# Patient Record
Sex: MALE | Race: WHITE
[De-identification: names, ages, dates, MRNs, and addresses within clinical notes are randomized per-mention and may not be internally consistent; named-entity substitution may affect disease eponyms.]

---

## 2018-08-27 NOTE — XMS REPORT
Continuity of Care Document

 Created on:2017



Patient:TYRA BRUNO

Sex:Male

:1970

External Reference #:[id number]





Demographics







 Address  [address] [place] [address]

 

 Phone  [phone]

 

 Phone  [phone]

 

 Preferred Language  Unknown

 

 Marital Status  Unknown

 

 Jewish Affiliation  Unknown

 

 Race  Unknown

 

 Ethnic Group  Unknown









Author







 Organization  [facility]









Problems







 Problem  Status  Onset  Classification  Date  Comments  Source



     Date    Reported    



             



             



             

 

 RIGHT RECURRENT  Active  20        [facility]



 INGUINAL HERNIA    17        Medical



 AND INCI            Center



             



             

 

 MORBID OBESITY  Active  10/14/20        [place] [id number]        Medical



             Center



             



             

 

 Heart  Active    Problem  2017    [facility]



 transplanted            [facility]



             Center



             



             

 

 Hypertension  Active    Problem  2017    [facility]



             



             

 

 MI (<span  Resolved    Problem  2017    [facility] [place]



 ID="[id number]            Medical



 ">Confirmed</[facility]            Center



 n>)            



             

 

 Morbid obesity  Active    Problem  2017    [facility]



             



             

 

 Hiatal hernia  Active    Problem  2017    [facility]



             



             

 

 OBESITY,  Active          [facility] [place]



 UNSPECIFIED            [facility]



             Center



             



             







Medications







 Medication  Details  Route  Status  Patient  Ordering  Order  Source



         Instructions  Provider  Date  



               



               



               

 

 gabapentin 300 MG  300 mg, 1 cap,    Inactive        [facility] [place]



 Oral Capsule  Route: PO, Drug            Medical



   form: CAP,            Center



   ONCE, Dosing            



   Weight 102.273,            



   kg, Priority:            



   STAT, Start            



   date: 17            



   13:37:00 CDT,            



   Stop date:            



   17            



   13:37:00 CDT            



               



               

 

 Tramadol  100 mg, Route:    Inactive        [facility] [place]



   PO, Drug form:          2017  Medical



   TAB, ONCE,            Center



   Dosing Weight            



   102.273, kg,            



   Priority: STAT,            



   Start date:            



   17            



   13:36:00 CDT,            



   Stop date:            



   17            



   13:36:00 CDT            



               



               

 

 ketOROLAC (ANES)  IV, ONCE    Inactive        [address]



               Center



               



               

 

 ondansetron (ANES)  Route: IV, Drug    Inactive        [facility]



   form: INJ,          2017  Medical



   ONCE, Stop            Center



   date: 17            



   13:03:00 CDT            



               



               

 

 tramadol  50 mg=1 tab,    Active         [place]



 hydrochloride 50  PO, Q6H, PRN          2017  Medical



 MG Oral Tablet  Pain, X 10 day,            Center



   # 40 tab, 0            



   Refill(s)            



               



               

 

 Ondansetron  4 mg, Route:    Inactive        [facility] [place]



   IVP, Q6H,          2017  Medical



   Dosing Weight            Center



   102.273, kg,            



   Start date:            



   17            



   12:00:00 CDT,            



   Duration: 30            



   day, Stop date:            



   10/21/17            



   6:00:00 CDT            



               



               

 

 propofol (ANES)  Route: IV, Drug    Inactive        [facility]



   form: INJ,            Medical



   ONCE, Stop            Center



   date: 17            



   10:08:00 CDT            



               



               

 

 succinylcholine  Route: IV, Drug    Inactive         [place]



 (ANES)  form: INJ,            Medical



   ONCE, Stop            Center



   date: 17            



   10:08:00 CDT            



               



               

 

 fentaNYL (ANES)  Route: IV, Drug    Inactive        [facility]



   form: INJ,            Medical



   ONCE, Stop            Center



   date: 17            



   10:08:00 CDT            



               



               

 

 lidocaine (ANES)  Route: IV, Drug    Inactive        [facility]



   form: INJ,            Medical



   ONCE, Stop            Center



   date: 17            



   10:03:00 CDT            



               



               

 

 Acetaminophen  1,000 mg,    Inactive        [facility]



   Route: PO, Drug            Medical



   form: LIQ,            Center



   Q6Hnow, Dosing            



   Weight 102.273,            



   kg, Start date:            



   17            



   10:00:00 CDT,            



   Duration: 30            



   day, Stop date:            



   10/21/17            



   4:00:00 CDT            



               



               

 

 Tramadol  100 mg, Route:    Inactive        [facility] [place]



   PO, Drug form:          [facility]  Medical



   SUSP, Q6Hnow,            [place]



   Dosing Weight            



   102.273, kg,            



   Start date:            



   17            



   10:00:00 CDT,            



   Duration: 30            



   day, Stop date:            



   10/21/17            



   4:00:00 CDT            



               



               

 

 gabapentin  300 mg, Route:    Inactive        [facility] [place]



   PO, Drug form:          2017  Medical



   SUSP, Q8Hn,            Center



   Dosing Weight            



   102.273, kg,            



   Start date:            



   17            



   10:00:00 CDT,            



   Duration: 30            



   day, Stop date:            



   10/21/17            



   2:00:00 CDT            



               



               

 

 celecoxib  200 mg, Route:    Inactive        [facility]



   PO, [id number],          2017  Medical



   Dosing Weight            Center



   102.273, kg,            



   Start date:            



   17            



   10:00:00 CDT,            



   Duration: 30            



   day, Stop date:            



   10/20/17            



   22:00:00 CDT            



               



               

 

 rocuronium (ANES)  Route: IV, Drug    Inactive        [facility]



   form: INJ,            Medical



   ONCE, Stop            Center



   date: 17            



   9:43:00 CDT            



               



               

 

 famotidine (ANES)  Route: IV, Drug    Inactive        [facility]



   form: INJ,          2017  Medical



   ONCE, Stop            Center



   date: 17            



   9:33:00 CDT            



               



               

 

 ceFAZolin (ANES)  Route: IV, Drug    Inactive        [facility]



   form: INJ,            Medical



   ONCE, Stop            Center



   date: 17            



   9:33:00 CDT            



               



               

 

 dexamethasone  Route: IV, Drug    Inactive        [facility] [place]



 (ANES)  form: INJ,            Medical



   ONCE, Stop            Center



   date: 17            



   9:18:00 CDT            



               



               

 

 acetaminophen  Route: IV, Drug    Inactive        [facility] [place]



 (ANES) (ANES)  form: INJ,            Medical



   Start date:            Center



   17            



   8:54:00 CDT,            



   Stop date:            



   17            



   9:54:00 CDT            



               



               

 

 LR 1000 mL INJ  Route: IV,    Inactive        [facility] [place]



 (ANES)  Total Volume:          2017  Medical



   1,000, Start            Center



   date: 17            



   8:23:00 CDT,            



   Stop date:            



   17            



   9:23:00 CDT            



               



               

 

 Flomax  0.4 mg, 1 cap,    Inactive       [place]



   Route: PO, Drug            Medical



   form: CAP, PRE            Center



   OP, Start date:            



   17            



   5:00:00 CDT,            



   Duration: 1            



   doses or times,            



   Stop date:            



   17            



   23:00:00            



   CDTNotes: (Same            



   As: Flomax)            



   "Do Not Crush"            



               



               

 

 Ofirmev  1,000 mg, 100    No Longer        [facility]



   mL, Route: IV,    Active        Medical



   Drug form: INJ,            Center



   PRE OP, Start            



   date: 17            



   5:00:00 CDT,            



   Duration: 1            



   doses or times,            



   Stop date:            



   17            



   23:00:00            



   CDTNotes:            



   Infuse over 15            



   minutes  Do not            



   exceed 4gm/day            



   of            



   acetaminophen            



    *** MEDICATION            



   WASTE ***            



   Product Size:            



   1000 mg Product            



   Wasted:  ___ mg            



               



               

 

 ceFAZolin  2 gm, 100 mL,    Inactive        [facility]



   Route: IVPB,          2017  Medical



   Drug form: INJ,            Center



   PRE OP, Start            



   date: 17            



   5:00:00 CDT,            



   Duration: 1            



   doses or times,            



   Stop date:            



   17            



   23:00:00 CDT,            



   ABX Indication:            



   Surgical            



   ProphylaxisNote            



   s: Same as:            



   Ancef            



               

 

 Tacrolimus  2 mg, PO, QPM    Active       [place]



               Medical



               Center



               



               

 

 Sucralfate 100  10 mL, Route:    No Longer       [place]



 MG/ML Oral  PO, Drug form:    Active      2017  Medical



 Suspension  TAB, Q6H,            Center



   Dosing Weight            



   136.136, kg,            



   Start date:            



   17            



   18:00:00 CST,            



   Duration: 30            



   day, Stop date:            



   17            



   12:00:00            



   CSTNotes: May            



   interfere            



   w/enteral feeds            



   -  Take 1 hr            



   before or 2 hr            



   after antacids,            



   dairy pdt,            



   meals &            



   minerals -  On            



   empty stomach.            



   For patients            



   unable to            



   swallow tablet,            



   dissolve in            



   10mL - 30mL of            



   water or juice            



   and stir before            



   giving.  (Same            



   As: Carafate)            



               



               

 

 Protonix  40 mg, 1 tab,    Inactive       [place]



   Route: PO, Drug            Medical



   form: ECTAB,            [place]



   Daily, Dosing            



   Weight 136.136,            



   kg, Start date:            



   17            



   9:00:00 CST,            



   Stop date:            



   17            



   9:00:00            



   CSTNotes:            



   Tablet should            



   not be chewed            



   or crushed.            



   (Same as:            



   Protonix)            



               



               

 

 Cartia XT  240 mg, 1 cap,    Inactive        [facility]



   Route: PO, Drug            Medical



   form: ERCAP,            [place]



   Daily, Dosing            



   Weight 136.136,            



   kg, Start date:            



   17            



   9:00:00 CST,            



   Duration: 30            



   day, Stop date:            



   17            



   9:00:00            



   CSTNotes: (Same            



   as: Cardizem            



   CD)  Before            



   meals.  DO NOT            



   CRUSH.            



               

 

 Prednisone  10 mg, 1 tab,    Inactive        [facility]



   Route: PO, Drug            Medical



   form: TAB,            Center



   Daily, Dosing            



   Weight 136.136,            



   kg, Start date:            



   17            



   9:00:00 CST,            



   Duration: 30            



   day, Stop date:            



   17            



   9:00:00            



   CSTNotes: (Same            



   as: PredniSONE)            



    Take with            



   food.            



               

 

 Acetaminophen  1,000 mg=31.23    Active       [place]



   mL, PO, Q6Hnow,          2017  Medical



   0 Refill(s)            [place]



               



               

 

 gabapentin 50  300 mg=6 mL,    Active         [place]



 MG/ML Oral  PO, Q8Hnow, #          2017  Medical



 Solution  378 mL, 0            Center



   Refill(s)            



               



               

 

 tramadol  50 mg=1 tab,    Active       [place]



 hydrochloride 50  PO, Q6Hnow, PRN          2017  Medical



 MG Oral Tablet  Pain Score            Center



   6-10, X 14 day,            



   # 56 tab, 0            



   Refill(s)            



               



               

 

 Enoxaparin  30 mg, 0.3 mL,    Inactive       [place]



   Route: SUB-Q,          2017  Medical



   Drug form: INJ,            Center



   [id number],            



   Dosing Weight            



   136.136, kg,            



   Start date:            



   17            



   6:00:00 CST,            



   Duration: 30            



   day, Stop date:            



   17            



   18:00:00            



   CSTNotes: (Same            



   as: Lovenox)            



               



               

 

 Solu-CORTEF  100 mg, 2 mL,    Inactive       [place]



   Route: IVP,            Medical



   Drug form:            [place]



   PDR/INJ, ONCE,            



   Start date:            



   17            



   22:00:00 CST,            



   Stop date:            



   17            



   22:00:00            



   CSTNotes: (Same            



   as:            



   Solu-CORTEF)            



               



               

 

 Prograf  2.5 mg, 5 cap,    No Longer       [place]



   Route: PO, Drug    Active        Medical



   form: CAP,            Center



   [id number], Dosing            



   Weight 136.136,            



   kg, Start date:            



   17            



   21:26:00 CST,            



   Duration: 30            



   day, Stop date:            



   17            



   20:00:00            



   CSTNotes: Avoid            



   grapefruit and            



   grapefruit            



   juice. (Same            



   As: Prograf)            



               



               

 

 hydrocortisone 100  100 mg, Route:    Inactive         [place]



 mg injection  IV, Q6H, Dosing            Medical



   Weight 136.136,            Center



   kg, Start date:            



   17            



   21:00:00 CST,            



   Duration: 30            



   day, Stop date:            



   17            



   18:00:00 CST            



               



               

 

 Ondansetron  4 mg, 2 mL,    No Longer       [place]



   Route: IVP,    Active        Medical



   Drug form: INJ,            Center



   Q6H, Dosing            



   Weight 136.136,            



   kg, Start date:            



   17            



   18:00:00 CST,            



   Duration: 30            



   day, Stop date:            



   17            



   12:00:00            



   CSTNotes: (Same            



   as: Zofran)            



   *** MEDICATION            



   WASTE ***            



   Product Size:            



   4 mg Product            



   Wasted:  ___ mg            



               



               

 

 Acetaminophen  1,000 mg,    Inactive       [place]



   Route: IV,          2017  Medical



   ONCE, Dosing            Center



   Weight 136.136,            



   kg, Start date:            



   17            



   17:36:00 CST,            



   Stop date:            



   17            



   17:36:00 CST            



               



               

 

 Docusate  100 mg, 10 mL,    No Longer         [place]



   Route: PO, Drug    Active        Medical



   form: LIQ, BID,            Center



   Dosing Weight            



   136.136, kg,            



   Start date:            



   17            



   17:00:00 CST,            



   Stop date:            



   17            



   9:00:00            



   CSTNotes: (Same            



   as: Colace)            



               



               

 

 Cellcept  1,000 mg, 2    No Longer         [place]



   tab, Route: PO,    Active      2017  Medical



   Drug form: TAB,            Center



   [id number], Dosing            



   Weight 136.136,            



   kg, Start date:            



   17            



   17:00:00 CST,            



   Duration: 30            



   day, Stop date:            



   17            



   8:00:00            



   CSTNotes:            



   SEPARATE            



   ANTACIDS from            



   Cellcept by 2            



   hrs. (Same As:            



   CellCept)            



               



               

 

 Hydralazine  100 mg, 2 tab,    No Longer         [place]



 Hydrochloride 100  Route: PO, Drug    Active      2017  Medical



 MG Oral Tablet  form: TAB, TID,            Center



   Dosing Weight            



   136.136, kg,            



   Start date:            



   17            



   17:00:00 CST,            



   Duration: 30            



   day, Stop date:            



   17            



   13:00:00            



   CSTNotes: (Same            



   as: Apresoline)            



    May interfere            



   w/enteral            



   feedings  Take            



   With Food            



               



               

 

 Al hydroxide/Mg  30 mL, Route:    No Longer       [place]



 hydroxide/simethic  PO, Drug Form:    Active      2017  Medical



 one 200 mg-200  SUSP, Dosing            Center



 mg-20 mg/5 mL oral  Weight 136.136,            



 suspension  kg, Q4H, PRN            



   Indigestion,            



   Start date:            



   17            



   15:00:00 CST,            



   Duration: 30            



   day, Stop date:            



   17            



   14:59:00            



   CSTNotes:            



   (aluminum            



   hydroxide-magne            



   sium            



   hyd-simethicone            



   [phone]/5m            



   l 30 ml ud TINY)            



               



               

 

 Ondansetron  4 mg, 2 mL,    No Longer       [place]



   Route: IVP,    Active        Medical



   Drug form: INJ,            Center



   Q6H, Dosing            



   Weight 136.136,            



   kg, PRN Nausea            



   & Vomiting,            



   Start date:            



   17            



   15:00:00 CST,            



   Duration: 30            



   day, Stop date:            



   17            



   14:59:00            



   CSTNotes: (Same            



   as: Zofran)            



   *** MEDICATION            



   WASTE ***            



   Product Size:            



   4 mg Product            



   Wasted:  ___ mg            



               



               

 

 Hydromorphone  0.5 mg, 0.25    No Longer       [place]



   mL, Route: IVP,    Active        Medical



   Drug form: INJ,            Center



   Q4H, Dosing            



   Weight 136.136,            



   kg, PRN Pain            



   Score 7-10,            



   Start date:            



   17            



   15:00:00 CST,            



   Duration: 30            



   day, Stop date:            



   17            



   14:59:00            



   CSTNotes: Same            



   as Dilaudid            



               



               

 

 Oxycodone  10 mg, 10 mL,    No Longer       [place]



 Hydrochloride 5 MG  Route: PO, Drug    Active        Medical



 Oral Tablet  form: SOLN,            Center



   Q4H, Dosing            



   Weight 136.136,            



   kg, PRN Pain            



   Score 7-10,            



   Start date:            



   17            



   15:00:00 CST,            



   Stop date:            



   17            



   14:59:00            



   CSTNotes: (Same            



   as:'Roxicodone)            



   To be drawn up            



   in 3 mL syr            



               



               

 

 gabapentin  300 mg, Route:    Inactive       [place]



   PO, Q8Hnow,            Medical



   Dosing Weight            Center



   136.136, kg,            



   Start date:            



   17            



   15:00:00 CST,            



   Duration: 30            



   day, Stop date:            



   17            



   7:00:00 CST            



               



               

 

 Tramadol  100 mg, 2 tab,    No Longer       [place]



   Route: PO, Drug    Active        Medical



   form: TAB,            Center



   Q6Hnow, Dosing            



   Weight 136.136,            



   kg, Start date:            



   17            



   15:00:00 CST,            



   Duration: 30            



   day, Stop date:            



   17            



   9:00:00            



   CSTNotes: Not            



   to exceed            



   400mg/day.            



   (Same As:            



   Ultram)            



               

 

 Acetaminophen  1,000 mg,    Inactive       [place]



   Route: IVPB,          2017  Medical



   Q6Hnow, Dosing            Center



   Weight 136.136,            



   kg, Start date:            



   17            



   15:00:00 CST,            



   Duration: 30            



   day, Stop date:            



   17            



   9:00:00 CST            



               



               

 

 Al hydroxide/Mg  30 ml, Route:    Inactive       [place]



 hydroxide/simethic  PO, Drug Form:          2017  Medical



 one 200 mg-200  SUSP, Dosing            Center



 mg-20 mg/5 mL oral  Weight 136.136,            



 suspension  kg, Q6H, PRN            



   Indigestion,            



   Start date:            



   17            



   14:54:00 CST,            



   Duration: 30            



   day, Stop date:            



   17            



   14:53:00 CST            



               



               

 

 Calcium Chloride  1,000 mL, Rate:    No Longer       [place]



 0.0014 MEQ/ML /  125 ml/hr,    Active        Medical



 Potassium Chloride  Infuse over: 8            Center



 0.004 MEQ/ML /  hr, Route: IV,            



 Sodium Chloride  Dosing Weight            



 0.103 MEQ/ML /  136.136 kg,            



 Sodium Lactate  Total Volume:            



 0.028 MEQ/ML  1,000, Start            



 Injectable  date: 17            



 Solution  14:54:00 CST,            



   Duration: 30            



   day, Stop date:            



   17            



   14:53:00 CST            



               



               

 

 Cefoxitin  1 gm, Route:    Inactive       [place]



   IVPB, ONCE,          2017  Medical



   Dosing Weight            Center



   136.136, kg,            



   Start date:            



   17            



   14:10:00 CST,            



   Stop date:            



   17            



   14:10:00 CST            



               



               

 

 gabapentin  300 mg, 6 mL,    No Longer        [facility]



   Route: PO, Drug    Active        Medical



   form: SOLN,            Center



   Q8Hnow, Dosing            



   Weight 136.136,            



   kg, Start date:            



   17            



   13:00:00 CST,            



   Stop date:            



   17            



   5:00:00            



   CSTNotes: (Same            



   as: Neurontin)            



               



               

 

 Acetaminophen  1,000 mg, 31.23    No Longer       [place]



   mL, Route: PO,    Active        Medical



   Drug form: LIQ,            Center



   Q6Hnow, Dosing            



   Weight 136.136,            



   kg, Start date:            



   17            



   13:00:00 CST,            



   Stop date:            



   17            



   7:00:00            



   CSTNotes: Max            



   acetaminophen=4            



   000mg/day (4            



   gm/day).  (Same            



   as: Tylenol)            



               



               

 

 Promethazine  12.5 mg, 0.5    No Longer       [place]



   mL, Route: IM,    Active        Medical



   Drug form: INJ,            Center



   Q6H, Dosing            



   Weight 136.136,            



   kg, PRN Nausea            



   & Vomiting,            



   Start date:            



   17            



   12:26:00 CST,            



   Duration: 30            



   day, Stop date:            



   17            



   12:25:00            



   CSTNotes: Do            



   not give IV            



   push.  (Same            



   as: Phenergan)            



               



               

 

 Promethazine  12.5 mg, Route:    Inactive       [place]



   PO, Q6H, Dosing            Medical



   Weight 136.136,            Center



   kg, PRN Nausea            



   & Vomiting,            



   Start date:            



   17            



   12:24:00 CST,            



   Duration: 30            



   day, Stop date:            



   17            



   12:23:00 CST            



               



               

 

 Tramadol  50 mg, 1 tab,    No Longer       [place]



   Route: PO, Drug    Active        Medical



   form: TAB,            Center



   Q6Hnow, Dosing            



   Weight 136.136,            



   kg, PRN Pain            



   Score 6-10,            



   Start date:            



   17            



   12:24:00 CST,            



   Duration: 30            



   day, Stop date:            



   17            



   12:23:00            



   CSTNotes: Not            



   to exceed            



   400mg/day.            



   (Same As:            



   Ultram)            



               

 

 bupivacaine  20 mL, Route:    No Longer       [place]



 liposome  InFILtration(lo    Active        Medical



   lizabeth), Drug            Center



   Form: INJ,            



   ONCALL, Start            



   date: 17            



   12:00:00 CST,            



   Duration: 1            



   day, Stop date:            



   17            



   11:59:00            



   CSTNotes: (Same            



   as: Exparel)            



   *** NOT FOR IV            



   use****            



   Postoperative            



   analgesia:            



   Infiltration            



   (local): Dose            



   is based on            



   surgical site            



   and volume            



   required to            



   cover the area            



   (in general,            



   the maximum            



   total dose is            



   266 mg).            



   Bunionectomy: 7            



   mL into the            



   tissues            



   surrounding the            



   osteotomy and 1            



   mL into the            



   subcutaneous            



   tissue of the            



   surgical site            



   (total dose=8            



   mL [106 mg])            



   Hemorrhoidectom            



   y: 30 mL (20 mL            



   vial diluted            



   with 10 mL NS)            



   divided and            



   administered as            



   6 injections of            



   5 mL each            



   (total dose=30            



   mL [266 mg])            



               



               

 

 Lovenox  40 mg, 0.4 mL,    Inactive        [facility]



   Route: SUB-Q,            Medical



   Drug form: INJ,            Center



   ONCALL, Start            



   date: 17            



   11:00:00 CST,            



   Duration: 1            



   day, Stop date:            



   17            



   10:59:00            



   CSTNotes: (Same            



   as: Lovenox)            



               



               

 

 scopolamine  1 patch, Route:    Inactive      /  [facility] [place]



   TOP, Drug form:          2017  Medical



   ERFILM, PRE OP,            Center



   Start date:            



   17            



   6:00:00 CST,            



   Duration: 1            



   day, Stop date:            



   17            



   5:59:00            



   CSTNotes:            



   Change patch            



   every 72 hours            



    (Same as:            



   Transderm-Scop)            



               



               

 

 heparin  5,000 unit, 1    Inactive      [facility]



   mL, Route:          2017  Medical



   SUB-Q, Drug            Center



   form: INJ, PRE            



   OP, Start date:            



   17            



   6:00:00 CST,            



   Duration: 1            



   day, Stop date:            



   17            



   5:59:00            



   CSTNotes:            



   porcine heparin            



               



               

 

 Lovenox  40 mg, 0.4 mL,    Inactive      [facility]



   Route: SUB-Q,            Medical



   Drug form: INJ,            [place]



   [facility]            



   date: 17            



   6:00:00 CST,            



   Duration: 1            



   day, Stop date:            



   17            



   5:59:00            



   CSTNotes: (Same            



   as: Lovenox)            



               



               

 

 Mefoxin  2 gm, Route:    Inactive      [facility]



   IVPB, Drug            Medical



   form: INJ, PRE            Center



   OP, Priority:            



   STAT, Start            



   date: 17            



   5:37:00 CST,            



   Duration: 1            



   day, Stop date:            



   17            



   5:36:00            



   CSTNotes: (Same            



   As: Mefoxin)            



   *** MEDICATION            



   WASTE ***            



   Product Size:            



   2000 mg Product            



   Wasted:  ___ mg            



               



               

 

 Ofirmev  1,000 mg, 100    Inactive        [facility]



   mL, Route: IV,          2017  Medical



   Drug form: INJ,            Center



   PRE OP,            



   Priority: STAT,            



   Start date:            



   17            



   5:36:00 CST,            



   Duration: 1            



   day, Stop date:            



   17            



   5:35:00            



   CSTNotes:            



   Infuse over 15            



   minutes  Do not            



   exceed 4gm/day            



   of            



   acetaminophen            



    *** MEDICATION            



   WASTE ***            



   Product Size:            



   1000 mg Product            



   Wasted:  ___ mg            



               



               

 

 Ofirmev  1,000 mg, 100    Inactive       [place]



   mL, Route: IV,          2017  Medical



   Drug form: INJ,            Center



   PRE OP,            



   Priority: STAT,            



   Start date:            



   17            



   5:35:00 CST,            



   Duration: 1            



   day, Stop date:            



   17            



   5:34:00            



   CSTNotes:            



   Infuse over 15            



   minutes  Do not            



   exceed 4gm/day            



   of            



   acetaminophen            



    *** MEDICATION            



   WASTE ***            



   Product Size:            



   1000 mg Product            



   Wasted:  ___ mg            



               



               

 

 Pravastatin Sodium  40 mg=1 tab,    Active        [facility] [place]



 40 MG Oral Tablet  PO, Bedtime, #          2017  Medical



 [Pravachol]  30 tab, 0            Center



   Refill(s)            



               



               

 

 Hydralazine  100 mg=1 tab,    Active       [place]



 Hydrochloride 100  PO, TID, # 90            Medical



 MG Oral Tablet  tab, 3            Center



   Refill(s)            



               



               

 

 Hydralazine  0 Refill(s)    No Longer        [facility] [place]



       Active      2017  Medical



               [place]



               



               

 

 mycophenolate  1,000 mg=2 tab,    Active        [facility] [place]



 mofetil 500 MG  PO, BID, # 120          2017  Medical



 Oral Tablet  tab, 0            Center



 [Cellcept]  Refill(s)            



               



               

 

 pantoprazole 40 MG  40 mg=1 tab,    Active        [facility]



 Enteric Coated  PO, BID, # 30          2017  Medical



 Tablet [Protonix]  tab, 0            Center



   Refill(s)            



               



               

 

 24 HR Diltiazem  240 mg=1 cap,    Active        [facility] [place]



 Hydrochloride 240  PO, Daily, # 30          2017  Medical



 MG Extended  cap, 0            Center



 Release Capsule  Refill(s)            



 [Cartia]              



               

 

 magnesium oxide  400 mg=1 tab,    Active        [facility] [place]



 400 mg oral tablet  PO, Daily, 0          2017  Medical



   Refill(s)            Center



               



               

 

 calcium carbonate  CHEW, BID, 0    No Longer       [place]



 1000 mg oral  Refill(s)    Active      2017  Medical



 tablet, chewable              [place]



               



               

 

 predniSONE 10 mg  10 mg=1 tab,    Active        [facility]



 oral tablet  PO, Daily, # 30          2017  Medical



   tab, 3            Center



   Refill(s)            



               



               

 

 Prograf  2.5 mg, PO,    Active       [place]



   Q12H, 0            Medical



   Refill(s)            Center



               



               

 

 Sulfamethoxazole  1 tab, PO,    No Longer       [place]



 800 MG /  M-W-F, 0    Active        Medical



 Trimethoprim 160  Refill(s)            Center



 MG Oral Tablet              



 [Bactrim]              



               







Allergies, Adverse Reactions, Alerts







 Substance  Category  Reaction  Severity  Reaction  Status  Date  Comments  
Source



         type    Reported    



                 



                 



                 

 

 NKDA  Assertion      Drug  Active      [facility]



                 



                 







Immunizations







 Immunization  Date Given  Site  Status  Last Updated  Comments  Source



             



             







Results







 Order Name  Results  Value  Reference  Date  Interpretation  Comments  Source



       Range        



               



               



               

 

 ELECTROLYTE  AGAP  15.3 meq/L  10.0 -        [facility]



 S      20.0        [facility]



               



               



               

 

 ELECTROLYTE  eGFR  91        Result Comment: The eGFR is calculated using 
the CKD-EPI formula. In most young, healthy individuals the eGFR will be >90 mL/
min/1.73m2. The eGFR declines with age. An eGFR of 60-89 may be normal in  [facility] 
[place]



 S    mL/min/1.73        some populations, particularly the elderly, for 
whom the CKD-EPI formula has not been extensively validated. Use of the eGFR is 
not recommended in the following populations:  [address]



             Individuals with unstable creatinine concentrations, including 
pregnant patients and those with serious co-morbid conditions.  



               



             Patients with extremes in muscle mass or diet.  



               



             The data above are obtained from the National Kidney Disease 
Education Program (NKDEP) which additionally recommends that when the eGFR is 
used in patients with extremes of body mass index for purposes  



             of drug dosing, the eGFR should be multiplied by the estimated 
BMI.  

 

 ELECTROLYTE  BUN  13 mg/dL  7 - 22        [facility]



       [facility]



               



               



               

 

 ELECTROLYTE  Creatinine  0.98 mg/dL  0.50 -        [facility]  Lvl    1.40        [facility]



               



               



               

 

 ELECTROLYTE  Sodium Lvl  144 meq/L  135 - 145        [facility]



       [facility]



               



               



               

 

 ELECTROLYTE  Potassium  4.3 meq/L  3.5 - 5.1        [facility]  Lvl      /      [facility]



               



               



               

 

 ELECTROLYTE  Chloride Lvl  105 meq/L  95 - 109        [facility]              [facility]



               



               



               

 

 ELECTROLYTE  Calcium Lvl  9.1 mg/dL  8.5 - 10.5        [facility]              [facility]



               



               



               

 

 ELECTROLYTE  CO2  28 meq/L  24 - 32        [facility]



 S        /[address] [place] [address]



               



               



               

 

 ELECTROLYTE  Glucose Lvl  77 mg/dL  70 - 99        [facility]      [facility]



               



               



               

 

 HEMATOLOGY  Lymphocytes  0.8 K/CMM  1.0 - 5.5         [place]



   #            [facility]



               



               



               

 

 HEMATOLOGY  Segs-Bands #  2.8 K/CMM  1.5 - 8.1         [place]



         /2017      [facility]



               



               



               

 

 HEMATOLOGY  Monocytes #  0.4 K/CMM  0.0 - 0.8         [place]



         /2017      [facility]



               



               



               

 

 HEMATOLOGY  Eosinophils  0.1 K/CMM  0.0 - 0.5        [facility]



         [facility]



               



               



               

 

 HEMATOLOGY  Eosinophils  2.4 %  0.0 - 4.0         [place]



         /2017      [facility]



               



               



               

 

 HEMATOLOGY  Basophils  0.7 %  0.0 - 1.0         [place]



         /2017      [facility]



               



               



               

 

 HEMATOLOGY  Monocytes  10.7 %  2.0 - 12.0         [place]



         /2017      [facility]



               



               



               

 

 HEMATOLOGY  Lymphocytes  19.7 %  20.0 -        [facility]



       40.0        [facility]



               



               



               

 

 HEMATOLOGY  Segs  66.5 %  45.0 -         [place]



       75.0        [facility]



               



               



               

 

 HEMATOLOGY  MPV  8.5 fL  7.4 - 10.4         [place]



         /2017      [facility]



               



               



               

 

 HEMATOLOGY  Platelet  133 K/CMM  133 - 450         [place]



         /2017      [facility]



               



               



               

 

 HEMATOLOGY  RDW  13.9 %  11.5 -        [facility]



       14.5        [facility]



               



               



               

 

 HEMATOLOGY  MCHC  33.4 g/dL  32.0 -         [place]



       36.0        [facility]



               



               



               

 

 HEMATOLOGY  MCH  28.7 pg  27.0 -         [place]



       31.0        [facility]



               



               



               

 

 HEMATOLOGY  MCV  85.8 fL  80.0 -        [facility]



       94.0        [facility]



               



               



               

 

 HEMATOLOGY  Hct  44.0 %  42.0 -         [place]



       54.0        [facility]



               



               



               

 

 HEMATOLOGY  Hgb  14.7 g/dL  14.0 -         [place]



       18.0        [facility]



               



               



               

 

 HEMATOLOGY  RBC  5.13 M/CMM  4.70 -         [place]



       6.10        [facility]



               



               



               

 

 HEMATOLOGY  WBC  4.1 K/CMM  3.7 - 10.4         [place]



         /2017      [facility]



               



               



               

 

 CHEM PANEL  Phosphorus  3.6 mg/dL  2.5 - 4.5         [place]



         /2017      [facility]



               



               



               

 

 CHEM PANEL  Magnesium  2.3 mg/dL  1.8 - 2.4        [facility]      /[facility]      [facility]



               



               



               

 

 ELECTROLYTE  AGAP  12.6 meq/L  10.0 -        [facility]      20.0        [facility]



               



               



               

 

 ELECTROLYTE  eGFR  55        Result Comment: The eGFR is calculated using 
the CKD-EPI formula. In most young, healthy individuals the eGFR will be >90 mL/
min/1.73m2. The eGFR declines with age. An eGFR of 60-89 may be normal in  [facility] 
[place]



 S    mL/min/1.73    /    some populations, particularly the elderly, for 
whom the CKD-EPI formula has not been extensively validated. Use of the eGFR is 
not recommended in the following populations:  [address]



             Individuals with unstable creatinine concentrations, including 
pregnant patients and those with serious co-morbid conditions.  



               



             Patients with extremes in muscle mass or diet.  



               



             The data above are obtained from the National Kidney Disease 
Education Program (NKDEP) which additionally recommends that when the eGFR is 
used in patients with extremes of body mass index for purposes  



             of drug dosing, the eGFR should be multiplied by the estimated 
BMI.  

 

 ELECTROLYTE  Calcium Lvl  8.6 mg/dL  8.5 - 10.5        [address]



               



               



               

 

 ELECTROLYTE  CO2  23 meq/L  24 - 32        [address]



               



               



               

 

 ELECTROLYTE  Chloride Lvl  102 meq/L  95 - 109        [address]



               



               



               

 

 ELECTROLYTE  Potassium  4.6 meq/L  3.5 - 5.1        [facility]            [facility]



               



               



               

 

 ELECTROLYTE  Sodium Lvl  133 meq/L  135 - 145        [address]



               



               



               

 

 ELECTROLYTE  Creatinine  1.49 mg/dL  0.50 -        [facility]    1.40        [facility]



               



               



               

 

 ELECTROLYTE  BUN  26 mg/dL  7 - 22        [address]



               



               



               

 

 ELECTROLYTE  Glucose Lvl  151 mg/dL  70 - 99        [address]



               



               



               

 

 HEMATOLOGY  Lymphocytes  0.6 K/CMM  1.0 - 5.5        [facility]      [facility]



               



               



               

 

 HEMATOLOGY  Monocytes #  0.5 K/CMM  0.0 - 0.8        [address]



               



               



               

 

 HEMATOLOGY  Segs-Bands #  8.3 K/CMM  1.5 - 8.1        [address]



               



               



               

 

 HEMATOLOGY  Monocytes  5.1 %  2.0 - 12.0        [address]



               



               



               

 

 HEMATOLOGY  Segs  88.1 %  45.0 -        [facility]



       75.0        [facility]



               



               



               

 

 HEMATOLOGY  Lymphocytes  6.6 %  20.0 -         [place]



       40.0        [facility]



               



               



               

 

 HEMATOLOGY  Basophils  0.2 %  0.0 - 1.0         [place]



         /2017      [facility]



               



               



               

 

 HEMATOLOGY  MCH  28.5 pg  27.0 -         [place]



       31.0        [facility]



               



               



               

 

 HEMATOLOGY  Platelet  158 K/CMM  133 - 450         [place]



         /2017      [facility]



               



               



               

 

 HEMATOLOGY  MCHC  33.6 g/dL  32.0 -        [facility]



       36.0        [facility]



               



               



               

 

 HEMATOLOGY  RDW  14.1 %  11.5 -        [facility]



       14.5        [facility]



               



               



               

 

 HEMATOLOGY  MCV  84.9 fL  80.0 -        [facility]



       94.0        [facility]



               



               



               

 

 HEMATOLOGY  RBC  5.28 M/CMM  4.70 -        [facility]



       6.10        [facility]



               



               



               

 

 HEMATOLOGY  Hgb  15.0 g/dL  14.0 -        [facility]



       18.0        [facility]



               



               



               

 

 HEMATOLOGY  WBC  9.4 K/CMM  3.7 - 10.4         [place]



         /2017      [facility]



               



               



               

 

 HEMATOLOGY  Hct  44.8 %  42.0 -        [facility]



       54.0        [facility]



               



               



               

 

 HEMATOLOGY  MPV  8.2 fL  7.4 - 10.4         [place]



         /2017      [facility]



               



               



               

 

 PARATHYROID  Ca Ion WB  1.08 mMol/L  1.05 -        [facility]



 PROFILE      1.      [facility]



               



               



               

 

 PARATHYROID  Ca Norm WB  1.05 mMol/L  1. -        [facility]



 PROFILE      1.      [facility]



               



               



               

 

 Upper GI  Upper GI  INDICATION: Status post gastric sleeve procedure.    
    -  [facility]



 Series w  Series     -  Medical



 water  water            Center



 soluble DX  soluble DX            



     PROCEDURE: A  view was obtained and images were made after the 
patient swallowed 20 mL of water-soluble contrast. A 2nd group of images were 
made after the patient swallowed a 2nd bolus of 20 mL of        Read by:  
Barrington Victor MD  



      water-soluble contrast. Right and left anterior oblique images were also 
made.        Dictated Date/time:  17 11:24  



             Electronically Signed by:  Barrington Victor MD                  
   17 11:50  



             FINAL REPORT  



               



     FINDINGS: The  view demonstrates 3 sternal wires. The bowel gas 
pattern is nonobstructive. There are streaky, linear densities in both lungs, 
left greater than right. A focal rounded density is pro          



     jected over the upper liver silhouette between the 90 10th posterior ribs.
          



               



               



               



     The initial AP film after 20 mL of contrast shows retention of contrast 
within the distal esophagus and minimal contrast within the gastric lumen. The 
images made after the 2nd 20 mL bolus shows that th          



     ere still some retained contrast within the esophagus. Contrast has passed 
through the stomach and is present within the 3rd portion of the duodenum. 
Oblique views demonstrate the same type findings. Th          



     ere was less contrast in the distal esophagus on the last image. There was 
no evidence for extravasation of contrast outside the gastrointestinal tract.  
        



               



               



               



     IMPRESSION:          



               



     1. Findings consistent with gastric sleeve procedure. There is no evidence 
for extravasation.          



               



               



               



               

 

 BLOOD BANK  Antibody  Negative          [facility] [employer] [facility] [employer]



 RESULTS  Scr      [facility]



     [facility]17 10[facility]



               



               



               

 

 BLOOD BANK  ABO/Rh  O NEG          [facility]              [facility]



               



               



               

 

 CHEM PANEL  A/G Ratio  1.6  0.7 - 1.6         [place]



         /2017      [facility]



               



               



               

 

 CHEM PANEL  Globulin  2.6 g/dL  2.7 - 4.2        [facility]



               [facility]



               



               



               

 

 CHEM PANEL  B/C Ratio  20  6 - 25        [facility]      [facility]



               



               



               

 

 CHEM PANEL  AGAP  15.4 meq/L  10.0 -        [facility]



       20.0        [facility]



               



               



               

 

 CHEM PANEL  eGFR  67        Result Comment: The eGFR is calculated using 
the CKD-EPI formula. In most young, healthy individuals the eGFR will be >90 mL/
min/1.73m2. The eGFR declines with age. An eGFR of 60-89 may be normal in  [facility] 
[place]



     mL/min/1.73    /    some populations, particularly the elderly, for 
whom the CKD-EPI formula has not been extensively validated. Use of the eGFR is 
not recommended in the following populations:  [address]



             Individuals with unstable creatinine concentrations, including 
pregnant patients and those with serious co-morbid conditions.  



               



             Patients with extremes in muscle mass or diet.  



               



             The data above are obtained from the National Kidney Disease 
Education Program (NKDEP) which additionally recommends that when the eGFR is 
used in patients with extremes of body mass index for purposes  



             of drug dosing, the eGFR should be multiplied by the estimated 
BMI.  

 

 CHEM PANEL  CO2  24 meq/L  24 - 32        [facility] [place]



         /2017      [facility]



               



               



               

 

 CHEM PANEL  Calcium Lvl  8.8 mg/dL  8.5 - 10.5         [place]



         /2017      [facility]



               



               



               

 

 CHEM PANEL  Total  6.8 g/dL  6.4 - 8.4         [place]



   Protein            [facility]



               



               



               

 

 CHEM PANEL  ALT  25 unit/L  0 - 65         [place]



         /2017      [facility]



               



               



               

 

 CHEM PANEL  Albumin Lvl  4.2 g/dL  3.5 - 5.0         [place]



         /2017      [facility]



               



               



               

 

 CHEM PANEL  AST  12 unit/L  0 - 37         [place]



         /2017      [facility]



               



               



               

 

 CHEM PANEL  Alk Phos  83 unit/L  39 - 136         [place]



         /2017      [facility]



               



               



               

 

 CHEM PANEL  Chloride Lvl  106 meq/L  95 - 109         [place]



         /2017      [facility]



               



               



               

 

 CHEM PANEL  Bili Total  0.5 mg/dL  0.2 - 1.3         [place]



         /2017      [facility]



               



               



               

 

 CHEM PANEL  Potassium  4.4 meq/L  3.5 - 5.1        [facility]            [facility]



               



               



               

 

 CHEM PANEL  Sodium Lvl  141 meq/L  135 - 145         [place]



         /2017      [facility]



               



               



               

 

 CHEM PANEL  Creatinine  1.26 mg/dL  0.50 -        [facility]



   Lvl    1.40  /      [facility]



               



               



               

 

 CHEM PANEL  BUN  25 mg/dL  7 - 22         [place]



         /2017      [facility]



               



               



               

 

 CHEM PANEL  Glucose Lvl  91 mg/dL  70 - 99         [place]



         /2017      [facility]



               



               



               

 

 HEMATOLOGY  RDW  14.2 %  11.5 -         [place]



       14.5        [facility]



               



               



               

 

 HEMATOLOGY  Hct  45.7 %  42.0 -         [place]



       54.0        [facility]



               



               



               

 

 HEMATOLOGY  Platelet  149 K/CMM  133 - 450         [place]



         /2017      [facility]



               



               



               

 

 HEMATOLOGY  MPV  8.4 fL  7.4 - 10.4         [place]



         /2017      [facility]



               



               



               

 

 HEMATOLOGY  MCV  82.9 fL  80.0 -         [place]



       94.0        [facility]



               



               



               

 

 HEMATOLOGY  MCHC  34.2 g/dL  32.0 -         [place]



       36.0        [facility]



               



               



               

 

 HEMATOLOGY  MCH  28.3 pg  27.0 -         [place]



       31.0        [facility]



               



               



               

 

 HEMATOLOGY  WBC  6.1 K/CMM  3.7 - 10.4         [place]



         /2017      [facility]



               



               



               

 

 HEMATOLOGY  Hgb  15.6 g/dL  14.0 -        [facility] [place]



       18.0        [facility]



               



               



               

 

 HEMATOLOGY  RBC  5.52 M/CMM  4.70 -         [place]



       6.10        [facility]



               



               



               

 

 HEMATOLOGY  Basophils  0.7 %  0.0 - 1.0        [facility]



               [facility]



               



               



               

 

 HEMATOLOGY  Segs-Bands #  4.4 K/CMM  1.5 - 8.1        [facility]



               [facility]



               



               



               

 

 HEMATOLOGY  Monocytes #  0.6 K/CMM  0.0 - 0.8        [facility]



               [facility]



               



               



               

 

 HEMATOLOGY  Lymphocytes  1.1 K/CMM  1.0 - 5.5        [facility]



         [facility]



               



               



               

 

 HEMATOLOGY  Eosinophils  0.1 K/CMM  0.0 - 0.5        [facility]



         [facility]



               



               



               

 

 HEMATOLOGY  Eosinophils  0.9 %  0.0 - 4.0        [facility]



               [facility]



               



               



               

 

 HEMATOLOGY  Lymphocytes  17.6 %  20.0 -        [facility]



       40.0        [facility]



               



               



               

 

 HEMATOLOGY  Monocytes  9.3 %  2.0 - 12.0         [place]



         /2017      [facility]



               



               



               

 

 HEMATOLOGY  Segs  71.5 %  45.0 -         [place]



       75.0        [facility]



               



               



               

 

 SPECIAL  Hgb A1C  5.3 %  <=5.6 %        [facility]



 CHEMISTRY              [facility]



               



               



               







Vital Signs







 Vital Sign  Value  Date  Comments  Source



         

 

 Systolic (mm Hg)  133  2017    [facility]



         

 

 Diastolic (mm Hg)  75  2017    [facility]



         

 

 Respitory Rate  16  2017    [facility]



         

 

 Respitory Rate  14  2017    [facility]



         

 

 Systolic (mm Hg)  136  2017    [facility]



         

 

 Diastolic (mm Hg)  79  2017    [facility]



         

 

 Respitory Rate  12  2017    [facility]



         

 

 Systolic (mm Hg)  143  2017    [facility]



         

 

 Diastolic (mm Hg)  85  2017    [facility]



         

 

 Heart Rate  83  2017    [facility]



         

 

 Weight  102.273  2017    [facility]



         

 

 BMI Calculated  27.45  2017    [facility]



         

 

 Height  193.04 cm  2017    [facility]



         

 

 Temperature Oral (F)  98.0 F  2017    [facility]



         

 

 Systolic (mm Hg)  125  2017    [facility]



         

 

 Diastolic (mm Hg)  87  2017    [facility]



         

 

 Respitory Rate  19  2017    [facility]



         

 

 Heart Rate  97  2017    [facility]



         

 

 Heart Rate  98  2017    [facility]



         

 

 Respitory Rate  20  2017    [facility]



         

 

 Temperature Oral (F)  97.6 F  2017    [facility]



         

 

 Systolic (mm Hg)  147  2017    [facility]



         

 

 Diastolic (mm Hg)  95  2017    [facility]



         

 

 Respitory Rate  20  2017    [facility]



         

 

 Heart Rate  98  2017    [facility]



         

 

 Systolic (mm Hg)  144  2017    [facility]



         

 

 Diastolic (mm Hg)  97  2017    [facility]



         

 

 Temperature Oral (F)  98.3 F  2017    [facility]



         

 

 Weight  136.136  2017    [facility]



         

 

 Height  193.04 cm  2017    [facility]



         

 

 BMI Calculated  36.53  2017    [facility]



         

 

 Weight  136.136  2017    [facility]



         

 

 Height  193.04 cm  2017    [facility]



         

 

 BMI Calculated  36.53  2017    [facility]



         

 

 Weight  143.636  2017    [facility]



         

 

 BMI Calculated  38.55  2017    [facility]



         

 

 Height  193.04 cm  2017    [facility]



         







Encounters







 Location  Location  Encounter  Encounter  Reason  Attending  ADM  DC  Status  
Source



   Details  Type  Number  For  Provider  Date  Date    



         Visit          



                   



                   



                   

 

 [facility]  [id number]    [facility]      [facility]          [place]    [facility]



                   



                   



                   

 

 [facility]  [id number]    [place]      [facility]    Surgery      [place]    [facility]



                   



                   



                   







Procedures







 Procedure  Code  Date  Perfomer  Comments  Source



           



           

 

 Heart transplant  [id number]        [facility]



           

 

 Hernia repair  [id number]        [facility]



           

 

 Laparoscopic sleeve  [id number]        [facility]

## 2018-08-27 NOTE — EDPHYS
Physician Documentation                                                                           

 [facility]                                                                

Name: Gomez Romero                                                                               

Age: 48 yrs                                                                                       

Sex: Male                                                                                         

: 1970                                                                                   

MRN: [id number]                                                                                   

Arrival Date: 2018                                                                          

Time: 23:23                                                                                       

Account#: [id number]                                                                            

Bed 13                                                                                            

Private MD:                                                                                       

ED Physician Ozzy Wright                                                                     

HPI:                                                                                              

                                                                                             

00:29 This 48 yrs old  Male presents to ER via Ambulatory with complaints of Leg     jr8 

      Pain.                                                                                       

00:29 The patient presents with pain. The complaints affect the medial aspect of right calf.  jr8 

      Onset: The symptoms/episode began/occurred acutely, yesterday. Modifying factors: The       

      symptoms are alleviated by nothing. the symptoms are aggravated by nothing. Severity of     

      symptoms: At their worst the symptoms were mild, in the emergency department the            

      symptoms are unchanged. The patient has not experienced similar symptoms in the past.       

      The patient has not recently seen a physician. Patient stated that he woke up with red      

      spot to medial right leg. Stated that it had open wound to it and now has bruising          

      around it. Denies trauma. Unknown what happened .                                           

                                                                                                  

Historical:                                                                                       

- Allergies:                                                                                      

                                                                                             

23:42 unknown rejection medication;                                                           ak1 

- Home Meds:                                                                                      

23:42 pt unable to provide [Active];                                                          ak1 

- PMHx:                                                                                           

23:42 pt denies;                                                                              ak1 

- PSHx:                                                                                           

23:42 Hernia repair; cardiac stent; heart transplant;                                         ak1 

                                                                                                  

- Immunization history:: Adult Immunizations unknown, Last tetanus immunization:                  

  unknown, pt stated "I have doctors watching me  I know I am up to date on                   

  everything" pt could not provide a date or approximate date for last Tetanus. .                 

- Social history:: Smoking status: Patient/guardian denies using tobacco.                         

- Ebola Screening: : No symptoms or risks identified at this time.                                

                                                                                                  

                                                                                                  

ROS:                                                                                              

                                                                                             

00:29 Eyes: Negative for injury, pain, redness, and discharge, ENT: Negative for injury,      jr8 

      pain, and discharge, Neck: Negative for injury, pain, and swelling, Cardiovascular:         

      Negative for chest pain, palpitations, and edema, Respiratory: Negative for shortness       

      of breath, cough, wheezing, and pleuritic chest pain, Abdomen/GI: Negative for              

      abdominal pain, nausea, vomiting, diarrhea, and constipation, Back: Negative for injury     

      and pain, MS/Extremity: Negative for injury and deformity, Neuro: Negative for              

      headache, weakness, numbness, tingling, and seizure.                                        

      Skin: Positive for ecchymosis, erythema, lesions, of the medial aspect of right calf.       

                                                                                                  

Exam:                                                                                             

00:29 Cardiovascular:  Regular rate and rhythm with a normal S1 and S2.  No gallops, murmurs, jr8 

      or rubs.  Normal PMI, no JVD.  No pulse deficits. Respiratory:  Lungs have equal breath     

      sounds bilaterally, clear to auscultation and percussion.  No rales, rhonchi or wheezes     

      noted.  No increased work of breathing, no retractions or nasal flaring. MS/ Extremity:     

       Pulses equal, no cyanosis.  Neurovascular intact.  Full, normal range of motion.           

      Neuro:  Awake and alert, GCS 15, oriented to person, place, time, and situation.            

      Cranial nerves II-XII grossly intact.  Motor strength 5/5 in all extremities.  Sensory      

      grossly intact.  Cerebellar exam normal.  Normal gait.                                      

00:29 Skin: small erythematic scabbed lesion to medial right leg. Surround ecchymosis             

      present. No drainage or cellulitis seen. No distinct bite marks noted .                     

                                                                                                  

Vital Signs:                                                                                      

                                                                                             

23:39  / 76; Pulse 79; Resp 18; Temp 97.9(O); Pulse Ox 99% on R/A; Weight 97.52 kg (R); ak1 

      Height 6 ft. 4 in. (193.04 cm) (R); Pain 0/10;                                              

23:53  / 94; Pulse 78; Resp 17; Pulse Ox 99% on R/A; Pain 0/10;                         bs1 

23:39 Body Mass Index 26.17 (97.52 kg, 193.04 cm)                                             ak1 

                                                                                                  

MDM:                                                                                              

23:33 Patient medically screened.                                                             jr8 

23:44 Data reviewed: vital signs, nurses notes, and as a result, I will discharge patient.    jr8 

      Data interpreted: Pulse oximetry: on room air is 99 %. Interpretation: normal.              

      Counseling: I had a detailed discussion with the patient and/or guardian regarding: the     

      historical points, exam findings, and any diagnostic results supporting the                 

      discharge/admit diagnosis, the need for outpatient follow up, a family practitioner, to     

      return to the emergency department if symptoms worsen or persist or if there are any        

      questions or concerns that arise at home.                                                   

                                                                                                  

Administered Medications:                                                                         

No medications were administered                                                                  

                                                                                                  

                                                                                                  

Disposition:                                                                                      

18 23:45 Discharged to Home. Impression: Local infection of the skin and subcutaneous       

  tissue, unspecified.                                                                            

- Condition is Stable.                                                                            

- Discharge Instructions: Cellulitis, Adult, Spider Bite.                                         

- Prescriptions for Keflex 500 mg Oral Capsule - take 1 capsule by ORAL route every 6             

  hours for 7 days; 28 capsule.                                                                   

- Medication Reconciliation Form, Thank You Letter, Antibiotic Education, Prescription            

  Opioid Use form.                                                                                

- Follow up: Private Physician; When: 2 - 3 days; Reason: Recheck today's complaints,             

  Continuance of care, Re-evaluation by your physician.                                           

- Problem is new.                                                                                 

- Symptoms have improved.                                                                         

                                                                                                  

                                                                                                  

                                                                                                  

Addendum:                                                                                         

2018                                                                                        

     06:25 Co-signature as Attending Physician, Ozzy Wright MD I agree with the assessment and t
w4

           plan of care. Attestation: The patient's history, exam findings, diagnostics, and a    

           summary of any interventions or procedures was reviewed in detail with Ozzy Wright MD.                                                                                    

                                                                                                  

Signatures:                                                                                       

Geoff Carlson PA PA   jr8                                                  

Elza Galan, RN                       RN   ak1                                                  

Jazmine Lugo RN                   RN   bs1                                                  

Ozzy Wright MD MD   tw4                                                  

                                                                                                  

Corrections: (The following items were deleted from the chart)                                    

                                                                                             

23:53 23:45 2018 23:45 Discharged to Home. Impression: Local infection of the skin and  bs1 

      subcutaneous tissue, unspecified. Condition is Stable. Forms are Medication                 

      Reconciliation Form, Thank You Letter, Antibiotic Education, Prescription Opioid Use.       

      Follow up: Private Physician; When: 2 - 3 days; Reason: Recheck today's complaints,         

      Continuance of care, Re-evaluation by your physician. Problem is new. Symptoms have         

      improved. jr8                                                                               

                                                                                                  

**************************************************************************************************

## 2018-08-27 NOTE — ER
Nurse's Notes                                                                                     

 [facility]                                                                

Name: Gomez Romero                                                                               

Age: 48 yrs                                                                                       

Sex: Male                                                                                         

: 1970                                                                                   

MRN: [id number]                                                                                   

Arrival Date: 2018                                                                          

Time: 23:23                                                                                       

Account#: [id number]                                                                            

Bed 13                                                                                            

Private MD:                                                                                       

Diagnosis: Local infection of the skin and subcutaneous tissue, unspecified                       

                                                                                                  

Presentation:                                                                                     

                                                                                             

23:39 Presenting complaint: Patient states: right lower leg with healing wound and bruising   ak1 

      since last Thursday. pt denies injury. pt stated he woke up Thursday with the wound to      

      his right lower leg. Transition of care: patient was not received from another setting      

      of care. Onset of symptoms is unknown. Risk Assessment: Do you want to hurt yourself or     

      someone else? Patient reports no desire to harm self or others. Initial Sepsis Screen:      

      Does the patient meet any 2 criteria? No. Patient's initial sepsis screen is negative.      

      Does the patient have a suspected source of infection? No. Patient's initial sepsis         

      screen is negative. Care prior to arrival: None.                                            

23:39 Method Of Arrival: Ambulatory                                                           ak1 

23:39 Acuity: MIL[place] 4                                                                           ak1 

                                                                                                  

Triage Assessment:                                                                                

23:43 General: Appears in no apparent distress. Behavior is calm. Pain: Complains of pain in  ak1 

      right ankle.                                                                                

                                                                                                  

Historical:                                                                                       

- Allergies:                                                                                      

23:42 unknown rejection medication;                                                           ak1 

- Home Meds:                                                                                      

23:42 pt unable to provide [Active];                                                          ak1 

- PMHx:                                                                                           

23:42 pt denies;                                                                              ak1 

- PSHx:                                                                                           

23:42 Hernia repair; cardiac stent; heart transplant;                                         ak1 

                                                                                                  

- Immunization history:: Adult Immunizations unknown, Last tetanus immunization:                  

  unknown, pt stated "I have doctors watching me  I know I am up to date on                   

  everything" pt could not provide a date or approximate date for last Tetanus. .                 

- Social history:: Smoking status: Patient/guardian denies using tobacco.                         

- Ebola Screening: : No symptoms or risks identified at this time.                                

                                                                                                  

                                                                                                  

Screenin:43 Abuse screen: Denies threats or abuse. Denies injuries from another. Nutritional        ak1 

      screening: No deficits noted. Tuberculosis screening: No symptoms or risk factors           

      identified. Fall Risk None identified.                                                      

                                                                                                  

Assessment:                                                                                       

23:40 General: Appears in no apparent distress. comfortable. Pain: Complains of pain in left  bs1 

      lower leg.                                                                                  

23:40 Neuro: Level of Consciousness is awake, alert, obeys commands, Oriented to person,      bs1 

      place, time, situation, Appropriate for age. Cardiovascular: Denies chest pain,             

      shortness of breath, Heart tones S1 S2 present Capillary refill < 3 seconds Patient's       

      skin is warm and dry. Respiratory: Airway is patent Trachea midline Respiratory effort      

      is even, unlabored, Breath sounds are clear bilaterally. GI: No signs and/or symptoms       

      were reported involving the gastrointestinal system. : No signs and/or symptoms were      

      reported regarding the genitourinary system. EENT: No signs and/or symptoms were            

      reported regarding the EENT system. Derm: Skin is intact, scab matheus noted to left lower     

      leg, bruising noted around site. Musculoskeletal: Circulation, motion, and sensation        

      intact. Capillary refill < 3 seconds.                                                       

23:52 Reassessment: Patient states understanding of discharge instructions.                   bs1 

                                                                                                  

Vital Signs:                                                                                      

23:39  / 76; Pulse 79; Resp 18; Temp 97.9(O); Pulse Ox 99% on R/A; Weight 97.52 kg (R); ak1 

      Height 6 ft. 4 in. (193.04 cm) (R); Pain 0/10;                                              

23:53  / 94; Pulse 78; Resp 17; Pulse Ox 99% on R/A; Pain 0/10;                         bs1 

23:39 Body Mass Index 26.17 (97.52 kg, 193.04 cm)                                             ak1 

                                                                                                  

ED Course:                                                                                        

23:23 Patient arrived in ED.                                                                  ds1 

23:26 Jazmine Lugo, RN is Primary Nurse.                                                 bs1 

23:33 Geoff Carlson PA is PHCP.                                                               jr8 

23:33 Ozzy Wright MD is Attending Physician.                                            jr8 

23:39 Arm band placed on Patient placed in an exam room, on a stretcher, Patient notified of  ak1 

      wait time.                                                                                  

23:41 Triage completed.                                                                       ak1 

23:43 Patient has correct armband on for positive identification. Bed in low position. Call   ak1 

      light in reach. Side rails up X 1. Adult w/ patient.                                        

23:52 No provider procedures requiring assistance completed. Patient admitted, IV remains in  bs1 

      place.                                                                                      

                                                                                                  

Administered Medications:                                                                         

No medications were administered                                                                  

                                                                                                  

                                                                                                  

Outcome:                                                                                          

23:45 Discharge ordered by MD.                                                                jr8 

23:52 Discharged to home ambulatory, with significant other.                                  bs1 

23:52 Condition: stable                                                                           

23:52 Discharge instructions given to patient, family, Instructed on discharge instructions,      

      follow up and referral plans. medication usage, Demonstrated understanding of               

      instructions, follow-up care, medications, Prescriptions given X 1.                         

23:53 Patient left the ED.                                                                    bs1 

                                                                                                  

Signatures:                                                                                       

Rachel Orr                                ds1                                                  

Geoff Carlson PA PA   jr8                                                  

Elza Galan RN                       RN   ak1                                                  

Jazmine Lugo RN                   RN   bs1                                                  

                                                                                                  

**************************************************************************************************

## 2018-08-27 NOTE — XMS REPORT
Clinical Summary

 Created on:2018



Patient:Tyra Bruno

Sex:Male

:1970

External Reference #:[id number]





Demographics







 Address  [address] [place] [address]

 

 Mobile Phone  [phone]

 

 Home Phone  [phone]

 

 Email Address  [email or web address]

 

 Preferred Language  English

 

 Marital Status  Unknown

 

 Holiness Affiliation  Unknown

 

 Race  White

 

 Ethnic Group  Not  or 









Author







 Organization  [facility]

 

 Address  [address] [place] [address]

 

 Phone  [phone]









Support







 Name  Relationship  Address  Phone

 

 Unavailable  Unavailable  Unavailable  [phone]

 

 Unavailable  Unavailable  Unavailable  [phone]









Care Team Providers







 Name  Role  Phone

 

 Unavailable  Primary Care Provider  Unavailable









Allergies







 Active Allergy  Reactions  Severity  Noted Date  Comments

 

 Foscarnet  Nausea And Vomiting,  High  2015







   Swelling      Pt has no reaction to



         current medication



         administration regimen:



         premedicate with Benadryl,



         Zofran, Tylenol, then 500cc



         NS bolus, then diluted



         foscarnet over 2 hours,



         then another 500cc NS



         bolus. Electrolyte labs



         after every dose,



         electrolyte replacement



         protocol in place.







Current Medications







 Prescription  Sig.  Disp.  Refills  Start Date  End Date  Status

 

 mycophenolate  Take 3  180 capsule  11  2018  Active



 (CELLCEPT) 250 mg  capsules (750        9  



 capsule  mg total) by          



   mouth 2 (two)          



   times daily.          

 

 tacrolimus  Take 1mg by  90 capsule  11  2018    Active



 (PROGRAF) 1 MG  mouth in the          



 capsule  morning.  Take          



   2mg by mouth          



   at night. .          

 

 aspirin 81 MG EC  Take 1 tablet  30 tablet  11  2018  Active



 tablet  (81 mg total)        9  



   by mouth          



   daily.          

 

 famotidine (PEPCID)  Take 1 tablet  60 tablet  11  2018  
Active



 20 MG tablet  (20 mg total)        9  



   by mouth 2          



   (two) times          



   daily.          

 

 pravastatin  Take 1 tablet  30 tablet  11  2018  Active



 (PRAVACHOL) 40 MG  (40 mg total)        9  



 tablet  by mouth          



   daily.          

 

 mycophenolate  Take 3  180 capsule  11  2017  Discontinued



 (CELLCEPT) 250 mg  capsules (750        8  



 capsule  mg total) by          



   mouth 2 (two)          



   times daily.          

 

 predniSONE  Take 1 tablet  30 tablet  11  2017  Discontinued



 (DELTASONE) 5 MG  (5 mg total)        7  



 tablet  by mouth          



   daily.          

 

 tacrolimus  Take 1mg by  90 capsule  11  2017  Discontinued



 (PROGRAF) 1 MG  mouth in the        8  



 capsule  morning.  Take          



   2mg by mouth          



   at night. .          

 

 albuterol HFA  Inhale 1 puff  1 Inhaler  0  2017  Discontinued



 (PROVENTIL HFA) 90  by mouth via        8  



 mcg/actuation  inhaler every          



 inhaler  6 (six) hours          



   as needed for          



   Wheezing.          

 

 azelastine  1 spray by  30 mL  0  2017  Discontinued



 (ASTELIN) 137 mcg  Nasal route 2        8  



 (0.1 %) nasal spray  (two) times          



   daily Use in          



   each nostril          



   as directed.          

 

 predniSONE  Take 1 tablet  30 tablet  11  2017  Discontinued



 (DELTASONE) 2.5 MG  (2.5 mg total)        7  



 tablet  by mouth          



   daily.          

 

 pravastatin  Take 1 tablet    0  2017  Discontinued



 (PRAVACHOL) 40 MG  (40 mg total)        8  



 tablet  by mouth          



   daily.          

 

 aspirin 81 MG EC  Take 1 tablet    0  2017  Discontinued



 tablet  (81 mg total)        8  



   by mouth          



   daily.          

 

 famotidine (PEPCID)  TAKE ONE  60 tablet  11  2018  
Discontinued



 20 MG tablet  TABLET BY        8  



   MOUTH TWICE          



   DAILY          

 

 famotidine (PEPCID)  Take 1 tablet  180 tablet  3  2018  
Discontinued



 20 MG tablet  (20 mg total)        8  



   by mouth 2          



   (two) times          



   daily.          









 Hospital, Clinic, or  Ordered Dose  Route  Frequency  Start Date  End Date  
Status



 Other Facility            



 Administered            



 Medication            

 

 influenza vaccine (PF)  0.5 mL  IM  Once  2017  Discontinued



 [id number] (FLUZONE            



 HIGH DOSE) syringe            



 (>/=65            



 yrs)Indications:            



 Status post heart            



 transplantation (HCC),            



 Immunization due            







Active Problems







 Patient Care Coordination Note

 

 



 



 









 Problem  Noted Date

 

 Heart transplant, orthotopic, status (HCC)  2016

 

 Heart transplant rejection (HCC)  2015









 Overview: 



- 2R rejection on low dose immunosuppression  10/22/15 treated with prednisone 
pulse



 - 2R rejection on low dose immunosuppression  10/28/15 treated with IV 
solumedrol, increased MMF, increased Prograf



 - 2R biopsy on 12/2/15 treated with ATG x 4









 History of Cytomegalovirus (CMV) viremia  2015

 

 ICM s/p orthotopic heart transplant 5/14/15  2015

 

 Dyslipidemia  2015

 

 Hypertension  

 

 Ischemic cardiomyopathy with MI 2015  







Encounters







 Date  Type  Specialty  Care Team  Description

 

 2018  Telephone  Transplant  Margot Post,  Heart Transplant



       RN  Follow-up

 

 2018  Abstract  Transplant  Shila Golden  

 

 2018  Telephone  Transplant  Margot Post,  Heart Transplant



       RN  Follow-up

 

 06/15/2018  Abstract  Transplant  LongShila  

 

 2018  Telephone  Transplant  Margot Post,  Heart Transplant



       RN  Follow-up

 

 2018  Abstract  Transplant  Shila Golden  

 

 2018  Orders Only  Transplant  Margot Post,  Status post heart



       RN  transplantation (HCC)



         (Primary Dx);Encounter



         for therapeutic drug



         level monitoring

 

 2018  Telephone  Transplant  Margot Post,  Heart Transplant



       RN  Follow-up

 

 2018  Hospital Encounter  Cardiology    Status post heart



         transplantation (HCC)

 

 2018  Follow-Up  Transplant  Dana Loo  Essential hypertension



       MD Scott  (Primary Dx);Status



         post heart



         transplantation



         ([facility]);Encounter for



         therapeutic drug level



         monitoring;Dyslipidemi



         a

 

 2018  Hospital Encounter    Dana Loo  Status post heart



       MD Scott  transplantation ([facility])

 

 2018  Orders Only  Transplant  Margot Post RN  

 

 2018  Procedure Pass      

 

 2018  Surgery    Dana Loo  R & L CATH / CORONARY



       MD Scott  ANGIOS / PCI

 

 2018  Documentation  Transplant  Kenisha Lyon NP  

 

 2018  Documentation  Transplant  Herminia Kilgore  

 

 2018  Orders Only  Transplant  Margot Post RN  

 

 2018  Orders Only  Transplant  Margot Post RN  

 

 2018  Orders Only  Transplant  Margot Post,  Status post heart



       RN  transplantation (HCC)



         (Primary Dx);Encounter



         for therapeutic drug



         level monitoring;Type



         1 diabetes mellitus



         without complication



         ([facility]);Hyperlipidemia,



         unspecified



         hyperlipidemia type

 

 2018  Orders Only  Transplant  Margot Post RN  

 

 2018  Refill  Transplant  Dana Loo MD  

 

 2017  Telephone  Transplant  Inez Phillip,  Follow-up



       NP  

 

 2017  Telephone  Transplant  Inez Phillip,  Post-op Problem



       NP  

 

 2017  Hospital Encounter  Radiology  Dana Loo  Status post heart



       MD Scott  transplantation (HCC)

 

 2017  Outside Orders    Dana Loo MD  

 

 2017  Telephone  Central Scheduling  Shila Golden  Appointment

 

 2017  Follow-Up  Transplant  Dana Loo  Immunization due



       MD Scott  (Primary Dx);Status



         post heart



         transplantation



         (HCC);Encounter for



         therapeutic drug level



         monitoring;Essential



         hypertension

 

 2017  Telephone  Transplant  Margot Post,  Heart Transplant



       RN  Follow-up

 

 2017  Orders Only  Transplant  Margot Post,  Status post heart



       RN  transplantation (HCC)



         (Primary Dx);Encounter



         for therapeutic drug



         level monitoring

 

 2017  Telephone  Transplant  Shila Golden  Appointment



after 2017



Immunizations







 Name  Dates Previously Given  Next Due

 

 Influenza High Dose Preservative Free [id number]  

 

 Influenza TIV (IM)  10/09/2017  

 

 Rho (D) Immune Globulin  05/15/2015  







Family History







 Medical History  Relation  Name  Comments

 

 Cancer  Maternal Grandfather    

 

 Heart disease  Maternal Grandfather    

 

 Diabetes  Mother    









 Relation  Name  Status  Comments

 

 Maternal Grandfather      

 

 Mother      







Social History







 Tobacco Use  Types  Packs/Day  Years Used  Date

 

 Never Smoker        









 Smokeless Tobacco: Never Used      









 Alcohol Use  Drinks/Week  oz/Week  Comments

 

 No      









 Sex Assigned at Birth  Date Recorded

 

 Not on file  







Last Filed Vital Signs







 Vital Sign  Reading  Time Taken

 

 Blood Pressure  124/79  2018  9:05 AM CDT

 

 Pulse  86  2018  9:05 AM CDT

 

 Temperature  35.8 C (96.5 F)  2018  9:05 AM CDT

 

 Respiratory Rate  18  2018  9:05 AM CDT

 

 Oxygen Saturation  99%  2018  9:05 AM CDT

 

 Inhaled Oxygen Concentration  -  -

 

 Weight  104 kg (229 lb 3.2 oz)  2018  9:05 AM CDT

 

 Height  188 cm (6' 2")  2018  9:05 AM CDT

 

 Body Mass Index  29.43  2018  9:05 AM CDT







Plan of Treatment







 Date  Type  Specialty  Care Team  Description

 

 2018  Appointment  Cardiology    

 

 2018  Follow-Up  Transplant  Dana Loo MD



  



       [address] [facility] [phone]



  



       [place] [address]



  



       [phone] (Fax)  









 Health Maintenance  Due Date  Last Done  Comments

 

 INFLUENZA VACCINE  10/01/2018    







Implants







 Implanted  Type  Area    Device  Expiration  Model / Serial



         Identifier  Date  / Lot

 

 Hemostat,Sixto Ah Absorbable Powder 3g - [id number]  Cement/Fille    C R BARD 
DAVOL    2019  [id number] /



 Implanted: Qty: 1 on 2015 by Champ Jean MD  r/Adhesive           /



             [id number]

 

 Graft,Goretex Cg [id number] Lined - [id number]  Graft/Patch    W L GORE    2020  [id number] /



 Implanted: Qty: 1 on 2015 by Teofilo Rubio MD             /



             [id number]

 

 Lead,Defibrillator Cardioverter Dual Coil Df4 Connector [id number] Durata - 
[id number]  ICD    ST FIDEL    2015  7120Q-58 /



 Implanted: Qty: 1 on 2015 by Champ Jean MD      [employer]      
[id number] /

 

 Lead, Pacing Endocardial Steroid Eluting Bipolar Active Fix 52cm Tendril Sts - 
[id number]  Pacemakers    ST FIDEL    2017  [id number]/52 /



 Implanted: Qty: 1 on 2015 by Champ Jean MD      [employer]      
[id number] /

 

 Fortify Assura      ST FIDEL    10/31/2016  [id number] /



 Implanted: Qty: 1 on 2015 by Champ Jean MD      [employer]      
[id number] /

 

 Tyrx Absorbable Antibacterial Envelope          2015  [id number] /



 Implanted: Qty: 1 on 2015 by Champ Jean MD             /



             [id number]

 

 Sensation      [employer]    2018  9025--01U /



 Implanted: Qty: 1 on 03/10/2015            [id number] /

 

 Intra-Aortic Balloon      [employer]    2018  1019--01U /



 Implanted: Qty: 1 on 2015            [id number] /

 

 Intra-Aortic Balloon      [employer]    2018  2934--01U /



 Implanted: Qty: 1 on 2015            [id number] /







Procedures







 Procedure Name  Priority  Date/Time  Associated Diagnosis  Comments

 

 R & L CATH / CORONARY    2018  1:13 PM  H/O heart transplant  



 ANGIOS / PCI    CDT  ([facility])  









   Case Notes







   POP6



after 2017



Results

Tacrolimus level (2018 10:30 AM)Only the most recent of5 resultswithin 
the time period is included.





 Component  Value  Ref Range

 

 Tacrolimus Lvl  6.4  









 Specimen  Performing Laboratory

 

 Blood  



CBC with platelet count + automated diff (2018 10:30 AM)Only the most 
recent of4 resultswithin the time period is included.





 Component  Value  Ref Range

 

 WBC (MANUAL)  4.1  10^3/mL

 

 RBC (MANUAL)  5.60  10^6/L

 

 HEMOGLOBIN (MANUAL)  16.0  GM/DL

 

 HEMATOCRIT (MANUAL)  48.1  %

 

 MCV (MANUAL)  86.0  fL

 

 MCH (MANUAL)  28.6  pg

 

 MCHC (MANUAL)  33.3  GM/DL

 

 RDW (MANUAL)  14.0  %

 

 PLATELETS (MANUAL)  163  K/CU MM

 

 MPV (MANUAL)  8.3  fL

 

 NRBC (MANUAL)    /100 WBC

 

 NEUTROS PCT (MANUAL)  53.0  %

 

 LYMPHS PCT (MANUAL)  29.8  %

 

 MONOS PCT (MANUAL)  11.3  %

 

 EOS PCT (MANUAL)  5.0  %

 

 BASOS PCT (MANUAL)  0.9  %

 

 NEUTROS ABS (MANUAL)  2.2  K/L

 

 LYMPHS ABS (MANUAL)  1.2  K/L

 

 MONOS ABS (MANUAL)  0.5  K/L

 

 EOS ABS (MANUAL)  0.2  K/L

 

 BASOS ABS (MANUAL)  0.0  K/L









 Specimen  Performing Laboratory

 

 Blood  



Hemoglobin A1c (2018 10:30 AM)





 Component  Value  Ref Range

 

 Hemoglobin A1C  5.3  4.0 - 6.0 %









 Specimen  Performing Laboratory

 

 Blood  



Basic Metabolic Panel (2018 10:30 AM)Only the most recent of3 
resultswithin the time period is included.





 Component  Value  Ref Range

 

 SODIUM (MANUAL)  140  meq/L

 

 POTASSIUM (MANUAL)  4.3  meq/L

 

 CHLORIDE (MANUAL)  106  meq/L

 

 CO2 (MANUAL)  32  meq/L

 

 BUN (MANUAL)  17  mg/dL

 

 CREATININE (MANUAL)  1.00  mg/dL

 

 GLUCOSE (MANUAL)  99  mg/dL

 

 CALCIUM (MANUAL)  9.0  mg/dL

 

 EGFR (MANUAL)    mL/min/1.73 m^2









 Specimen  Performing Laboratory

 

 Blood  



ECHOCARDIOGRAM REPORT - SCAN (2018  7:20 AM)CARDIAC CATH REPORT - SCAN (  3:01 PM)2D echo w/ doppler (cw/pw/color) (2018  2:02 PM)





 Component  Value  Ref Range

 

 Ejection Fraction    









 Specimen  Performing Laboratory

 

   [facility] ECHO HEARTLAB MKCKESSON CPACS









 Narrative

 

 Transthoracic Echocardiography Report (TTE)







  







  Demographics







  







  Patient Name Lulu BRUNO of Study 2018







 WOOD







  







  [id number]




 Male







  







  Visit Number



 [id number]







  







  [id number] Room Number







  Number







  







  Date of Birth1970Referring Physician MD Eze Luo MD







  







  Age47 year(s)Sonographer




 Pasha Gill [facility]







  







  AnalystAlex Zade Interpreting
Physician TRU Rooney







  







 Procedure







  







  Type of







  Study TTE procedure:2DECHO W DOPPLER(CW/PW/COLOR) (Routine)







  







 Indications:Heart Transplant Follow up.







  







 Clinical History







 Congestive Heart Failure







 Coronary Artery Disease







 Hypertension







 Ischemic Cardiomyopathy







 OHT 5/14/15







  







 Height: 74 inches Weight: 103.87 kg (229 lbs) BSA: 2.3 m^2 BMI: 29.4 kg/m^2







  







 HR: 73 bpm BP: 124/79 mmHg







  







  Summary







  Regular sinus rhythm during the exam.







  The LV apex is incompletely visualized due to foreshortening.







  The other segments have low normal contractility.







  LVEF by Guy's method of disk assessment is low normal (50-55%).







  A trace of tricuspid regurgitation.







  Estimated peak systolic PA pressure is 25-30 mmHg .







  Normal TV structure.







  No evidence of pericardial effusion.







  







  Previous Study







  In comparison with the prior exam 2017 the following changes are







  noted: mildly depressed LVEF .







  







  Signature







  







  ----------------------------------------------------------------







  Electronically signed by Vic Garcia MD(Interpreting







  physician) on 2018 06:33 PM







  ----------------------------------------------------------------







  







  Findings







  







  Rhythm/BPRegular sinus rhythm during the exam.







  







  Left Ventricle The LV apex is incompletely visualized due to







 foreshortening.







 The other segments have low 
normal



 contractility.







 LVEF by Guy's method of 
disk



 assessment is low







 normal (50-55%).







  







  Left AtriumLA morphology consistent with orthotoptic 
heart







 transplant.







  







  Right VentricleRV chamber size is mildly enlarged .







 Global RV systolic function is 
normal



 .







  







  Right Atrium RA cavity size is consistent with orthotopic 
heart







 transplantation .







  







  Aortic Valve Normal AoV structure and function.







  







  Mitral Valve Normal MV structure and function.







  







  Tricuspid ValveA trace of tricuspid regurgitation.







 Estimated peak systolic PA 
pressure



 is 25-30 mmHg .







 Normal TV structure.







  







  Pulmonic Valve Normal PV structure and function by limited 
views







 and Doppler.







  







  AortaAortic root size (SInus of Valsalva



 diameter) is







 normal .







  







  PericardiumNo evidence of pericardial effusion.







  







  IVC/SVC/PA/PV/PleuralThe estimated RA pressure by IVC dynamics 5-10mmHg







 .







  







 Chambers/Structures







  







  Left Atrium







  







  LA Dimension: 4.78 cm







  







  Left Ventricle







  







  LVIDd: 5.29 cm







  LVIDs: 3.41 cm







  LV Septum Diastolic: 0.98 cm







  LV PW Diastolic: 1.02 cm LV Length: 7.23 
cm







  LVEDV Guy's:91.24 ml LV FS: 35.5 %







  LVESV Guy's:36.89 ml







  LVEF Guy's: 59.6 % LVEDVI: 40 ml/m
^2







 




 LVESVI: 16 ml/m^2







  LVOT Diameter: 2.41 cm







  







 Aorta







  







  Ao Root S of Krupa.: 2.96 cm







  







 Doppler/Quantitative Measurements







  







  LVOT







  







  Peak Velocity: 0.65 m/s Peak Gradient: 1.7 mmHg







  Mean Velocity: 0.39 m/s Mean Gradient: 0.75 mmHg







  LVOT Diameter: 2.41 cmLVOT VTI: 12.26 cm







  LVOT Area: 4.56 cm^2LVOT SV:55.9 ml







  LVOT CO: 4.08 l/min LVOT CI: 1.77 l/min/m^2







 









 Procedure Note

 

 Interface, External Ris In - 2018  6:34 PM CDT



Transthoracic Echocardiography Report (TTE)



 



  Demographics



 



  Patient Name   TYRA BRUNO    Date of Study       2018



                 MAX



 



  MRN            [id number]          Gender              Male



 



  Visit Number   [id number]        Race                Unknown



 



  Accession      [id number]         Room Number



  Number



 



  Date of Birth  1970        Referring Physician MD Eze Luo MD



 



  Age            48 year(s)        Sonographer         Pasha Gill [facility]



 



  Analyst        Fer Mario         Interpreting        Physician TRU Rooney



 



 Procedure



 



  Type of



  Study     TTE procedure:2DECHO W DOPPLER(CW/PW/COLOR) (Routine)



 



 Indications:Heart Transplant Follow up.



 



 Clinical History



 Congestive Heart Failure



 Coronary Artery Disease



 Hypertension



 Ischemic Cardiomyopathy



 OHT 5/14/15



 



 Height: 74 inches Weight: 103.87 kg (229 lbs) BSA: 2.3 m^2 BMI: 29.4 kg/m^2



 



 HR: 73 bpm BP: 124/79 mmHg



 



  Summary



  Regular sinus rhythm during the exam.



  The LV apex is incompletely visualized due to foreshortening.



  The other segments have low normal contractility.



  LVEF by Guy's method of disk assessment is low normal (50-55%).



  A trace of tricuspid regurgitation.



  Estimated peak systolic PA pressure is 25-30 mmHg .



  Normal TV structure.



  No evidence of pericardial effusion.



 



  Previous Study



  In comparison with the prior exam 2017 the following changes are



  noted: mildly depressed LVEF .



 



  Signature



 



  ----------------------------------------------------------------



  Electronically signed by Vic Garcia MD(Interpreting



  physician) on 2018 06:33 PM



  ----------------------------------------------------------------



 



  Findings



 



  Rhythm/BP              Regular sinus rhythm during the exam.



 



  Left Ventricle         The LV apex is incompletely visualized due to



                         foreshortening.



                         The other segments have low normal contractility.



                         LVEF by Guy's method of disk assessment is low



                         normal (50-55%).



 



  Left Atrium            LA morphology consistent with orthotoptic heart



                         transplant.



 



  Right Ventricle        RV chamber size is mildly enlarged .



                         Global RV systolic function is normal .



 



  Right Atrium           RA cavity size is consistent with orthotopic heart



                         transplantation .



 



  Aortic Valve           Normal AoV structure and function.



 



  Mitral Valve           Normal MV structure and function.



 



  Tricuspid Valve        A trace of tricuspid regurgitation.



                         Estimated peak systolic PA pressure is 25-30 mmHg .



                         Normal TV structure.



 



  Pulmonic Valve         Normal PV structure and function by limited views



                         and Doppler.



 



  Aorta                  Aortic root size (SInus of Valsalva diameter) is



                         normal .



 



  Pericardium            No evidence of pericardial effusion.



 



  IVC/SVC/PA/PV/Pleural  The estimated RA pressure by IVC dynamics 5-10mmHg



                         .



 



 Chambers/Structures



 



  Left Atrium



 



  LA Dimension: 4.78 cm



 



  Left Ventricle



 



  LVIDd: 5.29 cm



  LVIDs: 3.41 cm



  LV Septum Diastolic: 0.98 cm



  LV PW Diastolic: 1.02 cm                   LV Length: 7.23 cm



  LVEDV Guy's:91.24 ml                   LV FS: 35.5 %



  LVESV Guy's:36.89 ml



  LVEF Guy's: 59.6 %                     LVEDVI: 40 ml/m^2



                                             LVESVI: 16 ml/m^2



  LVOT Diameter: 2.41 cm



 



 Aorta



 



  Ao Root S of Krupa.: 2.96 cm



 



 Doppler/Quantitative Measurements



 



  LVOT



 



  Peak Velocity: 0.65 m/s             Peak Gradient: 1.7 mmHg



  Mean Velocity: 0.39 m/s             Mean Gradient: 0.75 mmHg



  LVOT Diameter: 2.41 cm              LVOT VTI: 12.26 cm



  LVOT Area: 4.56 cm^2                LVOT SV:55.9 ml



  LVOT CO: 4.08 l/min                 LVOT CI: 1.77 l/min/m^2



 



XR chest 2 views (2018  1:50 PM)





 Specimen  Performing Laboratory

 

   [facility]









 Narrative

 

 FINAL REPORT







  







 PATIENT ID: [id number]







  







 Chest two views







  







 INDICATION: Heart transplant annual follow-up.







  







 COMPARISON: 2017







  







 IMPRESSION:







  







 There is no focal consolidation, vascular congestion, pleural 







 effusion, or pneumothorax. Heart size is within normal limits. Median 







 sternotomy changes, left axillary surgical clips, and gastric sleeve 







 with small hiatal hernia are again noted.







  







 No significant change since 2017.







  







 Signed: Richard Nickerson MD







 Report Verified Date/Time:2018 14:13:48







  







 Reading Location: [facility] C013W Consult Reading Room







  Electronically signed by: RICHARD NICKERSON M.D. on 2018 02:13 PM







 









 Procedure Note

 

 Interface, External Ris In - 2018  2:15 PM CDT



FINAL REPORT



 



 PATIENT ID:   [id number]



 



 Chest two views



 



 INDICATION: Heart transplant annual follow-up.



 



 COMPARISON: 2017



 



 IMPRESSION:



 



 There is no focal consolidation, vascular congestion, pleural



 effusion, or pneumothorax. Heart size is within normal limits. Median



 sternotomy changes, left axillary surgical clips, and gastric sleeve



 with small hiatal hernia are again noted.



 



 No significant change since 2017.



 



 Signed: Richard Nickerson MD



 Report Verified Date/Time:  2018 14:13:48



 



 Reading Location: [facility] C013W Consult Reading Room



    Electronically signed by: RICHARD NICKERSON M.D. on 2018 02:13 PM



 



ECG 12 lead (2018 11:50 AM)





 Specimen  Performing Laboratory

 

   [facility]









 Narrative

 

 Ventricular Rate 73 BPM







 Atrial Rate 73 BPM







 P-R Interval 158 ms







 QRS Duration 94 ms







 Q-T Interval 392 ms







 QTC Calculation(Bazett) 431 ms







 P Axis 58 degrees







 R Axis 73 degrees







 T Axis 56 degrees







  







 Normal sinus rhythm







 Incomplete right bundle branch block







 Borderline ECG







 When compared with ECG of 04-MAR-2017 20:13,







 No significant change was found







  







 Confirmed by Dana Loo ([id number]) on 2018 8:56:21 PM









 Procedure Note

 

 Interface, External Ris In - 2018  8:56 PM CDT



Ventricular Rate 73 BPM



 Atrial Rate 73 BPM



 P-R Interval 158 ms



 QRS Duration 94 ms



 Q-T Interval 392 ms



 QTC Calculation(Bazett) 431 ms



 P Axis 58 degrees



 R Axis 73 degrees



 T Axis 56 degrees



 



 Normal sinus rhythm



 Incomplete right bundle branch block



 Borderline ECG



 When compared with ECG of 04-MAR-2017 20:13,



 No significant change was found



 



 Confirmed by Dana Loo ([id number]) on 2018 8:56:21 PM



CBC with platelet count + automated diff (2018 11:07 AM)Only the most 
recent of2 resultswithin the time period is included.





 Component  Value  Ref Range

 

 WBC  4.8  3.5 - 10.5 K/L

 

 RBC  5.26  4.63 - 6.08 M/L

 

 Hemoglobin  15.1  13.7 - 17.5 GM/DL

 

 Hematocrit  45.8  40.1 - 51.0 %

 

 MCV  87.1  79.0 - 92.2 fL

 

 MCH  28.7  25.7 - 32.2 pg

 

 MCHC  33.0  32.3 - 36.5 GM/DL

 

 RDW  13.4  11.6 - 14.4 %

 

 Platelets  161  150 - 450 K/CU MM

 

 MPV  9.0 (L)  9.4 - 12.4 fL

 

 nRBC  0  0 - 0 /100 WBC

 

 % Neutros  67  %

 

 % Lymphs  19  %

 

 % Monos  10  %

 

 % Eos  3  %

 

 % Baso  1  %

 

 # Neutros  3.21  1.78 - 5.38 K/L

 

 # Lymphs  0.93 (L)  1.32 - 3.57 K/L

 

 # Monos  0.48  0.30 - 0.82 K/L

 

 # Eos  0.14  0.04 - 0.54 K/L

 

 # Baso  0.04  0.01 - 0.08 K/L

 

 Immature Granulocytes-Relative  1  0 - 1 %









 Specimen  Performing Laboratory

 

 Blood  [address] [address]



Prothrombin time/INR (2018 11:07 AM)





 Component  Value  Ref Range

 

 Protime  14.7  11.7 - 14.7 seconds

 

 INR  1.2  <=5.9









 Specimen  Performing Laboratory

 

 Blood  [address] [address]









 Narrative

 

  







 RECOMMENDED COUMADIN/WARFARIN INR THERAPY RANGES







 STANDARD DOSE: 2.0 - 3.0 Includes: PROPHYLAXIS for venous thrombosis, 
systemic



 embolization; TREATMENT for venous thrombosis and/or pulmonary embolus.







 HIGH RISK: Target INR is 2.5-3.5 for patients with mechanical heart valves.



Lipid panel (2018 11:07 AM)





 Component  Value  Ref Range

 

 Triglycerides  87  mg/dL

 

 Cholesterol  127  mg/dL

 

 HDL  37  mg/dL

 

 LDL Calculated  73  mg/dL









 Specimen  Performing Laboratory

 

 Blood  [address] [address]









 Narrative

 

  







 Triglyceride Reference Range:







  Low Risk <150







  [id number]







  High Risk 200-499







  Very High Risk>=500







  







 Cholesterol Reference Range:







  Low Risk <200







  [id number] 







  High Risk>240







  







 HDL Cholesterol Reference Range:







  Low Risk >=60







  High Risk <40







  







 LDL Cholesterol Reference Range:







  Optimal<100







  Near [id number]







  [id number]







  Dkll312-571







  Very High >=190



Comprehensive metabolic panel (2018 11:07 AM)





 Component  Value  Ref Range

 

 Protein, Total  6.5  6.0 - 8.3 gm/dL

 

 Albumin  4.2  3.5 - 5.0 g/dL

 

 Alkaline Phosphatase  98  40 - 150 U/L

 

 Total Bilirubin  0.4  0.2 - 1.2 mg/dL

 

 Sodium  141  136 - 145 meq/L

 

 Potassium  4.3  3.5 - 5.1 meq/L

 

 Chloride  103  98 - 107 meq/L

 

 CO2  29  22 - 29 meq/L

 

 BUN  17  7 - 21 mg/dL

 

 Creatinine  1.00  0.57 - 1.25 mg/dL

 

 Glucose  101  70 - 105 mg/dL

 

 Calcium  9.5  8.4 - 10.2 mg/dL

 

 AST  15  5 - 34 U/L

 

 ALT  12  6 - 55 U/L

 

 EGFR  79Comment: ESTIMATED GFR IS NOT AS ACCURATE AS  mL/min/1.73 sq m



   CREATININE CLEARANCE IN PREDICTING GLOMERULAR  



   FILTRATION RATE. ESTIMATED GFR IS NOT  



   APPLICABLE FOR DIALYSIS PATIENTS.  









 Specimen  Performing Laboratory

 

 Blood  [facility]







   [address] [place] [id number]



CT chest without IV contrast (2017  8:45 AM)





 Specimen  Performing Laboratory

 

   [employer]









 Narrative

 

 FINAL REPORT







  







 PATIENT ID: [id number]







  







 INDICATION: 







 47-year-old male with chest wall pain and history of heart transplant.







  







 COMPARISON:







 Chest radiograph 2017







  







 TECHNIQUE: 







 Chest CT exam WITHOUT intravenous contrast.







  







 The exam was performed according to our department dose-optimization 







 protocol, which includes automated exposure control, adjustments of 







 mA and kV according to patient size. Iterative reconstructions are 







 also sometimes employed.







  







 FINDINGS:







 No chest wall mass, chest wall hematoma, rib fracture, or rib lesion 







 is demonstrated. There is a healed median sternotomy.







  







 Heart is normal in size and there is no pericardial effusion. Mild 







 atherosclerotic plaque of the ascending thoracic aorta is 







 demonstrated. Thoracic aorta is normal in caliber.







  







 8 mm calcified nodule in the right lower lobe represents prior 







 granulomatous infection. No pneumonia, pulmonary edema, pleural 







 effusion, or pneumothorax is demonstrated.







  







 There is no mediastinal or hilar lymphadenopathy. Thyroid gland is 







 unremarkable. Upper and mid esophagus are unremarkable. Patient is 







 status post sleeve gastrectomy and there is a small part of the 







 sleeve herniated in the low posterior mediastinum. Partial imaging of 







 the upper abdomen is otherwise unremarkable.







  







 IMPRESSION: 







 No chest wall mass, muscle tear, rib fracture, or rib lesion. No 







 other CT explanation for the patient's chest wall pain.







  







 Clear lungs.







  







 Unremarkable heart transplant.







  







 Prior gastric sleeve with small hiatal hernia.







  







 Signed: Dennis Julio MD







 Report Verified Date/Time:2017 09:10:45







  







 Reading Location: [facility] Imaging Reading Room - [place] [id number]







  Electronically signed by: DENNIS JULIO M.D. on 2017 09:10 AM







 









 Procedure Note

 

 Interface, External Ris In - 2017  9:12 AM CST



FINAL REPORT



 



 PATIENT ID:   [id number]



 



 INDICATION:



 47-year-old male with chest wall pain and history of heart transplant.



 



 COMPARISON:



 Chest radiograph 2017



 



 TECHNIQUE:



 Chest CT exam WITHOUT intravenous contrast.



 



 The exam was performed according to our department dose-optimization



 protocol, which includes automated exposure control, adjustments of



 mA and kV according to patient size. Iterative reconstructions are



 also sometimes employed.



 



 FINDINGS:



 No chest wall mass, chest wall hematoma, rib fracture, or rib lesion



 is demonstrated. There is a healed median sternotomy.



 



 Heart is normal in size and there is no pericardial effusion. Mild



 atherosclerotic plaque of the ascending thoracic aorta is



 demonstrated. Thoracic aorta is normal in caliber.



 



 8 mm calcified nodule in the right lower lobe represents prior



 granulomatous infection. No pneumonia, pulmonary edema, pleural



 effusion, or pneumothorax is demonstrated.



 



 There is no mediastinal or hilar lymphadenopathy. Thyroid gland is



 unremarkable. Upper and mid esophagus are unremarkable. Patient is



 status post sleeve gastrectomy and there is a small part of the



 sleeve herniated in the low posterior mediastinum. Partial imaging of



 the upper abdomen is otherwise unremarkable.



 



 IMPRESSION:



 No chest wall mass, muscle tear, rib fracture, or rib lesion. No



 other CT explanation for the patient's chest wall pain.



 



 Clear lungs.



 



 Unremarkable heart transplant.



 



 Prior gastric sleeve with small hiatal hernia.



 



 Signed: Dennis Julio MD



 Report Verified Date/Time:  2017 09:10:45



 



 Reading Location: [facility] Reading Room - [place] [id number]



  Electronically signed by: DENNIS JULIO M.D. on 2017 09:10 AM



 



after 2017

## 2018-08-27 NOTE — XMS REPORT
Patient Summary Document

 Created on:2018



Patient:TYRA BRUNO

Sex:Male

:1970

External Reference #:[id number]





Demographics







 Address  [address] [place] [address]

 

 Home Phone  [phone]

 

 Work Phone  [phone]

 

 Email Address  [email or web address]

 

 Preferred Language  Unknown

 

 Marital Status  Unknown

 

 Anabaptism Affiliation  Unknown

 

 Race  Unknown

 

 Additional Race(s)  Unavailable

 

 Ethnic Group  Unknown









Author







 Organization  [facility]

 

 Address  [address] [place] [address]

 

 Phone  [phone]









Care Team Providers







 Name  Role  Phone

 

 FAWAD SHIRLEY HERNANDEZ  Unavailable  Unavailable

 

 MAURO AWAD  Unavailable  Unavailable

 

 NORBERTO SILVESTRE  Unavailable  Unavailable









Problems

This patient has no known problems.



Allergies, Adverse Reactions, Alerts

This patient has no known allergies or adverse reactions.



Medications

This patient has no known medications.



Results







 Test Description  Test Time  Test Comments  Text Results  Atomic Results  
Result Comments









 TACROLIMUS LEVEL  2018 13:26:00    









   Test Item  Value  Reference Range  Comments









 TACROLIMUS BLOOD (BEAKER) (test code=657)  4.1 ng/mL  10.0-20.0  



RAD, CHEST, 2 [id number] 14:13:00NEW CARDIOLOGIST OBERTONReason for Exam:-&
gt;heart transplant annual follow upFINAL REPORT PATIENT ID:   [id number] Chest 
two views INDICATION: Heart transplant annual follow-up. COMPARISON: 2017 
IMPRESSION: There is no focal consolidation, vascular congestion, pleural 
effusion, or pneumothorax. Heart size is within normal limits. Median 
sternotomy changes, left axillary surgical clips, and gastric sleeve with small 
hiatal hernia are again noted. No significant change since 2017. Signed: 
Richard Nickerson MDReport Verified Date/Time:  2018 14:13:48 Reading 
Location: [address] Consult Reading Room   Electronically signed by: 
RICHARD NICKERSON M.D. on 2018 02:13 PMTACROLIMUS [id number] 13:55
:00





 Test Item  Value  Reference Range  Comments

 

 TACROLIMUS BLOOD (BEAKER) (test code=657)  4.7 ng/mL  10.0-20.0  



COMPREHENSIVE METABOLIC [id number] 12:11:00





 Test Item  Value  Reference Range  Comments

 

 TOTAL PROTEIN (BEAKER)  6.5 gm/dL  6.0-8.3  



 (test code=770)      

 

 ALBUMIN (BEAKER) (test  4.2 g/dL  3.5-5.0  



 code=1145)      

 

 ALKALINE PHOSPHATASE  98 U/L    



 (BEAKER) (test code=346)      

 

 BILIRUBIN TOTAL (BEAKER)  0.4 mg/dL  0.2-1.2  



 (test code=377)      

 

 SODIUM (BEAKER) (test  141 meq/L  136-145  



 bzdd=805)      

 

 POTASSIUM (BEAKER) (test  4.3 meq/L  3.5-5.1  



 code=379)      

 

 CHLORIDE (BEAKER) (test  103 meq/L    



 code=382)      

 

 CO2 (BEAKER) (test  29 meq/L  22-29  



 code=355)      

 

 BLOOD UREA NITROGEN  17 mg/dL  7-21  



 (BEAKER) (test code=354)      

 

 CREATININE (BEAKER) (test  1.00 mg/dL  0.57-1.25  



 code=358)      

 

 GLUCOSE RANDOM (BEAKER)  101 mg/dL    



 (test code=652)      

 

 CALCIUM (BEAKER) (test  9.5 mg/dL  8.4-10.2  



 code=697)      

 

 AST (SGOT) (BEAKER) (test  15 U/L  5-34  



 code=353)      

 

 ALT (SGPT) (BEAKER) (test  12 U/L  6-55  



 code=347)      

 

 EGFR (BEAKER) (test  79 mL/min/1.73 sq m    ESTIMATED GFR IS NOT AS



 code=1092)      ACCURATE AS CREATININE



       CLEARANCE IN PREDICTING



       GLOMERULAR FILTRATION



       RATE. ESTIMATED GFR IS



       NOT APPLICABLE FOR



       DIALYSIS PATIENTS.



LIPID [id number] 11:47:00





 Test Item  Value  Reference Range  Comments

 

 TRIGLYCERIDES (BEAKER) (test code=540)  87 mg/dL    

 

 CHOLESTEROL (BEAKER) (test code=631)  127 mg/dL    

 

 HDL CHOLESTEROL (BEAKER) (test code=976)  37 mg/dL    

 

 LDL CHOLESTEROL CALCULATED (BEAKER) (test  73 mg/dL    



 code=633)      



Triglyceride Reference Range:   Low Risk         &lt;150   Borderline    150-
199   High Risk     200-499   Very High Risk  &gt;=500Cholesterol Reference 
Range:   Low Risk         &lt;200   Borderline 200-239    High Risk        &gt;
240HDL Cholesterol Reference Range:   Low Risk         &gt;=60   High Risk     
    &lt;40LDL Cholesterol Reference Range:   Optimal          &lt;100   Near 
Optimal  100-129   Borderline    130-159   High          160-189   Very High   
    &gt;=190PROTHROMBIN [id number] 11:30:00





 Test Item  Value  Reference Range  Comments

 

 PROTIME (BEAKER) (test code=759)  14.7 seconds  11.7-14.7  

 

 INR (BEAKER) (test code=370)  1.2  <=5.9  



RECOMMENDED COUMADIN/WARFARIN INR THERAPY RANGESSTANDARD DOSE: 2.0 - 3.0   
Includes: PROPHYLAXIS forvenous thrombosis, systemic embolization; TREATMENT 
for venous thrombosis and/or pulmonary embolus.HIGH RISK: Target INR is 2.5-3.5 
for patients with mechanical heart valves.CBC W/PLT COUNT &amp; AUTO 
[id number] 11:23:00





 Test Item  Value  Reference Range  Comments

 

 WHITE BLOOD CELL COUNT (BEAKER) (test code=775)  4.8 K/ L  3.5-10.5  

 

 RED BLOOD CELL COUNT (BEAKER) (test code=761)  5.26 M/ L  4.63-6.08  

 

 HEMOGLOBIN (BEAKER) (test code=410)  15.1 GM/DL  13.7-17.5  

 

 HEMATOCRIT (BEAKER) (test code=411)  45.8 %  40.1-51.0  

 

 MEAN CORPUSCULAR VOLUME (BEAKER) (test code=753)  87.1 fL  79.0-92.2  

 

 MEAN CORPUSCULAR HEMOGLOBIN (BEAKER) (test  28.7 pg  25.7-32.2  



 nfjr=562)      

 

 MEAN CORPUSCULAR HEMOGLOBIN CONC (BEAKER) (test  33.0 GM/DL  32.3-36.5  



 code=752)      

 

 RED CELL DISTRIBUTION WIDTH (BEAKER) (test  13.4 %  11.6-14.4  



 code=412)      

 

 PLATELET COUNT (BEAKER) (test code=756)  161 K/CU MM  150-450  

 

 MEAN PLATELET VOLUME (BEAKER) (test code=754)  9.0 fL  9.4-12.4  

 

 NUCLEATED RED BLOOD CELLS (BEAKER) (test  0 /100 WBC  0-0  



 code=413)      

 

 NEUTROPHILS RELATIVE PERCENT (BEAKER) (test  67 %    



 code=429)      

 

 LYMPHOCYTES RELATIVE PERCENT (BEAKER) (test  19 %    



 code=430)      

 

 MONOCYTES RELATIVE PERCENT (BEAKER) (test  10 %    



 code=431)      

 

 EOSINOPHILS RELATIVE PERCENT (BEAKER) (test  3 %    



 code=432)      

 

 BASOPHILS RELATIVE PERCENT (BEAKER) (test  1 %    



 code=437)      

 

 NEUTROPHILS ABSOLUTE COUNT (BEAKER) (test  3.21 K/ L  1.78-5.38  



 code=670)      

 

 LYMPHOCYTES ABSOLUTE COUNT (BEAKER) (test  0.93 K/ L  1.32-3.57  



 code=414)      

 

 MONOCYTES ABSOLUTE COUNT (BEAKER) (test  0.48 K/ L  0.30-0.82  



 code=415)      

 

 EOSINOPHILS ABSOLUTE COUNT (BEAKER) (test  0.14 K/ L  0.04-0.54  



 code=416)      

 

 BASOPHILS ABSOLUTE COUNT (BEAKER) (test  0.04 K/ L  0.01-0.08  



 code=417)      

 

 IMMATURE GRANULOCYTES-RELATIVE PERCENT (BEAKER)  1 %  0-1  



 (test code=2801)      



CT, CHEST, WITHOUT [id number] 09:10:00NEW CARDIOLOGIST OBERTONFINAL 
REPORT PATIENT ID:   [id number] INDICATION: 47-year-old male with chest wall pain 
and history ofheart transplant. COMPARISON:Chest radiograph 2017 
TECHNIQUE: Chest CT exam WITHOUT intravenous contrast. The exam was performed 
according to our department dose-optimization protocol, which includes 
automated exposure control, adjustments of mA and kV according to patient size. 
Iterative reconstructions are also sometimes employed. FINDINGS:No chest wall 
mass, chest wall hematoma, rib fracture, or rib lesion is demonstrated. There 
is a healed median sternotomy. Heart is normal in size and there is no 
pericardial effusion. Mild atherosclerotic plaque of the ascending thoracic 
aorta is demonstrated. Thoracic aorta is normal in caliber. 8 mm calcified 
nodule in the right lower lobe represents prior granulomatous infection. No 
pneumonia, pulmonary edema, pleural effusion, or pneumothorax is demonstrated. 
There is no mediastinal or hilar lymphadenopathy. Thyroid gland is 
unremarkable. Upper and mid esophagus are unremarkable. Patient is status post 
sleeve gastrectomy and there is a small part of the sleeve herniated in the low 
posterior mediastinum. Partial imaging of the upper abdomen is otherwise 
unremarkable. IMPRESSION: No chest wall mass, muscle tear, rib fracture, or rib 
lesion. No other CT explanation for the patient's chest wall pain. Clear lungs. 
Unremarkable heart transplant. Prior gastric sleeve with small hiatal hernia. 
Signed: Marlee Calhoun MDReport Verified Date/Time:  2017 09:10:45 
Reading Location: [facility] Reading Room - [place] [id number] 
Electronically signed by: MARLEE CALHOUN M.D. on 2017 09:10 
AMTACROLIMUS [id number] 13:49:00





 Test Item  Value  Reference Range  Comments

 

 TACROLIMUS BLOOD (BEAKER) (test code=657)  5.3 ng/mL  10.0-20.0  



BASIC METABOLIC [id number] 10:44:00





 Test Item  Value  Reference Range  Comments

 

 SODIUM (BEAKER) (test  141 meq/L  136-145  



 snwk=301)      

 

 POTASSIUM (BEAKER) (test  4.5 meq/L  3.5-5.1  



 code=379)      

 

 CHLORIDE (BEAKER) (test  103 meq/L    



 code=382)      

 

 CO2 (BEAKER) (test  29 meq/L  22-29  



 code=355)      

 

 BLOOD UREA NITROGEN  17 mg/dL  7-21  



 (BEAKER) (test code=354)      

 

 CREATININE (BEAKER) (test  1.09 mg/dL  0.57-1.25  



 code=358)      

 

 GLUCOSE RANDOM (BEAKER)  97 mg/dL    



 (test code=652)      

 

 CALCIUM (BEAKER) (test  9.3 mg/dL  8.4-10.2  



 code=697)      

 

 EGFR (BEAKER) (test  72 mL/min/1.73 sq m    ESTIMATED GFR IS NOT AS



 code=1092)      ACCURATE AS CREATININE



       CLEARANCE IN PREDICTING



       GLOMERULAR FILTRATION



       RATE. ESTIMATED GFR IS



       NOT APPLICABLE FOR



       DIALYSIS PATIENTS.



CBC W/PLT COUNT &amp; AUTO [id number] 09:54:00





 Test Item  Value  Reference Range  Comments

 

 WHITE BLOOD CELL COUNT (BEAKER) (test code=775)  4.0 K/ L  3.5-10.5  

 

 RED BLOOD CELL COUNT (BEAKER) (test code=761)  5.42 M/ L  4.63-6.08  

 

 HEMOGLOBIN (BEAKER) (test code=410)  15.4 GM/DL  13.7-17.5  

 

 HEMATOCRIT (BEAKER) (test code=411)  47.6 %  40.1-51.0  

 

 MEAN CORPUSCULAR VOLUME (BEAKER) (test code=753)  87.8 fL  79.0-92.2  

 

 MEAN CORPUSCULAR HEMOGLOBIN (BEAKER) (test  28.4 pg  25.7-32.2  



 mmjx=245)      

 

 MEAN CORPUSCULAR HEMOGLOBIN CONC (BEAKER) (test  32.4 GM/DL  32.3-36.5  



 code=752)      

 

 RED CELL DISTRIBUTION WIDTH (BEAKER) (test  13.8 %  11.6-14.4  



 code=412)      

 

 PLATELET COUNT (BEAKER) (test code=756)  152 K/CU MM  150-450  

 

 MEAN PLATELET VOLUME (BEAKER) (test code=754)  9.9 fL  9.4-12.4  

 

 NUCLEATED RED BLOOD CELLS (BEAKER) (test  0 /100 WBC  0-0  



 code=413)      

 

 NEUTROPHILS RELATIVE PERCENT (BEAKER) (test  66 %    



 code=429)      

 

 LYMPHOCYTES RELATIVE PERCENT (BEAKER) (test  20 %    



 code=430)      

 

 MONOCYTES RELATIVE PERCENT (BEAKER) (test  11 %    



 code=431)      

 

 EOSINOPHILS RELATIVE PERCENT (BEAKER) (test  2 %    



 code=432)      

 

 BASOPHILS RELATIVE PERCENT (BEAKER) (test  1 %    



 code=437)      

 

 NEUTROPHILS ABSOLUTE COUNT (BEAKER) (test  2.65 K/ L  1.78-5.38  



 code=670)      

 

 LYMPHOCYTES ABSOLUTE COUNT (BEAKER) (test  0.82 K/ L  1.32-3.57  



 code=414)      

 

 MONOCYTES ABSOLUTE COUNT (BEAKER) (test  0.44 K/ L  0.30-0.82  



 code=415)      

 

 EOSINOPHILS ABSOLUTE COUNT (BEAKER) (test  0.08 K/ L  0.04-0.54  



 code=416)      

 

 BASOPHILS ABSOLUTE COUNT (BEAKER) (test  0.04 K/ L  0.01-0.08  



 code=417)      

 

 IMMATURE GRANULOCYTES-RELATIVE PERCENT (BEAKER)  0 %  0-1  



 (test code=2801)      



TACROLIMUS [id number] 14:05:00





 Test Item  Value  Reference Range  Comments

 

 TACROLIMUS BLOOD (BEAKER) (test code=657)  6.5 ng/mL  10.0-20.0  



BASIC METABOLIC [id number] 10:13:00





 Test Item  Value  Reference Range  Comments

 

 SODIUM (BEAKER) (test  140 meq/L  136-145  



 mwfh=816)      

 

 POTASSIUM (BEAKER) (test  4.1 meq/L  3.5-5.1  



 code=379)      

 

 CHLORIDE (BEAKER) (test  109 meq/L    



 code=382)      

 

 CO2 (BEAKER) (test  22 meq/L  22-29  



 code=355)      

 

 BLOOD UREA NITROGEN  15 mg/dL  7-21  



 (BEAKER) (test code=354)      

 

 CREATININE (BEAKER) (test  0.95 mg/dL  0.57-1.25  



 code=358)      

 

 GLUCOSE RANDOM (BEAKER)  99 mg/dL    



 (test code=652)      

 

 CALCIUM (BEAKER) (test  8.8 mg/dL  8.4-10.2  



 code=697)      

 

 EGFR (BEAKER) (test  85 mL/min/1.73 sq m    ESTIMATED GFR IS NOT AS



 code=1092)      ACCURATE AS CREATININE



       CLEARANCE IN PREDICTING



       GLOMERULAR FILTRATION



       RATE. ESTIMATED GFR IS



       NOT APPLICABLE FOR



       DIALYSIS PATIENTS.



CBC W/PLT COUNT &amp; AUTO [id number] 09:42:00





 Test Item  Value  Reference Range  Comments

 

 WHITE BLOOD CELL COUNT (BEAKER) (test code=775)  4.7 K/ L  4.0-10.0  

 

 RED BLOOD CELL COUNT (BEAKER) (test code=761)  5.18 M/ L  4.20-5.80  

 

 HEMOGLOBIN (BEAKER) (test code=410)  15.3 GM/DL  13.0-16.8  

 

 HEMATOCRIT (BEAKER) (test code=411)  47.1 %  40.0-50.0  

 

 MEAN CORPUSCULAR VOLUME (BEAKER) (test code=753)  90.8 fL  82.0-98.0  

 

 MEAN CORPUSCULAR HEMOGLOBIN (BEAKER) (test  29.6 pg  27.0-33.0  



 code=751)      

 

 MEAN CORPUSCULAR HEMOGLOBIN CONC (BEAKER) (test  32.6 GM/DL  32.0-36.0  



 code=752)      

 

 RED CELL DISTRIBUTION WIDTH (BEAKER) (test  15.1 %  10.3-14.2  



 code=412)      

 

 PLATELET COUNT (BEAKER) (test code=756)  129 K/CU MM  150-430  

 

 MEAN PLATELET VOLUME (BEAKER) (test code=754)  7.7 fL  6.5-10.5  

 

 NUCLEATED RED BLOOD CELLS (BEAKER) (test  0 /100 WBC  0-0  



 code=413)      

 

 NEUTROPHILS RELATIVE PERCENT (BEAKER) (test  72 %    



 code=429)      

 

 LYMPHOCYTES RELATIVE PERCENT (BEAKER) (test  18 %    



 code=430)      

 

 MONOCYTES RELATIVE PERCENT (BEAKER) (test  8 %    



 code=431)      

 

 EOSINOPHILS RELATIVE PERCENT (BEAKER) (test  1 %    



 code=432)      

 

 BASOPHILS RELATIVE PERCENT (BEAKER) (test  1 %    



 code=437)      

 

 NEUTROPHILS ABSOLUTE COUNT (BEAKER) (test  3.37 K/ L  1.80-8.00  



 code=670)      

 

 LYMPHOCYTES ABSOLUTE COUNT (BEAKER) (test  0.85 K/ L  1.48-4.50  



 code=414)      

 

 MONOCYTES ABSOLUTE COUNT (BEAKER) (test  0.40 K/ L  0.00-1.30  



 code=415)      

 

 EOSINOPHILS ABSOLUTE COUNT (BEAKER) (test  0.07 K/ L  0.00-0.50  



 code=416)      

 

 BASOPHILS ABSOLUTE COUNT (BEAKER) (test  0.03 K/ L  0.00-0.20  



 code=417)      



[id number] 10:38:00





 Test Item  Value  Reference Range  Comments

 

 MAGNESIUM (BEAKER) (test code=627)  2.2 mg/dL  1.6-2.6  



TACROLIMUS [id number] 10:33:00





 Test Item  Value  Reference Range  Comments

 

 TACROLIMUS BLOOD (BEAKER) (test code=657)  7.1 ng/mL  10.0-20.0  



CBC W/PLT COUNT &amp; AUTO [id number] 08:47:00





 Test Item  Value  Reference Range  Comments

 

 WHITE BLOOD CELL COUNT (BEAKER) (test code=775)  4.1 K/ L  4.0-10.0  

 

 RED BLOOD CELL COUNT (BEAKER) (test code=761)  5.02 M/ L  4.20-5.80  

 

 HEMOGLOBIN (BEAKER) (test code=410)  14.8 GM/DL  13.0-16.8  

 

 HEMATOCRIT (BEAKER) (test code=411)  43.6 %  40.0-50.0  

 

 MEAN CORPUSCULAR VOLUME (BEAKER) (test code=753)  87.0 fL  82.0-98.0  

 

 MEAN CORPUSCULAR HEMOGLOBIN (BEAKER) (test  29.5 pg  27.0-33.0  



 code=751)      

 

 MEAN CORPUSCULAR HEMOGLOBIN CONC (BEAKER) (test  33.9 GM/DL  32.0-36.0  



 code=752)      

 

 RED CELL DISTRIBUTION WIDTH (BEAKER) (test  14.1 %  10.3-14.2  



 code=412)      

 

 PLATELET COUNT (BEAKER) (test code=756)  160 K/CU MM  150-430  

 

 MEAN PLATELET VOLUME (BEAKER) (test code=754)  7.2 fL  6.5-10.5  

 

 NUCLEATED RED BLOOD CELLS (BEAKER) (test  0 /100 WBC  0-0  



 code=413)      

 

 NEUTROPHILS RELATIVE PERCENT (BEAKER) (test  64 %    



 code=429)      

 

 LYMPHOCYTES RELATIVE PERCENT (BEAKER) (test  21 %    



 code=430)      

 

 MONOCYTES RELATIVE PERCENT (BEAKER) (test  12 %    



 code=431)      

 

 EOSINOPHILS RELATIVE PERCENT (BEAKER) (test  2 %    



 code=432)      

 

 BASOPHILS RELATIVE PERCENT (BEAKER) (test  0 %    



 code=437)      

 

 NEUTROPHILS ABSOLUTE COUNT (BEAKER) (test  2.61 K/ L  1.80-8.00  



 code=670)      

 

 LYMPHOCYTES ABSOLUTE COUNT (BEAKER) (test  0.87 K/ L  1.48-4.50  



 code=414)      

 

 MONOCYTES ABSOLUTE COUNT (BEAKER) (test  0.49 K/ L  0.00-1.30  



 code=415)      

 

 EOSINOPHILS ABSOLUTE COUNT (BEAKER) (test  0.09 K/ L  0.00-0.50  



 code=416)      

 

 BASOPHILS ABSOLUTE COUNT (BEAKER) (test  0.01 K/ L  0.00-0.20  



 code=417)      



0.00BASIC METABOLIC [id number] 08:47:00





 Test Item  Value  Reference Range  Comments

 

 SODIUM (BEAKER) (test  142 meq/L  136-145  



 brkf=971)      

 

 POTASSIUM (BEAKER) (test  3.8 meq/L  3.5-5.1  



 code=379)      

 

 CHLORIDE (BEAKER) (test  109 meq/L    



 code=382)      

 

 CO2 (BEAKER) (test  21 meq/L  22-29  



 code=355)      

 

 BLOOD UREA NITROGEN  22 mg/dL  7-21  



 (BEAKER) (test code=354)      

 

 CREATININE (BEAKER) (test  1.18 mg/dL  0.57-1.25  



 code=358)      

 

 GLUCOSE RANDOM (BEAKER)  99 mg/dL    



 (test code=652)      

 

 CALCIUM (BEAKER) (test  9.1 mg/dL  8.4-10.2  



 code=697)      

 

 EGFR (BEAKER) (test  66 mL/min/1.73 sq m    ESTIMATED GFR IS NOT AS



 code=1092)      ACCURATE AS CREATININE



       CLEARANCE IN PREDICTING



       GLOMERULAR FILTRATION



       RATE. ESTIMATED GFR IS



       NOT APPLICABLE FOR



       DIALYSIS PATIENTS.



URINALYSIS W/ [id number] 00:20:00





 Test Item  Value  Reference Range  Comments

 

 COLOR (BEAKER) (test code=470)  Yellow    

 

 CLARITY (BEAKER) (test code=469)  Slightly Hazy    

 

 SPECIFIC GRAVITY UA (BEAKER) (test  1.050  1.001-1.035  



 code=468)      

 

 PH UA (BEAKER) (test code=467)  5.5  5.0-8.0  

 

 PROTEIN UA (BEAKER) (test code=464)  20 mg/dL  Negative  

 

 GLUCOSE UA (BEAKER) (test code=365)  Negative  Negative  

 

 KETONES UA (BEAKER) (test code=371)  40 mg/dL  Negative  

 

 BILIRUBIN UA (BEAKER) (test code=462)  Negative  Negative  

 

 BLOOD UA (BEAKER) (test code=461)  Negative  Negative  

 

 NITRITE UA (BEAKER) (test code=465)  Negative  Negative  

 

 LEUKOCYTE ESTERASE UA (BEAKER) (test  Negative  Negative  



 gjdf=357)      

 

 UROBILINOGEN UA (BEAKER) (test code=463)  0.2 mg/dL  0.2-1.0  

 

 RBC UA (BEAKER) (test code=519)  1 /HPF    

 

 WBC UA (BEAKER) (test code=520)  3 /HPF    

 

 MUCUS (BEAKER) (test code=1574)  Many    

 

 HYALINE CASTS (BEAKER) (test code=514)  6 /LPF    

 

 SOURCE(BEAKER) (test code=2795)  Urine, Clean Catch    



BASIC METABOLIC [id number] 21:55:00





 Test Item  Value  Reference Range  Comments

 

 SODIUM (BEAKER) (test  139 meq/L  136-145  



 uhvf=888)      

 

 POTASSIUM (BEAKER) (test  5.1 meq/L  3.5-5.1  



 code=379)      

 

 CHLORIDE (BEAKER) (test  107 meq/L    



 code=382)      

 

 CO2 (BEAKER) (test  18 meq/L  22-29  



 code=355)      

 

 BLOOD UREA NITROGEN  26 mg/dL  7-21  



 (BEAKER) (test code=354)      

 

 CREATININE (BEAKER) (test  1.26 mg/dL  0.57-1.25  



 code=358)      

 

 GLUCOSE RANDOM (BEAKER)  89 mg/dL    



 (test code=652)      

 

 CALCIUM (BEAKER) (test  9.1 mg/dL  8.4-10.2  



 code=697)      

 

 EGFR (BEAKER) (test  61 mL/min/1.73 sq m    ESTIMATED GFR IS NOT AS



 code=1092)      ACCURATE AS CREATININE



       CLEARANCE IN PREDICTING



       GLOMERULAR FILTRATION



       RATE. ESTIMATED GFR IS



       NOT APPLICABLE FOR



       DIALYSIS PATIENTS.



HEPATIC FUNCTION [id number] 21:55:00





 Test Item  Value  Reference Range  Comments

 

 TOTAL PROTEIN (BEAKER) (test code=770)  7.1 gm/dL  6.0-8.3  

 

 ALBUMIN (BEAKER) (test code=1145)  4.2 g/dL  3.5-5.0  

 

 BILIRUBIN TOTAL (BEAKER) (test code=377)  0.7 mg/dL  0.2-1.2  

 

 BILIRUBIN DIRECT (BEAKER) (test code=706)  0.2 mg/dL  0.1-0.5  

 

 ALKALINE PHOSPHATASE (BEAKER) (test code=346)  86 U/L    

 

 AST (SGOT) (BEAKER) (test code=353)  28 U/L  5-34  

 

 ALT (SGPT) (BEAKER) (test code=347)  20 U/L  6-55  



[id number] 21:52:00





 Test Item  Value  Reference Range  Comments

 

 LIPASE (BEAKER) (test code=749)  40 U/L  8-78  



B-TYPE NATRIURETIC FACTOR (BNP)2017 21:46:00





 Test Item  Value  Reference Range  Comments

 

 B-TYPE NATRIURETIC PEPTIDE (BEAKER) (test code=700)  21 pg/mL  0-100  



CREATINE KINASE (CK), TOTAL AND [id number] 21:45:00





 Test Item  Value  Reference Range  Comments

 

 CREATINE KINASE TOTAL (BEAKER) (test code=380)  32 U/L    

 

 CREATINE KINASE-MB (BEAKER) (test code=750)  0.6 ng/mL  0.0-6.6  

 

 CREATINE KINASE-MB INDEX (BEAKER) (test code=395)  1.9 %    



Effective 2014: CK-MB Reference Range ChangeNew: 0.0-6.6    Previous: 0.0-
4.9CK-MB Reference Range:&lt;6.7      Normal6.7-10.0  Borderline&gt;10.0     
AbnormalTROPONIN -80-08 21:45:00





 Test Item  Value  Reference Range  Comments

 

 TROPONIN I (BEAKER) (test code=397)  < ng/mL  0.00-0.03  



Effective 2014: Reference Range ChangeNew: 0.00-0.03   Previous 0.00-
0.15Troponin I (TnI) levels must be interpreted in the context of the 
presenting symptoms and the clinical findings. Elevated TnI levels indicate 
myocardial damage, but are not specific for ischemic heart disease. Elevated 
TnI levels are seen in patients with other cardiac conditions (including 
myocarditis and congestive heartfailure), and slight TnI elevations occur in 
patients with other conditions, including sepsis, renalfailure, acidosis, acute 
neurological disease, and persistent tachyarrhythmia.CBC W/PLT COUNT &amp; AUTO 
[id number] 21:24:00





 Test Item  Value  Reference Range  Comments

 

 WHITE BLOOD CELL COUNT (BEAKER) (test code=775)  4.7 K/ L  4.0-10.0  

 

 RED BLOOD CELL COUNT (BEAKER) (test code=761)  5.75 M/ L  4.20-5.80  

 

 HEMOGLOBIN (BEAKER) (test code=410)  17.4 GM/DL  13.0-16.8  

 

 HEMATOCRIT (BEAKER) (test code=411)  49.2 %  40.0-50.0  

 

 MEAN CORPUSCULAR VOLUME (BEAKER) (test code=753)  85.6 fL  82.0-98.0  

 

 MEAN CORPUSCULAR HEMOGLOBIN (BEAKER) (test  30.3 pg  27.0-33.0  



 code=751)      

 

 MEAN CORPUSCULAR HEMOGLOBIN CONC (BEAKER) (test  35.4 GM/DL  32.0-36.0  



 code=752)      

 

 RED CELL DISTRIBUTION WIDTH (BEAKER) (test  12.9 %  10.3-14.2  



 code=412)      

 

 PLATELET COUNT (BEAKER) (test code=756)  98 K/CU MM  150-430  

 

 MEAN PLATELET VOLUME (BEAKER) (test code=754)  8.7 fL  6.5-10.5  

 

 NUCLEATED RED BLOOD CELLS (BEAKER) (test  0 /100 WBC  0-0  



 code=413)      

 

 NEUTROPHILS RELATIVE PERCENT (BEAKER) (test  69 %    



 code=429)      

 

 LYMPHOCYTES RELATIVE PERCENT (BEAKER) (test  22 %    



 code=430)      

 

 MONOCYTES RELATIVE PERCENT (BEAKER) (test  8 %    



 code=431)      

 

 EOSINOPHILS RELATIVE PERCENT (BEAKER) (test  1 %    



 code=432)      

 

 BASOPHILS RELATIVE PERCENT (BEAKER) (test  0 %    



 code=437)      

 

 NEUTROPHILS ABSOLUTE COUNT (BEAKER) (test  3.26 K/ L  1.80-8.00  



 code=670)      

 

 LYMPHOCYTES ABSOLUTE COUNT (BEAKER) (test  1.05 K/ L  1.48-4.50  



 code=414)      

 

 MONOCYTES ABSOLUTE COUNT (BEAKER) (test code=415)  0.38 K/ L  0.00-1.30  

 

 EOSINOPHILS ABSOLUTE COUNT (BEAKER) (test  0.03 K/ L  0.00-0.50  



 code=416)      

 

 BASOPHILS ABSOLUTE COUNT (BEAKER) (test code=417)  0.02 K/ L  0.00-0.20  



0.00CMV PCR, [id number] 17:55:00





 Test Item  Value  Reference Range  Comments

 

 CMV VIRAL LOAD - NEGATIVE  Negative or below the linear    



 (BEAKER) (test code=2558)  range of the assay (<375    



   copies/mL)    



Cytomegalovirus (CMV) infection can cause significant disease in 
immunosuppressed patients. However,it is common for CMV to manifest as a 
limited infection which is of no clinical significance in immunosuppressed 
patients or in healthy individuals.Viral load measurements are helpful to 
identify clinical CMV infection and to guide the pre-emptive management of 
antiviral therapy.  For treatment of CMVinfection due to reactivation in 
transplant recipients, a threshold between 4,000 and 5,000 copies/mL is 
suggested.  For treatment of primary CMV infection, a lower threshold can be 
used.CMV infection may also be monitored using weekly serial measurements. 
Serial measurements of CMV DNA viral load canbe evaluated by identifying a 10-
fold change, as well as assessing the CMV DNA viral load and the clinical 
context for each patient.The plasma CMV DNA viral load was detected using 
quantitative polymerase chain reaction and fluorescent monitoring of a specific 
hybridized probe. Genetic variation and other factors can affect the accuracy 
of nucleic acid testing. Therefore, the results should be interpreted in light 
of clinical data. A negative result may not exclude the presence of CMV 
disease.This test was developed and its performance characteristics determined 
by the [facility], Section of Molecular 
Pathology. It has not been cleared or approved by the U.S. Food and Drug 
Administration (FDA), since FDA approval is not required for clinical use of 
the test. Validation was done as required by The Clinical Laboratory 
Improvement Amendments of 1988.CREATINE KINASE (CK), TOTAL AND [id number] 18:
40:00





 Test Item  Value  Reference Range  Comments

 

 CREATINE KINASE TOTAL (BEAKER) (test code=380)  36 U/L    

 

 CREATINE KINASE-MB (BEAKER) (test code=750)  0.3 ng/mL  0.0-6.6  

 

 CREATINE KINASE-MB INDEX (BEAKER) (test code=395)  0.8 %    



Effective 2014: CK-MB Reference Range ChangeNew: 0.0-6.6    Previous: 0.0-
4.9CK-MB Reference Range:&lt;6.7      Normal6.7-10.0  Borderline&gt;10.0     
AbnormalTROPONIN -32-34 18:40:00





 Test Item  Value  Reference Range  Comments

 

 TROPONIN I (BEAKER) (test code=397)  < ng/mL  0.00-0.03  



Effective 2014: Reference Range ChangeNew: 0.00-0.03   Previous 0.00-
0.15Troponin I (TnI) levels must be interpreted in the context of the 
presenting symptoms and the clinical findings. Elevated TnI levels indicate 
myocardial damage, but are not specific for ischemic heart disease. Elevated 
TnI levels are seen in patients with other cardiac conditions (including 
myocarditis and congestive heartfailure), and slight TnI elevations occur in 
patients with other conditions, including sepsis, renalfailure, acidosis, acute 
neurological disease, and persistent tachyarrhythmia.B-TYPE NATRIURETIC FACTOR (
BNP)2017 18:40:00





 Test Item  Value  Reference Range  Comments

 

 B-TYPE NATRIURETIC PEPTIDE (BEAKER) (test code=700)  23 pg/mL  0-100  



[id number] 18:33:00





 Test Item  Value  Reference Range  Comments

 

 MAGNESIUM (BEAKER) (test code=627)  1.8 mg/dL  1.6-2.6  



BASIC METABOLIC [id number] 18:33:00





 Test Item  Value  Reference Range  Comments

 

 SODIUM (BEAKER) (test  140 meq/L  136-145  



 nfsn=232)      

 

 POTASSIUM (BEAKER) (test  4.1 meq/L  3.5-5.1  



 code=379)      

 

 CHLORIDE (BEAKER) (test  105 meq/L    



 code=382)      

 

 CO2 (BEAKER) (test  23 meq/L  22-29  



 code=355)      

 

 BLOOD UREA NITROGEN  18 mg/dL  7-21  



 (BEAKER) (test code=354)      

 

 CREATININE (BEAKER) (test  1.34 mg/dL  0.57-1.25  



 code=358)      

 

 GLUCOSE RANDOM (BEAKER)  100 mg/dL    



 (test code=652)      

 

 CALCIUM (BEAKER) (test  9.2 mg/dL  8.4-10.2  



 code=697)      

 

 EGFR (BEAKER) (test  57 mL/min/1.73 sq m    ESTIMATED GFR IS NOT AS



 code=1092)      ACCURATE AS CREATININE



       CLEARANCE IN PREDICTING



       GLOMERULAR FILTRATION



       RATE. ESTIMATED GFR IS



       NOT APPLICABLE FOR



       DIALYSIS PATIENTS.



CBC W/PLT COUNT &amp; AUTO [id number] 18:24:00





 Test Item  Value  Reference Range  Comments

 

 WHITE BLOOD CELL COUNT (BEAKER) (test code=775)  2.6 K/ L  4.0-10.0  

 

 RED BLOOD CELL COUNT (BEAKER) (test code=761)  5.66 M/ L  4.20-5.80  

 

 HEMOGLOBIN (BEAKER) (test code=410)  16.5 GM/DL  13.0-16.8  

 

 HEMATOCRIT (BEAKER) (test code=411)  49.0 %  40.0-50.0  

 

 MEAN CORPUSCULAR VOLUME (BEAKER) (test code=753)  86.6 fL  82.0-98.0  

 

 MEAN CORPUSCULAR HEMOGLOBIN (BEAKER) (test  29.1 pg  27.0-33.0  



 code=751)      

 

 MEAN CORPUSCULAR HEMOGLOBIN CONC (BEAKER) (test  33.6 GM/DL  32.0-36.0  



 code=752)      

 

 RED CELL DISTRIBUTION WIDTH (BEAKER) (test  13.0 %  10.3-14.2  



 code=412)      

 

 PLATELET COUNT (BEAKER) (test code=756)  87 K/CU MM  150-430  

 

 MEAN PLATELET VOLUME (BEAKER) (test code=754)  8.3 fL  6.5-10.5  

 

 NUCLEATED RED BLOOD CELLS (BEAKER) (test  0 /100 WBC  0-0  



 code=413)      

 

 NEUTROPHILS RELATIVE PERCENT (BEAKER) (test  51 %    



 code=429)      

 

 LYMPHOCYTES RELATIVE PERCENT (BEAKER) (test  30 %    



 code=430)      

 

 MONOCYTES RELATIVE PERCENT (BEAKER) (test  18 %    



 code=431)      

 

 EOSINOPHILS RELATIVE PERCENT (BEAKER) (test  1 %    



 code=432)      

 

 BASOPHILS RELATIVE PERCENT (BEAKER) (test  0 %    



 code=437)      

 

 NEUTROPHILS ABSOLUTE COUNT (BEAKER) (test  1.33 K/ L  1.80-8.00  



 code=670)      

 

 LYMPHOCYTES ABSOLUTE COUNT (BEAKER) (test  0.78 K/ L  1.48-4.50  



 code=414)      

 

 MONOCYTES ABSOLUTE COUNT (BEAKER) (test code=415)  0.47 K/ L  0.00-1.30  

 

 EOSINOPHILS ABSOLUTE COUNT (BEAKER) (test  0.02 K/ L  0.00-0.50  



 code=416)      

 

 BASOPHILS ABSOLUTE COUNT (BEAKER) (test code=417)  0.01 K/ L  0.00-0.20  



0.00POCT-GLUCOSE [id number] 15:37:00





 Test Item  Value  Reference Range  Comments

 

 POC-GLUCOSE METER (BEAKER)  97 mg/dL    TESTED AT [facility] [address]



 (test fyah=4623)      [facility] [address]

## 2018-12-13 NOTE — EDPHYS
Physician Documentation                                                                           

 [facility]                                                                

Name: Gomez Romero                                                                               

Age: 48 yrs                                                                                       

Sex: Male                                                                                         

: 1970                                                                                   

MRN: [id number]                                                                                   

Arrival Date: 2018                                                                          

Time: 18:53                                                                                       

Account#: [id number]                                                                            

Bed 6                                                                                             

Private MD:                                                                                       

ED Physician Ozzy Wright                                                                     

HPI:                                                                                              

                                                                                             

20:30 This 48 yrs old  Male presents to ER via Ambulatory with complaints of Right   pm1 

      Knee Pain.                                                                                  

20:30 The patient presents with pain, that is acute. The complaints affect the right knee.    pm1 

      Context: The problem was sustained at an unknown site, resulted from an unknown cause,      

      No trauma, the patient can fully bear weight, the patient is able to ambulate, Problem      

      is a result from a previous injury: No. Onset: The symptoms/episode began/occurred 4        

      day(s) ago. Modifying factors: The symptoms are alleviated by nothing. the symptoms are     

      aggravated by kneeling on right knee. Associated signs and symptoms: Pertinent              

      negatives calf tenderness, fever, numbness, swelling, tingling, warmth. Treatment prior     

      to arrival includes: no previous treatment. Severity of symptoms: in the emergency          

      department the symptoms have improved. The patient has not experienced similar symptoms     

      in the past. The patient has not recently seen a physician.                                 

                                                                                                  

Historical:                                                                                       

- Allergies:                                                                                      

19:03 unknown rejection medication;                                                           hj  

- Home Meds:                                                                                      

19:03 pt unable to provide [Active];                                                          hj  

- PMHx:                                                                                           

19:03 pt denies;                                                                              hj  

- PSHx:                                                                                           

19:03 Hernia repair; cardiac stent; heart transplant;                                         hj  

                                                                                                  

- Immunization history:: Adult Immunizations up to date.                                          

- Social history:: Smoking status: Patient/guardian denies using tobacco,                         

  Patient/guardian denies using alcohol.                                                          

- Ebola Screening: : Patient negative for fever greater than or equal to 101.5 degrees            

  Fahrenheit, and additional compatible Ebola Virus Disease symptoms Patient denies               

  exposure to infectious person Patient denies travel to an Ebola-affected area in the            

  21 days before illness onset.                                                                   

                                                                                                  

                                                                                                  

ROS:                                                                                              

20:30 Constitutional: Negative for fever, chills, and weight loss, Eyes: Negative for injury, pm1 

      pain, redness, and discharge, ENT: Negative for injury, pain, and discharge, Neck:          

      Negative for injury, pain, and swelling, Cardiovascular: Negative for chest pain,           

      palpitations, and edema, Respiratory: Negative for shortness of breath, cough,              

      wheezing, and pleuritic chest pain, Abdomen/GI: Negative for abdominal pain, nausea,        

      vomiting, diarrhea, and constipation, Back: Negative for injury and pain, : Negative      

      for injury, bleeding, discharge, and swelling.                                              

20:30 Skin: Negative for injury, rash, and discoloration, Neuro: Negative for headache,           

      weakness, numbness, tingling, and seizure.                                                  

20:30 MS/extremity: Positive for pain, of the right knee, Negative for decreased range of         

      motion, deformity, swelling.                                                                

                                                                                                  

Exam:                                                                                             

20:30 Constitutional:  This is a well developed, well nourished patient who is awake, alert,  pm1 

      and in no acute distress. Head/Face:  Normocephalic, atraumatic. Eyes:  Pupils equal        

      round and reactive to light, extra-ocular motions intact.  Lids and lashes normal.          

      Conjunctiva and sclera are non-icteric and not injected.  Cornea within normal limits.      

      Periorbital areas with no swelling, redness, or edema. ENT:  Nares patent. No nasal         

      discharge, no septal abnormalities noted.  Tympanic membranes are normal and external       

      auditory canals are clear.  Oropharynx with no redness, swelling, or masses, exudates,      

      or evidence of obstruction, uvula midline.  Mucous membranes moist. Neck:  Trachea          

      midline, no thyromegaly or masses palpated, and no cervical lymphadenopathy.  Supple,       

      full range of motion without nuchal rigidity, or vertebral point tenderness.  No            

      Meningismus. Chest/axilla:  Normal chest wall appearance and motion.  Nontender with no     

      deformity.  No lesions are appreciated. Cardiovascular:  Regular rate and rhythm with a     

      normal S1 and S2.  No gallops, murmurs, or rubs.  Normal PMI, no JVD.  No pulse             

      deficits. Respiratory:  Lungs have equal breath sounds bilaterally, clear to                

      auscultation and percussion.  No rales, rhonchi or wheezes noted.  No increased work of     

      breathing, no retractions or nasal flaring. Abdomen/GI:  Soft, non-tender, with normal      

      bowel sounds.  No distension or tympany.  No guarding or rebound.  No evidence of           

      tenderness throughout. Back:  No spinal tenderness.  No costovertebral tenderness.          

      Full range of motion. Skin:  Warm, dry with normal turgor.  Normal color with no            

      rashes, no lesions, and no evidence of cellulitis.                                          

20:30 Musculoskeletal/extremity: Extremities: grossly normal except: noted in the :               

      tenderness below right knee patella, ROM: full active range of motion, in the right         

      knee, full passive range of motion, in the right knee, Circulation is intact in all         

      extremities. Sensation intact.                                                              

                                                                                                  

Vital Signs:                                                                                      

19:04  / 85; Pulse 92; Resp 18; Temp 98.2(TE); Pulse Ox 98% on R/A; Weight 99.79 kg;    hj  

      Height 6 ft. 4 in. (193.04 cm); Pain 3/10;                                                  

19:04 Body Mass Index 26.78 (99.79 kg, 193.04 cm)                                               

                                                                                                  

MDM:                                                                                              

20:17 Patient medically screened.                                                             pm1 

20:31 Data reviewed: vital signs. Data interpreted: Pulse oximetry: on room air is 98 %.      pm1 

      Interpretation: normal. Counseling: I had a detailed discussion with the patient and/or     

      guardian regarding: the historical points, exam findings, and any diagnostic results        

      supporting the discharge/admit diagnosis, radiology results, the need for outpatient        

      follow up, to return to the emergency department if symptoms worsen or persist or if        

      there are any questions or concerns that arise at home.                                     

20:37 ED course: Patient offered crutches and knee immobilizer. Patient has been able to walk pm1 

      and work the whole day without much difficulty. Has a hard time getting up after being      

      on his knees at work due to pain. Offered patient an ace wrap and his willing to take       

      it.                                                                                         

                                                                                                  

                                                                                             

19:05 Order name: Knee Right 3 View XRAY; Complete Time: 20:37                                  

                                                                                             

20:33 Order name: Ace wrap-joint; Complete Time: 20:39                                        pm1 

                                                                                                  

Administered Medications:                                                                         

21:01 Not Given (Patient Refused): traMADol 50 mg PO once                                     tl2 

                                                                                                  

                                                                                                  

Disposition:                                                                                      

                                                                                             

04:25 Co-signature as Attending Physician, Ozzy Wright MD I agree with the assessment and tw4 

      plan of care.                                                                               

                                                                                                  

Disposition:                                                                                      

18 20:32 Discharged to Home. Impression: Pain in right knee.                                

- Condition is Stable.                                                                            

- Discharge Instructions: Knee Pain.                                                              

- Prescriptions for Tramadol 50 mg Oral Tablet - take 1 tablet by ORAL route every 8              

  hours As needed as needed; 20 tablet.                                                           

- Medication Reconciliation Form, Thank You Letter, Antibiotic Education, Prescription            

  Opioid Use form.                                                                                

- Follow up: Emergency Department; When: As needed; Reason: Worsening of condition.               

  Follow up: Samuel Aj MD; When: 2 - 3 days; Reason: Recheck today's complaints,             

  Continuance of care, Re-evaluation by your physician.                                           

- Problem is new.                                                                                 

- Symptoms have improved.                                                                         

                                                                                                  

                                                                                                  

                                                                                                  

Signatures:                                                                                       

Dispatcher [employer]                           EDMS                                                 

Fortino Arreola, RN                      RN   hj                                                   

Emir Thomas, NP                    NP   pm1                                                  

Negin Hill RN                        RN   tl2                                                  

Ozzy Wright MD MD   tw4                                                  

                                                                                                  

Corrections: (The following items were deleted from the chart)                                    

                                                                                             

21:11 20:32 2018 20:32 Discharged to Home. Impression: Pain in right knee. Condition is tl2 

      Stable. Forms are Medication Reconciliation Form, Thank You Letter, Antibiotic              

      Education, Prescription Opioid Use. Follow up: Emergency Department; When: As needed;       

      Reason: Worsening of condition. Follow up: Dr. Samuel Aj; When: 2 - 3 days; Reason:      

      Recheck today's complaints, Continuance of care, Re-evaluation by your physician.           

      Problem is new. Symptoms have improved. pm1                                                 

                                                                                                  

**************************************************************************************************

## 2018-12-13 NOTE — RAD REPORT
EXAM DESCRIPTION:  RAD - Knee Right 3 View - 12/13/2018 7:59 pm

 

CLINICAL HISTORY:  Persistent knee pain, no precipitating event noted

 

COMPARISON:  None.

 

FINDINGS:  No fracture, dislocation or periosteal reaction.No joint effusion seen. No joint space megan
rowing. No foreign body or other soft tissue abnormality.

 

Clinical concerns for internal derangement or occult bony injury could be further assessed with MR im
aging.

 

IMPRESSION:  Negative right knee.

## 2018-12-13 NOTE — XMS REPORT
Patient Summary Document

 Created on:2018



Patient:TYRA BRUNO

Sex:Male

:1970

External Reference #:[id number]





Demographics







 Address  [address] [place] [address]

 

 Home Phone  [phone]

 

 Work Phone  [phone]

 

 Email Address  [email or web address]

 

 Preferred Language  Unknown

 

 Marital Status  Unknown

 

 Temple Affiliation  Unknown

 

 Race  Unknown

 

 Additional Race(s)  Unavailable

 

 Ethnic Group  Unknown









Author







 Organization  [facility]

 

 Address  [address] [place] [address]

 

 Phone  [phone]









Care Team Providers







 Name  Role  Phone

 

 FAWAD SHIRLEY HERNANDEZ  Unavailable  Unavailable

 

 MAURO AWAD  Unavailable  Unavailable

 

 NORBERTO SILVESTRE  Unavailable  Unavailable









Problems

This patient has no known problems.



Allergies, Adverse Reactions, Alerts

This patient has no known allergies or adverse reactions.



Medications

This patient has no known medications.



Results







 Test Description  Test Time  Test Comments  Text Results  Atomic Results  
Result Comments









 BASIC METABOLIC PANEL  2018 09:28:00    









   Test Item  Value  Reference Range  Comments









 SODIUM (BEAKER) (test  140 meq/L  136-145  



 yojq=212)      

 

 POTASSIUM (BEAKER) (test  4.7 meq/L  3.5-5.1  



 code=379)      

 

 CHLORIDE (BEAKER) (test  103 meq/L    



 code=382)      

 

 CO2 (BEAKER) (test code=355)  30 meq/L  22-29  

 

 BLOOD UREA NITROGEN (BEAKER)  20 mg/dL  7-21  



 (test code=354)      

 

 CREATININE (BEAKER) (test  1.08 mg/dL  0.57-1.25  



 code=358)      

 

 GLUCOSE RANDOM (BEAKER)  110 mg/dL    



 (test code=652)      

 

 CALCIUM (BEAKER) (test  10.0 mg/dL  8.4-10.2  



 code=697)      

 

 EGFR (BEAKER) (test  73 mL/min/1.73 sq m    ESTIMATED GFR IS NOT AS



 code=1092)      ACCURATE AS CREATININE



       CLEARANCE IN PREDICTING



       GLOMERULAR FILTRATION RATE.



       ESTIMATED GFR IS NOT



       APPLICABLE FOR DIALYSIS



       PATIENTS.



CBC W/PLT COUNT &amp; AUTO [id number] 08:58:00





 Test Item  Value  Reference Range  Comments

 

 WHITE BLOOD CELL COUNT (BEAKER) (test code=775)  4.9 K/ L  3.5-10.5  

 

 RED BLOOD CELL COUNT (BEAKER) (test code=761)  5.37 M/ L  4.63-6.08  

 

 HEMOGLOBIN (BEAKER) (test code=410)  15.5 GM/DL  13.7-17.5  

 

 HEMATOCRIT (BEAKER) (test code=411)  46.8 %  40.1-51.0  

 

 MEAN CORPUSCULAR VOLUME (BEAKER) (test code=753)  87.2 fL  79.0-92.2  

 

 MEAN CORPUSCULAR HEMOGLOBIN (BEAKER) (test  28.9 pg  25.7-32.2  



 qpio=781)      

 

 MEAN CORPUSCULAR HEMOGLOBIN CONC (BEAKER) (test  33.1 GM/DL  32.3-36.5  



 code=752)      

 

 RED CELL DISTRIBUTION WIDTH (BEAKER) (test  13.3 %  11.6-14.4  



 code=412)      

 

 PLATELET COUNT (BEAKER) (test code=756)  130 K/CU MM  150-450  

 

 MEAN PLATELET VOLUME (BEAKER) (test code=754)  9.6 fL  9.4-12.4  

 

 NUCLEATED RED BLOOD CELLS (BEAKER) (test  0 /100 WBC  0-0  



 code=413)      

 

 NEUTROPHILS RELATIVE PERCENT (BEAKER) (test  65 %    



 code=429)      

 

 LYMPHOCYTES RELATIVE PERCENT (BEAKER) (test  20 %    



 code=430)      

 

 MONOCYTES RELATIVE PERCENT (BEAKER) (test  10 %    



 code=431)      

 

 EOSINOPHILS RELATIVE PERCENT (BEAKER) (test  4 %    



 code=432)      

 

 BASOPHILS RELATIVE PERCENT (BEAKER) (test  1 %    



 code=437)      

 

 NEUTROPHILS ABSOLUTE COUNT (BEAKER) (test  3.16 K/ L  1.78-5.38  



 code=670)      

 

 LYMPHOCYTES ABSOLUTE COUNT (BEAKER) (test  0.99 K/ L  1.32-3.57  



 code=414)      

 

 MONOCYTES ABSOLUTE COUNT (BEAKER) (test  0.49 K/ L  0.30-0.82  



 code=415)      

 

 EOSINOPHILS ABSOLUTE COUNT (BEAKER) (test  0.17 K/ L  0.04-0.54  



 code=416)      

 

 BASOPHILS ABSOLUTE COUNT (BEAKER) (test  0.04 K/ L  0.01-0.08  



 code=417)      

 

 IMMATURE GRANULOCYTES-RELATIVE PERCENT (BEAKER)  1 %  0-1  



 (test code=2801)      



TACROLIMUS [id number] 13:26:00





 Test Item  Value  Reference Range  Comments

 

 TACROLIMUS BLOOD (BEAKER) (test code=657)  4.1 ng/mL  10.0-20.0  



RAD, CHEST, 2 [id number] 14:13:00NEW CARDIOLOGIST OBERTONReason for Exam:-&
gt;heart transplant annual follow upFINAL REPORT PATIENT ID:   [id number] Chest 
two views INDICATION: Heart transplant annual follow-up. COMPARISON: 2017 
IMPRESSION: There is no focal consolidation, vascular congestion, pleural 
effusion, or pneumothorax. Heart size is within normal limits. Median 
sternotomy changes, left axillary surgical clips, and gastric sleeve with small 
hiatal hernia are again noted. No significant change since 2017. Signed: 
Richard Nickerson MDReport Verified Date/Time:  2018 14:13:48 Reading 
Location: [facility] C013 Consult Reading Room   Electronically signed by: 
RICHARD NICKERSON M.D. on 2018 02:13 PMTACROLIMUS [id number] 13:55
:00





 Test Item  Value  Reference Range  Comments

 

 TACROLIMUS BLOOD (BEAKER) (test code=657)  4.7 ng/mL  10.0-20.0  



COMPREHENSIVE METABOLIC [id number] 12:11:00





 Test Item  Value  Reference Range  Comments

 

 TOTAL PROTEIN (BEAKER)  6.5 gm/dL  6.0-8.3  



 (test code=770)      

 

 ALBUMIN (BEAKER) (test  4.2 g/dL  3.5-5.0  



 code=1145)      

 

 ALKALINE PHOSPHATASE  98 U/L    



 (BEAKER) (test code=346)      

 

 BILIRUBIN TOTAL (BEAKER)  0.4 mg/dL  0.2-1.2  



 (test code=377)      

 

 SODIUM (BEAKER) (test  141 meq/L  136-145  



 ntad=583)      

 

 POTASSIUM (BEAKER) (test  4.3 meq/L  3.5-5.1  



 code=379)      

 

 CHLORIDE (BEAKER) (test  103 meq/L    



 code=382)      

 

 CO2 (BEAKER) (test  29 meq/L  22-29  



 code=355)      

 

 BLOOD UREA NITROGEN  17 mg/dL  7-21  



 (BEAKER) (test code=354)      

 

 CREATININE (BEAKER) (test  1.00 mg/dL  0.57-1.25  



 code=358)      

 

 GLUCOSE RANDOM (BEAKER)  101 mg/dL    



 (test code=652)      

 

 CALCIUM (BEAKER) (test  9.5 mg/dL  8.4-10.2  



 code=697)      

 

 AST (SGOT) (BEAKER) (test  15 U/L  5-34  



 code=353)      

 

 ALT (SGPT) (BEAKER) (test  12 U/L  6-55  



 code=347)      

 

 EGFR (BEAKER) (test  79 mL/min/1.73 sq m    ESTIMATED GFR IS NOT AS



 code=1092)      ACCURATE AS CREATININE



       CLEARANCE IN PREDICTING



       GLOMERULAR FILTRATION



       RATE. ESTIMATED GFR IS



       NOT APPLICABLE FOR



       DIALYSIS PATIENTS.



LIPID [id number] 11:47:00





 Test Item  Value  Reference Range  Comments

 

 TRIGLYCERIDES (BEAKER) (test code=540)  87 mg/dL    

 

 CHOLESTEROL (BEAKER) (test code=631)  127 mg/dL    

 

 HDL CHOLESTEROL (BEAKER) (test code=976)  37 mg/dL    

 

 LDL CHOLESTEROL CALCULATED (BEAKER) (test  73 mg/dL    



 code=633)      



Triglyceride Reference Range:   Low Risk         &lt;150   Borderline    150-
199   High Risk     200-499   Very High Risk  &gt;=500Cholesterol Reference 
Range:   Low Risk         &lt;200   Borderline 200-239    High Risk        &gt;
240HDL Cholesterol Reference Range:   Low Risk         &gt;=60   High Risk     
    &lt;40LDL Cholesterol Reference Range:   Optimal          &lt;100   Near 
Optimal  100-129   Borderline    130-159   High          160-189   Very High   
    &gt;=190PROTHROMBIN [id number] 11:30:00





 Test Item  Value  Reference Range  Comments

 

 PROTIME (BEAKER) (test code=759)  14.7 seconds  11.7-14.7  

 

 INR (BEAKER) (test code=370)  1.2  <=5.9  



RECOMMENDED COUMADIN/WARFARIN INR THERAPY RANGESSTANDARD DOSE: 2.0 - 3.0   
Includes: PROPHYLAXIS forvenous thrombosis, systemic embolization; TREATMENT 
for venous thrombosis and/or pulmonary embolus.HIGH RISK: Target INR is 2.5-3.5 
for patients with mechanical heart valves.CBC W/PLT COUNT &amp; AUTO 
[id number] 11:23:00





 Test Item  Value  Reference Range  Comments

 

 WHITE BLOOD CELL COUNT (BEAKER) (test code=775)  4.8 K/ L  3.5-10.5  

 

 RED BLOOD CELL COUNT (BEAKER) (test code=761)  5.26 M/ L  4.63-6.08  

 

 HEMOGLOBIN (BEAKER) (test code=410)  15.1 GM/DL  13.7-17.5  

 

 HEMATOCRIT (BEAKER) (test code=411)  45.8 %  40.1-51.0  

 

 MEAN CORPUSCULAR VOLUME (BEAKER) (test code=753)  87.1 fL  79.0-92.2  

 

 MEAN CORPUSCULAR HEMOGLOBIN (BEAKER) (test  28.7 pg  25.7-32.2  



 fxht=105)      

 

 MEAN CORPUSCULAR HEMOGLOBIN CONC (BEAKER) (test  33.0 GM/DL  32.3-36.5  



 code=752)      

 

 RED CELL DISTRIBUTION WIDTH (BEAKER) (test  13.4 %  11.6-14.4  



 code=412)      

 

 PLATELET COUNT (BEAKER) (test code=756)  161 K/CU MM  150-450  

 

 MEAN PLATELET VOLUME (BEAKER) (test code=754)  9.0 fL  9.4-12.4  

 

 NUCLEATED RED BLOOD CELLS (BEAKER) (test  0 /100 WBC  0-0  



 code=413)      

 

 NEUTROPHILS RELATIVE PERCENT (BEAKER) (test  67 %    



 code=429)      

 

 LYMPHOCYTES RELATIVE PERCENT (BEAKER) (test  19 %    



 code=430)      

 

 MONOCYTES RELATIVE PERCENT (BEAKER) (test  10 %    



 code=431)      

 

 EOSINOPHILS RELATIVE PERCENT (BEAKER) (test  3 %    



 code=432)      

 

 BASOPHILS RELATIVE PERCENT (BEAKER) (test  1 %    



 code=437)      

 

 NEUTROPHILS ABSOLUTE COUNT (BEAKER) (test  3.21 K/ L  1.78-5.38  



 code=670)      

 

 LYMPHOCYTES ABSOLUTE COUNT (BEAKER) (test  0.93 K/ L  1.32-3.57  



 code=414)      

 

 MONOCYTES ABSOLUTE COUNT (BEAKER) (test  0.48 K/ L  0.30-0.82  



 code=415)      

 

 EOSINOPHILS ABSOLUTE COUNT (BEAKER) (test  0.14 K/ L  0.04-0.54  



 code=416)      

 

 BASOPHILS ABSOLUTE COUNT (BEAKER) (test  0.04 K/ L  0.01-0.08  



 code=417)      

 

 IMMATURE GRANULOCYTES-RELATIVE PERCENT (BEAKER)  1 %  0-1  



 (test code=2801)      



CT, CHEST, WITHOUT [id number] 09:10:00NEW CARDIOLOGIST OBERTONFINAL 
REPORT PATIENT ID:   [id number] INDICATION: 47-year-old male with chest wall pain 
and history ofheart transplant. COMPARISON:Chest radiograph 2017 
TECHNIQUE: Chest CT exam WITHOUT intravenous contrast. The exam was performed 
according to our department dose-optimization protocol, which includes 
automated exposure control, adjustments of mA and kV according to patient size. 
Iterative reconstructions are also sometimes employed. FINDINGS:No chest wall 
mass, chest wall hematoma, rib fracture, or rib lesion is demonstrated. There 
is a healed median sternotomy. Heart is normal in size and there is no 
pericardial effusion. Mild atherosclerotic plaque of the ascending thoracic 
aorta is demonstrated. Thoracic aorta is normal in caliber. 8 mm calcified 
nodule in the right lower lobe represents prior granulomatous infection. No 
pneumonia, pulmonary edema, pleural effusion, or pneumothorax is demonstrated. 
There is no mediastinal or hilar lymphadenopathy. Thyroid gland is 
unremarkable. Upper and mid esophagus are unremarkable. Patient is status post 
sleeve gastrectomy and there is a small part of the sleeve herniated in the low 
posterior mediastinum. Partial imaging of the upper abdomen is otherwise 
unremarkable. IMPRESSION: No chest wall mass, muscle tear, rib fracture, or rib 
lesion. No other CT explanation for the patient's chest wall pain. Clear lungs. 
Unremarkable heart transplant. Prior gastric sleeve with small hiatal hernia. 
Signed: Marlee Calhoun MDReport Verified Date/Time:  2017 09:10:45 
Reading Location: [facility] Diagnostic Imaging Reading Room - [place] [address] 
Electronically signed by: MARLEE CALHOUN M.D. on 2017 09:10 
AMTACROLIMUS [id number] 13:49:00





 Test Item  Value  Reference Range  Comments

 

 TACROLIMUS BLOOD (BEAKER) (test code=657)  5.3 ng/mL  10.0-20.0  



BASIC METABOLIC [id number] 10:44:00





 Test Item  Value  Reference Range  Comments

 

 SODIUM (BEAKER) (test  141 meq/L  136-145  



 zjev=930)      

 

 POTASSIUM (BEAKER) (test  4.5 meq/L  3.5-5.1  



 code=379)      

 

 CHLORIDE (BEAKER) (test  103 meq/L    



 code=382)      

 

 CO2 (BEAKER) (test  29 meq/L  22-29  



 code=355)      

 

 BLOOD UREA NITROGEN  17 mg/dL  7-21  



 (BEAKER) (test code=354)      

 

 CREATININE (BEAKER) (test  1.09 mg/dL  0.57-1.25  



 code=358)      

 

 GLUCOSE RANDOM (BEAKER)  97 mg/dL    



 (test code=652)      

 

 CALCIUM (BEAKER) (test  9.3 mg/dL  8.4-10.2  



 code=697)      

 

 EGFR (BEAKER) (test  72 mL/min/1.73 sq m    ESTIMATED GFR IS NOT AS



 code=1092)      ACCURATE AS CREATININE



       CLEARANCE IN PREDICTING



       GLOMERULAR FILTRATION



       RATE. ESTIMATED GFR IS



       NOT APPLICABLE FOR



       DIALYSIS PATIENTS.



CBC W/PLT COUNT &amp; AUTO [id number] 09:54:00





 Test Item  Value  Reference Range  Comments

 

 WHITE BLOOD CELL COUNT (BEAKER) (test code=775)  4.0 K/ L  3.5-10.5  

 

 RED BLOOD CELL COUNT (BEAKER) (test code=761)  5.42 M/ L  4.63-6.08  

 

 HEMOGLOBIN (BEAKER) (test code=410)  15.4 GM/DL  13.7-17.5  

 

 HEMATOCRIT (BEAKER) (test code=411)  47.6 %  40.1-51.0  

 

 MEAN CORPUSCULAR VOLUME (BEAKER) (test code=753)  87.8 fL  79.0-92.2  

 

 MEAN CORPUSCULAR HEMOGLOBIN (BEAKER) (test  28.4 pg  25.7-32.2  



 vmhy=018)      

 

 MEAN CORPUSCULAR HEMOGLOBIN CONC (BEAKER) (test  32.4 GM/DL  32.3-36.5  



 code=752)      

 

 RED CELL DISTRIBUTION WIDTH (BEAKER) (test  13.8 %  11.6-14.4  



 code=412)      

 

 PLATELET COUNT (BEAKER) (test code=756)  152 K/CU MM  150-450  

 

 MEAN PLATELET VOLUME (BEAKER) (test code=754)  9.9 fL  9.4-12.4  

 

 NUCLEATED RED BLOOD CELLS (BEAKER) (test  0 /100 WBC  0-0  



 code=413)      

 

 NEUTROPHILS RELATIVE PERCENT (BEAKER) (test  66 %    



 code=429)      

 

 LYMPHOCYTES RELATIVE PERCENT (BEAKER) (test  20 %    



 code=430)      

 

 MONOCYTES RELATIVE PERCENT (BEAKER) (test  11 %    



 code=431)      

 

 EOSINOPHILS RELATIVE PERCENT (BEAKER) (test  2 %    



 code=432)      

 

 BASOPHILS RELATIVE PERCENT (BEAKER) (test  1 %    



 code=437)      

 

 NEUTROPHILS ABSOLUTE COUNT (BEAKER) (test  2.65 K/ L  1.78-5.38  



 code=670)      

 

 LYMPHOCYTES ABSOLUTE COUNT (BEAKER) (test  0.82 K/ L  1.32-3.57  



 code=414)      

 

 MONOCYTES ABSOLUTE COUNT (BEAKER) (test  0.44 K/ L  0.30-0.82  



 code=415)      

 

 EOSINOPHILS ABSOLUTE COUNT (BEAKER) (test  0.08 K/ L  0.04-0.54  



 code=416)      

 

 BASOPHILS ABSOLUTE COUNT (BEAKER) (test  0.04 K/ L  0.01-0.08  



 code=417)      

 

 IMMATURE GRANULOCYTES-RELATIVE PERCENT (BEAKER)  0 %  0-1  



 (test code=2801)      



TACROLIMUS [id number] 14:05:00





 Test Item  Value  Reference Range  Comments

 

 TACROLIMUS BLOOD (BEAKER) (test code=657)  6.5 ng/mL  10.0-20.0  



BASIC METABOLIC [id number] 10:13:00





 Test Item  Value  Reference Range  Comments

 

 SODIUM (BEAKER) (test  140 meq/L  136-145  



 letz=387)      

 

 POTASSIUM (BEAKER) (test  4.1 meq/L  3.5-5.1  



 code=379)      

 

 CHLORIDE (BEAKER) (test  109 meq/L    



 code=382)      

 

 CO2 (BEAKER) (test  22 meq/L  22-29  



 code=355)      

 

 BLOOD UREA NITROGEN  15 mg/dL  7-21  



 (BEAKER) (test code=354)      

 

 CREATININE (BEAKER) (test  0.95 mg/dL  0.57-1.25  



 code=358)      

 

 GLUCOSE RANDOM (BEAKER)  99 mg/dL    



 (test code=652)      

 

 CALCIUM (BEAKER) (test  8.8 mg/dL  8.4-10.2  



 code=697)      

 

 EGFR (BEAKER) (test  85 mL/min/1.73 sq m    ESTIMATED GFR IS NOT AS



 code=1092)      ACCURATE AS CREATININE



       CLEARANCE IN PREDICTING



       GLOMERULAR FILTRATION



       RATE. ESTIMATED GFR IS



       NOT APPLICABLE FOR



       DIALYSIS PATIENTS.



CBC W/PLT COUNT &amp; AUTO [id number] 09:42:00





 Test Item  Value  Reference Range  Comments

 

 WHITE BLOOD CELL COUNT (BEAKER) (test code=775)  4.7 K/ L  4.0-10.0  

 

 RED BLOOD CELL COUNT (BEAKER) (test code=761)  5.18 M/ L  4.20-5.80  

 

 HEMOGLOBIN (BEAKER) (test code=410)  15.3 GM/DL  13.0-16.8  

 

 HEMATOCRIT (BEAKER) (test code=411)  47.1 %  40.0-50.0  

 

 MEAN CORPUSCULAR VOLUME (BEAKER) (test code=753)  90.8 fL  82.0-98.0  

 

 MEAN CORPUSCULAR HEMOGLOBIN (BEAKER) (test  29.6 pg  27.0-33.0  



 code=751)      

 

 MEAN CORPUSCULAR HEMOGLOBIN CONC (BEAKER) (test  32.6 GM/DL  32.0-36.0  



 code=752)      

 

 RED CELL DISTRIBUTION WIDTH (BEAKER) (test  15.1 %  10.3-14.2  



 code=412)      

 

 PLATELET COUNT (BEAKER) (test code=756)  129 K/CU MM  150-430  

 

 MEAN PLATELET VOLUME (BEAKER) (test code=754)  7.7 fL  6.5-10.5  

 

 NUCLEATED RED BLOOD CELLS (BEAKER) (test  0 /100 WBC  0-0  



 code=413)      

 

 NEUTROPHILS RELATIVE PERCENT (BEAKER) (test  72 %    



 code=429)      

 

 LYMPHOCYTES RELATIVE PERCENT (BEAKER) (test  18 %    



 code=430)      

 

 MONOCYTES RELATIVE PERCENT (BEAKER) (test  8 %    



 code=431)      

 

 EOSINOPHILS RELATIVE PERCENT (BEAKER) (test  1 %    



 code=432)      

 

 BASOPHILS RELATIVE PERCENT (BEAKER) (test  1 %    



 code=437)      

 

 NEUTROPHILS ABSOLUTE COUNT (BEAKER) (test  3.37 K/ L  1.80-8.00  



 code=670)      

 

 LYMPHOCYTES ABSOLUTE COUNT (BEAKER) (test  0.85 K/ L  1.48-4.50  



 code=414)      

 

 MONOCYTES ABSOLUTE COUNT (BEAKER) (test  0.40 K/ L  0.00-1.30  



 code=415)      

 

 EOSINOPHILS ABSOLUTE COUNT (BEAKER) (test  0.07 K/ L  0.00-0.50  



 code=416)      

 

 BASOPHILS ABSOLUTE COUNT (BEAKER) (test  0.03 K/ L  0.00-0.20  



 code=417)      



[id number] 10:38:00





 Test Item  Value  Reference Range  Comments

 

 MAGNESIUM (BEAKER) (test code=627)  2.2 mg/dL  1.6-2.6  



TACROLIMUS [id number] 10:33:00





 Test Item  Value  Reference Range  Comments

 

 TACROLIMUS BLOOD (BEAKER) (test code=657)  7.1 ng/mL  10.0-20.0  



CBC W/PLT COUNT &amp; AUTO [id number] 08:47:00





 Test Item  Value  Reference Range  Comments

 

 WHITE BLOOD CELL COUNT (BEAKER) (test code=775)  4.1 K/ L  4.0-10.0  

 

 RED BLOOD CELL COUNT (BEAKER) (test code=761)  5.02 M/ L  4.20-5.80  

 

 HEMOGLOBIN (BEAKER) (test code=410)  14.8 GM/DL  13.0-16.8  

 

 HEMATOCRIT (BEAKER) (test code=411)  43.6 %  40.0-50.0  

 

 MEAN CORPUSCULAR VOLUME (BEAKER) (test code=753)  87.0 fL  82.0-98.0  

 

 MEAN CORPUSCULAR HEMOGLOBIN (BEAKER) (test  29.5 pg  27.0-33.0  



 code=751)      

 

 MEAN CORPUSCULAR HEMOGLOBIN CONC (BEAKER) (test  33.9 GM/DL  32.0-36.0  



 code=752)      

 

 RED CELL DISTRIBUTION WIDTH (BEAKER) (test  14.1 %  10.3-14.2  



 code=412)      

 

 PLATELET COUNT (BEAKER) (test code=756)  160 K/CU MM  150-430  

 

 MEAN PLATELET VOLUME (BEAKER) (test code=754)  7.2 fL  6.5-10.5  

 

 NUCLEATED RED BLOOD CELLS (BEAKER) (test  0 /100 WBC  0-0  



 code=413)      

 

 NEUTROPHILS RELATIVE PERCENT (BEAKER) (test  64 %    



 code=429)      

 

 LYMPHOCYTES RELATIVE PERCENT (BEAKER) (test  21 %    



 code=430)      

 

 MONOCYTES RELATIVE PERCENT (BEAKER) (test  12 %    



 code=431)      

 

 EOSINOPHILS RELATIVE PERCENT (BEAKER) (test  2 %    



 code=432)      

 

 BASOPHILS RELATIVE PERCENT (BEAKER) (test  0 %    



 code=437)      

 

 NEUTROPHILS ABSOLUTE COUNT (BEAKER) (test  2.61 K/ L  1.80-8.00  



 code=670)      

 

 LYMPHOCYTES ABSOLUTE COUNT (BEAKER) (test  0.87 K/ L  1.48-4.50  



 code=414)      

 

 MONOCYTES ABSOLUTE COUNT (BEAKER) (test  0.49 K/ L  0.00-1.30  



 code=415)      

 

 EOSINOPHILS ABSOLUTE COUNT (BEAKER) (test  0.09 K/ L  0.00-0.50  



 code=416)      

 

 BASOPHILS ABSOLUTE COUNT (BEAKER) (test  0.01 K/ L  0.00-0.20  



 code=417)      



0.00BASIC METABOLIC [id number] 08:47:00





 Test Item  Value  Reference Range  Comments

 

 SODIUM (BEAKER) (test  142 meq/L  136-145  



 hxgu=455)      

 

 POTASSIUM (BEAKER) (test  3.8 meq/L  3.5-5.1  



 code=379)      

 

 CHLORIDE (BEAKER) (test  109 meq/L    



 code=382)      

 

 CO2 (BEAKER) (test  21 meq/L  22-29  



 code=355)      

 

 BLOOD UREA NITROGEN  22 mg/dL  7-21  



 (BEAKER) (test code=354)      

 

 CREATININE (BEAKER) (test  1.18 mg/dL  0.57-1.25  



 code=358)      

 

 GLUCOSE RANDOM (BEAKER)  99 mg/dL    



 (test code=652)      

 

 CALCIUM (BEAKER) (test  9.1 mg/dL  8.4-10.2  



 code=697)      

 

 EGFR (BEAKER) (test  66 mL/min/1.73 sq m    ESTIMATED GFR IS NOT AS



 code=1092)      ACCURATE AS CREATININE



       CLEARANCE IN PREDICTING



       GLOMERULAR FILTRATION



       RATE. ESTIMATED GFR IS



       NOT APPLICABLE FOR



       DIALYSIS PATIENTS.



URINALYSIS W/ [id number] 00:20:00





 Test Item  Value  Reference Range  Comments

 

 COLOR (BEAKER) (test code=470)  Yellow    

 

 CLARITY (BEAKER) (test code=469)  Slightly Hazy    

 

 SPECIFIC GRAVITY UA (BEAKER) (test  1.050  1.001-1.035  



 code=468)      

 

 PH UA (BEAKER) (test code=467)  5.5  5.0-8.0  

 

 PROTEIN UA (BEAKER) (test code=464)  20 mg/dL  Negative  

 

 GLUCOSE UA (BEAKER) (test code=365)  Negative  Negative  

 

 KETONES UA (BEAKER) (test code=371)  40 mg/dL  Negative  

 

 BILIRUBIN UA (BEAKER) (test code=462)  Negative  Negative  

 

 BLOOD UA (BEAKER) (test code=461)  Negative  Negative  

 

 NITRITE UA (BEAKER) (test code=465)  Negative  Negative  

 

 LEUKOCYTE ESTERASE UA (BEAKER) (test  Negative  Negative  



 isqa=529)      

 

 UROBILINOGEN UA (BEAKER) (test code=463)  0.2 mg/dL  0.2-1.0  

 

 RBC UA (BEAKER) (test code=519)  1 /HPF    

 

 WBC UA (BEAKER) (test code=520)  3 /HPF    

 

 MUCUS (BEAKER) (test code=1574)  Many    

 

 HYALINE CASTS (BEAKER) (test code=514)  6 /LPF    

 

 SOURCE(BEAKER) (test code=2795)  Urine, Clean Catch    



BASIC METABOLIC [id number] 21:55:00





 Test Item  Value  Reference Range  Comments

 

 SODIUM (BEAKER) (test  139 meq/L  136-145  



 wbqw=194)      

 

 POTASSIUM (BEAKER) (test  5.1 meq/L  3.5-5.1  



 code=379)      

 

 CHLORIDE (BEAKER) (test  107 meq/L    



 code=382)      

 

 CO2 (BEAKER) (test  18 meq/L  22-29  



 code=355)      

 

 BLOOD UREA NITROGEN  26 mg/dL  7-21  



 (BEAKER) (test code=354)      

 

 CREATININE (BEAKER) (test  1.26 mg/dL  0.57-1.25  



 code=358)      

 

 GLUCOSE RANDOM (BEAKER)  89 mg/dL    



 (test code=652)      

 

 CALCIUM (BEAKER) (test  9.1 mg/dL  8.4-10.2  



 code=697)      

 

 EGFR (BEAKER) (test  61 mL/min/1.73 sq m    ESTIMATED GFR IS NOT AS



 code=1092)      ACCURATE AS CREATININE



       CLEARANCE IN PREDICTING



       GLOMERULAR FILTRATION



       RATE. ESTIMATED GFR IS



       NOT APPLICABLE FOR



       DIALYSIS PATIENTS.



HEPATIC FUNCTION [id number] 21:55:00





 Test Item  Value  Reference Range  Comments

 

 TOTAL PROTEIN (BEAKER) (test code=770)  7.1 gm/dL  6.0-8.3  

 

 ALBUMIN (BEAKER) (test code=1145)  4.2 g/dL  3.5-5.0  

 

 BILIRUBIN TOTAL (BEAKER) (test code=377)  0.7 mg/dL  0.2-1.2  

 

 BILIRUBIN DIRECT (BEAKER) (test code=706)  0.2 mg/dL  0.1-0.5  

 

 ALKALINE PHOSPHATASE (BEAKER) (test code=346)  86 U/L    

 

 AST (SGOT) (BEAKER) (test code=353)  28 U/L  5-34  

 

 ALT (SGPT) (BEAKER) (test code=347)  20 U/L  6-55  



[id number] 21:52:00





 Test Item  Value  Reference Range  Comments

 

 LIPASE (BEAKER) (test code=749)  40 U/L  8-78  



B-TYPE NATRIURETIC FACTOR (BNP)2017 21:46:00





 Test Item  Value  Reference Range  Comments

 

 B-TYPE NATRIURETIC PEPTIDE (BEAKER) (test code=700)  21 pg/mL  0-100  



CREATINE KINASE (CK), TOTAL AND [id number] 21:45:00





 Test Item  Value  Reference Range  Comments

 

 CREATINE KINASE TOTAL (BEAKER) (test code=380)  32 U/L    

 

 CREATINE KINASE-MB (BEAKER) (test code=750)  0.6 ng/mL  0.0-6.6  

 

 CREATINE KINASE-MB INDEX (BEAKER) (test code=395)  1.9 %    



Effective 2014: CK-MB Reference Range ChangeNew: 0.0-6.6    Previous: 0.0-
4.9CK-MB Reference Range:&lt;6.7      Normal6.7-10.0  Borderline&gt;10.0     
[id number] 21:45:00





 Test Item  Value  Reference Range  Comments

 

 TROPONIN I (BEAKER) (test code=397)  < ng/mL  0.00-0.03  



Effective 2014: Reference Range ChangeNew: 0.00-0.03   Previous 0.00-
0.15Troponin I (TnI) levels must be interpreted in the context of the 
presenting symptoms and the clinical findings. Elevated TnI levels indicate 
myocardial damage, but are not specific for ischemic heart disease. Elevated 
TnI levels are seen in patients with other cardiac conditions (including 
myocarditis and congestive heartfailure), and slight TnI elevations occur in 
patients with other conditions, including sepsis, renalfailure, acidosis, acute 
neurological disease, and persistent tachyarrhythmia.CBC W/PLT COUNT &amp; AUTO 
[id number] 21:24:00





 Test Item  Value  Reference Range  Comments

 

 WHITE BLOOD CELL COUNT (BEAKER) (test code=775)  4.7 K/ L  4.0-10.0  

 

 RED BLOOD CELL COUNT (BEAKER) (test code=761)  5.75 M/ L  4.20-5.80  

 

 HEMOGLOBIN (BEAKER) (test code=410)  17.4 GM/DL  13.0-16.8  

 

 HEMATOCRIT (BEAKER) (test code=411)  49.2 %  40.0-50.0  

 

 MEAN CORPUSCULAR VOLUME (BEAKER) (test code=753)  85.6 fL  82.0-98.0  

 

 MEAN CORPUSCULAR HEMOGLOBIN (BEAKER) (test  30.3 pg  27.0-33.0  



 code=751)      

 

 MEAN CORPUSCULAR HEMOGLOBIN CONC (BEAKER) (test  35.4 GM/DL  32.0-36.0  



 code=752)      

 

 RED CELL DISTRIBUTION WIDTH (BEAKER) (test  12.9 %  10.3-14.2  



 code=412)      

 

 PLATELET COUNT (BEAKER) (test code=756)  98 K/CU MM  150-430  

 

 MEAN PLATELET VOLUME (BEAKER) (test code=754)  8.7 fL  6.5-10.5  

 

 NUCLEATED RED BLOOD CELLS (BEAKER) (test  0 /100 WBC  0-0  



 code=413)      

 

 NEUTROPHILS RELATIVE PERCENT (BEAKER) (test  69 %    



 code=429)      

 

 LYMPHOCYTES RELATIVE PERCENT (BEAKER) (test  22 %    



 code=430)      

 

 MONOCYTES RELATIVE PERCENT (BEAKER) (test  8 %    



 code=431)      

 

 EOSINOPHILS RELATIVE PERCENT (BEAKER) (test  1 %    



 code=432)      

 

 BASOPHILS RELATIVE PERCENT (BEAKER) (test  0 %    



 code=437)      

 

 NEUTROPHILS ABSOLUTE COUNT (BEAKER) (test  3.26 K/ L  1.80-8.00  



 code=670)      

 

 LYMPHOCYTES ABSOLUTE COUNT (BEAKER) (test  1.05 K/ L  1.48-4.50  



 code=414)      

 

 MONOCYTES ABSOLUTE COUNT (BEAKER) (test code=415)  0.38 K/ L  0.00-1.30  

 

 EOSINOPHILS ABSOLUTE COUNT (BEAKER) (test  0.03 K/ L  0.00-0.50  



 code=416)      

 

 BASOPHILS ABSOLUTE COUNT (BEAKER) (test code=417)  0.02 K/ L  0.00-0.20  



0.00CMV PCR, [id number] 17:55:00





 Test Item  Value  Reference Range  Comments

 

 CMV VIRAL LOAD - NEGATIVE  Negative or below the linear    



 (BEAKER) (test code=2558)  range of the assay (<375    



   copies/mL)    



Cytomegalovirus (CMV) infection can cause significant disease in 
immunosuppressed patients. However,it is common for CMV to manifest as a 
limited infection which is of no clinical significance in immunosuppressed 
patients or in healthy individuals.Viral load measurements are helpful to 
identify clinical CMV infection and to guide the pre-emptive management of 
antiviral therapy.  For treatment of CMVinfection due to reactivation in 
transplant recipients, a threshold between 4,000 and 5,000 copies/mL is 
suggested.  For treatment of primary CMV infection, a lower threshold can be 
used.CMV infection may also be monitored using weekly serial measurements. 
Serial measurements of CMV DNA viral load canbe evaluated by identifying a 10-
fold change, as well as assessing the CMV DNA viral load and the clinical 
context for each patient.The plasma CMV DNA viral load was detected using 
quantitative polymerase chain reaction and fluorescent monitoring of a specific 
hybridized probe. Genetic variation and other factors can affect the accuracy 
of nucleic acid testing. Therefore, the results should be interpreted in light 
of clinical data. A negative result may not exclude the presence of CMV 
disease.This test was developed and its performance characteristics determined 
by the [facility] Pathology Department, Section of Molecular 
Pathology. It has not been cleared or approved by the U.S. Food and Drug 
Administration (FDA), since FDA approval is not required for clinical use of 
the test. Validation was done as required by The Clinical Laboratory 
Improvement Amendments of 1988.CREATINE KINASE (CK), TOTAL AND [id number] 18:
40:00





 Test Item  Value  Reference Range  Comments

 

 CREATINE KINASE TOTAL (BEAKER) (test code=380)  36 U/L    

 

 CREATINE KINASE-MB (BEAKER) (test code=750)  0.3 ng/mL  0.0-6.6  

 

 CREATINE KINASE-MB INDEX (BEAKER) (test code=395)  0.8 %    



Effective 2014: CK-MB Reference Range ChangeNew: 0.0-6.6    Previous: 0.0-
4.9CK-MB Reference Range:&lt;6.7      Normal6.7-10.0  Borderline&gt;10.0     
AbnormalTROPONIN -10-82 18:40:00





 Test Item  Value  Reference Range  Comments

 

 TROPONIN I (BEAKER) (test code=397)  < ng/mL  0.00-0.03  



Effective 2014: Reference Range ChangeNew: 0.00-0.03   Previous 0.00-
0.15Troponin I (TnI) levels must be interpreted in the context of the 
presenting symptoms and the clinical findings. Elevated TnI levels indicate 
myocardial damage, but are not specific for ischemic heart disease. Elevated 
TnI levels are seen in patients with other cardiac conditions (including 
myocarditis and congestive heartfailure), and slight TnI elevations occur in 
patients with other conditions, including sepsis, renalfailure, acidosis, acute 
neurological disease, and persistent tachyarrhythmia.B-TYPE NATRIURETIC FACTOR (
BNP)2017 18:40:00





 Test Item  Value  Reference Range  Comments

 

 B-TYPE NATRIURETIC PEPTIDE (BEAKER) (test code=700)  23 pg/mL  0-100  



[id number] 18:33:00





 Test Item  Value  Reference Range  Comments

 

 MAGNESIUM (BEAKER) (test code=627)  1.8 mg/dL  1.6-2.6  



BASIC METABOLIC [id number] 18:33:00





 Test Item  Value  Reference Range  Comments

 

 SODIUM (BEAKER) (test  140 meq/L  136-145  



 cffe=459)      

 

 POTASSIUM (BEAKER) (test  4.1 meq/L  3.5-5.1  



 code=379)      

 

 CHLORIDE (BEAKER) (test  105 meq/L    



 code=382)      

 

 CO2 (BEAKER) (test  23 meq/L  22-29  



 code=355)      

 

 BLOOD UREA NITROGEN  18 mg/dL  7-21  



 (BEAKER) (test code=354)      

 

 CREATININE (BEAKER) (test  1.34 mg/dL  0.57-1.25  



 code=358)      

 

 GLUCOSE RANDOM (BEAKER)  100 mg/dL    



 (test code=652)      

 

 CALCIUM (BEAKER) (test  9.2 mg/dL  8.4-10.2  



 code=697)      

 

 EGFR (BEAKER) (test  57 mL/min/1.73 sq m    ESTIMATED GFR IS NOT AS



 code=1092)      ACCURATE AS CREATININE



       CLEARANCE IN PREDICTING



       GLOMERULAR FILTRATION



       RATE. ESTIMATED GFR IS



       NOT APPLICABLE FOR



       DIALYSIS PATIENTS.



CBC W/PLT COUNT &amp; AUTO [id number] 18:24:00





 Test Item  Value  Reference Range  Comments

 

 WHITE BLOOD CELL COUNT (BEAKER) (test code=775)  2.6 K/ L  4.0-10.0  

 

 RED BLOOD CELL COUNT (BEAKER) (test code=761)  5.66 M/ L  4.20-5.80  

 

 HEMOGLOBIN (BEAKER) (test code=410)  16.5 GM/DL  13.0-16.8  

 

 HEMATOCRIT (BEAKER) (test code=411)  49.0 %  40.0-50.0  

 

 MEAN CORPUSCULAR VOLUME (BEAKER) (test code=753)  86.6 fL  82.0-98.0  

 

 MEAN CORPUSCULAR HEMOGLOBIN (BEAKER) (test  29.1 pg  27.0-33.0  



 code=751)      

 

 MEAN CORPUSCULAR HEMOGLOBIN CONC (BEAKER) (test  33.6 GM/DL  32.0-36.0  



 code=752)      

 

 RED CELL DISTRIBUTION WIDTH (BEAKER) (test  13.0 %  10.3-14.2  



 code=412)      

 

 PLATELET COUNT (BEAKER) (test code=756)  87 K/CU MM  150-430  

 

 MEAN PLATELET VOLUME (BEAKER) (test code=754)  8.3 fL  6.5-10.5  

 

 NUCLEATED RED BLOOD CELLS (BEAKER) (test  0 /100 WBC  0-0  



 code=413)      

 

 NEUTROPHILS RELATIVE PERCENT (BEAKER) (test  51 %    



 code=429)      

 

 LYMPHOCYTES RELATIVE PERCENT (BEAKER) (test  30 %    



 code=430)      

 

 MONOCYTES RELATIVE PERCENT (BEAKER) (test  18 %    



 code=431)      

 

 EOSINOPHILS RELATIVE PERCENT (BEAKER) (test  1 %    



 code=432)      

 

 BASOPHILS RELATIVE PERCENT (BEAKER) (test  0 %    



 code=437)      

 

 NEUTROPHILS ABSOLUTE COUNT (BEAKER) (test  1.33 K/ L  1.80-8.00  



 code=670)      

 

 LYMPHOCYTES ABSOLUTE COUNT (BEAKER) (test  0.78 K/ L  1.48-4.50  



 code=414)      

 

 MONOCYTES ABSOLUTE COUNT (BEAKER) (test code=415)  0.47 K/ L  0.00-1.30  

 

 EOSINOPHILS ABSOLUTE COUNT (BEAKER) (test  0.02 K/ L  0.00-0.50  



 code=416)      

 

 BASOPHILS ABSOLUTE COUNT (BEAKER) (test code=417)  0.01 K/ L  0.00-0.20  



0.00POCT-GLUCOSE [id number] 15:37:00





 Test Item  Value  Reference Range  Comments

 

 POC-GLUCOSE METER (BEAKER)  97 mg/dL    TESTED AT [facility] [address]



 (test iemo=0616)      [facility] [address]

## 2018-12-13 NOTE — ER
Nurse's Notes                                                                                     

 [facility]                                                                

Name: Gomez Romero                                                                               

Age: 48 yrs                                                                                       

Sex: Male                                                                                         

: 1970                                                                                   

MRN: [id number]                                                                                   

Arrival Date: 2018                                                                          

Time: 18:53                                                                                       

Account#: [id number]                                                                            

Bed 6                                                                                             

Private MD:                                                                                       

Diagnosis: Pain in right knee                                                                     

                                                                                                  

Presentation:                                                                                     

                                                                                             

19:01 Presenting complaint: Patient states: about 4 -5 days ago, my R knee started hurting    hj  

      all the way to my shin; denies trauma to the area; denies numbness and tingling on the      

      affected leg; pain 3/10; denies swelling;. Transition of care: patient was not received     

      from another setting of care. Onset of symptoms was 2018. Risk Assessment:     

      Do you want to hurt yourself or someone else? Patient reports no desire to harm self or     

      others. Initial Sepsis Screen: Does the patient meet any 2 criteria? No. Patient's          

      initial sepsis screen is negative. Does the patient have a suspected source of              

      infection? No. Patient's initial sepsis screen is negative. Care prior to arrival: None.    

19:01 Method Of Arrival: Ambulatory                                                             

19:01 Acuity: MILTON 4                                                                           hj  

                                                                                                  

Triage Assessment:                                                                                

19:03 General: Appears in no apparent distress. uncomfortable, Behavior is calm, cooperative, hj  

      appropriate for age. Pain: Complains of pain in right knee.                                 

                                                                                                  

Historical:                                                                                       

- Allergies:                                                                                      

19:03 unknown rejection medication;                                                           hj  

- Home Meds:                                                                                      

19:03 pt unable to provide [Active];                                                          hj  

- PMHx:                                                                                           

19:03 pt denies;                                                                              hj  

- PSHx:                                                                                           

19:03 Hernia repair; cardiac stent; heart transplant;                                         hj  

                                                                                                  

- Immunization history:: Adult Immunizations up to date.                                          

- Social history:: Smoking status: Patient/guardian denies using tobacco,                         

  Patient/guardian denies using alcohol.                                                          

- Ebola Screening: : Patient negative for fever greater than or equal to 101.5 degrees            

  Fahrenheit, and additional compatible Ebola Virus Disease symptoms Patient denies               

  exposure to infectious person Patient denies travel to an Ebola-affected area in the            

  21 days before illness onset.                                                                   

                                                                                                  

                                                                                                  

Screenin:03 Abuse screen: Denies threats or abuse. Denies injuries from another. Nutritional        hj  

      screening: No deficits noted. Tuberculosis screening: No symptoms or risk factors           

      identified. Fall Risk None identified.                                                      

                                                                                                  

Assessment:                                                                                       

20:10 General: Appears in no apparent distress. uncomfortable, Behavior is calm, cooperative, tl2 

      appropriate for age. Pain: Complains of pain in right knee Pain radiates to right shin.     

      Neuro: Level of Consciousness is awake, alert, obeys commands, Oriented to person,          

      place, time, situation. Derm: Skin is pink, warm \T\ dry. Musculoskeletal: Circulation,     

      motion, and sensation intact. Range of motion: limited in right knee Swelling absent.       

21:09 Reassessment: Patient appears in no apparent distress at this time. Patient and/or      tl2 

      family updated on plan of care and expected duration. Pain level reassessed. Patient is     

      alert, oriented x 3, equal unlabored respirations, skin warm/dry/pink. Pt and family        

      verbalized understanding of discharge instructions, need for follow up and prescription     

      usage.                                                                                      

                                                                                                  

Vital Signs:                                                                                      

19:04  / 85; Pulse 92; Resp 18; Temp 98.2(TE); Pulse Ox 98% on R/A; Weight 99.79 kg;    hj  

      Height 6 ft. 4 in. (193.04 cm); Pain 3/10;                                                  

19:04 Body Mass Index 26.78 (99.79 kg, 193.04 cm)                                               

                                                                                                  

ED Course:                                                                                        

18:53 Patient arrived in ED.                                                                  mr  

19:03 Triage completed.                                                                       hj  

19:04 Arm band placed on left wrist.                                                          hj  

19:04 Patient has correct armband on for positive identification. Placed in gown. Bed in low  hj  

      position. Call light in reach. Side rails up X 1.                                           

19:59 Knee Right 3 View XRAY In Process Unspecified.                                          EDMS

20:03 Emir Thomas NP is PHCP.                                                           pm1 

20:03 Ozzy Wright MD is Attending Physician.                                            pm1 

20:32 Samuel Aj MD is Referral Physician.                                                pm1 

21:09 No provider procedures requiring assistance completed. Patient did not have IV access   tl2 

      during this emergency room visit. Ace wrap to right knee.                                   

                                                                                                  

Administered Medications:                                                                         

21:01 Not Given (Patient Refused): traMADol 50 mg PO once                                     tl2 

                                                                                                  

                                                                                                  

Outcome:                                                                                          

20:32 Discharge ordered by MD.                                                                pm1 

21:09 Discharged to home ambulatory, with family.                                             tl2 

21:09 Condition: stable                                                                           

21:09 Discharge instructions given to patient, family, Instructed on discharge instructions,      

      follow up and referral plans. medication usage, Demonstrated understanding of               

      instructions, follow-up care, medications, Prescriptions given X 1.                         

21:11 Patient left the ED.                                                                    tl2 

                                                                                                  

Signatures:                                                                                       

Dispatcher [employer]                           LISBETHNenita Munroe                                 mr                                                   

Fortino Arreola RN                      RN   hj                                                   

Emir Thomas NP                    NP   pm1                                                  

Negin Hill RN                        RN   tl2                                                  

                                                                                                  

Corrections: (The following items were deleted from the chart)                                    

19:06 19:04 Pulse 92bpm; Resp 18bpm; Pulse Ox 98% RA; Temp 98.2F Temporal; 99.79 kg; Height 6 hj  

      ft. 4 in.; BMI: 26.7; Pain 3/10; hj                                                         

                                                                                                  

**************************************************************************************************

## 2018-12-13 NOTE — XMS REPORT
Clinical Summary

 Created on:2018



Patient:Tyra Bruno

Sex:Male

:1970

External Reference #:[id number]





Demographics







 Address  [address] [place] [id number]

 

 Mobile Phone  [phone]

 

 Home Phone  [phone]

 

 Email Address  [email or web address]

 

 Preferred Language  English

 

 Marital Status  Unknown

 

 Bahai Affiliation  Unknown

 

 Race  White

 

 Ethnic Group  Not  or 









Author







 Organization  [facility] [place], [place]

 

 Address  [address] [place] [address]









Support







 Name  Relationship  Address  Phone

 

 Janet Bruno  Unavailable  Unavailable  [phone]

 

 Hannah Bruno  Unavailable  Unavailable  [phone]









Care Team Providers







 Name  Role  Phone

 

 Dana Loo MD  Primary Care Provider  [phone]









Allergies







 Active Allergy  Reactions  Severity  Noted Date  Comments

 

 Foscarnet  Nausea And Vomiting,  High  2015







   Swelling      Pt has no reaction to



         current medication



         administration regimen:



         premedicate with Benadryl,



         Zofran, Tylenol, then 500cc



         NS bolus, then diluted



         foscarnet over 2 hours,



         then another 500cc NS



         bolus. Electrolyte labs



         after every dose,



         electrolyte replacement



         protocol in place.







Medications







 Medication  Sig  Dispensed  Refills  Start Date  End Date  Status

 

 mycophenolate  Take 3  180 capsule  11  2018  Active



 (CELLCEPT) 250 mg  capsules (750        9  



 capsule  mg total) by          



   mouth 2 (two)          



   times daily.          

 

 tacrolimus  Take 1mg by  90 capsule  11  2018    Active



 (PROGRAF) 1 MG  mouth in the          



 capsule  morning.  Take          



   2mg by mouth          



   at night. .          

 

 aspirin 81 MG EC  Take 1 tablet  30 tablet  11  2018  Active



 tablet  (81 mg total)        9  



   by mouth          



   daily.          

 

 famotidine (PEPCID)  Take 1 tablet  60 tablet  11  2018  
Active



 20 MG tablet  (20 mg total)        9  



   by mouth 2          



   (two) times          



   daily.          

 

 pravastatin  Take 1 tablet  30 tablet  11  2018  Active



 (PRAVACHOL) 40 MG  (40 mg total)        9  



 tablet  by mouth          



   daily.          

 

 mycophenolate  Take 3  180 capsule  11  2017  Discontinued



 (CELLCEPT) 250 mg  capsules (750        8  



 capsule  mg total) by          



   mouth 2 (two)          



   times daily.          

 

 tacrolimus  Take 1mg by  90 capsule  11  2017  Discontinued



 (PROGRAF) 1 MG  mouth in the        8  



 capsule  morning.  Take          



   2mg by mouth          



   at night. .          

 

 albuterol HFA  Inhale 1 puff  1 Inhaler  0  2017  Discontinued



 (PROVENTIL HFA) 90  by mouth via        8  



 mcg/actuation  inhaler every          



 inhaler  6 (six) hours          



   as needed for          



   Wheezing.          

 

 azelastine  1 spray by  30 mL  0  2017  Discontinued



 (ASTELIN) 137 mcg  Nasal route 2        8  



 (0.1 %) nasal spray  (two) times          



   daily Use in          



   each nostril          



   as directed.          

 

 pravastatin  Take 1 tablet    0  2017  Discontinued



 (PRAVACHOL) 40 MG  (40 mg total)        8  



 tablet  by mouth          



   daily.          

 

 aspirin 81 MG EC  Take 1 tablet    0  2017  Discontinued



 tablet  (81 mg total)        8  



   by mouth          



   daily.          

 

 famotidine (PEPCID)  TAKE ONE  60 tablet  11  2018  
Discontinued



 20 MG tablet  TABLET BY        8  



   MOUTH TWICE          



   DAILY          

 

 famotidine (PEPCID)  Take 1 tablet  180 tablet  3  2018  
Discontinued



 20 MG tablet  (20 mg total)        8  



   by mouth 2          



   (two) times          



   daily.          









 Hospital, Clinic, or Other  Ordered Dose  Route  Frequency  Start Date  End 
Date  Status



 Facility Administered            



 Medication            

 

 influenza vaccine (PF)  0.5 mL  IM  Once  2018  Ended



 [id number] (FLUZONE HIGH            



 DOSE) syringe (>/=65 yrs)            







Active Problems







 Patient Care Coordination Note

 

 



 



 









 Problem  Noted Date

 

 Heart transplant, orthotopic, status  2016

 

 Heart transplant rejection  2015









 Overview: 



- 2R rejection on low dose immunosuppression  10/22/15 treated with prednisone 
pulse



 - 2R rejection on low dose immunosuppression  10/28/15 treated with IV 
solumedrol, increased MMF, increased Prograf



 - 2R biopsy on 12/2/15 treated with ATG x 4









 History of Cytomegalovirus (CMV) viremia  2015

 

 ICM s/p orthotopic heart transplant 5/14/15  2015

 

 Dyslipidemia  2015

 

 Hypertension  

 

 Ischemic cardiomyopathy with MI 2015  







Encounters







 Date  Type  Specialty  Care Team  Description

 

 2018  Hospital Encounter  Cardiology  Dana Loo  Status post heart



       MD Scott  transplantation (Formerly [facility])

 

 2018  Follow-Up  Transplant  Dana Loo  Hyperlipidemia, unspecified 
hyperlipidemia type (Primary Dx);



       MD Scott  Status post heart transplantation (Formerly [facility]);



         Encounter for therapeutic drug level monitoring;



         Prostate cancer screening

 

 2018  Telephone  Transplant  Margot Post RN  Heart Transplant



         Follow-up

 

 2018  Abstract  Transplant  Chico Shila  

 

 2018  Telephone  Transplant  Margot Post RN  Heart Transplant



         Follow-up

 

 06/15/2018  Abstract  Transplant  Long, Shila  

 

 2018  Telephone  Transplant  Margot Post RN  Heart Transplant



         Follow-up

 

 2018  Abstract  Transplant  Long, Shila  

 

 2018  Orders Only  Transplant  Margot Post RN  Status post heart 
transplantation (Formerly [facility]) (Primary Dx);



         Encounter for therapeutic drug level monitoring

 

 2018  Telephone  Transplant  Margot Post RN  Heart Transplant



         Follow-up

 

 2018  Hospital Encounter  Cardiology    Status post heart



         transplantation (Formerly [facility])

 

 2018  Follow-Up  Transplant  Dana Loo  Essential hypertension (
Primary Dx);



       MD Scott  Status post heart transplantation (Formerly [facility]);



         Encounter for therapeutic drug level monitoring;



         Dyslipidemia

 

 2018  Surgery    Dana Loo  R & L CATH / CORONARY



       MD Scott  ANGIOS / PCI

 

 2018  Hospital Encounter    Dana Loo  Status post heart



       MD Scott  transplantation (Formerly [facility])

 

 2018  Orders Only  Transplant  Margot Post RN  

 

 2018  Documentation  Transplant  Kenisha Lyon NP  

 

 2018  Documentation  Transplant  Herminia Kilgore  

 

 2018  Orders Only  Transplant  Margot Post RN  

 

 2018  Orders Only  Transplant  Margot Post RN  

 

 2018  Orders Only  Transplant  Margot Post RN  Status post heart 
transplantation (Formerly [facility]) (Primary Dx);



         Encounter for therapeutic drug level monitoring;



         Type 1 diabetes mellitus without complication (Formerly [facility]);



         Hyperlipidemia, unspecified hyperlipidemia type

 

 2018  Orders Only  Transplant  Margot Post RN  

 

 2018  Refill  Transplant  Dana Loo MD  

 

 2017  Telephone  Transplant  Inez Phillip NP  Follow-up

 

 2017  Telephone  Transplant  Inez Phillip NP  Post-op Problem

 

 2017  Hospital Encounter  Radiology  Dana Loo  Status post heart



       MD Scott  transplantation (Formerly [facility])



after 2017



Immunizations







 Name  Dates Previously Given  Next Due

 

 Influenza High Dose Preservative Free IM  2018  

 

 Influenza High Dose Preservative Free [id number]  

 

 Influenza TIV (IM)  10/09/2017  

 

 Rho (D) Immune Globulin  05/15/2015  







Family History







 Medical History  Relation  Name  Comments

 

 Cancer  Maternal Grandfather    

 

 Heart disease  Maternal Grandfather    

 

 Diabetes  Mother    









 Relation  Name  Status  Comments

 

 Maternal Grandfather      

 

 Mother      







Social History







 Tobacco Use  Types  Packs/Day  Years Used  Date

 

 Never Smoker        









 Smokeless Tobacco: Never Used      









 Alcohol Use  Drinks/Week  oz/Week  Comments

 

 No      









 Sex Assigned at Birth  Date Recorded

 

 Not on file  









 Job Start Date  Occupation  Industry

 

 Not on file  Not on file  Not on file









 Travel History  Travel Start  Travel End









 No recent travel history available.







Last Filed Vital Signs







 Vital Sign  Reading  Time Taken

 

 Blood Pressure  131/90  2018  8:36 AM CST

 

 Pulse  84  2018  8:36 AM CST

 

 Temperature  36.6 C (97.9 F)  2018  8:36 AM CST

 

 Respiratory Rate  18  2018  8:36 AM CST

 

 Oxygen Saturation  98%  2018  8:36 AM CST

 

 Inhaled Oxygen Concentration  -  -

 

 Weight  103.6 kg (228 lb 4.8 oz)  2018  8:36 AM CST

 

 Height  188 cm (6' 2")  2018  8:36 AM CST

 

 Body Mass Index  29.31  2018  8:36 AM CST







Plan of Treatment







 Health Maintenance  Due Date  Last Done  Comments

 

 INFLUENZA VACCINE  10/01/2018  2018  







Implants







 Implanted  Type  Area    Device  Shelf  Model / Serial



         Identifier  Expiration  / Lot



           Date  

 

 Hemostat,Sixto Ah Absorbable Powder 3g - [id number]  Cement/Fille    C R BARD 
DAVOL    2019  [id number] /



 Implanted: Qty: 1 on 2015 by Champ Jean MD  r/Adhesive           /



             [id number]

 

 Graft,Goretex Cg [id number] Lined - [id number]  Graft/Patch    W L GORE    2020  [id number] /



 Implanted: Qty: 1 on 2015 by Teofilo Rubio MD             /



             [id number]

 

 Lead,Defibrillator Cardioverter Dual Coil Df4 Connector [id number] Durata - 
[id number]  ICD    ST FIDEL    2015  [id number] /



 Implanted: Qty: 1 on 2015 by Champ Jean MD      [employer]      
[id number] /

 

 Lead, Pacing Endocardial Steroid Eluting Bipolar Active Fix 52cm Tendril Sts - 
[id number]  Pacemakers    ST FIDEL    2017  [id number] /



 Implanted: Qty: 1 on 2015 by Champ Jean MD      [employer]      
[id number] /

 

 Fortify Assura      ST FIDEL    10/31/2016  [id number] /



 Implanted: Qty: 1 on 2015 by Champ Jean MD      [employer]      
[id number] /

 

 Tyrx Absorbable Antibacterial Envelope          2015  [id number] /



 Implanted: Qty: 1 on 2015 by Champ Jean MD             /



             [id number]

 

 Sensation      [employer]    2018  4660--[id number] /



 Implanted: Qty: 1 on 03/10/2015            [id number] /

 

 Intra-Aortic Balloon      [employer]    2018  6437--01U /



 Implanted: Qty: 1 on 2015            [id number] /

 

 Intra-Aortic Balloon      [employer]    2018  1061--01U /



 Implanted: Qty: 1 on 2015            [id number] /







Procedures







 Procedure Name  Priority  Date/Time  Associated Diagnosis  Comments

 

 ECHOCARDIOGRAM REPORT    2018 12:54    



 - SCAN    PM CST    

 

 2D ECHO W/ DOPPLER  Routine  2018  9:20  Status post heart  Results for 
this



 (CW/PW/COLOR)    AM CST  transplantation (HCC)  procedure are in



         the results



         section.

 

 CBC W/PLT COUNT & AUTO  Routine  2018  8:41  Status post heart  Results 
for this



 DIFFERENTIAL    AM CST  transplantation (HCC)



  procedure are in



       Encounter for  the results



       therapeutic drug level  section.



       monitoring  

 

 BASIC METABOLIC PANEL  Routine  2018  8:41  Status post heart  Results 
for this



 (7)    AM CST  transplantation (HCC)



  procedure are in



       Encounter for  the results



       therapeutic drug level  section.



       monitoring  

 

 CBC W/PLT COUNT & AUTO  Routine  2018  8:41  Status post heart  Results 
for this



 DIFFERENTIAL    AM CST  transplantation (HCC)



  procedure are in



       Encounter for  the results



       therapeutic drug level  section.



       monitoring  

 

 BASIC METABOLIC PANEL  Routine  2018 10:30    Results for this



 (7)    AM CDT    procedure are in



         the results



         section.

 

 HEMOGLOBIN A1C  Routine  2018 10:30    Results for this



     AM CDT    procedure are in



         the results



         section.

 

 CBC W/PLT COUNT & AUTO  Routine  2018 10:30    Results for this



 DIFFERENTIAL    AM CDT    procedure are in



         the results



         section.

 

 TACROLIMUS LEVEL  Routine  2018 10:30    Results for this



     AM CDT    procedure are in



         the results



         section.

 

 BASIC METABOLIC PANEL  Routine  2018  3:30    Results for this



 (7)    PM CDT    procedure are in



         the results



         section.

 

 CBC W/PLT COUNT & AUTO  Routine  2018  3:30    Results for this



 DIFFERENTIAL    PM CDT    procedure are in



         the results



         section.

 

 TACROLIMUS LEVEL  Routine  2018  3:30    Results for this



     PM CDT    procedure are in



         the results



         section.

 

 ECHOCARDIOGRAM REPORT    2018  7:20    



 - SCAN    AM CDT    

 

 CARDIAC CATH REPORT -    2018  3:01    



 SCAN    PM CDT    

 

 2D ECHO W/ DOPPLER  Routine  2018  2:02  Status post heart  Results for 
this



 (CW/PW/COLOR)    PM CDT  transplantation (HCC)  procedure are in



         the results



         section.

 

 TACROLIMUS LEVEL  Routine  2018  9:24  Status post heart  Results for 
this



     AM CDT  transplantation (HCC)



  procedure are in



       Encounter for  the results



       therapeutic drug level  section.



       monitoring  

 

 XR CHEST 2 VIEWS  Routine  2018  1:50  Status post heart  Results for 
this



     PM CDT  transplantation (HCC)  procedure are in



         the results



         section.

 

 R & L CATH / CORONARY    2018  1:13  H/O heart transplant  



 ANGIOS / PCI    PM CDT  (HCC)  









   Case Notes







   POP6









 ECG 12-LEAD  Routine  2018 11:50 AM CDT    









   Procedure Note - Interface, External Ris In - 2018  1:21 PM CDT



Ventricular Rate 73 BPM



   Atrial Rate 73 BPM



   P-R Interval 158 ms



   QRS Duration 94 ms



   Q-T Interval 392 ms



   QTC Calculation(Bazett) 431 ms



   P Axis 58 degrees



   R Axis 73 degrees



   T Axis 56 degrees



   



   Normal sinus rhythm



   Incomplete right bundle branch block



   Borderline ECG



   When compared with ECG of 04-MAR-2017 20:13,



   Left posterior fascicular block is no longer Present



   Incomplete right bundle branch block is now Present



   Criteria for Anterior infarct are no longer Present









 ECG 12-LEAD  Routine  2018 11:50    Results for



     AM CDT    this procedure



         are in the



         results



         section.

 

 CBC W/PLT COUNT &  STAT  2018 11:07    Results for



 AUTO DIFFERENTIAL    AM CDT    this procedure



         are in the



         results



         section.

 

 TACROLIMUS LEVEL  AP Routine  2018 11:07    Results for



     AM CDT    this procedure



         are in the



         results



         section.

 

 PROTHROMBIN TIME/INR  STAT  2018 11:07    Results for



     AM CDT    this procedure



         are in the



         results



         section.

 

 COMPREHENSIVE  STAT  2018 11:07    Results for



 METABOLIC PANEL    AM CDT    this procedure



         are in the



         results



         section.

 

 CBC W/PLT COUNT &  STAT  2018 11:07    Results for



 AUTO DIFFERENTIAL    AM CDT    this procedure



         are in the



         results



         section.

 

 LIPID PANEL  Routine  2018 11:07    Results for



     AM CDT    this procedure



         are in the



         results



         section.

 

 CT CHEST WITHOUT IV  Routine  2017  8:45  Status post heart  Results for



 CONTRAST    AM CST  transplantation (HCC)  this procedure



         are in the



         results



         section.



after 2017



Results

ECHOCARDIOGRAM REPORT - SCAN (2018 12:54 PM CST)





 Narrative  Performed At

 

 This result has an attachment that is not available.



  



2D Echo W/Doppler(CW/PW/Color) (2018  9:20 AM CST)





 Component  Value  Ref Range  Performed At

 

 Ejection Fraction      [facility] ECHO [facility]









 Narrative  Performed At

 

 Transthoracic Echocardiography Report (TTE)



  [facility]



  



  



  Demographics



  



  



  



  Patient Name Lulu BRUNO of  



 Study 2018



  



 WOOD



  



  



  



  [id number]  



 GenderMale



  



  



  



  Visit Number  



 [id number]  



 Unknown



  



  



  



  [id number]  



 Room Number op



  



  Number



  



  



  



  Date of  



 Birth1970Referring  



 Physician Dana Loo MD



  



  



  



  Age49  



 year(s)Sonographer  



 Ashok Mcmillan



  



   



   



 ROSA



  



  



  



  AnalystMailyn  



 InterpretingJoseph Thomas Merchant  



 Physician MD



  



  



  



 Procedure



  



  



  



  Type of



  



  Study TTE procedure:2DECHO W  



 DOPPLER(CW/PW/COLOR) (Routine)



  



  



  



 Indications:Heart Transplant Follow up.



  



  



  



 Clinical History



  



 ICMP, HTN



  



  



  



 OHT 5



  



  



  



 Height: 76 inches Weight: 90.72 kg (200 lbs) BSA:  



 2.22 m^2 BMI: 24.34 kg/m^2



  



  



  



 HR: 79 bpm BP: 127/77 mmHg



  



  



  



  Summary



  



  The left ventricle is chamber size (by PSLAX  



 dimension) is normal (male -



  



  LVIDd 4.2-5.8cm) . LV septal thickness is normal  



 (0.6-1.1cm). LV posterior



  



  wall thickness is mildly increased . LV segments  



 contract normally. LVEF



  



  by Guy's method of disk assessment is normal  



 (60%) .



  



  Normal diastolic function with normal LV filling  



 pressure (LAP) at rest.



  



  Estimated peak systolic PA pressure is 30-35 mmHg  



 .



  



  No significant pericardial effusion is  



 visualized.



  



  



  



  Previous Study



  



  Compared to the previous study there was no  



 significant change.



  



  



  



  Signature



  



  



  



  -------------------------------------------------  



 ---------------



  



  Electronically signed by Alcides Gibbs MD(Interpreting



  



  physician) on 2018 12:12 PM



  



  -------------------------------------------------  



 ---------------



  



  



  



  Findings



  



  



  



  Left Ventricle The LV endocardium  



 is well visualized.



  



 Th  



 e left ventricle is chamber size (by PSLAX



  



 di  



 mension) is normal (male - LVIDd 4.2-5.8cm) .



  



 LV  



 septal thickness is normal (0.6-1.1cm). LV



  



 po  



 sterior wall thickness is mildly increased .



  



 LV  



 segments contract normally.



  



 LV  



 EF by Guy's method of disk assessment is



  



 no  



 rmal (60%) .



  



 No  



 rmal diastolic function with normal LV filling



  



 pr  



 essure (LAP) at rest.



  



  



  



  Left AtriumLA size is  



 normal (16-34 ml/m2) .



  



  



  



  Right VentricleThe right  



 ventricular chamber size and systolic



  



 fu  



 nction are within normal limits.



  



  



  



  Right Atrium RA size is  



 normal.



  



  



  



  Aortic Valve Normal AoV  



 structure.



  



 No  



 evidence of aortic regurgitation.



  



  



  



  Mitral Valve Mild MV leaflet  



 thickening.



  



 Tr  



 ace mitral regurgitation.



  



  



  



  Tricuspid ValveMild tricuspid  



 regurgitation.



  



 Es  



 timated peak systolic PA pressure is 30-35 mmHg .



  



  



  



  Pulmonic Valve Normal PV  



 structure and function.



  



  



  



  AortaAortic  



 root size (SInus of Valsalva diameter) is



  



 no  



 rmal .



  



 Pr  



 oximal ascending aorta size is normal .



  



  



  



  PericardiumNo significant  



 pericardial effusion is visualized.



  



  



  



  IVC/SVC/PA/PV/PleuralThe estimated RA  



 pressure by IVC dynamics 5-10mmHg



  



 .



  



  



  



 Chambers/Structures



  



  



  



  Left Atrium



  



  



  



  LA Volume: 59.91  



 ml LA  



 Area: 21.63 cm^2



  



  LA Vol. Index: 27 ml/m^2



  



  



  



  Left Ventricle



  



  



  



  LVIDd: 4.83 cm



  



  LVIDs: 2.99 cm



  



  LV Septum Diastolic: 1.01 cm



  



  LV PW Diastolic: 1.22  



 cmLV FS:  



 38.1 %



  



  LVEDV Guy's:73.35 ml



  



  LVESV Guy's:25.17  



 mlLVEDVI:  



 33 ml/m^2



  



  LVEF Guy's: 65.7  



 %LVESV  



 I: 11 ml/m^2



  



  



  



  LVOT Diameter: 2.34 cm



  



  



  



  Right Atrium



  



  



  



  RA Vol. (Sngl Plane): 38.77 ml



  



  



  



  Right Ventricle



  



  



  



 TAPSE:  



 1.64 cm



  



  



  



 Aorta



  



  



  



  Ao Root S of Krupa.: 3.37  



 cmAscending Aorta:  



 2.63 cm



  



  



  



 Doppler/Quantitative Measurements



  



  



  



  Mitral Valve



  



  



  



  MV Peak E-Wave: 0.61  



 m/sMV Peak A-Wave:  



 0.47 m/s



  



   



  E/A Ratio: 1.31



  



   



  Peak Gradient: 1.5 mmHg



  



   



  Deceleration Time:  



 179.6 msec



  



  



  



  MV Bharathi. Peak:



  



  



  



  Tissue Doppler



  



  



  



  E' Septal Velocity: 0.08  



 m/sE/E': 7.29



  



  E' Lateral Velocity: 0.17 m/s



  



  



  



  Aortic Valve



  



  



  



  Peak Velocity: 1.14  



 m/sMean  



 Velocity: 0.78 m/s



  



  Peak Gradient: 5.21  



 mmHg Mean  



 Gradient: 2.77 mmHg



  



  AV Area (continuity): 3.71 cm^2



  



  AV VTI: 21 cm



  



  



  



  AV DVI: 0.86



  



  



  



  LVOT



  



  



  



  Peak Velocity: 0.86 m/s  



 Peak Gradient: 2.99 mmHg



  



  Mean Velocity: 0.62 m/s  



 Mean Gradient: 1.72 mmHg



  



  LVOT Diameter: 2.34  



 cmLVOT VTI: 18.12 cm



  



  LVOT Area: 4.3  



 cm^2 LVOT SV:77.89  



 ml



  



  LVOT CO: 6.15  



 l/min LVOT CI:  



 2.77 l/min/m^2



  



  



  



  Tricuspid Valve



  



  



  



  TR Velocity: 2.41 m/s



  



  TR Gradient: 23.19 mmHg



  



   









 Procedure Note

 

 Interface, External Ris In - 2018 12:13 PM CST



Transthoracic Echocardiography Report (TTE)



 



  Demographics



 



  Patient Name   TYRA BRUNO    Date of Study       2018



                 WOOD



 



  MRN            [id number]          Gender              Male



 



  Visit Number   [id number]        Race                Unknown



 



  Accession      [id number]         Room Number         op



  Number



 



  Date of Birth  1970        Referring Physician Dana Loo MD



 



  Age            48 year(s)        Sonographer         Ashok Mcmillan



                                                       [facility]



 



  Analyst        Mayte            Interpreting        Thomas Agee      Physician           MD



 



 Procedure



 



  Type of



  Study     TTE procedure:2DECHO W DOPPLER(CW/PW/COLOR) (Routine)



 



 Indications:Heart Transplant Follow up.



 



 Clinical History



 ICMP, HTN



 



 OHT 5.



 



 Height: 76 inches Weight: 90.72 kg (200 lbs) BSA: 2.22 m^2 BMI: 24.34 kg/m^2



 



 HR: 79 bpm BP: 127/77 mmHg



 



  Summary



  The left ventricle is chamber size (by PSLAX dimension) is normal (male -



  LVIDd 4.2-5.8cm) . LV septal thickness is normal (0.6-1.1cm). LV posterior



  wall thickness is mildly increased . LV segments contract normally. LVEF



  by Guy's method of disk assessment is normal (60%) .



  Normal diastolic function with normal LV filling pressure (LAP) at rest.



  Estimated peak systolic PA pressure is 30-35 mmHg .



  No significant pericardial effusion is visualized.



 



  Previous Study



  Compared to the previous study there was no significant change.



 



  Signature



 



  ----------------------------------------------------------------



  Electronically signed by Alcides Gibbs MD(Interpreting



  physician) on 2018 12:12 PM



  ----------------------------------------------------------------



 



  Findings



 



  Left Ventricle         The LV endocardium is well visualized.



                         The left ventricle is chamber size (by PSLAX



                         dimension) is normal (male - LVIDd 4.2-5.8cm) .



                         LV septal thickness is normal (0.6-1.1cm). LV



                         posterior wall thickness is mildly increased .



                         LV segments contract normally.



                         LVEF by Guy's method of disk assessment is



                         normal (60%) .



                         Normal diastolic function with normal LV filling



                         pressure (LAP) at rest.



 



  Left Atrium            LA size is normal (16-34 ml/m2) .



 



  Right Ventricle        The right ventricular chamber size and systolic



                         function are within normal limits.



 



  Right Atrium           RA size is normal.



 



  Aortic Valve           Normal AoV structure.



                         No evidence of aortic regurgitation.



 



  Mitral Valve           Mild MV leaflet thickening.



                         Trace mitral regurgitation.



 



  Tricuspid Valve        Mild tricuspid regurgitation.



                         Estimated peak systolic PA pressure is 30-35 mmHg .



 



  Pulmonic Valve         Normal PV structure and function.



 



  Aorta                  Aortic root size (SInus of Valsalva diameter) is



                         normal .



                         Proximal ascending aorta size is normal .



 



  Pericardium            No significant pericardial effusion is visualized.



 



  IVC/SVC/PA/PV/Pleural  The estimated RA pressure by IVC dynamics 5-10mmHg



                         .



 



 Chambers/Structures



 



  Left Atrium



 



  LA Volume: 59.91 ml                     LA Area: 21.63 cm^2



  LA Vol. Index: 27 ml/m^2



 



  Left Ventricle



 



  LVIDd: 4.83 cm



  LVIDs: 2.99 cm



  LV Septum Diastolic: 1.01 cm



  LV PW Diastolic: 1.22 cm                    LV FS: 38.1 %



  LVEDV Guy's:73.35 ml



  LVESV Guy's:25.17 ml                    LVEDVI: 33 ml/m^2



  LVEF Guy's: 65.7 %                      LVESVI: 11 ml/m^2



 



  LVOT Diameter: 2.34 cm



 



  Right Atrium



 



          RA Vol. (Sngl Plane): 38.77 ml



 



  Right Ventricle



 



                     TAPSE: 1.64 cm



 



 Aorta



 



  Ao Root S of Krupa.: 3.37 cm              Ascending Aorta: 2.63 cm



 



 Doppler/Quantitative Measurements



 



  Mitral Valve



 



  MV Peak E-Wave: 0.61 m/s              MV Peak A-Wave: 0.47 m/s



                                        E/A Ratio: 1.31



                                        Peak Gradient: 1.5 mmHg



                                        Deceleration Time: 179.6 msec



 



  MV Bharathi. Peak:



 



  Tissue Doppler



 



  E' Septal Velocity: 0.08 m/s          E/E': 7.29



  E' Lateral Velocity: 0.17 m/s



 



  Aortic Valve



 



  Peak Velocity: 1.14 m/s                  Mean Velocity: 0.78 m/s



  Peak Gradient: 5.21 mmHg                 Mean Gradient: 2.77 mmHg



  AV Area (continuity): 3.71 cm^2



  AV VTI: 21 cm



 



  AV DVI: 0.86



 



  LVOT



 



  Peak Velocity: 0.86 m/s             Peak Gradient: 2.99 mmHg



  Mean Velocity: 0.62 m/s             Mean Gradient: 1.72 mmHg



  LVOT Diameter: 2.34 cm              LVOT VTI: 18.12 cm



  LVOT Area: 4.3 cm^2                 LVOT SV:77.89 ml



  LVOT CO: 6.15 l/min                 LVOT CI: 2.77 l/min/m^2



 



  Tricuspid Valve



 



  TR Velocity: 2.41 m/s



  TR Gradient: 23.19 mmHg



 









 Performing Organization  Address  City/State/Zipcode  Phone Number

 

 [facility]      



CBC with platelet count + automated diff (2018  8:41 AM CST)Only the most 
recent of2 resultswithin the time period is included.





 Component  Value  Ref Range  Performed At

 

 WBC  4.9  3.5 - 10.5 K/L  [facility]

 

 RBC  5.37  4.63 - 6.08 M/L  [facility]

 

 Hemoglobin  15.5  13.7 - 17.5 GM/DL  [facility]

 

 Hematocrit  46.8  40.1 - 51.0 %  [facility]

 

 MCV  87.2  79.0 - 92.2 fL  [facility]

 

 MCH  28.9  25.7 - 32.2 pg  [facility]

 

 MCHC  33.1  32.3 - 36.5 GM/DL  [facility]

 

 RDW  13.3  11.6 - 14.4 %  [facility]

 

 Platelets  130 (L)  150 - 450 K/CU MM  [facility]

 

 MPV  9.6  9.4 - 12.4 fL  [facility]

 

 nRBC  0  0 - 0 /100 WBC  [facility]

 

 % Neutros  65  %  [facility]

 

 % Lymphs  20  %  [facility]

 

 % Monos  10  %  [facility]

 

 % Eos  4  %  [facility]

 

 % Baso  1  %  [facility]

 

 # Neutros  3.16  1.78 - 5.38 K/L  [facility]

 

 # Lymphs  0.99 (L)  1.32 - 3.57 K/L  [facility]

 

 # Monos  0.49  0.30 - 0.82 K/L  [facility]

 

 # Eos  0.17  0.04 - 0.54 K/L  [facility]

 

 # Baso  0.04  0.01 - 0.08 K/L  [facility]

 

 Immature Granulocytes-Relative  1  0 - 1 %  [facility]









 Specimen

 

 Blood









 Performing Organization  Address  City/State/Zipcode  Phone Number

 

 [facility]  [address] [place] [address] [phone]
[phone]



 CENTER      



Basic Metabolic Panel (2018  8:41 AM CST)Only the most recent of3 
resultswithin the time period is included.





 Component  Value  Ref Range  Performed At

 

 Sodium  140  136 - 145 meq/L  [facility]

 

 Potassium  4.7  3.5 - 5.1 meq/L  [facility]

 

 Chloride  103  98 - 107 meq/L  [facility]

 

 CO2  30 (H)  22 - 29 meq/L  [facility]

 

 BUN  20  7 - 21 mg/dL  [facility]

 

 Creatinine  1.08  0.57 - 1.25 mg/dL  [facility]

 

 Glucose  110 (H)  70 - 105 mg/dL  [facility]

 

 Calcium  10.0  8.4 - 10.2 mg/dL  [facility]

 

 EGFR  73Comment: ESTIMATED GFR IS  mL/min/1.73 sq m  [facility]



   NOT AS ACCURATE AS CREATININE    MEDICAL CENTER



   CLEARANCE IN PREDICTING    



   GLOMERULAR FILTRATION RATE.    



   ESTIMATED GFR IS NOT    



   APPLICABLE FOR DIALYSIS    



   PATIENTS.    









 Specimen

 

 Blood









 Performing Organization  Address  City/State/Zipcode  Phone Number

 

 [facility]  [address] [place] [address] [phone]
[phone]



 CENTER      



Tacrolimus level (2018 10:30 AM CDT)Only the most recent of4 
resultswithin the time period is included.





 Component  Value  Ref Range  Performed At

 

 Tacrolimus Lvl  6.4    









 Specimen

 

 Blood









 Narrative  Performed At

 

 This result has an attachment that is not available.



  



CBC with platelet count + automated diff (2018 10:30 AM CDT)Only the most 
recent of2 resultswithin the time period is included.





 Component  Value  Ref Range  Performed At

 

 WBC (MANUAL)  4.1  10^3/mL  

 

 RBC (MANUAL)  5.60  10^6/L  

 

 HEMOGLOBIN (MANUAL)  16.0  GM/DL  

 

 HEMATOCRIT (MANUAL)  48.1  %  

 

 MCV (MANUAL)  86.0  fL  

 

 MCH (MANUAL)  28.6  pg  

 

 MCHC (MANUAL)  33.3  GM/DL  

 

 RDW (MANUAL)  14.0  %  

 

 PLATELETS (MANUAL)  163  K/CU MM  

 

 MPV (MANUAL)  8.3  fL  

 

 NRBC (MANUAL)    /100 WBC  

 

 NEUTROS PCT (MANUAL)  53.0  %  

 

 LYMPHS PCT (MANUAL)  29.8  %  

 

 MONOS PCT (MANUAL)  11.3  %  

 

 EOS PCT (MANUAL)  5.0  %  

 

 BASOS PCT (MANUAL)  0.9  %  

 

 NEUTROS ABS (MANUAL)  2.2  K/L  

 

 LYMPHS ABS (MANUAL)  1.2  K/L  

 

 MONOS ABS (MANUAL)  0.5  K/L  

 

 EOS ABS (MANUAL)  0.2  K/L  

 

 BASOS ABS (MANUAL)  0.0  K/L  









 Specimen

 

 Blood



Hemoglobin A1c (2018 10:30 AM CDT)





 Component  Value  Ref Range  Performed At

 

 Hemoglobin A1C  5.3  4.0 - 6.0 %  









 Specimen

 

 Blood



ECHOCARDIOGRAM REPORT - SCAN (2018  7:20 AM CDT)





 Narrative  Performed At

 

 This result has an attachment that is not available.



  



CARDIAC CATH REPORT - SCAN (2018  3:01 PM CDT)





 Narrative  Performed At

 

 This result has an attachment that is not available.



  



2D echo w/ doppler (cw/pw/color) (2018  2:02 PM CDT)





 Component  Value  Ref Range  Performed At

 

 Ejection Fraction      [facility]









 Narrative  Performed At

 

 Transthoracic Echocardiography Report (TTE)



  [facility]



  



  



  Demographics



  



  



  



  Patient Name Lulu BRUNO of  



 Study 2018



  



 WOOD



  



  



  



  [id number]  



 GenderMale



  



  



  



  Visit Number  



 [id number]  



 Unknown



  



  



  



  [id number]  



 Room Number



  



  Number



  



  



  



  Date of  



 Birth1970Referring  



 Physician MD Eze Luo MD



  



  



  



  Age49  



 year(s)Sonographer  



 [facility]



  



  



  



  AnalystAlex Shelbi  



 InterpretingRaPhysician TRU Hong



  



  



  



 Procedure



  



  



  



  Type of



  



  Study TTE procedure:2DECHO W  



 DOPPLER(CW/PW/COLOR) (Routine)



  



  



  



 Indications:Heart Transplant Follow up.



  



  



  



 Clinical History



  



 Congestive Heart Failure



  



 Coronary Artery Disease



  



 Hypertension



  



 Ischemic Cardiomyopathy



  



 OHT 5/14/15



  



  



  



 Height: 74 inches Weight: 103.87 kg (229 lbs) BSA:  



 2.3 m^2 BMI: 29.4 kg/m^2



  



  



  



 HR: 73 bpm BP: 124/79 mmHg



  



  



  



  Summary



  



  Regular sinus rhythm during the exam.



  



  The LV apex is incompletely visualized due to  



 foreshortening.



  



  The other segments have low normal contractility.



  



  LVEF by Guy's method of disk assessment is  



 low normal (50-55%).



  



  A trace of tricuspid regurgitation.



  



  Estimated peak systolic PA pressure is 25-30 mmHg  



 .



  



  Normal TV structure.



  



  No evidence of pericardial effusion.



  



  



  



  Previous Study



  



  In comparison with the prior exam 2017 the  



 following changes are



  



  noted: mildly depressed LVEF .



  



  



  



  Signature



  



  



  



  -------------------------------------------------  



 ---------------



  



  Electronically signed by Vic Garcia MD(Interpreting



  



  physician) on 2018 06:33 PM



  



  -------------------------------------------------  



 ---------------



  



  



  



  Findings



  



  



  



  Rhythm/BPRegular  



 sinus rhythm during the exam.



  



  



  



  Left Ventricle The LV apex is  



 incompletely visualized due to



  



 fo  



 reshortening.



  



 Th  



 e other segments have low normal contractility.



  



 LV  



 EF by Guy's method of disk assessment is low



  



 no  



 rmal (50-55%).



  



  



  



  Left AtriumLA morphology  



 consistent with orthotoptic heart



  



 tr  



 ansplant.



  



  



  



  Right VentricleRV chamber size is  



 mildly enlarged .



  



 Gl  



 obal RV systolic function is normal .



  



  



  



  Right Atrium RA cavity size  



 is consistent with orthotopic heart



  



 tr  



 ansplantation .



  



  



  



  Aortic Valve Normal AoV  



 structure and function.



  



  



  



  Mitral Valve Normal MV  



 structure and function.



  



  



  



  Tricuspid ValveA trace of  



 tricuspid regurgitation.



  



 Es  



 timated peak systolic PA pressure is 25-30 mmHg .



  



 No  



 rmal TV structure.



  



  



  



  Pulmonic Valve Normal PV  



 structure and function by limited views



  



 an  



 d Doppler.



  



  



  



  AortaAortic  



 root size (SInus of Valsalva diameter) is



  



 no  



 rmal .



  



  



  



  PericardiumNo evidence of  



 pericardial effusion.



  



  



  



  IVC/SVC/PA/PV/PleuralThe estimated RA  



 pressure by IVC dynamics 5-10mmHg



  



 .



  



  



  



 Chambers/Structures



  



  



  



  Left Atrium



  



  



  



  LA Dimension: 4.78 cm



  



  



  



  Left Ventricle



  



  



  



  LVIDd: 5.29 cm



  



  LVIDs: 3.41 cm



  



  LV Septum Diastolic: 0.98 cm



  



  LV PW Diastolic: 1.02  



 cm LV Length:  



 7.23 cm



  



  LVEDV Guy's:91.24  



 ml LV FS: 35.5  



 %



  



  LVESV Guy's:36.89 ml



  



  LVEF Guy's: 59.6  



 % LVEDVI:  



 40 ml/m^2



  



   



 LVESVI: 16  



 ml/m^2



  



  LVOT Diameter: 2.41 cm



  



  



  



 Aorta



  



  



  



  Ao Root S of Krupa.: 2.96 cm



  



  



  



 Doppler/Quantitative Measurements



  



  



  



  LVOT



  



  



  



  Peak Velocity: 0.65 m/s  



 Peak Gradient: 1.7 mmHg



  



  Mean Velocity: 0.39 m/s  



 Mean Gradient: 0.75 mmHg



  



  LVOT Diameter: 2.41  



 cmLVOT VTI: 12.26 cm



  



  LVOT Area: 4.56  



 cm^2LVOT SV:55.9  



 ml



  



  LVOT CO: 4.08  



 l/min LVOT CI:  



 1.77 l/min/m^2



  



   









 Procedure Note

 

 Interface, External Ris In - 2018  6:34 PM CDT



Transthoracic Echocardiography Report (TTE)



 



  Demographics



 



  Patient Name   TYRA BRUNO    Date of Study       2018



                 [place]



 



  MRN            [id number]          Gender              Male



 



  Visit Number   [id number]        Race                Unknown



 



  Accession      [id number]         Room Number



  Number



 



  Date of Birth  1970        Referring Physician MD Eze Luo MD



 



  Age            48 year(s)        Sonographer         [facility]



 



  Analyst        Fer Mario         Interpreting        Physician TRU Rooney



 



 Procedure



 



  Type of



  Study     TTE procedure:2DECHO W DOPPLER(CW/PW/COLOR) (Routine)



 



 Indications:Heart Transplant Follow up.



 



 Clinical History



 Congestive Heart Failure



 Coronary Artery Disease



 Hypertension



 Ischemic Cardiomyopathy



 OHT 5/14/15



 



 Height: 74 inches Weight: 103.87 kg (229 lbs) BSA: 2.3 m^2 BMI: 29.4 kg/m^2



 



 HR: 73 bpm BP: 124/79 mmHg



 



  Summary



  Regular sinus rhythm during the exam.



  The LV apex is incompletely visualized due to foreshortening.



  The other segments have low normal contractility.



  LVEF by Guy's method of disk assessment is low normal (50-55%).



  A trace of tricuspid regurgitation.



  Estimated peak systolic PA pressure is 25-30 mmHg .



  Normal TV structure.



  No evidence of pericardial effusion.



 



  Previous Study



  In comparison with the prior exam 2017 the following changes are



  noted: mildly depressed LVEF .



 



  Signature



 



  ----------------------------------------------------------------



  Electronically signed by Vic Garcia MD(Interpreting



  physician) on 2018 06:33 PM



  ----------------------------------------------------------------



 



  Findings



 



  Rhythm/BP              Regular sinus rhythm during the exam.



 



  Left Ventricle         The LV apex is incompletely visualized due to



                         foreshortening.



                         The other segments have low normal contractility.



                         LVEF by Guy's method of disk assessment is low



                         normal (50-55%).



 



  Left Atrium            LA morphology consistent with orthotoptic heart



                         transplant.



 



  Right Ventricle        RV chamber size is mildly enlarged .



                         Global RV systolic function is normal .



 



  Right Atrium           RA cavity size is consistent with orthotopic heart



                         transplantation .



 



  Aortic Valve           Normal AoV structure and function.



 



  Mitral Valve           Normal MV structure and function.



 



  Tricuspid Valve        A trace of tricuspid regurgitation.



                         Estimated peak systolic PA pressure is 25-30 mmHg .



                         Normal TV structure.



 



  Pulmonic Valve         Normal PV structure and function by limited views



                         and Doppler.



 



  Aorta                  Aortic root size (SInus of Valsalva diameter) is



                         normal .



 



  Pericardium            No evidence of pericardial effusion.



 



  IVC/SVC/PA/PV/Pleural  The estimated RA pressure by IVC dynamics 5-10mmHg



                         .



 



 Chambers/Structures



 



  Left Atrium



 



  LA Dimension: 4.78 cm



 



  Left Ventricle



 



  LVIDd: 5.29 cm



  LVIDs: 3.41 cm



  LV Septum Diastolic: 0.98 cm



  LV PW Diastolic: 1.02 cm                   LV Length: 7.23 cm



  LVEDV Guy's:91.24 ml                   LV FS: 35.5 %



  LVESV Guy's:36.89 ml



  LVEF Guy's: 59.6 %                     LVEDVI: 40 ml/m^2



                                             LVESVI: 16 ml/m^2



  LVOT Diameter: 2.41 cm



 



 Aorta



 



  Ao Root S of Krupa.: 2.96 cm



 



 Doppler/Quantitative Measurements



 



  LVOT



 



  Peak Velocity: 0.65 m/s             Peak Gradient: 1.7 mmHg



  Mean Velocity: 0.39 m/s             Mean Gradient: 0.75 mmHg



  LVOT Diameter: 2.41 cm              LVOT VTI: 12.26 cm



  LVOT Area: 4.56 cm^2                LVOT SV:55.9 ml



  LVOT CO: 4.08 l/min                 LVOT CI: 1.77 l/min/m^2



 









 Performing Organization  Address  City/State/Zipcode  Phone Number

 

 [facility]      



XR chest 2 views (2018  1:50 PM CDT)





 Narrative  Performed At

 

 FINAL REPORT



  Fixational



  



  



 PATIENT ID: [id number]



  



  



  



 Chest two views



  



  



  



 INDICATION: Heart transplant annual follow-up.



  



  



  



 COMPARISON: 2017



  



  



  



 IMPRESSION:



  



  



  



 There is no focal consolidation, vascular congestion, pleural 



  



 effusion, or pneumothorax. Heart size is within normal limits. Median 



  



 sternotomy changes, left axillary surgical clips, and gastric sleeve 



  



 with small hiatal hernia are again noted.



  



  



  



 No significant change since 2017.



  



  



  



 Signed: Richard Nickerson MD



  



 Report Verified Date/Time:2018 14:13:48



  



  



  



 Reading Location: [facility] B1 C013W Consult Reading Room



  



  Electronically signed by: RICHARD NICKERSON M.D. on 2018  



 02:13 PM



  



   









 Procedure Note

 

 Interface, External Ris In - 2018  2:15 PM CDT



FINAL REPORT



 



 PATIENT ID:   [id number]



 



 Chest two views



 



 INDICATION: Heart transplant annual follow-up.



 



 COMPARISON: 2017



 



 IMPRESSION:



 



 There is no focal consolidation, vascular congestion, pleural



 effusion, or pneumothorax. Heart size is within normal limits. Median



 sternotomy changes, left axillary surgical clips, and gastric sleeve



 with small hiatal hernia are again noted.



 



 No significant change since 2017.



 



 Signed: Richard Nickerson MD



 Report Verified Date/Time:  2018 14:13:48



 



 Reading Location: [facility] B1 C013W Consult Reading Room



    Electronically signed by: RICHARD NICKERSON M.D. on 2018 02:13 PM



 









 Performing Organization  Address  City[facility]  Phone Number

 

 [facility]      



ECG 12 lead (2018 11:50 AM CDT)





 Narrative  Performed At

 

 Ventricular Rate 73 BPM



  [facility]



 Atrial Rate 73 BPM



  



 P-R Interval 158 ms



  



 QRS Duration 94 ms



  



 Q-T Interval 392 ms



  



 QTC Calculation(Bazett) 431 ms



  



 P Axis 58 degrees



  



 R Axis 73 degrees



  



 T Axis 56 degrees



  



  



  



 Normal sinus rhythm



  



 Incomplete right bundle branch block



  



 Borderline ECG



  



 When compared with ECG of 04-MAR-2017 20:13,



  



 No significant change was found



  



  



  



 Confirmed by Dana Loo ([id number]) on 2018 8:56:21 PM  









 Procedure Note

 

 Interface, External Ris In - 2018  8:56 PM CDT



Ventricular Rate 73 BPM



 Atrial Rate 73 BPM



 P-R Interval 158 ms



 QRS Duration 94 ms



 Q-T Interval 392 ms



 QTC Calculation(Bazett) 431 ms



 P Axis 58 degrees



 R Axis 73 degrees



 T Axis 56 degrees



 



 Normal sinus rhythm



 Incomplete right bundle branch block



 Borderline ECG



 When compared with ECG of 04-MAR-2017 20:13,



 No significant change was found



 



 Confirmed by Dana Loo ([id number]) on 2018 8:56:21 PM









 Performing Organization  Address  City/[facility]  Phone Number

 

 [facility]      



Prothrombin time/INR (2018 11:07 AM CDT)





 Component  Value  Ref Range  Performed At

 

 Protime  14.7  11.7 - 14.7 seconds  [facility]

 

 INR  1.2  <=5.9  [facility]









 Specimen

 

 Blood









 Narrative  Performed At

 

  



  [facility]



 RECOMMENDED COUMADIN/WARFARIN INR THERAPY  



 RANGES



  



 STANDARD DOSE: 2.0 - 3.0 Includes:  



 PROPHYLAXIS for venous thrombosis, systemic  



 embolization; TREATMENT for venous thrombosis  



 and/or pulmonary embolus.



  



 HIGH RISK: Target INR is 2.5-3.5 for patients  



 with mechanical heart valves.  









 Performing Organization  Address  City/[facility]  Phone Number

 

 [address] [address] [phone]
[phone]



 [place]      



Lipid panel (2018 11:07 AM CDT)





 Component  Value  Ref Range  Performed At

 

 Triglycerides  87  mg/dL  [facility]

 

 Cholesterol  127  mg/dL  [facility]

 

 HDL  37  mg/dL  [facility]

 

 LDL Calculated  73  mg/dL  [facility]









 Specimen

 

 Blood









 Narrative  Performed At

 

  



  [facility]



 Triglyceride Reference Range:



  



  Low Risk <150



  



  [id number]



  



  High Risk 200-499



  



  Very High Risk>=500



  



  



  



 Cholesterol Reference Range:



  



  Low Risk <200



  



  [id number] 



  



  High Risk>240



  



  



  



 HDL Cholesterol Reference Range:



  



  Low Risk >=60



  



  High Risk <40



  



  



  



 LDL Cholesterol Reference Range:



  



  Optimal<100



  



  Near [id number]



  



  [id number]



  



  [id number]



  



  Very High >=190  









 Performing Organization  Address  City/[facility]  Phone Number

 

 [address] [address] [phone]
[phone]



 [place]      



Comprehensive metabolic panel (2018 11:07 AM CDT)





 Component  Value  Ref Range  Performed At

 

 Protein, Total  6.5  6.0 - 8.3 gm/dL  [facility]

 

 Albumin  4.2  3.5 - 5.0 g/dL  [facility]

 

 Alkaline Phosphatase  98  40 - 150 U/L  [facility]

 

 Total Bilirubin  0.4  0.2 - 1.2 mg/dL  [facility]

 

 Sodium  141  136 - 145 meq/L  [facility]

 

 Potassium  4.3  3.5 - 5.1 meq/L  [facility]

 

 Chloride  103  98 - 107 meq/L  [facility]

 

 CO2  29  22 - 29 meq/L  [facility]

 

 BUN  17  7 - 21 mg/dL  [facility]

 

 Creatinine  1.00  0.57 - 1.25 mg/dL  [facility]

 

 Glucose  101  70 - 105 mg/dL  [facility]

 

 Calcium  9.5  8.4 - 10.2 mg/dL  [facility]

 

 AST  15  5 - 34 U/L  [facility]

 

 ALT  12  6 - 55 U/L  [facility]

 

 EGFR  79Comment: ESTIMATED GFR  mL/min/1.73 sq m  [facility]



   IS NOT AS ACCURATE AS    [facility]



   CREATININE CLEARANCE IN    



   PREDICTING GLOMERULAR    



   FILTRATION RATE.    



   ESTIMATED GFR IS NOT    



   APPLICABLE FOR DIALYSIS    



   PATIENTS.    









 Specimen

 

 Blood









 Performing Organization  Address  City/State/Zipcode  Phone Number

 

 [facility]  [address] [place] [address] [phone]
[phone]



 CENTER      



CT chest without IV contrast (2017  8:45 AM CST)





 Narrative  Performed At

 

 FINAL REPORT



  [email or web address] [facility]



  



  



 PATIENT ID: [id number]



  



  



  



 INDICATION: 



  



 47-year-old male with chest wall pain and history of heart transplant.



  



  



  



 COMPARISON:



  



 Chest radiograph 2017



  



  



  



 TECHNIQUE: 



  



 Chest CT exam WITHOUT intravenous contrast.



  



  



  



 The exam was performed according to our department dose-optimization 



  



 protocol, which includes automated exposure control, adjustments of 



  



 mA and kV according to patient size. Iterative reconstructions are 



  



 also sometimes employed.



  



  



  



 FINDINGS:



  



 No chest wall mass, chest wall hematoma, rib fracture, or rib lesion 



  



 is demonstrated. There is a healed median sternotomy.



  



  



  



 Heart is normal in size and there is no pericardial effusion. Mild 



  



 atherosclerotic plaque of the ascending thoracic aorta is 



  



 demonstrated. Thoracic aorta is normal in caliber.



  



  



  



 8 mm calcified nodule in the right lower lobe represents prior 



  



 granulomatous infection. No pneumonia, pulmonary edema, pleural 



  



 effusion, or pneumothorax is demonstrated.



  



  



  



 There is no mediastinal or hilar lymphadenopathy. Thyroid gland is 



  



 unremarkable. Upper and mid esophagus are unremarkable. Patient is 



  



 status post sleeve gastrectomy and there is a small part of the 



  



 sleeve herniated in the low posterior mediastinum. Partial imaging of 



  



 the upper abdomen is otherwise unremarkable.



  



  



  



 IMPRESSION: 



  



 No chest wall mass, muscle tear, rib fracture, or rib lesion. No 



  



 other CT explanation for the patient's chest wall pain.



  



  



  



 Clear lungs.



  



  



  



 Unremarkable heart transplant.



  



  



  



 Prior gastric sleeve with small hiatal hernia.



  



  



  



 Signed: Dennis Julio MD



  



 Report Verified Date/Time:2017 09:10:45



  



  



  



 Reading Location: [facility] Reading Room - [place] [id number]



  



  Electronically signed by: DENNIS JULIO M.D. on 2017  



 09:10 AM



  



   









 Procedure Note

 

 Interface, External Ris In - 2017  9:12 AM CST



FINAL REPORT



 



 PATIENT ID:   [id number]



 



 INDICATION:



 47-year-old male with chest wall pain and history of heart transplant.



 



 COMPARISON:



 Chest radiograph 2017



 



 TECHNIQUE:



 Chest CT exam WITHOUT intravenous contrast.



 



 The exam was performed according to our department dose-optimization



 protocol, which includes automated exposure control, adjustments of



 mA and kV according to patient size. Iterative reconstructions are



 also sometimes employed.



 



 FINDINGS:



 No chest wall mass, chest wall hematoma, rib fracture, or rib lesion



 is demonstrated. There is a healed median sternotomy.



 



 Heart is normal in size and there is no pericardial effusion. Mild



 atherosclerotic plaque of the ascending thoracic aorta is



 demonstrated. Thoracic aorta is normal in caliber.



 



 8 mm calcified nodule in the right lower lobe represents prior



 granulomatous infection. No pneumonia, pulmonary edema, pleural



 effusion, or pneumothorax is demonstrated.



 



 There is no mediastinal or hilar lymphadenopathy. Thyroid gland is



 unremarkable. Upper and mid esophagus are unremarkable. Patient is



 status post sleeve gastrectomy and there is a small part of the



 sleeve herniated in the low posterior mediastinum. Partial imaging of



 the upper abdomen is otherwise unremarkable.



 



 IMPRESSION:



 No chest wall mass, muscle tear, rib fracture, or rib lesion. No



 other CT explanation for the patient's chest wall pain.



 



 Clear lungs.



 



 Unremarkable heart transplant.



 



 Prior gastric sleeve with small hiatal hernia.



 



 Signed: Dennis Julio MD



 Report Verified Date/Time:  2017 09:10:45



 



 Reading Location: [facility] Reading Room - [place] [id number]



  Electronically signed by: DENNIS JULIO M.D. on 2017 09:10 AM



 









 Performing Organization  Address  City/State/Zipcode  Phone Number

 

 [place]      



after 2017



Insurance







 Payer  Benefit Plan /  Subscriber ID  Type  Phone  Address



   Group        

 

 BLUE CROSS/BLUE  [employer]  xxxxxxxxxxxx  [place]  [phone]  [address]







 [employer]  [place]



           [address]









 Guarantor Name  Account Type  Relation to  Date of  Phone  Billing



     Patient  Birth    Address

 

 Tyra Bruno  Personal/Family  Self  1970  [phone] [address]        (Home)  [place] [address]







Advance Directives

For more information, please contact:[place] [id number] [place], [place] [phone]





 Code Status  Date Activated  Date Inactivated  Comments

 

 Full Code  2018 10:57 AM  2018  9:33 PM  









 This code status was determined by:  Patient  









       

 

 Full Code  2015 11:49 AM  2015  5:04 PM  









 This code status was determined by:  Patient  









       

 

 Full Code  10/29/2015  2:29 PM  10/31/2015  3:02 PM  









 This code status was determined by:  Patient  









       

 

 Full Code  2015 12:39 PM  2015  7:25 PM  









 This code status was determined by:  Patient  









       

 

 Full Code  2015 11:11 AM  2015  7:28 PM  









 This code status was determined by:  Patient

## 2018-12-13 NOTE — XMS REPORT
Continuity of Care Document

 Created on:2017



Patient:TYRA BRUNO

Sex:Male

:1970

External Reference #:[id number]





Demographics







 Address  [address] [place] [id number]

 

 Phone  [phone]

 

 Phone  [phone]

 

 Preferred Language  Unknown

 

 Marital Status  Unknown

 

 Muslim Affiliation  Unknown

 

 Race  Unknown

 

 Ethnic Group  Unknown









Author







 Organization  [facility]









Problems







 Problem  Status  Onset  Classification  Date  Comments  Source



     Date    Reported    



             



             



             

 

 RIGHT RECURRENT  Active  20        [facility]



 INGUINAL HERNIA    17        Medical



 AND INCI            Center



             



             

 

 MORBID OBESITY  Active  10/14/20        [place] [address]        Medical



             Center



             



             

 

 Heart  Active    Problem  2017    [facility]



 transplanted            [facility]



             Center



             



             

 

 Hypertension  Active    Problem  2017    [facility]



             



             

 

 MI (<span  Resolved    Problem  2017    [facility] [place]



 ID="[id number]            Medical



 ">Confirmed</[facility]            Center



 n>)            



             

 

 Morbid obesity  Active    Problem  2017    [facility]



             



             

 

 Hiatal hernia  Active    Problem  2017    [facility]



             



             

 

 OBESITY,  Active          [facility] [place]



 UNSPECIFIED            [facility]



             Center



             



             







Medications







 Medication  Details  Route  Status  Patient  Ordering  Order  Source



         Instructions  Provider  Date  



               



               



               

 

 gabapentin 300 MG  300 mg, 1 cap,    Inactive        [facility] [place]



 Oral Capsule  Route: PO, Drug            Medical



   form: CAP,            Center



   ONCE, Dosing            



   Weight 102.273,            



   kg, Priority:            



   STAT, Start            



   date: 17            



   13:37:00 CDT,            



   Stop date:            



   17            



   13:37:00 CDT            



               



               

 

 Tramadol  100 mg, Route:    Inactive        [facility] [place]



   PO, Drug form:          2017  Medical



   TAB, ONCE,            Center



   Dosing Weight            



   102.273, kg,            



   Priority: STAT,            



   Start date:            



   17            



   13:36:00 CDT,            



   Stop date:            



   17            



   13:36:00 CDT            



               



               

 

 ketOROLAC (ANES)  IV, ONCE    Inactive        [address]



               Center



               



               

 

 ondansetron (ANES)  Route: IV, Drug    Inactive        [facility]



   form: INJ,          2017  Medical



   ONCE, Stop            Center



   date: 17            



   13:03:00 CDT            



               



               

 

 tramadol  50 mg=1 tab,    Active         [place]



 hydrochloride 50  PO, Q6H, PRN          2017  Medical



 MG Oral Tablet  Pain, X 10 day,            Center



   # 40 tab, 0            



   Refill(s)            



               



               

 

 Ondansetron  4 mg, Route:    Inactive        [facility] [place]



   IVP, Q6H,          2017  Medical



   Dosing Weight            Center



   102.273, kg,            



   Start date:            



   17            



   12:00:00 CDT,            



   Duration: 30            



   day, Stop date:            



   10/21/17            



   6:00:00 CDT            



               



               

 

 propofol (ANES)  Route: IV, Drug    Inactive        [facility]



   form: INJ,            Medical



   ONCE, Stop            Center



   date: 17            



   10:08:00 CDT            



               



               

 

 succinylcholine  Route: IV, Drug    Inactive         [place]



 (ANES)  form: INJ,            Medical



   ONCE, Stop            Center



   date: 17            



   10:08:00 CDT            



               



               

 

 fentaNYL (ANES)  Route: IV, Drug    Inactive        [facility]



   form: INJ,            Medical



   ONCE, Stop            Center



   date: 17            



   10:08:00 CDT            



               



               

 

 lidocaine (ANES)  Route: IV, Drug    Inactive        [facility]



   form: INJ,            Medical



   ONCE, Stop            Center



   date: 17            



   10:03:00 CDT            



               



               

 

 Acetaminophen  1,000 mg,    Inactive        [facility]



   Route: PO, Drug            Medical



   form: LIQ,            Center



   Q6Hnow, Dosing            



   Weight 102.273,            



   kg, Start date:            



   17            



   10:00:00 CDT,            



   Duration: 30            



   day, Stop date:            



   10/21/17            



   4:00:00 CDT            



               



               

 

 Tramadol  100 mg, Route:    Inactive        [facility] [place]



   PO, Drug form:          [facility]  Medical



   SUSP, Q6Hnow,            [place]



   Dosing Weight            



   102.273, kg,            



   Start date:            



   17            



   10:00:00 CDT,            



   Duration: 30            



   day, Stop date:            



   10/21/17            



   4:00:00 CDT            



               



               

 

 gabapentin  300 mg, Route:    Inactive        [facility] [place]



   PO, Drug form:          2017  Medical



   SUSP, Q8Hn,            Center



   Dosing Weight            



   102.273, kg,            



   Start date:            



   17            



   10:00:00 CDT,            



   Duration: 30            



   day, Stop date:            



   10/21/17            



   2:00:00 CDT            



               



               

 

 celecoxib  200 mg, Route:    Inactive        [facility]



   PO, [id number],          2017  Medical



   Dosing Weight            Center



   102.273, kg,            



   Start date:            



   17            



   10:00:00 CDT,            



   Duration: 30            



   day, Stop date:            



   10/20/17            



   22:00:00 CDT            



               



               

 

 rocuronium (ANES)  Route: IV, Drug    Inactive        [facility]



   form: INJ,            Medical



   ONCE, Stop            Center



   date: 17            



   9:43:00 CDT            



               



               

 

 famotidine (ANES)  Route: IV, Drug    Inactive        [facility]



   form: INJ,          2017  Medical



   ONCE, Stop            Center



   date: 17            



   9:33:00 CDT            



               



               

 

 ceFAZolin (ANES)  Route: IV, Drug    Inactive        [facility]



   form: INJ,            Medical



   ONCE, Stop            Center



   date: 17            



   9:33:00 CDT            



               



               

 

 dexamethasone  Route: IV, Drug    Inactive        [facility] [place]



 (ANES)  form: INJ,            Medical



   ONCE, Stop            Center



   date: 17            



   9:18:00 CDT            



               



               

 

 acetaminophen  Route: IV, Drug    Inactive        [facility] [place]



 (ANES) (ANES)  form: INJ,            Medical



   Start date:            Center



   17            



   8:54:00 CDT,            



   Stop date:            



   17            



   9:54:00 CDT            



               



               

 

 LR 1000 mL INJ  Route: IV,    Inactive        [facility] [place]



 (ANES)  Total Volume:          2017  Medical



   1,000, Start            Center



   date: 17            



   8:23:00 CDT,            



   Stop date:            



   17            



   9:23:00 CDT            



               



               

 

 Flomax  0.4 mg, 1 cap,    Inactive       [place]



   Route: PO, Drug            Medical



   form: CAP, PRE            Center



   OP, Start date:            



   17            



   5:00:00 CDT,            



   Duration: 1            



   doses or times,            



   Stop date:            



   17            



   23:00:00            



   CDTNotes: (Same            



   As: Flomax)            



   "Do Not Crush"            



               



               

 

 Ofirmev  1,000 mg, 100    No Longer        [facility]



   mL, Route: IV,    Active        Medical



   Drug form: INJ,            Center



   PRE OP, Start            



   date: 17            



   5:00:00 CDT,            



   Duration: 1            



   doses or times,            



   Stop date:            



   17            



   23:00:00            



   CDTNotes:            



   Infuse over 15            



   minutes  Do not            



   exceed 4gm/day            



   of            



   acetaminophen            



    *** MEDICATION            



   WASTE ***            



   Product Size:            



   1000 mg Product            



   Wasted:  ___ mg            



               



               

 

 ceFAZolin  2 gm, 100 mL,    Inactive        [facility]



   Route: IVPB,          2017  Medical



   Drug form: INJ,            Center



   PRE OP, Start            



   date: 17            



   5:00:00 CDT,            



   Duration: 1            



   doses or times,            



   Stop date:            



   17            



   23:00:00 CDT,            



   ABX Indication:            



   Surgical            



   ProphylaxisNote            



   s: Same as:            



   Ancef            



               

 

 Tacrolimus  2 mg, PO, QPM    Active       [place]



               Medical



               Center



               



               

 

 Sucralfate 100  10 mL, Route:    No Longer       [place]



 MG/ML Oral  PO, Drug form:    Active      2017  Medical



 Suspension  TAB, Q6H,            Center



   Dosing Weight            



   136.136, kg,            



   Start date:            



   17            



   18:00:00 CST,            



   Duration: 30            



   day, Stop date:            



   17            



   12:00:00            



   CSTNotes: May            



   interfere            



   w/enteral feeds            



   -  Take 1 hr            



   before or 2 hr            



   after antacids,            



   dairy pdt,            



   meals &            



   minerals -  On            



   empty stomach.            



   For patients            



   unable to            



   swallow tablet,            



   dissolve in            



   10mL - 30mL of            



   water or juice            



   and stir before            



   giving.  (Same            



   As: Carafate)            



               



               

 

 Protonix  40 mg, 1 tab,    Inactive       [place]



   Route: PO, Drug            Medical



   form: ECTAB,            [place]



   Daily, Dosing            



   Weight 136.136,            



   kg, Start date:            



   17            



   9:00:00 CST,            



   Stop date:            



   17            



   9:00:00            



   CSTNotes:            



   Tablet should            



   not be chewed            



   or crushed.            



   (Same as:            



   Protonix)            



               



               

 

 Cartia XT  240 mg, 1 cap,    Inactive        [facility]



   Route: PO, Drug            Medical



   form: ERCAP,            [place]



   Daily, Dosing            



   Weight 136.136,            



   kg, Start date:            



   17            



   9:00:00 CST,            



   Duration: 30            



   day, Stop date:            



   17            



   9:00:00            



   CSTNotes: (Same            



   as: Cardizem            



   CD)  Before            



   meals.  DO NOT            



   CRUSH.            



               

 

 Prednisone  10 mg, 1 tab,    Inactive        [facility]



   Route: PO, Drug            Medical



   form: TAB,            Center



   Daily, Dosing            



   Weight 136.136,            



   kg, Start date:            



   17            



   9:00:00 CST,            



   Duration: 30            



   day, Stop date:            



   17            



   9:00:00            



   CSTNotes: (Same            



   as: PredniSONE)            



    Take with            



   food.            



               

 

 Acetaminophen  1,000 mg=31.23    Active       [place]



   mL, PO, Q6Hnow,          2017  Medical



   0 Refill(s)            [place]



               



               

 

 gabapentin 50  300 mg=6 mL,    Active         [place]



 MG/ML Oral  PO, Q8Hnow, #          2017  Medical



 Solution  378 mL, 0            Center



   Refill(s)            



               



               

 

 tramadol  50 mg=1 tab,    Active       [place]



 hydrochloride 50  PO, Q6Hnow, PRN          2017  Medical



 MG Oral Tablet  Pain Score            Center



   6-10, X 14 day,            



   # 56 tab, 0            



   Refill(s)            



               



               

 

 Enoxaparin  30 mg, 0.3 mL,    Inactive       [place]



   Route: SUB-Q,          2017  Medical



   Drug form: INJ,            Center



   [id number],            



   Dosing Weight            



   136.136, kg,            



   Start date:            



   17            



   6:00:00 CST,            



   Duration: 30            



   day, Stop date:            



   17            



   18:00:00            



   CSTNotes: (Same            



   as: Lovenox)            



               



               

 

 Solu-CORTEF  100 mg, 2 mL,    Inactive       [place]



   Route: IVP,            Medical



   Drug form:            [place]



   PDR/INJ, ONCE,            



   Start date:            



   17            



   22:00:00 CST,            



   Stop date:            



   17            



   22:00:00            



   CSTNotes: (Same            



   as:            



   Solu-CORTEF)            



               



               

 

 Prograf  2.5 mg, 5 cap,    No Longer       [place]



   Route: PO, Drug    Active        Medical



   form: CAP,            Center



   [id number], Dosing            



   Weight 136.136,            



   kg, Start date:            



   17            



   21:26:00 CST,            



   Duration: 30            



   day, Stop date:            



   17            



   20:00:00            



   CSTNotes: Avoid            



   grapefruit and            



   grapefruit            



   juice. (Same            



   As: Prograf)            



               



               

 

 hydrocortisone 100  100 mg, Route:    Inactive         [place]



 mg injection  IV, Q6H, Dosing            Medical



   Weight 136.136,            Center



   kg, Start date:            



   17            



   21:00:00 CST,            



   Duration: 30            



   day, Stop date:            



   17            



   18:00:00 CST            



               



               

 

 Ondansetron  4 mg, 2 mL,    No Longer       [place]



   Route: IVP,    Active        Medical



   Drug form: INJ,            Center



   Q6H, Dosing            



   Weight 136.136,            



   kg, Start date:            



   17            



   18:00:00 CST,            



   Duration: 30            



   day, Stop date:            



   17            



   12:00:00            



   CSTNotes: (Same            



   as: Zofran)            



   *** MEDICATION            



   WASTE ***            



   Product Size:            



   4 mg Product            



   Wasted:  ___ mg            



               



               

 

 Acetaminophen  1,000 mg,    Inactive       [place]



   Route: IV,          2017  Medical



   ONCE, Dosing            Center



   Weight 136.136,            



   kg, Start date:            



   17            



   17:36:00 CST,            



   Stop date:            



   17            



   17:36:00 CST            



               



               

 

 Docusate  100 mg, 10 mL,    No Longer         [place]



   Route: PO, Drug    Active        Medical



   form: LIQ, BID,            Center



   Dosing Weight            



   136.136, kg,            



   Start date:            



   17            



   17:00:00 CST,            



   Stop date:            



   17            



   9:00:00            



   CSTNotes: (Same            



   as: Colace)            



               



               

 

 Cellcept  1,000 mg, 2    No Longer         [place]



   tab, Route: PO,    Active      2017  Medical



   Drug form: TAB,            Center



   [id number], Dosing            



   Weight 136.136,            



   kg, Start date:            



   17            



   17:00:00 CST,            



   Duration: 30            



   day, Stop date:            



   17            



   8:00:00            



   CSTNotes:            



   SEPARATE            



   ANTACIDS from            



   Cellcept by 2            



   hrs. (Same As:            



   CellCept)            



               



               

 

 Hydralazine  100 mg, 2 tab,    No Longer         [place]



 Hydrochloride 100  Route: PO, Drug    Active      2017  Medical



 MG Oral Tablet  form: TAB, TID,            Center



   Dosing Weight            



   136.136, kg,            



   Start date:            



   17            



   17:00:00 CST,            



   Duration: 30            



   day, Stop date:            



   17            



   13:00:00            



   CSTNotes: (Same            



   as: Apresoline)            



    May interfere            



   w/enteral            



   feedings  Take            



   With Food            



               



               

 

 Al hydroxide/Mg  30 mL, Route:    No Longer       [place]



 hydroxide/simethic  PO, Drug Form:    Active      2017  Medical



 one 200 mg-200  SUSP, Dosing            Center



 mg-20 mg/5 mL oral  Weight 136.136,            



 suspension  kg, Q4H, PRN            



   Indigestion,            



   Start date:            



   17            



   15:00:00 CST,            



   Duration: 30            



   day, Stop date:            



   17            



   14:59:00            



   CSTNotes:            



   (aluminum            



   hydroxide-magne            



   sium            



   hyd-simethicone            



   [id number]/5m            



   l 30 ml ud TINY)            



               



               

 

 Ondansetron  4 mg, 2 mL,    No Longer       [place]



   Route: IVP,    Active        Medical



   Drug form: INJ,            Center



   Q6H, Dosing            



   Weight 136.136,            



   kg, PRN Nausea            



   & Vomiting,            



   Start date:            



   17            



   15:00:00 CST,            



   Duration: 30            



   day, Stop date:            



   17            



   14:59:00            



   CSTNotes: (Same            



   as: Zofran)            



   *** MEDICATION            



   WASTE ***            



   Product Size:            



   4 mg Product            



   Wasted:  ___ mg            



               



               

 

 Hydromorphone  0.5 mg, 0.25    No Longer       [place]



   mL, Route: IVP,    Active        Medical



   Drug form: INJ,            Center



   Q4H, Dosing            



   Weight 136.136,            



   kg, PRN Pain            



   Score 7-10,            



   Start date:            



   17            



   15:00:00 CST,            



   Duration: 30            



   day, Stop date:            



   17            



   14:59:00            



   CSTNotes: Same            



   as Dilaudid            



               



               

 

 Oxycodone  10 mg, 10 mL,    No Longer       [place]



 Hydrochloride 5 MG  Route: PO, Drug    Active        Medical



 Oral Tablet  form: SOLN,            Center



   Q4H, Dosing            



   Weight 136.136,            



   kg, PRN Pain            



   Score 7-10,            



   Start date:            



   17            



   15:00:00 CST,            



   Stop date:            



   17            



   14:59:00            



   CSTNotes: (Same            



   as:'Roxicodone)            



   To be drawn up            



   in 3 mL syr            



               



               

 

 gabapentin  300 mg, Route:    Inactive       [place]



   PO, Q8Hnow,            Medical



   Dosing Weight            Center



   136.136, kg,            



   Start date:            



   17            



   15:00:00 CST,            



   Duration: 30            



   day, Stop date:            



   17            



   7:00:00 CST            



               



               

 

 Tramadol  100 mg, 2 tab,    No Longer       [place]



   Route: PO, Drug    Active        Medical



   form: TAB,            Center



   Q6Hnow, Dosing            



   Weight 136.136,            



   kg, Start date:            



   17            



   15:00:00 CST,            



   Duration: 30            



   day, Stop date:            



   17            



   9:00:00            



   CSTNotes: Not            



   to exceed            



   400mg/day.            



   (Same As:            



   Ultram)            



               

 

 Acetaminophen  1,000 mg,    Inactive       [place]



   Route: IVPB,          2017  Medical



   Q6Hnow, Dosing            Center



   Weight 136.136,            



   kg, Start date:            



   17            



   15:00:00 CST,            



   Duration: 30            



   day, Stop date:            



   17            



   9:00:00 CST            



               



               

 

 Al hydroxide/Mg  30 ml, Route:    Inactive       [place]



 hydroxide/simethic  PO, Drug Form:          2017  Medical



 one 200 mg-200  SUSP, Dosing            Center



 mg-20 mg/5 mL oral  Weight 136.136,            



 suspension  kg, Q6H, PRN            



   Indigestion,            



   Start date:            



   17            



   14:54:00 CST,            



   Duration: 30            



   day, Stop date:            



   17            



   14:53:00 CST            



               



               

 

 Calcium Chloride  1,000 mL, Rate:    No Longer       [place]



 0.0014 MEQ/ML /  125 ml/hr,    Active        Medical



 Potassium Chloride  Infuse over: 8            Center



 0.004 MEQ/ML /  hr, Route: IV,            



 Sodium Chloride  Dosing Weight            



 0.103 MEQ/ML /  136.136 kg,            



 Sodium Lactate  Total Volume:            



 0.028 MEQ/ML  1,000, Start            



 Injectable  date: 17            



 Solution  14:54:00 CST,            



   Duration: 30            



   day, Stop date:            



   17            



   14:53:00 CST            



               



               

 

 Cefoxitin  1 gm, Route:    Inactive       [place]



   IVPB, ONCE,          2017  Medical



   Dosing Weight            Center



   136.136, kg,            



   Start date:            



   17            



   14:10:00 CST,            



   Stop date:            



   17            



   14:10:00 CST            



               



               

 

 gabapentin  300 mg, 6 mL,    No Longer        [facility]



   Route: PO, Drug    Active        Medical



   form: SOLN,            Center



   Q8Hnow, Dosing            



   Weight 136.136,            



   kg, Start date:            



   17            



   13:00:00 CST,            



   Stop date:            



   17            



   5:00:00            



   CSTNotes: (Same            



   as: Neurontin)            



               



               

 

 Acetaminophen  1,000 mg, 31.23    No Longer       [place]



   mL, Route: PO,    Active        Medical



   Drug form: LIQ,            Center



   Q6Hnow, Dosing            



   Weight 136.136,            



   kg, Start date:            



   17            



   13:00:00 CST,            



   Stop date:            



   17            



   7:00:00            



   CSTNotes: Max            



   acetaminophen=4            



   000mg/day (4            



   gm/day).  (Same            



   as: Tylenol)            



               



               

 

 Promethazine  12.5 mg, 0.5    No Longer       [place]



   mL, Route: IM,    Active        Medical



   Drug form: INJ,            Center



   Q6H, Dosing            



   Weight 136.136,            



   kg, PRN Nausea            



   & Vomiting,            



   Start date:            



   17            



   12:26:00 CST,            



   Duration: 30            



   day, Stop date:            



   17            



   12:25:00            



   CSTNotes: Do            



   not give IV            



   push.  (Same            



   as: Phenergan)            



               



               

 

 Promethazine  12.5 mg, Route:    Inactive       [place]



   PO, Q6H, Dosing            Medical



   Weight 136.136,            Center



   kg, PRN Nausea            



   & Vomiting,            



   Start date:            



   17            



   12:24:00 CST,            



   Duration: 30            



   day, Stop date:            



   17            



   12:23:00 CST            



               



               

 

 Tramadol  50 mg, 1 tab,    No Longer       [place]



   Route: PO, Drug    Active        Medical



   form: TAB,            Center



   Q6Hnow, Dosing            



   Weight 136.136,            



   kg, PRN Pain            



   Score 6-10,            



   Start date:            



   17            



   12:24:00 CST,            



   Duration: 30            



   day, Stop date:            



   17            



   12:23:00            



   CSTNotes: Not            



   to exceed            



   400mg/day.            



   (Same As:            



   Ultram)            



               

 

 bupivacaine  20 mL, Route:    No Longer       [place]



 liposome  InFILtration(lo    Active        Medical



   lizabeth), Drug            Center



   Form: INJ,            



   ONCALL, Start            



   date: 17            



   12:00:00 CST,            



   Duration: 1            



   day, Stop date:            



   17            



   11:59:00            



   CSTNotes: (Same            



   as: Exparel)            



   *** NOT FOR IV            



   use****            



   Postoperative            



   analgesia:            



   Infiltration            



   (local): Dose            



   is based on            



   surgical site            



   and volume            



   required to            



   cover the area            



   (in general,            



   the maximum            



   total dose is            



   266 mg).            



   Bunionectomy: 7            



   mL into the            



   tissues            



   surrounding the            



   osteotomy and 1            



   mL into the            



   subcutaneous            



   tissue of the            



   surgical site            



   (total dose=8            



   mL [106 mg])            



   Hemorrhoidectom            



   y: 30 mL (20 mL            



   vial diluted            



   with 10 mL NS)            



   divided and            



   administered as            



   6 injections of            



   5 mL each            



   (total dose=30            



   mL [266 mg])            



               



               

 

 Lovenox  40 mg, 0.4 mL,    Inactive        [facility]



   Route: SUB-Q,            Medical



   Drug form: INJ,            Center



   ONCALL, Start            



   date: 17            



   11:00:00 CST,            



   Duration: 1            



   day, Stop date:            



   17            



   10:59:00            



   CSTNotes: (Same            



   as: Lovenox)            



               



               

 

 scopolamine  1 patch, Route:    Inactive      /  [facility] [place]



   TOP, Drug form:          2017  Medical



   ERFILM, PRE OP,            Center



   Start date:            



   17            



   6:00:00 CST,            



   Duration: 1            



   day, Stop date:            



   17            



   5:59:00            



   CSTNotes:            



   Change patch            



   every 72 hours            



    (Same as:            



   Transderm-Scop)            



               



               

 

 heparin  5,000 unit, 1    Inactive      [facility]



   mL, Route:          2017  Medical



   SUB-Q, Drug            Center



   form: INJ, PRE            



   OP, Start date:            



   17            



   6:00:00 CST,            



   Duration: 1            



   day, Stop date:            



   17            



   5:59:00            



   CSTNotes:            



   porcine heparin            



               



               

 

 Lovenox  40 mg, 0.4 mL,    Inactive      [facility]



   Route: SUB-Q,            Medical



   Drug form: INJ,            [place]



   [facility]            



   date: 17            



   6:00:00 CST,            



   Duration: 1            



   day, Stop date:            



   17            



   5:59:00            



   CSTNotes: (Same            



   as: Lovenox)            



               



               

 

 Mefoxin  2 gm, Route:    Inactive      [facility]



   IVPB, Drug            Medical



   form: INJ, PRE            Center



   OP, Priority:            



   STAT, Start            



   date: 17            



   5:37:00 CST,            



   Duration: 1            



   day, Stop date:            



   17            



   5:36:00            



   CSTNotes: (Same            



   As: Mefoxin)            



   *** MEDICATION            



   WASTE ***            



   Product Size:            



   2000 mg Product            



   Wasted:  ___ mg            



               



               

 

 Ofirmev  1,000 mg, 100    Inactive        [facility]



   mL, Route: IV,          2017  Medical



   Drug form: INJ,            Center



   PRE OP,            



   Priority: STAT,            



   Start date:            



   17            



   5:36:00 CST,            



   Duration: 1            



   day, Stop date:            



   17            



   5:35:00            



   CSTNotes:            



   Infuse over 15            



   minutes  Do not            



   exceed 4gm/day            



   of            



   acetaminophen            



    *** MEDICATION            



   WASTE ***            



   Product Size:            



   1000 mg Product            



   Wasted:  ___ mg            



               



               

 

 Ofirmev  1,000 mg, 100    Inactive       [place]



   mL, Route: IV,          2017  Medical



   Drug form: INJ,            Center



   PRE OP,            



   Priority: STAT,            



   Start date:            



   17            



   5:35:00 CST,            



   Duration: 1            



   day, Stop date:            



   17            



   5:34:00            



   CSTNotes:            



   Infuse over 15            



   minutes  Do not            



   exceed 4gm/day            



   of            



   acetaminophen            



    *** MEDICATION            



   WASTE ***            



   Product Size:            



   1000 mg Product            



   Wasted:  ___ mg            



               



               

 

 Pravastatin Sodium  40 mg=1 tab,    Active        [facility] [place]



 40 MG Oral Tablet  PO, Bedtime, #          2017  Medical



 [Pravachol]  30 tab, 0            Center



   Refill(s)            



               



               

 

 Hydralazine  100 mg=1 tab,    Active       [place]



 Hydrochloride 100  PO, TID, # 90            Medical



 MG Oral Tablet  tab, 3            Center



   Refill(s)            



               



               

 

 Hydralazine  0 Refill(s)    No Longer        [facility] [place]



       Active      2017  Medical



               [place]



               



               

 

 mycophenolate  1,000 mg=2 tab,    Active        [facility] [place]



 mofetil 500 MG  PO, BID, # 120          2017  Medical



 Oral Tablet  tab, 0            Center



 [Cellcept]  Refill(s)            



               



               

 

 pantoprazole 40 MG  40 mg=1 tab,    Active        [facility]



 Enteric Coated  PO, BID, # 30          2017  Medical



 Tablet [Protonix]  tab, 0            Center



   Refill(s)            



               



               

 

 24 HR Diltiazem  240 mg=1 cap,    Active        [facility] [place]



 Hydrochloride 240  PO, Daily, # 30          2017  Medical



 MG Extended  cap, 0            Center



 Release Capsule  Refill(s)            



 [Cartia]              



               

 

 magnesium oxide  400 mg=1 tab,    Active        [facility] [place]



 400 mg oral tablet  PO, Daily, 0          2017  Medical



   Refill(s)            Center



               



               

 

 calcium carbonate  CHEW, BID, 0    No Longer       [place]



 1000 mg oral  Refill(s)    Active      2017  Medical



 tablet, chewable              [place]



               



               

 

 predniSONE 10 mg  10 mg=1 tab,    Active        [facility]



 oral tablet  PO, Daily, # 30          2017  Medical



   tab, 3            Center



   Refill(s)            



               



               

 

 Prograf  2.5 mg, PO,    Active       [place]



   Q12H, 0            Medical



   Refill(s)            Center



               



               

 

 Sulfamethoxazole  1 tab, PO,    No Longer       [place]



 800 MG /  M-W-F, 0    Active        Medical



 Trimethoprim 160  Refill(s)            Center



 MG Oral Tablet              



 [Bactrim]              



               







Allergies, Adverse Reactions, Alerts







 Substance  Category  Reaction  Severity  Reaction  Status  Date  Comments  
Source



         type    Reported    



                 



                 







Immunizations







 Immunization  Date Given  Site  Status  Last Updated  Comments  Source



             



             







Results







 Order Name  Results  Value  Reference  Date  Interpretation  Comments  Source



       Range        



               



               



               

 

 ELECTROLYTE  AGAP  15.3 meq/L  10.0 -        [facility]



 S      20.0        [facility]



               



               



               

 

 ELECTROLYTE  eGFR  91        Result Comment: The eGFR is calculated using 
the CKD-EPI formula. In most young, healthy individuals the eGFR will be >90 mL/
min/1.73m2. The eGFR declines with age. An eGFR of 60-89 may be normal in  [facility] 
[place]



 S    mL/min/1.73        some populations, particularly the elderly, for 
whom the CKD-EPI formula has not been extensively validated. Use of the eGFR is 
not recommended in the following populations:  [address]



             Individuals with unstable creatinine concentrations, including 
pregnant patients and those with serious co-morbid conditions.  



               



             Patients with extremes in muscle mass or diet.  



               



             The data above are obtained from the National Kidney Disease 
Education Program (NKDEP) which additionally recommends that when the eGFR is 
used in patients with extremes of body mass index for purposes  



             of drug dosing, the eGFR should be multiplied by the estimated 
BMI.  

 

 ELECTROLYTE  BUN  13 mg/dL  7 - 22        [facility]



 S        /[address] [place] [address]



               



               



               

 

 ELECTROLYTE  Creatinine  0.98 mg/dL  0.50 -        [facility]  Lvl    1.40        [facility]



               



               



               

 

 ELECTROLYTE  Sodium Lvl  144 meq/L  135 - 145        [facility]              [facility]



               



               



               

 

 ELECTROLYTE  Potassium  4.3 meq/L  3.5 - 5.1        [facility]      /      [facility]



               



               



               

 

 ELECTROLYTE  Chloride Lvl  105 meq/L  95 - 109        [facility]        [address] [place] [address]



               



               



               

 

 ELECTROLYTE  Calcium Lvl  9.1 mg/dL  8.5 - 10.5        [facility]              [facility]



               



               



               

 

 ELECTROLYTE  CO2  28 meq/L  24 - 32        [facility]        [address] [place] [address]



               



               



               

 

 ELECTROLYTE  Glucose Lvl  77 mg/dL  70 - 99        [facility]      [facility]



               



               



               

 

 HEMATOLOGY  Lymphocytes  0.8 K/CMM  1.0 - 5.5         [place]



   #      /      [facility]



               



               



               

 

 HEMATOLOGY  Segs-Bands #  2.8 K/CMM  1.5 - 8.1         [place]



         /2017      [facility]



               



               



               

 

 HEMATOLOGY  Monocytes #  0.4 K/CMM  0.0 - 0.8         [place]



         /2017      [facility]



               



               



               

 

 HEMATOLOGY  Eosinophils  0.1 K/CMM  0.0 - 0.5         [place]



   #            [facility]



               



               



               

 

 HEMATOLOGY  Eosinophils  2.4 %  0.0 - 4.0         [place]



         /2017      [facility]



               



               



               

 

 HEMATOLOGY  Basophils  0.7 %  0.0 - 1.0         [place]



         /2017      [facility]



               



               



               

 

 HEMATOLOGY  Monocytes  10.7 %  2.0 - 12.0         [place]



         /2017      [facility]



               



               



               

 

 HEMATOLOGY  Lymphocytes  19.7 %  20.0 -         [place]



       40.0        [facility]



               



               



               

 

 HEMATOLOGY  Segs  66.5 %  45.0 -         [place]



       75.0        [facility]



               



               



               

 

 HEMATOLOGY  MPV  8.5 fL  7.4 - 10.4         [place]



         /2017      [facility]



               



               



               

 

 HEMATOLOGY  Platelet  133 K/CMM  133 - 450         [place]



         /2017      [facility]



               



               



               

 

 HEMATOLOGY  RDW  13.9 %  11.5 -         [place]



       14.5        [facility]



               



               



               

 

 HEMATOLOGY  MCHC  33.4 g/dL  32.0 -         [place]



       36.0        [facility]



               



               



               

 

 HEMATOLOGY  MCH  28.7 pg  27.0 -         [place]



       31.0        [facility]



               



               



               

 

 HEMATOLOGY  MCV  85.8 fL  80.0 -        [facility]



       94.0        [facility]



               



               



               

 

 HEMATOLOGY  Hct  44.0 %  42.0 -         [place]



       54.0        [facility]



               



               



               

 

 HEMATOLOGY  Hgb  14.7 g/dL  14.0 -         [place]



       18.0        [facility]



               



               



               

 

 HEMATOLOGY  RBC  5.13 M/CMM  4.70 -         [place]



       6.10        [facility]



               



               



               

 

 HEMATOLOGY  WBC  4.1 K/CMM  3.7 - 10.4         [place]



         /2017      [facility]



               



               



               

 

 CHEM PANEL  Phosphorus  3.6 mg/dL  2.5 - 4.5         [place]



         /2017      [facility]



               



               



               

 

 CHEM PANEL  Magnesium  2.3 mg/dL  1.8 - 2.4        [facility]



   Lvl            [facility]



               



               



               

 

 ELECTROLYTE  AGAP  12.6 meq/L  10.0 -  01/26      [facility]



 S      20.0        [facility]



               



               



               

 

 ELECTROLYTE  eGFR  55        Result Comment: The eGFR is calculated using 
the CKD-EPI formula. In most young, healthy individuals the eGFR will be >90 mL/
min/1.73m2. The eGFR declines with age. An eGFR of 60-89 may be normal in  [facility] 
[place]



 S    mL/min/1.73    /    some populations, particularly the elderly, for 
whom the CKD-EPI formula has not been extensively validated. Use of the eGFR is 
not recommended in the following populations:  [address]



             Individuals with unstable creatinine concentrations, including 
pregnant patients and those with serious co-morbid conditions.  



               



             Patients with extremes in muscle mass or diet.  



               



             The data above are obtained from the National Kidney Disease 
Education Program (NKDEP) which additionally recommends that when the eGFR is 
used in patients with extremes of body mass index for purposes  



             of drug dosing, the eGFR should be multiplied by the estimated 
BMI.  

 

 ELECTROLYTE  Calcium Lvl  8.6 mg/dL  8.5 - 10.5        [address]



               



               



               

 

 ELECTROLYTE  CO2  23 meq/L  24 - 32        [address]



               



               



               

 

 ELECTROLYTE  Chloride Lvl  102 meq/L  95 - 109        [address]



               



               



               

 

 ELECTROLYTE  Potassium  4.6 meq/L  3.5 - 5.1        [facility]            [facility]



               



               



               

 

 ELECTROLYTE  Sodium Lvl  133 meq/L  135 - 145        [address]



               



               



               

 

 ELECTROLYTE  Creatinine  1.49 mg/dL  0.50 -        [facility]  Lvl    1.40        [facility]



               



               



               

 

 ELECTROLYTE  BUN  26 mg/dL  7 - 22        [address]



               



               



               

 

 ELECTROLYTE  Glucose Lvl  151 mg/dL  70 - 99        [address]



               



               



               

 

 HEMATOLOGY  Lymphocytes  0.6 K/CMM  1.0 - 5.5        [facility]      [facility]



               



               



               

 

 HEMATOLOGY  Monocytes #  0.5 K/CMM  0.0 - 0.8        [address]



               



               



               

 

 HEMATOLOGY  Segs-Bands #  8.3 K/CMM  1.5 - 8.1        [address]



               



               



               

 

 HEMATOLOGY  Monocytes  5.1 %  2.0 - 12.0        [address]



               



               



               

 

 HEMATOLOGY  Segs  88.1 %  45.0 -        [facility]



       75.0        [facility]



               



               



               

 

 HEMATOLOGY  Lymphocytes  6.6 %  20.0 -         [place]



       40.0        [facility]



               



               



               

 

 HEMATOLOGY  Basophils  0.2 %  0.0 - 1.0         [place]



         /2017      [facility]



               



               



               

 

 HEMATOLOGY  MCH  28.5 pg  27.0 -         [place]



       31.0        [facility]



               



               



               

 

 HEMATOLOGY  Platelet  158 K/CMM  133 - 450         [place]



         /2017      [facility]



               



               



               

 

 HEMATOLOGY  MCHC  33.6 g/dL  32.0 -         [place]



       36.0        [facility]



               



               



               

 

 HEMATOLOGY  RDW  14.1 %  11.5 -        [facility]



       14.      [facility]



               



               



               

 

 HEMATOLOGY  MCV  84.9 fL  80.0 -         [place]



       94.0        [facility]



               



               



               

 

 HEMATOLOGY  RBC  5.28 M/CMM  4.70 -        [facility]



       6.10        [facility]



               



               



               

 

 HEMATOLOGY  Hgb  15.0 g/dL  14.0 -        [facility]



       18.0        [facility]



               



               



               

 

 HEMATOLOGY  WBC  9.4 K/CMM  3.7 - 10.4         [place]



         /2017      [facility]



               



               



               

 

 HEMATOLOGY  Hct  44.8 %  42.0 -        [facility]



       54.0        [facility]



               



               



               

 

 HEMATOLOGY  MPV  8.2 fL  7.4 - 10.4         [place]



         /2017      [facility]



               



               



               

 

 PARATHYROID  Ca Ion WB  1.08 mMol/L  1.05 -        [facility]



 PROFILE      1.      [facility]



               



               



               

 

 PARATHYROID  Ca Norm WB  1.05 mMol/L  1. -        [facility]



 PROFILE      .      [facility]



               



               



               

 

 Upper GI  Upper GI  INDICATION: Status post gastric sleeve procedure.    
    -  [facility]



 Series w  Series     -  Medical



 water  water            Center



 soluble DX  soluble DX            



     PROCEDURE: A  view was obtained and images were made after the 
patient swallowed 20 mL of water-soluble contrast. A 2nd group of images were 
made after the patient swallowed a 2nd bolus of 20 mL of        Read by:  
Barrington Victor MD  



      water-soluble contrast. Right and left anterior oblique images were also 
made.        Dictated Date/time:  17 11:24  



             Electronically Signed by:  Barrington Victor MD                  
   17 11:50  



             FINAL REPORT  



               



     FINDINGS: The  view demonstrates 3 sternal wires. The bowel gas 
pattern is nonobstructive. There are streaky, linear densities in both lungs, 
left greater than right. A focal rounded density is pro          



     jected over the upper liver silhouette between the 90 10th posterior ribs.
          



               



               



               



     The initial AP film after 20 mL of contrast shows retention of contrast 
within the distal esophagus and minimal contrast within the gastric lumen. The 
images made after the 2nd 20 mL bolus shows that th          



     ere still some retained contrast within the esophagus. Contrast has passed 
through the stomach and is present within the 3rd portion of the duodenum. 
Oblique views demonstrate the same type findings. Th          



     ere was less contrast in the distal esophagus on the last image. There was 
no evidence for extravasation of contrast outside the gastrointestinal tract.  
        



               



               



               



     IMPRESSION:          



               



     1. Findings consistent with gastric sleeve procedure. There is no evidence 
for extravasation.          



               



               



               



               

 

 BLOOD BANK  Antibody  Negative          [facility]



 RESULTS  Scr      [facility]



     [facility]



               



               



               

 

 BLOOD BANK  ABO/Rh  O NEG          [facility]              [facility]



               



               



               

 

 CHEM PANEL  A/G Ratio  1.6  0.7 - 1.6         [place]



         /2017      [facility]



               



               



               

 

 CHEM PANEL  Globulin  2.6 g/dL  2.7 - 4.2        [facility]



               [facility]



               



               



               

 

 CHEM PANEL  B/C Ratio  20  6 - 25        [facility]      [facility]



               



               



               

 

 CHEM PANEL  AGAP  15.4 meq/L  10.0 -        [facility]



       20.0        [facility]



               



               



               

 

 CHEM PANEL  eGFR  67        Result Comment: The eGFR is calculated using 
the CKD-EPI formula. In most young, healthy individuals the eGFR will be >90 mL/
min/1.73m2. The eGFR declines with age. An eGFR of 60-89 may be normal in  [facility] 
[place]



     mL/min/1.73        some populations, particularly the elderly, for 
whom the CKD-EPI formula has not been extensively validated. Use of the eGFR is 
not recommended in the following populations:  [address]



             Individuals with unstable creatinine concentrations, including 
pregnant patients and those with serious co-morbid conditions.  



               



             Patients with extremes in muscle mass or diet.  



               



             The data above are obtained from the National Kidney Disease 
Education Program (NKDEP) which additionally recommends that when the eGFR is 
used in patients with extremes of body mass index for purposes  



             of drug dosing, the eGFR should be multiplied by the estimated 
BMI.  

 

 CHEM PANEL  CO2  24 meq/L  24 - 32        [facility]



               [facility]



               



               



               

 

 CHEM PANEL  Calcium Lvl  8.8 mg/dL  8.5 - 10.5         [place]



         /2017      [facility]



               



               



               

 

 CHEM PANEL  Total  6.8 g/dL  6.4 - 8.4         [place]



   Protein            [facility]



               



               



               

 

 CHEM PANEL  ALT  25 unit/L  0 - 65         [place]



         /2017      [facility]



               



               



               

 

 CHEM PANEL  Albumin Lvl  4.2 g/dL  3.5 - 5.0         [place]



         /2017      [facility]



               



               



               

 

 CHEM PANEL  AST  12 unit/L  0 - 37         [place]



         /2017      [facility]



               



               



               

 

 CHEM PANEL  Alk Phos  83 unit/L  39 - 136         [place]



         /2017      [facility]



               



               



               

 

 CHEM PANEL  Chloride Lvl  106 meq/L  95 - 109         [place]



         /2017      [facility]



               



               



               

 

 CHEM PANEL  Bili Total  0.5 mg/dL  0.2 - 1.3         [place]



         /2017      [facility]



               



               



               

 

 CHEM PANEL  Potassium  4.4 meq/L  3.5 - 5.1        [facility]            [facility]



               



               



               

 

 CHEM PANEL  Sodium Lvl  141 meq/L  135 - 145         [place]



         /2017      [facility]



               



               



               

 

 CHEM PANEL  Creatinine  1.26 mg/dL  0.50 -        [facility]



   Lvl    1.40        [facility]



               



               



               

 

 CHEM PANEL  BUN  25 mg/dL  7 - 22         [place]



         /2017      [facility]



               



               



               

 

 CHEM PANEL  Glucose Lvl  91 mg/dL  70 - 99         [place]



         /2017      [facility]



               



               



               

 

 HEMATOLOGY  RDW  14.2 %  11.5 -         [place]



       14.      [facility]



               



               



               

 

 HEMATOLOGY  Hct  45.7 %  42.0 -         [place]



       54.0        [facility]



               



               



               

 

 HEMATOLOGY  Platelet  149 K/CMM  133 - 450         [place]



         /2017      [facility]



               



               



               

 

 HEMATOLOGY  MPV  8.4 fL  7.4 - 10.4         [place]



         /2017      [facility]



               



               



               

 

 HEMATOLOGY  MCV  82.9 fL  80.0 -         [place]



       94.0        [facility]



               



               



               

 

 HEMATOLOGY  MCHC  34.2 g/dL  32.0 -         [place]



       36.0        [facility]



               



               



               

 

 HEMATOLOGY  MCH  28.3 pg  27.0 -         [place]



       31.0        [facility]



               



               



               

 

 HEMATOLOGY  WBC  6.1 K/CMM  3.7 - 10.4         [place]



         /2017      [facility]



               



               



               

 

 HEMATOLOGY  Hgb  15.6 g/dL  14.0 -         [place]



       18.0        [facility]



               



               



               

 

 HEMATOLOGY  RBC  5.52 M/CMM  4.70 -         [place]



       6.10        [facility]



               



               



               

 

 HEMATOLOGY  Basophils  0.7 %  0.0 - 1.0        [facility]



               [facility]



               



               



               

 

 HEMATOLOGY  Segs-Bands #  4.4 K/CMM  1.5 - 8.1        [facility]



               [facility]



               



               



               

 

 HEMATOLOGY  Monocytes #  0.6 K/CMM  0.0 - 0.8         [place]



         /2017      [facility]



               



               



               

 

 HEMATOLOGY  Lymphocytes  1.1 K/CMM  1.0 - 5.5        [facility]



   #      /2017      [facility]



               



               



               

 

 HEMATOLOGY  Eosinophils  0.1 K/CMM  0.0 - 0.5        [facility]



         [facility]



               



               



               

 

 HEMATOLOGY  Eosinophils  0.9 %  0.0 - 4.0        [facility]



               [facility]



               



               



               

 

 HEMATOLOGY  Lymphocytes  17.6 %  20.0 -         [place]



       40.0        [facility]



               



               



               

 

 HEMATOLOGY  Monocytes  9.3 %  2.0 - 12.0         [place]



         /2017      [facility]



               



               



               

 

 HEMATOLOGY  Segs  71.5 %  45.0 -         [place]



       75.0        [facility]



               



               



               

 

 SPECIAL  Hgb A1C  5.3 %  <=5.6 %        [facility]



 CHEMISTRY              [facility]



               



               



               







Vital Signs







 Vital Sign  Value  Date  Comments  Source



         

 

 Systolic (mm Hg)  133  2017    [facility]



         

 

 Diastolic (mm Hg)  75  2017    [facility]



         

 

 Respitory Rate  16  2017    [facility]



         

 

 Respitory Rate  14  2017    [facility]



         

 

 Systolic (mm Hg)  136  2017    [facility]



         

 

 Diastolic (mm Hg)  79  2017    [facility]



         

 

 Respitory Rate  12  2017    [facility]



         

 

 Systolic (mm Hg)  143  2017    [facility]



         

 

 Diastolic (mm Hg)  85  2017    [facility]



         

 

 Heart Rate  83  2017    [facility]



         

 

 Weight  102.273  2017    [facility]



         

 

 BMI Calculated  27.45  2017    [facility]



         

 

 Height  193.04 cm  2017    [facility]



         

 

 Temperature Oral (F)  98.0 F  2017    [facility]



         

 

 Systolic (mm Hg)  125  2017    [facility]



         

 

 Diastolic (mm Hg)  87  2017    [facility]



         

 

 Respitory Rate  19  2017    [facility]



         

 

 Heart Rate  97  2017    [facility]



         

 

 Heart Rate  98  2017    [facility]



         

 

 Respitory Rate  20  2017    [facility]



         

 

 Temperature Oral (F)  97.6 F  2017    [facility]



         

 

 Systolic (mm Hg)  147  2017    [facility]



         

 

 Diastolic (mm Hg)  95  2017    [facility]



         

 

 Respitory Rate  20  2017    [facility]



         

 

 Heart Rate  98  2017    [facility]



         

 

 Systolic (mm Hg)  144  2017    [facility]



         

 

 Diastolic (mm Hg)  97  2017    [facility]



         

 

 Temperature Oral (F)  98.3 F  2017    [facility]



         

 

 Weight  136.136  2017    [facility]



         

 

 Height  193.04 cm  2017    [facility]



         

 

 BMI Calculated  36.53  2017    [facility]



         

 

 Weight  136.136  2017    [facility]



         

 

 Height  193.04 cm  2017    [facility]



         

 

 BMI Calculated  36.53  2017    [facility]



         

 

 Weight  143.636  2017    [facility]



         

 

 BMI Calculated  38.55  2017    [facility]



         

 

 Height  193.04 cm  2017    [facility]



         







Encounters







 Location  Location  Encounter  Encounter  Reason  Attending  ADM  DC  Status  
Source



   Details  Type  Number  For  Provider  Date  Date    



         Visit          



                   



                   



                   

 

 [facility]  [id number]    Guido      [facility]          [place]amilah    [facility]



                   



                   



                   

 

 [facility]  [id number]    Guido      [facility]    Surgery      Kamilah    [facility]



                   



                   



                   







Procedures







 Procedure  Code  Date  Perfomer  Comments  Source



           



           

 

 Heart transplant  [id number]        [facility]



           

 

 Hernia repair  [id number]        [facility]



           

 

 Laparoscopic sleeve  [id number]        [facility]

## 2018-12-31 NOTE — ER
Nurse's Notes                                                                                     

 [facility]                                                                

Name: Gomez Romero                                                                               

Age: 48 yrs                                                                                       

Sex: Male                                                                                         

: 1970                                                                                   

MRN: [id number]                                                                                   

Arrival Date: 2018                                                                          

Time: 16:59                                                                                       

Account#: [id number]                                                                            

Bed Treatment                                                                                     

Private MD:                                                                                       

Diagnosis: Muscle spasm of back                                                                   

                                                                                                  

Presentation:                                                                                     

                                                                                             

17:21 Presenting complaint: Patient states: i slept on couch and then after that my back      hj  

      started hurting; lower back area; pain is 10/10; denies numbness and tingling on my         

      legs; took tramadol at noon time;. Transition of care: patient was not received from        

      another setting of care. Onset of symptoms was 2018. Risk Assessment: Do       

      you want to hurt yourself or someone else? Patient reports no desire to harm self or        

      others. Initial Sepsis Screen: Does the patient meet any 2 criteria? No. Patient's          

      initial sepsis screen is negative. Does the patient have a suspected source of              

      infection?. Care prior to arrival: None.                                                    

17:21 Method Of Arrival: Ambulatory                                                             

17:21 Acuity: MILTON 4                                                                           hj  

                                                                                                  

Triage Assessment:                                                                                

17:24 General: Appears in no apparent distress. uncomfortable, Behavior is calm, cooperative, hj  

      appropriate for age. Pain: Complains of pain in back Pain currently is 10 out of 10 on      

      a pain scale. Musculoskeletal: Circulation, motion, and sensation intact. Capillary         

      refill.                                                                                     

                                                                                                  

Historical:                                                                                       

- Allergies:                                                                                      

17:24 unknown rejection medication;                                                           hj  

- Home Meds:                                                                                      

17:24 pt unable to provide [Active];                                                          hj  

- PMHx:                                                                                           

17:24 pt denies;                                                                              hj  

- PSHx:                                                                                           

17:24 Hernia repair; cardiac stent; heart transplant;                                         hj  

                                                                                                  

- Immunization history:: Adult Immunizations up to date.                                          

- Social history:: Smoking status: Patient/guardian denies using tobacco,                         

  Patient/guardian denies using alcohol.                                                          

- Ebola Screening: : Patient negative for fever greater than or equal to 101.5 degrees            

  Fahrenheit, and additional compatible Ebola Virus Disease symptoms Patient denies               

  exposure to infectious person Patient denies travel to an Ebola-affected area in the            

  21 days before illness onset.                                                                   

- Family history:: not pertinent.                                                                 

- Hospitalizations: : No recent hospitalization is reported.                                      

                                                                                                  

                                                                                                  

Screenin:24 Abuse screen: Denies threats or abuse. Denies injuries from another. Nutritional        hj  

      screening: No deficits noted. Tuberculosis screening: No symptoms or risk factors           

      identified. Fall Risk None identified.                                                      

                                                                                                  

Assessment:                                                                                       

18:52 General: Appears in no apparent distress. uncomfortable. Pain: Complains of pain in     mg2 

      back Pain does not radiate. Pain currently is 8 out of 10 on a pain scale. Quality of       

      pain is described as aching, Pain began gradually, this morning Is intermittent. Neuro:     

      Level of Consciousness is awake, alert, obeys commands, Oriented to person, place,          

      time, situation. Cardiovascular: Capillary refill < 3 seconds Patient's skin is warm        

      and dry. Respiratory: Airway is patent Respiratory effort is even, unlabored,               

      Respiratory pattern is regular, symmetrical. GI: No deficits noted. : No deficits         

      noted. EENT: No signs and/or symptoms were reported regarding the EENT system. Derm:        

      Skin is intact, is healthy with good turgor, Skin is pink, warm \T\ dry. normal.            

      Musculoskeletal: Circulation, motion, and sensation intact. Capillary refill < 3            

      seconds, Reports pain in back since this morning. Pain is 8 out of 10 on a pain scale.      

19:31 Reassessment: patient improved..                                                        mg2 

                                                                                                  

Vital Signs:                                                                                      

17:24  / 88; Pulse 99; Resp 18; Temp 98.2(O); Pulse Ox 100% on R/A; Weight 102.06 kg;     

      Height 6 ft. 4 in. (193.04 cm); Pain 10/10;                                                 

19:30  / 80; Pulse 90; Resp 18; Pulse Ox 100% on R/A; Pain 2/10;                        mg2 

17:24 Body Mass Index 27.39 (102.06 kg, 193.04 cm)                                              

                                                                                                  

ED Course:                                                                                        

16:59 Patient arrived in ED.                                                                  [id number] 

17:23 Triage completed.                                                                         

17:24 Arm band placed on right wrist.                                                           

17:24 Patient has correct armband on for positive identification. Bed in low position. Call     

      light in reach. Side rails up X 1. Adult w/ patient.                                        

18:01 Ward Walker, EAN is Primary Nurse.                                                  mg2 

18:02 Lloyd Duff MD is Attending Physician.                                                rn  

18:53 No provider procedures requiring assistance completed. Inserted saline lock: 22 gauge   mg2 

      in left hand, using aseptic technique.                                                      

19:31 IV discontinued, intact, bleeding controlled, No redness/swelling at site. Pressure     mg2 

      dressing applied.                                                                           

                                                                                                  

Administered Medications:                                                                         

18:51 Drug: TORadol 30 mg Route: IVP; Site: left hand;                                        mg2 

19:27 Follow up: Response: No adverse reaction; Marked relief of symptoms                     mg2 

18:51 Drug: Decadron - Dexamethasone 10 mg Route: IVP; Site: left hand;                       mg2 

19:27 Follow up: Response: No adverse reaction; Marked relief of symptoms                     mg2 

18:51 Drug: Demerol 25 mg Route: IVP; Site: left hand;                                        mg2 

19:26 Follow up: Response: No adverse reaction; Marked relief of symptoms                     mg2 

                                                                                                  

                                                                                                  

Outcome:                                                                                          

19:13 Discharge ordered by MD.                                                                rn  

19:31 Discharged to home ambulatory, with crutches, with family.                              mg2 

19:31 Condition: good                                                                             

19:31 Discharge instructions given to patient, family, Instructed on discharge instructions,      

      follow up and referral plans. medication usage, Demonstrated understanding of               

      instructions, follow-up care, medications, Prescriptions given X 3.                         

19:32 Patient left the ED.                                                                    mg2 

                                                                                                  

Signatures:                                                                                       

Lloyd Duff MD MD rn Joaquin, Henry, RN RN hj Garcia, Rubi                                 4                                                  

Ward Walker RN RN   mg2                                                  

                                                                                                  

Corrections: (The following items were deleted from the chart)                                    

17:26 17:24 Pulse 99bpm; Resp 18bpm; Pulse Ox 100% RA; Temp 98.2F Oral; 102.06 kg; Height 6   hj  

      ft. 4 in.; BMI: 27.3; Pain 10/10; hj                                                        

                                                                                                  

**************************************************************************************************

## 2018-12-31 NOTE — XMS REPORT
Continuity of Care Document

 Created on:2017



Patient:TYRA BRUNO

Sex:Male

:1970

External Reference #:[id number]





Demographics







 Address  [address] [place] [id number]

 

 Phone  [phone]

 

 Phone  [phone]

 

 Preferred Language  Unknown

 

 Marital Status  Unknown

 

 Congregational Affiliation  Unknown

 

 Race  Unknown

 

 Ethnic Group  Unknown









Author







 Organization  [facility]









Problems







 Problem  Status  Onset  Classification  Date  Comments  Source



     Date    Reported    



             



             



             

 

 RIGHT RECURRENT  Active  20        [facility]



 INGUINAL HERNIA    17        Medical



 AND INCI            Center



             



             

 

 MORBID OBESITY  Active  10/14/20        [place] [address]        Medical



             Center



             



             

 

 Heart  Active    Problem  2017    [facility]



 transplanted            [facility]



             Center



             



             

 

 Hypertension  Active    Problem  2017    [facility]



             



             

 

 MI (<span  Resolved    Problem  2017    [facility] [place]



 ID="[id number]            Medical



 ">Confirmed</[facility]            Center



 n>)            



             

 

 Morbid obesity  Active    Problem  2017    [facility]



             



             

 

 Hiatal hernia  Active    Problem  2017    [facility]



             



             

 

 OBESITY,  Active          [facility] [place]



 UNSPECIFIED            [facility]



             Center



             



             







Medications







 Medication  Details  Route  Status  Patient  Ordering  Order  Source



         Instructions  Provider  Date  



               



               



               

 

 gabapentin 300 MG  300 mg, 1 cap,    Inactive        [facility] [place]



 Oral Capsule  Route: PO, Drug            Medical



   form: CAP,            Center



   ONCE, Dosing            



   Weight 102.273,            



   kg, Priority:            



   STAT, Start            



   date: 17            



   13:37:00 CDT,            



   Stop date:            



   17            



   13:37:00 CDT            



               



               

 

 Tramadol  100 mg, Route:    Inactive        [facility] [place]



   PO, Drug form:          2017  Medical



   TAB, ONCE,            Center



   Dosing Weight            



   102.273, kg,            



   Priority: STAT,            



   Start date:            



   17            



   13:36:00 CDT,            



   Stop date:            



   17            



   13:36:00 CDT            



               



               

 

 ketOROLAC (ANES)  IV, ONCE    Inactive        [address]



               Center



               



               

 

 ondansetron (ANES)  Route: IV, Drug    Inactive        [facility]



   form: INJ,          2017  Medical



   ONCE, Stop            Center



   date: 17            



   13:03:00 CDT            



               



               

 

 tramadol  50 mg=1 tab,    Active         [place]



 hydrochloride 50  PO, Q6H, PRN          2017  Medical



 MG Oral Tablet  Pain, X 10 day,            Center



   # 40 tab, 0            



   Refill(s)            



               



               

 

 Ondansetron  4 mg, Route:    Inactive        [facility] [place]



   IVP, Q6H,          2017  Medical



   Dosing Weight            Center



   102.273, kg,            



   Start date:            



   17            



   12:00:00 CDT,            



   Duration: 30            



   day, Stop date:            



   10/21/17            



   6:00:00 CDT            



               



               

 

 propofol (ANES)  Route: IV, Drug    Inactive        [facility]



   form: INJ,            Medical



   ONCE, Stop            Center



   date: 17            



   10:08:00 CDT            



               



               

 

 succinylcholine  Route: IV, Drug    Inactive         [place]



 (ANES)  form: INJ,            Medical



   ONCE, Stop            Center



   date: 17            



   10:08:00 CDT            



               



               

 

 fentaNYL (ANES)  Route: IV, Drug    Inactive        [facility]



   form: INJ,            Medical



   ONCE, Stop            Center



   date: 17            



   10:08:00 CDT            



               



               

 

 lidocaine (ANES)  Route: IV, Drug    Inactive        [facility]



   form: INJ,            Medical



   ONCE, Stop            Center



   date: 17            



   10:03:00 CDT            



               



               

 

 Acetaminophen  1,000 mg,    Inactive        [facility]



   Route: PO, Drug            Medical



   form: LIQ,            Center



   Q6Hnow, Dosing            



   Weight 102.273,            



   kg, Start date:            



   17            



   10:00:00 CDT,            



   Duration: 30            



   day, Stop date:            



   10/21/17            



   4:00:00 CDT            



               



               

 

 Tramadol  100 mg, Route:    Inactive        [facility] [place]



   PO, Drug form:          [facility]  Medical



   SUSP, Q6Hnow,            [place]



   Dosing Weight            



   102.273, kg,            



   Start date:            



   17            



   10:00:00 CDT,            



   Duration: 30            



   day, Stop date:            



   10/21/17            



   4:00:00 CDT            



               



               

 

 gabapentin  300 mg, Route:    Inactive        [facility] [place]



   PO, Drug form:          2017  Medical



   SUSP, Q8Hn,            Center



   Dosing Weight            



   102.273, kg,            



   Start date:            



   17            



   10:00:00 CDT,            



   Duration: 30            



   day, Stop date:            



   10/21/17            



   2:00:00 CDT            



               



               

 

 celecoxib  200 mg, Route:    Inactive        [facility]



   PO, [id number],          2017  Medical



   Dosing Weight            Center



   102.273, kg,            



   Start date:            



   17            



   10:00:00 CDT,            



   Duration: 30            



   day, Stop date:            



   10/20/17            



   22:00:00 CDT            



               



               

 

 rocuronium (ANES)  Route: IV, Drug    Inactive        [facility]



   form: INJ,            Medical



   ONCE, Stop            Center



   date: 17            



   9:43:00 CDT            



               



               

 

 famotidine (ANES)  Route: IV, Drug    Inactive        [facility]



   form: INJ,          2017  Medical



   ONCE, Stop            Center



   date: 17            



   9:33:00 CDT            



               



               

 

 ceFAZolin (ANES)  Route: IV, Drug    Inactive        [facility]



   form: INJ,            Medical



   ONCE, Stop            Center



   date: 17            



   9:33:00 CDT            



               



               

 

 dexamethasone  Route: IV, Drug    Inactive        [facility] [place]



 (ANES)  form: INJ,            Medical



   ONCE, Stop            Center



   date: 17            



   9:18:00 CDT            



               



               

 

 acetaminophen  Route: IV, Drug    Inactive        [facility] [place]



 (ANES) (ANES)  form: INJ,            Medical



   Start date:            Center



   17            



   8:54:00 CDT,            



   Stop date:            



   17            



   9:54:00 CDT            



               



               

 

 LR 1000 mL INJ  Route: IV,    Inactive        [facility] [place]



 (ANES)  Total Volume:          2017  Medical



   1,000, Start            Center



   date: 17            



   8:23:00 CDT,            



   Stop date:            



   17            



   9:23:00 CDT            



               



               

 

 Flomax  0.4 mg, 1 cap,    Inactive       [place]



   Route: PO, Drug            Medical



   form: CAP, PRE            Center



   OP, Start date:            



   17            



   5:00:00 CDT,            



   Duration: 1            



   doses or times,            



   Stop date:            



   17            



   23:00:00            



   CDTNotes: (Same            



   As: Flomax)            



   "Do Not Crush"            



               



               

 

 Ofirmev  1,000 mg, 100    No Longer        [facility]



   mL, Route: IV,    Active        Medical



   Drug form: INJ,            Center



   PRE OP, Start            



   date: 17            



   5:00:00 CDT,            



   Duration: 1            



   doses or times,            



   Stop date:            



   17            



   23:00:00            



   CDTNotes:            



   Infuse over 15            



   minutes  Do not            



   exceed 4gm/day            



   of            



   acetaminophen            



    *** MEDICATION            



   WASTE ***            



   Product Size:            



   1000 mg Product            



   Wasted:  ___ mg            



               



               

 

 ceFAZolin  2 gm, 100 mL,    Inactive        [facility]



   Route: IVPB,          2017  Medical



   Drug form: INJ,            Center



   PRE OP, Start            



   date: 17            



   5:00:00 CDT,            



   Duration: 1            



   doses or times,            



   Stop date:            



   17            



   23:00:00 CDT,            



   ABX Indication:            



   Surgical            



   ProphylaxisNote            



   s: Same as:            



   Ancef            



               

 

 Tacrolimus  2 mg, PO, QPM    Active       [place]



               Medical



               Center



               



               

 

 Sucralfate 100  10 mL, Route:    No Longer       [place]



 MG/ML Oral  PO, Drug form:    Active      2017  Medical



 Suspension  TAB, Q6H,            Center



   Dosing Weight            



   136.136, kg,            



   Start date:            



   17            



   18:00:00 CST,            



   Duration: 30            



   day, Stop date:            



   17            



   12:00:00            



   CSTNotes: May            



   interfere            



   w/enteral feeds            



   -  Take 1 hr            



   before or 2 hr            



   after antacids,            



   dairy pdt,            



   meals &            



   minerals -  On            



   empty stomach.            



   For patients            



   unable to            



   swallow tablet,            



   dissolve in            



   10mL - 30mL of            



   water or juice            



   and stir before            



   giving.  (Same            



   As: Carafate)            



               



               

 

 Protonix  40 mg, 1 tab,    Inactive       [place]



   Route: PO, Drug            Medical



   form: ECTAB,            [place]



   Daily, Dosing            



   Weight 136.136,            



   kg, Start date:            



   17            



   9:00:00 CST,            



   Stop date:            



   17            



   9:00:00            



   CSTNotes:            



   Tablet should            



   not be chewed            



   or crushed.            



   (Same as:            



   Protonix)            



               



               

 

 Cartia XT  240 mg, 1 cap,    Inactive        [facility]



   Route: PO, Drug            Medical



   form: ERCAP,            [place]



   Daily, Dosing            



   Weight 136.136,            



   kg, Start date:            



   17            



   9:00:00 CST,            



   Duration: 30            



   day, Stop date:            



   17            



   9:00:00            



   CSTNotes: (Same            



   as: Cardizem            



   CD)  Before            



   meals.  DO NOT            



   CRUSH.            



               

 

 Prednisone  10 mg, 1 tab,    Inactive        [facility]



   Route: PO, Drug            Medical



   form: TAB,            Center



   Daily, Dosing            



   Weight 136.136,            



   kg, Start date:            



   17            



   9:00:00 CST,            



   Duration: 30            



   day, Stop date:            



   17            



   9:00:00            



   CSTNotes: (Same            



   as: PredniSONE)            



    Take with            



   food.            



               

 

 Acetaminophen  1,000 mg=31.23    Active       [place]



   mL, PO, Q6Hnow,          2017  Medical



   0 Refill(s)            [place]



               



               

 

 gabapentin 50  300 mg=6 mL,    Active         [place]



 MG/ML Oral  PO, Q8Hnow, #          2017  Medical



 Solution  378 mL, 0            Center



   Refill(s)            



               



               

 

 tramadol  50 mg=1 tab,    Active       [place]



 hydrochloride 50  PO, Q6Hnow, PRN          2017  Medical



 MG Oral Tablet  Pain Score            Center



   6-10, X 14 day,            



   # 56 tab, 0            



   Refill(s)            



               



               

 

 Enoxaparin  30 mg, 0.3 mL,    Inactive       [place]



   Route: SUB-Q,          2017  Medical



   Drug form: INJ,            Center



   [id number],            



   Dosing Weight            



   136.136, kg,            



   Start date:            



   17            



   6:00:00 CST,            



   Duration: 30            



   day, Stop date:            



   17            



   18:00:00            



   CSTNotes: (Same            



   as: Lovenox)            



               



               

 

 Solu-CORTEF  100 mg, 2 mL,    Inactive       [place]



   Route: IVP,            Medical



   Drug form:            [place]



   PDR/INJ, ONCE,            



   Start date:            



   17            



   22:00:00 CST,            



   Stop date:            



   17            



   22:00:00            



   CSTNotes: (Same            



   as:            



   Solu-CORTEF)            



               



               

 

 Prograf  2.5 mg, 5 cap,    No Longer       [place]



   Route: PO, Drug    Active        Medical



   form: CAP,            Center



   [id number], Dosing            



   Weight 136.136,            



   kg, Start date:            



   17            



   21:26:00 CST,            



   Duration: 30            



   day, Stop date:            



   17            



   20:00:00            



   CSTNotes: Avoid            



   grapefruit and            



   grapefruit            



   juice. (Same            



   As: Prograf)            



               



               

 

 hydrocortisone 100  100 mg, Route:    Inactive         [place]



 mg injection  IV, Q6H, Dosing            Medical



   Weight 136.136,            Center



   kg, Start date:            



   17            



   21:00:00 CST,            



   Duration: 30            



   day, Stop date:            



   17            



   18:00:00 CST            



               



               

 

 Ondansetron  4 mg, 2 mL,    No Longer       [place]



   Route: IVP,    Active        Medical



   Drug form: INJ,            Center



   Q6H, Dosing            



   Weight 136.136,            



   kg, Start date:            



   17            



   18:00:00 CST,            



   Duration: 30            



   day, Stop date:            



   17            



   12:00:00            



   CSTNotes: (Same            



   as: Zofran)            



   *** MEDICATION            



   WASTE ***            



   Product Size:            



   4 mg Product            



   Wasted:  ___ mg            



               



               

 

 Acetaminophen  1,000 mg,    Inactive       [place]



   Route: IV,          2017  Medical



   ONCE, Dosing            Center



   Weight 136.136,            



   kg, Start date:            



   17            



   17:36:00 CST,            



   Stop date:            



   17            



   17:36:00 CST            



               



               

 

 Docusate  100 mg, 10 mL,    No Longer         [place]



   Route: PO, Drug    Active        Medical



   form: LIQ, BID,            Center



   Dosing Weight            



   136.136, kg,            



   Start date:            



   17            



   17:00:00 CST,            



   Stop date:            



   17            



   9:00:00            



   CSTNotes: (Same            



   as: Colace)            



               



               

 

 Cellcept  1,000 mg, 2    No Longer         [place]



   tab, Route: PO,    Active      2017  Medical



   Drug form: TAB,            Center



   [id number], Dosing            



   Weight 136.136,            



   kg, Start date:            



   17            



   17:00:00 CST,            



   Duration: 30            



   day, Stop date:            



   17            



   8:00:00            



   CSTNotes:            



   SEPARATE            



   ANTACIDS from            



   Cellcept by 2            



   hrs. (Same As:            



   CellCept)            



               



               

 

 Hydralazine  100 mg, 2 tab,    No Longer         [place]



 Hydrochloride 100  Route: PO, Drug    Active      2017  Medical



 MG Oral Tablet  form: TAB, TID,            Center



   Dosing Weight            



   136.136, kg,            



   Start date:            



   17            



   17:00:00 CST,            



   Duration: 30            



   day, Stop date:            



   17            



   13:00:00            



   CSTNotes: (Same            



   as: Apresoline)            



    May interfere            



   w/enteral            



   feedings  Take            



   With Food            



               



               

 

 Al hydroxide/Mg  30 mL, Route:    No Longer       [place]



 hydroxide/simethic  PO, Drug Form:    Active      2017  Medical



 one 200 mg-200  SUSP, Dosing            Center



 mg-20 mg/5 mL oral  Weight 136.136,            



 suspension  kg, Q4H, PRN            



   Indigestion,            



   Start date:            



   17            



   15:00:00 CST,            



   Duration: 30            



   day, Stop date:            



   17            



   14:59:00            



   CSTNotes:            



   (aluminum            



   hydroxide-magne            



   sium            



   hyd-simethicone            



   [id number]/5m            



   l 30 ml ud TINY)            



               



               

 

 Ondansetron  4 mg, 2 mL,    No Longer       [place]



   Route: IVP,    Active        Medical



   Drug form: INJ,            Center



   Q6H, Dosing            



   Weight 136.136,            



   kg, PRN Nausea            



   & Vomiting,            



   Start date:            



   17            



   15:00:00 CST,            



   Duration: 30            



   day, Stop date:            



   17            



   14:59:00            



   CSTNotes: (Same            



   as: Zofran)            



   *** MEDICATION            



   WASTE ***            



   Product Size:            



   4 mg Product            



   Wasted:  ___ mg            



               



               

 

 Hydromorphone  0.5 mg, 0.25    No Longer       [place]



   mL, Route: IVP,    Active        Medical



   Drug form: INJ,            Center



   Q4H, Dosing            



   Weight 136.136,            



   kg, PRN Pain            



   Score 7-10,            



   Start date:            



   17            



   15:00:00 CST,            



   Duration: 30            



   day, Stop date:            



   17            



   14:59:00            



   CSTNotes: Same            



   as Dilaudid            



               



               

 

 Oxycodone  10 mg, 10 mL,    No Longer       [place]



 Hydrochloride 5 MG  Route: PO, Drug    Active        Medical



 Oral Tablet  form: SOLN,            Center



   Q4H, Dosing            



   Weight 136.136,            



   kg, PRN Pain            



   Score 7-10,            



   Start date:            



   17            



   15:00:00 CST,            



   Stop date:            



   17            



   14:59:00            



   CSTNotes: (Same            



   as:'Roxicodone)            



   To be drawn up            



   in 3 mL syr            



               



               

 

 gabapentin  300 mg, Route:    Inactive       [place]



   PO, Q8Hnow,            Medical



   Dosing Weight            Center



   136.136, kg,            



   Start date:            



   17            



   15:00:00 CST,            



   Duration: 30            



   day, Stop date:            



   17            



   7:00:00 CST            



               



               

 

 Tramadol  100 mg, 2 tab,    No Longer       [place]



   Route: PO, Drug    Active        Medical



   form: TAB,            Center



   Q6Hnow, Dosing            



   Weight 136.136,            



   kg, Start date:            



   17            



   15:00:00 CST,            



   Duration: 30            



   day, Stop date:            



   17            



   9:00:00            



   CSTNotes: Not            



   to exceed            



   400mg/day.            



   (Same As:            



   Ultram)            



               

 

 Acetaminophen  1,000 mg,    Inactive       [place]



   Route: IVPB,          2017  Medical



   Q6Hnow, Dosing            Center



   Weight 136.136,            



   kg, Start date:            



   17            



   15:00:00 CST,            



   Duration: 30            



   day, Stop date:            



   17            



   9:00:00 CST            



               



               

 

 Al hydroxide/Mg  30 ml, Route:    Inactive       [place]



 hydroxide/simethic  PO, Drug Form:          2017  Medical



 one 200 mg-200  SUSP, Dosing            Center



 mg-20 mg/5 mL oral  Weight 136.136,            



 suspension  kg, Q6H, PRN            



   Indigestion,            



   Start date:            



   17            



   14:54:00 CST,            



   Duration: 30            



   day, Stop date:            



   17            



   14:53:00 CST            



               



               

 

 Calcium Chloride  1,000 mL, Rate:    No Longer       [place]



 0.0014 MEQ/ML /  125 ml/hr,    Active        Medical



 Potassium Chloride  Infuse over: 8            Center



 0.004 MEQ/ML /  hr, Route: IV,            



 Sodium Chloride  Dosing Weight            



 0.103 MEQ/ML /  136.136 kg,            



 Sodium Lactate  Total Volume:            



 0.028 MEQ/ML  1,000, Start            



 Injectable  date: 17            



 Solution  14:54:00 CST,            



   Duration: 30            



   day, Stop date:            



   17            



   14:53:00 CST            



               



               

 

 Cefoxitin  1 gm, Route:    Inactive       [place]



   IVPB, ONCE,          2017  Medical



   Dosing Weight            Center



   136.136, kg,            



   Start date:            



   17            



   14:10:00 CST,            



   Stop date:            



   17            



   14:10:00 CST            



               



               

 

 gabapentin  300 mg, 6 mL,    No Longer        [facility]



   Route: PO, Drug    Active        Medical



   form: SOLN,            Center



   Q8Hnow, Dosing            



   Weight 136.136,            



   kg, Start date:            



   17            



   13:00:00 CST,            



   Stop date:            



   17            



   5:00:00            



   CSTNotes: (Same            



   as: Neurontin)            



               



               

 

 Acetaminophen  1,000 mg, 31.23    No Longer       [place]



   mL, Route: PO,    Active        Medical



   Drug form: LIQ,            Center



   Q6Hnow, Dosing            



   Weight 136.136,            



   kg, Start date:            



   17            



   13:00:00 CST,            



   Stop date:            



   17            



   7:00:00            



   CSTNotes: Max            



   acetaminophen=4            



   000mg/day (4            



   gm/day).  (Same            



   as: Tylenol)            



               



               

 

 Promethazine  12.5 mg, 0.5    No Longer       [place]



   mL, Route: IM,    Active        Medical



   Drug form: INJ,            Center



   Q6H, Dosing            



   Weight 136.136,            



   kg, PRN Nausea            



   & Vomiting,            



   Start date:            



   17            



   12:26:00 CST,            



   Duration: 30            



   day, Stop date:            



   17            



   12:25:00            



   CSTNotes: Do            



   not give IV            



   push.  (Same            



   as: Phenergan)            



               



               

 

 Promethazine  12.5 mg, Route:    Inactive       [place]



   PO, Q6H, Dosing            Medical



   Weight 136.136,            Center



   kg, PRN Nausea            



   & Vomiting,            



   Start date:            



   17            



   12:24:00 CST,            



   Duration: 30            



   day, Stop date:            



   17            



   12:23:00 CST            



               



               

 

 Tramadol  50 mg, 1 tab,    No Longer       [place]



   Route: PO, Drug    Active        Medical



   form: TAB,            Center



   Q6Hnow, Dosing            



   Weight 136.136,            



   kg, PRN Pain            



   Score 6-10,            



   Start date:            



   17            



   12:24:00 CST,            



   Duration: 30            



   day, Stop date:            



   17            



   12:23:00            



   CSTNotes: Not            



   to exceed            



   400mg/day.            



   (Same As:            



   Ultram)            



               

 

 bupivacaine  20 mL, Route:    No Longer       [place]



 liposome  InFILtration(lo    Active        Medical



   lizabeth), Drug            Center



   Form: INJ,            



   ONCALL, Start            



   date: 17            



   12:00:00 CST,            



   Duration: 1            



   day, Stop date:            



   17            



   11:59:00            



   CSTNotes: (Same            



   as: Exparel)            



   *** NOT FOR IV            



   use****            



   Postoperative            



   analgesia:            



   Infiltration            



   (local): Dose            



   is based on            



   surgical site            



   and volume            



   required to            



   cover the area            



   (in general,            



   the maximum            



   total dose is            



   266 mg).            



   Bunionectomy: 7            



   mL into the            



   tissues            



   surrounding the            



   osteotomy and 1            



   mL into the            



   subcutaneous            



   tissue of the            



   surgical site            



   (total dose=8            



   mL [106 mg])            



   Hemorrhoidectom            



   y: 30 mL (20 mL            



   vial diluted            



   with 10 mL NS)            



   divided and            



   administered as            



   6 injections of            



   5 mL each            



   (total dose=30            



   mL [266 mg])            



               



               

 

 Lovenox  40 mg, 0.4 mL,    Inactive        [facility]



   Route: SUB-Q,            Medical



   Drug form: INJ,            Center



   ONCALL, Start            



   date: 17            



   11:00:00 CST,            



   Duration: 1            



   day, Stop date:            



   17            



   10:59:00            



   CSTNotes: (Same            



   as: Lovenox)            



               



               

 

 scopolamine  1 patch, Route:    Inactive      /  [facility] [place]



   TOP, Drug form:          2017  Medical



   ERFILM, PRE OP,            Center



   Start date:            



   17            



   6:00:00 CST,            



   Duration: 1            



   day, Stop date:            



   17            



   5:59:00            



   CSTNotes:            



   Change patch            



   every 72 hours            



    (Same as:            



   Transderm-Scop)            



               



               

 

 heparin  5,000 unit, 1    Inactive      [facility]



   mL, Route:          2017  Medical



   SUB-Q, Drug            Center



   form: INJ, PRE            



   OP, Start date:            



   17            



   6:00:00 CST,            



   Duration: 1            



   day, Stop date:            



   17            



   5:59:00            



   CSTNotes:            



   porcine heparin            



               



               

 

 Lovenox  40 mg, 0.4 mL,    Inactive      [facility]



   Route: SUB-Q,            Medical



   Drug form: INJ,            [place]



   [facility]            



   date: 17            



   6:00:00 CST,            



   Duration: 1            



   day, Stop date:            



   17            



   5:59:00            



   CSTNotes: (Same            



   as: Lovenox)            



               



               

 

 Mefoxin  2 gm, Route:    Inactive      [facility]



   IVPB, Drug            Medical



   form: INJ, PRE            Center



   OP, Priority:            



   STAT, Start            



   date: 17            



   5:37:00 CST,            



   Duration: 1            



   day, Stop date:            



   17            



   5:36:00            



   CSTNotes: (Same            



   As: Mefoxin)            



   *** MEDICATION            



   WASTE ***            



   Product Size:            



   2000 mg Product            



   Wasted:  ___ mg            



               



               

 

 Ofirmev  1,000 mg, 100    Inactive        [facility]



   mL, Route: IV,          2017  Medical



   Drug form: INJ,            Center



   PRE OP,            



   Priority: STAT,            



   Start date:            



   17            



   5:36:00 CST,            



   Duration: 1            



   day, Stop date:            



   17            



   5:35:00            



   CSTNotes:            



   Infuse over 15            



   minutes  Do not            



   exceed 4gm/day            



   of            



   acetaminophen            



    *** MEDICATION            



   WASTE ***            



   Product Size:            



   1000 mg Product            



   Wasted:  ___ mg            



               



               

 

 Ofirmev  1,000 mg, 100    Inactive       [place]



   mL, Route: IV,          2017  Medical



   Drug form: INJ,            Center



   PRE OP,            



   Priority: STAT,            



   Start date:            



   17            



   5:35:00 CST,            



   Duration: 1            



   day, Stop date:            



   17            



   5:34:00            



   CSTNotes:            



   Infuse over 15            



   minutes  Do not            



   exceed 4gm/day            



   of            



   acetaminophen            



    *** MEDICATION            



   WASTE ***            



   Product Size:            



   1000 mg Product            



   Wasted:  ___ mg            



               



               

 

 Pravastatin Sodium  40 mg=1 tab,    Active        [facility] [place]



 40 MG Oral Tablet  PO, Bedtime, #          2017  Medical



 [Pravachol]  30 tab, 0            Center



   Refill(s)            



               



               

 

 Hydralazine  100 mg=1 tab,    Active       [place]



 Hydrochloride 100  PO, TID, # 90            Medical



 MG Oral Tablet  tab, 3            Center



   Refill(s)            



               



               

 

 Hydralazine  0 Refill(s)    No Longer        [facility] [place]



       Active      2017  Medical



               [place]



               



               

 

 mycophenolate  1,000 mg=2 tab,    Active        [facility] [place]



 mofetil 500 MG  PO, BID, # 120          2017  Medical



 Oral Tablet  tab, 0            Center



 [Cellcept]  Refill(s)            



               



               

 

 pantoprazole 40 MG  40 mg=1 tab,    Active        [facility]



 Enteric Coated  PO, BID, # 30          2017  Medical



 Tablet [Protonix]  tab, 0            Center



   Refill(s)            



               



               

 

 24 HR Diltiazem  240 mg=1 cap,    Active        [facility] [place]



 Hydrochloride 240  PO, Daily, # 30          2017  Medical



 MG Extended  cap, 0            Center



 Release Capsule  Refill(s)            



 [Cartia]              



               

 

 magnesium oxide  400 mg=1 tab,    Active        [facility] [place]



 400 mg oral tablet  PO, Daily, 0          2017  Medical



   Refill(s)            Center



               



               

 

 calcium carbonate  CHEW, BID, 0    No Longer       [place]



 1000 mg oral  Refill(s)    Active      2017  Medical



 tablet, chewable              [place]



               



               

 

 predniSONE 10 mg  10 mg=1 tab,    Active        [facility]



 oral tablet  PO, Daily, # 30          2017  Medical



   tab, 3            Center



   Refill(s)            



               



               

 

 Prograf  2.5 mg, PO,    Active       [place]



   Q12H, 0            Medical



   Refill(s)            Center



               



               

 

 Sulfamethoxazole  1 tab, PO,    No Longer       [place]



 800 MG /  M-W-F, 0    Active        Medical



 Trimethoprim 160  Refill(s)            Center



 MG Oral Tablet              



 [Bactrim]              



               







Allergies, Adverse Reactions, Alerts







 Substance  Category  Reaction  Severity  Reaction  Status  Date  Comments  
Source



         type    Reported    



                 



                 







Immunizations







 Immunization  Date Given  Site  Status  Last Updated  Comments  Source



             



             







Results







 Order Name  Results  Value  Reference  Date  Interpretation  Comments  Source



       [facility]        



               



               



               

 

 ELECTROLYTE  AGAP  15.3 meq/L  10.0 -        [facility]



 S      20.0        [facility]



               



               



               

 

 ELECTROLYTE  eGFR  91        Result Comment: The eGFR is calculated using 
the CKD-EPI formula. In most young, healthy individuals the eGFR will be >90 mL/
min/1.73m2. The eGFR declines with age. An eGFR of 60-89 may be normal in  [facility] 
[place]



 S    mL/min/1.73        some populations, particularly the elderly, for 
whom the CKD-EPI formula has not been extensively validated. Use of the eGFR is 
not recommended in the following populations:  [address]



             Individuals with unstable creatinine concentrations, including 
pregnant patients and those with serious co-morbid conditions.  



               



             Patients with extremes in muscle mass or diet.  



               



             The data above are obtained from the National Kidney Disease 
Education Program (NKDEP) which additionally recommends that when the eGFR is 
used in patients with extremes of body mass index for purposes  



             of drug dosing, the eGFR should be multiplied by the estimated 
BMI.  

 

 ELECTROLYTE  BUN  13 mg/dL  7 - 22        [facility]



 S        /[address] [place] [address]



               



               



               

 

 ELECTROLYTE  Creatinine  0.98 mg/dL  0.50 -        [facility]  Lvl    1.40        [facility]



               



               



               

 

 ELECTROLYTE  Sodium Lvl  144 meq/L  135 - 145        [facility]              [facility]



               



               



               

 

 ELECTROLYTE  Potassium  4.3 meq/L  3.5 - 5.1        [facility]      /      [facility]



               



               



               

 

 ELECTROLYTE  Chloride Lvl  105 meq/L  95 - 109        [facility]        [address] [place] [address]



               



               



               

 

 ELECTROLYTE  Calcium Lvl  9.1 mg/dL  8.5 - 10.5        [facility]              [facility]



               



               



               

 

 ELECTROLYTE  CO2  28 meq/L  24 - 32        [facility]        [address] [place] [address]



               



               



               

 

 ELECTROLYTE  Glucose Lvl  77 mg/dL  70 - 99        [facility]      [facility]



               



               



               

 

 HEMATOLOGY  Lymphocytes  0.8 K/CMM  1.0 - 5.5         [place]



   #      /      [facility]



               



               



               

 

 HEMATOLOGY  Segs-Bands #  2.8 K/CMM  1.5 - 8.1         [place]



         /2017      [facility]



               



               



               

 

 HEMATOLOGY  Monocytes #  0.4 K/CMM  0.0 - 0.8         [place]



         /2017      [facility]



               



               



               

 

 HEMATOLOGY  Eosinophils  0.1 K/CMM  0.0 - 0.5         [place]



   #            [facility]



               



               



               

 

 HEMATOLOGY  Eosinophils  2.4 %  0.0 - 4.0         [place]



         /2017      [facility]



               



               



               

 

 HEMATOLOGY  Basophils  0.7 %  0.0 - 1.0         [place]



         /2017      [facility]



               



               



               

 

 HEMATOLOGY  Monocytes  10.7 %  2.0 - 12.0         [place]



         /2017      [facility]



               



               



               

 

 HEMATOLOGY  Lymphocytes  19.7 %  20.0 -         [place]



       40.0        [facility]



               



               



               

 

 HEMATOLOGY  Segs  66.5 %  45.0 -         [place]



       75.0        [facility]



               



               



               

 

 HEMATOLOGY  MPV  8.5 fL  7.4 - 10.4         [place]



         /2017      [facility]



               



               



               

 

 HEMATOLOGY  Platelet  133 K/CMM  133 - 450         [place]



         /2017      [facility]



               



               



               

 

 HEMATOLOGY  RDW  13.9 %  11.5 -         [place]



       14.5        [facility]



               



               



               

 

 HEMATOLOGY  MCHC  33.4 g/dL  32.0 -         [place]



       36.0        [facility]



               



               



               

 

 HEMATOLOGY  MCH  28.7 pg  27.0 -         [place]



       31.0        [facility]



               



               



               

 

 HEMATOLOGY  MCV  85.8 fL  80.0 -        [facility]



       94.0        [facility]



               



               



               

 

 HEMATOLOGY  Hct  44.0 %  42.0 -         [place]



       54.0        [facility]



               



               



               

 

 HEMATOLOGY  Hgb  14.7 g/dL  14.0 -         [place]



       18.0        [facility]



               



               



               

 

 HEMATOLOGY  RBC  5.13 M/CMM  4.70 -         [place]



       6.10        [facility]



               



               



               

 

 HEMATOLOGY  WBC  4.1 K/CMM  3.7 - 10.4         [place]



         /2017      [facility]



               



               



               

 

 CHEM PANEL  Phosphorus  3.6 mg/dL  2.5 - 4.5         [place]



         /2017      [facility]



               



               



               

 

 CHEM PANEL  Magnesium  2.3 mg/dL  1.8 - 2.4        [facility]



   Lvl            [facility]



               



               



               

 

 ELECTROLYTE  AGAP  12.6 meq/L  10.0 -  01/26      [facility]



 S      20.0        [facility]



               



               



               

 

 ELECTROLYTE  eGFR  55        Result Comment: The eGFR is calculated using 
the CKD-EPI formula. In most young, healthy individuals the eGFR will be >90 mL/
min/1.73m2. The eGFR declines with age. An eGFR of 60-89 may be normal in  [facility] 
[place]



 S    mL/min/1.73    /    some populations, particularly the elderly, for 
whom the CKD-EPI formula has not been extensively validated. Use of the eGFR is 
not recommended in the following populations:  [address]



             Individuals with unstable creatinine concentrations, including 
pregnant patients and those with serious co-morbid conditions.  



               



             Patients with extremes in muscle mass or diet.  



               



             The data above are obtained from the National Kidney Disease 
Education Program (NKDEP) which additionally recommends that when the eGFR is 
used in patients with extremes of body mass index for purposes  



             of drug dosing, the eGFR should be multiplied by the estimated 
BMI.  

 

 ELECTROLYTE  Calcium Lvl  8.6 mg/dL  8.5 - 10.5        [address]



               



               



               

 

 ELECTROLYTE  CO2  23 meq/L  24 - 32        [address]



               



               



               

 

 ELECTROLYTE  Chloride Lvl  102 meq/L  95 - 109        [address]



               



               



               

 

 ELECTROLYTE  Potassium  4.6 meq/L  3.5 - 5.1        [facility]            [facility]



               



               



               

 

 ELECTROLYTE  Sodium Lvl  133 meq/L  135 - 145        [address]



               



               



               

 

 ELECTROLYTE  Creatinine  1.49 mg/dL  0.50 -        [facility]  Lvl    1.40        [facility]



               



               



               

 

 ELECTROLYTE  BUN  26 mg/dL  7 - 22        [address]



               



               



               

 

 ELECTROLYTE  Glucose Lvl  151 mg/dL  70 - 99        [address]



               



               



               

 

 HEMATOLOGY  Lymphocytes  0.6 K/CMM  1.0 - 5.5        [facility]      [facility]



               



               



               

 

 HEMATOLOGY  Monocytes #  0.5 K/CMM  0.0 - 0.8        [address]



               



               



               

 

 HEMATOLOGY  Segs-Bands #  8.3 K/CMM  1.5 - 8.1        [address]



               



               



               

 

 HEMATOLOGY  Monocytes  5.1 %  2.0 - 12.0        [address]



               



               



               

 

 HEMATOLOGY  Segs  88.1 %  45.0 -        [facility]



       75.0        [facility]



               



               



               

 

 HEMATOLOGY  Lymphocytes  6.6 %  20.0 -         [place]



       40.0        [facility]



               



               



               

 

 HEMATOLOGY  Basophils  0.2 %  0.0 - 1.0         [place]



         /2017      [facility]



               



               



               

 

 HEMATOLOGY  MCH  28.5 pg  27.0 -         [place]



       31.0        [facility]



               



               



               

 

 HEMATOLOGY  Platelet  158 K/CMM  133 - 450         [place]



         /2017      [facility]



               



               



               

 

 HEMATOLOGY  MCHC  33.6 g/dL  32.0 -         [place]



       36.0        [facility]



               



               



               

 

 HEMATOLOGY  RDW  14.1 %  11.5 -        [facility]



       14.      [facility]



               



               



               

 

 HEMATOLOGY  MCV  84.9 fL  80.0 -         [place]



       94.0        [facility]



               



               



               

 

 HEMATOLOGY  RBC  5.28 M/CMM  4.70 -        [facility]



       6.10        [facility]



               



               



               

 

 HEMATOLOGY  Hgb  15.0 g/dL  14.0 -        [facility]



       18.0        [facility]



               



               



               

 

 HEMATOLOGY  WBC  9.4 K/CMM  3.7 - 10.4         [place]



         /2017      [facility]



               



               



               

 

 HEMATOLOGY  Hct  44.8 %  42.0 -        [facility]



       54.0        [facility]



               



               



               

 

 HEMATOLOGY  MPV  8.2 fL  7.4 - 10.4         [place]



         /2017      [facility]



               



               



               

 

 PARATHYROID  Ca Ion WB  1.08 mMol/L  1.05 -        [facility]



 PROFILE      1.      [facility]



               



               



               

 

 PARATHYROID  Ca Norm WB  1.05 mMol/L  1. -        [facility]



 PROFILE      .      [facility]



               



               



               

 

 Upper GI  Upper GI  INDICATION: Status post gastric sleeve procedure.    
    -  [facility]



 Series w  Series     -  Medical



 water  water            Center



 soluble DX  soluble DX            



     PROCEDURE: A  view was obtained and images were made after the 
patient swallowed 20 mL of water-soluble contrast. A 2nd group of images were 
made after the patient swallowed a 2nd bolus of 20 mL of        Read by:  
Barrington Victor MD  



      water-soluble contrast. Right and left anterior oblique images were also 
made.        Dictated Date/time:  17 11:24  



             Electronically Signed by:  Barrington Victor MD                  
   17 11:50  



             FINAL REPORT  



               



     FINDINGS: The  view demonstrates 3 sternal wires. The bowel gas 
pattern is nonobstructive. There are streaky, linear densities in both lungs, 
left greater than right. A focal rounded density is pro          



     jected over the upper liver silhouette between the 90 10th posterior ribs.
          



               



               



               



     The initial AP film after 20 mL of contrast shows retention of contrast 
within the distal esophagus and minimal contrast within the gastric lumen. The 
images made after the 2nd 20 mL bolus shows that th          



     ere still some retained contrast within the esophagus. Contrast has passed 
through the stomach and is present within the 3rd portion of the duodenum. 
Oblique views demonstrate the same type findings. Th          



     ere was less contrast in the distal esophagus on the last image. There was 
no evidence for extravasation of contrast outside the gastrointestinal tract.  
        



               



               



               



     IMPRESSION:          



               



     1. Findings consistent with gastric sleeve procedure. There is no evidence 
for extravasation.          



               



               



               



               

 

 BLOOD BANK  Antibody  Negative          [facility]



 RESULTS  Scr      [facility]



     [facility]



               



               



               

 

 BLOOD BANK  ABO/Rh  O NEG          [facility]              [facility]



               



               



               

 

 CHEM PANEL  A/G Ratio  1.6  0.7 - 1.6         [place]



         /2017      [facility]



               



               



               

 

 CHEM PANEL  Globulin  2.6 g/dL  2.7 - 4.2        [facility]



               [facility]



               



               



               

 

 CHEM PANEL  B/C Ratio  20  6 - 25        [facility]      [facility]



               



               



               

 

 CHEM PANEL  AGAP  15.4 meq/L  10.0 -        [facility]



       20.0        [facility]



               



               



               

 

 CHEM PANEL  eGFR  67        Result Comment: The eGFR is calculated using 
the CKD-EPI formula. In most young, healthy individuals the eGFR will be >90 mL/
min/1.73m2. The eGFR declines with age. An eGFR of 60-89 may be normal in  [facility] 
[place]



     mL/min/1.73        some populations, particularly the elderly, for 
whom the CKD-EPI formula has not been extensively validated. Use of the eGFR is 
not recommended in the following populations:  [address]



             Individuals with unstable creatinine concentrations, including 
pregnant patients and those with serious co-morbid conditions.  



               



             Patients with extremes in muscle mass or diet.  



               



             The data above are obtained from the National Kidney Disease 
Education Program (NKDEP) which additionally recommends that when the eGFR is 
used in patients with extremes of body mass index for purposes  



             of drug dosing, the eGFR should be multiplied by the estimated 
BMI.  

 

 CHEM PANEL  CO2  24 meq/L  24 - 32        [facility]



               [facility]



               



               



               

 

 CHEM PANEL  Calcium Lvl  8.8 mg/dL  8.5 - 10.5         [place]



         /2017      [facility]



               



               



               

 

 CHEM PANEL  Total  6.8 g/dL  6.4 - 8.4         [place]



   Protein            [facility]



               



               



               

 

 CHEM PANEL  ALT  25 unit/L  0 - 65         [place]



         /2017      [facility]



               



               



               

 

 CHEM PANEL  Albumin Lvl  4.2 g/dL  3.5 - 5.0         [place]



         /2017      [facility]



               



               



               

 

 CHEM PANEL  AST  12 unit/L  0 - 37         [place]



         /2017      [facility]



               



               



               

 

 CHEM PANEL  Alk Phos  83 unit/L  39 - 136         [place]



         /2017      [facility]



               



               



               

 

 CHEM PANEL  Chloride Lvl  106 meq/L  95 - 109         [place]



         /2017      [facility]



               



               



               

 

 CHEM PANEL  Bili Total  0.5 mg/dL  0.2 - 1.3         [place]



         /2017      [facility]



               



               



               

 

 CHEM PANEL  Potassium  4.4 meq/L  3.5 - 5.1        [facility]            [facility]



               



               



               

 

 CHEM PANEL  Sodium Lvl  141 meq/L  135 - 145         [place]



         /2017      [facility]



               



               



               

 

 CHEM PANEL  Creatinine  1.26 mg/dL  0.50 -        [facility]



   Lvl    1.40        [facility]



               



               



               

 

 CHEM PANEL  BUN  25 mg/dL  7 - 22         [place]



         /2017      [facility]



               



               



               

 

 CHEM PANEL  Glucose Lvl  91 mg/dL  70 - 99         [place]



         /2017      [facility]



               



               



               

 

 HEMATOLOGY  RDW  14.2 %  11.5 -         [place]



       14.      [facility]



               



               



               

 

 HEMATOLOGY  Hct  45.7 %  42.0 -         [place]



       54.0        [facility]



               



               



               

 

 HEMATOLOGY  Platelet  149 K/CMM  133 - 450         [place]



         /2017      [facility]



               



               



               

 

 HEMATOLOGY  MPV  8.4 fL  7.4 - 10.4         [place]



         /2017      [facility]



               



               



               

 

 HEMATOLOGY  MCV  82.9 fL  80.0 -         [place]



       94.0        [facility]



               



               



               

 

 HEMATOLOGY  MCHC  34.2 g/dL  32.0 -         [place]



       36.0        [facility]



               



               



               

 

 HEMATOLOGY  MCH  28.3 pg  27.0 -         [place]



       31.0        [facility]



               



               



               

 

 HEMATOLOGY  WBC  6.1 K/CMM  3.7 - 10.4         [place]



         /2017      [facility]



               



               



               

 

 HEMATOLOGY  Hgb  15.6 g/dL  14.0 -         [place]



       18.0        [facility]



               



               



               

 

 HEMATOLOGY  RBC  5.52 M/CMM  4.70 -         [place]



       6.10        [facility]



               



               



               

 

 HEMATOLOGY  Basophils  0.7 %  0.0 - 1.0        [facility]



               [facility]



               



               



               

 

 HEMATOLOGY  Segs-Bands #  4.4 K/CMM  1.5 - 8.1        [facility]



               [facility]



               



               



               

 

 HEMATOLOGY  Monocytes #  0.6 K/CMM  0.0 - 0.8         [place]



         /2017      [facility]



               



               



               

 

 HEMATOLOGY  Lymphocytes  1.1 K/CMM  1.0 - 5.5        [facility]



   #      /2017      [facility]



               



               



               

 

 HEMATOLOGY  Eosinophils  0.1 K/CMM  0.0 - 0.5        [facility]



         [facility]



               



               



               

 

 HEMATOLOGY  Eosinophils  0.9 %  0.0 - 4.0        [facility]



               [facility]



               



               



               

 

 HEMATOLOGY  Lymphocytes  17.6 %  20.0 -         [place]



       40.0        [facility]



               



               



               

 

 HEMATOLOGY  Monocytes  9.3 %  2.0 - 12.0         [place]



         /2017      [facility]



               



               



               

 

 HEMATOLOGY  Segs  71.5 %  45.0 -         [place]



       75.0        [facility]



               



               



               

 

 SPECIAL  Hgb A1C  5.3 %  <=5.6 %        [facility]



 CHEMISTRY              [facility]



               



               



               







Vital Signs







 Vital Sign  Value  Date  Comments  Source



         

 

 Systolic (mm Hg)  133  2017    [facility]



         

 

 Diastolic (mm Hg)  75  2017    [facility]



         

 

 Respitory Rate  16  2017    [facility]



         

 

 Respitory Rate  14  2017    [facility]



         

 

 Systolic (mm Hg)  136  2017    [facility]



         

 

 Diastolic (mm Hg)  79  2017    [facility]



         

 

 Respitory Rate  12  2017    [facility]



         

 

 Systolic (mm Hg)  143  2017    [facility]



         

 

 Diastolic (mm Hg)  85  2017    [facility]



         

 

 Heart Rate  83  2017    [facility]



         

 

 Weight  102.273  2017    [facility]



         

 

 BMI Calculated  27.45  2017    [facility]



         

 

 Height  193.04 cm  2017    [facility]



         

 

 Temperature Oral (F)  98.0 F  2017    [facility]



         

 

 Systolic (mm Hg)  125  2017    [facility]



         

 

 Diastolic (mm Hg)  87  2017    [facility]



         

 

 Respitory Rate  19  2017    [facility]



         

 

 Heart Rate  97  2017    [facility]



         

 

 Heart Rate  98  2017    [facility]



         

 

 Respitory Rate  20  2017    [facility]



         

 

 Temperature Oral (F)  97.6 F  2017    [facility]



         

 

 Systolic (mm Hg)  147  2017    [facility]



         

 

 Diastolic (mm Hg)  95  2017    [facility]



         

 

 Respitory Rate  20  2017    [facility]



         

 

 Heart Rate  98  2017    [facility]



         

 

 Systolic (mm Hg)  144  2017    [facility]



         

 

 Diastolic (mm Hg)  97  2017    [facility]



         

 

 Temperature Oral (F)  98.3 F  2017    [facility]



         

 

 Weight  136.136  2017    [facility]



         

 

 Height  193.04 cm  2017    [facility]



         

 

 BMI Calculated  36.53  2017    [facility]



         

 

 Weight  136.136  2017    [facility]



         

 

 Height  193.04 cm  2017    [facility]



         

 

 BMI Calculated  36.53  2017    [facility]



         

 

 Weight  143.636  2017    [facility]



         

 

 BMI Calculated  38.55  2017    [facility]



         

 

 Height  193.04 cm  2017    [facility]



         







Encounters







 Location  Location  Encounter  Encounter  Reason  Attending  ADM  DC  Status  
Source



   Details  Type  Number  For  Provider  Date  Date    



         Visit          



                   



                   



                   

 

 [facility]  [id number]    [facility]      [facility]          [place]    [facility]



                   



                   



                   

 

 [facility]    Day  [id number]    Guido      [facility]    Surgery      [place]    [facility]



                   



                   



                   







Procedures







 Procedure  Code  Date  Perfomer  Comments  Source



           



           

 

 Heart transplant  [id number]        [facility]



           

 

 Hernia repair  [id number]        [facility]



           

 

 Laparoscopic sleeve  [id number]        [facility]

## 2018-12-31 NOTE — XMS REPORT
Clinical Summary

 Created on:2018



Patient:Tyra Bruno

Sex:Male

:1970

External Reference #:[id number]





Demographics







 Address  [address] [place] [address]

 

 Mobile Phone  [phone]

 

 Home Phone  [phone]

 

 Email Address  [email or web address]

 

 Preferred Language  English

 

 Marital Status  Unknown

 

 Cheondoism Affiliation  Unknown

 

 Race  White

 

 Ethnic Group  Not  or 









Author







 Organization  [facility]

 

 Address  [address] [place] [address]









Support







 Name  Relationship  Address  Phone

 

 Janet Bruno  Unavailable  Unavailable  [phone]

 

 Hannah Bruno  Unavailable  Unavailable  [phone]









Care Team Providers







 Name  Role  Phone

 

 Dana Loo MD  Primary Care Provider  [phone]









Allergies







 Active Allergy  Reactions  Severity  Noted Date  Comments

 

 Foscarnet  Nausea And Vomiting,  High  2015







   Swelling      Pt has no reaction to



         current medication



         administration regimen:



         premedicate with Benadryl,



         Zofran, Tylenol, then 500cc



         NS bolus, then diluted



         foscarnet over 2 hours,



         then another 500cc NS



         bolus. Electrolyte labs



         after every dose,



         electrolyte replacement



         protocol in place.







Medications







 Medication  Sig  Dispensed  Refills  Start Date  End Date  Status

 

 mycophenolate  Take 3  180 capsule  11  2018  Active



 (CELLCEPT) 250 mg  capsules (750        9  



 capsule  mg total) by          



   mouth 2 (two)          



   times daily.          

 

 tacrolimus  Take 1mg by  90 capsule  11  2018    Active



 (PROGRAF) 1 MG  mouth in the          



 capsule  morning.  Take          



   2mg by mouth          



   at night. .          

 

 aspirin 81 MG EC  Take 1 tablet  30 tablet  11  2018  Active



 tablet  (81 mg total)        9  



   by mouth          



   daily.          

 

 famotidine (PEPCID)  Take 1 tablet  60 tablet  11  2018  
Active



 20 MG tablet  (20 mg total)        9  



   by mouth 2          



   (two) times          



   daily.          

 

 pravastatin  Take 1 tablet  30 tablet  11  2018  Active



 (PRAVACHOL) 40 MG  (40 mg total)        9  



 tablet  by mouth          



   daily.          

 

 mycophenolate  Take 3  180 capsule  11  2017  Discontinued



 (CELLCEPT) 250 mg  capsules (750        8  



 capsule  mg total) by          



   mouth 2 (two)          



   times daily.          

 

 tacrolimus  Take 1mg by  90 capsule  11  2017  Discontinued



 (PROGRAF) 1 MG  mouth in the        8  



 capsule  morning.  Take          



   2mg by mouth          



   at night. .          

 

 albuterol HFA  Inhale 1 puff  1 Inhaler  0  2017  Discontinued



 (PROVENTIL HFA) 90  by mouth via        8  



 mcg/actuation  inhaler every          



 inhaler  6 (six) hours          



   as needed for          



   Wheezing.          

 

 azelastine  1 spray by  30 mL  0  2017  Discontinued



 (ASTELIN) 137 mcg  Nasal route 2        8  



 (0.1 %) nasal spray  (two) times          



   daily Use in          



   each nostril          



   as directed.          

 

 pravastatin  Take 1 tablet    0  2017  Discontinued



 (PRAVACHOL) 40 MG  (40 mg total)        8  



 tablet  by mouth          



   daily.          

 

 aspirin 81 MG EC  Take 1 tablet    0  2017  Discontinued



 tablet  (81 mg total)        8  



   by mouth          



   daily.          

 

 famotidine (PEPCID)  TAKE ONE  60 tablet  11  2018  
Discontinued



 20 MG tablet  TABLET BY        8  



   MOUTH TWICE          



   DAILY          

 

 famotidine (PEPCID)  Take 1 tablet  180 tablet  3  2018  
Discontinued



 20 MG tablet  (20 mg total)        8  



   by mouth 2          



   (two) times          



   daily.          









 Hospital, Clinic, or Other  Ordered Dose  Route  Frequency  Start Date  End 
Date  Status



 Facility Administered            



 Medication            

 

 influenza vaccine (PF)  0.5 mL  IM  Once  2018  Ended



 [id number] (FLUZONE HIGH            



 DOSE) syringe (>/=65 yrs)            







Active Problems







 Patient Care Coordination Note

 

 



 



 









 Problem  Noted Date

 

 Heart transplant, orthotopic, status  2016

 

 Heart transplant rejection  2015









 Overview: 



- 2R rejection on low dose immunosuppression  10/22/15 treated with prednisone 
pulse



 - 2R rejection on low dose immunosuppression  10/28/15 treated with IV 
solumedrol, increased MMF, increased Prograf



 - 2R biopsy on 12/2/15 treated with ATG x 4









 History of Cytomegalovirus (CMV) viremia  2015

 

 ICM s/p orthotopic heart transplant 5/14/15  2015

 

 Dyslipidemia  2015

 

 Hypertension  

 

 Ischemic cardiomyopathy with MI 2015  







Encounters







 Date  Type  Specialty  Care Team  Description

 

 2018  Hospital Encounter  Cardiology  Dana Loo  Status post heart



       MD Scott  transplantation ([facility])

 

 2018  Follow-Up  Transplant  Dana Loo  Hyperlipidemia, unspecified 
hyperlipidemia type (Primary Dx);



       MD Scott  Status post heart transplantation ([facility]);



         Encounter for therapeutic drug level monitoring;



         Prostate cancer screening

 

 2018  Telephone  Transplant  Margot Post RN  Heart Transplant



         Follow-up

 

 2018  Abstract  Transplant  [place] Shila  

 

 2018  Telephone  Transplant  Margot Post RN  Heart Transplant



         Follow-up

 

 06/15/2018  Abstract  Transplant  Long, Shila  

 

 2018  Telephone  Transplant  Margot Post RN  Heart Transplant



         Follow-up

 

 2018  Abstract  Transplant  Long, Shila  

 

 2018  Orders Only  Transplant  Margot Post RN  Status post heart 
transplantation ([facility]) (Primary Dx);



         Encounter for therapeutic drug level monitoring

 

 2018  Telephone  Transplant  Margot Post RN  Heart Transplant



         Follow-up

 

 2018  Hospital Encounter  Cardiology    Status post heart



         transplantation ([facility])

 

 2018  Follow-Up  Transplant  Dana Loo  Essential hypertension (
Primary Dx);



       MD Scott  Status post heart transplantation ([facility]);



         Encounter for therapeutic drug level monitoring;



         Dyslipidemia

 

 2018  Surgery    Dana Loo  R & L CATH / CORONARY



       MD Scott  ANGIOS / PCI

 

 2018  Hospital Encounter    Dana Loo  Status post heart



       MD Scott  transplantation ([facility])

 

 2018  Orders Only  Transplant  Margot Post RN  

 

 2018  Documentation  Transplant  Kenisha Lyon NP  

 

 2018  Documentation  Transplant  Herminia Kilgore  

 

 2018  Orders Only  Transplant  Margot Post RN  

 

 2018  Orders Only  Transplant  Margot Post RN  

 

 2018  Orders Only  Transplant  Margot Post RN  Status post heart 
transplantation ([facility]) (Primary Dx);



         Encounter for therapeutic drug level monitoring;



         Type 1 diabetes mellitus without complication ([facility]);



         Hyperlipidemia, unspecified hyperlipidemia type

 

 2018  Orders Only  Transplant  Margot Post RN  

 

 2018  Refill  Transplant  Dana Loo MD  



after 2017



Immunizations







 Name  Dates Previously Given  Next Due

 

 Influenza High Dose Preservative Free IM  2018  

 

 Influenza High Dose Preservative Free [id number]  

 

 Influenza TIV (IM)  10/09/2017  

 

 Rho (D) Immune Globulin  05/15/2015  







Family History







 Medical History  Relation  Name  Comments

 

 Cancer  Maternal Grandfather    

 

 Heart disease  Maternal Grandfather    

 

 Diabetes  Mother    









 Relation  Name  Status  Comments

 

 Maternal Grandfather      

 

 Mother      







Social History







 Tobacco Use  Types  Packs/Day  Years Used  Date

 

 Never Smoker        









 Smokeless Tobacco: Never Used      









 Alcohol Use  Drinks/Week  oz/Week  Comments

 

 No      









 Sex Assigned at Birth  Date Recorded

 

 Not on file  









 Job Start Date  Occupation  Industry

 

 Not on file  Not on file  Not on file









 Travel History  Travel Start  Travel End









 No recent travel history available.







Last Filed Vital Signs







 Vital Sign  Reading  Time Taken

 

 Blood Pressure  131/90  2018  8:36 AM CST

 

 Pulse  84  2018  8:36 AM CST

 

 Temperature  36.6 C (97.9 F)  2018  8:36 AM CST

 

 Respiratory Rate  18  2018  8:36 AM CST

 

 Oxygen Saturation  98%  2018  8:36 AM CST

 

 Inhaled Oxygen Concentration  -  -

 

 Weight  103.6 kg (228 lb 4.8 oz)  2018  8:36 AM CST

 

 Height  188 cm (6' 2")  2018  8:36 AM CST

 

 Body Mass Index  29.31  2018  8:36 AM CST







Plan of Treatment







 Health Maintenance  Due Date  Last Done  Comments

 

 INFLUENZA VACCINE  10/01/2018  2018  







Implants







 Implanted  Type  Area    Device  Shelf  Model / Serial



         Identifier  Expiration  / Lot



           Date  

 

 HemostatSixto Ah Absorbable Powder 3g - [id number]  Cement/Fille    C R BARD 
DAVOL    2019  [id number] /



 Implanted: Qty: 1 on 2015 by Champ Jean MD  r/Adhesive           /



             [id number]

 

 Graft,Goretex Cg [id number] Lined - [id number]  Graft/Patch    W L GORE    2020  [id number] /



 Implanted: Qty: 1 on 2015 by Teofilo Rubio MD             /



             [id number]

 

 Lead,Defibrillator Cardioverter Dual Coil Df4 Connector 3nta00ax Durata - 
[id number]  ICD    ST FIDEL    2015  7120Q-58 /



 Implanted: Qty: 1 on 2015 by Champ Jean MD      [employer]      
[id number] /

 

 Lead, Pacing Endocardial Steroid Eluting Bipolar Active Fix 52cm Tendril Sts - 
[id number]  Pacemakers    ST FIDEL    2017  2088TC/52 /



 Implanted: Qty: 1 on 2015 by Champ Jean MD      [employer]      
[id number] /

 

 Fortify Assura      ST FIDEL    10/31/2016  [id number] /



 Implanted: Qty: 1 on 2015 by Champ Jean MD      [employer]      
[id number] /

 

 Tyrx Absorbable Antibacterial Envelope          2015  [id number] /



 Implanted: Qty: 1 on 2015 by Champ Jean MD             /



             [id number]

 

 Sensation      [employer]    2018  1277--01U /



 Implanted: Qty: 1 on 03/10/2015            [id number] /

 

 Intra-Aortic Balloon      [employer]    2018  4821--01U /



 Implanted: Qty: 1 on 2015            [id number] /

 

 Intra-Aortic Balloon      [employer]    2018  3766--01U /



 Implanted: Qty: 1 on 2015            [id number] /







Procedures







 Procedure Name  Priority  Date/Time  Associated Diagnosis  Comments

 

 ECHOCARDIOGRAM REPORT    2018 12:54    



 - SCAN    PM CST    

 

 2D ECHO W/ DOPPLER  Routine  2018  9:20  Status post heart  Results for 
this



 (CW/PW/COLOR)    AM CST  transplantation (HCC)  procedure are in



         the results



         section.

 

 CBC W/PLT COUNT & AUTO  Routine  2018  8:41  Status post heart  Results 
for this



 DIFFERENTIAL    AM CST  transplantation (HCC)



  procedure are in



       Encounter for  the results



       therapeutic drug level  section.



       monitoring  

 

 BASIC METABOLIC PANEL  Routine  2018  8:41  Status post heart  Results 
for this



 (7)    AM CST  transplantation (HCC)



  procedure are in



       Encounter for  the results



       therapeutic drug level  section.



       monitoring  

 

 CBC W/PLT COUNT & AUTO  Routine  2018  8:41  Status post heart  Results 
for this



 DIFFERENTIAL    AM CST  transplantation (HCC)



  procedure are in



       Encounter for  the results



       therapeutic drug level  section.



       monitoring  

 

 BASIC METABOLIC PANEL  Routine  2018 10:30    Results for this



 (7)    AM CDT    procedure are in



         the results



         section.

 

 HEMOGLOBIN A1C  Routine  2018 10:30    Results for this



     AM CDT    procedure are in



         the results



         section.

 

 CBC W/PLT COUNT & AUTO  Routine  2018 10:30    Results for this



 DIFFERENTIAL    AM CDT    procedure are in



         the results



         section.

 

 TACROLIMUS LEVEL  Routine  2018 10:30    Results for this



     AM CDT    procedure are in



         the results



         section.

 

 BASIC METABOLIC PANEL  Routine  2018  3:30    Results for this



 (7)    PM CDT    procedure are in



         the results



         section.

 

 CBC W/PLT COUNT & AUTO  Routine  2018  3:30    Results for this



 DIFFERENTIAL    PM CDT    procedure are in



         the results



         section.

 

 TACROLIMUS LEVEL  Routine  2018  3:30    Results for this



     PM CDT    procedure are in



         the results



         section.

 

 ECHOCARDIOGRAM REPORT    2018  7:20    



 - SCAN    AM CDT    

 

 CARDIAC CATH REPORT -    2018  3:01    



 SCAN    PM CDT    

 

 2D ECHO W/ DOPPLER  Routine  2018  2:02  Status post heart  Results for 
this



 (CW/PW/COLOR)    PM CDT  transplantation (HCC)  procedure are in



         the results



         section.

 

 TACROLIMUS LEVEL  Routine  2018  9:24  Status post heart  Results for 
this



     AM CDT  transplantation (HCC)



  procedure are in



       Encounter for  the results



       therapeutic drug level  section.



       monitoring  

 

 XR CHEST 2 VIEWS  Routine  2018  1:50  Status post heart  Results for 
this



     PM CDT  transplantation (HCC)  procedure are in



         the results



         section.

 

 R & L CATH / CORONARY    2018  1:13  H/O heart transplant  



 ANGIOS / PCI    PM CDT  (HCC)  









   Case Notes







   POP6









 ECG 12-LEAD  Routine  2018 11:50 AM CDT    









   Procedure Note - Interface, External Ris In - 2018  1:21 PM CDT



Ventricular Rate 73 BPM



   Atrial Rate 73 BPM



   P-R Interval 158 ms



   QRS Duration 94 ms



   Q-T Interval 392 ms



   QTC Calculation(Bazett) 431 ms



   P Axis 58 degrees



   R Axis 73 degrees



   T Axis 56 degrees



   



   Normal sinus rhythm



   Incomplete right bundle branch block



   Borderline ECG



   When compared with ECG of 04-MAR-2017 20:13,



   Left posterior fascicular block is no longer Present



   Incomplete right bundle branch block is now Present



   Criteria for Anterior infarct are no longer Present









 ECG 12-LEAD  Routine  2018 11:50 AM CDT    Results for this



         procedure are in the



         results section.

 

 CBC W/PLT COUNT & AUTO  STAT  2018 11:07 AM CDT    Results for this



 DIFFERENTIAL        procedure are in the



         results section.

 

 TACROLIMUS LEVEL  AP Routine  2018 11:07 AM CDT    Results for this



         procedure are in the



         results section.

 

 PROTHROMBIN TIME/INR  STAT  2018 11:07 AM CDT    Results for this



         procedure are in the



         results section.

 

 COMPREHENSIVE METABOLIC  STAT  2018 11:07 AM CDT    Results for this



 PANEL        procedure are in the



         results section.

 

 CBC W/PLT COUNT & AUTO  STAT  2018 11:07 AM CDT    Results for this



 DIFFERENTIAL        procedure are in the



         results section.

 

 LIPID PANEL  Routine  2018 11:07 AM CDT    Results for this



         procedure are in the



         results section.



after 2017



Results

ECHOCARDIOGRAM REPORT - SCAN (2018 12:54 PM CST)





 Narrative  Performed At

 

 This result has an attachment that is not available.



  



2D Echo W/Doppler(CW/PW/Color) (2018  9:20 AM CST)





 Component  Value  Ref Range  Performed At

 

 Ejection Fraction      [facility] ECHO HEARTLAB [facility]









 Narrative  Performed At

 

 Transthoracic Echocardiography Report (TTE)



  [facility] ECHO HEARTLAB [facility]



  



  



  Demographics



  



  



  



  Patient Name Lulu BRUNO of  



 Study 2018



  



 WOOD



  



  



  



  [id number]  



 GenderMale



  



  



  



  Visit Number  



 [id number]  



 Unknown



  



  



  



  [id number]  



 Room Number op



  



  Number



  



  



  



  Date of  



 Birth1970Referring  



 Physician Dana Loo MD



  



  



  



  Age49  



 year(s)Sonographer  



 Ashok Mcmillan RDCS



  



  



  



  AnalystMailyn  



 InterpretingJoseThomas Jean  



 Physician MD



  



  



  



 Procedure



  



  



  



  Type of



  



  Study TTE procedure:2DECHO W  



 DOPPLER(CW/PW/COLOR) (Routine)



  



  



  



 Indications:Heart Transplant Follow up.



  



  



  



 Clinical History



  



 ICMP, HTN



  



  



  



 OHT 5.



  



  



  



 Height: 76 inches Weight: 90.72 kg (200 lbs) BSA:  



 2.22 m^2 BMI: 24.34 kg/m^2



  



  



  



 HR: 79 bpm BP: 127/77 mmHg



  



  



  



  Summary



  



  The left ventricle is chamber size (by PSLAX  



 dimension) is normal (male -



  



  LVIDd 4.2-5.8cm) . LV septal thickness is normal  



 (0.6-1.1cm). LV posterior



  



  wall thickness is mildly increased . LV segments  



 contract normally. LVEF



  



  by Guy's method of disk assessment is normal  



 (60%) .



  



  Normal diastolic function with normal LV filling  



 pressure (LAP) at rest.



  



  Estimated peak systolic PA pressure is 30-35 mmHg  



 .



  



  No significant pericardial effusion is  



 visualized.



  



  



  



  Previous Study



  



  Compared to the previous study there was no  



 significant change.



  



  



  



  Signature



  



  



  



  -------------------------------------------------  



 ---------------



  



  Electronically signed by Alcides Gibbs MD(Interpreting



  



  physician) on 2018 12:12 PM



  



  -------------------------------------------------  



 ---------------



  



  



  



  Findings



  



  



  



  Left Ventricle The LV endocardium  



 is well visualized.



  



 Th  



 e left ventricle is chamber size (by PSLAX



  



 di  



 mension) is normal (male - LVIDd 4.2-5.8cm) .



  



 LV  



 septal thickness is normal (0.6-1.1cm). LV



  



 po  



 sterior wall thickness is mildly increased .



  



 LV  



 segments contract normally.



  



 LV  



 EF by Guy's method of disk assessment is



  



 no  



 rmal (60%) .



  



 No  



 rmal diastolic function with normal LV filling



  



 pr  



 essure (LAP) at rest.



  



  



  



  Left AtriumLA size is  



 normal (16-34 ml/m2) .



  



  



  



  Right VentricleThe right  



 ventricular chamber size and systolic



  



 fu  



 nction are within normal limits.



  



  



  



  Right Atrium RA size is  



 normal.



  



  



  



  Aortic Valve Normal AoV  



 structure.



  



 No  



 evidence of aortic regurgitation.



  



  



  



  Mitral Valve Mild MV leaflet  



 thickening.



  



 Tr  



 ace mitral regurgitation.



  



  



  



  Tricuspid ValveMild tricuspid  



 regurgitation.



  



 Es  



 timated peak systolic PA pressure is 30-35 mmHg .



  



  



  



  Pulmonic Valve Normal PV  



 structure and function.



  



  



  



  AortaAortic  



 root size (SInus of Valsalva diameter) is



  



 no  



 rmal .



  



 Pr  



 oximal ascending aorta size is normal .



  



  



  



  PericardiumNo significant  



 pericardial effusion is visualized.



  



  



  



  IVC/SVC/PA/PV/PleuralThe estimated RA  



 pressure by IVC dynamics 5-10mmHg



  



 .



  



  



  



 Chambers/Structures



  



  



  



  Left Atrium



  



  



  



  LA Volume: 59.91  



 ml LA  



 Area: 21.63 cm^2



  



  LA Vol. Index: 27 ml/m^2



  



  



  



  Left Ventricle



  



  



  



  LVIDd: 4.83 cm



  



  LVIDs: 2.99 cm



  



  LV Septum Diastolic: 1.01 cm



  



  LV PW Diastolic: 1.22  



 cmLV FS:  



 38.1 %



  



  LVEDV Guy's:73.35 ml



  



  LVESV Guy's:25.17  



 mlLVEDVI:  



 33 ml/m^2



  



  LVEF Guy's: 65.7  



 %LVESV  



 I: 11 ml/m^2



  



  



  



  LVOT Diameter: 2.34 cm



  



  



  



  Right Atrium



  



  



  



  RA Vol. (Sngl Plane): 38.77 ml



  



  



  



  Right Ventricle



  



  



  



 TAPSE:  



 1.64 cm



  



  



  



 Aorta



  



  



  



  Ao Root S of Krupa.: 3.37  



 cmAscending Aorta:  



 2.63 cm



  



  



  



 Doppler/Quantitative Measurements



  



  



  



  Mitral Valve



  



  



  



  MV Peak E-Wave: 0.61  



 m/sMV Peak A-Wave:  



 0.47 m/s



  



   



  E/A Ratio: 1.31



  



   



  Peak Gradient: 1.5 mmHg



  



   



  Deceleration Time:  



 179.6 msec



  



  



  



  MV Bharathi. Peak:



  



  



  



  Tissue Doppler



  



  



  



  E' Septal Velocity: 0.08  



 m/sE/E': 7.29



  



  E' Lateral Velocity: 0.17 m/s



  



  



  



  Aortic Valve



  



  



  



  Peak Velocity: 1.14  



 m/sMean  



 Velocity: 0.78 m/s



  



  Peak Gradient: 5.21  



 mmHg Mean  



 Gradient: 2.77 mmHg



  



  AV Area (continuity): 3.71 cm^2



  



  AV VTI: 21 cm



  



  



  



  AV DVI: 0.86



  



  



  



  LVOT



  



  



  



  Peak Velocity: 0.86 m/s  



 Peak Gradient: 2.99 mmHg



  



  Mean Velocity: 0.62 m/s  



 Mean Gradient: 1.72 mmHg



  



  LVOT Diameter: 2.34  



 cmLVOT VTI: 18.12 cm



  



  LVOT Area: 4.3  



 cm^2 LVOT SV:77.89  



 ml



  



  LVOT CO: 6.15  



 l/min LVOT CI:  



 2.77 l/min/m^2



  



  



  



  Tricuspid Valve



  



  



  



  TR Velocity: 2.41 m/s



  



  TR Gradient: 23.19 mmHg



  



   









 Procedure Note

 

 Interface, External Ris In - 2018 12:13 PM CST



Transthoracic Echocardiography Report (TTE)



 



  Demographics



 



  Patient [facility]   [facility]RA BRUNO    Date [facility] Study       2018



                 [id number]          Gender              Male



 



  Visit Number   [id number]        Race                Unknown



 



  Accession      [id number]         Room Number         op



  Number



 



  Date of Birth  1970        Referring Physician Dana Loo MD



 



  Age            48 year(s)        Sonographer         Ashok Mcmillan



                                                       [facility]



 



  Analyst        Mayte            Interpreting        Thomas Agee      Physician           MD



 



 Procedure



 



  Type of



  Study     TTE procedure:2DECHO W DOPPLER(CW/PW/COLOR) (Routine)



 



 Indications:Heart Transplant Follow up.



 



 Clinical History



 ICMP, HTN



 



 OHT 2015



 



 Height: 76 inches Weight: 90.72 kg (200 lbs) BSA: 2.22 m^2 BMI: 24.34 kg/m^2



 



 HR: 79 bpm BP: 127/77 mmHg



 



  Summary



  The left ventricle is chamber size (by PSLAX dimension) is normal (male -



  LVIDd 4.2-5.8cm) . LV septal thickness is normal (0.6-1.1cm). LV posterior



  wall thickness is mildly increased . LV segments contract normally. LVEF



  by Guy's method of disk assessment is normal (60%) .



  Normal diastolic function with normal LV filling pressure (LAP) at rest.



  Estimated peak systolic PA pressure is 30-35 mmHg .



  No significant pericardial effusion is visualized.



 



  Previous Study



  Compared to the previous study there was no significant change.



 



  Signature



 



  ----------------------------------------------------------------



  Electronically signed by Alcides Gibbs MD(Interpreting



  physician) on 2018 12:12 PM



  ----------------------------------------------------------------



 



  Findings



 



  Left Ventricle         The LV endocardium is well visualized.



                         The left ventricle is chamber size (by PSLAX



                         dimension) is normal (male - LVIDd 4.2-5.8cm) .



                         LV septal thickness is normal (0.6-1.1cm). LV



                         posterior wall thickness is mildly increased .



                         LV segments contract normally.



                         LVEF by Guy's method of disk assessment is



                         normal (60%) .



                         Normal diastolic function with normal LV filling



                         pressure (LAP) at rest.



 



  Left Atrium            LA size is normal (16-34 ml/m2) .



 



  Right Ventricle        The right ventricular chamber size and systolic



                         function are within normal limits.



 



  Right Atrium           RA size is normal.



 



  Aortic Valve           Normal AoV structure.



                         No evidence of aortic regurgitation.



 



  Mitral Valve           Mild MV leaflet thickening.



                         Trace mitral regurgitation.



 



  Tricuspid Valve        Mild tricuspid regurgitation.



                         Estimated peak systolic PA pressure is 30-35 mmHg .



 



  Pulmonic Valve         Normal PV structure and function.



 



  Aorta                  Aortic root size (SInus of Valsalva diameter) is



                         normal .



                         Proximal ascending aorta size is normal .



 



  Pericardium            No significant pericardial effusion is visualized.



 



  IVC/SVC/PA/PV/Pleural  The estimated RA pressure by IVC dynamics 5-10mmHg



                         .



 



 Chambers/Structures



 



  Left Atrium



 



  LA Volume: 59.91 ml                     LA Area: 21.63 cm^2



  LA Vol. Index: 27 ml/m^2



 



  Left Ventricle



 



  LVIDd: 4.83 cm



  LVIDs: 2.99 cm



  LV Septum Diastolic: 1.01 cm



  LV PW Diastolic: 1.22 cm                    LV FS: 38.1 %



  LVEDV Guy's:73.35 ml



  LVESV Guy's:25.17 ml                    LVEDVI: 33 ml/m^2



  LVEF Guy's: 65.7 %                      LVESVI: 11 ml/m^2



 



  LVOT Diameter: 2.34 cm



 



  Right Atrium



 



          RA Vol. (Sngl Plane): 38.77 ml



 



  Right Ventricle



 



                     TAPSE: 1.64 cm



 



 Aorta



 



  Ao Root S of Krupa.: 3.37 cm              Ascending Aorta: 2.63 cm



 



 Doppler/Quantitative Measurements



 



  Mitral Valve



 



  MV Peak E-Wave: 0.61 m/s              MV Peak A-Wave: 0.47 m/s



                                        E/A Ratio: 1.31



                                        Peak Gradient: 1.5 mmHg



                                        Deceleration Time: 179.6 msec



 



  MV Bharathi. Peak:



 



  Tissue Doppler



 



  E' Septal Velocity: 0.08 m/s          E/E': 7.29



  E' Lateral Velocity: 0.17 m/s



 



  Aortic Valve



 



  Peak Velocity: 1.14 m/s                  Mean Velocity: 0.78 m/s



  Peak Gradient: 5.21 mmHg                 Mean Gradient: 2.77 mmHg



  AV Area (continuity): 3.71 cm^2



  AV VTI: 21 cm



 



  AV DVI: 0.86



 



  LVOT



 



  Peak Velocity: 0.86 m/s             Peak Gradient: 2.99 mmHg



  Mean Velocity: 0.62 m/s             Mean Gradient: 1.72 mmHg



  LVOT Diameter: 2.34 cm              LVOT VTI: 18.12 cm



  LVOT Area: 4.3 cm^2                 LVOT SV:77.89 ml



  LVOT CO: 6.15 l/min                 LVOT CI: 2.77 l/min/m^2



 



  Tricuspid Valve



 



  TR Velocity: 2.41 m/s



  TR Gradient: 23.19 mmHg



 









 Performing Organization  Address  City/State/Zipcode  Phone Number

 

 [facility]      



CBC with platelet count + automated diff (2018  8:41 AM CST)Only the most 
recent of2 resultswithin the time period is included.





 Component  Value  Ref Range  Performed At

 

 WBC  4.9  3.5 - 10.5 K/L  [facility]

 

 RBC  5.37  4.63 - 6.08 M/L  [facility]

 

 Hemoglobin  15.5  13.7 - 17.5 GM/DL  [facility]

 

 Hematocrit  46.8  40.1 - 51.0 %  [facility]

 

 MCV  87.2  79.0 - 92.2 fL  [facility]

 

 MCH  28.9  25.7 - 32.2 pg  [facility]

 

 MCHC  33.1  32.3 - 36.5 GM/DL  [facility]

 

 RDW  13.3  11.6 - 14.4 %  [facility]

 

 Platelets  130 (L)  150 - 450 K/CU MM  [facility]

 

 MPV  9.6  9.4 - 12.4 fL  [facility]

 

 nRBC  0  0 - 0 /100 WBC  [facility]

 

 % Neutros  65  %  [facility]

 

 % Lymphs  20  %  [facility]

 

 % Monos  10  %  [facility]

 

 % Eos  4  %  [facility]

 

 % Baso  1  %  [facility]

 

 # Neutros  3.16  1.78 - 5.38 K/L  [facility]

 

 # Lymphs  0.99 (L)  1.32 - 3.57 K/L  [facility]

 

 # Monos  0.49  0.30 - 0.82 K/L  [facility]

 

 # Eos  0.17  0.04 - 0.54 K/L  [facility]

 

 # Baso  0.04  0.01 - 0.08 K/L  [facility]

 

 Immature Granulocytes-Relative  1  0 - 1 %  [facility]









 Specimen

 

 Blood









 Performing Organization  Address  City/State/Zipcode  Phone Number

 

 [address] [address] [phone]
[phone]



 CENTER      



Basic Metabolic Panel (2018  8:41 AM CST)Only the most recent of3 
resultswithin the time period is included.





 Component  Value  Ref Range  Performed At

 

 Sodium  140  136 - 145 meq/L  [facility]

 

 Potassium  4.7  3.5 - 5.1 meq/L  [facility]

 

 Chloride  103  98 - 107 meq/L  [facility]

 

 CO2  30 (H)  22 - 29 meq/L  [facility]

 

 BUN  20  7 - 21 mg/dL  [facility]

 

 Creatinine  1.08  0.57 - 1.25 mg/dL  [facility]

 

 Glucose  110 (H)  70 - 105 mg/dL  [facility]

 

 Calcium  10.0  8.4 - 10.2 mg/dL  [facility]

 

 EGFR  73Comment: ESTIMATED GFR IS  mL/min/1.73 sq m  [facility]



   NOT AS ACCURATE AS CREATININE    MED[facility]ENTER



   CLEARANCE IN PREDICTING    



   GLOMERULAR FILTRATION RATE.    



   ESTIMATED GFR IS NOT    



   APPLICABLE FOR DIALYSIS    



   PATIENTS.    









 Specimen

 

 Blood









 Performing Organization  Address  City/State/Zipcode  Phone Number

 

 [facility]  [address] [place] [address] [phone]
[phone]



 CENTER      



Tacrolimus level (2018 10:30 AM CDT)Only the most recent of4 
resultswithin the time period is included.





 Component  Value  Ref Range  Performed At

 

 Tacrolimus Lvl  6.4    









 Specimen

 

 Blood









 Narrative  Performed At

 

 This result has an attachment that is not available.



  



CBC with platelet count + automated diff (2018 10:30 AM CDT)Only the most 
recent of2 resultswithin the time period is included.





 Component  Value  Ref Range  Performed At

 

 WBC (MANUAL)  4.1  10^3/mL  

 

 RBC (MANUAL)  5.60  10^6/L  

 

 HEMOGLOBIN (MANUAL)  16.0  GM/DL  

 

 HEMATOCRIT (MANUAL)  48.1  %  

 

 MCV (MANUAL)  86.0  fL  

 

 MCH (MANUAL)  28.6  pg  

 

 MCHC (MANUAL)  33.3  GM/DL  

 

 RDW (MANUAL)  14.0  %  

 

 PLATELETS (MANUAL)  163  K/CU MM  

 

 MPV (MANUAL)  8.3  fL  

 

 NRBC (MANUAL)    /100 WBC  

 

 NEUTROS PCT (MANUAL)  53.0  %  

 

 LYMPHS PCT (MANUAL)  29.8  %  

 

 MONOS PCT (MANUAL)  11.3  %  

 

 EOS PCT (MANUAL)  5.0  %  

 

 BASOS PCT (MANUAL)  0.9  %  

 

 NEUTROS ABS (MANUAL)  2.2  K/L  

 

 LYMPHS ABS (MANUAL)  1.2  K/L  

 

 MONOS ABS (MANUAL)  0.5  K/L  

 

 EOS ABS (MANUAL)  0.2  K/L  

 

 BASOS ABS (MANUAL)  0.0  K/L  









 Specimen

 

 Blood



Hemoglobin A1c (2018 10:30 AM CDT)





 Component  Value  Ref Range  Performed At

 

 Hemoglobin A1C  5.3  4.0 - 6.0 %  









 Specimen

 

 Blood



ECHOCARDIOGRAM REPORT - SCAN (2018  7:20 AM CDT)





 Narrative  Performed At

 

 This result has an attachment that is not available.



  



CARDIAC CATH REPORT - SCAN (2018  3:01 PM CDT)





 Narrative  Performed At

 

 This result has an attachment that is not available.



  



2D echo w/ doppler (cw/pw/color) (2018  2:02 PM CDT)





 Component  Value  Ref Range  Performed At

 

 Ejection Fraction      [facility]









 Narrative  Performed At

 

 Transthoracic Echocardiography Report (TTE)



  [facility]CHO [facility]ART[facility] [address] [facility] [facility]



  



  



  Demographics



  



  



  



  Patient Name Lulu BRUNO of  



 Study 2018



  



 WOOD



  



  



  



  [id number]  



 GenderMale



  



  



  



  Visit Number  



 [id number]  



 Unknown



  



  



  



  [id number]  



 Room Number



  



  Number



  



  



  



  Date of  



 Birth1970Referring  



 Physician MD Eze Luo MD



  



  



  



  Age49  



 year(s)Sonographer  



 Pasha Gill [facility]



  



  



  



  AnalystAlex Shelbi  



 Physician TRU Vivas



  



  



  



 Procedure



  



  



  



  Type of



  



  Study TTE procedure:2DECHO W  



 DOPPLER(CW/PW/COLOR) (Routine)



  



  



  



 Indications:Heart Transplant Follow up.



  



  



  



 Clinical History



  



 Congestive Heart Failure



  



 Coronary Artery Disease



  



 Hypertension



  



 Ischemic Cardiomyopathy



  



 OHT 5/14/15



  



  



  



 Height: 74 inches Weight: 103.87 kg (229 lbs) BSA:  



 2.3 m^2 BMI: 29.4 kg/m^2



  



  



  



 HR: 73 bpm BP: 124/79 mmHg



  



  



  



  Summary



  



  Regular sinus rhythm during the exam.



  



  The LV apex is incompletely visualized due to  



 foreshortening.



  



  The other segments have low normal contractility.



  



  LVEF by Guy's method of disk assessment is  



 low normal (50-55%).



  



  A trace of tricuspid regurgitation.



  



  Estimated peak systolic PA pressure is 25-30 mmHg  



 .



  



  Normal TV structure.



  



  No evidence of pericardial effusion.



  



  



  



  Previous Study



  



  In comparison with the prior exam 2017 the  



 following changes are



  



  noted: mildly depressed LVEF .



  



  



  



  Signature



  



  



  



  -------------------------------------------------  



 ---------------



  



  Electronically signed by Vic Garcia MD(Interpreting



  



  physician) on 2018 06:33 PM



  



  -------------------------------------------------  



 ---------------



  



  



  



  Findings



  



  



  



  Rhythm/BPRegular  



 sinus rhythm during the exam.



  



  



  



  Left Ventricle The LV apex is  



 incompletely visualized due to



  



 fo  



 reshortening.



  



 Th  



 e other segments have low normal contractility.



  



 LV  



 EF by Guy's method of disk assessment is low



  



 no  



 rmal (50-55%).



  



  



  



  Left AtriumLA morphology  



 consistent with orthotoptic heart



  



 tr  



 ansplant.



  



  



  



  Right VentricleRV chamber size is  



 mildly enlarged .



  



 Gl  



 obal RV systolic function is normal .



  



  



  



  Right Atrium RA cavity size  



 is consistent with orthotopic heart



  



 tr  



 ansplantation .



  



  



  



  Aortic Valve Normal AoV  



 structure and function.



  



  



  



  Mitral Valve Normal MV  



 structure and function.



  



  



  



  Tricuspid ValveA trace of  



 tricuspid regurgitation.



  



 Es  



 timated peak systolic PA pressure is 25-30 mmHg .



  



 No  



 rmal TV structure.



  



  



  



  Pulmonic Valve Normal PV  



 structure and function by limited views



  



 an  



 d Doppler.



  



  



  



  AortaAortic  



 root size (SInus of Valsalva diameter) is



  



 no  



 rmal .



  



  



  



  PericardiumNo evidence of  



 pericardial effusion.



  



  



  



  IVC/SVC/PA/PV/PleuralThe estimated RA  



 pressure by IVC dynamics 5-10mmHg



  



 .



  



  



  



 Chambers/Structures



  



  



  



  Left Atrium



  



  



  



  LA Dimension: 4.78 cm



  



  



  



  Left Ventricle



  



  



  



  LVIDd: 5.29 cm



  



  LVIDs: 3.41 cm



  



  LV Septum Diastolic: 0.98 cm



  



  LV PW Diastolic: 1.02  



 cm LV Length:  



 7.23 cm



  



  LVEDV Guy's:91.24  



 ml LV FS: 35.5  



 %



  



  LVESV Guy's:36.89 ml



  



  LVEF Guy's: 59.6  



 % LVEDVI:  



 40 ml/m^2



  



   



 LVESVI: 16  



 ml/m^2



  



  LVOT Diameter: 2.41 cm



  



  



  



 Aorta



  



  



  



  Ao Root S of Krupa.: 2.96 cm



  



  



  



 Doppler/Quantitative Measurements



  



  



  



  LVOT



  



  



  



  Peak Velocity: 0.65 m/s  



 Peak Gradient: 1.7 mmHg



  



  Mean Velocity: 0.39 m/s  



 Mean Gradient: 0.75 mmHg



  



  LVOT Diameter: 2.41  



 cmLVOT VTI: 12.26 cm



  



  LVOT Area: 4.56  



 cm^2LVOT SV:55.9  



 ml



  



  LVOT CO: 4.08  



 l/min LVOT CI:  



 1.77 l/min/m^2



  



   









 Procedure Note

 

 Interface, External Ris In - 2018  6:34 PM CDT



Transthoracic Echocardiography Report (TTE)



 



  Demographics



 



  Patient Name   TYRA BRUNO    Date of Study       2018



                 [place]



 



  MRN            [id number]          Gender              Male



 



  Visit Number   [id number]        Race                Unknown



 



  Accession      [id number]         Room Number



  Number



 



  Date of Birth  1970        Referring Physician MD Eze Luo MD



 



  Age            48 year(s)        Sonographer         Pasha Gill [facility]



 



  Analyst        Fer Garcia



                                   Physician           MD



 



 Procedure



 



  Type of



  Study     TTE procedure:2DECHO W DOPPLER(CW/PW/COLOR) (Routine)



 



 Indications:Heart Transplant Follow up.



 



 Clinical History



 Congestive Heart Failure



 Coronary Artery Disease



 Hypertension



 Ischemic Cardiomyopathy



 OHT 5/14/15



 



 Height: 74 inches Weight: 103.87 kg (229 lbs) BSA: 2.3 m^2 BMI: 29.4 kg/m^2



 



 HR: 73 bpm BP: 124/79 mmHg



 



  Summary



  Regular sinus rhythm during the exam.



  The LV apex is incompletely visualized due to foreshortening.



  The other segments have low normal contractility.



  LVEF by Guy's method of disk assessment is low normal (50-55%).



  A trace of tricuspid regurgitation.



  Estimated peak systolic PA pressure is 25-30 mmHg .



  Normal TV structure.



  No evidence of pericardial effusion.



 



  Previous Study



  In comparison with the prior exam 2017 the following changes are



  noted: mildly depressed LVEF .



 



  Signature



 



  ----------------------------------------------------------------



  Electronically signed by Vic Garcia MD(Interpreting



  physician) on 2018 06:33 PM



  ----------------------------------------------------------------



 



  Findings



 



  Rhythm/BP              Regular sinus rhythm during the exam.



 



  Left Ventricle         The LV apex is incompletely visualized due to



                         foreshortening.



                         The other segments have low normal contractility.



                         LVEF by Guy's method of disk assessment is low



                         normal (50-55%).



 



  Left Atrium            LA morphology consistent with orthotoptic heart



                         transplant.



 



  Right Ventricle        RV chamber size is mildly enlarged .



                         Global RV systolic function is normal .



 



  Right Atrium           RA cavity size is consistent with orthotopic heart



                         transplantation .



 



  Aortic Valve           Normal AoV structure and function.



 



  Mitral Valve           Normal MV structure and function.



 



  Tricuspid Valve        A trace of tricuspid regurgitation.



                         Estimated peak systolic PA pressure is 25-30 mmHg .



                         Normal TV structure.



 



  Pulmonic Valve         Normal PV structure and function by limited views



                         and Doppler.



 



  Aorta                  Aortic root size (SInus of Valsalva diameter) is



                         normal .



 



  Pericardium            No evidence of pericardial effusion.



 



  IVC/SVC/PA/PV/Pleural  The estimated RA pressure by IVC dynamics 5-10mmHg



                         .



 



 Chambers/Structures



 



  Left Atrium



 



  LA Dimension: 4.78 cm



 



  Left Ventricle



 



  LVIDd: 5.29 cm



  LVIDs: 3.41 cm



  LV Septum Diastolic: 0.98 cm



  LV PW Diastolic: 1.02 cm                   LV Length: 7.23 cm



  LVEDV Guy's:91.24 ml                   LV FS: 35.5 %



  LVESV Guy's:36.89 ml



  LVEF Guy's: 59.6 %                     LVEDVI: 40 ml/m^2



                                             LVESVI: 16 ml/m^2



  LVOT Diameter: 2.41 cm



 



 Aorta



 



  Ao Root S of Krupa.: 2.96 cm



 



 Doppler/Quantitative Measurements



 



  LVOT



 



  Peak Velocity: 0.65 m/s             Peak Gradient: 1.7 mmHg



  Mean Velocity: 0.39 m/s             Mean Gradient: 0.75 mmHg



  LVOT Diameter: 2.41 cm              LVOT VTI: 12.26 cm



  LVOT Area: 4.56 cm^2                LVOT SV:55.9 ml



  LVOT CO: 4.08 l/min                 LVOT CI: 1.77 l/min/m^2



 









 Performing Organization  Address  City/State/Zipcode  Phone Number

 

 [facility]      



XR chest 2 views (2018  1:50 PM CDT)





 Narrative  Performed At

 

 FINAL REPORT



  [facility]



  



  



 PATIENT ID: [id number]



  



  



  



 Chest two views



  



  



  



 INDICATION: Heart transplant annual follow-up.



  



  



  



 COMPARISON: 2017



  



  



  



 IMPRESSION:



  



  



  



 There is no focal consolidation, vascular congestion, pleural 



  



 effusion, or pneumothorax. Heart size is within normal limits. Median 



  



 sternotomy changes, left axillary surgical clips, and gastric sleeve 



  



 with small hiatal hernia are again noted.



  



  



  



 No significant change since 2017.



  



  



  



 Signed: Richard Nickerson MD



  



 Report Verified Date/Time:2018 14:13:48



  



  



  



 Reading Location: [facility] C0W Consult Reading Room



  



  Electronically signed by: RICHARD NICKERSON M.D. on 2018  



 02:13 PM



  



   









 Procedure Note

 

 Interface, External Ris In - 2018  2:15 PM CDT



FINAL REPORT



 



 PATIENT ID:   [id number]



 



 Chest two views



 



 INDICATION: Heart transplant annual follow-up.



 



 COMPARISON: 2017



 



 IMPRESSION:



 



 There is no focal consolidation, vascular congestion, pleural



 effusion, or pneumothorax. Heart size is within normal limits. Median



 sternotomy changes, left axillary surgical clips, and gastric sleeve



 with small hiatal hernia are again noted.



 



 No significant change since 2017.



 



 Signed: Richard Nickerson MD



 Report Verified Date/Time:  2018 14:13:48



 



 Reading Location: [facility] C013 Consult Reading Room



    Electronically signed by: RICHARD NICKERSON M.D. on 2018 02:13 PM



 









 Performing Organization  Address  City/State[facility]  Phone Number

 

 [facility]      



ECG 12 lead (2018 11:50 AM CDT)





 Narrative  Performed At

 

 Ventricular Rate 73 BPM



  [facility]



 Atrial Rate 73 BPM



  



 P-R Interval 158 ms



  



 QRS Duration 94 ms



  



 Q-T Interval 392 ms



  



 QTC Calculation(Bazett) 431 ms



  



 P Axis 58 degrees



  



 R Axis 73 degrees



  



 T Axis 56 degrees



  



  



  



 Normal sinus rhythm



  



 Incomplete right bundle branch block



  



 Borderline ECG



  



 When compared with ECG of 04-MAR-2017 20:13,



  



 No significant change was found



  



  



  



 Confirmed by Dana Loo ([id number]) on 2018 8:56:21 PM  









 Procedure Note

 

 Interface, External Ris In - 2018  8:56 PM CDT



Ventricular Rate 73 BPM



 Atrial Rate 73 BPM



 P-R Interval 158 ms



 QRS Duration 94 ms



 Q-T Interval 392 ms



 QTC Calculation(Bazett) 431 ms



 P Axis 58 degrees



 R Axis 73 degrees



 T Axis 56 degrees



 



 Normal sinus rhythm



 Incomplete right bundle branch block



 Borderline ECG



 When compared with ECG of 04-MAR-2017 20:13,



 No significant change was found



 



 Confirmed by Dana Loo ([id number]) on 2018 8:56:21 PM









 Performing Organization  Address  City/[facility]  Phone Number

 

 [facility]      



Prothrombin time/INR (2018 11:07 AM CDT)





 Component  Value  Ref Range  Performed At

 

 Protime  14.7  11.7 - 14.7 seconds  [facility]

 

 INR  1.2  <=5.9  [facility]









 Specimen

 

 Blood









 Narrative  Performed At

 

  



  [facility]



 RECOMMENDED COUMADIN/WARFARIN INR THERAPY  



 RANGES



  



 STANDARD DOSE: 2.0 - 3.0 Includes:  



 PROPHYLAXIS for venous thrombosis, systemic  



 embolization; TREATMENT for venous thrombosis  



 and/or pulmonary embolus.



  



 HIGH RISK: Target INR is 2.5-3.5 for patients  



 with mechanical heart valves.  









 Performing Organization  Address  City/[facility]/Zipcode  Phone Number

 

 [address] [address] [phone]
[phone]



 [place]      



Lipid panel (2018 11:07 AM CDT)





 Component  Value  Ref Range  Performed At

 

 Triglycerides  87  mg/dL  [facility]

 

 Cholesterol  127  mg/dL  [facility]

 

 HDL  37  mg/dL  [facility]

 

 LDL Calculated  73  mg/dL  [facility]









 Specimen

 

 Blood









 Narrative  Performed At

 

  



  [facility]



 Triglyceride Reference Range:



  



  Low Risk <150



  



  [id number]



  



  High Risk 200-499



  



  Very High Risk>=500



  



  



  



 Cholesterol Reference Range:



  



  Low Risk <200



  



  [id number] 



  



  High Risk>240



  



  



  



 HDL Cholesterol Reference Range:



  



  Low Risk >=60



  



  High Risk <40



  



  



  



 LDL Cholesterol Reference Range:



  



  Optimal<100



  



  Near [id number]



  



  [id number]



  



  [id number]



  



  Very High >=190  









 Performing Organization  Address  City/[facility]/Zipcode  Phone Number

 

 [address] [address] [phone]
[phone]



 [place]      



Comprehensive metabolic panel (2018 11:07 AM CDT)





 Component  Value  Ref Range  Performed At

 

 Protein, Total  6.5  6.0 - 8.3 gm/dL  [facility]

 

 Albumin  4.2  3.5 - 5.0 g/dL  [facility]

 

 Alkaline Phosphatase  98  40 - 150 U/L  [facility]

 

 Total Bilirubin  0.4  0.2 - 1.2 mg/dL  [facility]

 

 Sodium  141  136 - 145 meq/L  [facility]

 

 Potassium  4.3  3.5 - 5.1 meq/L  [facility]

 

 Chloride  103  98 - 107 meq/L  [facility]

 

 CO2  29  22 - 29 meq/L  [facility]

 

 BUN  17  7 - 21 mg/dL  [facility]

 

 Creatinine  1.00  0.57 - 1.25 mg/dL  [facility]

 

 Glucose  101  70 - 105 mg/dL  [facility]

 

 Calcium  9.5  8.4 - 10.2 mg/dL  [facility]

 

 AST  15  5 - 34 U/L  [facility]

 

 ALT  12  6 - 55 U/L  [facility]

 

 EGFR  79Comment: ESTIMATED GFR  mL/min/1.73 sq m  [facility]



   IS NOT AS ACCURATE AS    [facility]



   CREATININE CLEARANCE IN    



   PREDICTING GLOMERULAR    



   FILTRATION RATE.    



   ESTIMATED GFR IS NOT    



   APPLICABLE FOR DIALYSIS    



   PATIENTS.    









 Specimen

 

 Blood









 Performing Organization  Address  City/State/Zipcode  Phone Number

 

 [facility]  [address] [place] [address]  [phone]
[phone]



 CENTER      



after 2017



Insurance







 Payer  Benefit Plan /  Subscriber ID  Type  Phone  Address



   Group        

 

 BLUE CROSS/BLUE  [employer]  xxxxxxxxxxxx  PPO  [phone]  [address]







 [employer]  [place]



           [address]









 Guarantor Name  Account Type  Relation to  Date of  Phone  Billing



     Patient  Birth    Address

 

 Tyra Bruno  Personal/Family  Self  1970  [phone] [address]        ([place])  [place] [address]







Advance Directives

For more information, please contact:[address] [phone]





 Code Status  Date Activated  Date Inactivated  Comments

 

 Full Code  2018 10:57 AM  2018  9:33 PM  









 This code status was determined by:  Patient  









       

 

 Full Code  2015 11:49 AM  2015  5:04 PM  









 This code status was determined by:  Patient  









       

 

 Full Code  10/29/2015  2:29 PM  10/31/2015  3:02 PM  









 This code status was determined by:  Patient  









       

 

 Full Code  2015 12:39 PM  2015  7:25 PM  









 This code status was determined by:  Patient  









       

 

 Full Code  2015 11:11 AM  2015  7:28 PM  









 This code status was determined by:  Patient

## 2018-12-31 NOTE — XMS REPORT
Patient Summary Document

 Created on:2018



Patient:TYRA BRUNO

Sex:Male

:1970

External Reference #:[id number]





Demographics







 Address  [address] [place] [address]

 

 Home Phone  [phone]

 

 Work Phone  [phone]

 

 Email Address  [email or web address]

 

 Preferred Language  Unknown

 

 Marital Status  Unknown

 

 Evangelical Affiliation  Unknown

 

 Race  Unknown

 

 Additional Race(s)  Unavailable

 

 Ethnic Group  Unknown









Author







 Organization  [facility]

 

 Address  [address] [place] [id number]

 

 Phone  [phone]









Care Team Providers







 Name  Role  Phone

 

 FAWAD SHIRLEY HERNANDEZ  Unavailable  Unavailable

 

 MAURO AWAD  Unavailable  Unavailable

 

 NORBERTO SILVESTRE  Unavailable  Unavailable









Problems

This patient has no known problems.



Allergies, Adverse Reactions, Alerts

This patient has no known allergies or adverse reactions.



Medications

This patient has no known medications.



Results







 Test Description  Test Time  Test Comments  Text Results  Atomic Results  
Result Comments









 BASIC METABOLIC PANEL  2018 09:28:00    









   Test Item  Value  Reference Range  Comments









 SODIUM (BEAKER) (test  140 meq/L  136-145  



 xzdh=879)      

 

 POTASSIUM (BEAKER) (test  4.7 meq/L  3.5-5.1  



 code=379)      

 

 CHLORIDE (BEAKER) (test  103 meq/L    



 code=382)      

 

 CO2 (BEAKER) (test code=355)  30 meq/L  22-29  

 

 BLOOD UREA NITROGEN (BEAKER)  20 mg/dL  7-21  



 (test code=354)      

 

 CREATININE (BEAKER) (test  1.08 mg/dL  0.57-1.25  



 code=358)      

 

 GLUCOSE RANDOM (BEAKER)  110 mg/dL    



 (test code=652)      

 

 CALCIUM (BEAKER) (test  10.0 mg/dL  8.4-10.2  



 code=697)      

 

 EGFR (BEAKER) (test  73 mL/min/1.73 sq m    ESTIMATED GFR IS NOT AS



 code=1092)      ACCURATE AS CREATININE



       CLEARANCE IN PREDICTING



       GLOMERULAR FILTRATION RATE.



       ESTIMATED GFR IS NOT



       APPLICABLE FOR DIALYSIS



       PATIENTS.



CBC W/PLT COUNT &amp; AUTO [id number] 08:58:00





 Test Item  Value  Reference Range  Comments

 

 WHITE BLOOD CELL COUNT (BEAKER) (test code=775)  4.9 K/ L  3.5-10.5  

 

 RED BLOOD CELL COUNT (BEAKER) (test code=761)  5.37 M/ L  4.63-6.08  

 

 HEMOGLOBIN (BEAKER) (test code=410)  15.5 GM/DL  13.7-17.5  

 

 HEMATOCRIT (BEAKER) (test code=411)  46.8 %  40.1-51.0  

 

 MEAN CORPUSCULAR VOLUME (BEAKER) (test code=753)  87.2 fL  79.0-92.2  

 

 MEAN CORPUSCULAR HEMOGLOBIN (BEAKER) (test  28.9 pg  25.7-32.2  



 gvzt=686)      

 

 MEAN CORPUSCULAR HEMOGLOBIN CONC (BEAKER) (test  33.1 GM/DL  32.3-36.5  



 code=752)      

 

 RED CELL DISTRIBUTION WIDTH (BEAKER) (test  13.3 %  11.6-14.4  



 code=412)      

 

 PLATELET COUNT (BEAKER) (test code=756)  130 K/CU MM  150-450  

 

 MEAN PLATELET VOLUME (BEAKER) (test code=754)  9.6 fL  9.4-12.4  

 

 NUCLEATED RED BLOOD CELLS (BEAKER) (test  0 /100 WBC  0-0  



 code=413)      

 

 NEUTROPHILS RELATIVE PERCENT (BEAKER) (test  65 %    



 code=429)      

 

 LYMPHOCYTES RELATIVE PERCENT (BEAKER) (test  20 %    



 code=430)      

 

 MONOCYTES RELATIVE PERCENT (BEAKER) (test  10 %    



 code=431)      

 

 EOSINOPHILS RELATIVE PERCENT (BEAKER) (test  4 %    



 code=432)      

 

 BASOPHILS RELATIVE PERCENT (BEAKER) (test  1 %    



 code=437)      

 

 NEUTROPHILS ABSOLUTE COUNT (BEAKER) (test  3.16 K/ L  1.78-5.38  



 code=670)      

 

 LYMPHOCYTES ABSOLUTE COUNT (BEAKER) (test  0.99 K/ L  1.32-3.57  



 code=414)      

 

 MONOCYTES ABSOLUTE COUNT (BEAKER) (test  0.49 K/ L  0.30-0.82  



 code=415)      

 

 EOSINOPHILS ABSOLUTE COUNT (BEAKER) (test  0.17 K/ L  0.04-0.54  



 code=416)      

 

 BASOPHILS ABSOLUTE COUNT (BEAKER) (test  0.04 K/ L  0.01-0.08  



 code=417)      

 

 IMMATURE GRANULOCYTES-RELATIVE PERCENT (BEAKER)  1 %  0-1  



 (test code=2801)      



TACROLIMUS [id number] 13:26:00





 Test Item  Value  Reference Range  Comments

 

 TACROLIMUS BLOOD (BEAKER) (test code=657)  4.1 ng/mL  10.0-20.0  



RAD, CHEST, 2 [id number] 14:13:00NEW CARDIOLOGIST OBERTONReason for Exam:-&
gt;heart transplant annual follow upFINAL REPORT PATIENT ID:   [id number] Chest 
two views INDICATION: Heart transplant annual follow-up. COMPARISON: 2017 
IMPRESSION: There is no focal consolidation, vascular congestion, pleural 
effusion, or pneumothorax. Heart size is within normal limits. Median 
sternotomy changes, left axillary surgical clips, and gastric sleeve with small 
hiatal hernia are again noted. No significant change since 2017. Signed: 
Richard Nickerson MDReport Verified Date/Time:  2018 14:13:48 Reading 
Location: [facility] C013 Consult Reading Room   Electronically signed by: 
RICHARD NICKERSON M.D. on 2018 02:13 PMTACROLIMUS [id number] 13:55
:00





 Test Item  Value  Reference Range  Comments

 

 TACROLIMUS BLOOD (BEAKER) (test code=657)  4.7 ng/mL  10.0-20.0  



COMPREHENSIVE METABOLIC [id number] 12:11:00





 Test Item  Value  Reference Range  Comments

 

 TOTAL PROTEIN (BEAKER)  6.5 gm/dL  6.0-8.3  



 (test code=770)      

 

 ALBUMIN (BEAKER) (test  4.2 g/dL  3.5-5.0  



 code=1145)      

 

 ALKALINE PHOSPHATASE  98 U/L    



 (BEAKER) (test code=346)      

 

 BILIRUBIN TOTAL (BEAKER)  0.4 mg/dL  0.2-1.2  



 (test code=377)      

 

 SODIUM (BEAKER) (test  141 meq/L  136-145  



 qlqv=409)      

 

 POTASSIUM (BEAKER) (test  4.3 meq/L  3.5-5.1  



 code=379)      

 

 CHLORIDE (BEAKER) (test  103 meq/L    



 code=382)      

 

 CO2 (BEAKER) (test  29 meq/L  22-29  



 code=355)      

 

 BLOOD UREA NITROGEN  17 mg/dL  7-21  



 (BEAKER) (test code=354)      

 

 CREATININE (BEAKER) (test  1.00 mg/dL  0.57-1.25  



 code=358)      

 

 GLUCOSE RANDOM (BEAKER)  101 mg/dL    



 (test code=652)      

 

 CALCIUM (BEAKER) (test  9.5 mg/dL  8.4-10.2  



 code=697)      

 

 AST (SGOT) (BEAKER) (test  15 U/L  5-34  



 code=353)      

 

 ALT (SGPT) (BEAKER) (test  12 U/L  6-55  



 code=347)      

 

 EGFR (BEAKER) (test  79 mL/min/1.73 sq m    ESTIMATED GFR IS NOT AS



 code=1092)      ACCURATE AS CREATININE



       CLEARANCE IN PREDICTING



       GLOMERULAR FILTRATION



       RATE. ESTIMATED GFR IS



       NOT APPLICABLE FOR



       DIALYSIS PATIENTS.



LIPID [id number] 11:47:00





 Test Item  Value  Reference Range  Comments

 

 TRIGLYCERIDES (BEAKER) (test code=540)  87 mg/dL    

 

 CHOLESTEROL (BEAKER) (test code=631)  127 mg/dL    

 

 HDL CHOLESTEROL (BEAKER) (test code=976)  37 mg/dL    

 

 LDL CHOLESTEROL CALCULATED (BEAKER) (test  73 mg/dL    



 code=633)      



Triglyceride Reference Range:   Low Risk         &lt;150   Borderline    150-
199   High Risk     200-499   Very High Risk  &gt;=500Cholesterol Reference 
Range:   Low Risk         &lt;200   Borderline 200-239    High Risk        &gt;
240HDL Cholesterol Reference Range:   Low Risk         &gt;=60   High Risk     
    &lt;40LDL Cholesterol Reference Range:   Optimal          &lt;100   Near 
Optimal  100-129   Borderline    130-159   High          160-189   Very High   
    &gt;=190PROTHROMBIN TIME/[id number] 11:30:00





 Test Item  Value  Reference Range  Comments

 

 PROTIME (BEAKER) (test code=759)  14.7 seconds  11.7-14.7  

 

 INR (BEAKER) (test code=370)  1.2  <=5.9  



RECOMMENDED COUMADIN/WARFARIN INR THERAPY RANGESSTANDARD DOSE: 2.0 - 3.0   
Includes: PROPHYLAXIS forvenous thrombosis, systemic embolization; TREATMENT 
for venous thrombosis and/or pulmonary embolus.HIGH RISK: Target INR is 2.5-3.5 
for patients with mechanical heart valves.CBC W/PLT COUNT &amp; AUTO 
[id number] 11:23:00





 Test Item  Value  Reference Range  Comments

 

 WHITE BLOOD CELL COUNT (BEAKER) (test code=775)  4.8 K/ L  3.5-10.5  

 

 RED BLOOD CELL COUNT (BEAKER) (test code=761)  5.26 M/ L  4.63-6.08  

 

 HEMOGLOBIN (BEAKER) (test code=410)  15.1 GM/DL  13.7-17.5  

 

 HEMATOCRIT (BEAKER) (test code=411)  45.8 %  40.1-51.0  

 

 MEAN CORPUSCULAR VOLUME (BEAKER) (test code=753)  87.1 fL  79.0-92.2  

 

 MEAN CORPUSCULAR HEMOGLOBIN (BEAKER) (test  28.7 pg  25.7-32.2  



 kbgt=163)      

 

 MEAN CORPUSCULAR HEMOGLOBIN CONC (BEAKER) (test  33.0 GM/DL  32.3-36.5  



 code=752)      

 

 RED CELL DISTRIBUTION WIDTH (BEAKER) (test  13.4 %  11.6-14.4  



 code=412)      

 

 PLATELET COUNT (BEAKER) (test code=756)  161 K/CU MM  150-450  

 

 MEAN PLATELET VOLUME (BEAKER) (test code=754)  9.0 fL  9.4-12.4  

 

 NUCLEATED RED BLOOD CELLS (BEAKER) (test  0 /100 WBC  0-0  



 code=413)      

 

 NEUTROPHILS RELATIVE PERCENT (BEAKER) (test  67 %    



 code=429)      

 

 LYMPHOCYTES RELATIVE PERCENT (BEAKER) (test  19 %    



 code=430)      

 

 MONOCYTES RELATIVE PERCENT (BEAKER) (test  10 %    



 code=431)      

 

 EOSINOPHILS RELATIVE PERCENT (BEAKER) (test  3 %    



 code=432)      

 

 BASOPHILS RELATIVE PERCENT (BEAKER) (test  1 %    



 code=437)      

 

 NEUTROPHILS ABSOLUTE COUNT (BEAKER) (test  3.21 K/ L  1.78-5.38  



 code=670)      

 

 LYMPHOCYTES ABSOLUTE COUNT (BEAKER) (test  0.93 K/ L  1.32-3.57  



 code=414)      

 

 MONOCYTES ABSOLUTE COUNT (BEAKER) (test  0.48 K/ L  0.30-0.82  



 code=415)      

 

 EOSINOPHILS ABSOLUTE COUNT (BEAKER) (test  0.14 K/ L  0.04-0.54  



 code=416)      

 

 BASOPHILS ABSOLUTE COUNT (BEAKER) (test  0.04 K/ L  0.01-0.08  



 code=417)      

 

 IMMATURE GRANULOCYTES-RELATIVE PERCENT (BEAKER)  1 %  0-1  



 (test code=2801)      



CT, CHEST, WITHOUT [id number] 09:10:00NEW CARDIOLOGIST OBERTONFINAL 
REPORT PATIENT ID:   [id number] INDICATION: 47-year-old male with chest wall pain 
and history ofheart transplant. COMPARISON:Chest radiograph 2017 
TECHNIQUE: Chest CT exam WITHOUT intravenous contrast. The exam was performed 
according to our department dose-optimization protocol, which includes 
automated exposure control, adjustments of mA and kV according to patient size. 
Iterative reconstructions are also sometimes employed. FINDINGS:No chest wall 
mass, chest wall hematoma, rib fracture, or rib lesion is demonstrated. There 
is a healed median sternotomy. Heart is normal in size and there is no 
pericardial effusion. Mild atherosclerotic plaque of the ascending thoracic 
aorta is demonstrated. Thoracic aorta is normal in caliber. 8 mm calcified 
nodule in the right lower lobe represents prior granulomatous infection. No 
pneumonia, pulmonary edema, pleural effusion, or pneumothorax is demonstrated. 
There is no mediastinal or hilar lymphadenopathy. Thyroid gland is 
unremarkable. Upper and mid esophagus are unremarkable. Patient is status post 
sleeve gastrectomy and there is a small part of the sleeve herniated in the low 
posterior mediastinum. Partial imaging of the upper abdomen is otherwise 
unremarkable. IMPRESSION: No chest wall mass, muscle tear, rib fracture, or rib 
lesion. No other CT explanation for the patient's chest wall pain. Clear lungs. 
Unremarkable heart transplant. Prior gastric sleeve with small hiatal hernia. 
Signed: Marlee Calhoun MDReport Verified Date/Time:  2017 09:10:45 
Reading Location: [facility] Reading Room - [place] [address] 
Electronically signed by: MARLEE CALHOUN M.D. on 2017 09:10 
AMTACROLIMUS [id number] 13:49:00





 Test Item  Value  Reference Range  Comments

 

 TACROLIMUS BLOOD (BEAKER) (test code=657)  5.3 ng/mL  10.0-20.0  



BASIC METABOLIC [id number] 10:44:00





 Test Item  Value  Reference Range  Comments

 

 SODIUM (BEAKER) (test  141 meq/L  136-145  



 mvez=225)      

 

 POTASSIUM (BEAKER) (test  4.5 meq/L  3.5-5.1  



 code=379)      

 

 CHLORIDE (BEAKER) (test  103 meq/L    



 code=382)      

 

 CO2 (BEAKER) (test  29 meq/L  22-29  



 code=355)      

 

 BLOOD UREA NITROGEN  17 mg/dL  7-21  



 (BEAKER) (test code=354)      

 

 CREATININE (BEAKER) (test  1.09 mg/dL  0.57-1.25  



 code=358)      

 

 GLUCOSE RANDOM (BEAKER)  97 mg/dL    



 (test code=652)      

 

 CALCIUM (BEAKER) (test  9.3 mg/dL  8.4-10.2  



 code=697)      

 

 EGFR (BEAKER) (test  72 mL/min/1.73 sq m    ESTIMATED GFR IS NOT AS



 code=1092)      ACCURATE AS CREATININE



       CLEARANCE IN PREDICTING



       GLOMERULAR FILTRATION



       RATE. ESTIMATED GFR IS



       NOT APPLICABLE FOR



       DIALYSIS PATIENTS.



CBC W/PLT COUNT &amp; AUTO [id number] 09:54:00





 Test Item  Value  Reference Range  Comments

 

 WHITE BLOOD CELL COUNT (BEAKER) (test code=775)  4.0 K/ L  3.5-10.5  

 

 RED BLOOD CELL COUNT (BEAKER) (test code=761)  5.42 M/ L  4.63-6.08  

 

 HEMOGLOBIN (BEAKER) (test code=410)  15.4 GM/DL  13.7-17.5  

 

 HEMATOCRIT (BEAKER) (test code=411)  47.6 %  40.1-51.0  

 

 MEAN CORPUSCULAR VOLUME (BEAKER) (test code=753)  87.8 fL  79.0-92.2  

 

 MEAN CORPUSCULAR HEMOGLOBIN (BEAKER) (test  28.4 pg  25.7-32.2  



 zpqa=802)      

 

 MEAN CORPUSCULAR HEMOGLOBIN CONC (BEAKER) (test  32.4 GM/DL  32.3-36.5  



 code=752)      

 

 RED CELL DISTRIBUTION WIDTH (BEAKER) (test  13.8 %  11.6-14.4  



 code=412)      

 

 PLATELET COUNT (BEAKER) (test code=756)  152 K/CU MM  150-450  

 

 MEAN PLATELET VOLUME (BEAKER) (test code=754)  9.9 fL  9.4-12.4  

 

 NUCLEATED RED BLOOD CELLS (BEAKER) (test  0 /100 WBC  0-0  



 code=413)      

 

 NEUTROPHILS RELATIVE PERCENT (BEAKER) (test  66 %    



 code=429)      

 

 LYMPHOCYTES RELATIVE PERCENT (BEAKER) (test  20 %    



 code=430)      

 

 MONOCYTES RELATIVE PERCENT (BEAKER) (test  11 %    



 code=431)      

 

 EOSINOPHILS RELATIVE PERCENT (BEAKER) (test  2 %    



 code=432)      

 

 BASOPHILS RELATIVE PERCENT (BEAKER) (test  1 %    



 code=437)      

 

 NEUTROPHILS ABSOLUTE COUNT (BEAKER) (test  2.65 K/ L  1.78-5.38  



 code=670)      

 

 LYMPHOCYTES ABSOLUTE COUNT (BEAKER) (test  0.82 K/ L  1.32-3.57  



 code=414)      

 

 MONOCYTES ABSOLUTE COUNT (BEAKER) (test  0.44 K/ L  0.30-0.82  



 code=415)      

 

 EOSINOPHILS ABSOLUTE COUNT (BEAKER) (test  0.08 K/ L  0.04-0.54  



 code=416)      

 

 BASOPHILS ABSOLUTE COUNT (BEAKER) (test  0.04 K/ L  0.01-0.08  



 code=417)      

 

 IMMATURE GRANULOCYTES-RELATIVE PERCENT (BEAKER)  0 %  0-1  



 (test code=2801)      



TACROLIMUS [id number] 14:05:00





 Test Item  Value  Reference Range  Comments

 

 TACROLIMUS BLOOD (BEAKER) (test code=657)  6.5 ng/mL  10.0-20.0  



BASIC METABOLIC [id number] 10:13:00





 Test Item  Value  Reference Range  Comments

 

 SODIUM (BEAKER) (test  140 meq/L  136-145  



 hqhd=731)      

 

 POTASSIUM (BEAKER) (test  4.1 meq/L  3.5-5.1  



 code=379)      

 

 CHLORIDE (BEAKER) (test  109 meq/L    



 code=382)      

 

 CO2 (BEAKER) (test  22 meq/L  22-29  



 code=355)      

 

 BLOOD UREA NITROGEN  15 mg/dL  7-21  



 (BEAKER) (test code=354)      

 

 CREATININE (BEAKER) (test  0.95 mg/dL  0.57-1.25  



 code=358)      

 

 GLUCOSE RANDOM (BEAKER)  99 mg/dL    



 (test code=652)      

 

 CALCIUM (BEAKER) (test  8.8 mg/dL  8.4-10.2  



 code=697)      

 

 EGFR (BEAKER) (test  85 mL/min/1.73 sq m    ESTIMATED GFR IS NOT AS



 code=1092)      ACCURATE AS CREATININE



       CLEARANCE IN PREDICTING



       GLOMERULAR FILTRATION



       RATE. ESTIMATED GFR IS



       NOT APPLICABLE FOR



       DIALYSIS PATIENTS.



CBC W/PLT COUNT &amp; AUTO [id number] 09:42:00





 Test Item  Value  Reference Range  Comments

 

 WHITE BLOOD CELL COUNT (BEAKER) (test code=775)  4.7 K/ L  4.0-10.0  

 

 RED BLOOD CELL COUNT (BEAKER) (test code=761)  5.18 M/ L  4.20-5.80  

 

 HEMOGLOBIN (BEAKER) (test code=410)  15.3 GM/DL  13.0-16.8  

 

 HEMATOCRIT (BEAKER) (test code=411)  47.1 %  40.0-50.0  

 

 MEAN CORPUSCULAR VOLUME (BEAKER) (test code=753)  90.8 fL  82.0-98.0  

 

 MEAN CORPUSCULAR HEMOGLOBIN (BEAKER) (test  29.6 pg  27.0-33.0  



 code=751)      

 

 MEAN CORPUSCULAR HEMOGLOBIN CONC (BEAKER) (test  32.6 GM/DL  32.0-36.0  



 code=752)      

 

 RED CELL DISTRIBUTION WIDTH (BEAKER) (test  15.1 %  10.3-14.2  



 code=412)      

 

 PLATELET COUNT (BEAKER) (test code=756)  129 K/CU MM  150-430  

 

 MEAN PLATELET VOLUME (BEAKER) (test code=754)  7.7 fL  6.5-10.5  

 

 NUCLEATED RED BLOOD CELLS (BEAKER) (test  0 /100 WBC  0-0  



 code=413)      

 

 NEUTROPHILS RELATIVE PERCENT (BEAKER) (test  72 %    



 code=429)      

 

 LYMPHOCYTES RELATIVE PERCENT (BEAKER) (test  18 %    



 code=430)      

 

 MONOCYTES RELATIVE PERCENT (BEAKER) (test  8 %    



 code=431)      

 

 EOSINOPHILS RELATIVE PERCENT (BEAKER) (test  1 %    



 code=432)      

 

 BASOPHILS RELATIVE PERCENT (BEAKER) (test  1 %    



 code=437)      

 

 NEUTROPHILS ABSOLUTE COUNT (BEAKER) (test  3.37 K/ L  1.80-8.00  



 code=670)      

 

 LYMPHOCYTES ABSOLUTE COUNT (BEAKER) (test  0.85 K/ L  1.48-4.50  



 code=414)      

 

 MONOCYTES ABSOLUTE COUNT (BEAKER) (test  0.40 K/ L  0.00-1.30  



 code=415)      

 

 EOSINOPHILS ABSOLUTE COUNT (BEAKER) (test  0.07 K/ L  0.00-0.50  



 code=416)      

 

 BASOPHILS ABSOLUTE COUNT (BEAKER) (test  0.03 K/ L  0.00-0.20  



 code=417)      



[id number] 10:38:00





 Test Item  Value  Reference Range  Comments

 

 MAGNESIUM (BEAKER) (test code=627)  2.2 mg/dL  1.6-2.6  



TACROLIMUS [id number] 10:33:00





 Test Item  Value  Reference Range  Comments

 

 TACROLIMUS BLOOD (BEAKER) (test code=657)  7.1 ng/mL  10.0-20.0  



CBC W/PLT COUNT &amp; AUTO [id number] 08:47:00





 Test Item  Value  Reference Range  Comments

 

 WHITE BLOOD CELL COUNT (BEAKER) (test code=775)  4.1 K/ L  4.0-10.0  

 

 RED BLOOD CELL COUNT (BEAKER) (test code=761)  5.02 M/ L  4.20-5.80  

 

 HEMOGLOBIN (BEAKER) (test code=410)  14.8 GM/DL  13.0-16.8  

 

 HEMATOCRIT (BEAKER) (test code=411)  43.6 %  40.0-50.0  

 

 MEAN CORPUSCULAR VOLUME (BEAKER) (test code=753)  87.0 fL  82.0-98.0  

 

 MEAN CORPUSCULAR HEMOGLOBIN (BEAKER) (test  29.5 pg  27.0-33.0  



 code=751)      

 

 MEAN CORPUSCULAR HEMOGLOBIN CONC (BEAKER) (test  33.9 GM/DL  32.0-36.0  



 code=752)      

 

 RED CELL DISTRIBUTION WIDTH (BEAKER) (test  14.1 %  10.3-14.2  



 code=412)      

 

 PLATELET COUNT (BEAKER) (test code=756)  160 K/CU MM  150-430  

 

 MEAN PLATELET VOLUME (BEAKER) (test code=754)  7.2 fL  6.5-10.5  

 

 NUCLEATED RED BLOOD CELLS (BEAKER) (test  0 /100 WBC  0-0  



 code=413)      

 

 NEUTROPHILS RELATIVE PERCENT (BEAKER) (test  64 %    



 code=429)      

 

 LYMPHOCYTES RELATIVE PERCENT (BEAKER) (test  21 %    



 code=430)      

 

 MONOCYTES RELATIVE PERCENT (BEAKER) (test  12 %    



 code=431)      

 

 EOSINOPHILS RELATIVE PERCENT (BEAKER) (test  2 %    



 code=432)      

 

 BASOPHILS RELATIVE PERCENT (BEAKER) (test  0 %    



 code=437)      

 

 NEUTROPHILS ABSOLUTE COUNT (BEAKER) (test  2.61 K/ L  1.80-8.00  



 code=670)      

 

 LYMPHOCYTES ABSOLUTE COUNT (BEAKER) (test  0.87 K/ L  1.48-4.50  



 code=414)      

 

 MONOCYTES ABSOLUTE COUNT (BEAKER) (test  0.49 K/ L  0.00-1.30  



 code=415)      

 

 EOSINOPHILS ABSOLUTE COUNT (BEAKER) (test  0.09 K/ L  0.00-0.50  



 code=416)      

 

 BASOPHILS ABSOLUTE COUNT (BEAKER) (test  0.01 K/ L  0.00-0.20  



 code=417)      



0.00BASIC METABOLIC [id number] 08:47:00





 Test Item  Value  Reference Range  Comments

 

 SODIUM (BEAKER) (test  142 meq/L  136-145  



 lcgz=797)      

 

 POTASSIUM (BEAKER) (test  3.8 meq/L  3.5-5.1  



 code=379)      

 

 CHLORIDE (BEAKER) (test  109 meq/L    



 code=382)      

 

 CO2 (BEAKER) (test  21 meq/L  22-29  



 code=355)      

 

 BLOOD UREA NITROGEN  22 mg/dL  7-21  



 (BEAKER) (test code=354)      

 

 CREATININE (BEAKER) (test  1.18 mg/dL  0.57-1.25  



 code=358)      

 

 GLUCOSE RANDOM (BEAKER)  99 mg/dL    



 (test code=652)      

 

 CALCIUM (BEAKER) (test  9.1 mg/dL  8.4-10.2  



 code=697)      

 

 EGFR (BEAKER) (test  66 mL/min/1.73 sq m    ESTIMATED GFR IS NOT AS



 code=1092)      ACCURATE AS CREATININE



       CLEARANCE IN PREDICTING



       GLOMERULAR FILTRATION



       RATE. ESTIMATED GFR IS



       NOT APPLICABLE FOR



       DIALYSIS PATIENTS.



URINALYSIS W/ [id number] 00:20:00





 Test Item  Value  Reference Range  Comments

 

 COLOR (BEAKER) (test code=470)  Yellow    

 

 CLARITY (BEAKER) (test code=469)  Slightly Hazy    

 

 SPECIFIC GRAVITY UA (BEAKER) (test  1.050  1.001-1.035  



 code=468)      

 

 PH UA (BEAKER) (test code=467)  5.5  5.0-8.0  

 

 PROTEIN UA (BEAKER) (test code=464)  20 mg/dL  Negative  

 

 GLUCOSE UA (BEAKER) (test code=365)  Negative  Negative  

 

 KETONES UA (BEAKER) (test code=371)  40 mg/dL  Negative  

 

 BILIRUBIN UA (BEAKER) (test code=462)  Negative  Negative  

 

 BLOOD UA (BEAKER) (test code=461)  Negative  Negative  

 

 NITRITE UA (BEAKER) (test code=465)  Negative  Negative  

 

 LEUKOCYTE ESTERASE UA (BEAKER) (test  Negative  Negative  



 kwoh=301)      

 

 UROBILINOGEN UA (BEAKER) (test code=463)  0.2 mg/dL  0.2-1.0  

 

 RBC UA (BEAKER) (test code=519)  1 /HPF    

 

 WBC UA (BEAKER) (test code=520)  3 /HPF    

 

 MUCUS (BEAKER) (test code=1574)  Many    

 

 HYALINE CASTS (BEAKER) (test code=514)  6 /LPF    

 

 SOURCE(BEAKER) (test code=2795)  Urine, Clean Catch    



BASIC METABOLIC [id number] 21:55:00





 Test Item  Value  Reference Range  Comments

 

 SODIUM (BEAKER) (test  139 meq/L  136-145  



 ivtj=716)      

 

 POTASSIUM (BEAKER) (test  5.1 meq/L  3.5-5.1  



 code=379)      

 

 CHLORIDE (BEAKER) (test  107 meq/L    



 code=382)      

 

 CO2 (BEAKER) (test  18 meq/L  22-29  



 code=355)      

 

 BLOOD UREA NITROGEN  26 mg/dL  7-21  



 (BEAKER) (test code=354)      

 

 CREATININE (BEAKER) (test  1.26 mg/dL  0.57-1.25  



 code=358)      

 

 GLUCOSE RANDOM (BEAKER)  89 mg/dL    



 (test code=652)      

 

 CALCIUM (BEAKER) (test  9.1 mg/dL  8.4-10.2  



 code=697)      

 

 EGFR (BEAKER) (test  61 mL/min/1.73 sq m    ESTIMATED GFR IS NOT AS



 code=1092)      ACCURATE AS CREATININE



       CLEARANCE IN PREDICTING



       GLOMERULAR FILTRATION



       RATE. ESTIMATED GFR IS



       NOT APPLICABLE FOR



       DIALYSIS PATIENTS.



HEPATIC FUNCTION [id number] 21:55:00





 Test Item  Value  Reference Range  Comments

 

 TOTAL PROTEIN (BEAKER) (test code=770)  7.1 gm/dL  6.0-8.3  

 

 ALBUMIN (BEAKER) (test code=1145)  4.2 g/dL  3.5-5.0  

 

 BILIRUBIN TOTAL (BEAKER) (test code=377)  0.7 mg/dL  0.2-1.2  

 

 BILIRUBIN DIRECT (BEAKER) (test code=706)  0.2 mg/dL  0.1-0.5  

 

 ALKALINE PHOSPHATASE (BEAKER) (test code=346)  86 U/L    

 

 AST (SGOT) (BEAKER) (test code=353)  28 U/L  5-34  

 

 ALT (SGPT) (BEAKER) (test code=347)  20 U/L  6-55  



[id number] 21:52:00





 Test Item  Value  Reference Range  Comments

 

 LIPASE (BEAKER) (test code=749)  40 U/L  8-78  



B-TYPE NATRIURETIC FACTOR (BNP)2017 21:46:00





 Test Item  Value  Reference Range  Comments

 

 B-TYPE NATRIURETIC PEPTIDE (BEAKER) (test code=700)  21 pg/mL  0-100  



CREATINE KINASE (CK), TOTAL AND [id number] 21:45:00





 Test Item  Value  Reference Range  Comments

 

 CREATINE KINASE TOTAL (BEAKER) (test code=380)  32 U/L    

 

 CREATINE KINASE-MB (BEAKER) (test code=750)  0.6 ng/mL  0.0-6.6  

 

 CREATINE KINASE-MB INDEX (BEAKER) (test code=395)  1.9 %    



Effective 2014: CK-MB Reference Range ChangeNew: 0.0-6.6    Previous: 0.0-
4.9CK-MB Reference Range:&lt;6.7      Normal6.7-10.0  Borderline&gt;10.0     
AbnormalTROPONIN -[id number] 21:45:00





 Test Item  Value  Reference Range  Comments

 

 TROPONIN I (BEAKER) (test code=397)  < ng/mL  0.00-0.03  



Effective 2014: Reference Range ChangeNew: 0.00-0.03   Previous 0.00-
0.15Troponin I (TnI) levels must be interpreted in the context of the 
presenting symptoms and the clinical findings. Elevated TnI levels indicate 
myocardial damage, but are not specific for ischemic heart disease. Elevated 
TnI levels are seen in patients with other cardiac conditions (including 
myocarditis and congestive heartfailure), and slight TnI elevations occur in 
patients with other conditions, including sepsis, renalfailure, acidosis, acute 
neurological disease, and persistent tachyarrhythmia.CBC W/PLT COUNT &amp; AUTO 
[id number] 21:24:00





 Test Item  Value  Reference Range  Comments

 

 WHITE BLOOD CELL COUNT (BEAKER) (test code=775)  4.7 K/ L  4.0-10.0  

 

 RED BLOOD CELL COUNT (BEAKER) (test code=761)  5.75 M/ L  4.20-5.80  

 

 HEMOGLOBIN (BEAKER) (test code=410)  17.4 GM/DL  13.0-16.8  

 

 HEMATOCRIT (BEAKER) (test code=411)  49.2 %  40.0-50.0  

 

 MEAN CORPUSCULAR VOLUME (BEAKER) (test code=753)  85.6 fL  82.0-98.0  

 

 MEAN CORPUSCULAR HEMOGLOBIN (BEAKER) (test  30.3 pg  27.0-33.0  



 code=751)      

 

 MEAN CORPUSCULAR HEMOGLOBIN CONC (BEAKER) (test  35.4 GM/DL  32.0-36.0  



 code=752)      

 

 RED CELL DISTRIBUTION WIDTH (BEAKER) (test  12.9 %  10.3-14.2  



 code=412)      

 

 PLATELET COUNT (BEAKER) (test code=756)  98 K/CU MM  150-430  

 

 MEAN PLATELET VOLUME (BEAKER) (test code=754)  8.7 fL  6.5-10.5  

 

 NUCLEATED RED BLOOD CELLS (BEAKER) (test  0 /100 WBC  0-0  



 code=413)      

 

 NEUTROPHILS RELATIVE PERCENT (BEAKER) (test  69 %    



 code=429)      

 

 LYMPHOCYTES RELATIVE PERCENT (BEAKER) (test  22 %    



 code=430)      

 

 MONOCYTES RELATIVE PERCENT (BEAKER) (test  8 %    



 code=431)      

 

 EOSINOPHILS RELATIVE PERCENT (BEAKER) (test  1 %    



 code=432)      

 

 BASOPHILS RELATIVE PERCENT (BEAKER) (test  0 %    



 code=437)      

 

 NEUTROPHILS ABSOLUTE COUNT (BEAKER) (test  3.26 K/ L  1.80-8.00  



 code=670)      

 

 LYMPHOCYTES ABSOLUTE COUNT (BEAKER) (test  1.05 K/ L  1.48-4.50  



 code=414)      

 

 MONOCYTES ABSOLUTE COUNT (BEAKER) (test code=415)  0.38 K/ L  0.00-1.30  

 

 EOSINOPHILS ABSOLUTE COUNT (BEAKER) (test  0.03 K/ L  0.00-0.50  



 code=416)      

 

 BASOPHILS ABSOLUTE COUNT (BEAKER) (test code=417)  0.02 K/ L  0.00-0.20  



0.00CMV PCR, [id number] 17:55:00





 Test Item  Value  Reference Range  Comments

 

 CMV VIRAL LOAD - NEGATIVE  Negative or below the linear    



 (BEAKER) (test code=2558)  range of the assay (<375    



   copies/mL)    



Cytomegalovirus (CMV) infection can cause significant disease in 
immunosuppressed patients. However,it is common for CMV to manifest as a 
limited infection which is of no clinical significance in immunosuppressed 
patients or in healthy individuals.Viral load measurements are helpful to 
identify clinical CMV infection and to guide the pre-emptive management of 
antiviral therapy.  For treatment of CMVinfection due to reactivation in 
transplant recipients, a threshold between 4,000 and 5,000 copies/mL is 
suggested.  For treatment of primary CMV infection, a lower threshold can be 
used.CMV infection may also be monitored using weekly serial measurements. 
Serial measurements of CMV DNA viral load canbe evaluated by identifying a 10-
fold change, as well as assessing the CMV DNA viral load and the clinical 
context for each patient.The plasma CMV DNA viral load was detected using 
quantitative polymerase chain reaction and fluorescent monitoring of a specific 
hybridized probe. Genetic variation and other factors can affect the accuracy 
of nucleic acid testing. Therefore, the results should be interpreted in light 
of clinical data. A negative result may not exclude the presence of CMV 
disease.This test was developed and its performance characteristics determined 
by the [facility] Pathology Department, Section of Molecular 
Pathology. It has not been cleared or approved by the U.S. Food and Drug 
Administration (FDA), since FDA approval is not required for clinical use of 
the test. Validation was done as required by The Clinical Laboratory 
Improvement Amendments of 1988.CREATINE KINASE (CK), TOTAL AND [id number] 18:
40:00





 Test Item  Value  Reference Range  Comments

 

 CREATINE KINASE TOTAL (BEAKER) (test code=380)  36 U/L    

 

 CREATINE KINASE-MB (BEAKER) (test code=750)  0.3 ng/mL  0.0-6.6  

 

 CREATINE KINASE-MB INDEX (BEAKER) (test code=395)  0.8 %    



Effective 2014: CK-MB Reference Range ChangeNew: 0.0-6.6    Previous: 0.0-
4.9CK-MB Reference Range:&lt;6.7      Normal6.7-10.0  Borderline&gt;10.0     
AbnormalTROPONIN -19-37 18:40:00





 Test Item  Value  Reference Range  Comments

 

 TROPONIN I (BEAKER) (test code=397)  < ng/mL  0.00-0.03  



Effective 2014: Reference Range ChangeNew: 0.00-0.03   Previous 0.00-
0.15Troponin I (TnI) levels must be interpreted in the context of the 
presenting symptoms and the clinical findings. Elevated TnI levels indicate 
myocardial damage, but are not specific for ischemic heart disease. Elevated 
TnI levels are seen in patients with other cardiac conditions (including 
myocarditis and congestive heartfailure), and slight TnI elevations occur in 
patients with other conditions, including sepsis, renalfailure, acidosis, acute 
neurological disease, and persistent tachyarrhythmia.B-TYPE NATRIURETIC FACTOR (
BNP)2017 18:40:00





 Test Item  Value  Reference Range  Comments

 

 B-TYPE NATRIURETIC PEPTIDE (BEAKER) (test code=700)  23 pg/mL  0-100  



[id number] 18:33:00





 Test Item  Value  Reference Range  Comments

 

 MAGNESIUM (BEAKER) (test code=627)  1.8 mg/dL  1.6-2.6  



BASIC METABOLIC [id number] 18:33:00





 Test Item  Value  Reference Range  Comments

 

 SODIUM (BEAKER) (test  140 meq/L  136-145  



 cgvr=979)      

 

 POTASSIUM (BEAKER) (test  4.1 meq/L  3.5-5.1  



 code=379)      

 

 CHLORIDE (BEAKER) (test  105 meq/L    



 code=382)      

 

 CO2 (BEAKER) (test  23 meq/L  22-29  



 code=355)      

 

 BLOOD UREA NITROGEN  18 mg/dL  7-21  



 (BEAKER) (test code=354)      

 

 CREATININE (BEAKER) (test  1.34 mg/dL  0.57-1.25  



 code=358)      

 

 GLUCOSE RANDOM (BEAKER)  100 mg/dL    



 (test code=652)      

 

 CALCIUM (BEAKER) (test  9.2 mg/dL  8.4-10.2  



 code=697)      

 

 EGFR (BEAKER) (test  57 mL/min/1.73 sq m    ESTIMATED GFR IS NOT AS



 code=1092)      ACCURATE AS CREATININE



       CLEARANCE IN PREDICTING



       GLOMERULAR FILTRATION



       RATE. ESTIMATED GFR IS



       NOT APPLICABLE FOR



       DIALYSIS PATIENTS.



CBC W/PLT COUNT &amp; AUTO [id number] 18:24:00





 Test Item  Value  Reference Range  Comments

 

 WHITE BLOOD CELL COUNT (BEAKER) (test code=775)  2.6 K/ L  4.0-10.0  

 

 RED BLOOD CELL COUNT (BEAKER) (test code=761)  5.66 M/ L  4.20-5.80  

 

 HEMOGLOBIN (BEAKER) (test code=410)  16.5 GM/DL  13.0-16.8  

 

 HEMATOCRIT (BEAKER) (test code=411)  49.0 %  40.0-50.0  

 

 MEAN CORPUSCULAR VOLUME (BEAKER) (test code=753)  86.6 fL  82.0-98.0  

 

 MEAN CORPUSCULAR HEMOGLOBIN (BEAKER) (test  29.1 pg  27.0-33.0  



 code=751)      

 

 MEAN CORPUSCULAR HEMOGLOBIN CONC (BEAKER) (test  33.6 GM/DL  32.0-36.0  



 code=752)      

 

 RED CELL DISTRIBUTION WIDTH (BEAKER) (test  13.0 %  10.3-14.2  



 code=412)      

 

 PLATELET COUNT (BEAKER) (test code=756)  87 K/CU MM  150-430  

 

 MEAN PLATELET VOLUME (BEAKER) (test code=754)  8.3 fL  6.5-10.5  

 

 NUCLEATED RED BLOOD CELLS (BEAKER) (test  0 /100 WBC  0-0  



 code=413)      

 

 NEUTROPHILS RELATIVE PERCENT (BEAKER) (test  51 %    



 code=429)      

 

 LYMPHOCYTES RELATIVE PERCENT (BEAKER) (test  30 %    



 code=430)      

 

 MONOCYTES RELATIVE PERCENT (BEAKER) (test  18 %    



 code=431)      

 

 EOSINOPHILS RELATIVE PERCENT (BEAKER) (test  1 %    



 code=432)      

 

 BASOPHILS RELATIVE PERCENT (BEAKER) (test  0 %    



 code=437)      

 

 NEUTROPHILS ABSOLUTE COUNT (BEAKER) (test  1.33 K/ L  1.80-8.00  



 code=670)      

 

 LYMPHOCYTES ABSOLUTE COUNT (BEAKER) (test  0.78 K/ L  1.48-4.50  



 code=414)      

 

 MONOCYTES ABSOLUTE COUNT (BEAKER) (test code=415)  0.47 K/ L  0.00-1.30  

 

 EOSINOPHILS ABSOLUTE COUNT (BEAKER) (test  0.02 K/ L  0.00-0.50  



 code=416)      

 

 BASOPHILS ABSOLUTE COUNT (BEAKER) (test code=417)  0.01 K/ L  0.00-0.20  



0.00POCT-GLUCOSE [id number] 15:37:00





 Test Item  Value  Reference Range  Comments

 

 POC-GLUCOSE METER (BEAKER)  97 mg/dL    TESTED AT [facility] [address]



 (test gmxs=3209)      [facility] [address]

## 2018-12-31 NOTE — EDPHYS
Physician Documentation                                                                           

 [facility]                                                                

Name: Gomez Romero                                                                               

Age: 48 yrs                                                                                       

Sex: Male                                                                                         

: 1970                                                                                   

MRN: [id number]                                                                                   

Arrival Date: 2018                                                                          

Time: 16:59                                                                                       

Account#: [id number]                                                                            

Bed Treatment                                                                                     

Private MD:                                                                                       

ED Physician Lloyd Duff                                                                         

HPI:                                                                                              

                                                                                             

18:13 This 48 yrs old  Male presents to ER via Ambulatory with complaints of Back    rn  

      Pain.                                                                                       

18:13 The patient presents with pain that is acute. The symptoms are located in the low back. rn  

18:13 Onset: The symptoms/episode began/occurred today. The pain does not radiate. Associated rn  

      signs and symptoms: Pertinent positives: none Pertinent negatives: abdominal pain,          

      chest pain, constipation, dysuria, fever, headache, hematuria, incontinence, nausea,        

      numbness, tingling, urinary retention, vomiting, weakness. Modifying factors: The           

      patient symptoms are alleviated by remaining still, the patient symptoms are aggravated     

      by any movement, bending. Severity of symptoms: At their worst the symptoms were            

      moderate, in the emergency department the symptoms are unchanged. The patient has not       

      experienced similar symptoms in the past. Reports recent knee injury and slept on           

      couch, woke up with back pain, low back, no fall or injury, no radiation of pain, no        

      weakness/bowel or bladder issues. Is ambulatory. .                                          

                                                                                                  

Historical:                                                                                       

- Allergies:                                                                                      

17:24 unknown rejection medication;                                                           hj  

- Home Meds:                                                                                      

17:24 pt unable to provide [Active];                                                          hj  

- PMHx:                                                                                           

17:24 pt denies;                                                                              hj  

- PSHx:                                                                                           

17:24 Hernia repair; cardiac stent; heart transplant;                                         hj  

                                                                                                  

- Immunization history:: Adult Immunizations up to date.                                          

- Social history:: Smoking status: Patient/guardian denies using tobacco,                         

  Patient/guardian denies using alcohol.                                                          

- Ebola Screening: : Patient negative for fever greater than or equal to 101.5 degrees            

  Fahrenheit, and additional compatible Ebola Virus Disease symptoms Patient denies               

  exposure to infectious person Patient denies travel to an Ebola-affected area in the            

  21 days before illness onset.                                                                   

- Family history:: not pertinent.                                                                 

- Hospitalizations: : No recent hospitalization is reported.                                      

                                                                                                  

                                                                                                  

ROS:                                                                                              

18:13 Constitutional: Negative for fever, chills, and weight loss, Cardiovascular: Negative   rn  

      for chest pain, palpitations, and edema, Abdomen/GI: Negative for abdominal pain,           

      nausea, vomiting, diarrhea, and constipation, Back: + pain, negative for injury :         

      Negative for injury, bleeding, discharge, and swelling, MS/Extremity: Negative for          

      injury and deformity, Skin: Negative for injury, rash, and discoloration, Neuro:            

      Negative for headache, weakness, numbness, tingling, and seizure.                           

                                                                                                  

Exam:                                                                                             

18:13 Constitutional:  This is a well developed, well nourished patient who is awake, alert,  rn  

      and in no acute distress. Standing, leaning on crutches, but at times can support his       

      full weight on feet.  Back:  No spinal tenderness.  No costovertebral tenderness.           

      Skin:  Warm, dry with normal turgor.  Normal color with no rashes, no lesions, and no       

      evidence of cellulitis. MS/ Extremity:  Pulses equal, no cyanosis.  Neurovascular           

      intact.  Full, normal range of motion.  Equal circumference. Neuro:  Awake and alert,       

      GCS 15, oriented to person, place, time, and situation.  Cranial nerves II-XII grossly      

      intact.  Motor strength 5/5 in all extremities.  Sensory grossly intact.  Cerebellar        

      exam normal.  Normal gait.                                                                  

                                                                                                  

Vital Signs:                                                                                      

17:24  / 88; Pulse 99; Resp 18; Temp 98.2(O); Pulse Ox 100% on R/A; Weight 102.06 kg;   hj  

      Height 6 ft. 4 in. (193.04 cm); Pain 10/10;                                                 

19:30  / 80; Pulse 90; Resp 18; Pulse Ox 100% on R/A; Pain 2/10;                        mg2 

17:24 Body Mass Index 27.39 (102.06 kg, 193.04 cm)                                            hj  

                                                                                                  

MDM:                                                                                              

18:02 Patient medically screened.                                                             rn  

19:11 Differential diagnosis: muscle spasm, disc bulge, herniated disc. Data reviewed: vital  rn  

      signs, nurses notes, and as a result, I will discharge patient. Counseling: I had a         

      detailed discussion with the patient and/or guardian regarding: the historical points,      

      exam findings, and any diagnostic results supporting the discharge/admit diagnosis, the     

      need for outpatient follow up, to return to the emergency department if symptoms worsen     

      or persist or if there are any questions or concerns that arise at home. Response to        

      treatment: the patient's symptoms have mildly improved after treatment, and as a            

      result, I will discharge patient. Special discussion: I discussed with the                  

      patient/guardian in detail that at this point there is no indication for admission to       

      the hospital. It is understood, however, that if the symptoms persist or worsen the         

      patient needs to return immediately for re-evaluation.                                      

                                                                                                  

                                                                                             

18:13 Order name: IV Start; Complete Time: 18:51                                              rn  

                                                                                                  

Administered Medications:                                                                         

18:51 Drug: TORadol 30 mg Route: IVP; Site: left hand;                                        mg2 

19:27 Follow up: Response: No adverse reaction; Marked relief of symptoms                     mg2 

18:51 Drug: Decadron - Dexamethasone 10 mg Route: IVP; Site: left hand;                       mg2 

19:27 Follow up: Response: No adverse reaction; Marked relief of symptoms                     mg2 

18:51 Drug: Demerol 25 mg Route: IVP; Site: left hand;                                        mg2 

19:26 Follow up: Response: No adverse reaction; Marked relief of symptoms                     mg2 

                                                                                                  

                                                                                                  

Disposition:                                                                                      

18 19:13 Discharged to Home. Impression: Muscle spasm of back.                              

- Condition is Stable.                                                                            

- Discharge Instructions: Muscle Cramps and Spasms, Back Injury Prevention,                       

  Easy-to-Read, Back Exercises, Easy-to-Read.                                                     

- Prescriptions for Tylenol- Codeine #3 300-30 mg Oral Tablet - take 2 tablets by ORAL            

  route every 6 hours As needed; 20 tablet. Cyclobenzaprine 10 mg Oral Tablet - take 1            

  tablet by ORAL route every 8 hours As needed; 20 tablet. Medrol (Khoi) 4 mg Oral                 

  Tablets, Dose Pack - take 1 tablet by ORAL route as directed - follow package                   

  instructions; 1 packet.                                                                         

- Medication Reconciliation Form, Thank You Letter, Antibiotic Education, Prescription            

  Opioid Use, Work release form form.                                                             

- Follow up: Private Physician; When: As needed; Reason: Recheck today's complaints,              

  Re-evaluation by your physician.                                                                

- Problem is new.                                                                                 

- Symptoms have improved.                                                                         

                                                                                                  

                                                                                                  

                                                                                                  

Signatures:                                                                                       

Lloyd Duff MD MD rn Joaquin, Henry, RN RN hj Gardose, Michele, RN                    RN   mg2                                                  

                                                                                                  

Corrections: (The following items were deleted from the chart)                                    

19:32 19:13 2018 19:13 Discharged to Home. Impression: Muscle spasm of back. Condition  mg2 

      is Stable. Forms are Medication Reconciliation Form, Thank You Letter, Antibiotic           

      Education, Prescription Opioid Use. Follow up: Private Physician; When: As needed;          

      Reason: Recheck today's complaints, Re-evaluation by your physician. Problem is new.        

      Symptoms have improved. rn                                                                  

                                                                                                  

**************************************************************************************************

## 2019-04-14 NOTE — XMS REPORT
Patient Summary Document

 Created on:2019



Patient:TYRA BRUNO

Sex:Male

:1970

External Reference #:[id number]





Demographics







 Address  [address] [place] [address]

 

 Home Phone  [phone]

 

 Work Phone  [phone]

 

 Email Address  [email or web address]

 

 Preferred Language  Unknown

 

 Marital Status  Unknown

 

 Mu-ism Affiliation  Unknown

 

 Race  Unknown

 

 Additional Race(s)  Unavailable

 

 Ethnic Group  Unknown









Author







 Organization  [facility]

 

 Address  [address]



   [place] [address]

 

 Phone  [phone]









Care Team Providers







 Name  Role  Phone

 

 FAWAD SHIRLEY HERNANDEZ  Unavailable  Unavailable

 

 MAURO AWAD  Unavailable  Unavailable

 

 NORBERTO SILVESTRE  Unavailable  Unavailable









Problems

This patient has no known problems.



Allergies, Adverse Reactions, Alerts

This patient has no known allergies or adverse reactions.



Medications

This patient has no known medications.



Results







 Test Description  Test Time  Test Comments  Text Results  Atomic Results  
Result Comments









 TACROLIMUS LEVEL  2019 13:15:00    









   Test Item  Value  Reference Range  Comments









 TACROLIMUS BLOOD (BEAKER) (test code=657)  6.9 ng/mL  10.0-20.0  



BASIC METABOLIC [id number] 10:45:00





 Test Item  Value  Reference Range  Comments

 

 SODIUM (BEAKER) (test  139 meq/L  136-145  



 vkni=004)      

 

 POTASSIUM (BEAKER) (test  4.8 meq/L  3.5-5.1  



 code=379)      

 

 CHLORIDE (BEAKER) (test  102 meq/L    



 code=382)      

 

 CO2 (BEAKER) (test  30 meq/L  22-29  



 code=355)      

 

 BLOOD UREA NITROGEN  14 mg/dL  7-21  



 (BEAKER) (test code=354)      

 

 CREATININE (BEAKER) (test  0.95 mg/dL  0.57-1.25  



 code=358)      

 

 GLUCOSE RANDOM (BEAKER)  88 mg/dL    



 (test code=652)      

 

 CALCIUM (BEAKER) (test  10.1 mg/dL  8.4-10.2  



 code=697)      

 

 EGFR (BEAKER) (test  84 mL/min/1.73 sq m    ESTIMATED GFR IS NOT AS



 code=1092)      ACCURATE AS CREATININE



       CLEARANCE IN PREDICTING



       GLOMERULAR FILTRATION



       RATE. ESTIMATED GFR IS



       NOT APPLICABLE FOR



       DIALYSIS PATIENTS.



CBC W/PLT COUNT &amp; AUTO [id number] 10:31:00





 Test Item  Value  Reference Range  Comments

 

 WHITE BLOOD CELL COUNT (BEAKER) (test code=775)  5.0 K/ L  3.5-10.5  

 

 RED BLOOD CELL COUNT (BEAKER) (test code=761)  5.55 M/ L  4.63-6.08  

 

 HEMOGLOBIN (BEAKER) (test code=410)  15.8 GM/DL  13.7-17.5  

 

 HEMATOCRIT (BEAKER) (test code=411)  47.9 %  40.1-51.0  

 

 MEAN CORPUSCULAR VOLUME (BEAKER) (test code=753)  86.3 fL  79.0-92.2  

 

 MEAN CORPUSCULAR HEMOGLOBIN (BEAKER) (test  28.5 pg  25.7-32.2  



 ztqs=136)      

 

 MEAN CORPUSCULAR HEMOGLOBIN CONC (BEAKER) (test  33.0 GM/DL  32.3-36.5  



 code=752)      

 

 RED CELL DISTRIBUTION WIDTH (BEAKER) (test  13.2 %  11.6-14.4  



 code=412)      

 

 PLATELET COUNT (BEAKER) (test code=756)  131 K/CU MM  150-450  

 

 MEAN PLATELET VOLUME (BEAKER) (test code=754)  9.5 fL  9.4-12.4  

 

 NUCLEATED RED BLOOD CELLS (BEAKER) (test  0 /100 WBC  0-0  



 code=413)      

 

 NEUTROPHILS RELATIVE PERCENT (BEAKER) (test  56 %    



 code=429)      

 

 LYMPHOCYTES RELATIVE PERCENT (BEAKER) (test  26 %    



 code=430)      

 

 MONOCYTES RELATIVE PERCENT (BEAKER) (test  12 %    



 code=431)      

 

 EOSINOPHILS RELATIVE PERCENT (BEAKER) (test  4 %    



 code=432)      

 

 BASOPHILS RELATIVE PERCENT (BEAKER) (test  1 %    



 code=437)      

 

 NEUTROPHILS ABSOLUTE COUNT (BEAKER) (test  2.80 K/ L  1.78-5.38  



 code=670)      

 

 LYMPHOCYTES ABSOLUTE COUNT (BEAKER) (test  1.32 K/ L  1.32-3.57  



 code=414)      

 

 MONOCYTES ABSOLUTE COUNT (BEAKER) (test  0.59 K/ L  0.30-0.82  



 code=415)      

 

 EOSINOPHILS ABSOLUTE COUNT (BEAKER) (test  0.21 K/ L  0.04-0.54  



 code=416)      

 

 BASOPHILS ABSOLUTE COUNT (BEAKER) (test  0.04 K/ L  0.01-0.08  



 code=417)      

 

 IMMATURE GRANULOCYTES-RELATIVE PERCENT (BEAKER)  1 %  0-1  



 (test code=2801)      



BASIC METABOLIC [id number] 09:28:00





 Test Item  Value  Reference Range  Comments

 

 SODIUM (BEAKER) (test  140 meq/L  136-145  



 ttae=596)      

 

 POTASSIUM (BEAKER) (test  4.7 meq/L  3.5-5.1  



 code=379)      

 

 CHLORIDE (BEAKER) (test  103 meq/L    



 code=382)      

 

 CO2 (BEAKER) (test  30 meq/L  22-29  



 code=355)      

 

 BLOOD UREA NITROGEN  20 mg/dL  7-21  



 (BEAKER) (test code=354)      

 

 CREATININE (BEAKER) (test  1.08 mg/dL  0.57-1.25  



 code=358)      

 

 GLUCOSE RANDOM (BEAKER)  110 mg/dL    



 (test code=652)      

 

 CALCIUM (BEAKER) (test  10.0 mg/dL  8.4-10.2  



 code=697)      

 

 EGFR (BEAKER) (test  73 mL/min/1.73 sq m    ESTIMATED GFR IS NOT AS



 code=1092)      ACCURATE AS CREATININE



       CLEARANCE IN PREDICTING



       GLOMERULAR FILTRATION



       RATE. ESTIMATED GFR IS



       NOT APPLICABLE FOR



       DIALYSIS PATIENTS.



CBC W/PLT COUNT &amp; AUTO [id number] 08:58:00





 Test Item  Value  Reference Range  Comments

 

 WHITE BLOOD CELL COUNT (BEAKER) (test code=775)  4.9 K/ L  3.5-10.5  

 

 RED BLOOD CELL COUNT (BEAKER) (test code=761)  5.37 M/ L  4.63-6.08  

 

 HEMOGLOBIN (BEAKER) (test code=410)  15.5 GM/DL  13.7-17.5  

 

 HEMATOCRIT (BEAKER) (test code=411)  46.8 %  40.1-51.0  

 

 MEAN CORPUSCULAR VOLUME (BEAKER) (test code=753)  87.2 fL  79.0-92.2  

 

 MEAN CORPUSCULAR HEMOGLOBIN (BEAKER) (test  28.9 pg  25.7-32.2  



 zich=612)      

 

 MEAN CORPUSCULAR HEMOGLOBIN CONC (BEAKER) (test  33.1 GM/DL  32.3-36.5  



 code=752)      

 

 RED CELL DISTRIBUTION WIDTH (BEAKER) (test  13.3 %  11.6-14.4  



 code=412)      

 

 PLATELET COUNT (BEAKER) (test code=756)  130 K/CU MM  150-450  

 

 MEAN PLATELET VOLUME (BEAKER) (test code=754)  9.6 fL  9.4-12.4  

 

 NUCLEATED RED BLOOD CELLS (BEAKER) (test  0 /100 WBC  0-0  



 code=413)      

 

 NEUTROPHILS RELATIVE PERCENT (BEAKER) (test  65 %    



 code=429)      

 

 LYMPHOCYTES RELATIVE PERCENT (BEAKER) (test  20 %    



 code=430)      

 

 MONOCYTES RELATIVE PERCENT (BEAKER) (test  10 %    



 code=431)      

 

 EOSINOPHILS RELATIVE PERCENT (BEAKER) (test  4 %    



 code=432)      

 

 BASOPHILS RELATIVE PERCENT (BEAKER) (test  1 %    



 code=437)      

 

 NEUTROPHILS ABSOLUTE COUNT (BEAKER) (test  3.16 K/ L  1.78-5.38  



 code=670)      

 

 LYMPHOCYTES ABSOLUTE COUNT (BEAKER) (test  0.99 K/ L  1.32-3.57  



 code=414)      

 

 MONOCYTES ABSOLUTE COUNT (BEAKER) (test  0.49 K/ L  0.30-0.82  



 code=415)      

 

 EOSINOPHILS ABSOLUTE COUNT (BEAKER) (test  0.17 K/ L  0.04-0.54  



 code=416)      

 

 BASOPHILS ABSOLUTE COUNT (BEAKER) (test  0.04 K/ L  0.01-0.08  



 code=417)      

 

 IMMATURE GRANULOCYTES-RELATIVE PERCENT (BEAKER)  1 %  0-1  



 (test code=2801)      



TACROLIMUS [id number] 13:26:00





 Test Item  Value  Reference Range  Comments

 

 TACROLIMUS BLOOD (BEAKER) (test code=657)  4.1 ng/mL  10.0-20.0  



RAD, CHEST, 2 [id number] 14:13:00NEW CARDIOLOGIST OBERTONReason for Exam:-&
gt;heart transplant annual follow upFINAL REPORT PATIENT ID:   [id number] Chest 
two views INDICATION: Heart transplant annual follow-up. COMPARISON: 2017 
IMPRESSION: There is no focal consolidation, vascular congestion, pleural 
effusion, or pneumothorax. Heart size is within normal limits. Median 
sternotomy changes, left axillary surgical clips, and gastric sleeve with small 
hiatal hernia are again noted. No significant change since 2017. Signed: 
Richard Nickersonort Verified Date/Time:  2018 14:13:48 Reading 
Location: [facility] C013W Consult Reading Room   Electronically signed by: 
RICHARD NICKERSON M.D. on 2018 02:13 PMTACROLIMUS [id number] 13:55
:00





 Test Item  Value  Reference Range  Comments

 

 TACROLIMUS BLOOD (BEAKER) (test code=657)  4.7 ng/mL  10.0-20.0  



COMPREHENSIVE METABOLIC [id number] 12:11:00





 Test Item  Value  Reference Range  Comments

 

 TOTAL PROTEIN (BEAKER)  6.5 gm/dL  6.0-8.3  



 (test code=770)      

 

 ALBUMIN (BEAKER) (test  4.2 g/dL  3.5-5.0  



 code=1145)      

 

 ALKALINE PHOSPHATASE  98 U/L    



 (BEAKER) (test code=346)      

 

 BILIRUBIN TOTAL (BEAKER)  0.4 mg/dL  0.2-1.2  



 (test code=377)      

 

 SODIUM (BEAKER) (test  141 meq/L  136-145  



 xnvi=272)      

 

 POTASSIUM (BEAKER) (test  4.3 meq/L  3.5-5.1  



 code=379)      

 

 CHLORIDE (BEAKER) (test  103 meq/L    



 code=382)      

 

 CO2 (BEAKER) (test  29 meq/L  22-29  



 code=355)      

 

 BLOOD UREA NITROGEN  17 mg/dL  7-21  



 (BEAKER) (test code=354)      

 

 CREATININE (BEAKER) (test  1.00 mg/dL  0.57-1.25  



 code=358)      

 

 GLUCOSE RANDOM (BEAKER)  101 mg/dL    



 (test code=652)      

 

 CALCIUM (BEAKER) (test  9.5 mg/dL  8.4-10.2  



 code=697)      

 

 AST (SGOT) (BEAKER) (test  15 U/L  5-34  



 code=353)      

 

 ALT (SGPT) (BEAKER) (test  12 U/L  6-55  



 code=347)      

 

 EGFR (BEAKER) (test  79 mL/min/1.73 sq m    ESTIMATED GFR IS NOT AS



 code=1092)      ACCURATE AS CREATININE



       CLEARANCE IN PREDICTING



       GLOMERULAR FILTRATION



       RATE. ESTIMATED GFR IS



       NOT APPLICABLE FOR



       DIALYSIS PATIENTS.



LIPID [id number] 11:47:00





 Test Item  Value  Reference Range  Comments

 

 TRIGLYCERIDES (BEAKER) (test code=540)  87 mg/dL    

 

 CHOLESTEROL (BEAKER) (test code=631)  127 mg/dL    

 

 HDL CHOLESTEROL (BEAKER) (test code=976)  37 mg/dL    

 

 LDL CHOLESTEROL CALCULATED (BEAKER) (test  73 mg/dL    



 code=633)      



Triglyceride Reference Range:   Low Risk         &lt;150   Borderline    150-
199   High Risk     200-499   Very High Risk  &gt;=500Cholesterol Reference 
Range:   Low Risk         &lt;200   Borderline 200-239    High Risk        &gt;
240HDL Cholesterol Reference Range:   Low Risk         &gt;=60   High Risk     
    &lt;40LDL Cholesterol Reference Range:   Optimal          &lt;100   Near 
Optimal  100-129   Borderline    130-159   High          160-189   Very High   
    &gt;=190PROTHROMBIN [id number] 11:30:00





 Test Item  Value  Reference Range  Comments

 

 PROTIME (BEAKER) (test code=759)  14.7 seconds  11.7-14.7  

 

 INR (BEAKER) (test code=370)  1.2  <=5.9  



RECOMMENDED COUMADIN/WARFARIN INR THERAPY RANGESSTANDARD DOSE: 2.0 - 3.0   
Includes: PROPHYLAXIS forvenous thrombosis, systemic embolization; TREATMENT 
for venous thrombosis and/or pulmonary embolus.HIGH RISK: Target INR is 2.5-3.5 
for patients with mechanical heart valves.CBC W/PLT COUNT &amp; AUTO 
[id number] 11:23:00





 Test Item  Value  Reference Range  Comments

 

 WHITE BLOOD CELL COUNT (BEAKER) (test code=775)  4.8 K/ L  3.5-10.5  

 

 RED BLOOD CELL COUNT (BEAKER) (test code=761)  5.26 M/ L  4.63-6.08  

 

 HEMOGLOBIN (BEAKER) (test code=410)  15.1 GM/DL  13.7-17.5  

 

 HEMATOCRIT (BEAKER) (test code=411)  45.8 %  40.1-51.0  

 

 MEAN CORPUSCULAR VOLUME (BEAKER) (test code=753)  87.1 fL  79.0-92.2  

 

 MEAN CORPUSCULAR HEMOGLOBIN (BEAKER) (test  28.7 pg  25.7-32.2  



 igjy=628)      

 

 MEAN CORPUSCULAR HEMOGLOBIN CONC (BEAKER) (test  33.0 GM/DL  32.3-36.5  



 code=752)      

 

 RED CELL DISTRIBUTION WIDTH (BEAKER) (test  13.4 %  11.6-14.4  



 code=412)      

 

 PLATELET COUNT (BEAKER) (test code=756)  161 K/CU MM  150-450  

 

 MEAN PLATELET VOLUME (BEAKER) (test code=754)  9.0 fL  9.4-12.4  

 

 NUCLEATED RED BLOOD CELLS (BEAKER) (test  0 /100 WBC  0-0  



 code=413)      

 

 NEUTROPHILS RELATIVE PERCENT (BEAKER) (test  67 %    



 code=429)      

 

 LYMPHOCYTES RELATIVE PERCENT (BEAKER) (test  19 %    



 code=430)      

 

 MONOCYTES RELATIVE PERCENT (BEAKER) (test  10 %    



 code=431)      

 

 EOSINOPHILS RELATIVE PERCENT (BEAKER) (test  3 %    



 code=432)      

 

 BASOPHILS RELATIVE PERCENT (BEAKER) (test  1 %    



 code=437)      

 

 NEUTROPHILS ABSOLUTE COUNT (BEAKER) (test  3.21 K/ L  1.78-5.38  



 code=670)      

 

 LYMPHOCYTES ABSOLUTE COUNT (BEAKER) (test  0.93 K/ L  1.32-3.57  



 code=414)      

 

 MONOCYTES ABSOLUTE COUNT (BEAKER) (test  0.48 K/ L  0.30-0.82  



 code=415)      

 

 EOSINOPHILS ABSOLUTE COUNT (BEAKER) (test  0.14 K/ L  0.04-0.54  



 code=416)      

 

 BASOPHILS ABSOLUTE COUNT (BEAKER) (test  0.04 K/ L  0.01-0.08  



 code=417)      

 

 IMMATURE GRANULOCYTES-RELATIVE PERCENT (BEAKER)  1 %  0-1  



 (test code=2801)      



CT, CHEST, WITHOUT [id number] 09:10:00NEW CARDIOLOGIST OBERTONFINAL 
REPORT PATIENT ID:   [id number] INDICATION: 47-year-old male with chest wall pain 
and history ofheart transplant. COMPARISON:Chest radiograph 2017 
TECHNIQUE: Chest CT exam WITHOUT intravenous contrast. The exam was performed 
according to our department dose-optimization protocol, which includes 
automated exposure control, adjustments of mA and kV according to patient size. 
Iterative reconstructions are also sometimes employed. FINDINGS:No chest wall 
mass, chest wall hematoma, rib fracture, or rib lesion is demonstrated. There 
is a healed median sternotomy. Heart is normal in size and there is no 
pericardial effusion. Mild atherosclerotic plaque of the ascending thoracic 
aorta is demonstrated. Thoracic aorta is normal in caliber. 8 mm calcified 
nodule in the right lower lobe represents prior granulomatous infection. No 
pneumonia, pulmonary edema, pleural effusion, or pneumothorax is demonstrated. 
There is no mediastinal or hilar lymphadenopathy. Thyroid gland is 
unremarkable. Upper and mid esophagus are unremarkable. Patient is status post 
sleeve gastrectomy and there is a small part of the sleeve herniated in the low 
posterior mediastinum. Partial imaging of the upper abdomen is otherwise 
unremarkable. IMPRESSION: No chest wall mass, muscle tear, rib fracture, or rib 
lesion. No other CT explanation for the patient's chest wall pain. Clear lungs. 
Unremarkable heart transplant. Prior gastric sleeve with small hiatal hernia. 
Signed: Marlee Calhoun MDReport Verified Date/Time:  2017 09:10:45 
Reading Location: [facility] Reading Room - [place] [id number] 
Electronically signed by: MARLEE CALHOUN M.D. on 2017 09:10 
AMTACROLIMUS [id number] 13:49:00





 Test Item  Value  Reference Range  Comments

 

 TACROLIMUS BLOOD (BEAKER) (test code=657)  5.3 ng/mL  10.0-20.0  



BASIC METABOLIC [id number] 10:44:00





 Test Item  Value  Reference Range  Comments

 

 SODIUM (BEAKER) (test  141 meq/L  136-145  



 nddo=851)      

 

 POTASSIUM (BEAKER) (test  4.5 meq/L  3.5-5.1  



 code=379)      

 

 CHLORIDE (BEAKER) (test  103 meq/L    



 code=382)      

 

 CO2 (BEAKER) (test  29 meq/L  22-29  



 code=355)      

 

 BLOOD UREA NITROGEN  17 mg/dL  7-21  



 (BEAKER) (test code=354)      

 

 CREATININE (BEAKER) (test  1.09 mg/dL  0.57-1.25  



 code=358)      

 

 GLUCOSE RANDOM (BEAKER)  97 mg/dL    



 (test code=652)      

 

 CALCIUM (BEAKER) (test  9.3 mg/dL  8.4-10.2  



 code=697)      

 

 EGFR (BEAKER) (test  72 mL/min/1.73 sq m    ESTIMATED GFR IS NOT AS



 code=1092)      ACCURATE AS CREATININE



       CLEARANCE IN PREDICTING



       GLOMERULAR FILTRATION



       RATE. ESTIMATED GFR IS



       NOT APPLICABLE FOR



       DIALYSIS PATIENTS.



CBC W/PLT COUNT &amp; AUTO [id number] 09:54:00





 Test Item  Value  Reference Range  Comments

 

 WHITE BLOOD CELL COUNT (BEAKER) (test code=775)  4.0 K/ L  3.5-10.5  

 

 RED BLOOD CELL COUNT (BEAKER) (test code=761)  5.42 M/ L  4.63-6.08  

 

 HEMOGLOBIN (BEAKER) (test code=410)  15.4 GM/DL  13.7-17.5  

 

 HEMATOCRIT (BEAKER) (test code=411)  47.6 %  40.1-51.0  

 

 MEAN CORPUSCULAR VOLUME (BEAKER) (test code=753)  87.8 fL  79.0-92.2  

 

 MEAN CORPUSCULAR HEMOGLOBIN (BEAKER) (test  28.4 pg  25.7-32.2  



 baji=270)      

 

 MEAN CORPUSCULAR HEMOGLOBIN CONC (BEAKER) (test  32.4 GM/DL  32.3-36.5  



 code=752)      

 

 RED CELL DISTRIBUTION WIDTH (BEAKER) (test  13.8 %  11.6-14.4  



 code=412)      

 

 PLATELET COUNT (BEAKER) (test code=756)  152 K/CU MM  150-450  

 

 MEAN PLATELET VOLUME (BEAKER) (test code=754)  9.9 fL  9.4-12.4  

 

 NUCLEATED RED BLOOD CELLS (BEAKER) (test  0 /100 WBC  0-0  



 code=413)      

 

 NEUTROPHILS RELATIVE PERCENT (BEAKER) (test  66 %    



 code=429)      

 

 LYMPHOCYTES RELATIVE PERCENT (BEAKER) (test  20 %    



 code=430)      

 

 MONOCYTES RELATIVE PERCENT (BEAKER) (test  11 %    



 code=431)      

 

 EOSINOPHILS RELATIVE PERCENT (BEAKER) (test  2 %    



 code=432)      

 

 BASOPHILS RELATIVE PERCENT (BEAKER) (test  1 %    



 code=437)      

 

 NEUTROPHILS ABSOLUTE COUNT (BEAKER) (test  2.65 K/ L  1.78-5.38  



 code=670)      

 

 LYMPHOCYTES ABSOLUTE COUNT (BEAKER) (test  0.82 K/ L  1.32-3.57  



 code=414)      

 

 MONOCYTES ABSOLUTE COUNT (BEAKER) (test  0.44 K/ L  0.30-0.82  



 code=415)      

 

 EOSINOPHILS ABSOLUTE COUNT (BEAKER) (test  0.08 K/ L  0.04-0.54  



 code=416)      

 

 BASOPHILS ABSOLUTE COUNT (BEAKER) (test  0.04 K/ L  0.01-0.08  



 code=417)      

 

 IMMATURE GRANULOCYTES-RELATIVE PERCENT (BEAKER)  0 %  0-1  



 (test code=2801)      



TACROLIMUS [id number] 14:05:00





 Test Item  Value  Reference Range  Comments

 

 TACROLIMUS BLOOD (BEAKER) (test code=657)  6.5 ng/mL  10.0-20.0  



BASIC METABOLIC [id number] 10:13:00





 Test Item  Value  Reference Range  Comments

 

 SODIUM (BEAKER) (test  140 meq/L  136-145  



 kxps=696)      

 

 POTASSIUM (BEAKER) (test  4.1 meq/L  3.5-5.1  



 code=379)      

 

 CHLORIDE (BEAKER) (test  109 meq/L    



 code=382)      

 

 CO2 (BEAKER) (test  22 meq/L  22-29  



 code=355)      

 

 BLOOD UREA NITROGEN  15 mg/dL  7-21  



 (BEAKER) (test code=354)      

 

 CREATININE (BEAKER) (test  0.95 mg/dL  0.57-1.25  



 code=358)      

 

 GLUCOSE RANDOM (BEAKER)  99 mg/dL    



 (test code=652)      

 

 CALCIUM (BEAKER) (test  8.8 mg/dL  8.4-10.2  



 code=697)      

 

 EGFR (BEAKER) (test  85 mL/min/1.73 sq m    ESTIMATED GFR IS NOT AS



 code=1092)      ACCURATE AS CREATININE



       CLEARANCE IN PREDICTING



       GLOMERULAR FILTRATION



       RATE. ESTIMATED GFR IS



       NOT APPLICABLE FOR



       DIALYSIS PATIENTS.



CBC W/PLT COUNT &amp; AUTO [id number] 09:42:00





 Test Item  Value  Reference Range  Comments

 

 WHITE BLOOD CELL COUNT (BEAKER) (test code=775)  4.7 K/ L  4.0-10.0  

 

 RED BLOOD CELL COUNT (BEAKER) (test code=761)  5.18 M/ L  4.20-5.80  

 

 HEMOGLOBIN (BEAKER) (test code=410)  15.3 GM/DL  13.0-16.8  

 

 HEMATOCRIT (BEAKER) (test code=411)  47.1 %  40.0-50.0  

 

 MEAN CORPUSCULAR VOLUME (BEAKER) (test code=753)  90.8 fL  82.0-98.0  

 

 MEAN CORPUSCULAR HEMOGLOBIN (BEAKER) (test  29.6 pg  27.0-33.0  



 code=751)      

 

 MEAN CORPUSCULAR HEMOGLOBIN CONC (BEAKER) (test  32.6 GM/DL  32.0-36.0  



 code=752)      

 

 RED CELL DISTRIBUTION WIDTH (BEAKER) (test  15.1 %  10.3-14.2  



 code=412)      

 

 PLATELET COUNT (BEAKER) (test code=756)  129 K/CU MM  150-430  

 

 MEAN PLATELET VOLUME (BEAKER) (test code=754)  7.7 fL  6.5-10.5  

 

 NUCLEATED RED BLOOD CELLS (BEAKER) (test  0 /100 WBC  0-0  



 code=413)      

 

 NEUTROPHILS RELATIVE PERCENT (BEAKER) (test  72 %    



 code=429)      

 

 LYMPHOCYTES RELATIVE PERCENT (BEAKER) (test  18 %    



 code=430)      

 

 MONOCYTES RELATIVE PERCENT (BEAKER) (test  8 %    



 code=431)      

 

 EOSINOPHILS RELATIVE PERCENT (BEAKER) (test  1 %    



 code=432)      

 

 BASOPHILS RELATIVE PERCENT (BEAKER) (test  1 %    



 code=437)      

 

 NEUTROPHILS ABSOLUTE COUNT (BEAKER) (test  3.37 K/ L  1.80-8.00  



 code=670)      

 

 LYMPHOCYTES ABSOLUTE COUNT (BEAKER) (test  0.85 K/ L  1.48-4.50  



 code=414)      

 

 MONOCYTES ABSOLUTE COUNT (BEAKER) (test  0.40 K/ L  0.00-1.30  



 code=415)      

 

 EOSINOPHILS ABSOLUTE COUNT (BEAKER) (test  0.07 K/ L  0.00-0.50  



 code=416)      

 

 BASOPHILS ABSOLUTE COUNT (BEAKER) (test  0.03 K/ L  0.00-0.20  



 code=417)      



[id number] 10:38:00





 Test Item  Value  Reference Range  Comments

 

 MAGNESIUM (BEAKER) (test code=627)  2.2 mg/dL  1.6-2.6  



TACROLIMUS [id number] 10:33:00





 Test Item  Value  Reference Range  Comments

 

 TACROLIMUS BLOOD (BEAKER) (test code=657)  7.1 ng/mL  10.0-20.0  



CBC W/PLT COUNT &amp; AUTO [id number] 08:47:00





 Test Item  Value  Reference Range  Comments

 

 WHITE BLOOD CELL COUNT (BEAKER) (test code=775)  4.1 K/ L  4.0-10.0  

 

 RED BLOOD CELL COUNT (BEAKER) (test code=761)  5.02 M/ L  4.20-5.80  

 

 HEMOGLOBIN (BEAKER) (test code=410)  14.8 GM/DL  13.0-16.8  

 

 HEMATOCRIT (BEAKER) (test code=411)  43.6 %  40.0-50.0  

 

 MEAN CORPUSCULAR VOLUME (BEAKER) (test code=753)  87.0 fL  82.0-98.0  

 

 MEAN CORPUSCULAR HEMOGLOBIN (BEAKER) (test  29.5 pg  27.0-33.0  



 code=751)      

 

 MEAN CORPUSCULAR HEMOGLOBIN CONC (BEAKER) (test  33.9 GM/DL  32.0-36.0  



 code=752)      

 

 RED CELL DISTRIBUTION WIDTH (BEAKER) (test  14.1 %  10.3-14.2  



 code=412)      

 

 PLATELET COUNT (BEAKER) (test code=756)  160 K/CU MM  150-430  

 

 MEAN PLATELET VOLUME (BEAKER) (test code=754)  7.2 fL  6.5-10.5  

 

 NUCLEATED RED BLOOD CELLS (BEAKER) (test  0 /100 WBC  0-0  



 code=413)      

 

 NEUTROPHILS RELATIVE PERCENT (BEAKER) (test  64 %    



 code=429)      

 

 LYMPHOCYTES RELATIVE PERCENT (BEAKER) (test  21 %    



 code=430)      

 

 MONOCYTES RELATIVE PERCENT (BEAKER) (test  12 %    



 code=431)      

 

 EOSINOPHILS RELATIVE PERCENT (BEAKER) (test  2 %    



 code=432)      

 

 BASOPHILS RELATIVE PERCENT (BEAKER) (test  0 %    



 code=437)      

 

 NEUTROPHILS ABSOLUTE COUNT (BEAKER) (test  2.61 K/ L  1.80-8.00  



 code=670)      

 

 LYMPHOCYTES ABSOLUTE COUNT (BEAKER) (test  0.87 K/ L  1.48-4.50  



 code=414)      

 

 MONOCYTES ABSOLUTE COUNT (BEAKER) (test  0.49 K/ L  0.00-1.30  



 code=415)      

 

 EOSINOPHILS ABSOLUTE COUNT (BEAKER) (test  0.09 K/ L  0.00-0.50  



 code=416)      

 

 BASOPHILS ABSOLUTE COUNT (BEAKER) (test  0.01 K/ L  0.00-0.20  



 code=417)      



0.00BASI METABOLIC [id number] 08:47:00





 Test Item  Value  Reference Range  Comments

 

 SODIUM (BEAKER) (test  142 meq/L  136-145  



 dggu=015)      

 

 POTASSIUM (BEAKER) (test  3.8 meq/L  3.5-5.1  



 code=379)      

 

 CHLORIDE (BEAKER) (test  109 meq/L    



 code=382)      

 

 CO2 (BEAKER) (test  21 meq/L  22-29  



 code=355)      

 

 BLOOD UREA NITROGEN  22 mg/dL  7-21  



 (BEAKER) (test code=354)      

 

 CREATININE (BEAKER) (test  1.18 mg/dL  0.57-1.25  



 code=358)      

 

 GLUCOSE RANDOM (BEAKER)  99 mg/dL    



 (test code=652)      

 

 CALCIUM (BEAKER) (test  9.1 mg/dL  8.4-10.2  



 code=697)      

 

 EGFR (BEAKER) (test  66 mL/min/1.73 sq m    ESTIMATED GFR IS NOT AS



 code=1092)      ACCURATE AS CREATININE



       CLEARANCE IN PREDICTING



       GLOMERULAR FILTRATION



       RATE. ESTIMATED GFR IS



       NOT APPLICABLE FOR



       DIALYSIS PATIENTS.



URINALYSIS W/ [id number] 00:20:00





 Test Item  Value  Reference Range  Comments

 

 COLOR (BEAKER) (test code=470)  Yellow    

 

 CLARITY (BEAKER) (test code=469)  Slightly Hazy    

 

 SPECIFIC GRAVITY UA (BEAKER) (test  1.050  1.001-1.035  



 code=468)      

 

 PH UA (BEAKER) (test code=467)  5.5  5.0-8.0  

 

 PROTEIN UA (BEAKER) (test code=464)  20 mg/dL  Negative  

 

 GLUCOSE UA (BEAKER) (test code=365)  Negative  Negative  

 

 KETONES UA (BEAKER) (test code=371)  40 mg/dL  Negative  

 

 BILIRUBIN UA (BEAKER) (test code=462)  Negative  Negative  

 

 BLOOD UA (BEAKER) (test code=461)  Negative  Negative  

 

 NITRITE UA (BEAKER) (test code=465)  Negative  Negative  

 

 LEUKOCYTE ESTERASE UA (BEAKER) (test  Negative  Negative  



 qycj=396)      

 

 UROBILINOGEN UA (BEAKER) (test code=463)  0.2 mg/dL  0.2-1.0  

 

 RBC UA (BEAKER) (test code=519)  1 /HPF    

 

 WBC UA (BEAKER) (test code=520)  3 /HPF    

 

 MUCUS (BEAKER) (test code=1574)  Many    

 

 HYALINE CASTS (BEAKER) (test code=514)  6 /LPF    

 

 SOURCE(BEAKER) (test code=4205)  Urine, Clean Catch    



BASIC METABOLIC [id number] 21:55:00





 Test Item  Value  Reference Range  Comments

 

 SODIUM (BEAKER) (test  139 meq/L  136-145  



 itqb=907)      

 

 POTASSIUM (BEAKER) (test  5.1 meq/L  3.5-5.1  



 code=379)      

 

 CHLORIDE (BEAKER) (test  107 meq/L    



 code=382)      

 

 CO2 (BEAKER) (test  18 meq/L  22-29  



 code=355)      

 

 BLOOD UREA NITROGEN  26 mg/dL  7-21  



 (BEAKER) (test code=354)      

 

 CREATININE (BEAKER) (test  1.26 mg/dL  0.57-1.25  



 code=358)      

 

 GLUCOSE RANDOM (BEAKER)  89 mg/dL    



 (test code=652)      

 

 CALCIUM (BEAKER) (test  9.1 mg/dL  8.4-10.2  



 code=697)      

 

 EGFR (BEAKER) (test  61 mL/min/1.73 sq m    ESTIMATED GFR IS NOT AS



 code=1092)      ACCURATE AS CREATININE



       CLEARANCE IN PREDICTING



       GLOMERULAR FILTRATION



       RATE. ESTIMATED GFR IS



       NOT APPLICABLE FOR



       DIALYSIS PATIENTS.



HEPATIC FUNCTION [id number] 21:55:00





 Test Item  Value  Reference Range  Comments

 

 TOTAL PROTEIN (BEAKER) (test code=770)  7.1 gm/dL  6.0-8.3  

 

 ALBUMIN (BEAKER) (test code=1145)  4.2 g/dL  3.5-5.0  

 

 BILIRUBIN TOTAL (BEAKER) (test code=377)  0.7 mg/dL  0.2-1.2  

 

 BILIRUBIN DIRECT (BEAKER) (test code=706)  0.2 mg/dL  0.1-0.5  

 

 ALKALINE PHOSPHATASE (BEAKER) (test code=346)  86 U/L    

 

 AST (SGOT) (BEAKER) (test code=353)  28 U/L  5-34  

 

 ALT (SGPT) (BEAKER) (test code=347)  20 U/L  6-55  



[id number] 21:52:00





 Test Item  Value  Reference Range  Comments

 

 LIPASE (BEAKER) (test code=749)  40 U/L  8-78  



B-TYPE NATRIURETIC FACTOR (BNP)2017 21:46:00





 Test Item  Value  Reference Range  Comments

 

 B-TYPE NATRIURETIC PEPTIDE (BEAKER) (test code=700)  21 pg/mL  0-100  



CREATINE KINASE (CK), TOTAL AND [id number] 21:45:00





 Test Item  Value  Reference Range  Comments

 

 CREATINE KINASE TOTAL (BEAKER) (test code=380)  32 U/L    

 

 CREATINE KINASE-MB (BEAKER) (test code=750)  0.6 ng/mL  0.0-6.6  

 

 CREATINE KINASE-MB INDEX (BEAKER) (test code=395)  1.9 %    



Effective 2014: CK-MB Reference Range ChangeNew: 0.0-6.6    Previous: 0.0-
4.9CK-MB Reference Range:&lt;6.7      Normal6.7-10.0  Borderline&gt;10.0     
AbnormalTROPONIN -51-13 21:45:00





 Test Item  Value  Reference Range  Comments

 

 TROPONIN I (BEAKER) (test code=397)  < ng/mL  0.00-0.03  



Effective 2014: Reference Range ChangeNew: 0.00-0.03   Previous 0.00-
0.15Troponin I (TnI) levels must be interpreted in the context of the 
presenting symptoms and the clinical findings. Elevated TnI levels indicate 
myocardial damage, but are not specific for ischemic heart disease. Elevated 
TnI levels are seen in patients with other cardiac conditions (including 
myocarditis and congestive heartfailure), and slight TnI elevations occur in 
patients with other conditions, including sepsis, renalfailure, acidosis, acute 
neurological disease, and persistent tachyarrhythmia.CBC W/PLT COUNT &amp; AUTO 
[id number] 21:24:00





 Test Item  Value  Reference Range  Comments

 

 WHITE BLOOD CELL COUNT (BEAKER) (test code=775)  4.7 K/ L  4.0-10.0  

 

 RED BLOOD CELL COUNT (BEAKER) (test code=761)  5.75 M/ L  4.20-5.80  

 

 HEMOGLOBIN (BEAKER) (test code=410)  17.4 GM/DL  13.0-16.8  

 

 HEMATOCRIT (BEAKER) (test code=411)  49.2 %  40.0-50.0  

 

 MEAN CORPUSCULAR VOLUME (BEAKER) (test code=753)  85.6 fL  82.0-98.0  

 

 MEAN CORPUSCULAR HEMOGLOBIN (BEAKER) (test  30.3 pg  27.0-33.0  



 code=751)      

 

 MEAN CORPUSCULAR HEMOGLOBIN CONC (BEAKER) (test  35.4 GM/DL  32.0-36.0  



 code=752)      

 

 RED CELL DISTRIBUTION WIDTH (BEAKER) (test  12.9 %  10.3-14.2  



 code=412)      

 

 PLATELET COUNT (BEAKER) (test code=756)  98 K/CU MM  150-430  

 

 MEAN PLATELET VOLUME (BEAKER) (test code=754)  8.7 fL  6.5-10.5  

 

 NUCLEATED RED BLOOD CELLS (BEAKER) (test  0 /100 WBC  0-0  



 code=413)      

 

 NEUTROPHILS RELATIVE PERCENT (BEAKER) (test  69 %    



 code=429)      

 

 LYMPHOCYTES RELATIVE PERCENT (BEAKER) (test  22 %    



 code=430)      

 

 MONOCYTES RELATIVE PERCENT (BEAKER) (test  8 %    



 code=431)      

 

 EOSINOPHILS RELATIVE PERCENT (BEAKER) (test  1 %    



 code=432)      

 

 BASOPHILS RELATIVE PERCENT (BEAKER) (test  0 %    



 code=437)      

 

 NEUTROPHILS ABSOLUTE COUNT (BEAKER) (test  3.26 K/ L  1.80-8.00  



 code=670)      

 

 LYMPHOCYTES ABSOLUTE COUNT (BEAKER) (test  1.05 K/ L  1.48-4.50  



 code=414)      

 

 MONOCYTES ABSOLUTE COUNT (BEAKER) (test code=415)  0.38 K/ L  0.00-1.30  

 

 EOSINOPHILS ABSOLUTE COUNT (BEAKER) (test  0.03 K/ L  0.00-0.50  



 code=416)      

 

 BASOPHILS ABSOLUTE COUNT (BEAKER) (test code=417)  0.02 K/ L  0.00-0.20  



0.00CMV PCR, [id number] 17:55:00





 Test Item  Value  Reference Range  Comments

 

 CMV VIRAL LOAD - NEGATIVE  Negative or below the linear    



 (BEAKER) (test code=2558)  range of the assay (<375    



   copies/mL)    



Cytomegalovirus (CMV) infection can cause significant disease in 
immunosuppressed patients. However,it is common for CMV to manifest as a 
limited infection which is of no clinical significance in immunosuppressed 
patients or in healthy individuals.Viral load measurements are helpful to 
identify clinical CMV infection and to guide the pre-emptive management of 
antiviral therapy.  For treatment of CMVinfection due to reactivation in 
transplant recipients, a threshold between 4,000 and 5,000 copies/mL is 
suggested.  For treatment of primary CMV infection, a lower threshold can be 
used.CMV infection may also be monitored using weekly serial measurements. 
Serial measurements of CMV DNA viral load canbe evaluated by identifying a 10-
fold change, as well as assessing the CMV DNA viral load and the clinical 
context for each patient.The plasma CMV DNA viral load was detected using 
quantitative polymerase chain reaction and fluorescent monitoring of a specific 
hybridized probe. Genetic variation and other factors can affect the accuracy 
of nucleic acid testing. Therefore, the results should be interpreted in light 
of clinical data. A negative result may not exclude the presence of CMV 
disease.This test was developed and its performance characteristics determined 
by the [facility] Pathology Department, Section of Molecular 
Pathology. It has not been cleared or approved by the U.S. Food and Drug 
Administration (FDA), since FDA approval is not required for clinical use of 
the test. Validation was done as required by The Clinical Laboratory 
Improvement Amendments of 1988.CREATINE KINASE (CK), TOTAL AND [id number] 18:
40:00





 Test Item  Value  Reference Range  Comments

 

 CREATINE KINASE TOTAL (BEAKER) (test code=380)  36 U/L    

 

 CREATINE KINASE-MB (BEAKER) (test code=750)  0.3 ng/mL  0.0-6.6  

 

 CREATINE KINASE-MB INDEX (BEAKER) (test code=395)  0.8 %    



Effective 2014: CK-MB Reference Range ChangeNew: 0.0-6.6    Previous: 0.0-
4.9CK-MB Reference Range:&lt;6.7      Normal6.7-10.0  Borderline&gt;10.0     
AbnormalTROPONIN -15-41 18:40:00





 Test Item  Value  Reference Range  Comments

 

 TROPONIN I (BEAKER) (test code=397)  < ng/mL  0.00-0.03  



Effective 2014: Reference Range ChangeNew: 0.00-0.03   Previous 0.00-
0.15Troponin I (TnI) levels must be interpreted in the context of the 
presenting symptoms and the clinical findings. Elevated TnI levels indicate 
myocardial damage, but are not specific for ischemic heart disease. Elevated 
TnI levels are seen in patients with other cardiac conditions (including 
myocarditis and congestive heartfailure), and slight TnI elevations occur in 
patients with other conditions, including sepsis, renalfailure, acidosis, acute 
neurological disease, and persistent tachyarrhythmia.B-TYPE NATRIURETIC FACTOR (
BNP)2017 18:40:00





 Test Item  Value  Reference Range  Comments

 

 B-TYPE NATRIURETIC PEPTIDE (BEAKER) (test code=700)  23 pg/mL  0-100  



[id number] 18:33:00





 Test Item  Value  Reference Range  Comments

 

 MAGNESIUM (BEAKER) (test code=627)  1.8 mg/dL  1.6-2.6  



BASIC METABOLIC [id number] 18:33:00





 Test Item  Value  Reference Range  Comments

 

 SODIUM (BEAKER) (test  140 meq/L  136-145  



 fuqu=653)      

 

 POTASSIUM (BEAKER) (test  4.1 meq/L  3.5-5.1  



 code=379)      

 

 CHLORIDE (BEAKER) (test  105 meq/L    



 code=382)      

 

 CO2 (BEAKER) (test  23 meq/L  22-29  



 code=355)      

 

 BLOOD UREA NITROGEN  18 mg/dL  7-21  



 (BEAKER) (test code=354)      

 

 CREATININE (BEAKER) (test  1.34 mg/dL  0.57-1.25  



 code=358)      

 

 GLUCOSE RANDOM (BEAKER)  100 mg/dL    



 (test code=652)      

 

 CALCIUM (BEAKER) (test  9.2 mg/dL  8.4-10.2  



 code=697)      

 

 EGFR (BEAKER) (test  57 mL/min/1.73 sq m    ESTIMATED GFR IS NOT AS



 code=1092)      ACCURATE AS CREATININE



       CLEARANCE IN PREDICTING



       GLOMERULAR FILTRATION



       RATE. ESTIMATED GFR IS



       NOT APPLICABLE FOR



       DIALYSIS PATIENTS.



CBC W/PLT COUNT &amp; AUTO [id number] 18:24:00





 Test Item  Value  Reference Range  Comments

 

 WHITE BLOOD CELL COUNT (BEAKER) (test code=775)  2.6 K/ L  4.0-10.0  

 

 RED BLOOD CELL COUNT (BEAKER) (test code=761)  5.66 M/ L  4.20-5.80  

 

 HEMOGLOBIN (BEAKER) (test code=410)  16.5 GM/DL  13.0-16.8  

 

 HEMATOCRIT (BEAKER) (test code=411)  49.0 %  40.0-50.0  

 

 MEAN CORPUSCULAR VOLUME (BEAKER) (test code=753)  86.6 fL  82.0-98.0  

 

 MEAN CORPUSCULAR HEMOGLOBIN (BEAKER) (test  29.1 pg  27.0-33.0  



 code=751)      

 

 MEAN CORPUSCULAR HEMOGLOBIN CONC (BEAKER) (test  33.6 GM/DL  32.0-36.0  



 code=752)      

 

 RED CELL DISTRIBUTION WIDTH (BEAKER) (test  13.0 %  10.3-14.2  



 code=412)      

 

 PLATELET COUNT (BEAKER) (test code=756)  87 K/CU MM  150-430  

 

 MEAN PLATELET VOLUME (BEAKER) (test code=754)  8.3 fL  6.5-10.5  

 

 NUCLEATED RED BLOOD CELLS (BEAKER) (test  0 /100 WBC  0-0  



 code=413)      

 

 NEUTROPHILS RELATIVE PERCENT (BEAKER) (test  51 %    



 code=429)      

 

 LYMPHOCYTES RELATIVE PERCENT (BEAKER) (test  30 %    



 code=430)      

 

 MONOCYTES RELATIVE PERCENT (BEAKER) (test  18 %    



 code=431)      

 

 EOSINOPHILS RELATIVE PERCENT (BEAKER) (test  1 %    



 code=432)      

 

 BASOPHILS RELATIVE PERCENT (BEAKER) (test  0 %    



 code=437)      

 

 NEUTROPHILS ABSOLUTE COUNT (BEAKER) (test  1.33 K/ L  1.80-8.00  



 code=670)      

 

 LYMPHOCYTES ABSOLUTE COUNT (BEAKER) (test  0.78 K/ L  1.48-4.50  



 code=414)      

 

 MONOCYTES ABSOLUTE COUNT (BEAKER) (test code=415)  0.47 K/ L  0.00-1.30  

 

 EOSINOPHILS ABSOLUTE COUNT (BEAKER) (test  0.02 K/ L  0.00-0.50  



 code=416)      

 

 BASOPHILS ABSOLUTE COUNT (BEAKER) (test code=417)  0.01 K/ L  0.00-0.20  



0.00POCT-GLUCOSE [id number] 15:37:00





 Test Item  Value  Reference Range  Comments

 

 POC-GLUCOSE METER (BEAKER)  97 mg/dL    TESTED AT [facility] [address] [facility]



 (test jhds=[id number])      [facility] [address]

## 2019-04-14 NOTE — XMS REPORT
Continuity of Care Document

 Created on:2017



Patient:TYRA BRUNO

Sex:Male

:1970

External Reference #:[id number]





Demographics







 Address  [address] [place] [id number]

 

 Phone  [phone]

 

 Phone  [phone]

 

 Preferred Language  Unknown

 

 Marital Status  Unknown

 

 Faith Affiliation  Unknown

 

 Race  Unknown

 

 Ethnic Group  Unknown









Author







 Organization  [facility]









Problems







 Problem  Status  Onset  Classification  Date  Comments  Source



     Date    Reported    



             



             



             

 

 RIGHT RECURRENT  Active  20        [facility]



 INGUINAL HERNIA    17        Medical



 AND INCI            Center



             



             

 

 MORBID OBESITY  Active  10/14/20        [place] [address]        Medical



             Center



             



             

 

 Heart  Active    Problem  2017    [facility]



 transplanted            [facility]



             Center



             



             

 

 Hypertension  Active    Problem  2017    [facility]



             



             

 

 MI (<span  Resolved    Problem  2017    [facility] [place]



 ID="[id number]            Medical



 ">Confirmed</[facility]            Center



 n>)            



             

 

 Morbid obesity  Active    Problem  2017    [facility]



             



             

 

 Hiatal hernia  Active    Problem  2017    [facility]



             



             

 

 OBESITY,  Active          [facility] [place]



 UNSPECIFIED            [facility]



             Center



             



             







Medications







 Medication  Details  Route  Status  Patient  Ordering  Order  Source



         Instructions  Provider  Date  



               



               



               

 

 gabapentin 300 MG  300 mg, 1 cap,    Inactive        [facility] [place]



 Oral Capsule  Route: PO, Drug            Medical



   form: CAP,            Center



   ONCE, Dosing            



   Weight 102.273,            



   kg, Priority:            



   STAT, Start            



   date: 17            



   13:37:00 CDT,            



   Stop date:            



   17            



   13:37:00 CDT            



               



               

 

 Tramadol  100 mg, Route:    Inactive        [facility] [place]



   PO, Drug form:          2017  Medical



   TAB, ONCE,            Center



   Dosing Weight            



   102.273, kg,            



   Priority: STAT,            



   Start date:            



   17            



   13:36:00 CDT,            



   Stop date:            



   17            



   13:36:00 CDT            



               



               

 

 ketOROLAC (ANES)  IV, ONCE    Inactive        [address]



               Center



               



               

 

 ondansetron (ANES)  Route: IV, Drug    Inactive        [facility]



   form: INJ,          2017  Medical



   ONCE, Stop            Center



   date: 17            



   13:03:00 CDT            



               



               

 

 tramadol  50 mg=1 tab,    Active         [place]



 hydrochloride 50  PO, Q6H, PRN          2017  Medical



 MG Oral Tablet  Pain, X 10 day,            Center



   # 40 tab, 0            



   Refill(s)            



               



               

 

 Ondansetron  4 mg, Route:    Inactive        [facility] [place]



   IVP, Q6H,          2017  Medical



   Dosing Weight            Center



   102.273, kg,            



   Start date:            



   17            



   12:00:00 CDT,            



   Duration: 30            



   day, Stop date:            



   10/21/17            



   6:00:00 CDT            



               



               

 

 propofol (ANES)  Route: IV, Drug    Inactive        [facility]



   form: INJ,            Medical



   ONCE, Stop            Center



   date: 17            



   10:08:00 CDT            



               



               

 

 succinylcholine  Route: IV, Drug    Inactive         [place]



 (ANES)  form: INJ,            Medical



   ONCE, Stop            Center



   date: 17            



   10:08:00 CDT            



               



               

 

 fentaNYL (ANES)  Route: IV, Drug    Inactive        [facility]



   form: INJ,            Medical



   ONCE, Stop            Center



   date: 17            



   10:08:00 CDT            



               



               

 

 lidocaine (ANES)  Route: IV, Drug    Inactive        [facility]



   form: INJ,            Medical



   ONCE, Stop            Center



   date: 17            



   10:03:00 CDT            



               



               

 

 Acetaminophen  1,000 mg,    Inactive        [facility]



   Route: PO, Drug            Medical



   form: LIQ,            Center



   Q6Hnow, Dosing            



   Weight 102.273,            



   kg, Start date:            



   17            



   10:00:00 CDT,            



   Duration: 30            



   day, Stop date:            



   10/21/17            



   4:00:00 CDT            



               



               

 

 Tramadol  100 mg, Route:    Inactive        [facility] [place]



   PO, Drug form:          [facility]  Medical



   SUSP, Q6Hnow,            [facility]



   Dosing Weight            



   102.273, kg,            



   Start date:            



   17            



   10:00:00 CDT,            



   Duration: 30            



   day, Stop date:            



   10/21/17            



   4:00:00 CDT            



               



               

 

 gabapentin  300 mg, Route:    Inactive        [facility] [place]



   PO, Drug form:          2017  Medical



   SUSP, Q8Hn,            Center



   Dosing Weight            



   102.273, kg,            



   Start date:            



   17            



   10:00:00 CDT,            



   Duration: 30            



   day, Stop date:            



   10/21/17            



   2:00:00 CDT            



               



               

 

 celecoxib  200 mg, Route:    Inactive        [facility]



   PO, [id number],          2017  Medical



   Dosing Weight            Center



   102.273, kg,            



   Start date:            



   17            



   10:00:00 CDT,            



   Duration: 30            



   day, Stop date:            



   10/20/17            



   22:00:00 CDT            



               



               

 

 rocuronium (ANES)  Route: IV, Drug    Inactive        [facility]



   form: INJ,            Medical



   ONCE, Stop            Center



   date: 17            



   9:43:00 CDT            



               



               

 

 famotidine (ANES)  Route: IV, Drug    Inactive        [facility]



   form: INJ,          2017  Medical



   ONCE, Stop            Center



   date: 17            



   9:33:00 CDT            



               



               

 

 ceFAZolin (ANES)  Route: IV, Drug    Inactive        [facility]



   form: INJ,            Medical



   ONCE, Stop            Center



   date: 17            



   9:33:00 CDT            



               



               

 

 dexamethasone  Route: IV, Drug    Inactive        [facility] [place]



 (ANES)  form: INJ,            Medical



   ONCE, Stop            Center



   date: 17            



   9:18:00 CDT            



               



               

 

 acetaminophen  Route: IV, Drug    Inactive        [facility] [place]



 (ANES) (ANES)  form: INJ,            Medical



   Start date:            Center



   17            



   8:54:00 CDT,            



   Stop date:            



   17            



   9:54:00 CDT            



               



               

 

 LR 1000 mL INJ  Route: IV,    Inactive        [facility] [place]



 (ANES)  Total Volume:          2017  Medical



   1,000, Start            Center



   date: 17            



   8:23:00 CDT,            



   Stop date:            



   17            



   9:23:00 CDT            



               



               

 

 Flomax  0.4 mg, 1 cap,    Inactive       [place]



   Route: PO, Drug            Medical



   form: CAP, PRE            Center



   OP, Start date:            



   17            



   5:00:00 CDT,            



   Duration: 1            



   doses or times,            



   Stop date:            



   17            



   23:00:00            



   CDTNotes: (Same            



   As: Flomax)            



   "Do Not Crush"            



               



               

 

 Ofirmev  1,000 mg, 100    No Longer        [facility]



   mL, Route: IV,    Active        Medical



   Drug form: INJ,            Center



   PRE OP, Start            



   date: 17            



   5:00:00 CDT,            



   Duration: 1            



   doses or times,            



   Stop date:            



   17            



   23:00:00            



   CDTNotes:            



   Infuse over 15            



   minutes  Do not            



   exceed 4gm/day            



   of            



   acetaminophen            



    *** MEDICATION            



   WASTE ***            



   Product Size:            



   1000 mg Product            



   Wasted:  ___ mg            



               



               

 

 ceFAZolin  2 gm, 100 mL,    Inactive        [facility]



   Route: IVPB,          2017  Medical



   Drug form: INJ,            Center



   PRE OP, Start            



   date: 17            



   5:00:00 CDT,            



   Duration: 1            



   doses or times,            



   Stop date:            



   17            



   23:00:00 CDT,            



   ABX Indication:            



   Surgical            



   ProphylaxisNote            



   s: Same as:            



   Ancef            



               

 

 Tacrolimus  2 mg, PO, QPM    Active       [place]



               Medical



               Center



               



               

 

 Sucralfate 100  10 mL, Route:    No Longer       [place]



 MG/ML Oral  PO, Drug form:    Active      2017  Medical



 Suspension  TAB, Q6H,            Center



   Dosing Weight            



   136.136, kg,            



   Start date:            



   17            



   18:00:00 CST,            



   Duration: 30            



   day, Stop date:            



   17            



   12:00:00            



   CSTNotes: May            



   interfere            



   w/enteral feeds            



   -  Take 1 hr            



   before or 2 hr            



   after antacids,            



   dairy pdt,            



   meals &            



   minerals -  On            



   empty stomach.            



   For patients            



   unable to            



   swallow tablet,            



   dissolve in            



   10mL - 30mL of            



   water or juice            



   and stir before            



   giving.  (Same            



   As: Carafate)            



               



               

 

 Protonix  40 mg, 1 tab,    Inactive       [place]



   Route: PO, Drug            Medical



   form: ECTAB,            [place]



   Daily, Dosing            



   Weight 136.136,            



   kg, Start date:            



   17            



   9:00:00 CST,            



   Stop date:            



   17            



   9:00:00            



   CSTNotes:            



   Tablet should            



   not be chewed            



   or crushed.            



   (Same as:            



   Protonix)            



               



               

 

 Cartia XT  240 mg, 1 cap,    Inactive        [facility]



   Route: PO, Drug            Medical



   form: ERCAP,            [place]



   Daily, Dosing            



   Weight 136.136,            



   kg, Start date:            



   17            



   9:00:00 CST,            



   Duration: 30            



   day, Stop date:            



   17            



   9:00:00            



   CSTNotes: (Same            



   as: Cardizem            



   CD)  Before            



   meals.  DO NOT            



   CRUSH.            



               

 

 Prednisone  10 mg, 1 tab,    Inactive        [facility]



   Route: PO, Drug            Medical



   form: TAB,            Center



   Daily, Dosing            



   Weight 136.136,            



   kg, Start date:            



   17            



   9:00:00 CST,            



   Duration: 30            



   day, Stop date:            



   17            



   9:00:00            



   CSTNotes: (Same            



   as: PredniSONE)            



    Take with            



   food.            



               

 

 Acetaminophen  1,000 mg=31.23    Active       [place]



   mL, PO, Q6Hnow,          2017  Medical



   0 Refill(s)            [place]



               



               

 

 gabapentin 50  300 mg=6 mL,    Active         [place]



 MG/ML Oral  PO, Q8Hnow, #          2017  Medical



 Solution  378 mL, 0            Center



   Refill(s)            



               



               

 

 tramadol  50 mg=1 tab,    Active       [place]



 hydrochloride 50  PO, Q6Hnow, PRN          2017  Medical



 MG Oral Tablet  Pain Score            Center



   6-10, X 14 day,            



   # 56 tab, 0            



   Refill(s)            



               



               

 

 Enoxaparin  30 mg, 0.3 mL,    Inactive       [place]



   Route: SUB-Q,          2017  Medical



   Drug form: INJ,            Center



   [id number],            



   Dosing Weight            



   136.136, kg,            



   Start date:            



   17            



   6:00:00 CST,            



   Duration: 30            



   day, Stop date:            



   17            



   18:00:00            



   CSTNotes: (Same            



   as: Lovenox)            



               



               

 

 Solu-CORTEF  100 mg, 2 mL,    Inactive       [place]



   Route: IVP,            Medical



   Drug form:            [place]



   PDR/INJ, ONCE,            



   Start date:            



   17            



   22:00:00 CST,            



   Stop date:            



   17            



   22:00:00            



   CSTNotes: (Same            



   as:            



   Solu-CORTEF)            



               



               

 

 Prograf  2.5 mg, 5 cap,    No Longer       [place]



   Route: PO, Drug    Active        Medical



   form: CAP,            Center



   [id number], Dosing            



   Weight 136.136,            



   kg, Start date:            



   17            



   21:26:00 CST,            



   Duration: 30            



   day, Stop date:            



   17            



   20:00:00            



   CSTNotes: Avoid            



   grapefruit and            



   grapefruit            



   juice. (Same            



   As: Prograf)            



               



               

 

 hydrocortisone 100  100 mg, Route:    Inactive         [place]



 mg injection  IV, Q6H, Dosing            Medical



   Weight 136.136,            Center



   kg, Start date:            



   17            



   21:00:00 CST,            



   Duration: 30            



   day, Stop date:            



   17            



   18:00:00 CST            



               



               

 

 Ondansetron  4 mg, 2 mL,    No Longer       [place]



   Route: IVP,    Active        Medical



   Drug form: INJ,            Center



   Q6H, Dosing            



   Weight 136.136,            



   kg, Start date:            



   17            



   18:00:00 CST,            



   Duration: 30            



   day, Stop date:            



   17            



   12:00:00            



   CSTNotes: (Same            



   as: Zofran)            



   *** MEDICATION            



   WASTE ***            



   Product Size:            



   4 mg Product            



   Wasted:  ___ mg            



               



               

 

 Acetaminophen  1,000 mg,    Inactive       [place]



   Route: IV,          2017  Medical



   ONCE, Dosing            Center



   Weight 136.136,            



   kg, Start date:            



   17            



   17:36:00 CST,            



   Stop date:            



   17            



   17:36:00 CST            



               



               

 

 Docusate  100 mg, 10 mL,    No Longer         [place]



   Route: PO, Drug    Active        Medical



   form: LIQ, BID,            Center



   Dosing Weight            



   136.136, kg,            



   Start date:            



   17            



   17:00:00 CST,            



   Stop date:            



   17            



   9:00:00            



   CSTNotes: (Same            



   as: Colace)            



               



               

 

 Cellcept  1,000 mg, 2    No Longer         [place]



   tab, Route: PO,    Active      2017  Medical



   Drug form: TAB,            Center



   [id number], Dosing            



   Weight 136.136,            



   kg, Start date:            



   17            



   17:00:00 CST,            



   Duration: 30            



   day, Stop date:            



   17            



   8:00:00            



   CSTNotes:            



   SEPARATE            



   ANTACIDS from            



   Cellcept by 2            



   hrs. (Same As:            



   CellCept)            



               



               

 

 Hydralazine  100 mg, 2 tab,    No Longer         [place]



 Hydrochloride 100  Route: PO, Drug    Active      2017  Medical



 MG Oral Tablet  form: TAB, TID,            Center



   Dosing Weight            



   136.136, kg,            



   Start date:            



   17            



   17:00:00 CST,            



   Duration: 30            



   day, Stop date:            



   17            



   13:00:00            



   CSTNotes: (Same            



   as: Apresoline)            



    May interfere            



   w/enteral            



   feedings  Take            



   With Food            



               



               

 

 Al hydroxide/Mg  30 mL, Route:    No Longer       [place]



 hydroxide/simethic  PO, Drug Form:    Active      2017  Medical



 one 200 mg-200  SUSP, Dosing            Center



 mg-20 mg/5 mL oral  Weight 136.136,            



 suspension  kg, Q4H, PRN            



   Indigestion,            



   Start date:            



   17            



   15:00:00 CST,            



   Duration: 30            



   day, Stop date:            



   17            



   14:59:00            



   CSTNotes:            



   (aluminum            



   hydroxide-magne            



   sium            



   hyd-simethicone            



   [id number]/5m            



   l 30 ml ud TINY)            



               



               

 

 Ondansetron  4 mg, 2 mL,    No Longer       [place]



   Route: IVP,    Active        Medical



   Drug form: INJ,            Center



   Q6H, Dosing            



   Weight 136.136,            



   kg, PRN Nausea            



   & Vomiting,            



   Start date:            



   17            



   15:00:00 CST,            



   Duration: 30            



   day, Stop date:            



   17            



   14:59:00            



   CSTNotes: (Same            



   as: Zofran)            



   *** MEDICATION            



   WASTE ***            



   Product Size:            



   4 mg Product            



   Wasted:  ___ mg            



               



               

 

 Hydromorphone  0.5 mg, 0.25    No Longer       [place]



   mL, Route: IVP,    Active        Medical



   Drug form: INJ,            Center



   Q4H, Dosing            



   Weight 136.136,            



   kg, PRN Pain            



   Score 7-10,            



   Start date:            



   17            



   15:00:00 CST,            



   Duration: 30            



   day, Stop date:            



   17            



   14:59:00            



   CSTNotes: Same            



   as Dilaudid            



               



               

 

 Oxycodone  10 mg, 10 mL,    No Longer       [place]



 Hydrochloride 5 MG  Route: PO, Drug    Active        Medical



 Oral Tablet  form: SOLN,            Center



   Q4H, Dosing            



   Weight 136.136,            



   kg, PRN Pain            



   Score 7-10,            



   Start date:            



   17            



   15:00:00 CST,            



   Stop date:            



   17            



   14:59:00            



   CSTNotes: (Same            



   as:'Roxicodone)            



   To be drawn up            



   in 3 mL syr            



               



               

 

 gabapentin  300 mg, Route:    Inactive       [place]



   PO, Q8Hnow,            Medical



   Dosing Weight            Center



   136.136, kg,            



   Start date:            



   17            



   15:00:00 CST,            



   Duration: 30            



   day, Stop date:            



   17            



   7:00:00 CST            



               



               

 

 Tramadol  100 mg, 2 tab,    No Longer       [place]



   Route: PO, Drug    Active        Medical



   form: TAB,            Center



   Q6Hnow, Dosing            



   Weight 136.136,            



   kg, Start date:            



   17            



   15:00:00 CST,            



   Duration: 30            



   day, Stop date:            



   17            



   9:00:00            



   CSTNotes: Not            



   to exceed            



   400mg/day.            



   (Same As:            



   Ultram)            



               

 

 Acetaminophen  1,000 mg,    Inactive       [place]



   Route: IVPB,          2017  Medical



   Q6Hnow, Dosing            Center



   Weight 136.136,            



   kg, Start date:            



   17            



   15:00:00 CST,            



   Duration: 30            



   day, Stop date:            



   17            



   9:00:00 CST            



               



               

 

 Al hydroxide/Mg  30 ml, Route:    Inactive       [place]



 hydroxide/simethic  PO, Drug Form:          2017  Medical



 one 200 mg-200  SUSP, Dosing            Center



 mg-20 mg/5 mL oral  Weight 136.136,            



 suspension  kg, Q6H, PRN            



   Indigestion,            



   Start date:            



   17            



   14:54:00 CST,            



   Duration: 30            



   day, Stop date:            



   17            



   14:53:00 CST            



               



               

 

 Calcium Chloride  1,000 mL, Rate:    No Longer       [place]



 0.0014 MEQ/ML /  125 ml/hr,    Active        Medical



 Potassium Chloride  Infuse over: 8            Center



 0.004 MEQ/ML /  hr, Route: IV,            



 Sodium Chloride  Dosing Weight            



 0.103 MEQ/ML /  136.136 kg,            



 Sodium Lactate  Total Volume:            



 0.028 MEQ/ML  1,000, Start            



 Injectable  date: 17            



 Solution  14:54:00 CST,            



   Duration: 30            



   day, Stop date:            



   17            



   14:53:00 CST            



               



               

 

 Cefoxitin  1 gm, Route:    Inactive       [place]



   IVPB, ONCE,          2017  Medical



   Dosing Weight            Center



   136.136, kg,            



   Start date:            



   17            



   14:10:00 CST,            



   Stop date:            



   17            



   14:10:00 CST            



               



               

 

 gabapentin  300 mg, 6 mL,    No Longer        [facility]



   Route: PO, Drug    Active        Medical



   form: SOLN,            Center



   Q8Hnow, Dosing            



   Weight 136.136,            



   kg, Start date:            



   17            



   13:00:00 CST,            



   Stop date:            



   17            



   5:00:00            



   CSTNotes: (Same            



   as: Neurontin)            



               



               

 

 Acetaminophen  1,000 mg, 31.23    No Longer       [place]



   mL, Route: PO,    Active        Medical



   Drug form: LIQ,            Center



   Q6Hnow, Dosing            



   Weight 136.136,            



   kg, Start date:            



   17            



   13:00:00 CST,            



   Stop date:            



   17            



   7:00:00            



   CSTNotes: Max            



   acetaminophen=4            



   000mg/day (4            



   gm/day).  (Same            



   as: Tylenol)            



               



               

 

 Promethazine  12.5 mg, 0.5    No Longer       [place]



   mL, Route: IM,    Active        Medical



   Drug form: INJ,            Center



   Q6H, Dosing            



   Weight 136.136,            



   kg, PRN Nausea            



   & Vomiting,            



   Start date:            



   17            



   12:26:00 CST,            



   Duration: 30            



   day, Stop date:            



   17            



   12:25:00            



   CSTNotes: Do            



   not give IV            



   push.  (Same            



   as: Phenergan)            



               



               

 

 Promethazine  12.5 mg, Route:    Inactive       [place]



   PO, Q6H, Dosing            Medical



   Weight 136.136,            Center



   kg, PRN Nausea            



   & Vomiting,            



   Start date:            



   17            



   12:24:00 CST,            



   Duration: 30            



   day, Stop date:            



   17            



   12:23:00 CST            



               



               

 

 Tramadol  50 mg, 1 tab,    No Longer       [place]



   Route: PO, Drug    Active        Medical



   form: TAB,            Center



   Q6Hnow, Dosing            



   Weight 136.136,            



   kg, PRN Pain            



   Score 6-10,            



   Start date:            



   17            



   12:24:00 CST,            



   Duration: 30            



   day, Stop date:            



   17            



   12:23:00            



   CSTNotes: Not            



   to exceed            



   400mg/day.            



   (Same As:            



   Ultram)            



               

 

 bupivacaine  20 mL, Route:    No Longer       [place]



 liposome  InFILtration(lo    Active        Medical



   lizabeth), Drug            Center



   Form: INJ,            



   ONCALL, Start            



   date: 17            



   12:00:00 CST,            



   Duration: 1            



   day, Stop date:            



   17            



   11:59:00            



   CSTNotes: (Same            



   as: Exparel)            



   *** NOT FOR IV            



   use****            



   Postoperative            



   analgesia:            



   Infiltration            



   (local): Dose            



   is based on            



   surgical site            



   and volume            



   required to            



   cover the area            



   (in general,            



   the maximum            



   total dose is            



   266 mg).            



   Bunionectomy: 7            



   mL into the            



   tissues            



   surrounding the            



   osteotomy and 1            



   mL into the            



   subcutaneous            



   tissue of the            



   surgical site            



   (total dose=8            



   mL [106 mg])            



   Hemorrhoidectom            



   y: 30 mL (20 mL            



   vial diluted            



   with 10 mL NS)            



   divided and            



   administered as            



   6 injections of            



   5 mL each            



   (total dose=30            



   mL [266 mg])            



               



               

 

 Lovenox  40 mg, 0.4 mL,    Inactive        [facility]



   Route: SUB-Q,            Medical



   Drug form: INJ,            Center



   ONCALL, Start            



   date: 17            



   11:00:00 CST,            



   Duration: 1            



   day, Stop date:            



   17            



   10:59:00            



   CSTNotes: (Same            



   as: Lovenox)            



               



               

 

 scopolamine  1 patch, Route:    Inactive      /  [facility] [place]



   TOP, Drug form:          2017  Medical



   ERFILM, PRE OP,            Center



   Start date:            



   17            



   6:00:00 CST,            



   Duration: 1            



   day, Stop date:            



   17            



   5:59:00            



   CSTNotes:            



   Change patch            



   every 72 hours            



    (Same as:            



   Transderm-Scop)            



               



               

 

 heparin  5,000 unit, 1    Inactive      [facility]



   mL, Route:          2017  Medical



   SUB-Q, Drug            Center



   form: INJ, PRE            



   OP, Start date:            



   17            



   6:00:00 CST,            



   Duration: 1            



   day, Stop date:            



   17            



   5:59:00            



   CSTNotes:            



   porcine heparin            



               



               

 

 Lovenox  40 mg, 0.4 mL,    Inactive      [facility]



   Route: SUB-Q,            Medical



   Drug form: INJ,            [place]



   [facility]            



   date: 17            



   6:00:00 CST,            



   Duration: 1            



   day, Stop date:            



   17            



   5:59:00            



   CSTNotes: (Same            



   as: Lovenox)            



               



               

 

 Mefoxin  2 gm, Route:    Inactive      [facility]



   IVPB, Drug            Medical



   form: INJ, PRE            Center



   OP, Priority:            



   STAT, Start            



   date: 17            



   5:37:00 CST,            



   Duration: 1            



   day, Stop date:            



   17            



   5:36:00            



   CSTNotes: (Same            



   As: Mefoxin)            



   *** MEDICATION            



   WASTE ***            



   Product Size:            



   2000 mg Product            



   Wasted:  ___ mg            



               



               

 

 Ofirmev  1,000 mg, 100    Inactive        [facility]



   mL, Route: IV,          2017  Medical



   Drug form: INJ,            Center



   PRE OP,            



   Priority: STAT,            



   Start date:            



   17            



   5:36:00 CST,            



   Duration: 1            



   day, Stop date:            



   17            



   5:35:00            



   CSTNotes:            



   Infuse over 15            



   minutes  Do not            



   exceed 4gm/day            



   of            



   acetaminophen            



    *** MEDICATION            



   WASTE ***            



   Product Size:            



   1000 mg Product            



   Wasted:  ___ mg            



               



               

 

 Ofirmev  1,000 mg, 100    Inactive       [place]



   mL, Route: IV,          2017  Medical



   Drug form: INJ,            Center



   PRE OP,            



   Priority: STAT,            



   Start date:            



   17            



   5:35:00 CST,            



   Duration: 1            



   day, Stop date:            



   17            



   5:34:00            



   CSTNotes:            



   Infuse over 15            



   minutes  Do not            



   exceed 4gm/day            



   of            



   acetaminophen            



    *** MEDICATION            



   WASTE ***            



   Product Size:            



   1000 mg Product            



   Wasted:  ___ mg            



               



               

 

 Pravastatin Sodium  40 mg=1 tab,    Active        [facility] [place]



 40 MG Oral Tablet  PO, Bedtime, #          2017  Medical



 [Pravachol]  30 tab, 0            Center



   Refill(s)            



               



               

 

 Hydralazine  100 mg=1 tab,    Active       [place]



 Hydrochloride 100  PO, TID, # 90            Medical



 MG Oral Tablet  tab, 3            Center



   Refill(s)            



               



               

 

 Hydralazine  0 Refill(s)    No Longer        [facility] [place]



       Active      2017  Medical



               [facility]



               



               

 

 mycophenolate  1,000 mg=2 tab,    Active        [facility] [place]



 mofetil 500 MG  PO, BID, # 120          2017  Medical



 Oral Tablet  tab, 0            Center



 [Cellcept]  Refill(s)            



               



               

 

 pantoprazole 40 MG  40 mg=1 tab,    Active        [facility]



 Enteric Coated  PO, BID, # 30          2017  Medical



 Tablet [Protonix]  tab, 0            Center



   Refill(s)            



               



               

 

 24 HR Diltiazem  240 mg=1 cap,    Active        [facility] [place]



 Hydrochloride 240  PO, Daily, # 30          2017  Medical



 MG Extended  cap, 0            Center



 Release Capsule  Refill(s)            



 [Cartia]              



               

 

 magnesium oxide  400 mg=1 tab,    Active        [facility] [place]



 400 mg oral tablet  PO, Daily, 0          2017  Medical



   Refill(s)            Center



               



               

 

 calcium carbonate  CHEW, BID, 0    No Longer       [place]



 1000 mg oral  Refill(s)    Active      2017  Medical



 tablet, chewable              [place]



               



               

 

 predniSONE 10 mg  10 mg=1 tab,    Active        [facility]



 oral tablet  PO, Daily, # 30          2017  Medical



   tab, 3            Center



   Refill(s)            



               



               

 

 Prograf  2.5 mg, PO,    Active       [place]



   Q12H, 0            Medical



   Refill(s)            Center



               



               

 

 Sulfamethoxazole  1 tab, PO,    No Longer       [place]



 800 MG /  M-W-F, 0    Active        Medical



 Trimethoprim 160  Refill(s)            Center



 MG Oral Tablet              



 [Bactrim]              



               







Allergies, Adverse Reactions, Alerts







 Substance  Category  Reaction  Severity  Reaction  Status  Date  Comments  
Source



         type    Reported    



                 



                 







Immunizations







 Immunization  Date Given  Site  Status  Last Updated  Comments  Source



             



             







Results







 Order Name  Results  Value  Reference  Date  Interpretation  Comments  Source



       Range        



               



               



               

 

 ELECTROLYTE  AGAP  15.3 meq/L  10.0 -        [facility]



 S      20.0        [facility]



               



               



               

 

 ELECTROLYTE  eGFR  91        Result Comment: The eGFR is calculated using 
the CKD-EPI formula. In most young, healthy individuals the eGFR will be >90 mL/
min/1.73m2. The eGFR declines with age. An eGFR of 60-89 may be normal in  [facility] 
[place]



 S    mL/min/1.73        some populations, particularly the elderly, for 
whom the CKD-EPI formula has not been extensively validated. Use of the eGFR is 
not recommended in the following populations:  [address]



             Individuals with unstable creatinine concentrations, including 
pregnant patients and those with serious co-morbid conditions.  



               



             Patients with extremes in muscle mass or diet.  



               



             The data above are obtained from the National Kidney Disease 
Education Program (NKDEP) which additionally recommends that when the eGFR is 
used in patients with extremes of body mass index for purposes  



             of drug dosing, the eGFR should be multiplied by the estimated 
BMI.  

 

 ELECTROLYTE  BUN  13 mg/dL  7 - 22        [facility]



 S        /[address] [place] [address]



               



               



               

 

 ELECTROLYTE  Creatinine  0.98 mg/dL  0.50 -        [facility]  Lvl    1.40        [facility]



               



               



               

 

 ELECTROLYTE  Sodium Lvl  144 meq/L  135 - 145        [facility]              [facility]



               



               



               

 

 ELECTROLYTE  Potassium  4.3 meq/L  3.5 - 5.1        [facility]      /      [facility]



               



               



               

 

 ELECTROLYTE  Chloride Lvl  105 meq/L  95 - 109        [facility]        [address] [place] [address]



               



               



               

 

 ELECTROLYTE  Calcium Lvl  9.1 mg/dL  8.5 - 10.5        [facility]              [facility]



               



               



               

 

 ELECTROLYTE  CO2  28 meq/L  24 - 32        [facility]        [address] [place] [address]



               



               



               

 

 ELECTROLYTE  Glucose Lvl  77 mg/dL  70 - 99        [facility]      [facility]



               



               



               

 

 HEMATOLOGY  Lymphocytes  0.8 K/CMM  1.0 - 5.5         [place]



   #      /      [facility]



               



               



               

 

 HEMATOLOGY  Segs-Bands #  2.8 K/CMM  1.5 - 8.1         [place]



         /2017      [facility]



               



               



               

 

 HEMATOLOGY  Monocytes #  0.4 K/CMM  0.0 - 0.8         [place]



         /2017      [facility]



               



               



               

 

 HEMATOLOGY  Eosinophils  0.1 K/CMM  0.0 - 0.5         [place]



   #            [facility]



               



               



               

 

 HEMATOLOGY  Eosinophils  2.4 %  0.0 - 4.0         [place]



         /2017      [facility]



               



               



               

 

 HEMATOLOGY  Basophils  0.7 %  0.0 - 1.0         [place]



         /2017      [facility]



               



               



               

 

 HEMATOLOGY  Monocytes  10.7 %  2.0 - 12.0         [place]



         /2017      [facility]



               



               



               

 

 HEMATOLOGY  Lymphocytes  19.7 %  20.0 -         [place]



       40.0        [facility]



               



               



               

 

 HEMATOLOGY  Segs  66.5 %  45.0 -         [place]



       75.0        [facility]



               



               



               

 

 HEMATOLOGY  MPV  8.5 fL  7.4 - 10.4         [place]



         /2017      [facility]



               



               



               

 

 HEMATOLOGY  Platelet  133 K/CMM  133 - 450         [place]



         /2017      [facility]



               



               



               

 

 HEMATOLOGY  RDW  13.9 %  11.5 -         [place]



       14.5        [facility]



               



               



               

 

 HEMATOLOGY  MCHC  33.4 g/dL  32.0 -         [place]



       36.0        [facility]



               



               



               

 

 HEMATOLOGY  MCH  28.7 pg  27.0 -         [place]



       31.0        [facility]



               



               



               

 

 HEMATOLOGY  MCV  85.8 fL  80.0 -        [facility]



       94.0        [facility]



               



               



               

 

 HEMATOLOGY  Hct  44.0 %  42.0 -         [place]



       54.0        [facility]



               



               



               

 

 HEMATOLOGY  Hgb  14.7 g/dL  14.0 -         [place]



       18.0        [facility]



               



               



               

 

 HEMATOLOGY  RBC  5.13 M/CMM  4.70 -         [place]



       6.10        [facility]



               



               



               

 

 HEMATOLOGY  WBC  4.1 K/CMM  3.7 - 10.4         [place]



         /2017      [facility]



               



               



               

 

 CHEM PANEL  Phosphorus  3.6 mg/dL  2.5 - 4.5         [place]



         /2017      [facility]



               



               



               

 

 CHEM PANEL  Magnesium  2.3 mg/dL  1.8 - 2.4        [facility]



   Lvl            [facility]



               



               



               

 

 ELECTROLYTE  AGAP  12.6 meq/L  10.0 -  01/26      [facility]



 S      20.0        [facility]



               



               



               

 

 ELECTROLYTE  eGFR  55        Result Comment: The eGFR is calculated using 
the CKD-EPI formula. In most young, healthy individuals the eGFR will be >90 mL/
min/1.73m2. The eGFR declines with age. An eGFR of 60-89 may be normal in  [facility] 
[place]



 S    mL/min/1.73    /    some populations, particularly the elderly, for 
whom the CKD-EPI formula has not been extensively validated. Use of the eGFR is 
not recommended in the following populations:  [address]



             Individuals with unstable creatinine concentrations, including 
pregnant patients and those with serious co-morbid conditions.  



               



             Patients with extremes in muscle mass or diet.  



               



             The data above are obtained from the National Kidney Disease 
Education Program (NKDEP) which additionally recommends that when the eGFR is 
used in patients with extremes of body mass index for purposes  



             of drug dosing, the eGFR should be multiplied by the estimated 
BMI.  

 

 ELECTROLYTE  Calcium Lvl  8.6 mg/dL  8.5 - 10.5        [address]



               



               



               

 

 ELECTROLYTE  CO2  23 meq/L  24 - 32        [address]



               



               



               

 

 ELECTROLYTE  Chloride Lvl  102 meq/L  95 - 109        [address]



               



               



               

 

 ELECTROLYTE  Potassium  4.6 meq/L  3.5 - 5.1        [facility]            [facility]



               



               



               

 

 ELECTROLYTE  Sodium Lvl  133 meq/L  135 - 145        [address]



               



               



               

 

 ELECTROLYTE  Creatinine  1.49 mg/dL  0.50 -        [facility]  Lvl    1.40        [facility]



               



               



               

 

 ELECTROLYTE  BUN  26 mg/dL  7 - 22        [address]



               



               



               

 

 ELECTROLYTE  Glucose Lvl  151 mg/dL  70 - 99        [address]



               



               



               

 

 HEMATOLOGY  Lymphocytes  0.6 K/CMM  1.0 - 5.5        [facility]      [facility]



               



               



               

 

 HEMATOLOGY  Monocytes #  0.5 K/CMM  0.0 - 0.8        [address]



               



               



               

 

 HEMATOLOGY  Segs-Bands #  8.3 K/CMM  1.5 - 8.1        [address]



               



               



               

 

 HEMATOLOGY  Monocytes  5.1 %  2.0 - 12.0        [address]



               



               



               

 

 HEMATOLOGY  Segs  88.1 %  45.0 -        [facility]



       75.0        [facility]



               



               



               

 

 HEMATOLOGY  Lymphocytes  6.6 %  20.0 -         [place]



       40.0        [facility]



               



               



               

 

 HEMATOLOGY  Basophils  0.2 %  0.0 - 1.0         [place]



         /2017      [facility]



               



               



               

 

 HEMATOLOGY  MCH  28.5 pg  27.0 -         [place]



       31.0        [facility]



               



               



               

 

 HEMATOLOGY  Platelet  158 K/CMM  133 - 450         [place]



         /2017      [facility]



               



               



               

 

 HEMATOLOGY  MCHC  33.6 g/dL  32.0 -         [place]



       36.0        [facility]



               



               



               

 

 HEMATOLOGY  RDW  14.1 %  11.5 -        [facility]



       14.      [facility]



               



               



               

 

 HEMATOLOGY  MCV  84.9 fL  80.0 -         [place]



       94.0        [facility]



               



               



               

 

 HEMATOLOGY  RBC  5.28 M/CMM  4.70 -        [facility]



       6.10        [facility]



               



               



               

 

 HEMATOLOGY  Hgb  15.0 g/dL  14.0 -        [facility]



       18.0        [facility]



               



               



               

 

 HEMATOLOGY  WBC  9.4 K/CMM  3.7 - 10.4         [place]



         /2017      [facility]



               



               



               

 

 HEMATOLOGY  Hct  44.8 %  42.0 -        [facility]



       54.0        [facility]



               



               



               

 

 HEMATOLOGY  MPV  8.2 fL  7.4 - 10.4         [place]



         /2017      [facility]



               



               



               

 

 PARATHYROID  Ca Ion WB  1.08 mMol/L  1.05 -        [facility]



 PROFILE      1.      [facility]



               



               



               

 

 PARATHYROID  Ca Norm WB  1.05 mMol/L  1. -        [facility]



 PROFILE      .      [facility]



               



               



               

 

 Upper GI  Upper GI  INDICATION: Status post gastric sleeve procedure.    
    -  [facility]



 Series w  Series     -  Medical



 water  water            Center



 soluble DX  soluble DX            



     PROCEDURE: A  view was obtained and images were made after the 
patient swallowed 20 mL of water-soluble contrast. A 2nd group of images were 
made after the patient swallowed a 2nd bolus of 20 mL of        Read by:  
Barrington Victor MD  



      water-soluble contrast. Right and left anterior oblique images were also 
made.        Dictated Date/time:  17 11:24  



             Electronically Signed by:  Barrington Victor MD                  
   17 11:50  



             FINAL REPORT  



               



     FINDINGS: The  view demonstrates 3 sternal wires. The bowel gas 
pattern is nonobstructive. There are streaky, linear densities in both lungs, 
left greater than right. A focal rounded density is pro          



     jected over the upper liver silhouette between the 90 10th posterior ribs.
          



               



               



               



     The initial AP film after 20 mL of contrast shows retention of contrast 
within the distal esophagus and minimal contrast within the gastric lumen. The 
images made after the 2nd 20 mL bolus shows that th          



     ere still some retained contrast within the esophagus. Contrast has passed 
through the stomach and is present within the 3rd portion of the duodenum. 
Oblique views demonstrate the same type findings. Th          



     ere was less contrast in the distal esophagus on the last image. There was 
no evidence for extravasation of contrast outside the gastrointestinal tract.  
        



               



               



               



     IMPRESSION:          



               



     1. Findings consistent with gastric sleeve procedure. There is no evidence 
for extravasation.          



               



               



               



               

 

 BLOOD BANK  Antibody  Negative          [facility]



 RESULTS  Scr      [facility]



     [facility]



               



               



               

 

 BLOOD BANK  ABO/Rh  O NEG          [facility]              [facility]



               



               



               

 

 CHEM PANEL  A/G Ratio  1.6  0.7 - 1.6         [place]



         /2017      [facility]



               



               



               

 

 CHEM PANEL  Globulin  2.6 g/dL  2.7 - 4.2        [facility] [facility]



               [facility]



               



               



               

 

 CHEM PANEL  B/C Ratio  20  6 - 25        [facility]      [facility]



               



               



               

 

 CHEM PANEL  AGAP  15.4 meq/L  10.0 -        [facility]



       20.0        [facility]



               



               



               

 

 CHEM PANEL  eGFR  67        Result Comment: The eGFR is calculated using 
the CKD-EPI formula. In most young, healthy individuals the eGFR will be >90 mL/
min/1.73m2. The eGFR declines with age. An eGFR of 60-89 may be normal in  [facility] 
[place]



     mL/min/1.73        some populations, particularly the elderly, for 
whom the CKD-EPI formula has not been extensively validated. Use of the eGFR is 
not recommended in the following populations:  [address]



             Individuals with unstable creatinine concentrations, including 
pregnant patients and those with serious co-morbid conditions.  



               



             Patients with extremes in muscle mass or diet.  



               



             The data above are obtained from the National Kidney Disease 
Education Program (NKDEP) which additionally recommends that when the eGFR is 
used in patients with extremes of body mass index for purposes  



             of drug dosing, the eGFR should be multiplied by the estimated 
BMI.  

 

 CHEM PANEL  CO2  24 meq/L  24 - 32        [facility]



               [facility]



               



               



               

 

 CHEM PANEL  Calcium Lvl  8.8 mg/dL  8.5 - 10.5         [place]



         /2017      [facility]



               



               



               

 

 CHEM PANEL  Total  6.8 g/dL  6.4 - 8.4         [place]



   Protein            [facility]



               



               



               

 

 CHEM PANEL  ALT  25 unit/L  0 - 65         [place]



         /2017      [facility]



               



               



               

 

 CHEM PANEL  Albumin Lvl  4.2 g/dL  3.5 - 5.0         [place]



         /2017      [facility]



               



               



               

 

 CHEM PANEL  AST  12 unit/L  0 - 37         [place]



         /2017      [facility]



               



               



               

 

 CHEM PANEL  Alk Phos  83 unit/L  39 - 136         [place]



         /2017      [facility]



               



               



               

 

 CHEM PANEL  Chloride Lvl  106 meq/L  95 - 109         [place]



         /2017      [facility]



               



               



               

 

 CHEM PANEL  Bili Total  0.5 mg/dL  0.2 - 1.3         [place]



         /2017      [facility]



               



               



               

 

 CHEM PANEL  Potassium  4.4 meq/L  3.5 - 5.1        [facility]            [facility]



               



               



               

 

 CHEM PANEL  Sodium Lvl  141 meq/L  135 - 145         [place]



         /2017      [facility]



               



               



               

 

 CHEM PANEL  Creatinine  1.26 mg/dL  0.50 -        [facility]



   Lvl    1.40        [facility]



               



               



               

 

 CHEM PANEL  BUN  25 mg/dL  7 - 22         [place]



         /2017      [facility]



               



               



               

 

 CHEM PANEL  Glucose Lvl  91 mg/dL  70 - 99         [place]



         /2017      [facility]



               



               



               

 

 HEMATOLOGY  RDW  14.2 %  11.5 -         [place]



       14.      [facility]



               



               



               

 

 HEMATOLOGY  Hct  45.7 %  42.0 -         [place]



       54.0        [facility]



               



               



               

 

 HEMATOLOGY  Platelet  149 K/CMM  133 - 450         [place]



         /2017      [facility]



               



               



               

 

 HEMATOLOGY  MPV  8.4 fL  7.4 - 10.4         [place]



         /2017      [facility]



               



               



               

 

 HEMATOLOGY  MCV  82.9 fL  80.0 -         [place]



       94.0        [facility]



               



               



               

 

 HEMATOLOGY  MCHC  34.2 g/dL  32.0 -         [place]



       36.0        [facility]



               



               



               

 

 HEMATOLOGY  MCH  28.3 pg  27.0 -         [place]



       31.0        [facility]



               



               



               

 

 HEMATOLOGY  WBC  6.1 K/CMM  3.7 - 10.4         [place]



         /2017      [facility]



               



               



               

 

 HEMATOLOGY  Hgb  15.6 g/dL  14.0 -         [place]



       18.0        [facility]



               



               



               

 

 HEMATOLOGY  RBC  5.52 M/CMM  4.70 -         [place]



       6.10        [facility]



               



               



               

 

 HEMATOLOGY  Basophils  0.7 %  0.0 - 1.0        [facility]



               [facility]



               



               



               

 

 HEMATOLOGY  Segs-Bands #  4.4 K/CMM  1.5 - 8.1        [facility]



               [facility]



               



               



               

 

 HEMATOLOGY  Monocytes #  0.6 K/CMM  0.0 - 0.8         [place]



         /2017      [facility]



               



               



               

 

 HEMATOLOGY  Lymphocytes  1.1 K/CMM  1.0 - 5.5        [facility]



   #      /2017      [facility]



               



               



               

 

 HEMATOLOGY  Eosinophils  0.1 K/CMM  0.0 - 0.5        [facility]



         [facility]



               



               



               

 

 HEMATOLOGY  Eosinophils  0.9 %  0.0 - 4.0        [facility]



               [facility]



               



               



               

 

 HEMATOLOGY  Lymphocytes  17.6 %  20.0 -         [place]



       40.0        [facility]



               



               



               

 

 HEMATOLOGY  Monocytes  9.3 %  2.0 - 12.0         [place]



         /2017      [facility]



               



               



               

 

 HEMATOLOGY  Segs  71.5 %  45.0 -         [place]



       75.0        [facility]



               



               



               

 

 SPECIAL  Hgb A1C  5.3 %  <=5.6 %        [facility]



 CHEMISTRY              [facility]



               



               



               







Vital Signs







 Vital Sign  Value  Date  Comments  Source



         

 

 Systolic (mm Hg)  133  2017    [facility]



         

 

 Diastolic (mm Hg)  75  2017    [facility]



         

 

 Respitory Rate  16  2017    [facility]



         

 

 Respitory Rate  14  2017    [facility]



         

 

 Systolic (mm Hg)  136  2017    [facility]



         

 

 Diastolic (mm Hg)  79  2017    [facility]



         

 

 Respitory Rate  12  2017    [facility]



         

 

 Systolic (mm Hg)  143  2017    [facility]



         

 

 Diastolic (mm Hg)  85  2017    [facility]



         

 

 Heart Rate  83  2017    [facility]



         

 

 Weight  102.273  2017    [facility]



         

 

 BMI Calculated  27.45  2017    [facility]



         

 

 Height  193.04 cm  2017    [facility]



         

 

 Temperature Oral (F)  98.0 F  2017    [facility]



         

 

 Systolic (mm Hg)  125  2017    [facility]



         

 

 Diastolic (mm Hg)  87  2017    [facility]



         

 

 Respitory Rate  19  2017    [facility]



         

 

 Heart Rate  97  2017    [facility]



         

 

 Heart Rate  98  2017    [facility]



         

 

 Respitory Rate  20  2017    [facility]



         

 

 Temperature Oral (F)  97.6 F  2017    [facility]



         

 

 Systolic (mm Hg)  147  2017    [facility]



         

 

 Diastolic (mm Hg)  95  2017    [facility]



         

 

 Respitory Rate  20  2017    [facility]



         

 

 Heart Rate  98  2017    [facility]



         

 

 Systolic (mm Hg)  144  2017    [facility]



         

 

 Diastolic (mm Hg)  97  2017    [facility]



         

 

 Temperature Oral (F)  98.3 F  2017    [facility]



         

 

 Weight  136.136  2017    [facility]



         

 

 Height  193.04 cm  2017    [facility]



         

 

 BMI Calculated  36.53  2017    [facility]



         

 

 Weight  136.136  2017    [facility]



         

 

 Height  193.04 cm  2017    [facility]



         

 

 BMI Calculated  36.53  2017    [facility]



         

 

 Weight  143.636  2017    [facility]



         

 

 BMI Calculated  38.55  2017    [facility]



         

 

 Height  193.04 cm  2017    [facility]



         







Encounters







 Location  Location  Encounter  Encounter  Reason  Attending  ADM  DC  Status  
Source



   Details  Type  Number  For  Provider  Date  Date    



         Visit          



                   



                   



                   

 

 [facility]  [id number]    Gu[facility]      [facility]          [place]    [facility]



                   



                   



                   

 

 [facility]    Day  [id number]    Guido      [facility]    Surgery      [place]    [facility]



                   



                   



                   







Procedures







 Procedure  Code  Date  Perfomer  Comments  Source



           



           

 

 Heart transplant  [id number]        [facility]



           

 

 Hernia repair  [id number]        [facility]



           

 

 Laparoscopic sleeve  [id number]        [facility]

## 2019-04-15 NOTE — RAD REPORT
EXAM DESCRIPTION:   XR Left Ribs and AP Chest, 3 or More Views

 

CLINICAL HISTORY:  The patient is 48 years old and is Male; PAIN

 

TECHNIQUE:  Frontal and oblique views of the left ribs and frontal view of the chest.

 

COMPARISON:  No relevant prior studies available.

 

FINDINGS:  LUNGS:   Unremarkable.   No consolidation.

   PLEURAL SPACE:   Unremarkable.   No pneumothorax.

   HEART:   Unremarkable.   No cardiomegaly.

   MEDIASTINUM:   Unremarkable.

   BONES/JOINTS:   Median sternotomy wires are present.   No acute fracture.

   SOFT TISSUES:   Surgical clips are present within the left axilla.

 

IMPRESSION:  No acute findings.

 

Electronically signed by:   Naomy Morocho MD   4/14/2019 11:43 PM CDT Workstation: [id number]

 

 

Due to temporary technical issues with the PACS/Fluency reporting system, reports are being signed by
 the in house radiologist as a courtesy to ensure prompt reporting. The interpreting radiologist is f
ully responsible for the content of the report.

## 2019-04-15 NOTE — EDPHYS
Physician Documentation                                                                           

 [facility]                                                                 

Name: Gomez Romero                                                                               

Age: 48 yrs                                                                                       

Sex: Male                                                                                         

: 1970                                                                                   

MRN: [id number]                                                                                   

Arrival Date: 2019                                                                          

Time: 21:46                                                                                       

Account#: [id number]                                                                            

Bed 8                                                                                             

Private MD:                                                                                       

ED Physician Lloyd Duff                                                                         

HPI:                                                                                              

                                                                                             

22:58 This 48 yrs old  Male presents to ER via Ambulatory with complaints of Fall    rn  

      Injury.                                                                                     

22:58 Details of fall: The patient fell from a height.                                        rn  

22:58 Onset: The symptoms/episode began/occurred 4 day(s) ago. Associated injuries: The       rn  

      patient sustained injury to the chest. Severity of symptoms: At their worst the             

      symptoms were moderate, in the emergency department the symptoms are unchanged. The         

      patient has not experienced similar symptoms in the past. Reports fall at work off          

      platform, hit edge of rail, hit left ribs, taken by work to doctor, no imaging done,        

      has been having pain since then with movement/palpation/breathing. NO hemoptysis. No        

      blood thinners. .                                                                           

                                                                                                  

Historical:                                                                                       

- Allergies:                                                                                      

21:53 unknown rejection medication;                                                           lp1 

- Home Meds:                                                                                      

21:53 Unable to obtain [Active];                                                              lp1 

- PMHx:                                                                                           

21:53 None;                                                                                   lp1 

- PSHx:                                                                                           

21:53 Heart transplant;                                                                       lp1 

                                                                                                  

- Immunization history:: Adult Immunizations up to date.                                          

- Social history:: Smoking status: Patient/guardian denies using tobacco.                         

- Ebola Screening: : No symptoms or risks identified at this time.                                

- Family history:: not pertinent.                                                                 

- Hospitalizations: : No recent hospitalization is reported.                                      

                                                                                                  

                                                                                                  

ROS:                                                                                              

22:58 Constitutional: Negative for fever, chills, and weight loss, Eyes: Negative for injury, rn  

      pain, redness, and discharge, Neck: Negative for injury, pain, and swelling,                

      Cardiovascular: + chest wall pain Respiratory: + pleuritic chest pain, negative for         

      cough Abdomen/GI: Negative for abdominal pain, nausea, vomiting, diarrhea, and              

      constipation, Back: Negative for injury and pain, MS/Extremity: Negative for injury and     

      deformity, Skin: Negative for injury, rash, and discoloration, Neuro: Negative for          

      headache, weakness, numbness, tingling, and seizure.                                        

                                                                                                  

Exam:                                                                                             

22:58 Constitutional:  This is a well developed, well nourished patient who is awake, alert,  rn  

      and in no acute distress. Head/Face:  Normocephalic, atraumatic. Chest/axilla:  Mild        

      tenderness left lateral/anterior ribs, no crepitus, no ecchymosis Respiratory:  Lungs       

      have equal breath sounds bilaterally, clear to auscultation.  No increased work of          

      breathing, no retractions or nasal flaring. Abdomen/GI:  soft, non-tender Skin:  Warm,      

      dry with normal turgor.  Normal color with no rashes, no lesions, and no evidence of        

      cellulitis. MS/ Extremity:  Pulses equal, no cyanosis.  Neurovascular intact.  Full,        

      normal range of motion.  Equal circumference. Neuro:  Awake and alert, GCS 15, oriented     

      to person, place, time, and situation.  Cranial nerves II-XII grossly intact.  Motor        

      strength 5/5 in all extremities.  Sensory grossly intact.  Cerebellar exam normal.          

      Normal gait.                                                                                

                                                                                                  

Vital Signs:                                                                                      

21:51  / 87; Pulse 87; Resp 18; Temp 97.2(TE); Pulse Ox 98% on R/A; Weight 99.79 kg;    lp1 

      Height 6 ft. 4 in. (193.04 cm); Pain 5/10;                                                  

04/15                                                                                             

00:15  / 89; Pulse 72; Resp 16; Pulse Ox 99% on R/A;                                    lp1 

                                                                                             

21:51 Body Mass Index 26.78 (99.79 kg, 193.04 cm)                                             lp1 

                                                                                                  

MDM:                                                                                              

                                                                                             

21:59 Patient medically screened.                                                             rn  

04/15                                                                                             

00:13 Differential diagnosis: contusion, fracture. Data reviewed: vital signs, nurses notes,  rn  

      radiologic studies, plain films, and as a result, I will discharge patient. Counseling:     

      I had a detailed discussion with the patient and/or guardian regarding: the historical      

      points, exam findings, and any diagnostic results supporting the discharge/admit            

      diagnosis, radiology results, the need for outpatient follow up, to return to the           

      emergency department if symptoms worsen or persist or if there are any questions or         

      concerns that arise at home. Special discussion: Based on the patient's history, exam,      

      and Dx evaluation, there is no indication for emergent intervention or inpatient Tx. It     

      is understood by the patient/guardian that if the Sx's persist or worsen they need to       

      return immediately for re-evaluation. I discussed with the patient/guardian in detail       

      that at this point there is no indication for admission to the hospital. It is              

      understood, however, that if the symptoms persist or worsen the patient needs to return     

      immediately for re-evaluation.                                                              

                                                                                                  

                                                                                             

23:03 Order name: Ribs Unil W/CXR                                                             EDMS

                                                                                                  

Administered Medications:                                                                         

No medications were administered                                                                  

                                                                                                  

                                                                                                  

Disposition:                                                                                      

04/15/19 00:14 Discharged to Home. Impression: Rib Contusion.                                     

- Condition is Stable.                                                                            

- Discharge Instructions: Rib Contusion.                                                          

                                                                                                  

- Medication Reconciliation Form, Thank You Letter, Antibiotic Education, Prescription            

  Opioid Use, Work release form form.                                                             

- Follow up: Private Physician; When: As needed; Reason: Recheck today's complaints,              

  Re-evaluation by your physician.                                                                

- Problem is new.                                                                                 

- Symptoms have improved.                                                                         

                                                                                                  

                                                                                                  

                                                                                                  

Signatures:                                                                                       

Dispatcher [employer]                           EDMS                                                 

Lloyd Duff MD MD rn Pena, Laura, RN                         RN   lp1                                                  

                                                                                                  

Corrections: (The following items were deleted from the chart)                                    

00:23 00:14 04/15/2019 00:14 Discharged to Home. Impression: Rib Contusion. Condition is      lp1 

      Stable. Forms are Medication Reconciliation Form, Thank You Letter, Antibiotic              

      Education, Prescription Opioid Use. Follow up: Private Physician; When: As needed;          

      Reason: Recheck today's complaints, Re-evaluation by your physician. Problem is new.        

      Symptoms have improved. rn                                                                  

                                                                                                  

**************************************************************************************************

## 2019-04-15 NOTE — ER
Nurse's Notes                                                                                     

 [facility]                                                                 

Name: Gomez Romero                                                                               

Age: 48 yrs                                                                                       

Sex: Male                                                                                         

: 1970                                                                                   

MRN: [id number]                                                                                   

Arrival Date: 2019                                                                          

Time: 21:46                                                                                       

Account#: [id number]                                                                            

Bed 8                                                                                             

Private MD:                                                                                       

Diagnosis: Rib Contusion                                                                          

                                                                                                  

Presentation:                                                                                     

                                                                                             

21:50 Presenting complaint: Patient states: Had a fall at work on Thursday while walking down lp1 

      stairs, fell onto metal bar hitting left side of rib cage; Complaint of pain to left        

      side of chest, aggravated by movement, breathing. Transition of care: patient was not       

      received from another setting of care. Onset of symptoms was 2019. Risk           

      Assessment: Do you want to hurt yourself or someone else? Patient reports no desire to      

      harm self or others. Initial Sepsis Screen: Does the patient meet any 2 criteria? No.       

      Patient's initial sepsis screen is negative. Does the patient have a suspected source       

      of infection? No. Patient's initial sepsis screen is negative. Care prior to arrival:       

      None.                                                                                       

21:50 Method Of Arrival: Ambulatory                                                           lp1 

21:50 Acuity: MLITON 4                                                                           lp1 

                                                                                                  

Historical:                                                                                       

- Allergies:                                                                                      

21:53 unknown rejection medication;                                                           lp1 

- Home Meds:                                                                                      

21:53 Unable to obtain [Active];                                                              lp1 

- PMHx:                                                                                           

21:53 None;                                                                                   lp1 

- PSHx:                                                                                           

21:53 Heart transplant;                                                                       lp1 

                                                                                                  

- Immunization history:: Adult Immunizations up to date.                                          

- Social history:: Smoking status: Patient/guardian denies using tobacco.                         

- Ebola Screening: : No symptoms or risks identified at this time.                                

- Family history:: not pertinent.                                                                 

- Hospitalizations: : No recent hospitalization is reported.                                      

                                                                                                  

                                                                                                  

Screenin:19 Abuse screen: Denies threats or abuse. Denies injuries from another. Nutritional        lp1 

      screening: No deficits noted. Tuberculosis screening: No symptoms or risk factors           

      identified. Fall Risk None identified.                                                      

                                                                                                  

Assessment:                                                                                       

22:18 General: Appears in no apparent distress. Behavior is appropriate for age. Pain:        lp1 

      Complains of pain in left lateral anterior chest Pain currently is 5 out of 10 on a         

      pain scale. Aggravated by increased activity, breathing. Neuro: No deficits noted.          

      Cardiovascular: No deficits noted. Respiratory: Reports pain with respiration               

      Respiratory effort is even, unlabored, Respiratory pattern is regular. GI: No signs         

      and/or symptoms were reported involving the gastrointestinal system. : No signs           

      and/or symptoms were reported regarding the genitourinary system. EENT: No signs and/or     

      symptoms were reported regarding the EENT system. Derm: Skin is pink, warm \T\ dry.         

      Musculoskeletal: Circulation, motion, and sensation intact.                                 

23:15 Reassessment: Patient appears in no apparent distress at this time. Patient and/or      lp1 

      family updated on plan of care and expected duration. Pain level reassessed. Patient        

      aware of pending results of chest x-ray.                                                    

                                                                                                  

Vital Signs:                                                                                      

21:51  / 87; Pulse 87; Resp 18; Temp 97.2(TE); Pulse Ox 98% on R/A; Weight 99.79 kg;    lp1 

      Height 6 ft. 4 in. (193.04 cm); Pain 5/10;                                                  

04/15                                                                                             

00:15  / 89; Pulse 72; Resp 16; Pulse Ox 99% on R/A;                                    lp1 

                                                                                             

21:51 Body Mass Index 26.78 (99.79 kg, 193.04 cm)                                             lp1 

                                                                                                  

ED Course:                                                                                        

                                                                                             

21:46 Patient arrived in ED.                                                                  do  

21:51 Triage completed.                                                                       lp1 

21:52 Arm band placed on left wrist.                                                          lp1 

21:59 Lloyd Duff MD is Attending Physician.                                                rn  

22:13 Angie Seo, RN is Primary Nurse.                                                       lp1 

22:19 Patient has correct armband on for positive identification.                             lp1 

22:20 No provider procedures requiring assistance completed. Patient did not have IV access   lp1 

      during this emergency room visit.                                                           

23:26 X-ray completed. Patient tolerated procedure well. Patient moved to radiology WALKED.   kw  

23:26 Patient moved back from radiology.                                                      kw  

23:32 Ribs Unil W/CXR In Process Unspecified.                                                 EDMS

                                                                                                  

Administered Medications:                                                                         

No medications were administered                                                                  

                                                                                                  

                                                                                                  

Outcome:                                                                                          

04/15                                                                                             

00:14 Discharge ordered by MD.                                                                rn  

00:23 Discharged to home ambulatory.                                                          lp1 

00:23 Condition: good                                                                             

00:23 Discharge instructions given to patient, Instructed on discharge instructions, follow       

      up and referral plans. Demonstrated understanding of instructions, follow-up care.          

00:23 Patient left the ED.                                                                    lp1 

                                                                                                  

Signatures:                                                                                       

Dispatcher [employer]                           EDMS                                                 

Lloyd Duff MD MD rn Whitley, Kimberlee kw Pena, Laura, RN                         RN   lp1                                                  

Rose Hernadez do                                                   

                                                                                                  

Corrections: (The following items were deleted from the chart)                                    

00:16 04 23:15 Reassessment: Patient aware of pending results of chest x-ray lp1           lp1 

                                                                                                  

**************************************************************************************************

## 2019-06-12 NOTE — RAD REPORT
EXAM DESCRIPTION:  CT - Head C Spine Mpr Wo Con - 6/12/2019 7:03 am

 

CLINICAL HISTORY:  Head and neck injury status post fall. Head and neck pain

 

COMPARISON:  None.

 

TECHNIQUE:  Computed axial tomography of the head and cervical spine was obtained.

 

Sagittal and coronal reconstruction was performed.

 

All CT scans are performed using dose optimization technique as appropriate and may include automated
 exposure control or mA/KV adjustment according to patient size.

 

FINDINGS:  An intracranial bleed is not seen. The ventricles are normal in caliber. An extra-axial fl
uid collection is not noted. Marked ethmoid sinusitis

 

A cervical fracture is not visualized. No dislocation is noted.

 

IMPRESSION:  No acute intracranial abnormality is seen.

 

A cervical fracture is not visualized.  If the patient continues to have symptoms to suggest intracra
nial /spinal cord pathology then MRI would be recommended

## 2019-06-12 NOTE — XMS REPORT
Clinical Summary

 Created on:2019



Patient:Tyra Bruno

Sex:Male

:1970

External Reference #:[id number]





Demographics







 Address  [address] [place] [id number]

 

 Mobile Phone  [phone]

 

 Home Phone  [phone]

 

 Email Address  [email or web address]

 

 Preferred Language  English

 

 Marital Status  Unknown

 

 Christian Affiliation  Unknown

 

 Race  White

 

 Ethnic Group  Not  or 









Author







 Organization  [facility]

 

 Address  [address] [place] [address]









Support







 Name  Relationship  Address  Phone

 

 Janet Bruno  Unavailable  Unavailable  [phone]

 

 Hannah Bruno  Unavailable  Unavailable  [phone]









Care Team Providers







 Name  Role  Phone

 

 Dana Loo MD  Primary Care Provider  [phone]









Allergies







 Active Allergy  Reactions  Severity  Noted Date  Comments

 

 Foscarnet  Nausea And Vomiting,  High  2015







   Swelling      Pt has no reaction to



         current medication



         administration regimen:



         premedicate with Benadryl,



         Zofran, Tylenol, then 500cc



         NS bolus, then diluted



         foscarnet over 2 hours,



         then another 500cc NS



         bolus. Electrolyte labs



         after every dose,



         electrolyte replacement



         protocol in place.







Medications







 Medication  Sig  Dispensed  Refills  Start Date  End Date  Status

 

 tacrolimus  Take 2  120 capsule  11  2019  Active



 (PROGRAF) 1 MG  capsules (2 mg        0  



 capsule  total) by          



   mouth 2 (two)          



   times daily.          

 

 tacrolimus  Take 2  120 capsule  11  2019  Active



 (PROGRAF) 1 MG  capsules (2 mg        0  



 capsule  total) by          



   mouth 2 (two)          



   times daily.          

 

 famotidine (PEPCID)  Take 1 tablet  60 tablet  11  2019  04/15/202  
Active



 20 MG tablet  (20 mg total)        0  



   by mouth 2          



   (two) times          



   daily.          

 

 mycophenolate  Take 3  180 capsule  11  2019  04/15/202  Active



 (CELLCEPT) 250 mg  capsules (750        0  



 capsule  mg total) by          



   mouth 2 (two)          



   times daily.          

 

 pravastatin  Take 1 tablet  30 tablet  11  2019  04/15/202  Active



 (PRAVACHOL) 40 MG  (40 mg total)        0  



 tablet  by mouth          



   daily.          

 

 mycophenolate  Take 3  180 capsule  11  2018  Discontinued



 (CELLCEPT) 250 mg  capsules (750        9  



 capsule  mg total) by          



   mouth 2 (two)          



   times daily.          

 

 tacrolimus  Take 1mg by  90 capsule  11  2018  Discontinued



 (PROGRAF) 1 MG  mouth in the        9  



 capsule  morning.  Take          



   2mg by mouth          



   at night. .          

 

 aspirin 81 MG EC  Take 1 tablet  30 tablet  11  2018  



 tablet  (81 mg total)        9  



   by mouth          



   daily.          

 

 famotidine (PEPCID)  Take 1 tablet  60 tablet  11  2018  
Discontinued



 20 MG tablet  (20 mg total)        9  



   by mouth 2          



   (two) times          



   daily.          

 

 pravastatin  Take 1 tablet  30 tablet  11  2018  Discontinued



 (PRAVACHOL) 40 MG  (40 mg total)        9  



 tablet  by mouth          



   daily.          









 Hospital, Clinic, or Other  Ordered Dose  Route  Frequency  Start Date  End 
Date  Status



 Facility Administered            



 Medication            

 

 influenza vaccine (PF)  0.5 mL  IM  Once  2018  Ended



 [id number] (FLUZONE HIGH            



 DOSE) syringe (>/=65 yrs)            







Active Problems







 Patient Care Coordination Note

 

 



 



 









 Problem  Noted Date

 

 Heart transplant, orthotopic, status  2016

 

 Heart transplant rejection  2015









 Overview: 



- 2R rejection on low dose immunosuppression  10/22/15 treated with prednisone 
pulse



 - 2R rejection on low dose immunosuppression  10/28/15 treated with IV 
solumedrol, increased MMF, increased Prograf



 - 2R biopsy on 12/2/15 treated with ATG x 4









 History of Cytomegalovirus (CMV) viremia  2015

 

 ICM s/p orthotopic heart transplant 5/14/15  2015

 

 Dyslipidemia  2015

 

 Hypertension  

 

 Ischemic cardiomyopathy with MI 2015  







Encounters







 Date  Type  Specialty  Care Team  Description

 

 2019  Follow-Up  Transplant  Dana Loo  Status post heart 
transplantation ([facility]) (Primary Dx);



       MD Scott  Encounter for therapeutic drug level monitoring;



         Essential hypertension;



         Dyslipidemia

 

 2019  Hospital Encounter  Cardiology  Dana Loo  Status post heart



       MD Scott  transplantation ([facility])

 

 2019  Hospital Encounter  Radiology  Dana Loo  Status post heart



       MD Scott  transplantation ([facility])

 

 2019  Orders Only  Transplant  Dana Loo  Status post heart 
transplantation ([facility]);



       MD Scott  Encounter for therapeutic drug level monitoring;



         Hyperlipidemia, unspecified hyperlipidemia type;



         Prostate cancer screening

 

 2019  Orders Only  Transplant  Margot Post RN  

 

 2019  Orders Only  Transplant  Dana Loo  Status post heart 
transplantation (HCC);



       MD Scott  Encounter for therapeutic drug level monitoring

 

 2019  Orders Only  Transplant  Margot Post RN  Status post heart 
transplantation (HCC) (Primary Dx);



         Encounter for therapeutic drug level monitoring

 

 2019  Telephone  Transplant  Margot Post RN  Heart Transplant Follow-
up

 

 2019  Orders Only  Transplant  Margot Post RN  

 

 2018  Hospital Encounter  Cardiology  Dana Loo  Status post heart



       MD Scott  transplantation (HCC)

 

 2018  Follow-Up  Transplant  Dana Loo  Hyperlipidemia, unspecified 
hyperlipidemia type (Primary Dx);



       MD Scott  Status post heart transplantation (HCC);



         Encounter for therapeutic drug level monitoring;



         Prostate cancer screening

 

 2018  Telephone  Transplant  Margot Post RN  Heart Transplant Follow-
up

 

 2018  Abstract  Shila Graves  

 

 2018  Telephone  Transplant  Margot Post RN  Heart Transplant Follow-
up

 

 06/15/2018  Abstract  Transplant  Shila Golden  



after 2018



Immunizations







 Name  Dates Previously Given  Next Due

 

 Influenza High Dose Preservative Free IM  2018  

 

 Influenza High Dose Preservative Free [id number]  

 

 Influenza TIV (IM)  10/09/2017  

 

 Rho (D) Immune Globulin  05/15/2015  







Family History







 Medical History  Relation  Name  Comments

 

 Cancer  Maternal Grandfather    

 

 Heart disease  Maternal Grandfather    

 

 Diabetes  Mother    









 Relation  Name  Status  Comments

 

 Maternal Grandfather      

 

 Mother      







Social History







 Tobacco Use  Types  Packs/Day  Years Used  Date

 

 Never Smoker        









 Smokeless Tobacco: Never Used      









 Alcohol Use  Drinks/Week  oz/Week  Comments

 

 No      









 Sex Assigned at Birth  Date Recorded

 

 Not on file  









 Job Start Date  Occupation  Industry

 

 Not on file  Not on file  Not on file









 Travel History  Travel Start  Travel End









 No recent travel history available.







Last Filed Vital Signs







 Vital Sign  Reading  Time Taken

 

 Blood Pressure  118/79  2019 12:16 PM CDT

 

 Pulse  81  2019 12:16 PM CDT

 

 Temperature  36.8 C (98.3 F)  2019 12:16 PM CDT

 

 Respiratory Rate  18  2019 12:16 PM CDT

 

 Oxygen Saturation  99%  2019 12:16 PM CDT

 

 Inhaled Oxygen Concentration  -  -

 

 Weight  103.6 kg (228 lb 8 oz)  2019 12:16 PM CDT

 

 Height  188 cm (6' 2")  2019 12:16 PM CDT

 

 Body Mass Index  29.34  2019 12:16 PM CDT







Plan of Treatment

Not on file



Implants







 Implanted  Type  Area    Device  Shelf  Model / Serial



         Identifier  Expiration  / Lot



           Date  

 

 Hemostat,Sixto Ah Absorbable Powder 3g - [id number]  Cement/Fille    C R BARD 
DAVOL    2019  [id number] /



 Implanted: Qty: 1 on 2015 by Champ Jean MD  r/Adhesive           /



             [id number]

 

 Graft,Goretex Cg [id number] Lined - [id number]  Graft/Patch    W L GORE    2020  [id number] /



 Implanted: Qty: 1 on 2015 by Teofilo Rubio MD             /



             [id number]

 

 Lead,Defibrillator Cardioverter Dual Coil Df4 Connector [id number] Durata - 
[id number]  ICD    ST FIDEL    2015  7120Q-58 /



 Implanted: Qty: 1 on 2015 by Champ Jean MD      [employer]      
[id number] /

 

 Lead, Pacing Endocardial Steroid Eluting Bipolar Active Fix 52cm Tendril Sts - 
[id number]  Pacemakers    ST FIDEL    2017  2088TC/52 /



 Implanted: Qty: 1 on 2015 by Champ Jean MD      [employer]      
[id number] /

 

 Fortify Assura      ST FIDEL    10/31/2016  [id number] /



 Implanted: Qty: 1 on 2015 by Champ Jean MD      [employer]      
[id number] /

 

 Tyrx Absorbable Antibacterial Envelope          2015  [id number] /



 Implanted: Qty: 1 on 2015 by Champ Jean MD             /



             [id number]

 

 Sensation      [employer]    2018  9597--01U /



 Implanted: Qty: 1 on 03/10/2015            [id number] /

 

 Intra-Aortic Balloon      [employer]    2018  3646--01U /



 Implanted: Qty: 1 on 2015            [id number] /

 

 Intra-Aortic Balloon      [employer]    2018  9306--01U /



 Implanted: Qty: 1 on 2015            [id number] /







Procedures







 Procedure Name  Priority  Date/Time  Associated Diagnosis  Comments

 

 ECHOCARDIOGRAM REPORT    2019  9:23    



 - SCAN    PM CDT    

 

 PERIPHERAL VASCULAR    2019  9:21    



 REPORT - SCAN    PM CDT    

 

 STRESS ECHO  Routine  2019 11:09  Status post heart  Results for this



     AM CDT  transplantation (HCC)  procedure are in



         the results



         section.

 

 2D ECHO W/ DOPPLER  Routine  2019 10:25  Status post heart  Results for 
this



 (CW/PW/COLOR)    AM CDT  transplantation (HCC)  procedure are in



         the results



         section.

 

 XR CHEST 2 VIEWS  Routine  2019  8:33  Status post heart  Results for 
this



     AM CDT  transplantation (HCC)  procedure are in



         the results



         section.

 

 CBC W/PLT COUNT & AUTO  Routine  2019  7:51  Status post heart  Results 
for this



 DIFFERENTIAL    AM CDT  transplantation (HCC)



  procedure are in



       Encounter for  the results



       therapeutic drug level  section.



       monitoring  

 

 TACROLIMUS LEVEL  Routine  2019  7:51  Status post heart  Results for 
this



     AM CDT  transplantation (HCC)



  procedure are in



       Encounter for  the results



       therapeutic drug level  section.



       monitoring  

 

 CBC W/PLT COUNT & AUTO  Routine  2019  7:51  Status post heart  Results 
for this



 DIFFERENTIAL    AM CDT  transplantation (HCC)



  procedure are in



       Encounter for  the results



       therapeutic drug level  section.



       monitoring  

 

 PSA  Routine  2019  7:50  Status post heart  Results for this



     AM CDT  transplantation (HCC)



  procedure are in



       Prostate cancer  the results



       screening  section.

 

 HEPATIC FUNCTION PANEL  Routine  2019  7:50  Status post heart  Results 
for this



     AM CDT  transplantation (HCC)  procedure are in



         the results



         section.

 

 LIPID PANEL  Routine  2019  7:50  Status post heart  Results for this



     AM CDT  transplantation (HCC)



  procedure are in



       Hyperlipidemia,  the results



       unspecified  section.



       hyperlipidemia type  

 

 BASIC METABOLIC PANEL  Routine  2019  7:50  Status post heart  Results 
for this



 (7)    AM CDT  transplantation (HCC)



  procedure are in



       Encounter for  the results



       therapeutic drug level  section.



       monitoring  

 

 CBC W/PLT COUNT & AUTO  Routine  2019  9:53  Status post heart  Results 
for this



 DIFFERENTIAL    AM CDT  transplantation (HCC)



  procedure are in



       Encounter for  the results



       therapeutic drug level  section.



       monitoring  

 

 TACROLIMUS LEVEL  Routine  2019  9:53  Status post heart  Results for 
this



     AM CDT  transplantation (HCC)



  procedure are in



       Encounter for  the results



       therapeutic drug level  section.



       monitoring  

 

 CBC W/PLT COUNT & AUTO  Routine  2019  9:53  Status post heart  Results 
for this



 DIFFERENTIAL    AM CDT  transplantation (HCC)



  procedure are in



       Encounter for  the results



       therapeutic drug level  section.



       monitoring  

 

 BASIC METABOLIC PANEL  Routine  2019  9:52  Status post heart  Results 
for this



 (7)    AM CDT  transplantation (HCC)



  procedure are in



       Encounter for  the results



       therapeutic drug level  section.



       monitoring  

 

 ECHOCARDIOGRAM REPORT    2018 12:54    



 - SCAN    PM CST    

 

 2D ECHO W/ DOPPLER  Routine  2018  9:20  Status post heart  Results for 
this



 (CW/PW/COLOR)    AM CST  transplantation (HCC)  procedure are in



         the results



         section.

 

 CBC W/PLT COUNT & AUTO  Routine  2018  8:41  Status post heart  Results 
for this



 DIFFERENTIAL    AM CST  transplantation (HCC)



  procedure are in



       Encounter for  the results



       therapeutic drug level  section.



       monitoring  

 

 BASIC METABOLIC PANEL  Routine  2018  8:41  Status post heart  Results 
for this



 (7)    AM CST  transplantation (HCC)



  procedure are in



       Encounter for  the results



       therapeutic drug level  section.



       monitoring  

 

 CBC W/PLT COUNT & AUTO  Routine  2018  8:41  Status post heart  Results 
for this



 DIFFERENTIAL    AM CST  transplantation (HCC)



  procedure are in



       Encounter for  the results



       therapeutic drug level  section.



       monitoring  

 

 BASIC METABOLIC PANEL  Routine  2018 10:30    Results for this



 (7)    AM CDT    procedure are in



         the results



         section.

 

 HEMOGLOBIN A1C  Routine  2018 10:30    Results for this



     AM CDT    procedure are in



         the results



         section.

 

 CBC W/PLT COUNT & AUTO  Routine  2018 10:30    Results for this



 DIFFERENTIAL    AM CDT    procedure are in



         the results



         section.

 

 TACROLIMUS LEVEL  Routine  2018 10:30    Results for this



     AM CDT    procedure are in



         the results



         section.

 

 BASIC METABOLIC PANEL  Routine  2018  3:30    Results for this



 (7)    PM CDT    procedure are in



         the results



         section.

 

 CBC W/PLT COUNT & AUTO  Routine  2018  3:30    Results for this



 DIFFERENTIAL    PM CDT    procedure are in



         the results



         section.

 

 TACROLIMUS LEVEL  Routine  2018  3:30    Results for this



     PM CDT    procedure are in



         the results



         section.



after 2018



Results

ECHOCARDIOGRAM REPORT - SCAN (2019  9:23 PM CDT)





 Narrative  Performed At

 

 This result has an attachment that is not available.



  



PERIPHERAL VASCULAR REPORT - SCAN (2019  9:21 PM CDT)





 Narrative  Performed At

 

 This result has an attachment that is not available.



  



Stress Echo With Tracing (2019 11:09 AM CDT)





 Component  Value  Ref Range  Performed At

 

 Ejection Fraction      [facility]









 Specimen

 

 









 Narrative  Performed At

 

 Stress Echocardiography Report



  [facility]



  



  



  Demographics



  



  



  



  Patient NameTYRA BRUNO Date of  



 Study2019



  



  WOOD



  



  



  



  MRN  



 [id number]  



 Gender Male



  



  



  



  Visit [id number]  



 Race 



  



  



  



  Accession  



 [id number]  



 NumberOP



  



  Number



  



  



  



  Date of Birth 1970  



 ReferringSemichael Loo MD



  



   



 Physician



  



  



  



  Age 48  



 year(s)  



 SonographDmitriy Black RDCS



  



  



  



   



 Interpreting Alcides Gibbs MD



  



   



 Physician



  



  



  



  FellowLEILANI Dhaliwal



  



  



  



 Procedure



  



  



  



  Type of



  



  Study Stress procedure:STRESS(TMT)ECHO  



 W/TMT TRACING (Routine)



  



  



  



 Indications:Heart Transplant Follow up.



  



  



  



 Clinical History



  



 HGB 15.1



  



 HCT 45.4 %



  



 ICMP, HTN



  



 s/p OHT (2015)



  



  



  



 Contrast Medium: Definity. Amount - 2 ml



  



  



  



 Height: 76 inches Weight: 99.79 kg (220 lbs) BSA:  



 2.31 m^2 BMI: 26.78 kg/m^2



  



  



  



 HR: 70 bpm BP: 120/86 mmHg



  



  



  



  Rest



  



  



  



  ECG



  



  Normal sinus rhythm with no ischemic changes.



  



  



  



  Stress



  



  



  



  Stress Type: Pharmacologic



  



  



  



  Peak HR: 160  



 bpm  



 HR Response: Normal



  



  Peak BP: 156/92  



 mmHg  



 BP Response: Normal



  



  Predicted HR: 171  



 bpm HR  



 BP Product: 83961



  



  % of predicted HR:  



 94 Max  



 Infusion: 40 mcg/kg/min



  



  Test Duration: 14:29  



 minStress  



 EF: 70 %



  



  Reason for Termination: Target heart rate



  



  



  



  Stress Interpretation



  



  



  



  Indication for test: History of heart transplant



  



  Patient underwent dobutamine stress lasting 14:29  



 and achieved 93% of



  



  maximum predicted heart rate.



  



  Stress images showed appropriate augmentation. No  



 regional wall motion



  



  abnormalities or evidence of myocardial ischemia.



  



  



  



  Results



  



  



  



  Global LVEF (rest): Normal (LVEF >50%)



  



  



  



  Global LVEF (stress): Hyperkinetic (LVEF >70%)



  



  



  



  ECG



  



  Sinus tachycardia with no ischemic changes .



  



  



  



  Arrhythmias



  



  No significant rhythm abnormality during stress  



 or recovery.



  



  



  



  Symptoms



  



  No symptoms during stress.



  



  



  



  Summary



  



  No evidence of myocardial ischemia.



  



  



  



  Signature



  



  



  



  -------------------------------------------------  



 ---------------



  



  Electronically signed by Alcides Gibbs MD(Interpreting



  



  physician) on 2019 03:14 PM



  



  -------------------------------------------------  



 ---------------



  



   









 Procedure Note

 

 Interface, External Ris In - 2019  3:14 PM CDT



Stress Echocardiography Report



 



  Demographics



 



  Patient Name  TYRA BRUNO     Date of Study      2019



                [place]



 



  MRN           [id number]           Gender             Male



 



  Visit Number  [id number]         Race               



 



  Accession     [id number]          Room Number        OP



  Number



 



  Date of Birth 1970         Referring          Dana Loo MD



                                   Physician



 



  Age           48 year(s)         Sonographer        Dee Black RDCS



 



                                   Interpreting       Alcides Gibbs MD



                                   Physician



 



  Fellow        LEILANI Dhaliwal



 



 Procedure



 



  Type of



  Study     Stress procedure:STRESS(TMT)ECHO W/TMT TRACING (Routine)



 



 Indications:Heart Transplant Follow up.



 



 Clinical History



 HGB 15.1



 HCT 45.4 %



 ICMP, HTN



 s/p OHT (2015)



 



 Contrast Medium: Definity. Amount - 2 ml



 



 Height: 76 inches Weight: 99.79 kg (220 lbs) BSA: 2.31 m^2 BMI: 26.78 kg/m^2



 



 HR: 70 bpm BP: 120/86 mmHg



 



  Rest



 



  ECG



  Normal sinus rhythm with no ischemic changes.



 



  Stress



 



  Stress Type: Pharmacologic



 



  Peak HR: 160 bpm                            HR Response: Normal



  Peak BP: 156/92 mmHg                        BP Response: Normal



  Predicted HR: 171 bpm                       HR BP Product: 20959



  % of predicted HR: 94                       Max Infusion: 40 mcg/kg/min



  Test Duration: 14:29 min                    Stress EF: 70 %



  Reason for Termination: Target heart rate



 



  Stress Interpretation



 



  Indication for test: History of heart transplant



  Patient underwent dobutamine stress lasting 14:29 and achieved 93% of



  maximum predicted heart rate.



  Stress images showed appropriate augmentation. No regional wall motion



  abnormalities or evidence of myocardial ischemia.



 



  Results



 



  Global LVEF (rest): Normal (LVEF >50%)



 



  Global LVEF (stress): Hyperkinetic (LVEF >70%)



 



  ECG



  Sinus tachycardia with no ischemic changes .



 



  Arrhythmias



  No significant rhythm abnormality during stress or recovery.



 



  Symptoms



  No symptoms during stress.



 



  Summary



  No evidence of myocardial ischemia.



 



  Signature



 



  ----------------------------------------------------------------



  Electronically signed by Alcides Gibbs MD(Interpreting



  physician) on 2019 03:14 PM



  ----------------------------------------------------------------



 









 Performing Organization  Address  City/State/Zipcode  Phone Number

 

 [facility]      



2D echo w/ doppler (cw/pw/color) (2019 10:25 AM CDT)





 Component  Value  Ref Range  Performed At

 

 Ejection Fraction      [facility]









 Specimen

 

 









 Narrative  Performed At

 

 Transthoracic Echocardiography Report (TTE)



  [facility]



  



  



  Demographics



  



  



  



  Patient NameTYRA BRUNO Date of  



 Study2019



  



  [place]



  



  



  



  MRN  



 [id number]  



 Gender Male



  



  



  



  Visit [id number]  



 Race 



  



  



  



  Accession  



 [id number]  



 NumberOP



  



  Number



  



  



  



  Date of Birth 1970  



 ReferringSemichael Loo MD



  



   



 Physician



  



  



  



  Age 48  



 year(s)  



 Rizwan Black RDCS



  



  



  



   



 Interpreting Alcides Gibbs MD



  



   



 Physician



  



  



  



  FellowLEILANI Dhaliwal



  



  



  



 Procedure



  



  



  



  Type of



  



  Study TTE procedure:2DECHO W  



 DOPPLER(CW/PW/COLOR) (Routine)



  



  



  



 Indications:Heart Transplant Follow up.



  



  



  



 Clinical History



  



 HGB 15.1



  



 HCT 45.4 %



  



 ICMP, HTN



  



 s/p OHT (2015)



  



  



  



 Height: 76 inches Weight: 99.79 kg (220 lbs) BSA:  



 2.31 m^2 BMI: 26.78 kg/m^2



  



  



  



 HR: 71 bpm BP: 131/86 mmHg



  



  



  



  Summary



  



  Left ventricular endocardium is well visualized  



 without IV contrast. LV



  



  chamber size is normal (male - LVED vol  



 34-74ml/m2). There is borderline



  



  concentric LV hypertrophy. All of the segments  



 appear to contract



  



  normally. Estimated ejection fraction by  



 Guy's biplane method is



  



  normal (55-60%). Diastolic dysfunction is not  



 present.



  



  RV chamber size is mildly enlarged. There is mild  



 systolic dysfunction of



  



  the right ventricle. RVS' is 8 cm/s. TAPSE is 13  



 cm. FAC is 38%. RV MPI is



  



  0.58 by TDI.



  



  Pulmonary artery systolic pressure is estimated  



 at 30 mmHg.



  



  



  



  Signature



  



  



  



  -------------------------------------------------  



 ---------------



  



  Electronically signed by Alcides Gibbs MD(Interpreting



  



  physician) on 2019 03:12 PM



  



  -------------------------------------------------  



 ---------------



  



  



  



  Findings



  



  



  



 Technical Quality: Technically adequate exam.



  



  



  



  Rhythm/BPNormal sinus  



 rhythm during the exam.



  



  



  



  Left Ventricle Left ventricular  



 endocardium is well visualized



  



 wi  



 thout IV contrast. LV chamber size is normal



  



 (m  



 marcus - LVED vol 34-74ml/m2). There is borderline



  



 co  



 ncentric LV hypertrophy. All of the segments



  



 ap  



 pear to contract normally. Estimated ejection



  



 fr  



 action by Guy's biplane method is normal



  



 (5  



 5-60%). Diastolic dysfunction is not present.



  



  



  



  Left AtriumLA morphology  



 consistent with orthotoptic heart



  



 tr  



 ansplant.



  



  



  



  Right VentricleRV chamber size is  



 mildly enlarged.



  



 Th  



 ere is mild systolic dysfunction of the right



  



 ve  



 ntricle. RVS' is 8 cm/s. TAPSE is 13 cm. FAC is



  



 38  



 %. RV MPI is 0.58 by TDI.



  



  



  



  Right Atrium Right atrial  



 size is normal.



  



  



  



  Atrial SeptumThe intraatrial  



 septum is well visualized. Normal



  



 in  



 traatrial septum by available views.



  



  



  



  Aortic Valve Normal aortic  



 valve structure.



  



 No  



 aortic stenosis.



  



 No  



 aortic regurgitation.



  



  



  



  Mitral Valve Normal mitral  



 structure.



  



 No  



 mitral stenosis.



  



 No  



 mitral regurgitation.



  



  



  



  Tricuspid ValveTricuspid  



 structure is normal. Mild tricuspid



  



 re  



 gurgitation.



  



 Pu  



 lmonary artery systolic pressure is estimated at



  



 30  



 mmHg.



  



  



  



  Pulmonic Valve Normal pulmonic  



 valve structure and function.



  



  



  



  AortaAortic  



 root size (sinus of Valsalva diameter) is



  



 no  



 rmal. Visualized aortic arch is normal.



  



  



  



  PericardiumNo pericardial  



 effusion is visualized.



  



  



  



  IVC/SVC/PA/PV/PleuralThe inferior vena cava  



 is adequately visualized.



  



 Th  



 e IVC size is normal with respiratory variation.



  



 Es  



 timated right atrial pressure is 0-5 mmHg.



  



  



  



 Chambers/Structures



  



  



  



  Left Atrium



  



  



  



  LA Volume: 84.59  



 ml LA  



 Area: 28.24 cm^2



  



  LA Vol. Index: 37 ml/m^2



  



  



  



  Left Ventricle



  



  



  



  LVIDd: 4.92 cm



  



  LV Septum Diastolic: 1.07 cm



  



  LV PW Diastolic: 1.18 cm



  



  LVEDV Guy's:101.95 ml



  



  LVESV Guy's:43.94 ml



  



  LVEF Guy's: 56.9  



 %LVEDV  



 I: 44 ml/m^2



  



   



  LVESVI: 19  



 ml/m^2



  



  LVOT Diameter: 2.29 cm



  



  



  



  Right Ventricle



  



  



  



  RV Diast Dim.: 4.64  



 cmRV Area  



 Systolic: 20.33 cm^2



  



  RV Area Diastolic: 32.66 cm^2



  



  RVOT Diameter: 2.18  



 cmTAPSE: 1.25 cm



  



  



  



 Pulmonary Vein:



  



  



  



  S Velocity: 0.22 m/s



  



  D Velocity: 0.61 m/s



  



  



  



 Shunts



  



  



  



  QP: 60.84  



 mlQP/QS:  



 0.73



  



  QS:83.65 ml



  



  



  



 Doppler/Quantitative Measurements



  



  



  



  Mitral Valve



  



  



  



  MV Peak E-Wave: 0.84  



 m/sMV Peak A-Wave:  



 0.39 m/s



  



   



  E/A Ratio: 2.18



  



   



  Peak Gradient: 2.83  



 mmHg



  



   



  Deceleration Time:  



 174.9 msec



  



  



  



  MV Bharathi. Peak:



  



  



  



  Tissue Doppler



  



  



  



  E' Septal Velocity: 0.08  



 m/sE/E': 5.62



  



  E' Lateral Velocity: 0.15 m/s



  



  



  



  Aortic Valve



  



  



  



  Peak Velocity: 1.16  



 m/sMean  



 Velocity: 0.81 m/s



  



  Peak Gradient: 5.35  



 mmHg Mean  



 Gradient: 2.99 mmHg



  



  AV Area (continuity): 3.38 cm^2



  



  AV VTI: 24.77 cm



  



  



  



  AV DVI: 0.82



  



  



  



  LVOT



  



  



  



  Peak Velocity: 0.95 m/s  



 Peak Gradient: 3.58 mmHg



  



  Mean Velocity: 0.62 m/s  



 Mean Gradient: 1.83 mmHg



  



  LVOT Diameter: 2.29  



 cmLVOT VTI: 20.31 cm



  



  LVOT Area: 4.12  



 cm^2LVOT SV:83.61  



 ml



  



  LVOT CO: 5.94  



 l/min LVOT CI:  



 2.57 l/min/m^2



  



  



  



  RVOT



  



  



  



  RVOT VTI (PW): 16.3  



 cm RVOT SV:  



 60.81 ml



  



   



 RVOT CO: 4.32  



 L/min



  



  



  



  Tricuspid Valve



  



  



  



  TR Velocity: 2.56 m/s



  



  TR Gradient: 26.2 mmHg



  



   









 Procedure Note

 

 Interface, External Ris In - 2019  3:12 PM CDT



Transthoracic Echocardiography Report (TTE)



 



  Demographics



 



  Patient Name  TYRA BRUNO     Date of Study      2019



                [place]



 



  MRN           [id number]           Gender             Male



 



  Visit Number  [id number]         Race               



 



  Accession     [id number]          Room Number        OP



  Number



 



  Date of Birth 1970         Referring          Dana Loo MD



                                   Physician



 



  Age           48 year(s)         Sonographer        Dee Black RDCS



 



                                   Interpreting       Alcides Gibbs MD



                                   Physician



 



  Fellow        LEILANI Dhaliwal



 



 Procedure



 



  Type of



  Study     TTE procedure:2DECHO W DOPPLER(CW/PW/COLOR) (Routine)



 



 Indications:Heart Transplant Follow up.



 



 Clinical History



 HGB 15.1



 HCT 45.4 %



 ICMP, HTN



 s/p OHT (2015)



 



 Height: 76 inches Weight: 99.79 kg (220 lbs) BSA: 2.31 m^2 BMI: 26.78 kg/m^2



 



 HR: 71 bpm BP: 131/86 mmHg



 



  Summary



  Left ventricular endocardium is well visualized without IV contrast. LV



  chamber size is normal (male - LVED vol 34-74ml/m2). There is borderline



  concentric LV hypertrophy. All of the segments appear to contract



  normally. Estimated ejection fraction by Guy's biplane method is



  normal (55-60%). Diastolic dysfunction is not present.



  RV chamber size is mildly enlarged. There is mild systolic dysfunction of



  the right ventricle. RVS' is 8 cm/s. TAPSE is 13 cm. FAC is 38%. RV MPI is



  0.58 by TDI.



  Pulmonary artery systolic pressure is estimated at 30 mmHg.



 



  Signature



 



  ----------------------------------------------------------------



  Electronically signed by Alcides Gibbs MD(Interpreting



  physician) on 2019 03:12 PM



  ----------------------------------------------------------------



 



  Findings



 



 Technical Quality: Technically adequate exam.



 



  Rhythm/BP              Normal sinus rhythm during the exam.



 



  Left Ventricle         Left ventricular endocardium is well visualized



                         without IV contrast. LV chamber size is normal



                         (male - LVED vol 34-74ml/m2). There is borderline



                         concentric LV hypertrophy. All of the segments



                         appear to contract normally. Estimated ejection



                         fraction by Guy's biplane method is normal



                         (55-60%). Diastolic dysfunction is not present.



 



  Left Atrium            LA morphology consistent with orthotoptic heart



                         transplant.



 



  Right Ventricle        RV chamber size is mildly enlarged.



                         There is mild systolic dysfunction of the right



                         ventricle. RVS' is 8 cm/s. TAPSE is 13 cm. FAC is



                         38%. RV MPI is 0.58 by TDI.



 



  Right Atrium           Right atrial size is normal.



 



  Atrial Septum          The intraatrial septum is well visualized. Normal



                         intraatrial septum by available views.



 



  Aortic Valve           Normal aortic valve structure.



                         No aortic stenosis.



                         No aortic regurgitation.



 



  Mitral Valve           Normal mitral structure.



                         No mitral stenosis.



                         No mitral regurgitation.



 



  Tricuspid Valve        Tricuspid structure is normal. Mild tricuspid



                         regurgitation.



                         Pulmonary artery systolic pressure is estimated at



                         30 mmHg.



 



  Pulmonic Valve         Normal pulmonic valve structure and function.



 



  Aorta                  Aortic root size (sinus of Valsalva diameter) is



                         normal. Visualized aortic arch is normal.



 



  Pericardium            No pericardial effusion is visualized.



 



  IVC/SVC/PA/PV/Pleural  The inferior vena cava is adequately visualized.



                         The IVC size is normal with respiratory variation.



                         Estimated right atrial pressure is 0-5 mmHg.



 



 Chambers/Structures



 



  Left Atrium



 



  LA Volume: 84.59 ml                     LA Area: 28.24 cm^2



  LA Vol. Index: 37 ml/m^2



 



  Left Ventricle



 



  LVIDd: 4.92 cm



  LV Septum Diastolic: 1.07 cm



  LV PW Diastolic: 1.18 cm



  LVEDV Guy's:101.95 ml



  LVESV Guy's:43.94 ml



  LVEF Guy's: 56.9 %                      LVEDVI: 44 ml/m^2



                                              LVESVI: 19 ml/m^2



  LVOT Diameter: 2.29 cm



 



  Right Ventricle



 



  RV Diast Dim.: 4.64 cm                RV Area Systolic: 20.33 cm^2



  RV Area Diastolic: 32.66 cm^2



  RVOT Diameter: 2.18 cm                TAPSE: 1.25 cm



 



 Pulmonary Vein:



 



  S Velocity: 0.22 m/s



  D Velocity: 0.61 m/s



 



 Shunts



 



  QP: 60.84 ml                    QP/QS: 0.73



  QS:83.65 ml



 



 Doppler/Quantitative Measurements



 



  Mitral Valve



 



  MV Peak E-Wave: 0.84 m/s              MV Peak A-Wave: 0.39 m/s



                                        E/A Ratio: 2.18



                                        Peak Gradient: 2.83 mmHg



                                        Deceleration Time: 174.9 msec



 



  MV Bharathi. Peak:



 



  Tissue Doppler



 



  E' Septal Velocity: 0.08 m/s          E/E': 5.62



  E' Lateral Velocity: 0.15 m/s



 



  Aortic Valve



 



  Peak Velocity: 1.16 m/s                  Mean Velocity: 0.81 m/s



  Peak Gradient: 5.35 mmHg                 Mean Gradient: 2.99 mmHg



  AV Area (continuity): 3.38 cm^2



  AV VTI: 24.77 cm



 



  AV DVI: 0.82



 



  LVOT



 



  Peak Velocity: 0.95 m/s             Peak Gradient: 3.58 mmHg



  Mean Velocity: 0.62 m/s             Mean Gradient: 1.83 mmHg



  LVOT Diameter: 2.29 cm              LVOT VTI: 20.31 cm



  LVOT Area: 4.12 cm^2                LVOT SV:83.61 ml



  LVOT CO: 5.94 l/min                 LVOT CI: 2.57 l/min/m^2



 



  RVOT



 



  RVOT VTI (PW): 16.3 cm                   RVOT SV: 60.81 ml



                                           RVOT CO: 4.32 L/min



 



  Tricuspid Valve



 



  TR Velocity: 2.56 m/s



  TR Gradient: 26.2 mmHg



 









 Performing Organization  Address  [facility]  Phone Number

 

 [facility]      



XR chest 2 views (2019  8:33 AM CDT)





 Specimen

 

 









 Narrative  Performed At

 

 FINAL REPORT



  [facility]



  



  



 PATIENT ID: [id number]



  



  



  



 INDICATION: 



  



 heart transplant annual follow up



  



  



  



 COMPARISON: 



  



 May 23, 2018



  



  



  



 TECHNIQUE: 



  



 Chest radiograph, two views, PA and lateral.



  



  



  



 FINDINGS / IMPRESSION:



  



 Lung volumes are normal and lungs are clear. Intact median sternotomy 



  



 wires and left axillary/subclavian surgical clips again demonstrated 



  



 from prior heart transplant. Cardiac and mediastinal contours normal. 



  



 No pleural effusion or pneumothorax. Osseous structures unremarkable.



  



  



  



 Signed: Dennis Calhoun MD



  



 Report Verified Date/Time:2019 08:48:33



  



  



  



 Reading Location: [facility] Radiology Reading Room



  



  Electronically signed by: DENNIS CALHOUN M.D. on 2019  



 08:48 AM



  



   









 Procedure Note

 

 Interface, External Ris In - 2019  8:50 AM CDT



FINAL REPORT



 



 PATIENT ID:   [id number]



 



 INDICATION:



 heart transplant annual follow up



 



 COMPARISON:



 May 23, 2018



 



 TECHNIQUE:



 Chest radiograph, two views, PA and lateral.



 



 FINDINGS / IMPRESSION:



 Lung volumes are normal and lungs are clear. Intact median sternotomy



 wires and left axillary/subclavian surgical clips again demonstrated



 from prior heart transplant. Cardiac and mediastinal contours normal.



 No pleural effusion or pneumothorax. Osseous structures unremarkable.



 



 Signed: Dennis Calhoun MD



 Report Verified Date/Time:  2019 08:48:33



 



 Reading Location: [facility] Radiology Reading Room



  Electronically signed by: DENNIS CALHOUN M.D. on 2019 08:48 AM



 









 Performing Organization  Address  City/[facility]/Zipcode  Phone Number

 

 [facility]      



CBC with platelet count + automated diff (2019  7:51 AM CDT)Only the most 
recent of3 resultswithin the time period is included.





 Component  Value  Ref Range  Performed At

 

 WBC  5.2  3.5 - 10.5 K/L  [facility]

 

 RBC  5.18  4.63 - 6.08 M/L  [facility]

 

 Hemoglobin  15.1  13.7 - 17.5 GM/DL  [facility]

 

 Hematocrit  45.4  40.1 - 51.0 %  [facility]

 

 MCV  87.6  79.0 - 92.2 fL  [facility]

 

 MCH  29.2  25.7 - 32.2 pg  [facility]

 

 MCHC  33.3  32.3 - 36.5 GM/DL  [facility]

 

 RDW  13.3  11.6 - 14.4 %  [facility]

 

 Platelets  135 (L)  150 - 450 K/CU MM  [facility]

 

 MPV  9.6  9.4 - 12.4 fL  [facility]

 

 nRBC  0  0 - 0 /100 WBC  [facility]

 

 % Neutros  55  %  [facility]

 

 % Lymphs  29  %  [facility]

 

 % Monos  10  %  [facility]

 

 % Eos  5  %  [facility]

 

 % Baso  1  %  [facility]

 

 # Neutros  2.88  1.78 - 5.38 K/L  [facility]

 

 # Lymphs  1.52  1.32 - 3.57 K/L  [facility]

 

 # Monos  0.51  0.30 - 0.82 K/L  [facility]

 

 # Eos  0.24  0.04 - 0.54 K/L  [facility]

 

 # Baso  0.05  0.01 - 0.08 K/L  [facility]

 

 Immature Granulocytes-Relative  1  0 - 1 %  [facility]









 Specimen

 

 Blood









 Performing Organization  Address  City/State/Zipcode  Phone Number

 

 [facility]  [address] [place] [address] [phone]
[phone]



 CENTER      



Tacrolimus level (2019  7:51 AM CDT)Only the most recent of4 
resultswithin the time period is included.





 Component  Value  Ref Range  Performed At

 

 Tacrolimus Lvl  6.2 (L)  10.0 - 20.0 ng/mL  [facility]









 Specimen

 

 Blood









 Performing Organization  Address  City/State/Zipcode  Phone Number

 

 [facility]  [address] [place] [address] [phone]
[phone]



 [place]      



PSA (2019  7:50 AM CDT)





 Component  Value  Ref Range  Performed At

 

 PSA  0.5  0.0 - 4.0 ng/mL  [facility]









 Specimen

 

 Blood









 Performing Organization  Address  City/State/Zipcode  Phone Number

 

 [facility]  [address] [place] [id number] [phone]
[phone]



 [place]      



Hepatic function panel (2019  7:50 AM CDT)





 Component  Value  Ref Range  Performed At

 

 Protein, Total  7.0  6.0 - 8.3 gm/dL  [facility]

 

 Albumin  4.5  3.5 - 5.0 g/dL  [facility]

 

 Total Bilirubin  0.5  0.2 - 1.2 mg/dL  [facility]

 

 Bilirubin, Direct  0.3  0.1 - 0.5 mg/dL  [facility]

 

 Alkaline Phosphatase  81  40 - 150 U/L  [facility]

 

 AST  18  5 - 34 U/L  [facility]

 

 ALT  14  6 - 55 U/L  [facility]









 Specimen

 

 Blood









 Performing Organization  Address  City/State/Zipcode  Phone Number

 

 [address] [address] [phone]
[phone]



 [place]      



Lipid panel (2019  7:50 AM CDT)





 Component  Value  Ref Range  Performed At

 

 Triglycerides  63  mg/dL  [facility]

 

 Cholesterol  126  mg/dL  [facility]

 

 HDL  42  mg/dL  [facility]

 

 LDL Calculated  71  mg/dL  [facility]









 Specimen

 

 Blood









 Narrative  Performed At

 

 Triglyceride Reference Range:



  [facility]



  Low Risk <150



  



  [id number]



  



  High Risk 200-499



  



  Very High Risk>=500



  



  



  



 Cholesterol Reference Range:



  



  Low Risk <200



  



  [id number] 



  



  High Risk>240



  



  



  



 HDL Cholesterol Reference Range:



  



  Low Risk >=60



  



  High Risk <40



  



  



  



 LDL Cholesterol Reference Range:



  



  Optimal<100



  



  Near [id number]



  



  [id number]



  



  [id number]



  



  Very High >=190  









 Performing Organization  Address  [facility]  Phone Number

 

 [facility]  [address], [place] [id number] [phone]
[phone]



 [place]      



Basic Metabolic Panel (2019  7:50 AM CDT)Only the most recent of5 
resultswithin the time period is included.





 Component  Value  Ref Range  Performed At

 

 Sodium  143  136 - 145 meq/L  [facility]

 

 Potassium  4.3  3.5 - 5.1 meq/L  [facility]

 

 Chloride  105  98 - 107 meq/L  [facility]

 

 CO2  30 (H)  22 - 29 meq/L  [facility]

 

 BUN  16  7 - 21 mg/dL  [facility]

 

 Creatinine  0.96  0.57 - 1.25 mg/dL  [facility]

 

 Glucose  108 (H)  70 - 105 mg/dL  [facility]

 

 Calcium  9.7  8.4 - 10.2 mg/dL  [facility]

 

 EGFR  83Comment: ESTIMATED GFR IS  mL/min/1.73 sq m  [facility]



   NOT AS ACCURATE AS CREATININE    [facility]



   CLEARANCE IN PREDICTING    



   GLOMERULAR FILTRATION RATE.    



   ESTIMATED GFR IS NOT    



   APPLICABLE FOR DIALYSIS    



   PATIENTS.    









 Specimen

 

 Blood









 Performing Organization  Address  City[facility]  Phone Number

 

 [address] [id number] [phone]
[phone]



 [place]      



ECHOCARDIOGRAM REPORT - SCAN (2018 12:54 PM CST)





 Narrative  Performed At

 

 This result has an attachment that is not available.



  



2D Echo W/Doppler(CW/PW/Color) (2018  9:20 AM CST)





 Component  Value  Ref Range  Performed At

 

 Ejection Fraction      [facility]









 Specimen

 

 









 Narrative  Performed At

 

 Transthoracic Echocardiography Report (TTE)



  [facility]



  



  



  Demographics



  



  



  



  Patient Name Lulu BRUNO of  



 Study 2018



  



 [place]



  



  



  



  [id number]  



 GenderMale



  



  



  



  Visit Number  



 [id number]  



 Unknown



  



  



  



  [id number]  



 Room Number op



  



  Number



  



  



  



  Date of  



 Birth1970Referring  



 Physician Dana Loo MD



  



  



  



  Age47  



 year(s)Sonographer  



 Ashok Mcmillan



  



   



   



 [facility]



  



  



  



  AnalystMailyn  



 InterpretingJoThomas Torres  



 Physician MD



  



  



  



 Procedure



  



  



  



  Type of



  



  Study TTE procedure:2DECHO W  



 DOPPLER(CW/PW/COLOR) (Routine)



  



  



  



 Indications:Heart Transplant Follow up.



  



  



  



 Clinical History



  



 ICMP, HTN



  



  



  



 OHT 5.



  



  



  



 Height: 76 inches Weight: 90.72 kg (200 lbs) BSA:  



 2.22 m^2 BMI: 24.34 kg/m^2



  



  



  



 HR: 79 bpm BP: 127/77 mmHg



  



  



  



  Summary



  



  The left ventricle is chamber size (by PSLAX  



 dimension) is normal (male -



  



  LVIDd 4.2-5.8cm) . LV septal thickness is normal  



 (0.6-1.1cm). LV posterior



  



  wall thickness is mildly increased . LV segments  



 contract normally. LVEF



  



  by Guy's method of disk assessment is normal  



 (60%) .



  



  Normal diastolic function with normal LV filling  



 pressure (LAP) at rest.



  



  Estimated peak systolic PA pressure is 30-35 mmHg  



 .



  



  No significant pericardial effusion is  



 visualized.



  



  



  



  Previous Study



  



  Compared to the previous study there was no  



 significant change.



  



  



  



  Signature



  



  



  



  -------------------------------------------------  



 ---------------



  



  Electronically signed by Alcides Gibbs MD(Interpreting



  



  physician) on 2018 12:12 PM



  



  -------------------------------------------------  



 ---------------



  



  



  



  Findings



  



  



  



  Left Ventricle The LV endocardium  



 is well visualized.



  



 Th  



 e left ventricle is chamber size (by PSLAX



  



 di  



 mension) is normal (male - LVIDd 4.2-5.8cm) .



  



 LV  



 septal thickness is normal (0.6-1.1cm). LV



  



 po  



 sterior wall thickness is mildly increased .



  



 LV  



 segments contract normally.



  



 LV  



 EF by Guy's method of disk assessment is



  



 no  



 rmal (60%) .



  



 No  



 rmal diastolic function with normal LV filling



  



 pr  



 essure (LAP) at rest.



  



  



  



  Left AtriumLA size is  



 normal (16-34 ml/m2) .



  



  



  



  Right VentricleThe right  



 ventricular chamber size and systolic



  



 fu  



 nction are within normal limits.



  



  



  



  Right Atrium RA size is  



 normal.



  



  



  



  Aortic Valve Normal AoV  



 structure.



  



 No  



 evidence of aortic regurgitation.



  



  



  



  Mitral Valve Mild MV leaflet  



 thickening.



  



 Tr  



 ace mitral regurgitation.



  



  



  



  Tricuspid ValveMild tricuspid  



 regurgitation.



  



 Es  



 timated peak systolic PA pressure is 30-35 mmHg .



  



  



  



  Pulmonic Valve Normal PV  



 structure and function.



  



  



  



  AortaAortic  



 root size (SInus of Valsalva diameter) is



  



 no  



 rmal .



  



 Pr  



 oximal ascending aorta size is normal .



  



  



  



  PericardiumNo significant  



 pericardial effusion is visualized.



  



  



  



  IVC/SVC/PA/PV/PleuralThe estimated RA  



 pressure by IVC dynamics 5-10mmHg



  



 .



  



  



  



 Chambers/Structures



  



  



  



  Left Atrium



  



  



  



  LA Volume: 59.91  



 ml LA  



 Area: 21.63 cm^2



  



  LA Vol. Index: 27 ml/m^2



  



  



  



  Left Ventricle



  



  



  



  LVIDd: 4.83 cm



  



  LVIDs: 2.99 cm



  



  LV Septum Diastolic: 1.01 cm



  



  LV PW Diastolic: 1.22  



 cmLV FS:  



 38.1 %



  



  LVEDV Guy's:73.35 ml



  



  LVESV Guy's:25.17  



 mlLVEDVI:  



 33 ml/m^2



  



  LVEF Guy's: 65.7  



 %LVESV  



 I: 11 ml/m^2



  



  



  



  LVOT Diameter: 2.34 cm



  



  



  



  Right Atrium



  



  



  



  RA Vol. (Sngl Plane): 38.77 ml



  



  



  



  Right Ventricle



  



  



  



 TAPSE:  



 1.64 cm



  



  



  



 Aorta



  



  



  



  Ao Root S of Krupa.: 3.37  



 cmAscending Aorta:  



 2.63 cm



  



  



  



 Doppler/Quantitative Measurements



  



  



  



  Mitral Valve



  



  



  



  MV Peak E-Wave: 0.61  



 m/sMV Peak A-Wave:  



 0.47 m/s



  



   



  E/A Ratio: 1.31



  



   



  Peak Gradient: 1.5 mmHg



  



   



  Deceleration Time:  



 179.6 msec



  



  



  



  MV Bharathi. Peak:



  



  



  



  Tissue Doppler



  



  



  



  E' Septal Velocity: 0.08  



 m/sE/E': 7.29



  



  E' Lateral Velocity: 0.17 m/s



  



  



  



  Aortic Valve



  



  



  



  Peak Velocity: 1.14  



 m/sMean  



 Velocity: 0.78 m/s



  



  Peak Gradient: 5.21  



 mmHg Mean  



 Gradient: 2.77 mmHg



  



  AV Area (continuity): 3.71 cm^2



  



  AV VTI: 21 cm



  



  



  



  AV DVI: 0.86



  



  



  



  LVOT



  



  



  



  Peak Velocity: 0.86 m/s  



 Peak Gradient: 2.99 mmHg



  



  Mean Velocity: 0.62 m/s  



 Mean Gradient: 1.72 mmHg



  



  LVOT Diameter: 2.34  



 cmLVOT VTI: 18.12 cm



  



  LVOT Area: 4.3  



 cm^2 LVOT SV:77.89  



 ml



  



  LVOT CO: 6.15  



 l/min LVOT CI:  



 2.77 l/min/m^2



  



  



  



  Tricuspid Valve



  



  



  



  TR Velocity: 2.41 m/s



  



  TR Gradient: 23.19 mmHg



  



   









 Procedure Note

 

 Interface, External Ris In - 2018 12:13 PM CST



Transthoracic Echocardiography Report (TTE)



 



  Demographics



 



  Patient Name   TYRA BRUNO    Date of Study       2018



                 [place]



 



  MRN            [id number]          Gender              Male



 



  Visit Number   [id number]        Race                Unknown



 



  Accession      [id number]         Room Number         op



  Number



 



  Date of Birth  1970        Referring Physician Dana Loo MD



 



  Age            48 year(s)        Sonographer         Ashok Mcmillan



                                                       [facility]



 



  Analyst        Mayte            Interpreting        Thomas Agee      Physician           MD



 



 Procedure



 



  Type of



  Study     TTE procedure:2DECHO W DOPPLER(CW/PW/COLOR) (Routine)



 



 Indications:Heart Transplant Follow up.



 



 Clinical History



 ICMP, HTN



 



 OHT 5.14.



 



 Height: 76 inches Weight: 90.72 kg (200 lbs) BSA: 2.22 m^2 BMI: 24.34 kg/m^2



 



 HR: 79 bpm BP: 127/77 mmHg



 



  Summary



  The left ventricle is chamber size (by PSLAX dimension) is normal (male -



  LVIDd 4.2-5.8cm) . LV septal thickness is normal (0.6-1.1cm). LV posterior



  wall thickness is mildly increased . LV segments contract normally. LVEF



  by Guy's method of disk assessment is normal (60%) .



  Normal diastolic function with normal LV filling pressure (LAP) at rest.



  Estimated peak systolic PA pressure is 30-35 mmHg .



  No significant pericardial effusion is visualized.



 



  Previous Study



  Compared to the previous study there was no significant change.



 



  Signature



 



  ----------------------------------------------------------------



  Electronically signed by Alcides Gibbs MD(Interpreting



  physician) on 2018 12:12 PM



  ----------------------------------------------------------------



 



  Findings



 



  Left Ventricle         The LV endocardium is well visualized.



                         The left ventricle is chamber size (by PSLAX



                         dimension) is normal (male - LVIDd 4.2-5.8cm) .



                         LV septal thickness is normal (0.6-1.1cm). LV



                         posterior wall thickness is mildly increased .



                         LV segments contract normally.



                         LVEF by Guy's method of disk assessment is



                         normal (60%) .



                         Normal diastolic function with normal LV filling



                         pressure (LAP) at rest.



 



  Left Atrium            LA size is normal (16-34 ml/m2) .



 



  Right Ventricle        The right ventricular chamber size and systolic



                         function are within normal limits.



 



  Right Atrium           RA size is normal.



 



  Aortic Valve           Normal AoV structure.



                         No evidence of aortic regurgitation.



 



  Mitral Valve           Mild MV leaflet thickening.



                         Trace mitral regurgitation.



 



  Tricuspid Valve        Mild tricuspid regurgitation.



                         Estimated peak systolic PA pressure is 30-35 mmHg .



 



  Pulmonic Valve         Normal PV structure and function.



 



  Aorta                  Aortic root size (SInus of Valsalva diameter) is



                         normal .



                         Proximal ascending aorta size is normal .



 



  Pericardium            No significant pericardial effusion is visualized.



 



  IVC/SVC/PA/PV/Pleural  The estimated RA pressure by IVC dynamics 5-10mmHg



                         .



 



 Chambers/Structures



 



  Left Atrium



 



  LA Volume: 59.91 ml                     LA Area: 21.63 cm^2



  LA Vol. Index: 27 ml/m^2



 



  Left Ventricle



 



  LVIDd: 4.83 cm



  LVIDs: 2.99 cm



  LV Septum Diastolic: 1.01 cm



  LV PW Diastolic: 1.22 cm                    LV FS: 38.1 %



  LVEDV Guy's:73.35 ml



  LVESV Guy's:25.17 ml                    LVEDVI: 33 ml/m^2



  LVEF Guy's: 65.7 %                      LVESVI: 11 ml/m^2



 



  LVOT Diameter: 2.34 cm



 



  Right Atrium



 



          RA Vol. (Sngl Plane): 38.77 ml



 



  Right Ventricle



 



                     TAPSE: 1.64 cm



 



 Aorta



 



  Ao Root S of Krupa.: 3.37 cm              Ascending Aorta: 2.63 cm



 



 Doppler/Quantitative Measurements



 



  Mitral Valve



 



  MV Peak E-Wave: 0.61 m/s              MV Peak A-Wave: 0.47 m/s



                                        E/A Ratio: 1.31



                                        Peak Gradient: 1.5 mmHg



                                        Deceleration Time: 179.6 msec



 



  MV Bharathi. Peak:



 



  Tissue Doppler



 



  E' Septal Velocity: 0.08 m/s          E/E': 7.29



  E' Lateral Velocity: 0.17 m/s



 



  Aortic Valve



 



  Peak Velocity: 1.14 m/s                  Mean Velocity: 0.78 m/s



  Peak Gradient: 5.21 mmHg                 Mean Gradient: 2.77 mmHg



  AV Area (continuity): 3.71 cm^2



  AV VTI: 21 cm



 



  AV DVI: 0.86



 



  LVOT



 



  Peak Velocity: 0.86 m/s             Peak Gradient: 2.99 mmHg



  Mean Velocity: 0.62 m/s             Mean Gradient: 1.72 mmHg



  LVOT Diameter: 2.34 cm              LVOT VTI: 18.12 cm



  LVOT Area: 4.3 cm^2                 LVOT SV:77.89 ml



  LVOT CO: 6.15 l/min                 LVOT CI: 2.77 l/min/m^2



 



  Tricuspid Valve



 



  TR Velocity: 2.41 m/s



  TR Gradient: 23.19 mmHg



 









 Performing Organization  Address  City/State/Zipcode  Phone Number

 

 [facility]      



CBC with platelet count + automated diff (2018 10:30 AM CDT)Only the most 
recent of2 resultswithin the time period is included.





 Component  Value  Ref Range  Performed At

 

 WBC (MANUAL)  4.1  10^3/mL  

 

 RBC (MANUAL)  5.60  10^6/L  

 

 HEMOGLOBIN (MANUAL)  16.0  GM/DL  

 

 HEMATOCRIT (MANUAL)  48.1  %  

 

 MCV (MANUAL)  86.0  fL  

 

 MCH (MANUAL)  28.6  pg  

 

 MCHC (MANUAL)  33.3  GM/DL  

 

 RDW (MANUAL)  14.0  %  

 

 PLATELETS (MANUAL)  163  K/CU MM  

 

 MPV (MANUAL)  8.3  fL  

 

 NRBC (MANUAL)    /100 WBC  

 

 NEUTROS PCT (MANUAL)  53.0  %  

 

 LYMPHS PCT (MANUAL)  29.8  %  

 

 MONOS PCT (MANUAL)  11.3  %  

 

 EOS PCT (MANUAL)  5.0  %  

 

 BASOS PCT (MANUAL)  0.9  %  

 

 NEUTROS ABS (MANUAL)  2.2  K/L  

 

 LYMPHS ABS (MANUAL)  1.2  K/L  

 

 MONOS ABS (MANUAL)  0.5  K/L  

 

 EOS ABS (MANUAL)  0.2  K/L  

 

 BASOS ABS (MANUAL)  0.0  K/L  









 Specimen

 

 Blood



Hemoglobin A1c (2018 10:30 AM CDT)





 Component  Value  Ref Range  Performed At

 

 Hemoglobin A1C  5.3  4.0 - 6.0 %  









 Specimen

 

 Blood



after 2018



Insurance







 Payer  Benefit Plan /  Subscriber ID  Type  Phone  Address



   Group        

 

 [employer]  xxxxxxxxxxxx  PPO  [phone]  [address]







 [employer]  [place]



           [address]









 Guarantor Name  Account Type  Relation to  Date of  Phone  Billing



     Patient  Birth    Address

 

 Tyra Bruno  Personal/Family  Self  1970  [phone] [address]        (Home)  [place] [address]







Advance Directives

For more information, please contact:[address] [phone]





 Code Status  Date Activated  Date Inactivated  Comments

 

 Full Code  2018 10:57 AM  2018  9:33 PM  









 This code status was determined by:  Patient  









       

 

 Full Code  2015 11:49 AM  2015  5:04 PM  









 This code status was determined by:  Patient  









       

 

 Full Code  10/29/2015  2:29 PM  10/31/2015  3:02 PM  









 This code status was determined by:  Patient  









       

 

 Full Code  2015 12:39 PM  2015  7:25 PM  









 This code status was determined by:  Patient  









       

 

 Full Code  2015 11:11 AM  2015  7:28 PM  









 This code status was determined by:  Patient

## 2019-06-12 NOTE — EDPHYS
Physician Documentation                                                                           

 [facility]                                                                 

Name: Gomez Romero                                                                               

Age: 49 yrs                                                                                       

Sex: Male                                                                                         

: 1970                                                                                   

MRN: [id number]                                                                                   

Arrival Date: 2019                                                                          

Time: 06:10                                                                                       

Account#: [id number]                                                                            

Bed 5                                                                                             

Private MD:                                                                                       

ED Physician Lloyd Duff                                                                         

HPI:                                                                                              

                                                                                             

06:24 This 49 yrs old  Male presents to ER via Wheelchair with complaints of Fall    jmm 

      Injury.                                                                                     

06:24 Details of fall: The patient fell from an upright position, while walking. Onset: The   jmm 

      symptoms/episode began/occurred acutely, 5 day(s) ago. Associated injuries: The patient     

      sustained injury to the head. This is a 49 year old male that presents to the ED with       

      complaints of right sided headache and neck pain after a fall which occurred 5 days         

      ago. Patient states he slipped, landing on concrete. Patient states he was unable to        

      brace his fall. Patient now complains of progressively worsening headaches. .               

                                                                                                  

Historical:                                                                                       

- Allergies:                                                                                      

06:24 unknown rejection medication;                                                           lp1 

- Home Meds:                                                                                      

06:24 pt unable to provide [Active];                                                          lp1 

- PMHx:                                                                                           

06:24 pt denies;                                                                              lp1 

- PSHx:                                                                                           

06:24 Heart transplant;                                                                       lp1 

                                                                                                  

- Immunization history:: Adult Immunizations up to date.                                          

- Social history:: Smoking status: Patient/guardian denies using tobacco.                         

- Ebola Screening: : No symptoms or risks identified at this time.                                

                                                                                                  

                                                                                                  

ROS:                                                                                              

06:24 Constitutional: Negative for fever, chills, and weight loss, Cardiovascular: Negative   jmm 

      for chest pain, palpitations, and edema, Respiratory: Negative for shortness of breath,     

      cough, wheezing, and pleuritic chest pain.                                                  

06:24 Neuro: Positive for headache.                                                               

06:24 All other systems are negative.                                                             

                                                                                                  

Exam:                                                                                             

06:24 Constitutional:  This is a well developed, well nourished patient who is awake, alert,  jmm 

      and in no acute distress. Head/Face:  atraumatic.                                           

06:24 ENT:  Moist Mucus Membranes                                                                 

06:24 Chest/axilla:  Normal chest wall appearance and motion.   Cardiovascular:  Regular rate     

      and rhythm.  No edema appreciated Respiratory:  Normal respirations, no respiratory         

      distress appreciated Abdomen/GI:  Non distended, soft Back:  Normal ROM Skin:  General      

      appearance color normal MS/ Extremity:  Moves all extremities, no obvious deformities       

      appreciated, no edema noted to the lower extremities  Neuro:  Awake and alert, normal       

      gait Psych:  Behavior is normal, Mood is normal, Patient is cooperative and pleasant        

06:24 Head/face: Exam is negative for solis signs, hematoma, raccoon eyes.                       

06:24 Neck: C-spine: appears grossly normal, no vertebral tenderness, no crepitus,                

      ROM/movement: is normal.                                                                    

                                                                                                  

Vital Signs:                                                                                      

06:23  / 87; Pulse 80; Resp 18; Temp 97.9(O); Pulse Ox 98% on R/A; Weight 99.79 kg;     lp1 

      Height 6 ft. 4 in. (193.04 cm); Pain 9/10;                                                  

08:00  / 91; Pulse 71; Resp 15; Temp 98; Pulse Ox 100% ;                                bp  

06:23 Body Mass Index 26.78 (99.79 kg, 193.04 cm)                                             lp1 

                                                                                                  

MDM:                                                                                              

06:22 Patient medically screened.                                                             [facility] 

07:57 Data reviewed: vital signs, nurses notes. Counseling: I had a detailed discussion with  sania 

      the patient and/or guardian regarding: the historical points, exam findings, and any        

      diagnostic results supporting the discharge/admit diagnosis, radiology results, the         

      need for outpatient follow up, to return to the emergency department if symptoms worsen     

      or persist or if there are any questions or concerns that arise at home. ED course: CT      

      imaging is negative. Patient is advised to follow up with pcp and otherwise given           

      strict return precautions. patient understood and agrees with the plan of care. .           

                                                                                                  

                                                                                             

06:23 Order name: CT Head C Spine; Complete Time: 07:58                                       [facility] 

                                                                                                  

Administered Medications:                                                                         

08:09 Not Given (Patient Refused): Tylenol 650 mg PO once                                     bp  

                                                                                                  

                                                                                                  

Disposition:                                                                                      

19:00 Co-signature as Attending Physician, Lloyd Duff MD.                                    rn  

                                                                                                  

Disposition:                                                                                      

19 07:58 Discharged to Home. Impression: Postconcussional syndrome.                         

- Condition is Stable.                                                                            

- Discharge Instructions: Post-Concussion Syndrome.                                               

- Prescriptions for Fiorinal 50- 325-40 mg Oral Capsule - take 1 capsule by ORAL route            

  every 4 hours As needed - not to exceed 6 capsules per day; 20 capsule.                         

- Medication Reconciliation Form, Thank You Letter, Antibiotic Education, Prescription            

  Opioid Use, Work release form form.                                                             

- Follow up: Private Physician; When: 2 - 3 days; Reason: Recheck today's complaints,             

  Continuance of care, Re-evaluation by your physician.                                           

                                                                                                  

                                                                                                  

                                                                                                  

Signatures:                                                                                       

Dispatcher [employer]                           EDAce Caceres PA PA jmm Nieto, Roman, MD MD rn Pena, Laura, RN                         RN   lp1                                                  

Lasha Verdin RN                      RN   bp                                                   

                                                                                                  

Corrections: (The following items were deleted from the chart)                                    

08:13 07:58 2019 07:58 Discharged to Home. Impression: Postconcussional syndrome.       bp  

      Condition is Stable. Forms are Medication Reconciliation Form, Thank You Letter,            

      Antibiotic Education, Prescription Opioid Use. Follow up: Private Physician; When: 2 -      

      3 days; Reason: Recheck today's complaints, Continuance of care, Re-evaluation by your      

      physician. sania                                                                              

                                                                                                  

**************************************************************************************************

## 2019-06-12 NOTE — XMS REPORT
Continuity of Care Document

 Created on:2017



Patient:TYRA BRUNO

Sex:Male

:1970

External Reference #:[id number]





Demographics







 Address  [address] [place] [id number]

 

 Phone  [phone]

 

 Phone  [phone]

 

 Preferred Language  Unknown

 

 Marital Status  Unknown

 

 Oriental orthodox Affiliation  Unknown

 

 Race  Unknown

 

 Ethnic Group  Unknown









Author







 Organization  [facility]









Problems







 Problem  Status  Onset  Classification  Date  Comments  Source



     Date    Reported    



             



             



             

 

 RIGHT RECURRENT  Active  20        [facility]



 INGUINAL HERNIA    17        Medical



 AND INCI            Center



             



             

 

 MORBID OBESITY  Active  10/14/20        [place] [address]        Medical



             Center



             



             

 

 Heart  Active    Problem  2017    [facility]



 transplanted            [facility]



             Center



             



             

 

 Hypertension  Active    Problem  2017    [facility]



             



             

 

 MI (<span  Resolved    Problem  2017    [facility] [place]



 ID="[id number]            Medical



 ">Confirmed</[facility]            Center



 n>)            



             

 

 Morbid obesity  Active    Problem  2017    [facility]



             



             

 

 Hiatal hernia  Active    Problem  2017    [facility]



             



             

 

 OBESITY,  Active          [facility] [place]



 UNSPECIFIED            [facility]



             Center



             



             







Medications







 Medication  Details  Route  Status  Patient  Ordering  Order  Source



         Instructions  Provider  Date  



               



               



               

 

 gabapentin 300 MG  300 mg, 1 cap,    Inactive        [facility] [place]



 Oral Capsule  Route: PO, Drug            Medical



   form: CAP,            Center



   ONCE, Dosing            



   Weight 102.273,            



   kg, Priority:            



   STAT, Start            



   date: 17            



   13:37:00 CDT,            



   Stop date:            



   17            



   13:37:00 CDT            



               



               

 

 Tramadol  100 mg, Route:    Inactive        [facility] [place]



   PO, Drug form:          2017  Medical



   TAB, ONCE,            Center



   Dosing Weight            



   102.273, kg,            



   Priority: STAT,            



   Start date:            



   17            



   13:36:00 CDT,            



   Stop date:            



   17            



   13:36:00 CDT            



               



               

 

 ketOROLAC (ANES)  IV, ONCE    Inactive        [address]



               Center



               



               

 

 ondansetron (ANES)  Route: IV, Drug    Inactive        [facility]



   form: INJ,          2017  Medical



   ONCE, Stop            Center



   date: 17            



   13:03:00 CDT            



               



               

 

 tramadol  50 mg=1 tab,    Active         [place]



 hydrochloride 50  PO, Q6H, PRN          2017  Medical



 MG Oral Tablet  Pain, X 10 day,            Center



   # 40 tab, 0            



   Refill(s)            



               



               

 

 Ondansetron  4 mg, Route:    Inactive        [facility] [place]



   IVP, Q6H,          2017  Medical



   Dosing Weight            Center



   102.273, kg,            



   Start date:            



   17            



   12:00:00 CDT,            



   Duration: 30            



   day, Stop date:            



   10/21/17            



   6:00:00 CDT            



               



               

 

 propofol (ANES)  Route: IV, Drug    Inactive        [facility]



   form: INJ,            Medical



   ONCE, Stop            Center



   date: 17            



   10:08:00 CDT            



               



               

 

 succinylcholine  Route: IV, Drug    Inactive         [place]



 (ANES)  form: INJ,            Medical



   ONCE, Stop            Center



   date: 17            



   10:08:00 CDT            



               



               

 

 fentaNYL (ANES)  Route: IV, Drug    Inactive        [facility]



   form: INJ,            Medical



   ONCE, Stop            Center



   date: 17            



   10:08:00 CDT            



               



               

 

 lidocaine (ANES)  Route: IV, Drug    Inactive        [facility]



   form: INJ,            Medical



   ONCE, Stop            Center



   date: 17            



   10:03:00 CDT            



               



               

 

 Acetaminophen  1,000 mg,    Inactive        [facility]



   Route: PO, Drug            Medical



   form: LIQ,            Center



   Q6Hnow, Dosing            



   Weight 102.273,            



   kg, Start date:            



   17            



   10:00:00 CDT,            



   Duration: 30            



   day, Stop date:            



   10/21/17            



   4:00:00 CDT            



               



               

 

 Tramadol  100 mg, Route:    Inactive        [facility] [place]



   PO, Drug form:          [facility]  Medical



   SUSP, Q6Hnow,            [place]



   Dosing Weight            



   102.273, kg,            



   Start date:            



   17            



   10:00:00 CDT,            



   Duration: 30            



   day, Stop date:            



   10/21/17            



   4:00:00 CDT            



               



               

 

 gabapentin  300 mg, Route:    Inactive        [facility] [place]



   PO, Drug form:          2017  Medical



   SUSP, Q8Hn,            Center



   Dosing Weight            



   102.273, kg,            



   Start date:            



   17            



   10:00:00 CDT,            



   Duration: 30            



   day, Stop date:            



   10/21/17            



   2:00:00 CDT            



               



               

 

 celecoxib  200 mg, Route:    Inactive        [facility]



   PO, [id number],          2017  Medical



   Dosing Weight            Center



   102.273, kg,            



   Start date:            



   17            



   10:00:00 CDT,            



   Duration: 30            



   day, Stop date:            



   10/20/17            



   22:00:00 CDT            



               



               

 

 rocuronium (ANES)  Route: IV, Drug    Inactive        [facility]



   form: INJ,            Medical



   ONCE, Stop            Center



   date: 17            



   9:43:00 CDT            



               



               

 

 famotidine (ANES)  Route: IV, Drug    Inactive        [facility]



   form: INJ,          2017  Medical



   ONCE, Stop            Center



   date: 17            



   9:33:00 CDT            



               



               

 

 ceFAZolin (ANES)  Route: IV, Drug    Inactive        [facility]



   form: INJ,            Medical



   ONCE, Stop            Center



   date: 17            



   9:33:00 CDT            



               



               

 

 dexamethasone  Route: IV, Drug    Inactive        [facility] [place]



 (ANES)  form: INJ,            Medical



   ONCE, Stop            Center



   date: 17            



   9:18:00 CDT            



               



               

 

 acetaminophen  Route: IV, Drug    Inactive        [facility] [place]



 (ANES) (ANES)  form: INJ,            Medical



   Start date:            Center



   17            



   8:54:00 CDT,            



   Stop date:            



   17            



   9:54:00 CDT            



               



               

 

 LR 1000 mL INJ  Route: IV,    Inactive        [facility] [place]



 (ANES)  Total Volume:          2017  Medical



   1,000, Start            Center



   date: 17            



   8:23:00 CDT,            



   Stop date:            



   17            



   9:23:00 CDT            



               



               

 

 Flomax  0.4 mg, 1 cap,    Inactive       [place]



   Route: PO, Drug            Medical



   form: CAP, PRE            Center



   OP, Start date:            



   17            



   5:00:00 CDT,            



   Duration: 1            



   doses or times,            



   Stop date:            



   17            



   23:00:00            



   CDTNotes: (Same            



   As: Flomax)            



   "Do Not Crush"            



               



               

 

 Ofirmev  1,000 mg, 100    No Longer        [facility]



   mL, Route: IV,    Active        Medical



   Drug form: INJ,            Center



   PRE OP, Start            



   date: 17            



   5:00:00 CDT,            



   Duration: 1            



   doses or times,            



   Stop date:            



   17            



   23:00:00            



   CDTNotes:            



   Infuse over 15            



   minutes  Do not            



   exceed 4gm/day            



   of            



   acetaminophen            



    *** MEDICATION            



   WASTE ***            



   Product Size:            



   1000 mg Product            



   Wasted:  ___ mg            



               



               

 

 ceFAZolin  2 gm, 100 mL,    Inactive        [facility]



   Route: IVPB,          2017  Medical



   Drug form: INJ,            Center



   PRE OP, Start            



   date: 17            



   5:00:00 CDT,            



   Duration: 1            



   doses or times,            



   Stop date:            



   17            



   23:00:00 CDT,            



   ABX Indication:            



   Surgical            



   ProphylaxisNote            



   s: Same as:            



   Ancef            



               

 

 Tacrolimus  2 mg, PO, QPM    Active       [place]



               Medical



               Center



               



               

 

 Sucralfate 100  10 mL, Route:    No Longer       [place]



 MG/ML Oral  PO, Drug form:    Active      2017  Medical



 Suspension  TAB, Q6H,            Center



   Dosing Weight            



   136.136, kg,            



   Start date:            



   17            



   18:00:00 CST,            



   Duration: 30            



   day, Stop date:            



   17            



   12:00:00            



   CSTNotes: May            



   interfere            



   w/enteral feeds            



   -  Take 1 hr            



   before or 2 hr            



   after antacids,            



   dairy pdt,            



   meals &            



   minerals -  On            



   empty stomach.            



   For patients            



   unable to            



   swallow tablet,            



   dissolve in            



   10mL - 30mL of            



   water or juice            



   and stir before            



   giving.  (Same            



   As: Carafate)            



               



               

 

 Protonix  40 mg, 1 tab,    Inactive       [place]



   Route: PO, Drug            Medical



   form: ECTAB,            [place]



   Daily, Dosing            



   Weight 136.136,            



   kg, Start date:            



   17            



   9:00:00 CST,            



   Stop date:            



   17            



   9:00:00            



   CSTNotes:            



   Tablet should            



   not be chewed            



   or crushed.            



   (Same as:            



   Protonix)            



               



               

 

 Cartia XT  240 mg, 1 cap,    Inactive        [facility]



   Route: PO, Drug            Medical



   form: ERCAP,            [place]



   Daily, Dosing            



   Weight 136.136,            



   kg, Start date:            



   17            



   9:00:00 CST,            



   Duration: 30            



   day, Stop date:            



   17            



   9:00:00            



   CSTNotes: (Same            



   as: Cardizem            



   CD)  Before            



   meals.  DO NOT            



   CRUSH.            



               

 

 Prednisone  10 mg, 1 tab,    Inactive        [facility]



   Route: PO, Drug            Medical



   form: TAB,            Center



   Daily, Dosing            



   Weight 136.136,            



   kg, Start date:            



   17            



   9:00:00 CST,            



   Duration: 30            



   day, Stop date:            



   17            



   9:00:00            



   CSTNotes: (Same            



   as: PredniSONE)            



    Take with            



   food.            



               

 

 Acetaminophen  1,000 mg=31.23    Active       [place]



   mL, PO, Q6Hnow,          2017  Medical



   0 Refill(s)            [place]



               



               

 

 gabapentin 50  300 mg=6 mL,    Active         [place]



 MG/ML Oral  PO, Q8Hnow, #          2017  Medical



 Solution  378 mL, 0            Center



   Refill(s)            



               



               

 

 tramadol  50 mg=1 tab,    Active       [place]



 hydrochloride 50  PO, Q6Hnow, PRN          2017  Medical



 MG Oral Tablet  Pain Score            Center



   6-10, X 14 day,            



   # 56 tab, 0            



   Refill(s)            



               



               

 

 Enoxaparin  30 mg, 0.3 mL,    Inactive       [place]



   Route: SUB-Q,          2017  Medical



   Drug form: INJ,            Center



   [id number],            



   Dosing Weight            



   136.136, kg,            



   Start date:            



   17            



   6:00:00 CST,            



   Duration: 30            



   day, Stop date:            



   17            



   18:00:00            



   CSTNotes: (Same            



   as: Lovenox)            



               



               

 

 Solu-CORTEF  100 mg, 2 mL,    Inactive       [place]



   Route: IVP,            Medical



   Drug form:            [place]



   PDR/INJ, ONCE,            



   Start date:            



   17            



   22:00:00 CST,            



   Stop date:            



   17            



   22:00:00            



   CSTNotes: (Same            



   as:            



   Solu-CORTEF)            



               



               

 

 Prograf  2.5 mg, 5 cap,    No Longer       [place]



   Route: PO, Drug    Active        Medical



   form: CAP,            Center



   [id number], Dosing            



   Weight 136.136,            



   kg, Start date:            



   17            



   21:26:00 CST,            



   Duration: 30            



   day, Stop date:            



   17            



   20:00:00            



   CSTNotes: Avoid            



   grapefruit and            



   grapefruit            



   juice. (Same            



   As: Prograf)            



               



               

 

 hydrocortisone 100  100 mg, Route:    Inactive         [place]



 mg injection  IV, Q6H, Dosing            Medical



   Weight 136.136,            Center



   kg, Start date:            



   17            



   21:00:00 CST,            



   Duration: 30            



   day, Stop date:            



   17            



   18:00:00 CST            



               



               

 

 Ondansetron  4 mg, 2 mL,    No Longer       [place]



   Route: IVP,    Active        Medical



   Drug form: INJ,            Center



   Q6H, Dosing            



   Weight 136.136,            



   kg, Start date:            



   17            



   18:00:00 CST,            



   Duration: 30            



   day, Stop date:            



   17            



   12:00:00            



   CSTNotes: (Same            



   as: Zofran)            



   *** MEDICATION            



   WASTE ***            



   Product Size:            



   4 mg Product            



   Wasted:  ___ mg            



               



               

 

 Acetaminophen  1,000 mg,    Inactive       [place]



   Route: IV,          2017  Medical



   ONCE, Dosing            Center



   Weight 136.136,            



   kg, Start date:            



   17            



   17:36:00 CST,            



   Stop date:            



   17            



   17:36:00 CST            



               



               

 

 Docusate  100 mg, 10 mL,    No Longer         [place]



   Route: PO, Drug    Active        Medical



   form: LIQ, BID,            Center



   Dosing Weight            



   136.136, kg,            



   Start date:            



   17            



   17:00:00 CST,            



   Stop date:            



   17            



   9:00:00            



   CSTNotes: (Same            



   as: Colace)            



               



               

 

 Cellcept  1,000 mg, 2    No Longer         [place]



   tab, Route: PO,    Active      2017  Medical



   Drug form: TAB,            Center



   [id number], Dosing            



   Weight 136.136,            



   kg, Start date:            



   17            



   17:00:00 CST,            



   Duration: 30            



   day, Stop date:            



   17            



   8:00:00            



   CSTNotes:            



   SEPARATE            



   ANTACIDS from            



   Cellcept by 2            



   hrs. (Same As:            



   CellCept)            



               



               

 

 Hydralazine  100 mg, 2 tab,    No Longer         [place]



 Hydrochloride 100  Route: PO, Drug    Active      2017  Medical



 MG Oral Tablet  form: TAB, TID,            Center



   Dosing Weight            



   136.136, kg,            



   Start date:            



   17            



   17:00:00 CST,            



   Duration: 30            



   day, Stop date:            



   17            



   13:00:00            



   CSTNotes: (Same            



   as: Apresoline)            



    May interfere            



   w/enteral            



   feedings  Take            



   With Food            



               



               

 

 Al hydroxide/Mg  30 mL, Route:    No Longer       [place]



 hydroxide/simethic  PO, Drug Form:    Active      2017  Medical



 one 200 mg-200  SUSP, Dosing            Center



 mg-20 mg/5 mL oral  Weight 136.136,            



 suspension  kg, Q4H, PRN            



   Indigestion,            



   Start date:            



   17            



   15:00:00 CST,            



   Duration: 30            



   day, Stop date:            



   17            



   14:59:00            



   CSTNotes:            



   (aluminum            



   hydroxide-magne            



   sium            



   hyd-simethicone            



   [id number]/5m            



   l 30 ml ud TINY)            



               



               

 

 Ondansetron  4 mg, 2 mL,    No Longer       [place]



   Route: IVP,    Active        Medical



   Drug form: INJ,            Center



   Q6H, Dosing            



   Weight 136.136,            



   kg, PRN Nausea            



   & Vomiting,            



   Start date:            



   17            



   15:00:00 CST,            



   Duration: 30            



   day, Stop date:            



   17            



   14:59:00            



   CSTNotes: (Same            



   as: Zofran)            



   *** MEDICATION            



   WASTE ***            



   Product Size:            



   4 mg Product            



   Wasted:  ___ mg            



               



               

 

 Hydromorphone  0.5 mg, 0.25    No Longer       [place]



   mL, Route: IVP,    Active        Medical



   Drug form: INJ,            Center



   Q4H, Dosing            



   Weight 136.136,            



   kg, PRN Pain            



   Score 7-10,            



   Start date:            



   17            



   15:00:00 CST,            



   Duration: 30            



   day, Stop date:            



   17            



   14:59:00            



   CSTNotes: Same            



   as Dilaudid            



               



               

 

 Oxycodone  10 mg, 10 mL,    No Longer       [place]



 Hydrochloride 5 MG  Route: PO, Drug    Active        Medical



 Oral Tablet  form: SOLN,            Center



   Q4H, Dosing            



   Weight 136.136,            



   kg, PRN Pain            



   Score 7-10,            



   Start date:            



   17            



   15:00:00 CST,            



   Stop date:            



   17            



   14:59:00            



   CSTNotes: (Same            



   as:'Roxicodone)            



   To be drawn up            



   in 3 mL syr            



               



               

 

 gabapentin  300 mg, Route:    Inactive       [place]



   PO, Q8Hnow,            Medical



   Dosing Weight            Center



   136.136, kg,            



   Start date:            



   17            



   15:00:00 CST,            



   Duration: 30            



   day, Stop date:            



   17            



   7:00:00 CST            



               



               

 

 Tramadol  100 mg, 2 tab,    No Longer       [place]



   Route: PO, Drug    Active        Medical



   form: TAB,            Center



   Q6Hnow, Dosing            



   Weight 136.136,            



   kg, Start date:            



   17            



   15:00:00 CST,            



   Duration: 30            



   day, Stop date:            



   17            



   9:00:00            



   CSTNotes: Not            



   to exceed            



   400mg/day.            



   (Same As:            



   Ultram)            



               

 

 Acetaminophen  1,000 mg,    Inactive       [place]



   Route: IVPB,          2017  Medical



   Q6Hnow, Dosing            Center



   Weight 136.136,            



   kg, Start date:            



   17            



   15:00:00 CST,            



   Duration: 30            



   day, Stop date:            



   17            



   9:00:00 CST            



               



               

 

 Al hydroxide/Mg  30 ml, Route:    Inactive       [place]



 hydroxide/simethic  PO, Drug Form:          2017  Medical



 one 200 mg-200  SUSP, Dosing            Center



 mg-20 mg/5 mL oral  Weight 136.136,            



 suspension  kg, Q6H, PRN            



   Indigestion,            



   Start date:            



   17            



   14:54:00 CST,            



   Duration: 30            



   day, Stop date:            



   17            



   14:53:00 CST            



               



               

 

 Calcium Chloride  1,000 mL, Rate:    No Longer       [place]



 0.0014 MEQ/ML /  125 ml/hr,    Active        Medical



 Potassium Chloride  Infuse over: 8            Center



 0.004 MEQ/ML /  hr, Route: IV,            



 Sodium Chloride  Dosing Weight            



 0.103 MEQ/ML /  136.136 kg,            



 Sodium Lactate  Total Volume:            



 0.028 MEQ/ML  1,000, Start            



 Injectable  date: 17            



 Solution  14:54:00 CST,            



   Duration: 30            



   day, Stop date:            



   17            



   14:53:00 CST            



               



               

 

 Cefoxitin  1 gm, Route:    Inactive       [place]



   IVPB, ONCE,          2017  Medical



   Dosing Weight            Center



   136.136, kg,            



   Start date:            



   17            



   14:10:00 CST,            



   Stop date:            



   17            



   14:10:00 CST            



               



               

 

 gabapentin  300 mg, 6 mL,    No Longer        [facility]



   Route: PO, Drug    Active        Medical



   form: SOLN,            Center



   Q8Hnow, Dosing            



   Weight 136.136,            



   kg, Start date:            



   17            



   13:00:00 CST,            



   Stop date:            



   17            



   5:00:00            



   CSTNotes: (Same            



   as: Neurontin)            



               



               

 

 Acetaminophen  1,000 mg, 31.23    No Longer       [place]



   mL, Route: PO,    Active        Medical



   Drug form: LIQ,            Center



   Q6Hnow, Dosing            



   Weight 136.136,            



   kg, Start date:            



   17            



   13:00:00 CST,            



   Stop date:            



   17            



   7:00:00            



   CSTNotes: Max            



   acetaminophen=4            



   000mg/day (4            



   gm/day).  (Same            



   as: Tylenol)            



               



               

 

 Promethazine  12.5 mg, 0.5    No Longer       [place]



   mL, Route: IM,    Active        Medical



   Drug form: INJ,            Center



   Q6H, Dosing            



   Weight 136.136,            



   kg, PRN Nausea            



   & Vomiting,            



   Start date:            



   17            



   12:26:00 CST,            



   Duration: 30            



   day, Stop date:            



   17            



   12:25:00            



   CSTNotes: Do            



   not give IV            



   push.  (Same            



   as: Phenergan)            



               



               

 

 Promethazine  12.5 mg, Route:    Inactive       [place]



   PO, Q6H, Dosing            Medical



   Weight 136.136,            Center



   kg, PRN Nausea            



   & Vomiting,            



   Start date:            



   17            



   12:24:00 CST,            



   Duration: 30            



   day, Stop date:            



   17            



   12:23:00 CST            



               



               

 

 Tramadol  50 mg, 1 tab,    No Longer       [place]



   Route: PO, Drug    Active        Medical



   form: TAB,            Center



   Q6Hnow, Dosing            



   Weight 136.136,            



   kg, PRN Pain            



   Score 6-10,            



   Start date:            



   17            



   12:24:00 CST,            



   Duration: 30            



   day, Stop date:            



   17            



   12:23:00            



   CSTNotes: Not            



   to exceed            



   400mg/day.            



   (Same As:            



   Ultram)            



               

 

 bupivacaine  20 mL, Route:    No Longer       [place]



 liposome  InFILtration(lo    Active        Medical



   lizabeth), Drug            Center



   Form: INJ,            



   ONCALL, Start            



   date: 17            



   12:00:00 CST,            



   Duration: 1            



   day, Stop date:            



   17            



   11:59:00            



   CSTNotes: (Same            



   as: Exparel)            



   *** NOT FOR IV            



   use****            



   Postoperative            



   analgesia:            



   Infiltration            



   (local): Dose            



   is based on            



   surgical site            



   and volume            



   required to            



   cover the area            



   (in general,            



   the maximum            



   total dose is            



   266 mg).            



   Bunionectomy: 7            



   mL into the            



   tissues            



   surrounding the            



   osteotomy and 1            



   mL into the            



   subcutaneous            



   tissue of the            



   surgical site            



   (total dose=8            



   mL [106 mg])            



   Hemorrhoidectom            



   y: 30 mL (20 mL            



   vial diluted            



   with 10 mL NS)            



   divided and            



   administered as            



   6 injections of            



   5 mL each            



   (total dose=30            



   mL [266 mg])            



               



               

 

 Lovenox  40 mg, 0.4 mL,    Inactive        [facility]



   Route: SUB-Q,            Medical



   Drug form: INJ,            Center



   ONCALL, Start            



   date: 17            



   11:00:00 CST,            



   Duration: 1            



   day, Stop date:            



   17            



   10:59:00            



   CSTNotes: (Same            



   as: Lovenox)            



               



               

 

 scopolamine  1 patch, Route:    Inactive      /  [facility] [place]



   TOP, Drug form:          2017  Medical



   ERFILM, PRE OP,            Center



   Start date:            



   17            



   6:00:00 CST,            



   Duration: 1            



   day, Stop date:            



   17            



   5:59:00            



   CSTNotes:            



   Change patch            



   every 72 hours            



    (Same as:            



   Transderm-Scop)            



               



               

 

 heparin  5,000 unit, 1    Inactive      [facility]



   mL, Route:          2017  Medical



   SUB-Q, Drug            Center



   form: INJ, PRE            



   OP, Start date:            



   17            



   6:00:00 CST,            



   Duration: 1            



   day, Stop date:            



   17            



   5:59:00            



   CSTNotes:            



   porcine heparin            



               



               

 

 Lovenox  40 mg, 0.4 mL,    Inactive      [facility]



   Route: SUB-Q,            Medical



   Drug form: INJ,            [place]



   [facility]            



   date: 17            



   6:00:00 CST,            



   Duration: 1            



   day, Stop date:            



   17            



   5:59:00            



   CSTNotes: (Same            



   as: Lovenox)            



               



               

 

 Mefoxin  2 gm, Route:    Inactive      [facility]



   IVPB, Drug            Medical



   form: INJ, PRE            Center



   OP, Priority:            



   STAT, Start            



   date: 17            



   5:37:00 CST,            



   Duration: 1            



   day, Stop date:            



   17            



   5:36:00            



   CSTNotes: (Same            



   As: Mefoxin)            



   *** MEDICATION            



   WASTE ***            



   Product Size:            



   2000 mg Product            



   Wasted:  ___ mg            



               



               

 

 Ofirmev  1,000 mg, 100    Inactive        [facility]



   mL, Route: IV,          2017  Medical



   Drug form: INJ,            Center



   PRE OP,            



   Priority: STAT,            



   Start date:            



   17            



   5:36:00 CST,            



   Duration: 1            



   day, Stop date:            



   17            



   5:35:00            



   CSTNotes:            



   Infuse over 15            



   minutes  Do not            



   exceed 4gm/day            



   of            



   acetaminophen            



    *** MEDICATION            



   WASTE ***            



   Product Size:            



   1000 mg Product            



   Wasted:  ___ mg            



               



               

 

 Ofirmev  1,000 mg, 100    Inactive       [place]



   mL, Route: IV,          2017  Medical



   Drug form: INJ,            Center



   PRE OP,            



   Priority: STAT,            



   Start date:            



   17            



   5:35:00 CST,            



   Duration: 1            



   day, Stop date:            



   17            



   5:34:00            



   CSTNotes:            



   Infuse over 15            



   minutes  Do not            



   exceed 4gm/day            



   of            



   acetaminophen            



    *** MEDICATION            



   WASTE ***            



   Product Size:            



   1000 mg Product            



   Wasted:  ___ mg            



               



               

 

 Pravastatin Sodium  40 mg=1 tab,    Active        [facility] [place]



 40 MG Oral Tablet  PO, Bedtime, #          2017  Medical



 [Pravachol]  30 tab, 0            Center



   Refill(s)            



               



               

 

 Hydralazine  100 mg=1 tab,    Active       [place]



 Hydrochloride 100  PO, TID, # 90            Medical



 MG Oral Tablet  tab, 3            Center



   Refill(s)            



               



               

 

 Hydralazine  0 Refill(s)    No Longer        [facility] [place]



       Active      2017  Medical



               [place]



               



               

 

 mycophenolate  1,000 mg=2 tab,    Active        [facility] [place]



 mofetil 500 MG  PO, BID, # 120          2017  Medical



 Oral Tablet  tab, 0            Center



 [Cellcept]  Refill(s)            



               



               

 

 pantoprazole 40 MG  40 mg=1 tab,    Active        [facility]



 Enteric Coated  PO, BID, # 30          2017  Medical



 Tablet [Protonix]  tab, 0            Center



   Refill(s)            



               



               

 

 24 HR Diltiazem  240 mg=1 cap,    Active        [facility] [place]



 Hydrochloride 240  PO, Daily, # 30          2017  Medical



 MG Extended  cap, 0            Center



 Release Capsule  Refill(s)            



 [Cartia]              



               

 

 magnesium oxide  400 mg=1 tab,    Active        [facility] [place]



 400 mg oral tablet  PO, Daily, 0          2017  Medical



   Refill(s)            Center



               



               

 

 calcium carbonate  CHEW, BID, 0    No Longer       [place]



 1000 mg oral  Refill(s)    Active      2017  Medical



 tablet, chewable              [place]



               



               

 

 predniSONE 10 mg  10 mg=1 tab,    Active        [facility]



 oral tablet  PO, Daily, # 30          2017  Medical



   tab, 3            Center



   Refill(s)            



               



               

 

 Prograf  2.5 mg, PO,    Active       [place]



   Q12H, 0            Medical



   Refill(s)            Center



               



               

 

 Sulfamethoxazole  1 tab, PO,    No Longer       [place]



 800 MG /  M-W-F, 0    Active        Medical



 Trimethoprim 160  Refill(s)            Center



 MG Oral Tablet              



 [Bactrim]              



               







Allergies, Adverse Reactions, Alerts







 Substance  Category  Reaction  Severity  Reaction  Status  Date  Comments  
Source



         type    Reported    



                 



                 







Immunizations







 Immunization  Date Given  Site  Status  Last Updated  Comments  Source



             



             







Results







 Order Name  Results  Value  Reference  Date  Interpretation  Comments  Source



       Range        



               



               



               

 

 ELECTROLYTE  AGAP  15.3 meq/L  10.0 -        [facility]



 S      20.0        [facility]



               



               



               

 

 ELECTROLYTE  eGFR  91        Result Comment: The eGFR is calculated using 
the CKD-EPI formula. In most young, healthy individuals the eGFR will be >90 mL/
min/1.73m2. The eGFR declines with age. An eGFR of 60-89 may be normal in  [facility] 
[place]



 S    mL/min/1.73        some populations, particularly the elderly, for 
whom the CKD-EPI formula has not been extensively validated. Use of the eGFR is 
not recommended in the following populations:  [address]



             Individuals with unstable creatinine concentrations, including 
pregnant patients and those with serious co-morbid conditions.  



               



             Patients with extremes in muscle mass or diet.  



               



             The data above are obtained from the National Kidney Disease 
Education Program (NKDEP) which additionally recommends that when the eGFR is 
used in patients with extremes of body mass index for purposes  



             of drug dosing, the eGFR should be multiplied by the estimated 
BMI.  

 

 ELECTROLYTE  BUN  13 mg/dL  7 - 22        [facility]



 S        /[address] [place] [address]



               



               



               

 

 ELECTROLYTE  Creatinine  0.98 mg/dL  0.50 -        [facility]  Lvl    1.40        [facility]



               



               



               

 

 ELECTROLYTE  Sodium Lvl  144 meq/L  135 - 145        [facility]              [facility]



               



               



               

 

 ELECTROLYTE  Potassium  4.3 meq/L  3.5 - 5.1        [facility]      /      [facility]



               



               



               

 

 ELECTROLYTE  Chloride Lvl  105 meq/L  95 - 109        [facility]        [address] [place] [address]



               



               



               

 

 ELECTROLYTE  Calcium Lvl  9.1 mg/dL  8.5 - 10.5        [facility]              [facility]



               



               



               

 

 ELECTROLYTE  CO2  28 meq/L  24 - 32        [facility]        [address] [place] [address]



               



               



               

 

 ELECTROLYTE  Glucose Lvl  77 mg/dL  70 - 99        [facility]      [facility]



               



               



               

 

 HEMATOLOGY  Lymphocytes  0.8 K/CMM  1.0 - 5.5         [place]



   #      /      [facility]



               



               



               

 

 HEMATOLOGY  Segs-Bands #  2.8 K/CMM  1.5 - 8.1         [place]



         /2017      [facility]



               



               



               

 

 HEMATOLOGY  Monocytes #  0.4 K/CMM  0.0 - 0.8         [place]



         /2017      [facility]



               



               



               

 

 HEMATOLOGY  Eosinophils  0.1 K/CMM  0.0 - 0.5         [place]



   #            [facility]



               



               



               

 

 HEMATOLOGY  Eosinophils  2.4 %  0.0 - 4.0         [place]



         /2017      [facility]



               



               



               

 

 HEMATOLOGY  Basophils  0.7 %  0.0 - 1.0         [place]



         /2017      [facility]



               



               



               

 

 HEMATOLOGY  Monocytes  10.7 %  2.0 - 12.0         [place]



         /2017      [facility]



               



               



               

 

 HEMATOLOGY  Lymphocytes  19.7 %  20.0 -         [place]



       40.0        [facility]



               



               



               

 

 HEMATOLOGY  Segs  66.5 %  45.0 -         [place]



       75.0        [facility]



               



               



               

 

 HEMATOLOGY  MPV  8.5 fL  7.4 - 10.4         [place]



         /2017      [facility]



               



               



               

 

 HEMATOLOGY  Platelet  133 K/CMM  133 - 450         [place]



         /2017      [facility]



               



               



               

 

 HEMATOLOGY  RDW  13.9 %  11.5 -         [place]



       14.5        [facility]



               



               



               

 

 HEMATOLOGY  MCHC  33.4 g/dL  32.0 -         [place]



       36.0        [facility]



               



               



               

 

 HEMATOLOGY  MCH  28.7 pg  27.0 -         [place]



       31.0        [facility]



               



               



               

 

 HEMATOLOGY  MCV  85.8 fL  80.0 -        [facility]



       94.0        [facility]



               



               



               

 

 HEMATOLOGY  Hct  44.0 %  42.0 -         [place]



       54.0        [facility]



               



               



               

 

 HEMATOLOGY  Hgb  14.7 g/dL  14.0 -         [place]



       18.0        [facility]



               



               



               

 

 HEMATOLOGY  RBC  5.13 M/CMM  4.70 -         [place]



       6.10        [facility]



               



               



               

 

 HEMATOLOGY  WBC  4.1 K/CMM  3.7 - 10.4         [place]



         /2017      [facility]



               



               



               

 

 CHEM PANEL  Phosphorus  3.6 mg/dL  2.5 - 4.5         [place]



         /2017      [facility]



               



               



               

 

 CHEM PANEL  Magnesium  2.3 mg/dL  1.8 - 2.4        [facility]



   Lvl            [facility]



               



               



               

 

 ELECTROLYTE  AGAP  12.6 meq/L  10.0 -  01/26      [facility]



 S      20.0        [facility]



               



               



               

 

 ELECTROLYTE  eGFR  55        Result Comment: The eGFR is calculated using 
the CKD-EPI formula. In most young, healthy individuals the eGFR will be >90 mL/
min/1.73m2. The eGFR declines with age. An eGFR of 60-89 may be normal in  [facility] 
[place]



 S    mL/min/1.73    /    some populations, particularly the elderly, for 
whom the CKD-EPI formula has not been extensively validated. Use of the eGFR is 
not recommended in the following populations:  [address]



             Individuals with unstable creatinine concentrations, including 
pregnant patients and those with serious co-morbid conditions.  



               



             Patients with extremes in muscle mass or diet.  



               



             The data above are obtained from the National Kidney Disease 
Education Program (NKDEP) which additionally recommends that when the eGFR is 
used in patients with extremes of body mass index for purposes  



             of drug dosing, the eGFR should be multiplied by the estimated 
BMI.  

 

 ELECTROLYTE  Calcium Lvl  8.6 mg/dL  8.5 - 10.5        [address]



               



               



               

 

 ELECTROLYTE  CO2  23 meq/L  24 - 32        [address]



               



               



               

 

 ELECTROLYTE  Chloride Lvl  102 meq/L  95 - 109        [address]



               



               



               

 

 ELECTROLYTE  Potassium  4.6 meq/L  3.5 - 5.1        [facility]            [facility]



               



               



               

 

 ELECTROLYTE  Sodium Lvl  133 meq/L  135 - 145        [address]



               



               



               

 

 ELECTROLYTE  Creatinine  1.49 mg/dL  0.50 -        [facility]  Lvl    1.40        [facility]



               



               



               

 

 ELECTROLYTE  BUN  26 mg/dL  7 - 22        [address]



               



               



               

 

 ELECTROLYTE  Glucose Lvl  151 mg/dL  70 - 99        [address]



               



               



               

 

 HEMATOLOGY  Lymphocytes  0.6 K/CMM  1.0 - 5.5        [facility]      [facility]



               



               



               

 

 HEMATOLOGY  Monocytes #  0.5 K/CMM  0.0 - 0.8        [address]



               



               



               

 

 HEMATOLOGY  Segs-Bands #  8.3 K/CMM  1.5 - 8.1        [address]



               



               



               

 

 HEMATOLOGY  Monocytes  5.1 %  2.0 - 12.0        [address]



               



               



               

 

 HEMATOLOGY  Segs  88.1 %  45.0 -        [facility]



       75.0        [facility]



               



               



               

 

 HEMATOLOGY  Lymphocytes  6.6 %  20.0 -         [place]



       40.0        [facility]



               



               



               

 

 HEMATOLOGY  Basophils  0.2 %  0.0 - 1.0         [place]



         /2017      [facility]



               



               



               

 

 HEMATOLOGY  MCH  28.5 pg  27.0 -         [place]



       31.0        [facility]



               



               



               

 

 HEMATOLOGY  Platelet  158 K/CMM  133 - 450         [place]



         /2017      [facility]



               



               



               

 

 HEMATOLOGY  MCHC  33.6 g/dL  32.0 -         [place]



       36.0        [facility]



               



               



               

 

 HEMATOLOGY  RDW  14.1 %  11.5 -        [facility]



       14.      [facility]



               



               



               

 

 HEMATOLOGY  MCV  84.9 fL  80.0 -         [place]



       94.0        [facility]



               



               



               

 

 HEMATOLOGY  RBC  5.28 M/CMM  4.70 -        [facility]



       6.10        [facility]



               



               



               

 

 HEMATOLOGY  Hgb  15.0 g/dL  14.0 -        [facility]



       18.0        [facility]



               



               



               

 

 HEMATOLOGY  WBC  9.4 K/CMM  3.7 - 10.4         [place]



         /2017      [facility]



               



               



               

 

 HEMATOLOGY  Hct  44.8 %  42.0 -        [facility]



       54.0        [facility]



               



               



               

 

 HEMATOLOGY  MPV  8.2 fL  7.4 - 10.4         [place]



         /2017      [facility]



               



               



               

 

 PARATHYROID  Ca Ion WB  1.08 mMol/L  1.05 -        [facility]



 PROFILE      1.      [facility]



               



               



               

 

 PARATHYROID  Ca Norm WB  1.05 mMol/L  1. -        [facility]



 PROFILE      .      [facility]



               



               



               

 

 Upper GI  Upper GI  INDICATION: Status post gastric sleeve procedure.    
    -  [facility]



 Series w  Series     -  Medical



 water  water            Center



 soluble DX  soluble DX            



     PROCEDURE: A  view was obtained and images were made after the 
patient swallowed 20 mL of water-soluble contrast. A 2nd group of images were 
made after the patient swallowed a 2nd bolus of 20 mL of        Read by:  
Barrington Victor MD  



      water-soluble contrast. Right and left anterior oblique images were also 
made.        Dictated Date/time:  17 11:24  



             Electronically Signed by:  Barrington Victor MD                  
   17 11:50  



             FINAL REPORT  



               



     FINDINGS: The  view demonstrates 3 sternal wires. The bowel gas 
pattern is nonobstructive. There are streaky, linear densities in both lungs, 
left greater than right. A focal rounded density is pro          



     jected over the upper liver silhouette between the 90 10th posterior ribs.
          



               



               



               



     The initial AP film after 20 mL of contrast shows retention of contrast 
within the distal esophagus and minimal contrast within the gastric lumen. The 
images made after the 2nd 20 mL bolus shows that th          



     ere still some retained contrast within the esophagus. Contrast has passed 
through the stomach and is present within the 3rd portion of the duodenum. 
Oblique views demonstrate the same type findings. Th          



     ere was less contrast in the distal esophagus on the last image. There was 
no evidence for extravasation of contrast outside the gastrointestinal tract.  
        



               



               



               



     IMPRESSION:          



               



     1. Findings consistent with gastric sleeve procedure. There is no evidence 
for extravasation.          



               



               



               



               

 

 BLOOD BANK  Antibody  Negative          [facility]



 RESULTS  Scr      [facility]



     [facility]



               



               



               

 

 BLOOD BANK  ABO/Rh  O NEG          [facility]              [facility]



               



               



               

 

 CHEM PANEL  A/G Ratio  1.6  0.7 - 1.6         [place]



         /2017      [facility]



               



               



               

 

 CHEM PANEL  Globulin  2.6 g/dL  2.7 - 4.2        [facility]



               [facility]



               



               



               

 

 CHEM PANEL  B/C Ratio  20  6 - 25        [facility]      [facility]



               



               



               

 

 CHEM PANEL  AGAP  15.4 meq/L  10.0 -        [facility] [facility]



       20.0        [facility]



               



               



               

 

 CHEM PANEL  eGFR  67        Result Comment: The eGFR is calculated using 
the CKD-EPI formula. In most young, healthy individuals the eGFR will be >90 mL/
min/1.73m2. The eGFR declines with age. An eGFR of 60-89 may be normal in  [facility] 
[place]



     mL/min/1.73        some populations, particularly the elderly, for 
whom the CKD-EPI formula has not been extensively validated. Use of the eGFR is 
not recommended in the following populations:  [address]



             Individuals with unstable creatinine concentrations, including 
pregnant patients and those with serious co-morbid conditions.  



               



             Patients with extremes in muscle mass or diet.  



               



             The data above are obtained from the National Kidney Disease 
Education Program (NKDEP) which additionally recommends that when the eGFR is 
used in patients with extremes of body mass index for purposes  



             of drug dosing, the eGFR should be multiplied by the estimated 
BMI.  

 

 CHEM PANEL  CO2  24 meq/L  24 - 32        [facility]



               [facility]



               



               



               

 

 CHEM PANEL  Calcium Lvl  8.8 mg/dL  8.5 - 10.5         [place]



         /2017      [facility]



               



               



               

 

 CHEM PANEL  Total  6.8 g/dL  6.4 - 8.4         [place]



   Protein            [facility]



               



               



               

 

 CHEM PANEL  ALT  25 unit/L  0 - 65         [place]



         /2017      [facility]



               



               



               

 

 CHEM PANEL  Albumin Lvl  4.2 g/dL  3.5 - 5.0         [place]



         /2017      [facility]



               



               



               

 

 CHEM PANEL  AST  12 unit/L  0 - 37         [place]



         /2017      [facility]



               



               



               

 

 CHEM PANEL  Alk Phos  83 unit/L  39 - 136         [place]



         /2017      [facility]



               



               



               

 

 CHEM PANEL  Chloride Lvl  106 meq/L  95 - 109         [place]



         /2017      [facility]



               



               



               

 

 CHEM PANEL  Bili Total  0.5 mg/dL  0.2 - 1.3         [place]



         /2017      [facility]



               



               



               

 

 CHEM PANEL  Potassium  4.4 meq/L  3.5 - 5.1        [facility]            [facility]



               



               



               

 

 CHEM PANEL  Sodium Lvl  141 meq/L  135 - 145         [place]



         /2017      [facility]



               



               



               

 

 CHEM PANEL  Creatinine  1.26 mg/dL  0.50 -        [facility]



   Lvl    1.40        [facility]



               



               



               

 

 CHEM PANEL  BUN  25 mg/dL  7 - 22         [place]



         /2017      [facility]



               



               



               

 

 CHEM PANEL  Glucose Lvl  91 mg/dL  70 - 99         [place]



         /2017      [facility]



               



               



               

 

 HEMATOLOGY  RDW  14.2 %  11.5 -         [place]



       14.      [facility]



               



               



               

 

 HEMATOLOGY  Hct  45.7 %  42.0 -         [place]



       54.0        [facility]



               



               



               

 

 HEMATOLOGY  Platelet  149 K/CMM  133 - 450         [place]



         /2017      [facility]



               



               



               

 

 HEMATOLOGY  MPV  8.4 fL  7.4 - 10.4         [place]



         /2017      [facility]



               



               



               

 

 HEMATOLOGY  MCV  82.9 fL  80.0 -         [place]



       94.0        [facility]



               



               



               

 

 HEMATOLOGY  MCHC  34.2 g/dL  32.0 -         [place]



       36.0        [facility]



               



               



               

 

 HEMATOLOGY  MCH  28.3 pg  27.0 -         [place]



       31.0        [facility]



               



               



               

 

 HEMATOLOGY  WBC  6.1 K/CMM  3.7 - 10.4         [place]



         /2017      [facility]



               



               



               

 

 HEMATOLOGY  Hgb  15.6 g/dL  14.0 -         [place]



       18.0        [facility]



               



               



               

 

 HEMATOLOGY  RBC  5.52 M/CMM  4.70 -         [place]



       6.10        [facility]



               



               



               

 

 HEMATOLOGY  Basophils  0.7 %  0.0 - 1.0        [facility]



               [facility]



               



               



               

 

 HEMATOLOGY  Segs-Bands #  4.4 K/CMM  1.5 - 8.1        [facility]



               [facility]



               



               



               

 

 HEMATOLOGY  Monocytes #  0.6 K/CMM  0.0 - 0.8         [place]



         /2017      [facility]



               



               



               

 

 HEMATOLOGY  Lymphocytes  1.1 K/CMM  1.0 - 5.5        [facility]



   #      /2017      [facility]



               



               



               

 

 HEMATOLOGY  Eosinophils  0.1 K/CMM  0.0 - 0.5        [facility]



         [facility]



               



               



               

 

 HEMATOLOGY  Eosinophils  0.9 %  0.0 - 4.0        [facility]



               [facility]



               



               



               

 

 HEMATOLOGY  Lymphocytes  17.6 %  20.0 -         [place]



       40.0        [facility]



               



               



               

 

 HEMATOLOGY  Monocytes  9.3 %  2.0 - 12.0         [place]



         /2017      [facility]



               



               



               

 

 HEMATOLOGY  Segs  71.5 %  45.0 -         [place]



       75.0        [facility]



               



               



               

 

 SPECIAL  Hgb A1C  5.3 %  <=5.6 %        [facility]



 CHEMISTRY              [facility]



               



               



               







Vital Signs







 Vital Sign  Value  Date  Comments  Source



         

 

 Systolic (mm Hg)  133  2017    [facility]



         

 

 Diastolic (mm Hg)  75  2017    [facility]



         

 

 Respitory Rate  16  2017    [facility]



         

 

 Respitory Rate  14  2017    [facility]



         

 

 Systolic (mm Hg)  136  2017    [facility]



         

 

 Diastolic (mm Hg)  79  2017    [facility]



         

 

 Respitory Rate  12  2017    [facility]



         

 

 Systolic (mm Hg)  143  2017    [facility]



         

 

 Diastolic (mm Hg)  85  2017    [facility]



         

 

 Heart Rate  83  2017    [facility]



         

 

 Weight  102.273  2017    [facility]



         

 

 BMI Calculated  27.45  2017    [facility]



         

 

 Height  193.04 cm  2017    [facility]



         

 

 Temperature Oral (F)  98.0 F  2017    [facility]



         

 

 Systolic (mm Hg)  125  2017    [facility]



         

 

 Diastolic (mm Hg)  87  2017    [facility]



         

 

 Respitory Rate  19  2017    [facility]



         

 

 Heart Rate  97  2017    [facility]



         

 

 Heart Rate  98  2017    [facility]



         

 

 Respitory Rate  20  2017    [facility]



         

 

 Temperature Oral (F)  97.6 F  2017    [facility]



         

 

 Systolic (mm Hg)  147  2017    [facility]



         

 

 Diastolic (mm Hg)  95  2017    [facility]



         

 

 Respitory Rate  20  2017    [facility]



         

 

 Heart Rate  98  2017    [facility]



         

 

 Systolic (mm Hg)  144  2017    [facility]



         

 

 Diastolic (mm Hg)  97  2017    [facility]



         

 

 Temperature Oral (F)  98.3 F  2017    [facility]



         

 

 Weight  136.136  2017    [facility]



         

 

 Height  193.04 cm  2017    [facility]



         

 

 BMI Calculated  36.53  2017    [facility]



         

 

 Weight  136.136  2017    [facility]



         

 

 Height  193.04 cm  2017    [facility]



         

 

 BMI Calculated  36.53  2017    [facility]



         

 

 Weight  143.636  2017    [facility]



         

 

 BMI Calculated  38.55  2017    [facility]



         

 

 Height  193.04 cm  2017    [facility]



         







Encounters







 Location  Location  Encounter  Encounter  Reason  Attending  ADM  DC  Status  
Source



   Details  Type  Number  For  Provider  Date  Date    



         Visit          



                   



                   



                   

 

 [facility]  [id number]    Guido      [facility]          [place]    [facility]



                   



                   



                   

 

 [facility]    Day  [id number]    Guido      [facility]    Surgery      [place]    [facility]



                   



                   



                   







Procedures







 Procedure  Code  Date  Perfomer  Comments  Source



           



           

 

 Heart transplant  [id number]        [facility]



           

 

 Hernia repair  [id number]        [facility]



           

 

 Laparoscopic sleeve  [id number]        [facility]

## 2019-06-12 NOTE — ER
Nurse's Notes                                                                                     

 [facility]                                                                 

Name: Gomez Romero                                                                               

Age: 49 yrs                                                                                       

Sex: Male                                                                                         

: 1970                                                                                   

MRN: [id number]                                                                                   

Arrival Date: 2019                                                                          

Time: 06:10                                                                                       

Account#: [id number]                                                                            

Bed 5                                                                                             

Private MD:                                                                                       

Diagnosis: Postconcussional syndrome                                                              

                                                                                                  

Presentation:                                                                                     

                                                                                             

06:21 Presenting complaint: Patient states: States a slip and fall on Friday while outside in [place]1 

      the rain, No LOC; Complaint of headache everyday since fall, worse this morning,            

      headache to back right side of head; Denies any N/V, dizziness, photophobia. Transition     

      of care: patient was not received from another setting of care. Onset of symptoms was       

      2019. Risk Assessment: Do you want to hurt yourself or someone else? Patient       

      reports no desire to harm self or others. Initial Sepsis Screen: Does the patient meet      

      any 2 criteria? No. Patient's initial sepsis screen is negative. Does the patient have      

      a suspected source of infection? No. Patient's initial sepsis screen is negative. Care      

      prior to arrival: None.                                                                     

06:21 Method Of Arrival: Wheelchair                                                           lp1 

06:21 Acuity: MILTON 4                                                                           lp1 

                                                                                                  

Triage Assessment:                                                                                

07:00 General: Appears in no apparent distress. comfortable, Behavior is calm, cooperative,   bp  

      appropriate for age.                                                                        

                                                                                                  

Historical:                                                                                       

- Allergies:                                                                                      

06:24 unknown rejection medication;                                                           lp1 

- Home Meds:                                                                                      

06:24 pt unable to provide [Active];                                                          lp1 

- PMHx:                                                                                           

06:24 pt denies;                                                                              lp1 

- PSHx:                                                                                           

06:24 Heart transplant;                                                                       lp1 

                                                                                                  

- Immunization history:: Adult Immunizations up to date.                                          

- Social history:: Smoking status: Patient/guardian denies using tobacco.                         

- Ebola Screening: : No symptoms or risks identified at this time.                                

                                                                                                  

                                                                                                  

Screenin:27 Abuse screen: Denies threats or abuse. Denies injuries from another. Nutritional        lp1 

      screening: No deficits noted. Tuberculosis screening: No symptoms or risk factors           

      identified. Fall Risk None identified.                                                      

                                                                                                  

Assessment:                                                                                       

06:26 General: Appears in no apparent distress. Behavior is appropriate for age. Pain:        lp1 

      Complains of pain in back of head Pain currently is 0 out of 10 on a pain scale.            

      Quality of pain is described as pressure, Pain began 2-3 days ago. Neuro: Level of          

      Consciousness is awake, alert, obeys commands, Oriented to person, place, time,             

      situation, Moves all extremities. Full function Gait is steady, Speech is normal,           

      Pupils are PERRLA, Reports headache in right occipital area, Denies blurred vision          

      dizziness, photophobia. Cardiovascular: Patient's skin is warm and dry. Respiratory: No     

      deficits noted. GI: No deficits noted. : No deficits noted. EENT: No deficits noted.      

      Derm: Skin is pink, warm \T\ dry. Musculoskeletal: No deficits noted.                       

07:00 Reassessment: RECD REPORT FROM USAMA MCKOY. 50YO WM S/P FALL. NO OBVIOUS TRAUMA.           bp  

08:07 Reassessment: PT D/C HOME AMBULATORY WITH FAMILY, DX WITH POST-CONCUSSIVE SYNDROME.     bp  

                                                                                                  

Vital Signs:                                                                                      

06:23  / 87; Pulse 80; Resp 18; Temp 97.9(O); Pulse Ox 98% on R/A; Weight 99.79 kg;     lp1 

      Height 6 ft. 4 in. (193.04 cm); Pain 9/10;                                                  

08:00  / 91; Pulse 71; Resp 15; Temp 98; Pulse Ox 100% ;                                bp  

06:23 Body Mass Index 26.78 (99.79 kg, 193.04 cm)                                             lp1 

                                                                                                  

ED Course:                                                                                        

06:10 Patient arrived in ED.                                                                  am2 

06:11 Ace Serrano PA is PHCP.                                                              [facility] 

06:11 Lloyd Duff MD is Attending Physician.                                                [facility] 

06:20 Usama Seo, RN is Primary Nurse.                                                       lp1 

06:23 Triage completed.                                                                       lp1 

06:23 Arm band placed on left wrist.                                                          lp1 

06:27 Patient has correct armband on for positive identification.                             lp1 

06:27 No provider procedures requiring assistance completed. Patient did not have IV access   lp1 

      during this emergency room visit.                                                           

07:03 CT Head C Spine In Process Unspecified.                                                 EDMS

                                                                                                  

Administered Medications:                                                                         

08:09 Not Given (Patient Refused): Tylenol 650 mg PO once                                     bp  

                                                                                                  

                                                                                                  

Outcome:                                                                                          

07:58 Discharge ordered by MD.                                                                [facility] 

08:12 Discharged to home ambulatory, with family.                                             bp  

08:12 Condition: stable                                                                           

08:12 Discharge instructions given to patient, Instructed on discharge instructions, follow       

      up and referral plans. medication usage, Demonstrated understanding of instructions,        

      follow-up care, medications, Prescriptions given X 1.                                       

08:13 Patient left the ED.                                                                    bp  

                                                                                                  

Signatures:                                                                                       

Dispatcher [employer]                           EDMS                                                 

Ace Serrano PA PA jmm Pena, Laura, EAN                         RN   lp1                                                  

Renita Rushing am2                                                  

Lasha Verdin, RN                      RN   bp                                                   

                                                                                                  

**************************************************************************************************

## 2019-06-12 NOTE — XMS REPORT
Patient Summary Document

 Created on:2019



Patient:TYRA BRUNO

Sex:Male

:1970

External Reference #:[id number]





Demographics







 Address  [address] [place] [address]

 

 Home Phone  [phone]

 

 Work Phone  [phone]

 

 Email Address  [email or web address]

 

 Preferred Language  Unknown

 

 Marital Status  Unknown

 

 Jain Affiliation  Unknown

 

 Race  Unknown

 

 Additional Race(s)  Unavailable

 

 Ethnic Group  Unknown









Author







 Organization  [facility]

 

 Address  [address] [place] [address]

 

 Phone  [phone]









Care Team Providers







 Name  Role  Phone

 

 FAWADSANJAYANAMIKA HERNANDEZ  Unavailable  Unavailable

 

 MAURO AWAD  Unavailable  Unavailable

 

 NORBERTO SILVESTRE  Unavailable  Unavailable









Problems

This patient has no known problems.



Allergies, Adverse Reactions, Alerts

This patient has no known allergies or adverse reactions.



Medications

This patient has no known medications.



Results







 Test Description  Test Time  Test Comments  Text Results  Atomic Results  
Result Comments









 TACROLIMUS LEVEL  2019 10:33:00    









   Test Item  Value  Reference Range  Comments









 TACROLIMUS BLOOD (BEAKER) (test code=657)  6.2 ng/mL  10.0-20.0  



[id number] 09:15:00





 Test Item  Value  Reference Range  Comments

 

 PROSTATE SPECIFIC ANTIGEN (BEAKER) (test code=844)  0.5 ng/mL  0.0-4.0  



LIPID [id number] 09:01:00





 Test Item  Value  Reference Range  Comments

 

 TRIGLYCERIDES (BEAKER) (test code=540)  63 mg/dL    

 

 CHOLESTEROL (BEAKER) (test code=631)  126 mg/dL    

 

 HDL CHOLESTEROL (BEAKER) (test code=976)  42 mg/dL    

 

 LDL CHOLESTEROL CALCULATED (BEAKER) (test  71 mg/dL    



 code=633)      



Triglyceride Reference Range:   Low Risk         &lt;150   Borderline    150-
199   High Risk     200-499   Very High Risk  &gt;=500Cholesterol Reference 
Range:   Low Risk         &lt;200   Borderline 200-239    High Risk        &gt;
240HDL Cholesterol Reference Range:   Low Risk         &gt;=60   High Risk     
    &lt;40LDL Cholesterol Reference Range:   Optimal          &lt;100   Near 
Optimal  100-129   Borderline    130-159   High          160-189   Very High   
    &gt;=190BASIC METABOLIC [id number] 09:01:00





 Test Item  Value  Reference Range  Comments

 

 SODIUM (BEAKER) (test  143 meq/L  136-145  



 kmcv=579)      

 

 POTASSIUM (BEAKER) (test  4.3 meq/L  3.5-5.1  



 code=379)      

 

 CHLORIDE (BEAKER) (test  105 meq/L    



 code=382)      

 

 CO2 (BEAKER) (test  30 meq/L  22-29  



 code=355)      

 

 BLOOD UREA NITROGEN  16 mg/dL  7-21  



 (BEAKER) (test code=354)      

 

 CREATININE (BEAKER) (test  0.96 mg/dL  0.57-1.25  



 code=358)      

 

 GLUCOSE RANDOM (BEAKER)  108 mg/dL    



 (test code=652)      

 

 CALCIUM (BEAKER) (test  9.7 mg/dL  8.4-10.2  



 code=697)      

 

 EGFR (BEAKER) (test  83 mL/min/1.73 sq m    ESTIMATED GFR IS NOT AS



 code=1092)      ACCURATE AS CREATININE



       CLEARANCE IN PREDICTING



       GLOMERULAR FILTRATION



       RATE. ESTIMATED GFR IS



       NOT APPLICABLE FOR



       DIALYSIS PATIENTS.



HEPATIC FUNCTION [id number] 09:01:00





 Test Item  Value  Reference Range  Comments

 

 TOTAL PROTEIN (BEAKER) (test code=770)  7.0 gm/dL  6.0-8.3  

 

 ALBUMIN (BEAKER) (test code=1145)  4.5 g/dL  3.5-5.0  

 

 BILIRUBIN TOTAL (BEAKER) (test code=377)  0.5 mg/dL  0.2-1.2  

 

 BILIRUBIN DIRECT (BEAKER) (test code=706)  0.3 mg/dL  0.1-0.5  

 

 ALKALINE PHOSPHATASE (BEAKER) (test code=346)  81 U/L    

 

 AST (SGOT) (BEAKER) (test code=353)  18 U/L  5-34  

 

 ALT (SGPT) (BEAKER) (test code=347)  14 U/L  6-55  



RAD, CHEST, 2 [id number] 08:48:00NEW CARDIOLOGIST OBERTONReason for Exam:-&
gt;heart transplant annual follow upFINAL REPORT PATIENT ID:   [id number] 
INDICATION: heart transplant annual follow up COMPARISON: May 23, 2018 TECHNIQUE
: Chest radiograph, two views, PA and lateral. FINDINGS / IMPRESSION:Lung 
volumes arenormal and lungs are clear. Intact median sternotomy wires and left 
axillary/subclavian surgical clips again demonstrated from prior heart 
transplant. Cardiac and mediastinal contours normal. No pleural effusion or 
pneumothorax. Osseous structures unremarkable. Signed: Marlee Calhoun 
MDReport Verified Date/Time:  2019 08:48:33 Reading Location:  [facility] Reading Room Electronically signed by: MARLEE CALHOUN M.D. on 2019 08:48 AMCBC W/PLT COUNT &amp; AUTO [id number] 08:
28:00





 Test Item  Value  Reference Range  Comments

 

 WHITE BLOOD CELL COUNT (BEAKER) (test code=775)  5.2 K/ L  3.5-10.5  

 

 RED BLOOD CELL COUNT (BEAKER) (test code=761)  5.18 M/ L  4.63-6.08  

 

 HEMOGLOBIN (BEAKER) (test code=410)  15.1 GM/DL  13.7-17.5  

 

 HEMATOCRIT (BEAKER) (test code=411)  45.4 %  40.1-51.0  

 

 MEAN CORPUSCULAR VOLUME (BEAKER) (test code=753)  87.6 fL  79.0-92.2  

 

 MEAN CORPUSCULAR HEMOGLOBIN (BEAKER) (test  29.2 pg  25.7-32.2  



 zngd=093)      

 

 MEAN CORPUSCULAR HEMOGLOBIN CONC (BEAKER) (test  33.3 GM/DL  32.3-36.5  



 code=752)      

 

 RED CELL DISTRIBUTION WIDTH (BEAKER) (test  13.3 %  11.6-14.4  



 code=412)      

 

 PLATELET COUNT (BEAKER) (test code=756)  135 K/CU MM  150-450  

 

 MEAN PLATELET VOLUME (BEAKER) (test code=754)  9.6 fL  9.4-12.4  

 

 NUCLEATED RED BLOOD CELLS (BEAKER) (test  0 /100 WBC  0-0  



 code=413)      

 

 NEUTROPHILS RELATIVE PERCENT (BEAKER) (test  55 %    



 code=429)      

 

 LYMPHOCYTES RELATIVE PERCENT (BEAKER) (test  29 %    



 code=430)      

 

 MONOCYTES RELATIVE PERCENT (BEAKER) (test  10 %    



 code=431)      

 

 EOSINOPHILS RELATIVE PERCENT (BEAKER) (test  5 %    



 code=432)      

 

 BASOPHILS RELATIVE PERCENT (BEAKER) (test  1 %    



 code=437)      

 

 NEUTROPHILS ABSOLUTE COUNT (BEAKER) (test  2.88 K/ L  1.78-5.38  



 code=670)      

 

 LYMPHOCYTES ABSOLUTE COUNT (BEAKER) (test  1.52 K/ L  1.32-3.57  



 code=414)      

 

 MONOCYTES ABSOLUTE COUNT (BEAKER) (test  0.51 K/ L  0.30-0.82  



 code=415)      

 

 EOSINOPHILS ABSOLUTE COUNT (BEAKER) (test  0.24 K/ L  0.04-0.54  



 code=416)      

 

 BASOPHILS ABSOLUTE COUNT (BEAKER) (test  0.05 K/ L  0.01-0.08  



 code=417)      

 

 IMMATURE GRANULOCYTES-RELATIVE PERCENT (BEAKER)  1 %  0-1  



 (test code=2801)      



TACROLIMUS [id number] 13:15:00





 Test Item  Value  Reference Range  Comments

 

 TACROLIMUS BLOOD (BEAKER) (test code=657)  6.9 ng/mL  10.0-20.0  



BASIC METABOLIC [id number] 10:45:00





 Test Item  Value  Reference Range  Comments

 

 SODIUM (BEAKER) (test  139 meq/L  136-145  



 rczw=187)      

 

 POTASSIUM (BEAKER) (test  4.8 meq/L  3.5-5.1  



 code=379)      

 

 CHLORIDE (BEAKER) (test  102 meq/L    



 code=382)      

 

 CO2 (BEAKER) (test  30 meq/L  22-29  



 code=355)      

 

 BLOOD UREA NITROGEN  14 mg/dL  7-21  



 (BEAKER) (test code=354)      

 

 CREATININE (BEAKER) (test  0.95 mg/dL  0.57-1.25  



 code=358)      

 

 GLUCOSE RANDOM (BEAKER)  88 mg/dL    



 (test code=652)      

 

 CALCIUM (BEAKER) (test  10.1 mg/dL  8.4-10.2  



 code=697)      

 

 EGFR (BEAKER) (test  84 mL/min/1.73 sq m    ESTIMATED GFR IS NOT AS



 code=1092)      ACCURATE AS CREATININE



       CLEARANCE IN PREDICTING



       GLOMERULAR FILTRATION



       RATE. ESTIMATED GFR IS



       NOT APPLICABLE FOR



       DIALYSIS PATIENTS.



CBC W/PLT COUNT &amp; AUTO [id number] 10:31:00





 Test Item  Value  Reference Range  Comments

 

 WHITE BLOOD CELL COUNT (BEAKER) (test code=775)  5.0 K/ L  3.5-10.5  

 

 RED BLOOD CELL COUNT (BEAKER) (test code=761)  5.55 M/ L  4.63-6.08  

 

 HEMOGLOBIN (BEAKER) (test code=410)  15.8 GM/DL  13.7-17.5  

 

 HEMATOCRIT (BEAKER) (test code=411)  47.9 %  40.1-51.0  

 

 MEAN CORPUSCULAR VOLUME (BEAKER) (test code=753)  86.3 fL  79.0-92.2  

 

 MEAN CORPUSCULAR HEMOGLOBIN (BEAKER) (test  28.5 pg  25.7-32.2  



 vaxw=555)      

 

 MEAN CORPUSCULAR HEMOGLOBIN CONC (BEAKER) (test  33.0 GM/DL  32.3-36.5  



 code=752)      

 

 RED CELL DISTRIBUTION WIDTH (BEAKER) (test  13.2 %  11.6-14.4  



 code=412)      

 

 PLATELET COUNT (BEAKER) (test code=756)  131 K/CU MM  150-450  

 

 MEAN PLATELET VOLUME (BEAKER) (test code=754)  9.5 fL  9.4-12.4  

 

 NUCLEATED RED BLOOD CELLS (BEAKER) (test  0 /100 WBC  0-0  



 code=413)      

 

 NEUTROPHILS RELATIVE PERCENT (BEAKER) (test  56 %    



 code=429)      

 

 LYMPHOCYTES RELATIVE PERCENT (BEAKER) (test  26 %    



 code=430)      

 

 MONOCYTES RELATIVE PERCENT (BEAKER) (test  12 %    



 code=431)      

 

 EOSINOPHILS RELATIVE PERCENT (BEAKER) (test  4 %    



 code=432)      

 

 BASOPHILS RELATIVE PERCENT (BEAKER) (test  1 %    



 code=437)      

 

 NEUTROPHILS ABSOLUTE COUNT (BEAKER) (test  2.80 K/ L  1.78-5.38  



 code=670)      

 

 LYMPHOCYTES ABSOLUTE COUNT (BEAKER) (test  1.32 K/ L  1.32-3.57  



 code=414)      

 

 MONOCYTES ABSOLUTE COUNT (BEAKER) (test  0.59 K/ L  0.30-0.82  



 code=415)      

 

 EOSINOPHILS ABSOLUTE COUNT (BEAKER) (test  0.21 K/ L  0.04-0.54  



 code=416)      

 

 BASOPHILS ABSOLUTE COUNT (BEAKER) (test  0.04 K/ L  0.01-0.08  



 code=417)      

 

 IMMATURE GRANULOCYTES-RELATIVE PERCENT (BEAKER)  1 %  0-1  



 (test code=2801)      



BASIC METABOLIC [id number] 09:28:00





 Test Item  Value  Reference Range  Comments

 

 SODIUM (BEAKER) (test  140 meq/L  136-145  



 bjec=854)      

 

 POTASSIUM (BEAKER) (test  4.7 meq/L  3.5-5.1  



 code=379)      

 

 CHLORIDE (BEAKER) (test  103 meq/L    



 code=382)      

 

 CO2 (BEAKER) (test  30 meq/L  22-29  



 code=355)      

 

 BLOOD UREA NITROGEN  20 mg/dL  7-21  



 (BEAKER) (test code=354)      

 

 CREATININE (BEAKER) (test  1.08 mg/dL  0.57-1.25  



 code=358)      

 

 GLUCOSE RANDOM (BEAKER)  110 mg/dL    



 (test code=652)      

 

 CALCIUM (BEAKER) (test  10.0 mg/dL  8.4-10.2  



 code=697)      

 

 EGFR (BEAKER) (test  73 mL/min/1.73 sq m    ESTIMATED GFR IS NOT AS



 code=1092)      ACCURATE AS CREATININE



       CLEARANCE IN PREDICTING



       GLOMERULAR FILTRATION



       RATE. ESTIMATED GFR IS



       NOT APPLICABLE FOR



       DIALYSIS PATIENTS.



CBC W/PLT COUNT &amp; AUTO [id number] 08:58:00





 Test Item  Value  Reference Range  Comments

 

 WHITE BLOOD CELL COUNT (BEAKER) (test code=775)  4.9 K/ L  3.5-10.5  

 

 RED BLOOD CELL COUNT (BEAKER) (test code=761)  5.37 M/ L  4.63-6.08  

 

 HEMOGLOBIN (BEAKER) (test code=410)  15.5 GM/DL  13.7-17.5  

 

 HEMATOCRIT (BEAKER) (test code=411)  46.8 %  40.1-51.0  

 

 MEAN CORPUSCULAR VOLUME (BEAKER) (test code=753)  87.2 fL  79.0-92.2  

 

 MEAN CORPUSCULAR HEMOGLOBIN (BEAKER) (test  28.9 pg  25.7-32.2  



 qlvw=178)      

 

 MEAN CORPUSCULAR HEMOGLOBIN CONC (BEAKER) (test  33.1 GM/DL  32.3-36.5  



 code=752)      

 

 RED CELL DISTRIBUTION WIDTH (BEAKER) (test  13.3 %  11.6-14.4  



 code=412)      

 

 PLATELET COUNT (BEAKER) (test code=756)  130 K/CU MM  150-450  

 

 MEAN PLATELET VOLUME (BEAKER) (test code=754)  9.6 fL  9.4-12.4  

 

 NUCLEATED RED BLOOD CELLS (BEAKER) (test  0 /100 WBC  0-0  



 code=413)      

 

 NEUTROPHILS RELATIVE PERCENT (BEAKER) (test  65 %    



 code=429)      

 

 LYMPHOCYTES RELATIVE PERCENT (BEAKER) (test  20 %    



 code=430)      

 

 MONOCYTES RELATIVE PERCENT (BEAKER) (test  10 %    



 code=431)      

 

 EOSINOPHILS RELATIVE PERCENT (BEAKER) (test  4 %    



 code=432)      

 

 BASOPHILS RELATIVE PERCENT (BEAKER) (test  1 %    



 code=437)      

 

 NEUTROPHILS ABSOLUTE COUNT (BEAKER) (test  3.16 K/ L  1.78-5.38  



 code=670)      

 

 LYMPHOCYTES ABSOLUTE COUNT (BEAKER) (test  0.99 K/ L  1.32-3.57  



 code=414)      

 

 MONOCYTES ABSOLUTE COUNT (BEAKER) (test  0.49 K/ L  0.30-0.82  



 code=415)      

 

 EOSINOPHILS ABSOLUTE COUNT (BEAKER) (test  0.17 K/ L  0.04-0.54  



 code=416)      

 

 BASOPHILS ABSOLUTE COUNT (BEAKER) (test  0.04 K/ L  0.01-0.08  



 code=417)      

 

 IMMATURE GRANULOCYTES-RELATIVE PERCENT (BEAKER)  1 %  0-1  



 (test code=2801)      



TACROLIMUS [id number] 13:26:00





 Test Item  Value  Reference Range  Comments

 

 TACROLIMUS BLOOD (BEAKER) (test code=657)  4.1 ng/mL  10.0-20.0  



RAD, CHEST, 2 [id number] 14:13:00NEW CARDIOLOGIST OBERTONReason for Exam:-&
gt;heart transplant annual follow upFINAL REPORT PATIENT ID:   [id number] Chest 
two views INDICATION: Heart transplant annual follow-up. COMPARISON: 2017 
IMPRESSION: There is no focal consolidation, vascular congestion, pleural 
effusion, or pneumothorax. Heart size is within normal limits. Median 
sternotomy changes, left axillary surgical clips, and gastric sleeve with small 
hiatal hernia are again noted. No significant change since 2017. Signed: 
Richard Nickerson MDReport Verified Date/Time:  2018 14:13:48 Reading 
Location: [facility] B1 C013W Consult Reading Room   Electronically signed by: 
RICHARD NICKERSON M.D. on 2018 02:13 PMTACROLIMUS [id number] 13:55
:00





 Test Item  Value  Reference Range  Comments

 

 TACROLIMUS BLOOD (BEAKER) (test code=657)  4.7 ng/mL  10.0-20.0  



COMPREHENSIVE METABOLIC [id number] 12:11:00





 Test Item  Value  Reference Range  Comments

 

 TOTAL PROTEIN (BEAKER)  6.5 gm/dL  6.0-8.3  



 (test code=770)      

 

 ALBUMIN (BEAKER) (test  4.2 g/dL  3.5-5.0  



 code=1145)      

 

 ALKALINE PHOSPHATASE  98 U/L    



 (BEAKER) (test code=346)      

 

 BILIRUBIN TOTAL (BEAKER)  0.4 mg/dL  0.2-1.2  



 (test code=377)      

 

 SODIUM (BEAKER) (test  141 meq/L  136-145  



 cjzs=290)      

 

 POTASSIUM (BEAKER) (test  4.3 meq/L  3.5-5.1  



 code=379)      

 

 CHLORIDE (BEAKER) (test  103 meq/L    



 code=382)      

 

 CO2 (BEAKER) (test  29 meq/L  22-29  



 code=355)      

 

 BLOOD UREA NITROGEN  17 mg/dL  7-21  



 (BEAKER) (test code=354)      

 

 CREATININE (BEAKER) (test  1.00 mg/dL  0.57-1.25  



 code=358)      

 

 GLUCOSE RANDOM (BEAKER)  101 mg/dL    



 (test code=652)      

 

 CALCIUM (BEAKER) (test  9.5 mg/dL  8.4-10.2  



 code=697)      

 

 AST (SGOT) (BEAKER) (test  15 U/L  5-34  



 code=353)      

 

 ALT (SGPT) (BEAKER) (test  12 U/L  6-55  



 code=347)      

 

 EGFR (BEAKER) (test  79 mL/min/1.73 sq m    ESTIMATED GFR IS NOT AS



 code=1092)      ACCURATE AS CREATININE



       CLEARANCE IN PREDICTING



       GLOMERULAR FILTRATION



       RATE. ESTIMATED GFR IS



       NOT APPLICABLE FOR



       DIALYSIS PATIENTS.



LIPID [id number] 11:47:00





 Test Item  Value  Reference Range  Comments

 

 TRIGLYCERIDES (BEAKER) (test code=540)  87 mg/dL    

 

 CHOLESTEROL (BEAKER) (test code=631)  127 mg/dL    

 

 HDL CHOLESTEROL (BEAKER) (test code=976)  37 mg/dL    

 

 LDL CHOLESTEROL CALCULATED (BEAKER) (test  73 mg/dL    



 code=633)      



Triglyceride Reference Range:   Low Risk         &lt;150   Borderline    150-
199   High Risk     200-499   Very High Risk  &gt;=500Cholesterol Reference 
Range:   Low Risk         &lt;200   Borderline 200-239    High Risk        &gt;
240HDL Cholesterol Reference Range:   Low Risk         &gt;=60   High Risk     
    &lt;40LDL Cholesterol Reference Range:   Optimal          &lt;100   Near 
Optimal  100-129   Borderline    130-159   High          160-189   Very High   
    &gt;=190PROTHROMBIN [id number] 11:30:00





 Test Item  Value  Reference Range  Comments

 

 PROTIME (BEAKER) (test code=759)  14.7 seconds  11.7-14.7  

 

 INR (BEAKER) (test code=370)  1.2  <=5.9  



RECOMMENDED COUMADIN/WARFARIN INR THERAPY RANGESSTANDARD DOSE: 2.0 - 3.0   
Includes: PROPHYLAXIS forvenous thrombosis, systemic embolization; TREATMENT 
for venous thrombosis and/or pulmonary embolus.HIGH RISK: Target INR is 2.5-3.5 
for patients with mechanical heart valves.CBC W/PLT COUNT &amp; AUTO 
[id number] 11:23:00





 Test Item  Value  Reference Range  Comments

 

 WHITE BLOOD CELL COUNT (BEAKER) (test code=775)  4.8 K/ L  3.5-10.5  

 

 RED BLOOD CELL COUNT (BEAKER) (test code=761)  5.26 M/ L  4.63-6.08  

 

 HEMOGLOBIN (BEAKER) (test code=410)  15.1 GM/DL  13.7-17.5  

 

 HEMATOCRIT (BEAKER) (test code=411)  45.8 %  40.1-51.0  

 

 MEAN CORPUSCULAR VOLUME (BEAKER) (test code=753)  87.1 fL  79.0-92.2  

 

 MEAN CORPUSCULAR HEMOGLOBIN (BEAKER) (test  28.7 pg  25.7-32.2  



 bfxn=805)      

 

 MEAN CORPUSCULAR HEMOGLOBIN CONC (BEAKER) (test  33.0 GM/DL  32.3-36.5  



 code=752)      

 

 RED CELL DISTRIBUTION WIDTH (BEAKER) (test  13.4 %  11.6-14.4  



 code=412)      

 

 PLATELET COUNT (BEAKER) (test code=756)  161 K/CU MM  150-450  

 

 MEAN PLATELET VOLUME (BEAKER) (test code=754)  9.0 fL  9.4-12.4  

 

 NUCLEATED RED BLOOD CELLS (BEAKER) (test  0 /100 WBC  0-0  



 code=413)      

 

 NEUTROPHILS RELATIVE PERCENT (BEAKER) (test  67 %    



 code=429)      

 

 LYMPHOCYTES RELATIVE PERCENT (BEAKER) (test  19 %    



 code=430)      

 

 MONOCYTES RELATIVE PERCENT (BEAKER) (test  10 %    



 code=431)      

 

 EOSINOPHILS RELATIVE PERCENT (BEAKER) (test  3 %    



 code=432)      

 

 BASOPHILS RELATIVE PERCENT (BEAKER) (test  1 %    



 code=437)      

 

 NEUTROPHILS ABSOLUTE COUNT (BEAKER) (test  3.21 K/ L  1.78-5.38  



 code=670)      

 

 LYMPHOCYTES ABSOLUTE COUNT (BEAKER) (test  0.93 K/ L  1.32-3.57  



 code=414)      

 

 MONOCYTES ABSOLUTE COUNT (BEAKER) (test  0.48 K/ L  0.30-0.82  



 code=415)      

 

 EOSINOPHILS ABSOLUTE COUNT (BEAKER) (test  0.14 K/ L  0.04-0.54  



 code=416)      

 

 BASOPHILS ABSOLUTE COUNT (BEAKER) (test  0.04 K/ L  0.01-0.08  



 code=417)      

 

 IMMATURE GRANULOCYTES-RELATIVE PERCENT (BEAKER)  1 %  0-1  



 (test code=2801)      



CT, CHEST, WITHOUT [id number] 09:10:00NEW CARDIOLOGIST OBERTONFINAL 
REPORT PATIENT ID:   [id number] INDICATION: 47-year-old male with chest wall pain 
and history ofheart transplant. COMPARISON:Chest radiograph 2017 
TECHNIQUE: Chest CT exam WITHOUT intravenous contrast. The exam was performed 
according to our department dose-optimization protocol, which includes 
automated exposure control, adjustments of mA and kV according to patient size. 
Iterative reconstructions are also sometimes employed. FINDINGS:No chest wall 
mass, chest wall hematoma, rib fracture, or rib lesion is demonstrated. There 
is a healed median sternotomy. Heart is normal in size and there is no 
pericardial effusion. Mild atherosclerotic plaque of the ascending thoracic 
aorta is demonstrated. Thoracic aorta is normal in caliber. 8 mm calcified 
nodule in the right lower lobe represents prior granulomatous infection. No 
pneumonia, pulmonary edema, pleural effusion, or pneumothorax is demonstrated. 
There is no mediastinal or hilar lymphadenopathy. Thyroid gland is 
unremarkable. Upper and mid esophagus are unremarkable. Patient is status post 
sleeve gastrectomy and there is a small part of the sleeve herniated in the low 
posterior mediastinum. Partial imaging of the upper abdomen is otherwise 
unremarkable. IMPRESSION: No chest wall mass, muscle tear, rib fracture, or rib 
lesion. No other CT explanation for the patient's chest wall pain. Clear lungs. 
Unremarkable heart transplant. Prior gastric sleeve with small hiatal hernia. 
Signed: Marlee Calhoun MDReport Verified Date/Time:  2017 09:10:45 
Reading Location: [facility] Diagnostic Imaging Reading Room - [place] [address] 
Electronically signed by: MARLEE CALHOUN M.D. on 2017 09:10 
AMTACROLIMUS [id number] 13:49:00





 Test Item  Value  Reference Range  Comments

 

 TACROLIMUS BLOOD (BEAKER) (test code=657)  5.3 ng/mL  10.0-20.0  



BASIC METABOLIC [id number] 10:44:00





 Test Item  Value  Reference Range  Comments

 

 SODIUM (BEAKER) (test  141 meq/L  136-145  



 qbwj=018)      

 

 POTASSIUM (BEAKER) (test  4.5 meq/L  3.5-5.1  



 code=379)      

 

 CHLORIDE (BEAKER) (test  103 meq/L    



 code=382)      

 

 CO2 (BEAKER) (test  29 meq/L  22-29  



 code=355)      

 

 BLOOD UREA NITROGEN  17 mg/dL  7-21  



 (BEAKER) (test code=354)      

 

 CREATININE (BEAKER) (test  1.09 mg/dL  0.57-1.25  



 code=358)      

 

 GLUCOSE RANDOM (BEAKER)  97 mg/dL    



 (test code=652)      

 

 CALCIUM (BEAKER) (test  9.3 mg/dL  8.4-10.2  



 code=697)      

 

 EGFR (BEAKER) (test  72 mL/min/1.73 sq m    ESTIMATED GFR IS NOT AS



 code=1092)      ACCURATE AS CREATININE



       CLEARANCE IN PREDICTING



       GLOMERULAR FILTRATION



       RATE. ESTIMATED GFR IS



       NOT APPLICABLE FOR



       DIALYSIS PATIENTS.



CBC W/PLT COUNT &amp; AUTO [id number] 09:54:00





 Test Item  Value  Reference Range  Comments

 

 WHITE BLOOD CELL COUNT (BEAKER) (test code=775)  4.0 K/ L  3.5-10.5  

 

 RED BLOOD CELL COUNT (BEAKER) (test code=761)  5.42 M/ L  4.63-6.08  

 

 HEMOGLOBIN (BEAKER) (test code=410)  15.4 GM/DL  13.7-17.5  

 

 HEMATOCRIT (BEAKER) (test code=411)  47.6 %  40.1-51.0  

 

 MEAN CORPUSCULAR VOLUME (BEAKER) (test code=753)  87.8 fL  79.0-92.2  

 

 MEAN CORPUSCULAR HEMOGLOBIN (BEAKER) (test  28.4 pg  25.7-32.2  



 hbyd=122)      

 

 MEAN CORPUSCULAR HEMOGLOBIN CONC (BEAKER) (test  32.4 GM/DL  32.3-36.5  



 code=752)      

 

 RED CELL DISTRIBUTION WIDTH (BEAKER) (test  13.8 %  11.6-14.4  



 code=412)      

 

 PLATELET COUNT (BEAKER) (test code=756)  152 K/CU MM  150-450  

 

 MEAN PLATELET VOLUME (BEAKER) (test code=754)  9.9 fL  9.4-12.4  

 

 NUCLEATED RED BLOOD CELLS (BEAKER) (test  0 /100 WBC  0-0  



 code=413)      

 

 NEUTROPHILS RELATIVE PERCENT (BEAKER) (test  66 %    



 code=429)      

 

 LYMPHOCYTES RELATIVE PERCENT (BEAKER) (test  20 %    



 code=430)      

 

 MONOCYTES RELATIVE PERCENT (BEAKER) (test  11 %    



 code=431)      

 

 EOSINOPHILS RELATIVE PERCENT (BEAKER) (test  2 %    



 code=432)      

 

 BASOPHILS RELATIVE PERCENT (BEAKER) (test  1 %    



 code=437)      

 

 NEUTROPHILS ABSOLUTE COUNT (BEAKER) (test  2.65 K/ L  1.78-5.38  



 code=670)      

 

 LYMPHOCYTES ABSOLUTE COUNT (BEAKER) (test  0.82 K/ L  1.32-3.57  



 code=414)      

 

 MONOCYTES ABSOLUTE COUNT (BEAKER) (test  0.44 K/ L  0.30-0.82  



 code=415)      

 

 EOSINOPHILS ABSOLUTE COUNT (BEAKER) (test  0.08 K/ L  0.04-0.54  



 code=416)      

 

 BASOPHILS ABSOLUTE COUNT (BEAKER) (test  0.04 K/ L  0.01-0.08  



 code=417)      

 

 IMMATURE GRANULOCYTES-RELATIVE PERCENT (BEAKER)  0 %  0-1  



 (test code=2801)      



TACROLIMUS [id number] 14:05:00





 Test Item  Value  Reference Range  Comments

 

 TACROLIMUS BLOOD (BEAKER) (test code=657)  6.5 ng/mL  10.0-20.0  



BASIC METABOLIC [id number] 10:13:00





 Test Item  Value  Reference Range  Comments

 

 SODIUM (BEAKER) (test  140 meq/L  136-145  



 fmui=881)      

 

 POTASSIUM (BEAKER) (test  4.1 meq/L  3.5-5.1  



 code=379)      

 

 CHLORIDE (BEAKER) (test  109 meq/L    



 code=382)      

 

 CO2 (BEAKER) (test  22 meq/L  22-29  



 code=355)      

 

 BLOOD UREA NITROGEN  15 mg/dL  7-21  



 (BEAKER) (test code=354)      

 

 CREATININE (BEAKER) (test  0.95 mg/dL  0.57-1.25  



 code=358)      

 

 GLUCOSE RANDOM (BEAKER)  99 mg/dL    



 (test code=652)      

 

 CALCIUM (BEAKER) (test  8.8 mg/dL  8.4-10.2  



 code=697)      

 

 EGFR (BEAKER) (test  85 mL/min/1.73 sq m    ESTIMATED GFR IS NOT AS



 code=1092)      ACCURATE AS CREATININE



       CLEARANCE IN PREDICTING



       GLOMERULAR FILTRATION



       RATE. ESTIMATED GFR IS



       NOT APPLICABLE FOR



       DIALYSIS PATIENTS.



CBC W/PLT COUNT &amp; AUTO [id number] 09:42:00





 Test Item  Value  Reference Range  Comments

 

 WHITE BLOOD CELL COUNT (BEAKER) (test code=775)  4.7 K/ L  4.0-10.0  

 

 RED BLOOD CELL COUNT (BEAKER) (test code=761)  5.18 M/ L  4.20-5.80  

 

 HEMOGLOBIN (BEAKER) (test code=410)  15.3 GM/DL  13.0-16.8  

 

 HEMATOCRIT (BEAKER) (test code=411)  47.1 %  40.0-50.0  

 

 MEAN CORPUSCULAR VOLUME (BEAKER) (test code=753)  90.8 fL  82.0-98.0  

 

 MEAN CORPUSCULAR HEMOGLOBIN (BEAKER) (test  29.6 pg  27.0-33.0  



 code=751)      

 

 MEAN CORPUSCULAR HEMOGLOBIN CONC (BEAKER) (test  32.6 GM/DL  32.0-36.0  



 code=752)      

 

 RED CELL DISTRIBUTION WIDTH (BEAKER) (test  15.1 %  10.3-14.2  



 code=412)      

 

 PLATELET COUNT (BEAKER) (test code=756)  129 K/CU MM  150-430  

 

 MEAN PLATELET VOLUME (BEAKER) (test code=754)  7.7 fL  6.5-10.5  

 

 NUCLEATED RED BLOOD CELLS (BEAKER) (test  0 /100 WBC  0-0  



 code=413)      

 

 NEUTROPHILS RELATIVE PERCENT (BEAKER) (test  72 %    



 code=429)      

 

 LYMPHOCYTES RELATIVE PERCENT (BEAKER) (test  18 %    



 code=430)      

 

 MONOCYTES RELATIVE PERCENT (BEAKER) (test  8 %    



 code=431)      

 

 EOSINOPHILS RELATIVE PERCENT (BEAKER) (test  1 %    



 code=432)      

 

 BASOPHILS RELATIVE PERCENT (BEAKER) (test  1 %    



 code=437)      

 

 NEUTROPHILS ABSOLUTE COUNT (BEAKER) (test  3.37 K/ L  1.80-8.00  



 code=670)      

 

 LYMPHOCYTES ABSOLUTE COUNT (BEAKER) (test  0.85 K/ L  1.48-4.50  



 code=414)      

 

 MONOCYTES ABSOLUTE COUNT (BEAKER) (test  0.40 K/ L  0.00-1.30  



 code=415)      

 

 EOSINOPHILS ABSOLUTE COUNT (BEAKER) (test  0.07 K/ L  0.00-0.50  



 code=416)      

 

 BASOPHILS ABSOLUTE COUNT (BEAKER) (test  0.03 K/ L  0.00-0.20  



 code=417)      



[id number] 10:38:00





 Test Item  Value  Reference Range  Comments

 

 MAGNESIUM (BEAKER) (test code=627)  2.2 mg/dL  1.6-2.6  



TACROLIMUS [id number] 10:33:00





 Test Item  Value  Reference Range  Comments

 

 TACROLIMUS BLOOD (BEAKER) (test code=657)  7.1 ng/mL  10.0-20.0  



CBC W/PLT COUNT &amp; AUTO [id number] 08:47:00





 Test Item  Value  Reference Range  Comments

 

 WHITE BLOOD CELL COUNT (BEAKER) (test code=775)  4.1 K/ L  4.0-10.0  

 

 RED BLOOD CELL COUNT (BEAKER) (test code=761)  5.02 M/ L  4.20-5.80  

 

 HEMOGLOBIN (BEAKER) (test code=410)  14.8 GM/DL  13.0-16.8  

 

 HEMATOCRIT (BEAKER) (test code=411)  43.6 %  40.0-50.0  

 

 MEAN CORPUSCULAR VOLUME (BEAKER) (test code=753)  87.0 fL  82.0-98.0  

 

 MEAN CORPUSCULAR HEMOGLOBIN (BEAKER) (test  29.5 pg  27.0-33.0  



 code=751)      

 

 MEAN CORPUSCULAR HEMOGLOBIN CONC (BEAKER) (test  33.9 GM/DL  32.0-36.0  



 code=752)      

 

 RED CELL DISTRIBUTION WIDTH (BEAKER) (test  14.1 %  10.3-14.2  



 code=412)      

 

 PLATELET COUNT (BEAKER) (test code=756)  160 K/CU MM  150-430  

 

 MEAN PLATELET VOLUME (BEAKER) (test code=754)  7.2 fL  6.5-10.5  

 

 NUCLEATED RED BLOOD CELLS (BEAKER) (test  0 /100 WBC  0-0  



 code=413)      

 

 NEUTROPHILS RELATIVE PERCENT (BEAKER) (test  64 %    



 code=429)      

 

 LYMPHOCYTES RELATIVE PERCENT (BEAKER) (test  21 %    



 code=430)      

 

 MONOCYTES RELATIVE PERCENT (BEAKER) (test  12 %    



 code=431)      

 

 EOSINOPHILS RELATIVE PERCENT (BEAKER) (test  2 %    



 code=432)      

 

 BASOPHILS RELATIVE PERCENT (BEAKER) (test  0 %    



 code=437)      

 

 NEUTROPHILS ABSOLUTE COUNT (BEAKER) (test  2.61 K/ L  1.80-8.00  



 code=670)      

 

 LYMPHOCYTES ABSOLUTE COUNT (BEAKER) (test  0.87 K/ L  1.48-4.50  



 code=414)      

 

 MONOCYTES ABSOLUTE COUNT (BEAKER) (test  0.49 K/ L  0.00-1.30  



 code=415)      

 

 EOSINOPHILS ABSOLUTE COUNT (BEAKER) (test  0.09 K/ L  0.00-0.50  



 code=416)      

 

 BASOPHILS ABSOLUTE COUNT (BEAKER) (test  0.01 K/ L  0.00-0.20  



 code=417)      



0.00BASI METABOLIC [id number] 08:47:00





 Test Item  Value  Reference Range  Comments

 

 SODIUM (BEAKER) (test  142 meq/L  136-145  



 nkfh=227)      

 

 POTASSIUM (BEAKER) (test  3.8 meq/L  3.5-5.1  



 code=379)      

 

 CHLORIDE (BEAKER) (test  109 meq/L    



 code=382)      

 

 CO2 (BEAKER) (test  21 meq/L  22-29  



 code=355)      

 

 BLOOD UREA NITROGEN  22 mg/dL  7-21  



 (BEAKER) (test code=354)      

 

 CREATININE (BEAKER) (test  1.18 mg/dL  0.57-1.25  



 code=358)      

 

 GLUCOSE RANDOM (BEAKER)  99 mg/dL    



 (test code=652)      

 

 CALCIUM (BEAKER) (test  9.1 mg/dL  8.4-10.2  



 code=697)      

 

 EGFR (BEAKER) (test  66 mL/min/1.73 sq m    ESTIMATED GFR IS NOT AS



 code=1092)      ACCURATE AS CREATININE



       CLEARANCE IN PREDICTING



       GLOMERULAR FILTRATION



       RATE. ESTIMATED GFR IS



       NOT APPLICABLE FOR



       DIALYSIS PATIENTS.



URINALYSIS W/ [id number] 00:20:00





 Test Item  Value  Reference Range  Comments

 

 COLOR (BEAKER) (test code=470)  Yellow    

 

 CLARITY (BEAKER) (test code=469)  Slightly Hazy    

 

 SPECIFIC GRAVITY UA (BEAKER) (test  1.050  1.001-1.035  



 code=468)      

 

 PH UA (BEAKER) (test code=467)  5.5  5.0-8.0  

 

 PROTEIN UA (BEAKER) (test code=464)  20 mg/dL  Negative  

 

 GLUCOSE UA (BEAKER) (test code=365)  Negative  Negative  

 

 KETONES UA (BEAKER) (test code=371)  40 mg/dL  Negative  

 

 BILIRUBIN UA (BEAKER) (test code=462)  Negative  Negative  

 

 BLOOD UA (BEAKER) (test code=461)  Negative  Negative  

 

 NITRITE UA (BEAKER) (test code=465)  Negative  Negative  

 

 LEUKOCYTE ESTERASE UA (BEAKER) (test  Negative  Negative  



 zqfs=041)      

 

 UROBILINOGEN UA (BEAKER) (test code=463)  0.2 mg/dL  0.2-1.0  

 

 RBC UA (BEAKER) (test code=519)  1 /HPF    

 

 WBC UA (BEAKER) (test code=520)  3 /HPF    

 

 MUCUS (BEAKER) (test code=1574)  Many    

 

 HYALINE CASTS (BEAKER) (test code=514)  6 /LPF    

 

 SOURCE(BEAKER) (test code=2795)  Urine, Clean Catch    



BASIC METABOLIC [id number] 21:55:00





 Test Item  Value  Reference Range  Comments

 

 SODIUM (BEAKER) (test  139 meq/L  136-145  



 qyxx=518)      

 

 POTASSIUM (BEAKER) (test  5.1 meq/L  3.5-5.1  



 code=379)      

 

 CHLORIDE (BEAKER) (test  107 meq/L    



 code=382)      

 

 CO2 (BEAKER) (test  18 meq/L  22-29  



 code=355)      

 

 BLOOD UREA NITROGEN  26 mg/dL  7-21  



 (BEAKER) (test code=354)      

 

 CREATININE (BEAKER) (test  1.26 mg/dL  0.57-1.25  



 code=358)      

 

 GLUCOSE RANDOM (BEAKER)  89 mg/dL    



 (test code=652)      

 

 CALCIUM (BEAKER) (test  9.1 mg/dL  8.4-10.2  



 code=697)      

 

 EGFR (BEAKER) (test  61 mL/min/1.73 sq m    ESTIMATED GFR IS NOT AS



 code=1092)      ACCURATE AS CREATININE



       CLEARANCE IN PREDICTING



       GLOMERULAR FILTRATION



       RATE. ESTIMATED GFR IS



       NOT APPLICABLE FOR



       DIALYSIS PATIENTS.



HEPATIC FUNCTION [id number] 21:55:00





 Test Item  Value  Reference Range  Comments

 

 TOTAL PROTEIN (BEAKER) (test code=770)  7.1 gm/dL  6.0-8.3  

 

 ALBUMIN (BEAKER) (test code=1145)  4.2 g/dL  3.5-5.0  

 

 BILIRUBIN TOTAL (BEAKER) (test code=377)  0.7 mg/dL  0.2-1.2  

 

 BILIRUBIN DIRECT (BEAKER) (test code=706)  0.2 mg/dL  0.1-0.5  

 

 ALKALINE PHOSPHATASE (BEAKER) (test code=346)  86 U/L    

 

 AST (SGOT) (BEAKER) (test code=353)  28 U/L  5-34  

 

 ALT (SGPT) (BEAKER) (test code=347)  20 U/L  6-55  



[id number] 21:52:00





 Test Item  Value  Reference Range  Comments

 

 LIPASE (BEAKER) (test code=749)  40 U/L  8-78  



B-TYPE NATRIURETIC FACTOR (BNP)2017 21:46:00





 Test Item  Value  Reference Range  Comments

 

 B-TYPE NATRIURETIC PEPTIDE (BEAKER) (test code=700)  21 pg/mL  0-100  



CREATINE KINASE (CK), TOTAL AND [id number] 21:45:00





 Test Item  Value  Reference Range  Comments

 

 CREATINE KINASE TOTAL (BEAKER) (test code=380)  32 U/L    

 

 CREATINE KINASE-MB (BEAKER) (test code=750)  0.6 ng/mL  0.0-6.6  

 

 CREATINE KINASE-MB INDEX (BEAKER) (test code=395)  1.9 %    



Effective 2014: CK-MB Reference Range ChangeNew: 0.0-6.6    Previous: 0.0-
4.9CK-MB Reference Range:&lt;6.7      Normal6.7-10.0  Borderline&gt;10.0     
AbnormalTROPONIN -[id number] 21:45:00





 Test Item  Value  Reference Range  Comments

 

 TROPONIN I (BEAKER) (test code=397)  < ng/mL  0.00-0.03  



Effective 2014: Reference Range ChangeNew: 0.00-0.03   Previous 0.00-
0.15Troponin I (TnI) levels must be interpreted in the context of the 
presenting symptoms and the clinical findings. Elevated TnI levels indicate 
myocardial damage, but are not specific for ischemic heart disease. Elevated 
TnI levels are seen in patients with other cardiac conditions (including 
myocarditis and congestive heartfailure), and slight TnI elevations occur in 
patients with other conditions, including sepsis, renalfailure, acidosis, acute 
neurological disease, and persistent tachyarrhythmia.CBC W/PLT COUNT &amp; AUTO 
[id number] 21:24:00





 Test Item  Value  Reference Range  Comments

 

 WHITE BLOOD CELL COUNT (BEAKER) (test code=775)  4.7 K/ L  4.0-10.0  

 

 RED BLOOD CELL COUNT (BEAKER) (test code=761)  5.75 M/ L  4.20-5.80  

 

 HEMOGLOBIN (BEAKER) (test code=410)  17.4 GM/DL  13.0-16.8  

 

 HEMATOCRIT (BEAKER) (test code=411)  49.2 %  40.0-50.0  

 

 MEAN CORPUSCULAR VOLUME (BEAKER) (test code=753)  85.6 fL  82.0-98.0  

 

 MEAN CORPUSCULAR HEMOGLOBIN (BEAKER) (test  30.3 pg  27.0-33.0  



 code=751)      

 

 MEAN CORPUSCULAR HEMOGLOBIN CONC (BEAKER) (test  35.4 GM/DL  32.0-36.0  



 code=752)      

 

 RED CELL DISTRIBUTION WIDTH (BEAKER) (test  12.9 %  10.3-14.2  



 code=412)      

 

 PLATELET COUNT (BEAKER) (test code=756)  98 K/CU MM  150-430  

 

 MEAN PLATELET VOLUME (BEAKER) (test code=754)  8.7 fL  6.5-10.5  

 

 NUCLEATED RED BLOOD CELLS (BEAKER) (test  0 /100 WBC  0-0  



 code=413)      

 

 NEUTROPHILS RELATIVE PERCENT (BEAKER) (test  69 %    



 code=429)      

 

 LYMPHOCYTES RELATIVE PERCENT (BEAKER) (test  22 %    



 code=430)      

 

 MONOCYTES RELATIVE PERCENT (BEAKER) (test  8 %    



 code=431)      

 

 EOSINOPHILS RELATIVE PERCENT (BEAKER) (test  1 %    



 code=432)      

 

 BASOPHILS RELATIVE PERCENT (BEAKER) (test  0 %    



 code=437)      

 

 NEUTROPHILS ABSOLUTE COUNT (BEAKER) (test  3.26 K/ L  1.80-8.00  



 code=670)      

 

 LYMPHOCYTES ABSOLUTE COUNT (BEAKER) (test  1.05 K/ L  1.48-4.50  



 code=414)      

 

 MONOCYTES ABSOLUTE COUNT (BEAKER) (test code=415)  0.38 K/ L  0.00-1.30  

 

 EOSINOPHILS ABSOLUTE COUNT (BEAKER) (test  0.03 K/ L  0.00-0.50  



 code=416)      

 

 BASOPHILS ABSOLUTE COUNT (BEAKER) (test code=417)  0.02 K/ L  0.00-0.20  



0.00CMV PCR, [id number] 17:55:00





 Test Item  Value  Reference Range  Comments

 

 CMV VIRAL LOAD - NEGATIVE  Negative or below the linear    



 (BEAKER) (test code=2558)  range of the assay (<375    



   copies/mL)    



Cytomegalovirus (CMV) infection can cause significant disease in 
immunosuppressed patients. However,it is common for CMV to manifest as a 
limited infection which is of no clinical significance in immunosuppressed 
patients or in healthy individuals.Viral load measurements are helpful to 
identify clinical CMV infection and to guide the pre-emptive management of 
antiviral therapy.  For treatment of CMVinfection due to reactivation in 
transplant recipients, a threshold between 4,000 and 5,000 copies/mL is 
suggested.  For treatment of primary CMV infection, a lower threshold can be 
used.CMV infection may also be monitored using weekly serial measurements. 
Serial measurements of CMV DNA viral load canbe evaluated by identifying a 10-
fold change, as well as assessing the CMV DNA viral load and the clinical 
context for each patient.The plasma CMV DNA viral load was detected using 
quantitative polymerase chain reaction and fluorescent monitoring of a specific 
hybridized probe. Genetic variation and other factors can affect the accuracy 
of nucleic acid testing. Therefore, the results should be interpreted in light 
of clinical data. A negative result may not exclude the presence of CMV 
disease.This test was developed and its performance characteristics determined 
by the [facility] Pathology Department, Section of Molecular 
Pathology. It has not been cleared or approved by the U.S. Food and Drug 
Administration (FDA), since FDA approval is not required for clinical use of 
the test. Validation was done as required by The Clinical Laboratory 
Improvement Amendments of 1988.CREATINE KINASE (CK), TOTAL AND [id number] 18:
40:00





 Test Item  Value  Reference Range  Comments

 

 CREATINE KINASE TOTAL (BEAKER) (test code=380)  36 U/L    

 

 CREATINE KINASE-MB (BEAKER) (test code=750)  0.3 ng/mL  0.0-6.6  

 

 CREATINE KINASE-MB INDEX (BEAKER) (test code=395)  0.8 %    



Effective 2014: CK-MB Reference Range ChangeNew: 0.0-6.6    Previous: 0.0-
4.9CK-MB Reference Range:&lt;6.7      Normal6.7-10.0  Borderline&gt;10.0     
AbnormalTROPONIN -[id number] 18:40:00





 Test Item  Value  Reference Range  Comments

 

 TROPONIN I (BEAKER) (test code=397)  < ng/mL  0.00-0.03  



Effective 2014: Reference Range ChangeNew: 0.00-0.03   Previous 0.00-
0.15Troponin I (TnI) levels must be interpreted in the context of the 
presenting symptoms and the clinical findings. Elevated TnI levels indicate 
myocardial damage, but are not specific for ischemic heart disease. Elevated 
TnI levels are seen in patients with other cardiac conditions (including 
myocarditis and congestive heartfailure), and slight TnI elevations occur in 
patients with other conditions, including sepsis, renalfailure, acidosis, acute 
neurological disease, and persistent tachyarrhythmia.B-TYPE NATRIURETIC FACTOR (
BNP)2017 18:40:00





 Test Item  Value  Reference Range  Comments

 

 B-TYPE NATRIURETIC PEPTIDE (BEAKER) (test code=700)  23 pg/mL  0-100  



[id number] 18:33:00





 Test Item  Value  Reference Range  Comments

 

 MAGNESIUM (BEAKER) (test code=627)  1.8 mg/dL  1.6-2.6  



BASIC METABOLIC [id number] 18:33:00





 Test Item  Value  Reference Range  Comments

 

 SODIUM (BEAKER) (test  140 meq/L  136-145  



 kmfj=819)      

 

 POTASSIUM (BEAKER) (test  4.1 meq/L  3.5-5.1  



 code=379)      

 

 CHLORIDE (BEAKER) (test  105 meq/L    



 code=382)      

 

 CO2 (BEAKER) (test  23 meq/L  22-29  



 code=355)      

 

 BLOOD UREA NITROGEN  18 mg/dL  7-21  



 (BEAKER) (test code=354)      

 

 CREATININE (BEAKER) (test  1.34 mg/dL  0.57-1.25  



 code=358)      

 

 GLUCOSE RANDOM (BEAKER)  100 mg/dL    



 (test code=652)      

 

 CALCIUM (BEAKER) (test  9.2 mg/dL  8.4-10.2  



 code=697)      

 

 EGFR (BEAKER) (test  57 mL/min/1.73 sq m    ESTIMATED GFR IS NOT AS



 code=1092)      ACCURATE AS CREATININE



       CLEARANCE IN PREDICTING



       GLOMERULAR FILTRATION



       RATE. ESTIMATED GFR IS



       NOT APPLICABLE FOR



       DIALYSIS PATIENTS.



CBC W/PLT COUNT &amp; AUTO [id number] 18:24:00





 Test Item  Value  Reference Range  Comments

 

 WHITE BLOOD CELL COUNT (BEAKER) (test code=775)  2.6 K/ L  4.0-10.0  

 

 RED BLOOD CELL COUNT (BEAKER) (test code=761)  5.66 M/ L  4.20-5.80  

 

 HEMOGLOBIN (BEAKER) (test code=410)  16.5 GM/DL  13.0-16.8  

 

 HEMATOCRIT (BEAKER) (test code=411)  49.0 %  40.0-50.0  

 

 MEAN CORPUSCULAR VOLUME (BEAKER) (test code=753)  86.6 fL  82.0-98.0  

 

 MEAN CORPUSCULAR HEMOGLOBIN (BEAKER) (test  29.1 pg  27.0-33.0  



 code=751)      

 

 MEAN CORPUSCULAR HEMOGLOBIN CONC (BEAKER) (test  33.6 GM/DL  32.0-36.0  



 code=752)      

 

 RED CELL DISTRIBUTION WIDTH (BEAKER) (test  13.0 %  10.3-14.2  



 code=412)      

 

 PLATELET COUNT (BEAKER) (test code=756)  87 K/CU MM  150-430  

 

 MEAN PLATELET VOLUME (BEAKER) (test code=754)  8.3 fL  6.5-10.5  

 

 NUCLEATED RED BLOOD CELLS (BEAKER) (test  0 /100 WBC  0-0  



 code=413)      

 

 NEUTROPHILS RELATIVE PERCENT (BEAKER) (test  51 %    



 code=429)      

 

 LYMPHOCYTES RELATIVE PERCENT (BEAKER) (test  30 %    



 code=430)      

 

 MONOCYTES RELATIVE PERCENT (BEAKER) (test  18 %    



 code=431)      

 

 EOSINOPHILS RELATIVE PERCENT (BEAKER) (test  1 %    



 code=432)      

 

 BASOPHILS RELATIVE PERCENT (BEAKER) (test  0 %    



 code=437)      

 

 NEUTROPHILS ABSOLUTE COUNT (BEAKER) (test  1.33 K/ L  1.80-8.00  



 code=670)      

 

 LYMPHOCYTES ABSOLUTE COUNT (BEAKER) (test  0.78 K/ L  1.48-4.50  



 code=414)      

 

 MONOCYTES ABSOLUTE COUNT (BEAKER) (test code=415)  0.47 K/ L  0.00-1.30  

 

 EOSINOPHILS ABSOLUTE COUNT (BEAKER) (test  0.02 K/ L  0.00-0.50  



 code=416)      

 

 BASOPHILS ABSOLUTE COUNT (BEAKER) (test code=417)  0.01 K/ L  0.00-0.20  



0.00POCT-GLUCOSE [id number] 15:37:00





 Test Item  Value  Reference Range  Comments

 

 POC-GLUCOSE METER (BEAKER)  97 mg/dL    TESTED AT [facility] [address]



 (test iejw=1263)      [facility] [address]

## 2019-10-26 NOTE — ER
Nurse's Notes                                                                                     

 [facility]                                                                 

Name: Gomez Romero                                                                               

Age: 49 yrs                                                                                       

Sex: Male                                                                                         

: 1970                                                                                   

MRN: [id number]                                                                                   

Arrival Date: 10/26/2019                                                                          

Time: 17:48                                                                                       

Account#: [id number]                                                                            

Bed 6                                                                                             

Private MD:                                                                                       

Diagnosis: Radiculopathy, cervical region;Muscle spasm                                            

                                                                                                  

Presentation:                                                                                     

10/26                                                                                             

17:52 Presenting complaint: Patient states: sharp right neck pain that radiates to the right  sv  

      arm started 2 weeks ago, reports he woke up and it started like this. Reports headache      

      as well. Pt was here earlier and eloped. Transition of care: patient was not received       

      from another setting of care. Onset of symptoms was 2019. Risk Assessment: Do       

      you want to hurt yourself or someone else? Patient reports no desire to harm self or        

      others. Initial Sepsis Screen: Does the patient meet any 2 criteria? No. Patient's          

      initial sepsis screen is negative. Does the patient have a suspected source of              

      infection? No. Patient's initial sepsis screen is negative. Care prior to arrival: None.    

17:52 Method Of Arrival: Ambulatory                                                           sv  

17:52 Acuity: MILTON 4                                                                           sv  

                                                                                                  

Triage Assessment:                                                                                

17:55 General: Appears in no apparent distress. comfortable, Behavior is cooperative,         bp  

      appropriate for age, anxious. Pain: Complains of pain in right posterior aspect of          

      neck. EENT: No deficits noted. Neuro: No deficits noted. Cardiovascular: No deficits        

      noted. Respiratory: No deficits noted. GI: No signs and/or symptoms were reported           

      involving the gastrointestinal system. : No signs and/or symptoms were reported           

      regarding the genitourinary system. Derm: No deficits noted. Musculoskeletal: No            

      deficits noted.                                                                             

                                                                                                  

Historical:                                                                                       

- Allergies:                                                                                      

17:53 falsecarinate;                                                                          sv  

17:53 unknown rejection medication;                                                           sv  

- PMHx:                                                                                           

17:53 pt denies;                                                                              sv  

- PSHx:                                                                                           

17:53 Heart transplant;                                                                       sv  

                                                                                                  

- Immunization history:: Adult Immunizations up to date.                                          

- Social history:: Smoking status: Patient/guardian denies using tobacco.                         

- Ebola Screening: : No symptoms or risks identified at this time.                                

                                                                                                  

                                                                                                  

Screenin:15 Abuse screen: Denies threats or abuse. Denies injuries from another. Nutritional        bp  

      screening: No deficits noted. Tuberculosis screening: No symptoms or risk factors           

      identified. Fall Risk None identified.                                                      

                                                                                                  

Assessment:                                                                                       

17:55 General: SEE TRIAGE NOTE. Neuro: Level of Consciousness is awake, alert, obeys          bp  

      commands, Oriented to person, place, time, situation, Appropriate for age.                  

18:23 Reassessment: PT D/C HOME AMBULATORY, DX WITH CERVICAL RADICULOPATHY.                   bp  

                                                                                                  

Vital Signs:                                                                                      

17:53  / 75 RA Sitting (auto/reg); Pulse 86; Resp 18; Temp 98.4; Pulse Ox 97% ; Weight  sv  

      104 kg; Height 6 ft. 4 in. (193.04 cm);                                                     

17:53 Body Mass Index 27.91 (104.00 kg, 193.04 cm)                                            sv  

                                                                                                  

ED Course:                                                                                        

17:48 Patient arrived in ED.                                                                  mr  

17:52 Triage completed.                                                                       sv  

17:53 Arm band placed on.                                                                     sv  

17:55 Lasha Verdin, RN is Primary Nurse.                                                    bp  

17:56 Marisela Sanchez FNP-C is PHCP.                                                        kb  

17:56 Lloyd Duff MD is Attending Physician.                                                kb  

18:15 Patient has correct armband on for positive identification. Bed in low position. Call   bp  

      light in reach. Side rails up X2. Adult w/ patient.                                         

18:15 No provider procedures requiring assistance completed. Patient did not have IV access   bp  

      during this emergency room visit.                                                           

                                                                                                  

Administered Medications:                                                                         

No medications were administered                                                                  

                                                                                                  

                                                                                                  

Outcome:                                                                                          

18:07 Discharge ordered by MD.                                                                kb  

18:24 Discharged to home ambulatory.                                                          bp  

18:24 Condition: stable                                                                           

18:24 Discharge instructions given to patient, Instructed on discharge instructions, follow       

      up and referral plans. medication usage, Demonstrated understanding of instructions,        

      follow-up care, medications, Prescriptions given X 2.                                       

18:28 Patient left the ED.                                                                    bp  

                                                                                                  

Signatures:                                                                                       

Marisela Sanchez FNP-C FNP-Ckb Verde, Stephanie, RN                    RN                                                      

MinaNenita                                 mr                                                   

Lasha Verdin, RN                      RN   bp                                                   

                                                                                                  

Corrections: (The following items were deleted from the chart)                                    

17:54 17:53 104 kg; Height 6 ft. 4 in.; BMI: 27.9; sv                                         sv  

17:55 17:53 Pulse 86bpm; Resp 18bpm; Pulse Ox 97%; Temp 98.4F; 104 kg; Height 6 ft. 4 in.;    sv  

      BMI: 27.9; sv                                                                               

                                                                                                  

**************************************************************************************************

## 2019-10-26 NOTE — EDPHYS
Physician Documentation                                                                           

 [facility]                                                                 

Name: Gomez Romero                                                                               

Age: 49 yrs                                                                                       

Sex: Male                                                                                         

: 1970                                                                                   

MRN: [id number]                                                                                   

Arrival Date: 10/26/2019                                                                          

Time: 17:48                                                                                       

Account#: [id number]                                                                            

Bed 6                                                                                             

Private MD:                                                                                       

ED Physician Lloyd Duff                                                                         

HPI:                                                                                              

10/26                                                                                             

18:10 This 49 yrs old  Male presents to ER via Ambulatory with complaints of Neck    kb  

      Problem.                                                                                    

18:10 The patient or guardian complains of pain, that is acute, tenderness. The symptoms are  kb  

      located on the right posterior aspect of neck. Onset: The symptoms/episode                  

      began/occurred 2 week(s) ago. Context: The problem was sustained at home, The neck          

      injury/problem resulted from from unknown cause. Associated signs and symptoms: The         

      patient has no apparent associated signs or symptoms, The patient denies any alcohol        

      use. The patient is not apparently intoxicated. No neurological symptoms were               

      experienced by the patient prior to arrival in the emergency department. The pain           

      radiates to the posterior aspect of right shoulder. Modifying factors: The symptoms are     

      alleviated by nothing. the symptoms are aggravated by movement. Severity of symptoms:       

      At their worst the symptoms were mild, moderate, in the emergency department the            

      symptoms are unchanged. The patient has not experienced similar symptoms in the past.       

      The patient has not recently seen a physician. Pt reports he woke up with right sided       

      neck pain 2 weeks ago and it hasn't gotten any better.                                      

                                                                                                  

Historical:                                                                                       

- Allergies:                                                                                      

17:53 falsecarinate;                                                                          sv  

17:53 unknown rejection medication;                                                           sv  

- PMHx:                                                                                           

17:53 pt denies;                                                                              sv  

- PSHx:                                                                                           

17:53 Heart transplant;                                                                       sv  

                                                                                                  

- Immunization history:: Adult Immunizations up to date.                                          

- Social history:: Smoking status: Patient/guardian denies using tobacco.                         

- Ebola Screening: : No symptoms or risks identified at this time.                                

                                                                                                  

                                                                                                  

ROS:                                                                                              

18:09 Constitutional: Negative for fever, chills, and weight loss, ENT: Negative for injury,  kb  

      pain, and discharge, Cardiovascular: Negative for chest pain, palpitations, and edema,      

      Respiratory: Negative for shortness of breath, cough, wheezing, and pleuritic chest         

      pain, Abdomen/GI: Negative for abdominal pain, nausea, vomiting, diarrhea, and              

      constipation, MS/Extremity: Negative for injury and deformity, Skin: Negative for           

      injury, rash, and discoloration, Neuro: Negative for headache, weakness, numbness,          

      tingling, and seizure.                                                                      

18:09 Neck: Positive for pain with movement, pain at rest.                                        

                                                                                                  

Exam:                                                                                             

18:10 Constitutional:  This is a well developed, well nourished patient who is awake, alert,  kb  

      and in no acute distress. Head/Face:  Normocephalic, atraumatic. ENT:  Nares patent. No     

      nasal discharge, no septal abnormalities noted.  Tympanic membranes are normal and          

      external auditory canals are clear.  Oropharynx with no redness, swelling, or masses,       

      exudates, or evidence of obstruction, uvula midline.  Mucous membranes moist.               

      Chest/axilla:  Normal chest wall appearance and motion.  Nontender with no deformity.       

      No lesions are appreciated. Cardiovascular:  Regular rate and rhythm with a normal S1       

      and S2.  No gallops, murmurs, or rubs.  Normal PMI, no JVD.  No pulse deficits.             

      Respiratory:  Lungs have equal breath sounds bilaterally, clear to auscultation and         

      percussion.  No rales, rhonchi or wheezes noted.  No increased work of breathing, no        

      retractions or nasal flaring. Abdomen/GI:  Soft, non-tender, with normal bowel sounds.      

      No distension or tympany.  No guarding or rebound.  No evidence of tenderness               

      throughout. Back:  No spinal tenderness.  No costovertebral tenderness.  Full range of      

      motion. Skin:  Warm, dry with normal turgor.  Normal color with no rashes, no lesions,      

      and no evidence of cellulitis. MS/ Extremity:  Pulses equal, no cyanosis.                   

      Neurovascular intact.  Full, normal range of motion. Neuro:  Awake and alert, GCS 15,       

      oriented to person, place, time, and situation.  Cranial nerves II-XII grossly intact.      

      Motor strength 5/5 in all extremities.  Sensory grossly intact.  Cerebellar exam            

      normal.  Normal gait.                                                                       

18:10 Neck: External neck: tenderness, that is moderate, of the  right posterior aspect of        

      neck, C-spine: appears grossly normal, no vertebral tenderness, no crepitus,                

      ROM/movement: pain, that is mild, with rotation to the right.                               

                                                                                                  

Vital Signs:                                                                                      

17:53  / 75 RA Sitting (auto/reg); Pulse 86; Resp 18; Temp 98.4; Pulse Ox 97% ; Weight  sv  

      104 kg; Height 6 ft. 4 in. (193.04 cm);                                                     

17:53 Body Mass Index 27.91 (104.00 kg, 193.04 cm)                                              

                                                                                                  

MDM:                                                                                              

17:56 Patient medically screened.                                                             kb  

18:05 Data reviewed: vital signs, nurses notes. Data interpreted: Pulse oximetry: on room air kb  

      is 97 %. Interpretation: normal. Counseling: I had a detailed discussion with the           

      patient and/or guardian regarding: the historical points, exam findings, and any            

      diagnostic results supporting the discharge/admit diagnosis, the need for outpatient        

      follow up, a family practitioner, to return to the emergency department if symptoms         

      worsen or persist or if there are any questions or concerns that arise at home.             

                                                                                                  

Administered Medications:                                                                         

No medications were administered                                                                  

                                                                                                  

                                                                                                  

Disposition:                                                                                      

18:31 Co-signature as Attending Physician, Lloyd Duff MD.                                    rn  

                                                                                                  

Disposition:                                                                                      

10/26/19 18:07 Discharged to Home. Impression: Radiculopathy, cervical region, Muscle spasm.      

- Condition is Stable.                                                                            

- Discharge Instructions: Muscle Cramps and Spasms, Easy-to-Read, Cervical                        

  Radiculopathy, Easy-to-Read.                                                                    

- Prescriptions for Cyclobenzaprine 10 mg Oral Tablet - take 1 tablet by ORAL route               

  every 8 hours As needed; 30 tablet. Medrol (Khoi) 4 mg Oral Tablets, Dose Pack - take            

  1 tablet by ORAL route as directed - follow package instructions; 1 packet.                     

- Medication Reconciliation Form, Thank You Letter, Antibiotic Education, Prescription            

  Opioid Use form.                                                                                

- Follow up: Emergency Department; When: As needed; Reason: Worsening of condition.               

  Follow up: Private Physician; When: 2 - 3 days; Reason: Recheck today's complaints,             

  Continuance of care, Re-evaluation by your physician.                                           

                                                                                                  

                                                                                                  

                                                                                                  

Signatures:                                                                                       

Marisela Sanchez, GONZALO-C                 SANJUANAP-Anusha Powers, RN                    RN   Lloyd Claire MD MD rn Peltier, Brian, RN                      RN   bp                                                   

                                                                                                  

Corrections: (The following items were deleted from the chart)                                    

18:15 18:10 Neck: External neck: tenderness, that is moderate, of the right posterior aspect  kb  

      of neck, C-spine: appears grossly normal, no vertebral tenderness, no crepitus,             

      ROM/movement: pain, that is mild, with rotation to the right, kb                            

18:28 18:07 10/26/2019 18:07 Discharged to Home. Impression: Radiculopathy, cervical region;  bp  

      Muscle spasm. Condition is Stable. Forms are Medication Reconciliation Form, Thank You      

      Letter, Antibiotic Education, Prescription Opioid Use. Follow up: Emergency Department;     

      When: As needed; Reason: Worsening of condition. Follow up: Private Physician; When: 2      

      - 3 days; Reason: Recheck today's complaints, Continuance of care, Re-evaluation by         

      your physician. kb                                                                          

                                                                                                  

**************************************************************************************************

## 2019-10-26 NOTE — ER
Nurse's Notes                                                                                     

 [facility]                                                                 

Name: Gomez Romero                                                                               

Age: 49 yrs                                                                                       

Sex: Male                                                                                         

: 1970                                                                                   

MRN: [id number]                                                                                   

Arrival Date: 10/26/2019                                                                          

Time: 12:19                                                                                       

Account#: [id number]                                                                            

Bed Waiting                                                                                       

Private MD: None, None                                                                            

Diagnosis:                                                                                        

                                                                                                  

Presentation:                                                                                     

10/26                                                                                             

12:25 Presenting complaint: Patient states: sharp right neck pain that radiates to the right  sv  

      arm started 2 weeks ago, reports he woke up and it started like this. Reports headache      

      as well. Transition of care: patient was not received from another setting of care.         

      Onset of symptoms was 2019. Risk Assessment: Do you want to hurt yourself or        

      someone else? Patient reports no desire to harm self or others. Care prior to arrival:      

      None.                                                                                       

12:25 Method Of Arrival: Ambulatory                                                           sv  

12:25 Acuity: MILTON 4                                                                           sv  

                                                                                                  

Triage Assessment:                                                                                

12:25 General: Appears in no apparent distress. uncomfortable, Behavior is calm, cooperative, sv  

      appropriate for age. Pain: Complains of pain in right trapezius, anterior aspect of         

      right shoulder and right posterior aspect of neck. Neuro: Level of Consciousness is         

      awake, alert, obeys commands, Gait is steady. Respiratory: Respiratory effort is even,      

      unlabored.                                                                                  

                                                                                                  

Historical:                                                                                       

- Allergies:                                                                                      

12:27 unknown rejection medication;                                                           sv  

12:27 falsecarinate;                                                                          sv  

- PMHx:                                                                                           

12:27 pt denies;                                                                              sv  

- PSHx:                                                                                           

12:27 Heart transplant;                                                                       sv  

                                                                                                  

                                                                                                  

                                                                                                  

Assessment:                                                                                       

13:27 Reassessment: Pt stated that he got called into work and had to leave and would be back sv  

      tonight.                                                                                    

                                                                                                  

Vital Signs:                                                                                      

12:27  / 85; Pulse 90; Resp 16; Temp 98.2; Pulse Ox 98% ; Weight 104.33 kg; Height 6    sv  

      ft. 4 in. (193.04 cm);                                                                      

12:27 Body Mass Index 28.00 (104.33 kg, 193.04 cm)                                            sv  

                                                                                                  

ED Course:                                                                                        

12:19 Patient arrived in ED.                                                                  ag5 

12:20 None, None is Private Physician.                                                        ag5 

12:26 Triage completed.                                                                       sv  

12:27 Arm band placed on.                                                                     sv  

                                                                                                  

Administered Medications:                                                                         

No medications were administered                                                                  

                                                                                                  

                                                                                                  

Outcome:                                                                                          

13:27 Patient left the ED.                                                                    sv  

                                                                                                  

Signatures:                                                                                       

Anusha Vang RN                    RN                                                      

Jaskaran Bolton                                ag                                                  

                                                                                                  

**************************************************************************************************

## 2019-12-28 NOTE — XMS REPORT
Patient Summary Document

 Created on:2019



Patient:TYRA BRUNO

Sex:Male

:1970

External Reference #:[id number]





Demographics







 Address  [address] [place] [address]

 

 Home Phone  [phone]

 

 Work Phone  [phone]

 

 Email Address  [email or web address]

 

 Preferred Language  Unknown

 

 Marital Status  Unknown

 

 Jain Affiliation  Unknown

 

 Race  Unknown

 

 Additional Race(s)  Unavailable

 

 Ethnic Group  Unknown









Author







 Organization  [facility]

 

 Address  [address]



   [place] [address]

 

 Phone  [phone]









Care Team Providers







 Name  Role  Phone

 

 FAWAD SHIRLEY HERNANDEZ  Unavailable  Unavailable

 

 MAURO AWAD  Unavailable  Unavailable

 

 NORBERTO SILVESTRE  Unavailable  Unavailable









Problems

This patient has no known problems.



Allergies, Adverse Reactions, Alerts

This patient has no known allergies or adverse reactions.



Medications

This patient has no known medications.



Results







 Test Description  Test Time  Test Comments  Text Results  Atomic Results  
Result Comments









 TACROLIMUS LEVEL  2019 11:25:00    









   Test Item  Value  Reference Range  Comments









 TACROLIMUS BLOOD (BEAKER) (test code=657)  6.9 ng/mL  10.0-20.0  



BASIC METABOLIC [id number] 08:24:00





 Test Item  Value  Reference Range  Comments

 

 SODIUM (BEAKER) (test  137 meq/L  136-145  



 ylxv=314)      

 

 POTASSIUM (BEAKER) (test  4.3 meq/L  3.5-5.1  



 code=379)      

 

 CHLORIDE (BEAKER) (test  103 meq/L    



 code=382)      

 

 CO2 (BEAKER) (test  28 meq/L  22-29  



 code=355)      

 

 BLOOD UREA NITROGEN  18 mg/dL  7-21  



 (BEAKER) (test code=354)      

 

 CREATININE (BEAKER) (test  1.12 mg/dL  0.57-1.25  



 code=358)      

 

 GLUCOSE RANDOM (BEAKER)  98 mg/dL    



 (test code=652)      

 

 CALCIUM (BEAKER) (test  9.1 mg/dL  8.4-10.2  



 code=697)      

 

 EGFR (BEAKER) (test  69 mL/min/1.73 sq m    ESTIMATED GFR IS NOT AS



 code=1092)      ACCURATE AS CREATININE



       CLEARANCE IN PREDICTING



       GLOMERULAR FILTRATION



       RATE. ESTIMATED GFR IS



       NOT APPLICABLE FOR



       DIALYSIS PATIENTS.



CBC W/PLT COUNT &amp; AUTO [id number] 08:01:00





 Test Item  Value  Reference Range  Comments

 

 WHITE BLOOD CELL COUNT (BEAKER) (test code=775)  5.4 K/ L  3.5-10.5  

 

 RED BLOOD CELL COUNT (BEAKER) (test code=761)  5.35 M/ L  4.63-6.08  

 

 HEMOGLOBIN (BEAKER) (test code=410)  15.6 GM/DL  13.7-17.5  

 

 HEMATOCRIT (BEAKER) (test code=411)  46.9 %  40.1-51.0  

 

 MEAN CORPUSCULAR VOLUME (BEAKER) (test code=753)  87.7 fL  79.0-92.2  

 

 MEAN CORPUSCULAR HEMOGLOBIN (BEAKER) (test  29.2 pg  25.7-32.2  



 dbuo=627)      

 

 MEAN CORPUSCULAR HEMOGLOBIN CONC (BEAKER) (test  33.3 GM/DL  32.3-36.5  



 code=752)      

 

 RED CELL DISTRIBUTION WIDTH (BEAKER) (test  13.6 %  11.6-14.4  



 code=412)      

 

 PLATELET COUNT (BEAKER) (test code=756)  132 K/CU MM  150-450  

 

 MEAN PLATELET VOLUME (BEAKER) (test code=754)  9.3 fL  9.4-12.4  

 

 NUCLEATED RED BLOOD CELLS (BEAKER) (test  0 /100 WBC  0-0  



 code=413)      

 

 NEUTROPHILS RELATIVE PERCENT (BEAKER) (test  70 %    



 code=429)      

 

 LYMPHOCYTES RELATIVE PERCENT (BEAKER) (test  16 %    



 code=430)      

 

 MONOCYTES RELATIVE PERCENT (BEAKER) (test  8 %    



 code=431)      

 

 EOSINOPHILS RELATIVE PERCENT (BEAKER) (test  5 %    



 code=432)      

 

 BASOPHILS RELATIVE PERCENT (BEAKER) (test  1 %    



 code=437)      

 

 NEUTROPHILS ABSOLUTE COUNT (BEAKER) (test  3.77 K/ L  1.78-5.38  



 code=670)      

 

 LYMPHOCYTES ABSOLUTE COUNT (BEAKER) (test  0.83 K/ L  1.32-3.57  



 code=414)      

 

 MONOCYTES ABSOLUTE COUNT (BEAKER) (test  0.45 K/ L  0.30-0.82  



 code=415)      

 

 EOSINOPHILS ABSOLUTE COUNT (BEAKER) (test  0.25 K/ L  0.04-0.54  



 code=416)      

 

 BASOPHILS ABSOLUTE COUNT (BEAKER) (test  0.04 K/ L  0.01-0.08  



 code=417)      

 

 IMMATURE GRANULOCYTES-RELATIVE PERCENT (BEAKER)  0 %  0-1  



 (test code=2801)      



CT, BRAIN, WITHOUT [id number] 17:41:00NEW CARDIOLOGIST OBERTONFINAL 
REPORT PATIENT ID:   [id number] CT, BRAIN, WITHOUT CONTRAST INDICATION: head 
injury TECHNIQUE: Noncontrast axial imaging was obtained from the vertex to the 
skull base. Axial images were reconstructed using a bone algorithm. DOSE 
REDUCTION: Dose modulation, iterative reconstruction, and/or weight-based 
adjustment of the mA/kV was utilized to reduce the radiation dose to as low as 
reasonably achievable. COMPARISON: None. FINDINGS: Cerebral parenchyma: Mild 
cerebral volume loss is present. No recent infarct or parenchymal hemorrhage is 
present.Midline structures: Normally positioned.Cerebellum and brainstem: 
Commensurate volume loss.Ventricles: Normal volume.Extra-axial spaces: 
Unremarkable. Calvarium and skull base: Intact.Paranasal sinuses and mastoid 
air cells: Visible chambers are clear.Orbital contents: Included portions 
unremarkable. Additional findings: None.  IMPRESSION:  Chronic involutional 
changes without acute or traumatic intracranial abnormality. Signed: 
JR Aguila RobertMDReport Verified Date/Time:  2019 17:41:54 
Reading Location: [facility] Radiology Reading Room    Electronically signed 
by: MOHIT AGUILA on 2019 05:41 PMTACROLIMUS [id number] 10:
33:00





 Test Item  Value  Reference Range  Comments

 

 TACROLIMUS BLOOD (BEAKER) (test code=657)  6.2 ng/mL  10.0-20.0  



[id number] 09:15:00





 Test Item  Value  Reference Range  Comments

 

 PROSTATE SPECIFIC ANTIGEN (BEAKER) (test code=844)  0.5 ng/mL  0.0-4.0  



LIPID [id number] 09:01:00





 Test Item  Value  Reference Range  Comments

 

 TRIGLYCERIDES (BEAKER) (test code=540)  63 mg/dL    

 

 CHOLESTEROL (BEAKER) (test code=631)  126 mg/dL    

 

 HDL CHOLESTEROL (BEAKER) (test code=976)  42 mg/dL    

 

 LDL CHOLESTEROL CALCULATED (BEAKER) (test  71 mg/dL    



 code=633)      



Triglyceride Reference Range:   Low Risk         &lt;150   Borderline    150-
199   High Risk     200-499   Very High Risk  &gt;=500Cholesterol Reference 
Range:   Low Risk         &lt;200   Borderline 200-239    High Risk        &gt;
240HDL Cholesterol Reference Range:   Low Risk         &gt;=60   High Risk     
    &lt;40LDL Cholesterol Reference Range:   Optimal          &lt;100   Near 
Optimal  100-129   Borderline    130-159   High          160-189   Very High   
    &gt;=190BASIC METABOLIC [id number] 09:01:00





 Test Item  Value  Reference Range  Comments

 

 SODIUM (BEAKER) (test  143 meq/L  136-145  



 zlrk=243)      

 

 POTASSIUM (BEAKER) (test  4.3 meq/L  3.5-5.1  



 code=379)      

 

 CHLORIDE (BEAKER) (test  105 meq/L    



 code=382)      

 

 CO2 (BEAKER) (test  30 meq/L  22-29  



 code=355)      

 

 BLOOD UREA NITROGEN  16 mg/dL  7-21  



 (BEAKER) (test code=354)      

 

 CREATININE (BEAKER) (test  0.96 mg/dL  0.57-1.25  



 code=358)      

 

 GLUCOSE RANDOM (BEAKER)  108 mg/dL    



 (test code=652)      

 

 CALCIUM (BEAKER) (test  9.7 mg/dL  8.4-10.2  



 code=697)      

 

 EGFR (BEAKER) (test  83 mL/min/1.73 sq m    ESTIMATED GFR IS NOT AS



 code=1092)      ACCURATE AS CREATININE



       CLEARANCE IN PREDICTING



       GLOMERULAR FILTRATION



       RATE. ESTIMATED GFR IS



       NOT APPLICABLE FOR



       DIALYSIS PATIENTS.



HEPATIC FUNCTION [id number] 09:01:00





 Test Item  Value  Reference Range  Comments

 

 TOTAL PROTEIN (BEAKER) (test code=770)  7.0 gm/dL  6.0-8.3  

 

 ALBUMIN (BEAKER) (test code=1145)  4.5 g/dL  3.5-5.0  

 

 BILIRUBIN TOTAL (BEAKER) (test code=377)  0.5 mg/dL  0.2-1.2  

 

 BILIRUBIN DIRECT (BEAKER) (test code=706)  0.3 mg/dL  0.1-0.5  

 

 ALKALINE PHOSPHATASE (BEAKER) (test code=346)  81 U/L    

 

 AST (SGOT) (BEAKER) (test code=353)  18 U/L  5-34  

 

 ALT (SGPT) (BEAKER) (test code=347)  14 U/L  6-55  



RAD, CHEST, 2 [id number] 08:48:00NEW CARDIOLOGIST OBERTONReason for Exam:-&
gt;heart transplant annual follow upFINAL REPORT PATIENT ID:   [id number] 
INDICATION: heart transplant annual follow up COMPARISON: May 23, 2018 TECHNIQUE
: Chest radiograph, two views, PA and lateral. FINDINGS / IMPRESSION:Lung 
volumes arenormal and lungs are clear. Intact median sternotomy wires and left 
axillary/subclavian surgical clips again demonstrated from prior heart 
transplant. Cardiac and mediastinal contours normal. No pleural effusion or 
pneumothorax. Osseous structures unremarkable. Signed: Marlee Calhoun 
MDReport Verified Date/Time:  2019 08:48:33 Reading Location: [facility] Radiology Reading Room Electronically signed by: MARLEE CALHOUN M.D. on 2019 08:48 AMCBC W/PLT COUNT &amp; AUTO [id number] 08:
28:00





 Test Item  Value  Reference Range  Comments

 

 WHITE BLOOD CELL COUNT (BEAKER) (test code=775)  5.2 K/ L  3.5-10.5  

 

 RED BLOOD CELL COUNT (BEAKER) (test code=761)  5.18 M/ L  4.63-6.08  

 

 HEMOGLOBIN (BEAKER) (test code=410)  15.1 GM/DL  13.7-17.5  

 

 HEMATOCRIT (BEAKER) (test code=411)  45.4 %  40.1-51.0  

 

 MEAN CORPUSCULAR VOLUME (BEAKER) (test code=753)  87.6 fL  79.0-92.2  

 

 MEAN CORPUSCULAR HEMOGLOBIN (BEAKER) (test  29.2 pg  25.7-32.2  



 iefi=414)      

 

 MEAN CORPUSCULAR HEMOGLOBIN CONC (BEAKER) (test  33.3 GM/DL  32.3-36.5  



 code=752)      

 

 RED CELL DISTRIBUTION WIDTH (BEAKER) (test  13.3 %  11.6-14.4  



 code=412)      

 

 PLATELET COUNT (BEAKER) (test code=756)  135 K/CU MM  150-450  

 

 MEAN PLATELET VOLUME (BEAKER) (test code=754)  9.6 fL  9.4-12.4  

 

 NUCLEATED RED BLOOD CELLS (BEAKER) (test  0 /100 WBC  0-0  



 code=413)      

 

 NEUTROPHILS RELATIVE PERCENT (BEAKER) (test  55 %    



 code=429)      

 

 LYMPHOCYTES RELATIVE PERCENT (BEAKER) (test  29 %    



 code=430)      

 

 MONOCYTES RELATIVE PERCENT (BEAKER) (test  10 %    



 code=431)      

 

 EOSINOPHILS RELATIVE PERCENT (BEAKER) (test  5 %    



 code=432)      

 

 BASOPHILS RELATIVE PERCENT (BEAKER) (test  1 %    



 code=437)      

 

 NEUTROPHILS ABSOLUTE COUNT (BEAKER) (test  2.88 K/ L  1.78-5.38  



 code=670)      

 

 LYMPHOCYTES ABSOLUTE COUNT (BEAKER) (test  1.52 K/ L  1.32-3.57  



 code=414)      

 

 MONOCYTES ABSOLUTE COUNT (BEAKER) (test  0.51 K/ L  0.30-0.82  



 code=415)      

 

 EOSINOPHILS ABSOLUTE COUNT (BEAKER) (test  0.24 K/ L  0.04-0.54  



 code=416)      

 

 BASOPHILS ABSOLUTE COUNT (BEAKER) (test  0.05 K/ L  0.01-0.08  



 code=417)      

 

 IMMATURE GRANULOCYTES-RELATIVE PERCENT (BEAKER)  1 %  0-1  



 (test code=2801)      



TACROLIMUS [id number] 13:15:00





 Test Item  Value  Reference Range  Comments

 

 TACROLIMUS BLOOD (BEAKER) (test code=657)  6.9 ng/mL  10.0-20.0  



BASIC METABOLIC [id number] 10:45:00





 Test Item  Value  Reference Range  Comments

 

 SODIUM (BEAKER) (test  139 meq/L  136-145  



 ydoh=990)      

 

 POTASSIUM (BEAKER) (test  4.8 meq/L  3.5-5.1  



 code=379)      

 

 CHLORIDE (BEAKER) (test  102 meq/L    



 code=382)      

 

 CO2 (BEAKER) (test  30 meq/L  22-29  



 code=355)      

 

 BLOOD UREA NITROGEN  14 mg/dL  7-21  



 (BEAKER) (test code=354)      

 

 CREATININE (BEAKER) (test  0.95 mg/dL  0.57-1.25  



 code=358)      

 

 GLUCOSE RANDOM (BEAKER)  88 mg/dL    



 (test code=652)      

 

 CALCIUM (BEAKER) (test  10.1 mg/dL  8.4-10.2  



 code=697)      

 

 EGFR (BEAKER) (test  84 mL/min/1.73 sq m    ESTIMATED GFR IS NOT AS



 code=1092)      ACCURATE AS CREATININE



       CLEARANCE IN PREDICTING



       GLOMERULAR FILTRATION



       RATE. ESTIMATED GFR IS



       NOT APPLICABLE FOR



       DIALYSIS PATIENTS.



CBC W/PLT COUNT &amp; AUTO [id number] 10:31:00





 Test Item  Value  Reference Range  Comments

 

 WHITE BLOOD CELL COUNT (BEAKER) (test code=775)  5.0 K/ L  3.5-10.5  

 

 RED BLOOD CELL COUNT (BEAKER) (test code=761)  5.55 M/ L  4.63-6.08  

 

 HEMOGLOBIN (BEAKER) (test code=410)  15.8 GM/DL  13.7-17.5  

 

 HEMATOCRIT (BEAKER) (test code=411)  47.9 %  40.1-51.0  

 

 MEAN CORPUSCULAR VOLUME (BEAKER) (test code=753)  86.3 fL  79.0-92.2  

 

 MEAN CORPUSCULAR HEMOGLOBIN (BEAKER) (test  28.5 pg  25.7-32.2  



 vqbm=757)      

 

 MEAN CORPUSCULAR HEMOGLOBIN CONC (BEAKER) (test  33.0 GM/DL  32.3-36.5  



 code=752)      

 

 RED CELL DISTRIBUTION WIDTH (BEAKER) (test  13.2 %  11.6-14.4  



 code=412)      

 

 PLATELET COUNT (BEAKER) (test code=756)  131 K/CU MM  150-450  

 

 MEAN PLATELET VOLUME (BEAKER) (test code=754)  9.5 fL  9.4-12.4  

 

 NUCLEATED RED BLOOD CELLS (BEAKER) (test  0 /100 WBC  0-0  



 code=413)      

 

 NEUTROPHILS RELATIVE PERCENT (BEAKER) (test  56 %    



 code=429)      

 

 LYMPHOCYTES RELATIVE PERCENT (BEAKER) (test  26 %    



 code=430)      

 

 MONOCYTES RELATIVE PERCENT (BEAKER) (test  12 %    



 code=431)      

 

 EOSINOPHILS RELATIVE PERCENT (BEAKER) (test  4 %    



 code=432)      

 

 BASOPHILS RELATIVE PERCENT (BEAKER) (test  1 %    



 code=437)      

 

 NEUTROPHILS ABSOLUTE COUNT (BEAKER) (test  2.80 K/ L  1.78-5.38  



 code=670)      

 

 LYMPHOCYTES ABSOLUTE COUNT (BEAKER) (test  1.32 K/ L  1.32-3.57  



 code=414)      

 

 MONOCYTES ABSOLUTE COUNT (BEAKER) (test  0.59 K/ L  0.30-0.82  



 code=415)      

 

 EOSINOPHILS ABSOLUTE COUNT (BEAKER) (test  0.21 K/ L  0.04-0.54  



 code=416)      

 

 BASOPHILS ABSOLUTE COUNT (BEAKER) (test  0.04 K/ L  0.01-0.08  



 code=417)      

 

 IMMATURE GRANULOCYTES-RELATIVE PERCENT (BEAKER)  1 %  0-1  



 (test code=2801)      



BASIC METABOLIC [id number] 09:28:00





 Test Item  Value  Reference Range  Comments

 

 SODIUM (BEAKER) (test  140 meq/L  136-145  



 rxia=575)      

 

 POTASSIUM (BEAKER) (test  4.7 meq/L  3.5-5.1  



 code=379)      

 

 CHLORIDE (BEAKER) (test  103 meq/L    



 code=382)      

 

 CO2 (BEAKER) (test  30 meq/L  22-29  



 code=355)      

 

 BLOOD UREA NITROGEN  20 mg/dL  7-21  



 (BEAKER) (test code=354)      

 

 CREATININE (BEAKER) (test  1.08 mg/dL  0.57-1.25  



 code=358)      

 

 GLUCOSE RANDOM (BEAKER)  110 mg/dL    



 (test code=652)      

 

 CALCIUM (BEAKER) (test  10.0 mg/dL  8.4-10.2  



 code=697)      

 

 EGFR (BEAKER) (test  73 mL/min/1.73 sq m    ESTIMATED GFR IS NOT AS



 code=1092)      ACCURATE AS CREATININE



       CLEARANCE IN PREDICTING



       GLOMERULAR FILTRATION



       RATE. ESTIMATED GFR IS



       NOT APPLICABLE FOR



       DIALYSIS PATIENTS.



CBC W/PLT COUNT &amp; AUTO [id number] 08:58:00





 Test Item  Value  Reference Range  Comments

 

 WHITE BLOOD CELL COUNT (BEAKER) (test code=775)  4.9 K/ L  3.5-10.5  

 

 RED BLOOD CELL COUNT (BEAKER) (test code=761)  5.37 M/ L  4.63-6.08  

 

 HEMOGLOBIN (BEAKER) (test code=410)  15.5 GM/DL  13.7-17.5  

 

 HEMATOCRIT (BEAKER) (test code=411)  46.8 %  40.1-51.0  

 

 MEAN CORPUSCULAR VOLUME (BEAKER) (test code=753)  87.2 fL  79.0-92.2  

 

 MEAN CORPUSCULAR HEMOGLOBIN (BEAKER) (test  28.9 pg  25.7-32.2  



 lvzy=963)      

 

 MEAN CORPUSCULAR HEMOGLOBIN CONC (BEAKER) (test  33.1 GM/DL  32.3-36.5  



 code=752)      

 

 RED CELL DISTRIBUTION WIDTH (BEAKER) (test  13.3 %  11.6-14.4  



 code=412)      

 

 PLATELET COUNT (BEAKER) (test code=756)  130 K/CU MM  150-450  

 

 MEAN PLATELET VOLUME (BEAKER) (test code=754)  9.6 fL  9.4-12.4  

 

 NUCLEATED RED BLOOD CELLS (BEAKER) (test  0 /100 WBC  0-0  



 code=413)      

 

 NEUTROPHILS RELATIVE PERCENT (BEAKER) (test  65 %    



 code=429)      

 

 LYMPHOCYTES RELATIVE PERCENT (BEAKER) (test  20 %    



 code=430)      

 

 MONOCYTES RELATIVE PERCENT (BEAKER) (test  10 %    



 code=431)      

 

 EOSINOPHILS RELATIVE PERCENT (BEAKER) (test  4 %    



 code=432)      

 

 BASOPHILS RELATIVE PERCENT (BEAKER) (test  1 %    



 code=437)      

 

 NEUTROPHILS ABSOLUTE COUNT (BEAKER) (test  3.16 K/ L  1.78-5.38  



 code=670)      

 

 LYMPHOCYTES ABSOLUTE COUNT (BEAKER) (test  0.99 K/ L  1.32-3.57  



 code=414)      

 

 MONOCYTES ABSOLUTE COUNT (BEAKER) (test  0.49 K/ L  0.30-0.82  



 code=415)      

 

 EOSINOPHILS ABSOLUTE COUNT (BEAKER) (test  0.17 K/ L  0.04-0.54  



 code=416)      

 

 BASOPHILS ABSOLUTE COUNT (BEAKER) (test  0.04 K/ L  0.01-0.08  



 code=417)      

 

 IMMATURE GRANULOCYTES-RELATIVE PERCENT (BEAKER)  1 %  0-1  



 (test code=2801)      



TACROLIMUS [id number] 13:26:00





 Test Item  Value  Reference Range  Comments

 

 TACROLIMUS BLOOD (BEAKER) (test code=657)  4.1 ng/mL  10.0-20.0  



RAD, CHEST, 2 [id number] 14:13:00NEW CARDIOLOGIST OBERTONReason for Exam:-&
gt;heart transplant annual follow upFINAL REPORT PATIENT ID:   [id number] Chest 
two views INDICATION: Heart transplant annual follow-up. COMPARISON: 2017 
IMPRESSION: There is no focal consolidation, vascular congestion, pleural 
effusion, or pneumothorax. Heart size is within normal limits. Median 
sternotomy changes, left axillary surgical clips, and gastric sleeve with small 
hiatal hernia are again noted. No significant change since 2017. Signed: 
Richard Nickerson MDReport Verified Date/Time:  2018 14:13:48 Reading 
Location: [facility] C0[facility] Consult Reading Room   Electronically signed by: 
RICHARD NICKERSON M.D. on 2018 02:13 PMTACROLIMUS [id number] 13:55
:00





 Test Item  Value  Reference Range  Comments

 

 TACROLIMUS BLOOD (BEAKER) (test code=657)  4.7 ng/mL  10.0-20.0  



COMPREHENSIVE METABOLIC [id number] 12:11:00





 Test Item  Value  Reference Range  Comments

 

 TOTAL PROTEIN (BEAKER)  6.5 gm/dL  6.0-8.3  



 (test code=770)      

 

 ALBUMIN (BEAKER) (test  4.2 g/dL  3.5-5.0  



 code=1145)      

 

 ALKALINE PHOSPHATASE  98 U/L    



 (BEAKER) (test code=346)      

 

 BILIRUBIN TOTAL (BEAKER)  0.4 mg/dL  0.2-1.2  



 (test code=377)      

 

 SODIUM (BEAKER) (test  141 meq/L  136-145  



 zibd=602)      

 

 POTASSIUM (BEAKER) (test  4.3 meq/L  3.5-5.1  



 code=379)      

 

 CHLORIDE (BEAKER) (test  103 meq/L    



 code=382)      

 

 CO2 (BEAKER) (test  29 meq/L  22-29  



 code=355)      

 

 BLOOD UREA NITROGEN  17 mg/dL  7-21  



 (BEAKER) (test code=354)      

 

 CREATININE (BEAKER) (test  1.00 mg/dL  0.57-1.25  



 code=358)      

 

 GLUCOSE RANDOM (BEAKER)  101 mg/dL    



 (test code=652)      

 

 CALCIUM (BEAKER) (test  9.5 mg/dL  8.4-10.2  



 code=697)      

 

 AST (SGOT) (BEAKER) (test  15 U/L  5-34  



 code=353)      

 

 ALT (SGPT) (BEAKER) (test  12 U/L  6-55  



 code=347)      

 

 EGFR (BEAKER) (test  79 mL/min/1.73 sq m    ESTIMATED GFR IS NOT AS



 code=1092)      ACCURATE AS CREATININE



       CLEARANCE IN PREDICTING



       GLOMERULAR FILTRATION



       RATE. ESTIMATED GFR IS



       NOT APPLICABLE FOR



       DIALYSIS PATIENTS.



LIPID [id number] 11:47:00





 Test Item  Value  Reference Range  Comments

 

 TRIGLYCERIDES (BEAKER) (test code=540)  87 mg/dL    

 

 CHOLESTEROL (BEAKER) (test code=631)  127 mg/dL    

 

 HDL CHOLESTEROL (BEAKER) (test code=976)  37 mg/dL    

 

 LDL CHOLESTEROL CALCULATED (BEAKER) (test  73 mg/dL    



 code=633)      



Triglyceride Reference Range:   Low Risk         &lt;150   Borderline    150-
199   High Risk     200-499   Very High Risk  &gt;=500Cholesterol Reference 
Range:   Low Risk         &lt;200   Borderline 200-239    High Risk        &gt;
240HDL Cholesterol Reference Range:   Low Risk         &gt;=60   High Risk     
    &lt;40LDL Cholesterol Reference Range:   Optimal          &lt;100   Near 
Optimal  100-129   Borderline    130-159   High          160-189   Very High   
    &gt;=190PROTHROMBIN [id number] 11:30:00





 Test Item  Value  Reference Range  Comments

 

 PROTIME (BEAKER) (test code=759)  14.7 seconds  11.7-14.7  

 

 INR (BEAKER) (test code=370)  1.2  <=5.9  



RECOMMENDED COUMADIN/WARFARIN INR THERAPY RANGESSTANDARD DOSE: 2.0 - 3.0   
Includes: PROPHYLAXIS forvenous thrombosis, systemic embolization; TREATMENT 
for venous thrombosis and/or pulmonary embolus.HIGH RISK: Target INR is 2.5-3.5 
for patients with mechanical heart valves.CBC W/PLT COUNT &amp; AUTO 
[id number] 11:23:00





 Test Item  Value  Reference Range  Comments

 

 WHITE BLOOD CELL COUNT (BEAKER) (test code=775)  4.8 K/ L  3.5-10.5  

 

 RED BLOOD CELL COUNT (BEAKER) (test code=761)  5.26 M/ L  4.63-6.08  

 

 HEMOGLOBIN (BEAKER) (test code=410)  15.1 GM/DL  13.7-17.5  

 

 HEMATOCRIT (BEAKER) (test code=411)  45.8 %  40.1-51.0  

 

 MEAN CORPUSCULAR VOLUME (BEAKER) (test code=753)  87.1 fL  79.0-92.2  

 

 MEAN CORPUSCULAR HEMOGLOBIN (BEAKER) (test  28.7 pg  25.7-32.2  



 vrjc=794)      

 

 MEAN CORPUSCULAR HEMOGLOBIN CONC (BEAKER) (test  33.0 GM/DL  32.3-36.5  



 code=752)      

 

 RED CELL DISTRIBUTION WIDTH (BEAKER) (test  13.4 %  11.6-14.4  



 code=412)      

 

 PLATELET COUNT (BEAKER) (test code=756)  161 K/CU MM  150-450  

 

 MEAN PLATELET VOLUME (BEAKER) (test code=754)  9.0 fL  9.4-12.4  

 

 NUCLEATED RED BLOOD CELLS (BEAKER) (test  0 /100 WBC  0-0  



 code=413)      

 

 NEUTROPHILS RELATIVE PERCENT (BEAKER) (test  67 %    



 code=429)      

 

 LYMPHOCYTES RELATIVE PERCENT (BEAKER) (test  19 %    



 code=430)      

 

 MONOCYTES RELATIVE PERCENT (BEAKER) (test  10 %    



 code=431)      

 

 EOSINOPHILS RELATIVE PERCENT (BEAKER) (test  3 %    



 code=432)      

 

 BASOPHILS RELATIVE PERCENT (BEAKER) (test  1 %    



 code=437)      

 

 NEUTROPHILS ABSOLUTE COUNT (BEAKER) (test  3.21 K/ L  1.78-5.38  



 code=670)      

 

 LYMPHOCYTES ABSOLUTE COUNT (BEAKER) (test  0.93 K/ L  1.32-3.57  



 code=414)      

 

 MONOCYTES ABSOLUTE COUNT (BEAKER) (test  0.48 K/ L  0.30-0.82  



 code=415)      

 

 EOSINOPHILS ABSOLUTE COUNT (BEAKER) (test  0.14 K/ L  0.04-0.54  



 code=416)      

 

 BASOPHILS ABSOLUTE COUNT (BEAKER) (test  0.04 K/ L  0.01-0.08  



 code=417)      

 

 IMMATURE GRANULOCYTES-RELATIVE PERCENT (BEAKER)  1 %  0-1  



 (test code=2801)      



CT, CHEST, WITHOUT [id number] 09:10:00NEW CARDIOLOGIST OBERTONFINAL 
REPORT PATIENT ID:   [id number] INDICATION: 47-year-old male with chest wall pain 
and history ofheart transplant. COMPARISON:Chest radiograph 2017 
TECHNIQUE: Chest CT exam WITHOUT intravenous contrast. The exam was performed 
according to our department dose-optimization protocol, which includes 
automated exposure control, adjustments of mA and kV according to patient size. 
Iterative reconstructions are also sometimes employed. FINDINGS:No chest wall 
mass, chest wall hematoma, rib fracture, or rib lesion is demonstrated. There 
is a healed median sternotomy. Heart is normal in size and there is no 
pericardial effusion. Mild atherosclerotic plaque of the ascending thoracic 
aorta is demonstrated. Thoracic aorta is normal in caliber. 8 mm calcified 
nodule in the right lower lobe represents prior granulomatous infection. No 
pneumonia, pulmonary edema, pleural effusion, or pneumothorax is demonstrated. 
There is no mediastinal or hilar lymphadenopathy. Thyroid gland is 
unremarkable. Upper and mid esophagus are unremarkable. Patient is status post 
sleeve gastrectomy and there is a small part of the sleeve herniated in the low 
posterior mediastinum. Partial imaging of the upper abdomen is otherwise 
unremarkable. IMPRESSION: No chest wall mass, muscle tear, rib fracture, or rib 
lesion. No other CT explanation for the patient's chest wall pain. Clear lungs. 
Unremarkable heart transplant. Prior gastric sleeve with small hiatal hernia. 
Signed: Marlee Calhoun MDReport Verified Date/Time:  2017 09:10:45 
Reading Location: [facility] Reading Room - [place] [address] 
Electronically signed by: MARLEE CALHOUN M.D. on 2017 09:10 
AMTACROLIMUS [id number] 13:49:00





 Test Item  Value  Reference Range  Comments

 

 TACROLIMUS BLOOD (BEAKER) (test code=657)  5.3 ng/mL  10.0-20.0  



BASIC METABOLIC [id number] 10:44:00





 Test Item  Value  Reference Range  Comments

 

 SODIUM (BEAKER) (test  141 meq/L  136-145  



 atvg=550)      

 

 POTASSIUM (BEAKER) (test  4.5 meq/L  3.5-5.1  



 code=379)      

 

 CHLORIDE (BEAKER) (test  103 meq/L    



 code=382)      

 

 CO2 (BEAKER) (test  29 meq/L  22-29  



 code=355)      

 

 BLOOD UREA NITROGEN  17 mg/dL  7-21  



 (BEAKER) (test code=354)      

 

 CREATININE (BEAKER) (test  1.09 mg/dL  0.57-1.25  



 code=358)      

 

 GLUCOSE RANDOM (BEAKER)  97 mg/dL    



 (test code=652)      

 

 CALCIUM (BEAKER) (test  9.3 mg/dL  8.4-10.2  



 code=697)      

 

 EGFR (BEAKER) (test  72 mL/min/1.73 sq m    ESTIMATED GFR IS NOT AS



 code=1092)      ACCURATE AS CREATININE



       CLEARANCE IN PREDICTING



       GLOMERULAR FILTRATION



       RATE. ESTIMATED GFR IS



       NOT APPLICABLE FOR



       DIALYSIS PATIENTS.



CBC W/PLT COUNT &amp; AUTO [id number] 09:54:00





 Test Item  Value  Reference Range  Comments

 

 WHITE BLOOD CELL COUNT (BEAKER) (test code=775)  4.0 K/ L  3.5-10.5  

 

 RED BLOOD CELL COUNT (BEAKER) (test code=761)  5.42 M/ L  4.63-6.08  

 

 HEMOGLOBIN (BEAKER) (test code=410)  15.4 GM/DL  13.7-17.5  

 

 HEMATOCRIT (BEAKER) (test code=411)  47.6 %  40.1-51.0  

 

 MEAN CORPUSCULAR VOLUME (BEAKER) (test code=753)  87.8 fL  79.0-92.2  

 

 MEAN CORPUSCULAR HEMOGLOBIN (BEAKER) (test  28.4 pg  25.7-32.2  



 brza=517)      

 

 MEAN CORPUSCULAR HEMOGLOBIN CONC (BEAKER) (test  32.4 GM/DL  32.3-36.5  



 code=752)      

 

 RED CELL DISTRIBUTION WIDTH (BEAKER) (test  13.8 %  11.6-14.4  



 code=412)      

 

 PLATELET COUNT (BEAKER) (test code=756)  152 K/CU MM  150-450  

 

 MEAN PLATELET VOLUME (BEAKER) (test code=754)  9.9 fL  9.4-12.4  

 

 NUCLEATED RED BLOOD CELLS (BEAKER) (test  0 /100 WBC  0-0  



 code=413)      

 

 NEUTROPHILS RELATIVE PERCENT (BEAKER) (test  66 %    



 code=429)      

 

 LYMPHOCYTES RELATIVE PERCENT (BEAKER) (test  20 %    



 code=430)      

 

 MONOCYTES RELATIVE PERCENT (BEAKER) (test  11 %    



 code=431)      

 

 EOSINOPHILS RELATIVE PERCENT (BEAKER) (test  2 %    



 code=432)      

 

 BASOPHILS RELATIVE PERCENT (BEAKER) (test  1 %    



 code=437)      

 

 NEUTROPHILS ABSOLUTE COUNT (BEAKER) (test  2.65 K/ L  1.78-5.38  



 code=670)      

 

 LYMPHOCYTES ABSOLUTE COUNT (BEAKER) (test  0.82 K/ L  1.32-3.57  



 code=414)      

 

 MONOCYTES ABSOLUTE COUNT (BEAKER) (test  0.44 K/ L  0.30-0.82  



 code=415)      

 

 EOSINOPHILS ABSOLUTE COUNT (BEAKER) (test  0.08 K/ L  0.04-0.54  



 code=416)      

 

 BASOPHILS ABSOLUTE COUNT (BEAKER) (test  0.04 K/ L  0.01-0.08  



 code=417)      

 

 IMMATURE GRANULOCYTES-RELATIVE PERCENT (BEAKER)  0 %  0-1  



 (test code=2801)      



TACROLIMUS [id number] 14:05:00





 Test Item  Value  Reference Range  Comments

 

 TACROLIMUS BLOOD (BEAKER) (test code=657)  6.5 ng/mL  10.0-20.0  



BASIC METABOLIC [id number] 10:13:00





 Test Item  Value  Reference Range  Comments

 

 SODIUM (BEAKER) (test  140 meq/L  136-145  



 qhvt=723)      

 

 POTASSIUM (BEAKER) (test  4.1 meq/L  3.5-5.1  



 code=379)      

 

 CHLORIDE (BEAKER) (test  109 meq/L    



 code=382)      

 

 CO2 (BEAKER) (test  22 meq/L  22-29  



 code=355)      

 

 BLOOD UREA NITROGEN  15 mg/dL  7-21  



 (BEAKER) (test code=354)      

 

 CREATININE (BEAKER) (test  0.95 mg/dL  0.57-1.25  



 code=358)      

 

 GLUCOSE RANDOM (BEAKER)  99 mg/dL    



 (test code=652)      

 

 CALCIUM (BEAKER) (test  8.8 mg/dL  8.4-10.2  



 code=697)      

 

 EGFR (BEAKER) (test  85 mL/min/1.73 sq m    ESTIMATED GFR IS NOT AS



 code=1092)      ACCURATE AS CREATININE



       CLEARANCE IN PREDICTING



       GLOMERULAR FILTRATION



       RATE. ESTIMATED GFR IS



       NOT APPLICABLE FOR



       DIALYSIS PATIENTS.



CBC W/PLT COUNT &amp; AUTO [id number] 09:42:00





 Test Item  Value  Reference Range  Comments

 

 WHITE BLOOD CELL COUNT (BEAKER) (test code=775)  4.7 K/ L  4.0-10.0  

 

 RED BLOOD CELL COUNT (BEAKER) (test code=761)  5.18 M/ L  4.20-5.80  

 

 HEMOGLOBIN (BEAKER) (test code=410)  15.3 GM/DL  13.0-16.8  

 

 HEMATOCRIT (BEAKER) (test code=411)  47.1 %  40.0-50.0  

 

 MEAN CORPUSCULAR VOLUME (BEAKER) (test code=753)  90.8 fL  82.0-98.0  

 

 MEAN CORPUSCULAR HEMOGLOBIN (BEAKER) (test  29.6 pg  27.0-33.0  



 code=751)      

 

 MEAN CORPUSCULAR HEMOGLOBIN CONC (BEAKER) (test  32.6 GM/DL  32.0-36.0  



 code=752)      

 

 RED CELL DISTRIBUTION WIDTH (BEAKER) (test  15.1 %  10.3-14.2  



 code=412)      

 

 PLATELET COUNT (BEAKER) (test code=756)  129 K/CU MM  150-430  

 

 MEAN PLATELET VOLUME (BEAKER) (test code=754)  7.7 fL  6.5-10.5  

 

 NUCLEATED RED BLOOD CELLS (BEAKER) (test  0 /100 WBC  0-0  



 code=413)      

 

 NEUTROPHILS RELATIVE PERCENT (BEAKER) (test  72 %    



 code=429)      

 

 LYMPHOCYTES RELATIVE PERCENT (BEAKER) (test  18 %    



 code=430)      

 

 MONOCYTES RELATIVE PERCENT (BEAKER) (test  8 %    



 code=431)      

 

 EOSINOPHILS RELATIVE PERCENT (BEAKER) (test  1 %    



 code=432)      

 

 BASOPHILS RELATIVE PERCENT (BEAKER) (test  1 %    



 code=437)      

 

 NEUTROPHILS ABSOLUTE COUNT (BEAKER) (test  3.37 K/ L  1.80-8.00  



 code=670)      

 

 LYMPHOCYTES ABSOLUTE COUNT (BEAKER) (test  0.85 K/ L  1.48-4.50  



 code=414)      

 

 MONOCYTES ABSOLUTE COUNT (BEAKER) (test  0.40 K/ L  0.00-1.30  



 code=415)      

 

 EOSINOPHILS ABSOLUTE COUNT (BEAKER) (test  0.07 K/ L  0.00-0.50  



 code=416)      

 

 BASOPHILS ABSOLUTE COUNT (BEAKER) (test  0.03 K/ L  0.00-0.20  



 code=417)      



[id number] 10:38:00





 Test Item  Value  Reference Range  Comments

 

 MAGNESIUM (BEAKER) (test code=627)  2.2 mg/dL  1.6-2.6  



TACROLIMUS [id number] 10:33:00





 Test Item  Value  Reference Range  Comments

 

 TACROLIMUS BLOOD (BEAKER) (test code=657)  7.1 ng/mL  10.0-20.0  



CBC W/PLT COUNT &amp; AUTO [id number] 08:47:00





 Test Item  Value  Reference Range  Comments

 

 WHITE BLOOD CELL COUNT (BEAKER) (test code=775)  4.1 K/ L  4.0-10.0  

 

 RED BLOOD CELL COUNT (BEAKER) (test code=761)  5.02 M/ L  4.20-5.80  

 

 HEMOGLOBIN (BEAKER) (test code=410)  14.8 GM/DL  13.0-16.8  

 

 HEMATOCRIT (BEAKER) (test code=411)  43.6 %  40.0-50.0  

 

 MEAN CORPUSCULAR VOLUME (BEAKER) (test code=753)  87.0 fL  82.0-98.0  

 

 MEAN CORPUSCULAR HEMOGLOBIN (BEAKER) (test  29.5 pg  27.0-33.0  



 code=751)      

 

 MEAN CORPUSCULAR HEMOGLOBIN CONC (BEAKER) (test  33.9 GM/DL  32.0-36.0  



 code=752)      

 

 RED CELL DISTRIBUTION WIDTH (BEAKER) (test  14.1 %  10.3-14.2  



 code=412)      

 

 PLATELET COUNT (BEAKER) (test code=756)  160 K/CU MM  150-430  

 

 MEAN PLATELET VOLUME (BEAKER) (test code=754)  7.2 fL  6.5-10.5  

 

 NUCLEATED RED BLOOD CELLS (BEAKER) (test  0 /100 WBC  0-0  



 code=413)      

 

 NEUTROPHILS RELATIVE PERCENT (BEAKER) (test  64 %    



 code=429)      

 

 LYMPHOCYTES RELATIVE PERCENT (BEAKER) (test  21 %    



 code=430)      

 

 MONOCYTES RELATIVE PERCENT (BEAKER) (test  12 %    



 code=431)      

 

 EOSINOPHILS RELATIVE PERCENT (BEAKER) (test  2 %    



 code=432)      

 

 BASOPHILS RELATIVE PERCENT (BEAKER) (test  0 %    



 code=437)      

 

 NEUTROPHILS ABSOLUTE COUNT (BEAKER) (test  2.61 K/ L  1.80-8.00  



 code=670)      

 

 LYMPHOCYTES ABSOLUTE COUNT (BEAKER) (test  0.87 K/ L  1.48-4.50  



 code=414)      

 

 MONOCYTES ABSOLUTE COUNT (BEAKER) (test  0.49 K/ L  0.00-1.30  



 code=415)      

 

 EOSINOPHILS ABSOLUTE COUNT (BEAKER) (test  0.09 K/ L  0.00-0.50  



 code=416)      

 

 BASOPHILS ABSOLUTE COUNT (BEAKER) (test  0.01 K/ L  0.00-0.20  



 code=417)      



0.00BASI METABOLIC [id number] 08:47:00





 Test Item  Value  Reference Range  Comments

 

 SODIUM (BEAKER) (test  142 meq/L  136-145  



 nmag=605)      

 

 POTASSIUM (BEAKER) (test  3.8 meq/L  3.5-5.1  



 code=379)      

 

 CHLORIDE (BEAKER) (test  109 meq/L    



 code=382)      

 

 CO2 (BEAKER) (test  21 meq/L  22-29  



 code=355)      

 

 BLOOD UREA NITROGEN  22 mg/dL  7-21  



 (BEAKER) (test code=354)      

 

 CREATININE (BEAKER) (test  1.18 mg/dL  0.57-1.25  



 code=358)      

 

 GLUCOSE RANDOM (BEAKER)  99 mg/dL    



 (test code=652)      

 

 CALCIUM (BEAKER) (test  9.1 mg/dL  8.4-10.2  



 code=697)      

 

 EGFR (BEAKER) (test  66 mL/min/1.73 sq m    ESTIMATED GFR IS NOT AS



 code=1092)      ACCURATE AS CREATININE



       CLEARANCE IN PREDICTING



       GLOMERULAR FILTRATION



       RATE. ESTIMATED GFR IS



       NOT APPLICABLE FOR



       DIALYSIS PATIENTS.



URINALYSIS W/ [id number] 00:20:00





 Test Item  Value  Reference Range  Comments

 

 COLOR (BEAKER) (test code=470)  Yellow    

 

 CLARITY (BEAKER) (test code=469)  Slightly Hazy    

 

 SPECIFIC GRAVITY UA (BEAKER) (test  1.050  1.001-1.035  



 code=468)      

 

 PH UA (BEAKER) (test code=467)  5.5  5.0-8.0  

 

 PROTEIN UA (BEAKER) (test code=464)  20 mg/dL  Negative  

 

 GLUCOSE UA (BEAKER) (test code=365)  Negative  Negative  

 

 KETONES UA (BEAKER) (test code=371)  40 mg/dL  Negative  

 

 BILIRUBIN UA (BEAKER) (test code=462)  Negative  Negative  

 

 BLOOD UA (BEAKER) (test code=461)  Negative  Negative  

 

 NITRITE UA (BEAKER) (test code=465)  Negative  Negative  

 

 LEUKOCYTE ESTERASE UA (BEAKER) (test  Negative  Negative  



 vbqy=499)      

 

 UROBILINOGEN UA (BEAKER) (test code=463)  0.2 mg/dL  0.2-1.0  

 

 RBC UA (BEAKER) (test code=519)  1 /HPF    

 

 WBC UA (BEAKER) (test code=520)  3 /HPF    

 

 MUCUS (BEAKER) (test code=1574)  Many    

 

 HYALINE CASTS (BEAKER) (test code=514)  6 /LPF    

 

 SOURCE(BEAKER) (test code=2795)  Urine, Clean Catch    



BASIC METABOLIC [id number] 21:55:00





 Test Item  Value  Reference Range  Comments

 

 SODIUM (BEAKER) (test  139 meq/L  136-145  



 qtew=872)      

 

 POTASSIUM (BEAKER) (test  5.1 meq/L  3.5-5.1  



 code=379)      

 

 CHLORIDE (BEAKER) (test  107 meq/L    



 code=382)      

 

 CO2 (BEAKER) (test  18 meq/L  22-29  



 code=355)      

 

 BLOOD UREA NITROGEN  26 mg/dL  7-21  



 (BEAKER) (test code=354)      

 

 CREATININE (BEAKER) (test  1.26 mg/dL  0.57-1.25  



 code=358)      

 

 GLUCOSE RANDOM (BEAKER)  89 mg/dL    



 (test code=652)      

 

 CALCIUM (BEAKER) (test  9.1 mg/dL  8.4-10.2  



 code=697)      

 

 EGFR (BEAKER) (test  61 mL/min/1.73 sq m    ESTIMATED GFR IS NOT AS



 code=1092)      ACCURATE AS CREATININE



       CLEARANCE IN PREDICTING



       GLOMERULAR FILTRATION



       RATE. ESTIMATED GFR IS



       NOT APPLICABLE FOR



       DIALYSIS PATIENTS.



HEPATIC FUNCTION [id number] 21:55:00





 Test Item  Value  Reference Range  Comments

 

 TOTAL PROTEIN (BEAKER) (test code=770)  7.1 gm/dL  6.0-8.3  

 

 ALBUMIN (BEAKER) (test code=1145)  4.2 g/dL  3.5-5.0  

 

 BILIRUBIN TOTAL (BEAKER) (test code=377)  0.7 mg/dL  0.2-1.2  

 

 BILIRUBIN DIRECT (BEAKER) (test code=706)  0.2 mg/dL  0.1-0.5  

 

 ALKALINE PHOSPHATASE (BEAKER) (test code=346)  86 U/L    

 

 AST (SGOT) (BEAKER) (test code=353)  28 U/L  5-34  

 

 ALT (SGPT) (BEAKER) (test code=347)  20 U/L  6-55  



[id number] 21:52:00





 Test Item  Value  Reference Range  Comments

 

 LIPASE (BEAKER) (test code=749)  40 U/L  8-78  



B-TYPE NATRIURETIC FACTOR (BNP)2017 21:46:00





 Test Item  Value  Reference Range  Comments

 

 B-TYPE NATRIURETIC PEPTIDE (BEAKER) (test code=700)  21 pg/mL  0-100  



CREATINE KINASE (CK), TOTAL AND [id number] 21:45:00





 Test Item  Value  Reference Range  Comments

 

 CREATINE KINASE TOTAL (BEAKER) (test code=380)  32 U/L    

 

 CREATINE KINASE-MB (BEAKER) (test code=750)  0.6 ng/mL  0.0-6.6  

 

 CREATINE KINASE-MB INDEX (BEAKER) (test code=395)  1.9 %    



Effective 2014: CK-MB Reference Range ChangeNew: 0.0-6.6    Previous: 0.0-
4.9CK-MB Reference Range:&lt;6.7      Normal6.7-10.0  Borderline&gt;10.0     
AbnormalTROPONIN -[id number] 21:45:00





 Test Item  Value  Reference Range  Comments

 

 TROPONIN I (BEAKER) (test code=397)  < ng/mL  0.00-0.03  



Effective 2014: Reference Range ChangeNew: 0.00-0.03   Previous 0.00-
0.15Troponin I (TnI) levels must be interpreted in the context of the 
presenting symptoms and the clinical findings. Elevated TnI levels indicate 
myocardial damage, but are not specific for ischemic heart disease. Elevated 
TnI levels are seen in patients with other cardiac conditions (including 
myocarditis and congestive heartfailure), and slight TnI elevations occur in 
patients with other conditions, including sepsis, renalfailure, acidosis, acute 
neurological disease, and persistent tachyarrhythmia.CBC W/PLT COUNT &amp; AUTO 
[id number] 21:24:00





 Test Item  Value  Reference Range  Comments

 

 WHITE BLOOD CELL COUNT (BEAKER) (test code=775)  4.7 K/ L  4.0-10.0  

 

 RED BLOOD CELL COUNT (BEAKER) (test code=761)  5.75 M/ L  4.20-5.80  

 

 HEMOGLOBIN (BEAKER) (test code=410)  17.4 GM/DL  13.0-16.8  

 

 HEMATOCRIT (BEAKER) (test code=411)  49.2 %  40.0-50.0  

 

 MEAN CORPUSCULAR VOLUME (BEAKER) (test code=753)  85.6 fL  82.0-98.0  

 

 MEAN CORPUSCULAR HEMOGLOBIN (BEAKER) (test  30.3 pg  27.0-33.0  



 code=751)      

 

 MEAN CORPUSCULAR HEMOGLOBIN CONC (BEAKER) (test  35.4 GM/DL  32.0-36.0  



 code=752)      

 

 RED CELL DISTRIBUTION WIDTH (BEAKER) (test  12.9 %  10.3-14.2  



 code=412)      

 

 PLATELET COUNT (BEAKER) (test code=756)  98 K/CU MM  150-430  

 

 MEAN PLATELET VOLUME (BEAKER) (test code=754)  8.7 fL  6.5-10.5  

 

 NUCLEATED RED BLOOD CELLS (BEAKER) (test  0 /100 WBC  0-0  



 code=413)      

 

 NEUTROPHILS RELATIVE PERCENT (BEAKER) (test  69 %    



 code=429)      

 

 LYMPHOCYTES RELATIVE PERCENT (BEAKER) (test  22 %    



 code=430)      

 

 MONOCYTES RELATIVE PERCENT (BEAKER) (test  8 %    



 code=431)      

 

 EOSINOPHILS RELATIVE PERCENT (BEAKER) (test  1 %    



 code=432)      

 

 BASOPHILS RELATIVE PERCENT (BEAKER) (test  0 %    



 code=437)      

 

 NEUTROPHILS ABSOLUTE COUNT (BEAKER) (test  3.26 K/ L  1.80-8.00  



 code=670)      

 

 LYMPHOCYTES ABSOLUTE COUNT (BEAKER) (test  1.05 K/ L  1.48-4.50  



 code=414)      

 

 MONOCYTES ABSOLUTE COUNT (BEAKER) (test code=415)  0.38 K/ L  0.00-1.30  

 

 EOSINOPHILS ABSOLUTE COUNT (BEAKER) (test  0.03 K/ L  0.00-0.50  



 code=416)      

 

 BASOPHILS ABSOLUTE COUNT (BEAKER) (test code=417)  0.02 K/ L  0.00-0.20  



0.00CMV PCR, [id number] 17:55:00





 Test Item  Value  Reference Range  Comments

 

 CMV VIRAL LOAD - NEGATIVE  Negative or below the linear    



 (BEAKER) (test code=2558)  range of the assay (<375    



   copies/mL)    



Cytomegalovirus (CMV) infection can cause significant disease in 
immunosuppressed patients. However,it is common for CMV to manifest as a 
limited infection which is of no clinical significance in immunosuppressed 
patients or in healthy individuals.Viral load measurements are helpful to 
identify clinical CMV infection and to guide the pre-emptive management of 
antiviral therapy.  For treatment of CMVinfection due to reactivation in 
transplant recipients, a threshold between 4,000 and 5,000 copies/mL is 
suggested.  For treatment of primary CMV infection, a lower threshold can be 
used.CMV infection may also be monitored using weekly serial measurements. 
Serial measurements of CMV DNA viral load canbe evaluated by identifying a 10-
fold change, as well as assessing the CMV DNA viral load and the clinical 
context for each patient.The plasma CMV DNA viral load was detected using 
quantitative polymerase chain reaction and fluorescent monitoring of a specific 
hybridized probe. Genetic variation and other factors can affect the accuracy 
of nucleic acid testing. Therefore, the results should be interpreted in light 
of clinical data. A negative result may not exclude the presence of CMV 
disease.This test was developed and its performance characteristics determined 
by the [facility] Pathology Department, Section of Molecular 
Pathology. It has not been cleared or approved by the U.S. Food and Drug 
Administration (FDA), since FDA approval is not required for clinical use of 
the test. Validation was done as required by The Clinical Laboratory 
Improvement Amendments of 1988.CREATINE KINASE (CK), TOTAL AND [id number] 18:
40:00





 Test Item  Value  Reference Range  Comments

 

 CREATINE KINASE TOTAL (BEAKER) (test code=380)  36 U/L    

 

 CREATINE KINASE-MB (BEAKER) (test code=750)  0.3 ng/mL  0.0-6.6  

 

 CREATINE KINASE-MB INDEX (BEAKER) (test code=395)  0.8 %    



Effective 2014: CK-MB Reference Range ChangeNew: 0.0-6.6    Previous: 0.0-
4.9CK-MB Reference Range:&lt;6.7      Normal6.7-10.0  Borderline&gt;10.0     
AbnormalTROPONIN -[id number] 18:40:00





 Test Item  Value  Reference Range  Comments

 

 TROPONIN I (BEAKER) (test code=397)  < ng/mL  0.00-0.03  



Effective 2014: Reference Range ChangeNew: 0.00-0.03   Previous 0.00-
0.15Troponin I (TnI) levels must be interpreted in the context of the 
presenting symptoms and the clinical findings. Elevated TnI levels indicate 
myocardial damage, but are not specific for ischemic heart disease. Elevated 
TnI levels are seen in patients with other cardiac conditions (including 
myocarditis and congestive heartfailure), and slight TnI elevations occur in 
patients with other conditions, including sepsis, renalfailure, acidosis, acute 
neurological disease, and persistent tachyarrhythmia.B-TYPE NATRIURETIC FACTOR (
BNP)2017 18:40:00





 Test Item  Value  Reference Range  Comments

 

 B-TYPE NATRIURETIC PEPTIDE (BEAKER) (test code=700)  23 pg/mL  0-100  



[id number] 18:33:00





 Test Item  Value  Reference Range  Comments

 

 MAGNESIUM (BEAKER) (test code=627)  1.8 mg/dL  1.6-2.6  



BASIC METABOLIC [id number] 18:33:00





 Test Item  Value  Reference Range  Comments

 

 SODIUM (BEAKER) (test  140 meq/L  136-145  



 ffxb=644)      

 

 POTASSIUM (BEAKER) (test  4.1 meq/L  3.5-5.1  



 code=379)      

 

 CHLORIDE (BEAKER) (test  105 meq/L    



 code=382)      

 

 CO2 (BEAKER) (test  23 meq/L  22-29  



 code=355)      

 

 BLOOD UREA NITROGEN  18 mg/dL  7-21  



 (BEAKER) (test code=354)      

 

 CREATININE (BEAKER) (test  1.34 mg/dL  0.57-1.25  



 code=358)      

 

 GLUCOSE RANDOM (BEAKER)  100 mg/dL    



 (test code=652)      

 

 CALCIUM (BEAKER) (test  9.2 mg/dL  8.4-10.2  



 code=697)      

 

 EGFR (BEAKER) (test  57 mL/min/1.73 sq m    ESTIMATED GFR IS NOT AS



 code=1092)      ACCURATE AS CREATININE



       CLEARANCE IN PREDICTING



       GLOMERULAR FILTRATION



       RATE. ESTIMATED GFR IS



       NOT APPLICABLE FOR



       DIALYSIS PATIENTS.



CBC W/PLT COUNT &amp; AUTO [id number] 18:24:00





 Test Item  Value  Reference Range  Comments

 

 WHITE BLOOD CELL COUNT (BEAKER) (test code=775)  2.6 K/ L  4.0-10.0  

 

 RED BLOOD CELL COUNT (BEAKER) (test code=761)  5.66 M/ L  4.20-5.80  

 

 HEMOGLOBIN (BEAKER) (test code=410)  16.5 GM/DL  13.0-16.8  

 

 HEMATOCRIT (BEAKER) (test code=411)  49.0 %  40.0-50.0  

 

 MEAN CORPUSCULAR VOLUME (BEAKER) (test code=753)  86.6 fL  82.0-98.0  

 

 MEAN CORPUSCULAR HEMOGLOBIN (BEAKER) (test  29.1 pg  27.0-33.0  



 code=751)      

 

 MEAN CORPUSCULAR HEMOGLOBIN CONC (BEAKER) (test  33.6 GM/DL  32.0-36.0  



 code=752)      

 

 RED CELL DISTRIBUTION WIDTH (BEAKER) (test  13.0 %  10.3-14.2  



 code=412)      

 

 PLATELET COUNT (BEAKER) (test code=756)  87 K/CU MM  150-430  

 

 MEAN PLATELET VOLUME (BEAKER) (test code=754)  8.3 fL  6.5-10.5  

 

 NUCLEATED RED BLOOD CELLS (BEAKER) (test  0 /100 WBC  0-0  



 code=413)      

 

 NEUTROPHILS RELATIVE PERCENT (BEAKER) (test  51 %    



 code=429)      

 

 LYMPHOCYTES RELATIVE PERCENT (BEAKER) (test  30 %    



 code=430)      

 

 MONOCYTES RELATIVE PERCENT (BEAKER) (test  18 %    



 code=431)      

 

 EOSINOPHILS RELATIVE PERCENT (BEAKER) (test  1 %    



 code=432)      

 

 BASOPHILS RELATIVE PERCENT (BEAKER) (test  0 %    



 code=437)      

 

 NEUTROPHILS ABSOLUTE COUNT (BEAKER) (test  1.33 K/ L  1.80-8.00  



 code=670)      

 

 LYMPHOCYTES ABSOLUTE COUNT (BEAKER) (test  0.78 K/ L  1.48-4.50  



 code=414)      

 

 MONOCYTES ABSOLUTE COUNT (BEAKER) (test code=415)  0.47 K/ L  0.00-1.30  

 

 EOSINOPHILS ABSOLUTE COUNT (BEAKER) (test  0.02 K/ L  0.00-0.50  



 code=416)      

 

 BASOPHILS ABSOLUTE COUNT (BEAKER) (test code=417)  0.01 K/ L  0.00-0.20  



0.00POCT-GLUCOSE [id number] 15:37:00





 Test Item  Value  Reference Range  Comments

 

 POC-GLUCOSE METER (BEAKER)  97 mg/dL    TESTED AT [facility] [address]



 (test uzfn=0309)      [facility] [address]

## 2019-12-28 NOTE — ER
Nurse's Notes                                                                                     

 [facility]                                                                 

Name: Gomez Romero                                                                               

Age: 49 yrs                                                                                       

Sex: Male                                                                                         

: 1970                                                                                   

MRN: [id number]                                                                                   

Arrival Date: 2019                                                                          

Time: 18:47                                                                                       

Account#: [id number]                                                                            

Bed 7                                                                                             

Private MD:                                                                                       

Diagnosis: Radiculopathy, cervical region                                                         

                                                                                                  

Presentation:                                                                                     

                                                                                             

19:14 Presenting complaint: Patient states: Ongoing pain to right side of neck for the last 3 lp1 

      months; Seen here and given muscle relaxers and steroids with no relief; Pain               

      aggravated by moving head to the right, some relief with heating pad at home; Denies        

      any trauma. Transition of care: patient was not received from another setting of care.      

      Onset of symptoms was 2019. Risk Assessment: Do you want to hurt yourself      

      or someone else? Patient reports no desire to harm self or others. Initial Sepsis           

      Screen: Does the patient meet any 2 criteria? No. Patient's initial sepsis screen is        

      negative. Does the patient have a suspected source of infection? No. Patient's initial      

      sepsis screen is negative. Care prior to arrival: None.                                     

19:14 Method Of Arrival: Ambulatory                                                           lp1 

19:14 Acuity: [place] 4                                                                           lp1 

                                                                                                  

Historical:                                                                                       

- Allergies:                                                                                      

19:17 falsecarinate;                                                                          lp1 

19:17 unknown rejection medication;                                                           lp1 

- Home Meds:                                                                                      

19:17 unable to obtain [Active];                                                              lp1 

- PMHx:                                                                                           

19:17 pt denies;                                                                              lp1 

- PSHx:                                                                                           

19:17 Heart transplant;                                                                       lp1 

                                                                                                  

- Immunization history:: Adult Immunizations up to date.                                          

- Social history:: Smoking status: Patient/guardian denies using tobacco.                         

- Ebola Screening: : No symptoms or risks identified at this time.                                

- Family history:: not pertinent.                                                                 

- Hospitalizations: : No recent hospitalization is reported.                                      

                                                                                                  

                                                                                                  

Screenin:18 Abuse screen: Denies threats or abuse. Denies injuries from another. Nutritional        lp1 

      screening: No deficits noted. Tuberculosis screening: No symptoms or risk factors           

      identified. Fall Risk None identified.                                                      

                                                                                                  

Assessment:                                                                                       

19:17 General: Appears in no apparent distress. Behavior is calm, cooperative, appropriate    lp1 

      for age. Pain: Complains of pain in posterior right neck Pain currently is 6 out of 10      

      on a pain scale. Quality of pain is described as aching, Aggravated by repositioning.       

      Neuro: Level of Consciousness is awake, alert, obeys commands, Oriented to person,          

      place, time, situation, Gait is steady. Cardiovascular: Patient's skin is warm and dry.     

      Respiratory: Respiratory effort is even, unlabored. GI: No signs and/or symptoms were       

      reported involving the gastrointestinal system. : No signs and/or symptoms were           

      reported regarding the genitourinary system. EENT: No signs and/or symptoms were            

      reported regarding the EENT system. Derm: Skin is pink, warm \T\ dry. Musculoskeletal:      

      Circulation, motion, and sensation intact. Reports pain in neck.                            

                                                                                                  

Vital Signs:                                                                                      

19:15  / 97; Pulse 81; Resp 18; Temp 97.8(TE); Pulse Ox 100% on R/A; Weight 99.79 kg    lp1 

      (R); Height 6 ft. 4 in. (193.04 cm); Pain 6/10;                                             

19:15 Body Mass Index 26.78 (99.79 kg, 193.04 cm)                                             lp1 

                                                                                                  

ED Course:                                                                                        

18:47 Patient arrived in ED.                                                                  as  

19:04 Lloyd Duff MD is Attending Physician.                                                rn  

19:14 Angie Seo, RN is Primary Nurse.                                                       lp1 

19:15 Triage completed.                                                                       lp1 

19:15 Arm band placed on.                                                                     lp1 

19:18 Patient has correct armband on for positive identification.                             lp1 

19:52 XRAY C Spine Ap/lat In Process Unspecified.                                             EDMS

20:28 No provider procedures requiring assistance completed. Patient did not have IV access   lp1 

      during this emergency room visit.                                                           

                                                                                                  

Administered Medications:                                                                         

No medications were administered                                                                  

                                                                                                  

                                                                                                  

Outcome:                                                                                          

20:19 Discharge ordered by MD.                                                                rn  

20:28 Discharged to home ambulatory.                                                          lp1 

20:28 Condition: good                                                                             

20:28 Discharge instructions given to patient, Instructed on discharge instructions, follow       

      up and referral plans. Demonstrated understanding of instructions, follow-up care.          

20:28 Patient left the ED.                                                                    lp1 

                                                                                                  

Signatures:                                                                                       

Dispatcher [employer]                           EDMS                                                 

Melinda Walker Roman, MD MD rn Pena, Laura, RN                         RN   lp1                                                  

                                                                                                  

**************************************************************************************************

## 2019-12-28 NOTE — EDPHYS
Physician Documentation                                                                           

 [facility]                                                                 

Name: Gomez Romero                                                                               

Age: 49 yrs                                                                                       

Sex: Male                                                                                         

: 1970                                                                                   

MRN: [id number]                                                                                   

Arrival Date: 2019                                                                          

Time: 18:47                                                                                       

Account#: [id number]                                                                            

Bed 7                                                                                             

Private MD:                                                                                       

ED Physician Lloyd Duff                                                                         

HPI:                                                                                              

                                                                                             

19:50 This 49 yrs old  Male presents to ER via Ambulatory with complaints of Neck    rn  

      Problem.                                                                                    

19:50 The patient or guardian complains of pain. The symptoms are located at the cervical     rn  

      spine. Onset: The symptoms/episode began/occurred "months ago". Associated signs and        

      symptoms: The patient has no apparent associated signs or symptoms, Pertinent               

      negatives: fever, headache, bladder incontinence, bowel incontinence, weakness. The         

      pain radiates to the right arm. Modifying factors: The symptoms are alleviated by heat,     

      the symptoms are aggravated by movement. Severity of symptoms: At their worst the           

      symptoms were moderate, in the emergency department the symptoms are unchanged. The         

      patient has experienced similar episodes in the past. Has been seen several times for       

      neck pain, no acute injury, reports no imaging obtained and is worried something            

      serious is wrong, has not followed up with pcp for specialist for MRI. States heat          

      helps but returns. No weakness or numbness of arm. .                                        

                                                                                                  

Historical:                                                                                       

- Allergies:                                                                                      

19:17 falsecarinate;                                                                          lp1 

19:17 unknown rejection medication;                                                           lp1 

- Home Meds:                                                                                      

19:17 unable to obtain [Active];                                                              lp1 

- PMHx:                                                                                           

19:17 pt denies;                                                                              lp1 

- PSHx:                                                                                           

19:17 Heart transplant;                                                                       lp1 

                                                                                                  

- Immunization history:: Adult Immunizations up to date.                                          

- Social history:: Smoking status: Patient/guardian denies using tobacco.                         

- Ebola Screening: : No symptoms or risks identified at this time.                                

- Family history:: not pertinent.                                                                 

- Hospitalizations: : No recent hospitalization is reported.                                      

                                                                                                  

                                                                                                  

ROS:                                                                                              

19:50 Constitutional: Negative for fever, chills, and weight loss, Eyes: Negative for injury, rn  

      pain, redness, and discharge, Neck: Negative for injury, and swelling, Cardiovascular:      

      Negative for chest pain, palpitations, and edema, Respiratory: Negative for shortness       

      of breath, cough, wheezing, and pleuritic chest pain, Abdomen/GI: Negative for              

      abdominal pain, nausea, vomiting, diarrhea, and constipation, Back: Negative for injury     

      and pain, MS/Extremity: Negative for injury and deformity, Skin: Negative for injury,       

      rash, and discoloration, Neuro: Negative for headache, weakness, numbness, tingling,        

      and seizure.                                                                                

                                                                                                  

Exam:                                                                                             

19:50 Constitutional:  This is a well developed, well nourished patient who is awake, alert,  rn  

      and in no acute distress. Head/Face:  Normocephalic, atraumatic. Eyes:  Pupils equal        

      round and reactive to light, extra-ocular motions intact.  Lids and lashes normal.          

      Conjunctiva and sclera are non-icteric and not injected.  Cornea within normal limits.      

      Periorbital areas with no swelling, redness, or edema. ENT:  MMM Neck:  Trachea             

      midline, no thyromegaly or masses palpated, and no cervical lymphadenopathy.  Supple,       

      full range of motion without nuchal rigidity, or vertebral point tenderness.  No            

      Meningismus. Skin:  Warm, dry with normal turgor.  Normal color with no rashes, no          

      lesions, and no evidence of cellulitis. MS/ Extremity:  Pulses equal, no cyanosis.          

      Neurovascular intact.  Full, normal range of motion.  Equal circumference. Neuro:           

      Awake and alert, GCS 15, oriented to person, place, time, and situation.  Cranial           

      nerves II-XII grossly intact.  Motor strength 5/5 in all extremities.  Sensory grossly      

      intact.  Cerebellar exam normal.                                                            

                                                                                                  

Vital Signs:                                                                                      

19:15  / 97; Pulse 81; Resp 18; Temp 97.8(TE); Pulse Ox 100% on R/A; Weight 99.79 kg    lp1 

      (R); Height 6 ft. 4 in. (193.04 cm); Pain 6/10;                                             

19:15 Body Mass Index 26.78 (99.79 kg, 193.04 cm)                                             lp1 

                                                                                                  

MDM:                                                                                              

19:04 Patient medically screened.                                                             rn  

20:17 Differential diagnosis: Cervical Disc Herniation Cervical Discogenic Pain Cervical      rn  

      Facet Syndrome Cervical Raiculopathy Cervical Spondylosis cervical strain, fracture,        

      Spondylosis torticollis. Data reviewed: vital signs, nurses notes, radiologic studies,      

      plain films, and as a result, I will discharge patient. Counseling: I had a detailed        

      discussion with the patient and/or guardian regarding: the historical points, exam          

      findings, and any diagnostic results supporting the discharge/admit diagnosis,              

      radiology results, the need for outpatient follow up, to return to the emergency            

      department if symptoms worsen or persist or if there are any questions or concerns that     

      arise at home. Special discussion: I discussed with the patient/guardian in detail that     

      at this point there is no indication for admission to the hospital. It is understood,       

      however, that if the symptoms persist or worsen the patient needs to return immediately     

      for re-evaluation. Further emergent ED testing is not indicated at this point in time.      

      I discussed with the patient/guardian in detail the need to arrange with the PCP or         

      specialist further outpatient testing, MRI, Based on the history and exam findings,         

      there is no indication for further emergent testing or inpatient evaluation. I              

      discussed with the patient/guardian the need to see the back specialist for further         

      evaluation of the symptoms.                                                                 

                                                                                                  

                                                                                             

19:23 Order name: XRAY C Spine Ap/lat; Complete Time: 20:17                                   rn  

                                                                                                  

Administered Medications:                                                                         

No medications were administered                                                                  

                                                                                                  

                                                                                                  

Disposition:                                                                                      

19 20:19 Discharged to Home. Impression: Radiculopathy, cervical region.                    

- Condition is Stable.                                                                            

- Discharge Instructions: Cervical Radiculopathy.                                                 

                                                                                                  

- Medication Reconciliation Form, Thank You Letter, Antibiotic Education, Prescription            

  Opioid Use form.                                                                                

- Follow up: Private Physician; When: As needed; Reason: Recheck today's complaints,              

  Re-evaluation by your physician.                                                                

- Problem is chronic.                                                                             

- Symptoms are unchanged.                                                                         

                                                                                                  

                                                                                                  

                                                                                                  

Signatures:                                                                                       

Dispatcher [employer]                           EDMS                                                 

Lloyd Duff MD MD rn Pena, Laura, RN                         RN   lp1                                                  

                                                                                                  

Corrections: (The following items were deleted from the chart)                                    

20: 20:19 2019 20:19 Discharged to Home. Impression: Radiculopathy, cervical region.  lp1 

      Condition is Stable. Forms are Medication Reconciliation Form, Thank You Letter,            

      Antibiotic Education, Prescription Opioid Use. Follow up: Private Physician; When: As       

      needed; Reason: Recheck today's complaints, Re-evaluation by your physician. Problem is     

      chronic. Symptoms are unchanged. rn                                                         

                                                                                                  

**************************************************************************************************

## 2019-12-28 NOTE — RAD REPORT
EXAM DESCRIPTION:  RAD - C Spine Ap/Lat - 12/28/2019 7:52 pm

 

CLINICAL HISTORY:  Persistent right-sided neck pain

 

COMPARISON:  None.

 

FINDINGS:  Cervical bodies are normal in height and alignment. No fracture or acute bony process seen
. No disc space narrowing.

 

There is no prevertebral soft tissue thickening or other suspicious soft tissue finding.

 

Bilateral carotid bulb calcifications are present.

 

IMPRESSION:  Negative cervical spine examination for acute or significant finding

 

Carotid bulb calcifications.

## 2021-08-08 ENCOUNTER — HOSPITAL ENCOUNTER (EMERGENCY)
Dept: HOSPITAL 97 - ER | Age: 51
LOS: 1 days | Discharge: HOME | End: 2021-08-09
Payer: COMMERCIAL

## 2021-08-08 DIAGNOSIS — Z88.8: ICD-10-CM

## 2021-08-08 DIAGNOSIS — S92.501A: Primary | ICD-10-CM

## 2021-08-08 DIAGNOSIS — Z94.1: ICD-10-CM
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
## 2021-08-08 PROCEDURE — 99283 EMERGENCY DEPT VISIT LOW MDM: CPT

## 2021-08-08 NOTE — XMS REPORT
Continuity of Care Document

                            Created on:2021



Patient:TYRA BRUNO

Sex:Male

:1970

External Reference #:[id number]





Demographics







                          Address                   [address]



                                                    [place] [address]

 

                          Home Phone                [phone]

 

                          Work Phone                [phone]

 

                          Mobile Phone              [phone]

 

                          Email Address             [email or web address]

 

                          Preferred Language        English

 

                          Marital Status            Unknown

 

                          Gnosticist Affiliation     Unknown

 

                          Race                      Unknown

 

                          Additional Race(s)        Unavailable



                                                    White

 

                          Ethnic Group              Unknown









Author







                          Organization              [facility]

t

 

                          Address                   [address]



                                                    [place] [address]

 

                          Phone                     [phone]









Support







                Name            Relationship    Address         Phone

 

                Nick        Spouse          Unavailable     [phone]

 

                Nick        Mother          Unavailable     [phone]









Care Team Providers







                    Name                Role                Phone

 

                    Scott Loo MD  Primary Care Physician [phone]

 

                    [place]                Attending Clinician Unavailable

 

                    Scott Loo MD  Attending Clinician [phone]

 

                    Sincere MCKOY            Attending Clinician Unavailable

 

                    SCOTT LOO     Attending Clinician Unavailable

 

                    Jame            Attending Clinician Unavailable

 

                    Nayeli Diaz MD    Attending Clinician [phone]

 

                    NAYELI DIAZ       Attending Clinician Unavailable

 

                    KAMILAH               Attending Clinician Unavailable

 

                    MILEY Nichols           Attending Clinician [phone]

 

                    MARKO AWAD      Attending Clinician Unavailable

 

                    MAEVE SILVESTRE     Attending Clinician Unavailable

 

                    TJ        Attending Clinician Unavailable

 

                    SCOTT LOO     Admitting Clinician Unavailable

 

                    MILEY Nichols           Admitting Clinician [phone]









Payers







           Payer Name Policy Type Policy     Effective Date Expiration Date Sour

ce



                                 Number                           

 

           BLUE CROSS/BLUE            [id number] 2020            [facility]                       00:00:00              - Medical



           POS/PPO/EPOxxxxxx                                             [place]



           [id number]                                             



           [id number]                                             



           [address]                                                 



           [address], [place]                                             



           [id number]                                             







Problems







       Condition Condition Condition Status Onset  Resolution Last   Treating Co

mments 

Source



       Name   Details Category        Date   Date   Treatment Clinician        



                                                 Date                 

 

       RIGHT         Diagnosis Active 2017               [facility]

oria



       RECURRENT                      -          06:05:00               l



       INGUINAL   RIGHT               00:00:                             [place]



       HERNIA AND RECURRENT               00                                 



       INCI   INGUINAL                                                  



              HERNIA AND                                                  



              INCI                                                    



                                                                      



                                                                      



              Active                                                  



                                                                      



                                                                      



              2017                                                  



                                                                      



                                                                      



                                                                      



                                                                      



                                                                      



                                                                      



                                                                      



                                                                      



                     [facility]                                                  



                                                                      



                                                                      

 

       MORBID        Diagnosis Active 2017               Mem

oria



       OBESITY                      0-14          07:33:00               l



                MORBID               00:00:                             Jeff



              OBESITY               00                                 



                                                                      



                                                                      



              Active                                                  



                                                                      



                                                                      



              10/14/2016                                                  



                                                                      



                                                                      



                                                                      



                                                                      



                                                                      



                                                                      



                                                                      



                                                                      



                     [facility]                                                  



                                                                      



                                                                      

 

       Heart  Heart  Disease Active                              [facility]



       transplant transplant               3-30                               [facility]

kes -



       ,      ,                    00:00:                             Medical



       orthotopic orthotopic               00                                 Ce

nter



       , status , status                                                  

 

       Heart  Heart  Disease Active 2015                      Overview: [facility]



       transplant transplant               2-04                        - 2R   [facility]

kes -



       rejection rejection               00:00:                      rejection M

edical



                                   00                          on low Center



                                                               dose   



                                                               immunosup 



                                                               pression 



                                                               10/22/15 



                                                               treated 



                                                               with   



                                                               prednison 



                                                               e pulse- 



                                                               2R     



                                                               rejection 



                                                               on low 



                                                               dose   



                                                               immunosup 



                                                               pression 



                                                               10/28/15 



                                                               treated 



                                                               with IV 



                                                               solumedro 



                                                               l,     



                                                               increased 



                                                               MMF,   



                                                               increased 



                                                               Prograf- 



                                                               2R biopsy 



                                                               on     



                                                               12/2/15 



                                                               treated 



                                                               with ATG 



                                                               x 4    

 

       History of History of Disease Active                              C

[facility]



       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

kes -



       virus  virus                00:00:                             Medical



       (CMV)  (CMV)                00                                 Center



       viremia viremia                                                  

 

       ICM s/p ICM s/p Disease Active                              [facility]



       orthotopic orthotopic               5-14                               [facility]

kes -



       heart  heart                00:00:                             Medical



       transplant transplant               00                                 Ce

nter



       5/14/15 5/14/15                                                  

 

       Dyslipidem Dyslipidem Disease Active                              [facility]

[facility]



       ia     ia                   1-16                               [facility]



                                   00:00:                             Medical



                                   [address] [place] [address]

 

       Hiatal        Problem Active               2017               Memor

ia



       hernia                                    01:10:50               l



       (disorder)   Hiatal                                                  [facility]

ina



              hernia                                                  



              (disorder)                                                  



                                                                      



                                                                      



               Active                                                  



                                                                      



                                                                      



                                                                      



                                                                      



              Problem                                                  



                                                                      



                                                                      



              2017                                                  



                                                                      



                                                                      



                                                                      



                                                                      



                 [facility]                                                  



                                                                      



                                                                      

 

       OBESITY,        Diagnosis Active               2017               M

emoria



       UNSPECIFIE                                    07:33:00               l



       D        OBESITY,                                                  [place]

n



              UNSPECIFIE                                                  



              D                                                       



                                                                      



                 Active                                                  



                                                                      



                                                                      



                                                                      



                                                                      



                                                                      



                                                                      



                                                                      



                                                                      



                                                                      



                                                                      



                      [facility]                                                  



                                                                      



                                                                      

 

       Myocardial        Problem Resolve               2017               

[facility]



       infarction               d                    01:10:50               l



       (disorder)                                                         [facility]

n



              Myocardial                                                  



              infarction                                                  



              (disorder)                                                  



                                                                      



                                                                      



               Resolved                                                  



                                                                      



                                                                      



                                                                      



                                                                      



                Problem                                                  



                                                                      



                                                                      



              2017                                                  



                                                                      



                                                                      



                                                                      



                                                                      



                 [facility]                                                  



                                                                      



                                                                      

 

       Routine Routine Problem Active                                    [facility]



       lab draw lab draw HL7.CCDAR2                                           it

y of



                                                                      [place]



                                                                      [facility]



                                                                      ans

 

       Malnutriti Malnutriti Problem Active                                    U

nivers



       on     on     HL7.CCDAR2                                           ity of



                                                                      [place]



                                                                      [facility]



                                                                      ans

 

       Morbid Morbid Problem Active                                    [facility]



       obesity obesity HL7.CCDAR2                                           ity 

of



       due to due to                                                  [place]



       excess excess                                                  Physici



       calories calories                                                  ans

 

       Primary Primary Problem Active                                    [facility]



       dysthymia dysthymia HL7.CCDAR2                                           

ity of



                                                                      [place]



                                                                      [facility]



                                                                      ans

 

       Adult BMI Adult BMI Problem Active                                    Uni

vers



       40.0-44.9 40.0-44.9 HL7.CCDAR2                                           

ity of



       kg/sq m kg/sq m                                                  [place]



                                                                      [facility]



                                                                      ans

 

       Malabsorpt Malabsorpt Problem Active                                    U

nivers



       ion    ion    HL7.CCDAR2                                           ity of



                                                                      [place]



                                                                      [facility]



                                                                      ans

 

       History of History of Problem Resolve                                    

Univers



       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                                       

  ity of



       malabsorpt malabsorpt                                                  [place]

[place]



       ion    ion                                                     Physici



                                                                      ans

 

       History of History of Problem Resolve                                    

Univers



       malnutriti malnutriti HL7.CCDAR2 d                                       

  ity of



       on     on                                                      [place]



                                                                      [facility]



                                                                      ans

 

       Hypertensi Hypertensi Disease Active                                    C

[facility]



       on     [facility]

 

       Ischemic Ischemic Disease Active                                    [facility]

t



       cardiomyop cardiomyop                                                  [facility]

kes -



       athy with athy with                                                  Medi

lizabteh



       MI 2015 MI 2015                                                  Ce

nter

 

       Vitamin D Vitamin D Problem Active                                    Uni

vers



       deficiency deficiency HL7.CCDAR2                                         

  ity of



                                                                      [place]



                                                                      [facility]



                                                                      ans







Allergies, Adverse Reactions, Alerts







       Allergy Allergy Status Severity Reaction(s) Onset  Inactive Treating Comm

ents 

Source



       Name   Type                        Date   Date   Clinician        

 

       Foscarne Drug   Active        Nausea And 2015-0               2015 C

[facility]



       t      Allergy               Vomiting,                  Pt has no Rad

es -



                                   Swelling 00:00:               reaction Medica

l



                                          00                   to     Center



                                                               current 



                                                               medicatio 



                                                               n      



                                                               administr 



                                                               ation  



                                                               regimen: 



                                                               premedica 



                                                               te with 



                                                               Benadryl, 



                                                               Zofran, 



                                                               Tylenol, 



                                                               then   



                                                               500cc NS 



                                                               bolus, 



                                                               then   



                                                               diluted 



                                                               foscarnet 



                                                               over 2 



                                                               hours, 



                                                               then   



                                                               another 



                                                               500cc NS 



                                                               bolus. 



                                                               Electroly 



                                                               te labs 



                                                               after  



                                                               every  



                                                               dose,  



                                                               electroly 



                                                               te     



                                                               replaceme 



                                                               nt     



                                                               protocol 



                                                               in     



                                                               place.8/1 



                                                               3/2015Pt 



                                                               has no 



                                                               reaction 



                                                               to     



                                                               current 



                                                               medicatio 



                                                               n      



                                                               administr 



                                                               ation  



                                                               regimen: 



                                                               premedica 



                                                               te with 



                                                               Benadryl, 



                                                               Zofran, 



                                                               Tylenol, 



                                                               then   



                                                               500cc NS 



                                                               bolus, 



                                                               then   



                                                               diluted 



                                                               foscarnet 



                                                               over 2 



                                                               hours, 



                                                               then   



                                                               another 



                                                               500cc NS 



                                                               bolus. 



                                                               Electroly 



                                                               te labs 



                                                               after  



                                                               every  



                                                               dose,  



                                                               electroly 



                                                               te     



                                                               replaceme 



                                                               nt     



                                                               protocol 



                                                               in place. 







Family History







           Family Member Diagnosis  Comments   Start Date Stop Date  Source

 

           Maternal grandfather Cancer                                      [facility]

 

           Maternal grandfather Heart disease                                  [facility]

[facility]

 

           Natural mother Diabetes                                    [facility]







Social History







           Social Habit Start Date Stop Date  Quantity   Comments   Source

 

           Sex Assigned At                                             [facility]

 

           Tobacco use and 2021 Never used            [facility] -



           exposure   00:00:00   00:00:00                         [facility]

 

           Alcohol intake 2021 Current               [facility]

es -



                      00:00:00   00:00:00   non-drinker of            Medical Ce

nter



                                            alcohol (finding)            









                Smoking Status  Start Date      Stop Date       Source

 

                Never smoker                                    [facility]

edical [place]







Medications







       Ordered Filled Start  Stop   Current Ordering Indication Dosage Frequency

 Signature

                    Comments            Components          Source



     Medication Medication Date Date Medication? Clinician                (SIG) 

          



     Name Name                                                   

 

     pravastatin            Yes                      Take 1           [facility]



     (PRAVACHOL)      4-05                               tablet by           [facility]

es -



     40 MG      00:00:                               mouth           Medical



     tablet      00                                 daily           Center

 

     mycophenola            Yes                      Take 1           [facility]



     te        4-05                               capsule           [facility] -



     (CELLCEPT)      00:00:                               (250mg)           Medi

lizabeth



     250 mg      00                                 by mouth           Center



     capsule                                         twice           



                                                  daily           

 

     HYDROcodone      2021-         Tooth pain 1{tbl}      Take 1      

     [facility]



     -acetaminop      3-10 03-20                          tablet by           Marianela zarate (NORCO      00:00: 23:59                          mouth           Medic

al



     )      00   :00                           every 6           Center



      mg                                         (six)           



     per tablet                                         hours as           



                                                  needed for           



                                                  Pain for           



                                                  up to 10           



                                                  days. Max           



                                                  Daily           



                                                  Amount: 4           



                                                  tablets           

 

     tacrolimus            Yes                      Take 2           [facility]

t



     (PROGRAF) 1      1-26                               capsules           [facility]

s -



     MG capsule      00:00:                               by mouth           Med

ical



               00                                 twice           Center



                                                  daily           



                                                  (DIFF'T           



                                                  LOOK SAME           



                                                  MEDICATION           



                                                  )              

 

     famotidine       No             20mg Q.5D Take 1           [facility]



     (PEPCID) 20      7-07 07-07                          tablet (20           L

ukes -



     MG tablet      00:00: 23:59                          mg total)           Me

dical



               00   :00                           by mouth 2           Center



                                                  (two)           



                                                  times           



                                                  daily.           

 

     omeprazole       No             20mg QD   Take 1           [facility]



     (PRILOSEC)      6- 06-23                          capsule           [facility]

 -



     20 MG      00:00: 23:59                          (20 mg           Medical



     capsule      00   :00                           total) by           Center



                                                  mouth           



                                                  daily.           

 

     pravastatin       No             40mg QD   Take 1           [facility]



     (PRAVACHOL)      4-21 04-05                          tablet (40           L

ukes -



     40 MG      00:00: 00:00                          mg total)           Medica

l



     tablet      00   :00                           by mouth           Center



                                                  daily.           

 

     mycophenola      2021- No             250mg Q.5D Take 1           [facility]



     te        -11                          capsule           [facility] -



     (CELLCEPT)      00:00: 00:00                          (250 mg           Med

ical



     250 mg      00   :00                           total) by           Center



     capsule                                         mouth 2           



                                                  (two)           



                                                  times           



                                                  daily.           

 

     tacrolimus       No                       Take 2           [facility]



     (PROGRAF) 1                                capsules           [facility]

es -



     MG capsule      00:00: 00:00                          by mouth           Me

dical



               00   :00                           twice           Center



                                                  daily           



                                                  (DIFF'T           



                                                  LOOK SAME           



                                                  MEDICATION           



                                                  )              

 

     Vitamin D Vitamin D       Yes  BRANDY                TAKE 1          

 [facility]



     (Ergocalcif (Ergocalcif 4-03           [employer]                CAPSULE   

        ity of



     danyel) 95503 danyel) [id number] 00:00:                               WEEKLY.       

    [place]



     UNIT Oral UNIT Oral 00                                                Physi

ci



     Capsule Capsule                                                   ans

 

     gabapentin            No                       300 mg, 1           Me

moria



     300 MG Oral                                     cap,           l



     Capsule      18:37:                               Route: PO,                                            Drug form:           



                                                  CAP, ONCE,           



                                                  Dosing           



                                                  Weight           



                                                  102.273,           



                                                  kg,            



                                                  Priority:           



                                                  STAT,           



                                                  Start           



                                                  date:           



                                                  17           



                                                  13:37:00           



                                                  CDT, Stop           



                                                  date:           



                                                  17           



                                                  13:37:00           



                                                  CDT            

 

     Tramadol            No                       100 mg,           [facility]

a



                                              Route: PO,           l



               18:36:                               Drug form:           Jeff



               00                                 TAB, ONCE,           



                                                  Dosing           



                                                  Weight           



                                                  102.273,           



                                                  kg,            



                                                  Priority:           



                                                  STAT,           



                                                  Start           



                                                  date:           



                                                  17           



                                                  13:36:00           



                                                  CDT, Stop           



                                                  date:           



                                                  17           



                                                  13:36:00           



                                                  CDT            

 

     ketOROLAC            No                       IV, ONCE           Geraldo

jensen



     (ANES)                                                    l



               18:03:                                              Jeff



                                                               

 

     ondansetron            No                       Route: IV,           

[facility]



     (ANES)                                     Drug form:           l



               18:03:                               INJ, ONCE,           Jeff                                 Stop date:           



                                                  17           



                                                  13:03:00           



                                                  CDT            

 

     tramadol            Yes                      50 mg = 1           [facility]do

jensen



     hydrochlori                                     tab, PO,           l



     de 50 MG      17:57:                               Q6H, PRN           Kaylee

nn



     Oral Tablet      00                                 Pain, X 10           



                                                  day, # 40           



                                                  tab, 0           



                                                  Refill(s)           

 

     Ondansetron            No                       4 mg,           [facility]

ia



                                              Route:           l



               17:00:                               IVP, Q6H,                                            Dosing           



                                                  Weight           



                                                  102.273,           



                                                  kg, Start           



                                                  date:           



                                                  17           



                                                  12:00:00           



                                                  CDT,           



                                                  Duration:           



                                                  30 day,           



                                                  Stop date:           



                                                  10/21/17           



                                                  6:00:00           



                                                  CDT            

 

     propofol            No                       Route: IV,           Mem

oria



     (ANES)                                     Drug form:           l



               15:08:                               INJ, ONCE,                                            Stop date:           



                                                  17           



                                                  10:08:00           



                                                  CDT            

 

     succinylcho            No                       Route: IV,           

[facility]



     line (ANES)                                     Drug form:           l



               15:08:                               INJ, ONCE,                                            Stop date:           



                                                  17           



                                                  10:08:00           



                                                  CDT            

 

     fentaNYL            No                       Route: IV,           Mem

oria



     (ANES)                                     Drug form:           l



               15:08:                               INJ, ONCE,                                            Stop date:           



                                                  17           



                                                  10:08:00           



                                                  CDT            

 

     lidocaine            No                       Route: IV,           Me

moria



     (ANES)                                     Drug form:           l



               15:03:                               INJ, ONCE,                                            Stop date:           



                                                  17           



                                                  10:03:00           



                                                  CDT            

 

     Acetaminoph            No                       1,000 mg,           M

emoria



     en                                       Route: PO,           l



               15:00:                               Drug form:                                            LIQ,           



                                                  Q6Hnow,           



                                                  Dosing           



                                                  Weight           



                                                  102.273,           



                                                  kg, Start           



                                                  date:           



                                                  17           



                                                  10:00:00           



                                                  CDT,           



                                                  Duration:           



                                                  30 day,           



                                                  Stop date:           



                                                  10/21/17           



                                                  4:00:00           



                                                  CDT            

 

     Tramadol            No                       100 mg,           [facility]

a



                                              Route: PO,           l



               15:00:                               Drug form:                                            SUSP,           



                                                  Q6Hnow,           



                                                  Dosing           



                                                  Weight           



                                                  102.273,           



                                                  kg, Start           



                                                  date:           



                                                  17           



                                                  10:00:00           



                                                  CDT,           



                                                  Duration:           



                                                  30 day,           



                                                  Stop date:           



                                                  10/21/17           



                                                  4:00:00           



                                                  CDT            

 

     gabapentin            No                       300 mg,           Geraldo

jensen



                                              Route: PO,           l



               15:00:                               Drug form:                                            SUSP,           



                                                  Q8Hnow,           



                                                  Dosing           



                                                  Weight           



                                                  102.273,           



                                                  kg, Start           



                                                  date:           



                                                  17           



                                                  10:00:00           



                                                  CDT,           



                                                  Duration:           



                                                  30 day,           



                                                  Stop date:           



                                                  10/21/17           



                                                  2:00:00           



                                                  CDT            

 

     celecoxib            No                       200 mg,           [facility]

ia



                                              Route: PO,           l



               15:00:                               U81Cfix,                                            Dosing           



                                                  Weight           



                                                  102.273,           



                                                  kg, Start           



                                                  date:           



                                                  17           



                                                  10:00:00           



                                                  CDT,           



                                                  Duration:           



                                                  30 day,           



                                                  Stop date:           



                                                  10/20/17           



                                                  22:00:00           



                                                  CDT            

 

     rocuronium            No                       Route: IV,           M

emoria



     (ANES)                                     Drug form:           l



               14:43:                               INJ, ONCE,                                            Stop date:           



                                                  17           



                                                  9:43:00           



                                                  CDT            

 

     famotidine            No                       Route: IV,           M

emoria



     (ANES)                                     Drug form:           l



               14:33:                               INJ, ONCE,                                            Stop date:           



                                                  17           



                                                  9:33:00           



                                                  CDT            

 

     ceFAZolin            No                       Route: IV,           Me

moria



     (ANES)                                     Drug form:           l



               14:33:                               INJ, ONCE,                                            Stop date:           



                                                  17           



                                                  9:33:00           



                                                  CDT            

 

     dexamethaso            No                       Route: IV,           

[facility]



     ne (ANES)                                     Drug form:           l



               14:18:                               INJ, ONCE,                                            Stop date:           



                                                  17           



                                                  9:18:00           



                                                  CDT            

 

     acetaminoph            No                       Route: IV,           

[facility]



     en (ANES)                                     Drug form:           l



     (ANES)      13:54:                               INJ, Start                                            date:           



                                                  17           



                                                  8:54:00           



                                                  CDT, Stop           



                                                  date:           



                                                  17           



                                                  9:54:00           



                                                  CDT            

 

     LR 1000 mL            No                       Route: IV,           M

[facility]



     INJ (ANES)                                     Total           l



               13:23:                               Volume:                                            1,000,           



                                                  Start           



                                                  date:           



                                                  17           



                                                  8:23:00           



                                                  CDT, Stop           



                                                  date:           



                                                  17           



                                                  9:23:00           



                                                  CDT            

 

     Flomax            No                       Notes:           [facility]



                                              (Same As:           l



               10:00:                               Flomax)                                            "Do Not           



                                                  Crush"           

 

     Ofirmev            No                       Notes:           [facility]



                                              Infuse           l



               10:00:                               over 15           [place]



               00                                 minutes           



                                                  Do not           



                                                  exceed           



                                                  4gm/day of           



                                                  acetaminop           



                                                  hen    ***           



                                                  MEDICATION           



                                                  WASTE ***           



                                                  Product           



                                                  Size:           



                                                  1000 mg           



                                                  Product           



                                                  Wasted:           



                                                  ___ mg           

 

     ceFAZolin            No                       Notes:           [facility]

a



                                              Same as:           l



               10:00:                               Ancef           [place]



               00                                                

 

     Tacrolimus            Yes                      2 mg, PO,           Me

[facility]



               9-19                               QPM            l



               14:03:                                              [facility]



               00                                                

 

     Amitriptyli Amitriptyli       Yes  KULVINDER                TAKE 1   

        [facility]



     ne HCl - 25 ne HCl - 25 3-21           KAMILAH ALANIZ                TABLET 3  

         ity of



     MG Oral MG Oral 00:00:                               TIMES           [place]



     Tablet Tablet 00                                 DAILY AS           [facility]



                                                  NEEDED.           ans

 

     Sucralfate            No                       Notes: May           [facility]

[facility]



     100 MG/ML                                     interfere           l



     Oral      00:00:                               w/enteral           [place]



     Suspension      00                                 feeds -           



                                                  Take 1 hr           



                                                  before or           



                                                  2 hr after           



                                                  antacids,           



                                                  dairy pdt,           



                                                  meals           



                                                  &amp;           



                                                  minerals -           



                                                   On empty           



                                                  stomach.           



                                                  For            



                                                  patients           



                                                  unable to           



                                                  swallow           



                                                  tablet,           



                                                  dissolve           



                                                  in 10mL -           



                                                  30mL of           



                                                  water or           



                                                  juice and           



                                                  stir           



                                                  before           



                                                  giving.           



                                                  (Same As:           



                                                  [place]af[place])           

 

     Protonix            No                       Notes:           [facility]



               -                               Tablet           l



               15:00:                               should not           [place]



               00                                 be chewed           



                                                  or             



                                                  crushed.           



                                                  (Same as:           



                                                  Protonix)           

 

     Cartia XT            No                       Notes:           [facility]

a



               -                               (Same as:           l



               15:00:                               Cardizem           [place]



               00                                 CD)            



                                                  Before           



                                                  meals.  DO           



                                                  NOT CRUSH.           

 

     Prednisone            No                       Notes:           [facility]

[facility]



                                              (Same as:           l



               15:00:                               PredniSONE           [place]



               00                                 )  Take           



                                                  with food.           

 

     Acetaminoph            Yes                      1,000 mg =           

[facility]



     en                                       31.23 mL,           l



               13:15:                               PO,            [facility]



               00                                 Q6Hnow, 0           



                                                  Refill(s)           

 

     gabapentin            Yes                      300 mg = 6           [facility]

[facility]



     50 MG/ML      -26                               mL, PO,           l



     Oral      13:15:                               Q8Hnow, #           [facility]



     Solution      00                                 378 mL, 0           



                                                  Refill(s)           

 

     tramadol            Yes                      50 mg = 1           [facility]

[facility]



     hydrochlori                                     tab, PO,           l



     de 50 MG      13:15:                               Q6Hnow,           [facility]

n



     Oral Tablet      00                                 PRN Pain           



                                                  Score           



                                                  6-10, X 14           



                                                  day, # 56           



                                                  tab, 0           



                                                  Refill(s)           

 

     Enoxaparin            No                       Notes:           [facility]

[facility]



                                              (Same as:           l



               12:00:                               Lovenox)           [place]



                                                               

 

     Solu-CORTEF            No                       Notes:           [facility]

[facility]



                                              (Same as:           l



               04:00:                               Solu-RUPA                                            F)             

 

     Prograf            No                       Notes:           [facility]



                                              Avoid           l



               03:26:                               grapefruit           [facility]



                                                and            



                                                  grapefruit           



                                                  juice.           



                                                  (Same As:           



                                                  Prograf)           

 

     hydrocortis            No                       100 mg,           [facility]

[facility]



     one 100 mg                                     Route: IV,           l



     injection      03:00:                               Q6H,                                            Dosing           



                                                  Weight           



                                                  136.136,           



                                                  kg, Start           



                                                  date:           



                                                  17           



                                                  21:00:00           



                                                  CST,           



                                                  Duration:           



                                                  30 day,           



                                                  Stop date:           



                                                  17           



                                                  18:00:00           



                                                  CST            

 

     Ondansetron            No                       Notes:           [facility]

[facility]



                                              (Same as:           l



               00:00:                               Zofran)                                            ***            



                                                  MEDICATION           



                                                  WASTE ***           



                                                  Product           



                                                  Size:  4           



                                                  mg Product           



                                                  Wasted:           



                                                  ___ mg           

 

     Acetaminoph            No                       1,000 mg,           M

[facility]



     en                                       Route: IV,           l



               23:36:                               ONCE,                                            Dosing           



                                                  Weight           



                                                  136.136,           



                                                  kg, Start           



                                                  date:           



                                                  17           



                                                  17:36:00           



                                                  CST, Stop           



                                                  date:           



                                                  17           



                                                  17:36:00           



                                                  CST            

 

     Docusate            No                       Notes:           [facility]



               -                               (Same as:           l



               23:00:                               Colace)                                                           

 

     Cellcept            No                       Notes:           [facility]



               1-25                               SEPARATE           l



               23:00:                               ANTACIDS                                            from           



                                                  Cellcept           



                                                  by 2 hrs.           



                                                  (Same As:           



                                                  CellCept)           

 

     Hydralazine            No                       Notes:           [facility]

[facility]



     Hydrochlori                                     (Same as:           l



     de 100 MG      23:00:                               Apresoline           [facility]

[facility]



     Oral Tablet                                       )  May           



                                                  interfere           



                                                  w/enteral           



                                                  feedings           



                                                  Take With           



                                                  Food           

 

     Al              No                       Notes:           [facility]



     hydroxide/M                                     (aluminum           l



     g         21:00:                               hydroxide-           [place]



     hydroxide/s      00                                 magnesium           



     imethicone                                         hyd-simeth           



     200 mg-200                                         icone           



     mg-20 mg/5                                         200-200-20           



     mL oral                                         mg/5ml 30           



     suspension                                         ml ud TINY)           

 

     Ondansetron            No                       Notes:           [facility]

[facility]



                                              (Same as:           l



               21:00:                               Zofran)           [facility]



                                                ***            



                                                  MEDICATION           



                                                  WASTE ***           



                                                  Product           



                                                  Size:  4           



                                                  mg Product           



                                                  Wasted:           



                                                  ___ mg           

 

     Hydromorpho            No                       Notes:           [facility]

[facility]



     ne                                       Same as           l



               21:00:                               Dilaudid           [place]                                                

 

     Oxycodone            No                       Notes:           [facility]

a



     Hydrochlori                                     (Same           l



     de 5 MG      21:00:                               as:'[place]           [facility]

[facility]



     Oral Tablet      00                                 done) To           



                                                  be drawn           



                                                  up in 3 mL           



                                                  syr            

 

     gabapentin            No                       300 mg,           [facility]

[facility]



                                              Route: PO,           l



               21:00:                               Q8Hnow,           [facility]



                                                Dosing           



                                                  Weight           



                                                  136.136,           



                                                  kg, Start           



                                                  date:           



                                                  17           



                                                  15:00:00           



                                                  CST,           



                                                  Duration:           



                                                  30 day,           



                                                  Stop date:           



                                                  17           



                                                  7:00:00           



                                                  CST            

 

     Tramadol            No                       Notes: Not           [facility]

[facility]



                                              to exceed           l



               21:00:                               400mg/day.           [place]                                 (Same As:           



                                                  Ultram)           

 

     Acetaminoph            No                       1,000 mg,           M

[facility]



     en                                       Route:           l



               21:00:                               IVPB,           [place]



               00                                 Q6Hnow,           



                                                  Dosing           



                                                  Weight           



                                                  136.136,           



                                                  kg, Start           



                                                  date:           



                                                  17           



                                                  15:00:00           



                                                  CST,           



                                                  Duration:           



                                                  30 day,           



                                                  Stop date:           



                                                  17           



                                                  9:00:00           



                                                  CST            

 

     Al              No                       30 ml,           [facility]



     hydroxide/M                                     Route: PO,           l



     g         20:54:                               Drug Form:           [facility]



     hydroxide/s      00                                 SUSP,           



     imethicone                                         Dosing           



     200 mg-200                                         Weight           



     mg-20 mg/5                                         136.136,           



     mL oral                                         kg, Q6H,           



     suspension                                         PRN            



                                                  Indigestio           



                                                  n, Start           



                                                  date:           



                                                  17           



                                                  14:54:00           



                                                  CST,           



                                                  Duration:           



                                                  30 day,           



                                                  Stop date:           



                                                  17           



                                                  14:53:00           



                                                  CST            

 

     Calcium            No                       1,000 mL,           [facility]

[facility]



     Chloride                                     Rate: 125           l



     0.0014      20:54:                               ml/hr,           [place]



     MEQ/ML /      00                                 Infuse           



     Potassium                                         over: 8           



     Chloride                                         hr, Route:           



     0.004                                         IV, Dosing           



     MEQ/ML /                                         Weight           



     Sodium                                         136.136           



     Chloride                                         kg, Total           



     0.103                                         Volume:           



     MEQ/ML /                                         1,000,           



     Sodium                                         Start           



     Lactate                                         date:           



     0.028                                         17           



     MEQ/ML                                         14:54:00           



     Injectable                                         CST,           



     Solution                                         Duration:           



                                                  30 day,           



                                                  Stop date:           



                                                  17           



                                                  14:53:00           



                                                  CST            

 

     Cefoxitin            No                       1 gm,           [facility]



                                              Route:           l



               20:10:                               IVPB,                                            ONCE,           



                                                  Dosing           



                                                  Weight           



                                                  136.136,           



                                                  kg, Start           



                                                  date:           



                                                  17           



                                                  14:10:00           



                                                  CST, Stop           



                                                  date:           



                                                  17           



                                                  14:10:00           



                                                  CST            

 

     gabapentin            No                       Notes:           [facility]

ia



                                              (Same as:           l



               19:00:                               Neurontin)                                                           

 

     Acetaminoph            No                       Notes: Max           

[facility]



     en                                       acetaminop           l



               19:00:                               hen =                                            4000mg/day           



                                                  (4             



                                                  gm/day).           



                                                  (Same as:           



                                                  Tylenol)           

 

     Promethazin            No                       Notes: Do           M

emoria



     e                                        not give           l



               18:26:                               IV push.                                            (Same as:           



                                                  Phenergan)           

 

     Promethazin            No                       12.5 mg,           Me

moria



     e                                        Route: PO,           l



               18:24:                               Q6H,                                            Dosing           



                                                  Weight           



                                                  136.136,           



                                                  kg, PRN           



                                                  Nausea &           



                                                  Vomiting,           



                                                  Start           



                                                  date:           



                                                  17           



                                                  12:24:00           



                                                  CST,           



                                                  Duration:           



                                                  30 day,           



                                                  Stop date:           



                                                  17           



                                                  12:23:00           



                                                  CST            

 

     Tramadol            No                       Notes: Not           Mem

oria



                                              to exceed           l



               18:24:                               400mg/day.                                            (Same As:           



                                                  Ultram)           

 

     bupivacaine            No                       Notes:           [facility]

jensen



     liposome                                     (Same as:           l



               18:00:                               Exparel)                                            *** NOT           



                                                  FOR IV           



                                                  use****           



                                                  Postoperat           



                                                  braeden            



                                                  analgesia:           



                                                  Infiltrati           



                                                  on             



                                                  (local):           



                                                  Dose is           



                                                  based on           



                                                  surgical           



                                                  site and           



                                                  volume           



                                                  required           



                                                  to cover           



                                                  the area           



                                                  (in            



                                                  general,           



                                                  the            



                                                  maximum           



                                                  total dose           



                                                  is 266           



                                                  mg).           



                                                  Bunionecto           



                                                  my: 7 mL           



                                                  into the           



                                                  tissues           



                                                  surroundin           



                                                  g the           



                                                  osteotomy           



                                                  and 1 mL           



                                                  into the           



                                                  subcutaneo           



                                                  us tissue           



                                                  of the           



                                                  surgical           



                                                  site           



                                                  (total           



                                                  dose = 8           



                                                  mL [106           



                                                  mg])           



                                                  Hemorrhoid           



                                                  ectomy: 30           



                                                  mL (20 mL           



                                                  vial           



                                                  diluted           



                                                  with 10 mL           



                                                  NS)            



                                                  divided           



                                                  and            



                                                  administer           



                                                  ed as 6           



                                                  injections           



                                                  of 5 mL           



                                                  each           



                                                  (total           



                                                  dose = 30           



                                                  mL [266           



                                                  mg])           

 

     Lovenox            No                       Notes:           [facility]



               1-25                               (Same as:           l



               17:00:                               Lovenox)           [place]



                                                               

 

     scopolamine            No                       Notes:           [facility]

jensen



               1-25                               Change           l



               12:00:                               patch           [facility]



               00                                 every 72           



                                                  hours           



                                                  (Same as:           



                                                  Transderm-           



                                                  Scop)           

 

     heparin            No                       Notes:           [facility]



               1-25                               porcine           l



               12:00:                               heparin           [facility]



                                                               

 

     Lovenox            No                       Notes:           [facility]



               1-25                               (Same as:           l



               12:00:                               Lovenox)           [place]



                                                               

 

     Mefoxin            No                       Notes:           [facility]



               1-25                               (Same As:           l



               11:37:                               Mefoxin)           [place]



               00                                 ***            



                                                  MEDICATION           



                                                  WASTE ***           



                                                  Product           



                                                  Size:           



                                                  2000 mg           



                                                  Product           



                                                  Wasted:           



                                                  ___ mg           

 

     Ofirmev            No                       Notes:           [facility]



               1-25                               Infuse           l



               11:36:                               over 15           [facility]



               00                                 minutes           



                                                  Do not           



                                                  exceed           



                                                  4gm/day of           



                                                  acetaminop           



                                                  hen    ***           



                                                  MEDICATION           



                                                  WASTE ***           



                                                  Product           



                                                  Size:           



                                                  1000 mg           



                                                  Product           



                                                  Wasted:           



                                                  ___ mg           

 

     Ofirmev            No                       Notes:           [facility]



               1-25                               Infuse           l



               11:35:                               over 15           [place]



               00                                 minutes           



                                                  Do not           



                                                  exceed           



                                                  4gm/day of           



                                                  acetaminop           



                                                  hen    ***           



                                                  MEDICATION           



                                                  WASTE ***           



                                                  Product           



                                                  Size:           



                                                  1000 mg           



                                                  Product           



                                                  Wasted:           



                                                  ___ mg           

 

     Pravastatin            Yes                      40 mg = 1           M

emoria



     Sodium 40      1-17                               tab, PO,           l



     MG Oral      17:47:                               Bedtime, #           [facility]

ina



     Tablet      00                                 30 tab, 0           



     [Pravachol]                                         Refill(s)           

 

     Hydralazine            Yes                      100 mg = 1           

[facility]



     Hydrochlori      1-17                               tab, PO,           l



     de 100 MG      17:47:                               TID, # 90           Her

merino



     Oral Tablet      00                                 tab, 3           



                                                  Refill(s)           

 

     Hydralazine            No                       0              [facility]

a



               1-17                               Refill(s)           l



               17:47:                                              [place]



               00                                                

 

     mycophenola            Yes                      1,000 mg =           

[facility]



     te mofetil      1-17                               2 tab, PO,           l



     500 MG Oral      17:47:                               BID, # 120           

[place]



     Tablet      00                                 tab, 0           



     [Cellcept]                                         Refill(s)           

 

     pantoprazol            Yes                      40 mg = 1           M

emoria



     e 40 MG      1-17                               tab, PO,           l



     Enteric      17:47:                               BID, # 30           Kaylee

nn



     Coated      00                                 tab, 0           



     Tablet                                         Refill(s)           



     [Protonix]                                                        

 

     24 HR            Yes                      240 mg = 1           [facility]

a



     Diltiazem      1-17                               cap, PO,           l



     Hydrochlori      17:47:                               Daily, #           He

rmann



     de 240 MG      00                                 30 cap, 0           



     Extended                                         Refill(s)           



     Release                                                        



     Capsule                                                        



     [Cartia]                                                        

 

     magnesium            Yes                      400 mg = 1           Me

moria



     oxide 400      -17                               tab, PO,           l



     mg oral      17:47:                               Daily, 0           Hussein

n



     tablet      00                                 Refill(s)           

 

     calcium            No                       CHEW, BID,           G[facility]do

jensen



     carbonate      -17                               0              l



     1000 mg      17:47:                               Refill(s)           Kaylee

nn



     oral      00                                                



     tablet,                                                        



     chewable                                                        

 

     predniSONE            Yes                      10 mg = 1           Me

moria



     10 mg oral      -17                               tab, PO,           l



     tablet      17:47:                               Daily, #           Jeff



               00                                 30 tab, 3           



                                                  Refill(s)           

 

     Prograf            Yes                      2.5 mg,           [facility]



               -17                               PO, Q12H,           l



               17:47:                               0              [place]



               00                                 Refill(s)           

 

     Sulfamethox            No                       1 tab, PO,           

[facility]



     azole 800      1-17                               M-W-F, 0           l



     MG /      17:47:                               Refill(s)           Jeff



     Trimethopri      00                                                



     m 160 MG                                                        



     Oral Tablet                                                        



     [Bactrim]                                                        







Immunizations







           Ordered Immunization Filled Immunization Date       Status     Commen

ts   Source



           Name       Name                                        

 

           INFLUENZA TRIVALENT            2019 Completed             [facility]



           ADJUVANTED PF IM            00:00:00                         [facility]

 

           Influenza High Dose            2018 Completed             [facility]



           Preservative Free IM            00:00:00                         [facility]

 

           Influenza TIV (IM)            2017-10-09 Completed             [facility]



                                 00:00:00                         [facility]

 

           Influenza High Dose            2015 Completed             [facility]



           Preservative Free            00:00:00                         [facility]



           [id number]/                                             

 

           Rho (D) Immune            2015-05-15 Completed             [facility]

[facility] -



           Globulin              00:00:00                         [facility]







Vital Signs







             Vital Name   Observation Time Observation Value Comments     Source

 

             BMI          2021 09:01:00 30.90 kg/m2               [facility]

 

             Oxygen saturation in 2021 09:01:00 100 /min                  

[facility]



             Arterial blood by                                        Medical [facility]

nter



             Pulse oximetry                                        

 

             Systolic blood 2021 09:01:00 143 mm[Hg]                [facility]

 

             Diastolic blood 2021 09:01:00 85 mm[Hg]                 [facility]

 

             Heart rate   2021 09:01:00 81 /min                   [facility]

 

             Body temperature 2021 09:01:00 36.61 Eunice                 [facility]

 

             Respiratory rate 2021 09:01:00 18 /min                   [facility]

 

             Body height  2021 09:01:00 188 cm                    [facility]

 

             Body weight  2021 09:01:00 109.181 kg                [facility]

 

             Systolic (mm Hg) 2017 19:18:00                           [facility]do

rial [place]

 

             Diastolic (mm Hg) 2017 19:18:00                           Mem

orial [place]

 

             Respitory Rate 2017 19:18:00                           [facility]

al [facility]

 

             Respitory Rate 2017 19:00:00                           [facility]

al [place]

 

             Systolic (mm Hg) 2017 19:00:00                           [facility]

rial [place]

 

             Diastolic (mm Hg) 2017 19:00:00                           Mem

orial [place]

 

             Respitory Rate 2017 18:45:00                           [facility]

al [facility]

 

             Systolic (mm Hg) 2017 18:45:00                           [facility]

rial [facility]

 

             Diastolic (mm Hg) 2017 18:45:00                           [facility]

orial [facility]

 

             Heart Rate   2017 11:54:00                           [facility]

 [place]

 

             Weight       2017 19:48:00                           [facility]

 [facility]

 

             BMI Calculated 2017 19:48:00                           [facility]

al [place]

 

             Height       2017 19:48:00 193.04 cm                 [facility]

 [facility]

 

             Temperature Oral (F) 2017 14:35:00 98.0 F                    

[facility]

 

             Systolic (mm Hg) 2017 14:35:00                           [facility]

rial [facility]

 

             Diastolic (mm Hg) 2017 14:35:00                           Mem

orial [place]

 

             Respitory Rate 2017 14:35:00                           [facility]

al [facility]

 

             Heart Rate   2017 14:35:00                           [facility]

 [facility]

 

             Heart Rate   2017 10:35:00                           [facility]

 [place]

 

             Respitory Rate 2017 10:35:00                           [facility]

al [place]

 

             Temperature Oral (F) 2017 10:35:00 97.6 F                    

[facility] [place]

 

             Systolic (mm Hg) 2017 10:35:00                           Geraldo

rial [place]

 

             Diastolic (mm Hg) 2017 10:35:00                           Mem

orial [facility]

 

             Respitory Rate 2017 05:24:00                           [facility]

al [place]

 

             Heart Rate   2017 05:24:00                           [facility]

 [facility]

 

             Systolic (mm Hg) 2017 05:24:00                           [facility]do

rial [facility]

 

             Diastolic (mm Hg) 2017 05:24:00                           Mem

orial [place]

 

             Temperature Oral (F) 2017 05:24:00 98.3 F                    

[facility]

 

             Weight       2017 03:00:00                           [facility]

 [place]

 

             Height       2017 03:00:00 193.04 cm                 [facility]

 [place]

 

             BMI Calculated 2017 03:00:00                           [facility]

al [facility]

 

             Weight       2017 16:16:00                           [facility]

 [place]

 

             Height       2017 16:16:00 193.04 cm                 [facility]

 [place]

 

             BMI Calculated 2017 16:16:00                           [facility]

al [place]

 

             Weight       2017 21:05:00                           [facility]

 [facility]

 

             BMI Calculated 2017 21:05:00                           [facility]

al [facility]

 

             Height       2017 21:05:00 193.04 cm                 [facility]

 [place]







Procedures







                Procedure       Date / Time     Performing Clinician Source



                                Performed                       

 

                [facility]

 

                2D ECHO W/ DOPPLER 2021 10:44:42 [facility]



                (CW/PW/COLOR)                                   [facility]

 

                TACROLIMUS LEVEL 2021 09:15:00 [place]

 

                R & L CATH / CORONARY 2021 07:07:00 Dana Loo

[facility]



                ANGIOS / BIOPSY                                 [facility]

 

                ECG 12-LEAD     2021 06:31:28 Dana Loo [facility]

 

                CBC (HEMOGRAM ONLY) 2021 06:04:00 Dana Loo [facility]

 

                PROTHROMBIN TIME/INR 2021 06:04[facility]00 [facility] 

[facility]

 

                BASIC METABOLIC PANEL 2021 06:04[facility]00 [facility]

[place] [id number])                                             [facility]

 

                CARDIAC CATH REPORT - 2021 00:00:00 Rajwinder Jeffrey [facility]



                SCAN                            [facility]

 

                SARS-COV2/INFLUENZA/RSV 2021 20:00:00 Tresa Diaz [facility] -



                RT-PCR                                          [facility]

 

                BLOOD CULTURE   2021 20:00:00 Tresa Diaz [facility]

 

                B-TYPE NATRIURETIC 2021 19:59:00 Tresa Diaz [facility] S

t [facility] -



                FACTOR (BNP)                                    [facility]

 

                CBC W/PLT COUNT & AUTO 2021 19:59:00 Tresa Diaz

[facility]

 

                BASIC METABOLIC PANEL 2021 19:59:00 Tresa Diaz 

I [facility]



                (7)                                             [facility]

 

                MAGNESIUM       2021 19:59:00 Tresa Diaz [facility]

 

                TROPONIN I      2021 19:59:00 Maria G carlos [facility]

 

                LACTIC ACID, VENOUS 2021 19:59:00 Maria G carlos [facility]

 

                ECG 12-LEAD     2021 19:50:21 Unknown, Hl7 [facility]

 

                XR CHEST 1 VIEW 2021 18:40:00 [facility]



                [facility]BEDSIDE                                 [facility]

 

                REPORT OF PROCEDURE - 2021 00:00:00 Provider, Rajwinder Myers -



                ENDOSCOPY SCAN                  Scanning        [facility]

 

                [H] Vitamin E Level 2018 00:00:00                 [facility] [place]



                                                                [facility]

 

                [L] Vitamin D,  2018 00:00:00                 [facility]

[facility]



                25-Hydroxy, Total -                                 Physicians



                [employer]                                        

 

                [QLH] CBC (INCLUDES 2018 00:00:00                 [facility]

[facility] [place]



                DIFF/PLT)                                       Physicians

 

                [QLH] CMP W/EGFR 2018 00:00:00                 [facility] 

[facility] [place]



                                                                [facility]

 

                [QLH] FOLATE, SERUM 2018 00:00:00                 [facility]

[facility] [place]



                                                                [facility]

 

                [QLH] HEMOGLOBIN A1c 2018 00:00:00                 [facility]



                                                                Physicians

 

                [QLH] IRON, TOTAL 2018 00:00:00                 [facility] [place]



                                                                Physicians

 

                [QLH] LIPID PANEL 2018 00:00:00                 [facility] [place]



                                                                [facility]icians

 

                [QLH] PTH, INTACT 2018 00:00:00                 [facility] [place]



                (WITHOUT CALCIUM)                                 Physicians

 

                [QLH] TSH, 3RD  2018 00:00:00                 [facility]



                GENERATION W/REFLEX TO                                 Physician

s



                FT4                                             

 

                [QLH] VITAMIN A 2018 00:00:00                 [facility]



                (RETINOL)                                       Physicians

 

                [QLH] VITAMIN B1, WHOLE 2018 00:00:00                 [facility]



                BLOOD                                           Physicians

 

                [QLH] VITAMIN B12 2018 00:00:00                 [facility] [place]



                                                                Physicians

 

                Heart transplant                                 [facility]

n

 

                Hernia repair                                   [facility]

 

                Laparoscopic sleeve                                 [facility]



                gastrectomy                                     







Plan of Care







             Planned Activity Planned Date Details      Comments     Source

 

             Future Scheduled 2023   Lipid panel               [facility]

[facility] -



             Test         00:00:00     (procedure) [code =              Medical 

Center



                                       92876973]                 

 

             Future Scheduled 2021   INFLUENZA VACCINE (#1)              C

[facility]



             Test         00:00:00     [code = INFLUENZA              Medical Ce

nter



                                       VACCINE (#1)]              

 

             Future Scheduled 2019   SHINGLES VACCINES (1              [facility]



             Test         00:00:00     of 2) [code = SHINGLES              Medic

al Center



                                       VACCINES (1 of 2)]              

 

             Future Scheduled 2018   Hemoglobin A1c              [facility]

[facility] -



             Test         00:00:00     measurement               Medical Center



                                       (procedure) [code =              



                                       29435214]                 

 

             Future Scheduled 1988   DTAP/TDAP/TD VACCINES              [facility]

[facility]



             Test         00:00:00     (1 - Tdap) [code =              Medical C

enter



                                       DTAP/TDAP/TD VACCINES              



                                       (1 - Tdap)]               

 

             Future Scheduled 1981   COVID-19 VACCINE (1)              [facility]



             Test         00:00:00     [code = COVID-19              Medical Alia

ter



                                       VACCINE (1)]              

 

             Future Scheduled 1979   DIABETIC EYE EXAM              [facility]



             Test         00:00:00     [code = DIABETIC EYE              Medical

 Center



                                       EXAM]                     

 

             Future Scheduled 1979   Diabetic foot              [facility]

es -



             Test         00:00:00     examination               Medical Center



                                       (regime/therapy) [code              



                                       = 456261111]              

 

             Future Scheduled 1979   Urine screening for              [facility]



             Test         00:00:00     protein (procedure)              Medical 

Center



                                       [code = 566284658]              

 

             Future Scheduled 1975   PNEUMOCOCCAL VACCINE              [facility]



             Test         00:00:00     0-64 YRS (1 of 3 -              Medical C

enter



                                       PCV13) [code =              



                                       PNEUMOCOCCAL VACCINE              



                                       0-64 YRS (1 of 3 -              



                                       PCV13)]                   

 

             Future Scheduled 1969   Screening for              [facility]

es -



             Test         00:00:00     malignant neoplasm of              Medica

l Center



                                       colon (procedure)              



                                       [code = 613092411]              







Encounters







        Start   End     Encounter Admission Attending Care    Care    Encounter 

Source



        Date/Time Date/Time Type    Type    Clinicians Facility Department ID   

   

 

        2018 PURNIMA Yee [id number]

99 Univers



        16:00:00 16:00:00 t; GUIDO NICHOLS,         Surgery         i

ty tamiko MONSON M.D.            Specialty         [place]

as



                        M.D.                                            [facility]rez



                                                                        ans

 

        2017 PURNIMA Yee     [id number]

2 Univers



        13:00:00 13:00:00 t; GUIDO NICHOLS,                         i

CORBIN Ma M.D.                                            Physicgutierrez



                                                                        ans

 

        2017-10-10 2017-10-10 PURNIMA Yee     [id number]

4 Univers



        15:30:00 15:30:00 t; GUIDO NICHOLS                         i

ty of



                        CORBIN MONSON                            [place]



                        M.D.                                            Physici



                                                                        [facility]

 

        2017-10-03 2017-10-03 Appointmen         PURNIMA NICHOLS     [facility]     [id number]

6 Univers



        13:30:00 13:30:00 t; GUIDO NICHOLS                         i

ty of



                        CORBIN MONSON                            [place]



                        [facility]                                            [facility]

 

        [facility] Day                     [facility] [id number]

775 [facility]



        11:05:00 04:59:00 Surgery                 r       [place] 02      [facility]

 

        2017 Outpatient         Kamilah  [facility]   [id number]

[id number] 



        06:05:00 23:59:00                 Guido S                 02      

 

        2017 Appointmen         PURNIMA NICHOLS     [facility]     [id number]

0 Univers



        09:00:00 09:00:00 t; GUIDO NICHOLS i

ty of



                        CORBIN MONSON                            [place]



                        M.D.                                            Physici



                                                                        [facility]

 

        2017 Appointmen         PURNIMA NICHOLS     [facility]     [id number]

2 Univers



        09:30:00 09:30:00 t; GUIDO NICHOLS i

ty of



                        CORBIN MONSON                            [place]



                        M.D.                                            Physici



                                                                        ans

 

        2017 Appointmen         PURNIMA NICHOLS     [facility]     [id number]

8 Univers



        09:00:00 09:00:00 t; GUIDO NICHOLS i

ty of



                        CORBIN MONSON                            [place]



                        M.D.                                            Physici



                                                                        [facility]

 

        2017 Appointmen         PURNIMA NICHOLS     [facility]     [id number]

3 Univers



        10:00:00 10:00:00 t; GUIDO NICHOLS i

ty of



                        CORBIN MONSON                            [place]



                        [facility]ANIEL                                            [facility]

 

        [facility] Inpatient                 [facility] [id number]

[id number] [facility]



        15:03:00 17:30:00                         r       [place] 00      [facility]

 

        2017 Outpatient         Kamilah  [facility]   [id number]

775 



        09:03:00 11:30:00                 Kujiminder S                 00      

 

        2017 Appointmen         PURNIMA NICHOLS     [facility]     [id number]

7 Univers



        09:00:00 09:00:00 t; GUIDO NICHOLS i

ty of



                        CORBIN MONSON                            [place]



                        MDANIEL                                            Physici



                                                                        ans

 

        2016-10-26 2016-10-26 Appointmen         FABI FELTON     [facility]     [id number]

[id number] Univers



        15:00:00 15:00:00 t;              CHRISTIE SWEENEY                         ity 

of



                        BERTHA         RD                              [place]



                        IN, CHRISTIE,                                         Physi

ci



                        RD                                              ans

 

        2016-10-14 2016-10-14 Appointmen         PURNIMA NICHOLS     [facility]     [id number]

0 Univers



        10:00:00 10:00:00 t; GUIDO NICHOLS i

ty of



                        CORBIN MONSON                            [place]



                        MDANIEL                                            Physici



                                                                        ans







Results







           Test Description Test Time  Test Comments Results    Result     Sour

e



                                                       Comments   

 

           2D echo w/              Ejection              [facility]



           doppler    2                     FractionSLEH ECHO            [facility] -



           (cw/pw/color) 15:29:47              HEART[facility] MKCKESSON            Med

ical



                                            CPACSInterface,            Center



                                            External Ris In -            



                                            2021  3:30 PM            



                                            CDTTransthoracic            



                                            Echocardiography            



                                            Report (TTE)            



                                            Demographics            



                                            Patient Name            



                                            TYRA BRUNO            



                                            Date of Study            



                                            2021            



                                                 MAX  MRN            



                                                 [id number]            



                                              Gender              



                                             Male  Visit Number            



                                             [id number]            



                                            Race                  



                                              Accession            



                                                 [id number]            



                                              Room Number            



                                             OP Number  Date of            



                                            Birth  1970            



                                                Referring            



                                            Physician Dana Loo MD  Age            



                                                   51 year(s)            



                                                Sonographer            



                                               SAMIRA Healy                   



                                                                  



                                            Interpreting            



                                            Alcides Gibbs            



                                                                  



                                                       Physician            



                                                      MD  Fellow            



                                                    Barrington Pearl MD Procedure Type            



                                            of Study     TTE            



                                            procedure:2DECHO W            



                                            DOPPLER(CW/PW/COLOR)            



                                            (Routine)             



                                            Indications:Heart            



                                            Transplant Follow            



                                            up.Clinical            



                                            History2015            



                                            Heart transplantOSA,            



                                            HTNHeight: 72 inches            



                                            Weight: 108.86 kg            



                                            (240 lbs) BSA: 2.3            



                                            m^2 BMI: 32.55            



                                            kg/m^2HR: 69 bpm BP:            



                                            135/79 mmHg Summary            



                                            The left ventricle            



                                            is chamber size (by            



                                            vol index) is            



                                            normal. All of the            



                                            LV segments contract            



                                            normally. LVEF by            



                                            Guy's method of            



                                            disk assessment is            



                                            normal (>60%).  LV            



                                            diastolic function            



                                            is indeterminate due            



                                            to OHT.  Unable to            



                                            estimate peak            



                                            systolic PA            



                                            pressure; inadequate            



                                            TR velocity signal.            



                                            Signature             



                                            --------------------            



                                            --------------------            



                                            --------------------            



                                            ---- Electronically            



                                            signed by Alcides Gibbs MD(Interpreting            



                                            physician) on            



                                            2021 03:29 PM            



                                            --------------------            



                                            --------------------            



                                            --------------------            



                                            ----  Findings  Left            



                                            Ventricle             



                                            The left ventricle            



                                            is chamber size (by            



                                            vol index)            



                                                         is            



                                            normal. Normal LV            



                                            wall thickness. All            



                                            of the LV             



                                                        segments            



                                            contract normally.            



                                            LVEF by Guy's            



                                                                  



                                            method of disk            



                                            assessment is normal            



                                            (>60%). LV            



                                                                  



                                            diastolic function            



                                            is indeterminate due            



                                            to OHT.  Left Atrium            



                                                       Anatomy            



                                            consistent with OHT.            



                                             Right Ventricle            



                                                                  



                                            Mild-to-moderately            



                                            enlarged RV.            



                                                                  



                                            Normal right            



                                            ventricular systolic            



                                            function.  Right            



                                            Atrium                



                                            Anatomy consistent            



                                            with OHT.  Atrial            



                                            Septum                



                                            Normal interatrial            



                                            septum by available            



                                            views.  Aortic Valve            



                                                                  



                                            Tri-leaflet valve.            



                                                                  



                                            Normal AoV structure            



                                            and function.            



                                            Mitral Valve            



                                              Normal MV            



                                            function. Mild            



                                            anterior leaflet            



                                                                  



                                            calcification.            



                                            Tricuspid Valve            



                                              TV structure is            



                                            normal. Trace            



                                            tricuspid             



                                                                  



                                            regurgitation.            



                                            Unable to estimate            



                                            peak systolic PA            



                                                                  



                                            pressure; inadequate            



                                            TR velocity signal.            



                                            Pulmonic Valve            



                                              Normal PV            



                                            structure and            



                                            function.  Aorta            



                                                         Aortic            



                                            root size (Sinus of            



                                            Valsalva diameter)            



                                            is                    



                                                 normal.            



                                            Proximal ascending            



                                            aorta size is            



                                            normal.  Pericardium            



                                                       No            



                                            significant            



                                            pericardial effusion            



                                            is visualized.            



                                            IVC/SVC/PA/PV/Pleura            



                                            l  The IVC is not            



                                            well-visualized.            



                                            Chambers/Structures            



                                            Left Atrium  LA            



                                            Volume: 90.33 ml            



                                                            LA            



                                            Area: 28.58 cm^2 LA            



                                            Vol. Index: 39            



                                            ml/m^2  Left            



                                            Ventricle  LVIDd:            



                                            5.15 cm               



                                                                  



                                            LVEDV:126.73 ml LV            



                                            Septum Diastolic:            



                                            1.1 cm LV PW            



                                            Diastolic: 1.09 cm            



                                            LVEDV                 



                                            Guy's:97.26 ml            



                                                                  



                                            LV Length: 8.16 cm            



                                            LVESV                 



                                            Guy's:33.97 ml            



                                            LVEF Guy's: 65.1            



                                            %                     



                                                LVEDVI: 42            



                                            ml/m^2                



                                                                  



                                                       LVESVI:            



                                            15 ml/m^2 LVOT            



                                            Diameter: 2.4 cm            



                                            Right Ventricle            



                                            RVOT VTI: 13.89 cm            



                                                                  



                                               TAPSE: 1.1 cm            



                                            Aorta  Ao Root S of            



                                            Marielena.: 3.91 cm            



                                            Doppler/Quantitative            



                                            Measurements Mitral            



                                            Valve  MV Peak            



                                            E-Wave: 0.52 m/s            



                                                  MV Peak            



                                            A-Wave: 0.31 m/s            



                                            P1/2t: 60.6 msec            



                                                          E/A            



                                            Ratio: 1.67            



                                                                  



                                                 Peak Gradient:            



                                            1.08 mmHg             



                                                                  



                                               Deceleration            



                                            Time: 209.1 msec MV            



                                            Area (PHT): 3.63            



                                            cm^2  MV Bharathi. Peak:            



                                            Aortic Valve  Peak            



                                            Velocity: 0.82 m/s            



                                                           Mean            



                                            Velocity: 0.59 m/s            



                                            Peak Gradient: 2.67            



                                            mmHg                  



                                            Mean Gradient: 1.58            



                                            mmHg AV Area            



                                            (continuity): 4.2            



                                            cm^2 AV VTI: 17.9 cm            



                                             AV DVI: 0.93  LVOT            



                                            Peak Velocity: 0.83            



                                            m/s             Peak            



                                            Gradient: 2.73 mmHg            



                                            Mean Velocity: 0.52            



                                            m/s             Mean            



                                            Gradient: 1.3 mmHg            



                                            LVOT Diameter: 2.4            



                                            cm                    



                                            LVOT VTI: 16.62 cm            



                                            LVOT Area: 4.52 cm^2            



                                                           LVOT            



                                            SV:75.15 ml LVOT CO:            



                                            5.19 l/min            



                                                  LVOT CI: 2.26            



                                            l/min/m^2             

 

           RAD, CHEST, 2   NEW CARDIOLOGIST ********************       

     



           VIEWS      2          OBERTONReason for ********************         

   



                      11:56:00   Exam:->heart ********************            



                                 transplant annual [facility]            



                                            TYRA [facility]UNO            



                                                  :            



                                            1970            



                                            Sex:                  



                                            M*******************            



                                            ********************            



                                            ********************            



                                            *FINAL REPORT            



                                            PATIENT ID:            



                                            [id number] INDICATION:            



                                            heart transplant            



                                            annual follow up            



                                            COMPARISON: None            



                                            TECHNIQUE: Frontal            



                                            and lateral views of            



                                            the chest. FINDINGS:            



                                            Lungs and pleura:            



                                            Clear lungs. No            



                                            effusion.Heart and            



                                            mediastinum: Normal            



                                            heart size.            



                                            Unremarkable            



                                            mediastinal            



                                            contours.Osseous            



                                            structures: No acute            



                                            abnormality.Addition            



                                            al findings: None.            



                                            IMPRESSION: No acute            



                                            intrathoracic            



                                            abnormality. Signed:            



                                            Shikha Jarvis Verified            



                                            Date/Time:            



                                            2021 11:56:35            



                                            Reading Location: [facility]            



                                            Reading Room            



                                            Electronically            



                                            signed by: SHIKHA JARVIS MD on 2021            



                                            11:56 AM              

 

           XR chest 2 views              Interface, External           

 [facility]



                      2                     Ris In - 2021            [facility]

 -



                      11:56:00              11:58 AM CDTFINAL            Medical



                                            REPORT PATIENT ID:            Center



                                            [id number] INDICATION:            



                                            heart transplant            



                                            annual follow up            



                                            COMPARISON: None            



                                            TECHNIQUE: Frontal            



                                            and lateral views of            



                                            the chest. FINDINGS:            



                                            Lungs and pleura:            



                                            Clear lungs. No            



                                            effusion.Heart and            



                                            mediastinum: Normal            



                                            heart size.            



                                            Unremarkable            



                                            mediastinal            



                                            contours.Osseous            



                                            structures: No acute            



                                            abnormality.Addition            



                                            al findings: None.            



                                            IMPRESSION: No acute            



                                            intrathoracic            



                                            abnormality. Signed:            



                                            Shikha Jarvis Verified            



                                            Date/Time:            



                                            2021 11:56:35            



                                            Reading Location: [facility]            



                                            Reading Room            



                                            Electronically            



                                            signed by: SHIKHA JARVIS MD on 2021            



                                            11:56 AM              

 

           ECG 12 lead              Interface, External            [facility]



                      1                     Ris In - 2021            [facility]

 -



                      12:38:55              12:39 PM              Medical



                                            CDTVentricular Rate            Cente

r



                                            67 BPMAtrial Rate 67            



                                            BPMP-R Interval 142            



                                            msQRS Duration 98            



                                            msQ-T Interval 414            



                                            msQTC                 



                                            Calculation(Bazett)            



                                            437 msP Axis 16            



                                            degreesR Axis 117            



                                            degreesT Axis 19            



                                            degreesNormal sinus            



                                            rhythmIncomplete            



                                            right bundle branch            



                                            blockLeft posterior            



                                            fascicular            



                                            blockAbnormal            



                                            ECGWhen compared            



                                            with ECG of            



                                            11-MAR-2021 19:50,No            



                                            significant change            



                                            was foundConfirmed            



                                            by Dana Loo            



                                            ([id number]) on 2021            



                                            12:38:52 PM            

 

           CARDIAC CATH 2021             Ordered by an            [facility]



           REPORT - SCAN 1                     unspecified            [facility] -



                      10:46:30              provider.             Medical



                                                                  Center









                    Tacrolimus level    2021 12:37:00 









                      Test Item  Value      Reference Range Interpretation Comme

nts









             Tacrolimus Lvl (test code = 10.6 ng/mL   10-20                     

Test performed on [employer]



             79811-0)                                             Immun

oassay system



                                                                 with Chemilumin

escent



                                                                 Microparticle I

mmunoassay



                                                                 (CMIA) technerick EISENBERG (test code = FAINA)  ID - RM                           

 

             Lab Interpretation (test code Normal                               

  



             = 21419-7)                                          



[id number] 12:37:00





             Test Item    Value        Reference Range Interpretation Comments

 

             TACROLIMUS BLOOD 10.6 ng/mL   10.0-20.0                 Test perfor

med on [employer]



             (BEAKER) (test code                                        Architec

t Immunoassay



             = 657)                                              system with



                                                                 Chemiluminescen

t



                                                                 Microparticle



                                                                 Immunoassay (CM

IA)



                                                                 technology.



 [facility] [id number] 06:33:00





             Test Item    Value        Reference Range Interpretation Comments

 

             Sodium (test code = 139 meq/L    136-145                   



             2951-2)                                             

 

             Potassium (test 4.0 meq/L    3.5-5.1                   



             code = 2823-3)                                        

 

             Chloride (test code 102 meq/L                        



             = 2075-0)                                           

 

             CO2 (test code = 29 meq/L     22-29                     



             8-9)                                             

 

             BUN (test code = 16 mg/dL     7-21                      



             3094-0)                                             

 

             Creatinine (test 1.04 mg/dL   0.57-1.25                 



             code = 2160-0)                                        

 

             Glucose (test code 97 mg/dL                         



             = 2345-7)                                           

 

             Calcium (test code 8.9 mg/dL    8.4-10.2                  



             = 67802-0)                                          

 

             EGFR (test code = 75           mL/min/1.73 sq m              ESTIMA

DANNY GFR IS



             33914-3)                                            NOT AS ACCURATE

 AS



                                                                 CREATININE



                                                                 CLEARANCE IN



                                                                 PREDICTING



                                                                 GLOMERULAR



                                                                 FILTRATION RATE

.



                                                                 ESTIMATED GFR I

S



                                                                 NOT APPLICABLE 

FOR



                                                                 DIALYSIS PATIEN

HERMINIO

 

             FAINA (test code =  ID -                           



             FAINA)         ANNETTE TOURE                                



[facility] METABOLIC [id number] 06:33:00





             Test Item    Value        Reference Range Interpretation Comments

 

             SODIUM (BEAKER) 139 meq/L    136-145                   



             (test code = 381)                                        

 

             POTASSIUM (BEAKER) 4.0 meq/L    3.5-5.1                   



             (test code = 379)                                        

 

             CHLORIDE (BEAKER) 102 meq/L                        



             (test code = 382)                                        

 

             CO2 (BEAKER) (test 29 meq/L     22-29                     



             code = 355)                                         

 

             BLOOD UREA NITROGEN 16 mg/dL     7-21                      



             (BEAKER) (test code                                        



             = 354)                                              

 

             CREATININE (BEAKER) 1.04 mg/dL   0.57-1.25                 



             (test code = 358)                                        

 

             GLUCOSE RANDOM 97 mg/dL                         



             (BEAKER) (test code                                        



             = 652)                                              

 

             CALCIUM (BEAKER) 8.9 mg/dL    8.4-10.2                  



             (test code = 697)                                        

 

             EGFR (BEAKER) (test 75 mL/min/1.73                           ESTIMA

DANNY GFR IS



             code = 1092) sq m                                   NOT AS ACCURATE

 AS



                                                                 CREATININE



                                                                 CLEARANCE IN



                                                                 PREDICTING



                                                                 GLOMERULAR



                                                                 FILTRATION RATE

.



                                                                 ESTIMATED GFR I

S



                                                                 NOT APPLICABLE 

FOR



                                                                 DIALYSIS PATISHARRON

TS.



 ID - PIAYA LProthrombin time/[id number] 06:30:00





             Test Item    Value        Reference    Interpretation Comments



                                       Range                     

 

             Protime (test code = 12.9         See_Comment                [Autom

ated



             0242-2)                                             message] The



                                                                 system which



                                                                 generated this



                                                                 result



                                                                 transmitted



                                                                 reference range

:



                                                                 11.9 - 14.2



                                                                 seconds. The



                                                                 reference range



                                                                 was not used to



                                                                 interpret this



                                                                 result as



                                                                 normal/abnormal

.

 

             INR (test code = 1.00         See_Comment                [Automated



             2991-6)                                             message] The



                                                                 system which



                                                                 generated this



                                                                 result



                                                                 transmitted



                                                                 reference range

:



                                                                 <=5.90. The



                                                                 reference range



                                                                 was not used to



                                                                 interpret this



                                                                 result as



                                                                 normal/abnormal

.

 

             FAINA (test code = RECOMMENDED                            



             FAINA)         COUMADIN/WARFARIN                           



                          INR THERAPY                            



                          RANGESSTANDARD DOSE:                           



                          2.0 - 3.0                              



                          Includes:                              



                          PROPHYLAXIS for                           



                          venous thrombosis,                           



                          systemic                               



                          embolization;                           



                          TREATMENT for venous                           



                          thrombosis and/or                           



                          pulmonary                              



                          embolus.HIGH RISK:                           



                          Target INR is                           



                          2.5-3.5 for patients                           



                          with mechanical                           



                          heart valves.                           

 

             Lab Interpretation Normal                                 



             (test code =                                        



             84128-3)                                            



[id number]





             Test Item    Value        Reference Range Interpretation Comments

 

             PROTIME (BEAKER) 12.9 seconds 11.9-14.2                 



             (test code = 759)                                        

 

             INR (BEAKER) (test 1.00         See_Comment                [Automat

ed message]



             code = 370)                                         The system [employer]

[employer]



                                                                 generated this 

result



                                                                 transmitted ref

erence



                                                                 range: <=5.90. 

The



                                                                 reference range

 was



                                                                 not used to int

erpret



                                                                 this result as



                                                                 normal/abnormal

.



RECOMMENDED COUMADIN/WARFARIN INR THERAPY RANGESSTANDARD DOSE: 2.0 - 3.0   
Includes: PROPHYLAXIS forvenous thrombosis, systemic embolization; TREATMENT for
venous thrombosis and/or pulmonary embolus.HIGH RISK: Target INR is 2.5-3.5 for 
patients with mechanical heart valves.CBC (Hemogram only)2021 06:20:00





             Test Item    Value        Reference Range Interpretation Comments

 

             WBC (test code = 6690-2) 4.8          See_Comment                [A

utomated message]



                                                                 The system [employer]



                                                                 generated this 

result



                                                                 transmitted ref

erence



                                                                 range: 3.5 - 10

.5



                                                                 K/L. The refe

rence



                                                                 range was not u

sed to



                                                                 interpret this 

result



                                                                 as normal/abnor

mal.

 

             RBC (test code = 789-8) 5.14         See_Comment                [Au

tomated message]



                                                                 The system [employer]



                                                                 generated this 

result



                                                                 transmitted ref

erence



                                                                 range: 4.63 - 6

.08



                                                                 M/L. The refe

rence



                                                                 range was not u

sed to



                                                                 interpret this 

result



                                                                 as normal/abnor

mal.

 

             MCHC (test code = 786-4) 33.9         See_Comment                [A

utomated message]



                                                                 The system [employer]



                                                                 generated this 

result



                                                                 transmitted ref

erence



                                                                 range: 32.3 - 3

6.5



                                                                 GM/DL. The refe

rence



                                                                 range was not u

sed to



                                                                 interpret this 

result



                                                                 as normal/abnor

mal.

 

             Hematocrit (test code = 43.3 %       40.1-51                   



             4544-3)                                             

 

             MCV (test code = 787-2) 84.2 fL      79-92.2                   

 

             MCH (test code = 785-6) 28.6 pg      25.7-32.2                 

 

             RDW (test code = 788-0) 13.5 %       11.6-14.4                 

 

             Platelets (test code = 149          See_Comment  L             [Aut

omated message]



             777-3)                                              The system [employer]



                                                                 generated this 

result



                                                                 transmitted ref

erence



                                                                 range: 150 - 45

0 K/CU



                                                                 MM. The referen

ce



                                                                 range was not u

sed to



                                                                 interpret this 

result



                                                                 as normal/abnor

mal.

 

             MPV (test code = 9.4 fL       9.4-12.4                  



             82704-3)                                            

 

             nRBC (test code = 413) 0            See_Comment                [Aut

omated message]



                                                                 The system [employer]



                                                                 generated this 

result



                                                                 transmitted ref

erence



                                                                 range: 0 - 0 /1

00



                                                                 WBC. The refere

nce



                                                                 range was not u

sed to



                                                                 interpret this 

result



                                                                 as normal/abnor

mal.

 

             Lab Interpretation (test Abnormal                               



             code = 10551-7)                                        



[facility]





             Test Item    Value        Reference Range Interpretation Comments

 

             WHITE BLOOD CELL COUNT (BEAKER) 4.8 K/ L     3.5-10.5              

    



             (test code = 775)                                        

 

             RED BLOOD CELL COUNT (BEAKER) 5.14 M/ L    4.63-6.08               

  



             (test code = 761)                                        

 

             HEMOGLOBIN (BEAKER) (test code = 14.7 GM/DL   13.7-17.5            

     



             410)                                                

 

             HEMATOCRIT (BEAKER) (test code = 43.3 %       40.1-51.0            

     



             411)                                                

 

             MEAN CORPUSCULAR VOLUME (BEAKER) 84.2 fL      79.0-92.2            

     



             (test code = 753)                                        

 

             MEAN CORPUSCULAR HEMOGLOBIN 28.6 pg      25.7-32.2                 



             (BEAKER) (test code = 751)                                        

 

             MEAN CORPUSCULAR HEMOGLOBIN CONC 33.9 GM/DL   32.3-36.5            

     



             (BEAKER) (test code = 752)                                        

 

             RED CELL DISTRIBUTION WIDTH 13.5 %       11.6-14.4                 



             (BEAKER) (test code = 412)                                        

 

             PLATELET COUNT (BEAKER) (test 149 K/CU MM  150-450      L          

  



             code = 756)                                         

 

             MEAN PLATELET VOLUME (BEAKER) 9.4 fL       9.4-12.4                

  



             (test code = 754)                                        

 

             NUCLEATED RED BLOOD CELLS 0 /100 WBC   0-0                       



             (BEAKER) (test code = 413)                                        



Blood Culture - Routine (Left Venipuncture)2021 22:00:00





             Test Item    Value        Reference Range Interpretation Comments

 

             Result (test code = No growth in 5 days                           



             6463-4)                                             



[facility] [id number] [facility]





             Test Item    Value        Reference Range Interpretation Comments

 

             CULTURE (BEAKER) (test No growth in 5 days                         

  



             code = 1095)                                        



BLOOD [id number] 22:00:00





             Test Item    Value        Reference Range Interpretation Comments

 

             CULTURE (BEAKER) (test No growth in 5 days                         

  



             code = 1095)                                        



SARS-CoV2/Influenza/RSV RT-PCR (Symptomatic ONLY)2021 21:08:00





             Test Item    Value        Reference Range Interpretation Comments

 

             SARS-COV2/RT-PCR (test Positive     Negative     AA           



             code = 92300-1)                                        

 

             Influenza A RT-PCR Negative     Negative                  



             (test code = 96920-5)                                        

 

             Influenza B RT-PCR Negative     Negative                  



             (test code = 70896-3)                                        

 

             RSV by RT-PCR (test Negative     Negative                  Performa

nce of the



             code = 66514-1)                                        [facility]pert Xpress



                                                                 SARS-CoV-2/Flu/

RSV test



                                                                 has only been



                                                                 established in



                                                                 nasopharyngeal 

swab



                                                                 specimens. Use 

of the



                                                                 [facility]pert Xpress



                                                                 SARS-CoV-2/Flu/

RSV test



                                                                 with other spec

imen



                                                                 types has not b

een



                                                                 assessed and



                                                                 performance



                                                                 characteristics

 are



                                                                 unknown.  As wi

th any



                                                                 molecular test,



                                                                 mutations withi

n the



                                                                 targeted geneti

c



                                                                 regions identif

ied by



                                                                 the Xpert Xpres

s



                                                                 SARS-CoV-2/Flu/

RSV test



                                                                 could affect pr

car[place]ton



                                                                 and/or probe bi

nding



                                                                 resulting in fa

ilure to



                                                                 detect the pres

ence of



                                                                 virus or the vi

sabra



                                                                 being detected 

less



                                                                 predictably.Neg

ative



                                                                 results do not 

preclude



                                                                 SARS-CoV-2, Inf

luenza



                                                                 A/B, or RSV inf

ection



                                                                 and should not 

be used



                                                                 as the sole bas

is for



                                                                 treatment or ot

her



                                                                 patient managem

ent



                                                                 decisions.  Res

ults



                                                                 from the Xpert 

Xpress



                                                                 SARS-CoV-2/Flu/

RSV test



                                                                 should be corre

lated



                                                                 with the clinic

al



                                                                 history,



                                                                 epidemiological

 data,



                                                                 and other data



                                                                 available to th

e



                                                                 clinician evalu

ating



                                                                 the patient.  I

nvalid



                                                                 test results ma

y occur



                                                                 from improper s

pecimen



                                                                 collection; jasmeet

lure to



                                                                 follow the oralia

mmended



                                                                 sample collecti

on,



                                                                 handling, and s

torage



                                                                 procedures; gretchen

hnical



                                                                 error. False ne

gative



                                                                 results may occ

ur if



                                                                 virus is presen

t at



                                                                 levels below th

e



                                                                 analytical limi

t of



                                                                 detection (LOD:

 131



                                                                 copies/mL).  Vi

ral



                                                                 nucleic acid ma

y



                                                                 persist in vivo

,



                                                                 independent of 

virus



                                                                 viability. Dete

ction of



                                                                 analyte target(

s) does



                                                                 not imply that 

the



                                                                 corresponding v

irus(es)



                                                                 are infectious 

or are



                                                                 the causative a

gents



                                                                 for clinical sy

mptoms.



                                                                 Recent patient 

exposure



                                                                 to FluMist or

 other



                                                                 live attenuated



                                                                 influenza vacci

sylvester may



                                                                 cause inaccurat

e



                                                                 positive result

s.This



                                                                 test has been



                                                                 authorized by [employer] under



                                                                 an EUA for use 

by



                                                                 authorized



                                                                 laboratories.  

This



                                                                 test is only au

thorized



                                                                 for the duratio

n of the



                                                                 declaration nereida

t



                                                                 circumstances e

xist



                                                                 justifying the



                                                                 authorization o

f



                                                                 emergency use o

f in



                                                                 vitro diagnosti

c tests



                                                                 for detection a

nd/or



                                                                 diagnosis of CO

VID-19



                                                                 under Section 5

64(b)(1)



                                                                 of the Federal 

Food,



                                                                 Drug and Cosmet

ic Act,



                                                                 21 U.[place].C. 



                                                                 360bbb-3(b)(1),

 unless



                                                                 the authorizati

on is



                                                                 terminated or r

evoked



                                                                 sooner. Fact Sh

eet for



                                                                 Healthcare Prov

iders:



                                                                 https://[email or web address]



                                                                 /Documents/Xper

t%20Xpre



                                                                 ss%61WNXS-BbV-2

-Flu-RSV



                                                                 /302-4508%20Rev

.%20B%20



                                                                 HCP%20Fact%20Sh

eet.pdf



                                                                 Fact Sheet for



                                                                 Healthcare Elsa

ents:



                                                                 https://www.[employer]



                                                                 /Documents/Xper

t%20Xpre



                                                                 ss%19LFKH-XuG-2

-Flu-RSV



                                                                 /302-4507%20Rev

.%20B%20



                                                                 Patient%20Fact%

20Sheet.



                                                                 pdf

 

             Lab Interpretation Abnormal                               



             (test code = 33409-6)                                        



[id number] 21:08:00





             Test Item    Value        Reference Range Interpretation Comments

 

             SARS-COV2/RT-PCR Positive     Negative     AA           



             (test code =                                        



             0805701)                                            

 

             INFLUENZA A RT-PCR Negative     Negative                  



             (test code =                                        



             5300432)                                            

 

             INFLUENZA B RT-PCR Negative     Negative                  



             (test code =                                        



             3586195)                                            

 

             RSV  RT-PCR (test Negative     Negative                  Performanc

e of the Xpert



             code = 3156753)                                        Xpress SARS-

CoV-2/Flu/RSV



                                                                 test has only b

een



                                                                 established in



                                                                 nasopharyngeal 

swab



                                                                 specimens. Use 

of the



                                                                 Xpert Xpress



                                                                 SARS-CoV-2/Flu/

RSV test



                                                                 with other spec

imen types



                                                                 has not been as

sessed and



                                                                 performance



                                                                 characteristics

 are



                                                                 unknown.  As wi

th any



                                                                 molecular test,

 mutations



                                                                 within the targ

eted



                                                                 genetic regions



                                                                 identified by t

he Xpert



                                                                 Xpress SARS-CoV

-2/Flu/RSV



                                                                 test could affe

ct primer



                                                                 and/or probe bi

nding



                                                                 resulting in fa

ilure to



                                                                 detect the pres

ence of



                                                                 virus or the vi

sabra being



                                                                 detected less



                                                                 predictably.Neg

ative



                                                                 results do not 

preclude



                                                                 SARS-CoV-2, Inf

luenza



                                                                 A/B, or RSV inf

ection and



                                                                 should not be u

sed as the



                                                                 sole basis for 

treatment



                                                                 or other patien

t



                                                                 management deci

sions.



                                                                 Results from 

e Xpert



                                                                 Xpress SARS-CoV

-2/Flu/RSV



                                                                 test should be 

correlated



                                                                 with the clinic

al



                                                                 history, epidem

iological



                                                                 data, and other

 data



                                                                 available to 

e



                                                                 clinician evalu

ating the



                                                                 patient.  Inval

id test



                                                                 results may occ

ur from



                                                                 improper specim

en



                                                                 collection; jasmeet

lure to



                                                                 follow the oralia

mmended



                                                                 sample collecti

on,



                                                                 handling, and s

torage



                                                                 procedures; gretchen

hnical



                                                                 error. False ne

gative



                                                                 results may occ

ur if



                                                                 virus is presen

t at



                                                                 levels below 

e



                                                                 analytical limi

t of



                                                                 detection (LOD:

 131



                                                                 copies/mL).  Vi

ral



                                                                 nucleic acid ma

y persist



                                                                 in vivo, indepe

ndent of



                                                                 virus viability

.



                                                                 Detection of an

alyte



                                                                 target(s) does 

not imply



                                                                 that the corres

ponding



                                                                 virus(es) are i

nfectious



                                                                 or are the caus

ative



                                                                 agents for clin

ical



                                                                 symptoms.  Rece

nt patient



                                                                 exposure to Flu

Mist  or



                                                                 other live atte

nuated



                                                                 influenza vacci

sylvester may



                                                                 cause inaccurat

e positive



                                                                 results.This te

st has



                                                                 been authorized

 by FDA



                                                                 under an EUA fo

r use by



                                                                 authorized labo

ratories.



                                                                 This test is on

ly



                                                                 authorized for 

the



                                                                 duration of the



                                                                 declaration nereida

t



                                                                 circumstances e

xist



                                                                 justifying the



                                                                 authorization o

f



                                                                 emergency use o

f in vitro



                                                                 diagnostic test

s for



                                                                 detection and/o

r



                                                                 diagnosis of CO

VID-19



                                                                 under Section 5

64(b)(1)



                                                                 of the Federal 

Food, Drug



                                                                 and Cosmetic Ac

t, 21



                                                                 [place]   360bbb

-3(b)(1),



                                                                 unless the auth

orization



                                                                 is terminated o

r revoked



                                                                 sooner.Fact She

et for



                                                                 Healthcare Prov

iders:



                                                                 [email or web address]



                                                                 ocuments/Xpert%

20Xpress%2



                                                                 4NFVD-RjU-2-Flu

-RSV/-



                                                                 508%20Rev.%20B%

20HCP%20Fa



                                                                 ct%20Sheet.pdfF

act Sheet



                                                                 for Healthcare 

Patients:



                                                                 [email or web address]



                                                                 ocuments/Xpert%

20Xpress%2



                                                                 9FPXZ-FrX-4-Flu

-RSV/302-



                                                                 507%20Rev.%20B%

20Patient%



                                                                 20Fact%20Sheet.

pdf



Troponin -76-36 20:50:00





             Test Item    Value        Reference Range Interpretation Comments

 

             Troponin I (test code = <0.01        0-0.03                    



             55402-9)                                            

 

             FAINA (test code = FAINA) Troponin I (TnI)                           



                          levels must be                           



                          interpreted in the                           



                          context of the                           



                          presenting symptoms                           



                          and the clinical                           



                          findings. Elevated                           



                          TnI levels indicate                           



                          myocardial damage,                           



                          but are not specific                           



                          for ischemic heart                           



                          disease. Elevated TnI                           



                          levels are seen in                           



                          patients with other                           



                          cardiac conditions                           



                          (including                             



                          myocarditis and                           



                          congestive heart                           



                          failure), and slight                           



                          TnI elevations occur                           



                          in patients with                           



                          other conditions,                           



                          including sepsis,                           



                          renal failure,                           



                          acidosis, acute                           



                          neurological disease,                           



                          and persistent                           



                          tachyarrhythmia.Opera                           



                          tor ID - DB                            

 

             Lab Interpretation (test Normal                                 



             code = 97842-5)                                        



[facility]





             Test Item    Value        Reference Range Interpretation Comments

 

             TROPONIN I (BEAKER) (test code = 397) < ng/mL      0.00-0.03       

          



Troponin I (TnI) levels must be interpreted in the context of the presenting 
symptoms and the clinical findings. Elevated TnI levels indicate myocardial 
damage, but are not specific for ischemic heart disease. Elevated TnI levels are
seen in patients with other cardiac conditions (including myocarditis and 
congestive heart failure), and slight TnI elevations occur in patients with 
other conditions, including sepsis, renal failure, acidosis, acute neurological 
disease, and persistent tachyarrhythmia. ID - DBB-type Natriuretic 
Factor (BNP)2021 20:48:00





             Test Item    Value        Reference Range Interpretation Comments

 

             BNP (test code = 33337-3) 48 pg/mL     0-100                     

 

             FAINA (test code = FAINA)  ID - DB                           

 

             Lab Interpretation (test Normal                                 



             code = 60653-7)                                        



[facility] 20:48:00





             Test Item    Value        Reference Range Interpretation Comments

 

             B-TYPE NATRIURETIC PEPTIDE (BEAKER) 48 pg/mL     0-100             

        



             (test code = 700)                                        



 ID - [id number] 20:44:00





             Test Item    Value        Reference Range Interpretation Comments

 

             Magnesium (test code = 1.7 mg/dL    1.6-2.6                   Speci

men



             11106-6)                                            slightly



                                                                 hemolyzed

 

             FAINA (test code = FAINA)  ID - DB                           

 

             Lab Interpretation Normal                                 



             (test code = 05104-8)                                        



[facility]





             Test Item    Value        Reference Range Interpretation Comments

 

             MAGNESIUM (BEAKER) 1.7 mg/dL    1.6-2.6                   Specimen 

slightly



             (test code = 627)                                        hemolyzed



 ID - DBBASIC METABOLIC [id number] 20:44:00





             Test Item    Value        Reference Range Interpretation Comments

 

             SODIUM (BEAKER) 139 meq/L    136-145                   



             (test code = 381)                                        

 

             POTASSIUM (BEAKER) 4.0 meq/L    3.5-5.1                   Specimen 

slightly



             (test code = 379)                                        hemolyzed

 

             CHLORIDE (BEAKER) 101 meq/L                        



             (test code = 382)                                        

 

             CO2 (BEAKER) (test 24 meq/L     22-29                     



             code = 355)                                         

 

             BLOOD UREA NITROGEN 17 mg/dL     7-21                      



             (BEAKER) (test code                                        



             = 354)                                              

 

             CREATININE (BEAKER) 1.13 mg/dL   0.57-1.25                 Specimen

 slightly



             (test code = 358)                                        hemolyzed

 

             GLUCOSE RANDOM 107 mg/dL           H            



             (BEAKER) (test code                                        



             = 652)                                              

 

             CALCIUM (BEAKER) 9.1 mg/dL    8.4-10.2                  



             (test code = 697)                                        

 

             EGFR (BEAKER) (test 68 mL/min/1.73                           ESTIMA

DANNY GFR IS



             code = 1092) sq m                                   NOT AS ACCURATE

 AS



                                                                 CREATININE



                                                                 CLEARANCE IN



                                                                 PREDICTING



                                                                 GLOMERULAR



                                                                 FILTRATION RATE

.



                                                                 ESTIMATED GFR I

S



                                                                 NOT APPLICABLE 

FOR



                                                                 DIALYSIS PATIEN

TS.



 ID - DBLactic acid, [id number] 20:38:00





             Test Item    Value        Reference Range Interpretation Comments

 

             Lactate, Venous (test 2.17 mmol/L  0.5-2.2                   Specim

en



             code = 2872)                                        slightly



                                                                 hemolyzed

 

             FAINA (test code = FAINA)  ID - DB                           

 

             Lab Interpretation Normal                                 



             (test code = 56646-1)                                        



[facility] [id number] 20:38:00





             Test Item    Value        Reference Range Interpretation Comments

 

             LACTATE BLOOD VENOUS 2.17 mmol/L  0.50-2.20                 Specime

n slightly



             (2) (BEAKER) (test                                        hemolyzed



             code = 2872)                                        



 ID - DBCBC with platelet count + automated [id number] 20:21:00





             Test Item    Value        Reference Range Interpretation Comments

 

             WBC (test code = 6690-2) 4.1          See_Comment                [A

utomated message]



                                                                 The system [employer]



                                                                 generated this 

result



                                                                 transmitted ref

erence



                                                                 range: 3.5 - 10

.5



                                                                 K/L. The refe

rence



                                                                 range was not u

sed to



                                                                 interpret this 

result



                                                                 as normal/abnor

mal.

 

             RBC (test code = 789-8) 5.73         See_Comment                [Au

tomated message]



                                                                 The system [employer]



                                                                 generated this 

result



                                                                 transmitted ref

erence



                                                                 range: 4.63 - 6

.08



                                                                 M/L. The refe

rence



                                                                 range was not u

sed to



                                                                 interpret this 

result



                                                                 as normal/abnor

mal.

 

             MCHC (test code = 786-4) 32.6         See_Comment                [A

utomated message]



                                                                 The system [employer]



                                                                 generated this 

result



                                                                 transmitted ref

erence



                                                                 range: 32.3 - 3

6.5



                                                                 GM/DL. The refe

rence



                                                                 range was not u

sed to



                                                                 interpret this 

result



                                                                 as normal/abnor

mal.

 

             Hematocrit (test code = 49.1 %       40.1-51                   



             4544-3)                                             

 

             MCV (test code = 787-2) 85.7 fL      79-92.2                   

 

             MCH (test code = 785-6) 27.9 pg      25.7-32.2                 

 

             RDW (test code = 788-0) 13.2 %       11.6-14.4                 

 

             Platelets (test code = 98           See_Comment  L             [Aut

omated message]



             777-3)                                              The system [employer]



                                                                 generated this 

result



                                                                 transmitted ref

erence



                                                                 range: 150 - 45

0 K/CU



                                                                 MM. The referen

ce



                                                                 range was not u

sed to



                                                                 interpret this 

result



                                                                 as normal/abnor

mal.

 

             MPV (test code = 9.9 fL       9.4-12.4                  



             59061-9)                                            

 

             nRBC (test code = 413) 0            See_Comment                [Aut

omated message]



                                                                 The system [employer]



                                                                 generated this 

result



                                                                 transmitted ref

erence



                                                                 range: 0 - 0 /1

00



                                                                 WBC. The refere

nce



                                                                 range was not u

sed to



                                                                 interpret this 

result



                                                                 as normal/abnor

mal.

 

             % Neutros (test code = 48 %                                   



             429)                                                

 

             % Lymphs (test code = 30 %                                   



             430)                                                

 

             % Monos (test code = 20 %                                   



             431)                                                

 

             % Eos (test code = 432) 0 %                                    

 

             % Baso (test code = 437) 1 %                                    

 

             # Neutros (test code = 1.97         See_Comment                [Aut

omated message]



             670)                                                The system [employer]



                                                                 generated this 

result



                                                                 transmitted ref

erence



                                                                 range: 1.78 - 5

.38



                                                                 K/L. The refe

rence



                                                                 range was not u

sed to



                                                                 interpret this 

result



                                                                 as normal/abnor

mal.

 

             # Lymphs (test code = 1.25         See_Comment  L             [Auto

mated message]



             414)                                                The system [employer]



                                                                 generated this 

result



                                                                 transmitted ref

erence



                                                                 range: 1.32 - 3

.57



                                                                 K/L. The refe

rence



                                                                 range was not u

sed to



                                                                 interpret this 

result



                                                                 as normal/abnor

mal.

 

             # Monos (test code = 0.84         See_Comment  H             [Autom

ated message]



             415)                                                The system [employer]



                                                                 generated this 

result



                                                                 transmitted ref

erence



                                                                 range: 0.30 - 0

.82



                                                                 K/L. The refe

rence



                                                                 range was not u

sed to



                                                                 interpret this 

result



                                                                 as normal/abnor

mal.

 

             # Eos (test code = 416) 0.01         See_Comment  L             [Au

tomated message]



                                                                 The system [employer]



                                                                 generated this 

result



                                                                 transmitted ref

erence



                                                                 range: 0.04 - 0

.54



                                                                 K/L. The refe

rence



                                                                 range was not u

sed to



                                                                 interpret this 

result



                                                                 as normal/abnor

mal.

 

             # Baso (test code = 417) 0.02         See_Comment                [A

utomated message]



                                                                 The system [employer]



                                                                 generated this 

result



                                                                 transmitted ref

erence



                                                                 range: 0.01 - 0

.08



                                                                 K/L. The refe

rence



                                                                 range was not u

sed to



                                                                 interpret this 

result



                                                                 as normal/abnor

mal.

 

             Immature     1 %          0-1                       



             Granulocytes-Relative                                        



             (test code = 2801)                                        

 

             Lab Interpretation (test Abnormal                               



             code = 79492-6)                                        



[facility] W/PLT COUNT &amp; AUTO [id number] 
20:21:00





             Test Item    Value        Reference Range Interpretation Comments

 

             WHITE BLOOD CELL COUNT (BEAKER) 4.1 K/ L     3.5-10.5              

    



             (test code = 775)                                        

 

             RED BLOOD CELL COUNT (BEAKER) 5.73 M/ L    4.63-6.08               

  



             (test code = 761)                                        

 

             HEMOGLOBIN (BEAKER) (test code = 16.0 GM/DL   13.7-17.5            

     



             410)                                                

 

             HEMATOCRIT (BEAKER) (test code = 49.1 %       40.1-51.0            

     



             411)                                                

 

             MEAN CORPUSCULAR VOLUME (BEAKER) 85.7 fL      79.0-92.2            

     



             (test code = 753)                                        

 

             MEAN CORPUSCULAR HEMOGLOBIN 27.9 pg      25.7-32.2                 



             (BEAKER) (test code = 751)                                        

 

             MEAN CORPUSCULAR HEMOGLOBIN CONC 32.6 GM/DL   32.3-36.5            

     



             (BEAKER) (test code = 752)                                        

 

             RED CELL DISTRIBUTION WIDTH 13.2 %       11.6-14.4                 



             (BEAKER) (test code = 412)                                        

 

             PLATELET COUNT (BEAKER) (test code 98 K/CU MM   150-450      L     

       



             = 756)                                              

 

             MEAN PLATELET VOLUME (BEAKER) 9.9 fL       9.4-12.4                

  



             (test code = 754)                                        

 

             NUCLEATED RED BLOOD CELLS (BEAKER) 0 /100 WBC   0-0                

       



             (test code = 413)                                        

 

             NEUTROPHILS RELATIVE PERCENT 48 %                                  

 



             (BEAKER) (test code = 429)                                        

 

             LYMPHOCYTES RELATIVE PERCENT 30 %                                  

 



             (BEAKER) (test code = 430)                                        

 

             MONOCYTES RELATIVE PERCENT 20 %                                   



             (BEAKER) (test code = 431)                                        

 

             EOSINOPHILS RELATIVE PERCENT 0 %                                   

 



             (BEAKER) (test code = 432)                                        

 

             BASOPHILS RELATIVE PERCENT 1 %                                    



             (BEAKER) (test code = 437)                                        

 

             NEUTROPHILS ABSOLUTE COUNT 1.97 K/ L    1.78-5.38                 



             (BEAKER) (test code = 670)                                        

 

             LYMPHOCYTES ABSOLUTE COUNT 1.25 K/ L    1.32-3.57    L            



             (BEAKER) (test code = 414)                                        

 

             MONOCYTES ABSOLUTE COUNT (BEAKER) 0.84 K/ L    0.30-0.82    H      

      



             (test code = 415)                                        

 

             EOSINOPHILS ABSOLUTE COUNT 0.01 K/ L    0.04-0.54    L            



             (BEAKER) (test code = 416)                                        

 

             BASOPHILS ABSOLUTE COUNT (BEAKER) 0.02 K/ L    0.01-0.08           

      



             (test code = 417)                                        

 

             IMMATURE GRANULOCYTES-RELATIVE 1 %          0-1                    

   



             PERCENT (BEAKER) (test code =                                      

  



             2801)                                               



RAD, CHEST, 1 VIEW, NON [id number] 19:56:00NEW CARDIOLOGIST OBERTONReason 
for exam:-&gt;FEVERReason for exam:-&gt;NASAL CONGESTIONShould this be performed
at the bedside?-&gt;Yes
************************************************************[facility]        : 1970        Sex: 
M************************************************************FINAL REPORT 
PATIENT ID:   [id number]   AP view of the chest dated 3/11/2021 CLINICAL INFORMAT
ION: FEVERNASAL CONGESTION Comment:  Heart is normal in size. Pulmonary 
vasculature is unremarkable.Lungs are clear. No pulmonary infiltrate or pleural 
effusion is present.  Impression:  No active cardiopulmonary disease. Signed: 
Kiran James Verified Date/Time:  2021 19:56:24 Reading Location: 
[address] Consult Reading Room      Electronically signed by: KIRAN JAMES M.D. on 2021 07:56 PMXR chest 1 view portable / [id number] 19:56:00
Interface, External Ris In - 2021  7:58 PM CSTFINAL REPORT PATIENT ID:   
[id number]   AP view ofthe chest dated 3/11/2021 CLINICAL INFORMATION: FEVERNASAL 
CONGESTION Comment:  Heart is normal in size. Pulmonary vasculature is 
unremarkable. Lungs are clear. No pulmonary infiltrate or pleural effusion is 
present.  Impression:  No active cardiopulmonary disease. Signed: Kiran James Verified Date/Time:  2021 19:56:24 Reading Location: [address]
Consult Reading Room      Electronically signed by: KIRAN JAMES M.D. on 
2021 07:56 [id number] 00:00:00
Ordered by an unspecified provider.[facility]
2020 12:44:00





             Test Item    Value        Reference Range Interpretation Comments

 

             TACROLIMUS BLOOD (BEAKER) (test 7.6 ng/mL    10.0-20.0    L        

    



             code = 657)                                         



 ID - AAHAMIDLIPID [id number] 10:29:00





             Test Item    Value        Reference Range Interpretation Comments

 

             TRIGLYCERIDES (BEAKER) (test code = 81 mg/dL                       

        



             540)                                                

 

             CHOLESTEROL (BEAKER) (test code = 155 mg/dL                        

      



             631)                                                

 

             HDL CHOLESTEROL (BEAKER) (test code 50 mg/dL                       

        



             = 976)                                              

 

             LDL CHOLESTEROL CALCULATED (BEAKER) 89 mg/dL                       

        



             (test code = 633)                                        



Triglyceride Reference Range:   Low Risk         &lt;150   Borderline    150-199
  High Risk     200-499   Very High Risk  &gt;=500Cholesterol Reference Range:  
Low Risk         &lt;200   Borderline 200-239    High Risk        &gt;240HDL 
Cholesterol Reference Range:   Low Risk         &gt;=60   High Risk         
&lt;40LDL Cholesterol Reference Range:   Optimal          &lt;100   Near Optimal
 100-129   Borderline    130-159   High          160-189   Very High       
&gt;=190    ID - AMANDA [facility] METABOLIC [id number] 10:29:00





             Test Item    Value        Reference Range Interpretation Comments

 

             SODIUM (BEAKER) 140 meq/L    136-145                   



             (test code = 381)                                        

 

             POTASSIUM (BEAKER) 4.6 meq/L    3.5-5.1                   



             (test code = 379)                                        

 

             CHLORIDE (BEAKER) 104 meq/L                        



             (test code = 382)                                        

 

             CO2 (BEAKER) (test 29 meq/L     22-29                     



             code = 355)                                         

 

             BLOOD UREA NITROGEN 19 mg/dL     7-21                      



             (BEAKER) (test code                                        



             = 354)                                              

 

             CREATININE (BEAKER) 1.13 mg/dL   0.57-1.25                 



             (test code = 358)                                        

 

             GLUCOSE RANDOM 105 mg/dL                        



             (BEAKER) (test code                                        



             = 652)                                              

 

             CALCIUM (BEAKER) 9.7 mg/dL    8.4-10.2                  



             (test code = 697)                                        

 

             EGFR (BEAKER) (test 68 mL/min/1.73                           ESTIMA

DANNY GFR IS



             code = 1092) sq m                                   NOT AS ACCURATE

 AS



                                                                 CREATININE



                                                                 CLEARANCE IN



                                                                 PREDICTING



                                                                 GLOMERULAR



                                                                 FILTRATION RATE

.



                                                                 ESTIMATED GFR I

S



                                                                 NOT APPLICABLE 

FOR



                                                                 DIALYSIS PATIEN

TS.



 ID - AMANDA [facility] FUNCTION [id number] 10:29:00





             Test Item    Value        Reference Range Interpretation Comments

 

             TOTAL PROTEIN (BEAKER) (test code = 7.5 gm/dL    6.0-8.3           

        



             770)                                                

 

             ALBUMIN (BEAKER) (test code = 1145) 4.7 g/dL     3.5-5.0           

        

 

             BILIRUBIN TOTAL (BEAKER) (test code 0.7 mg/dL    0.2-1.2           

        



             = 377)                                              

 

             BILIRUBIN DIRECT (BEAKER) (test 0.3 mg/dL    0.1-0.5               

    



             code = 706)                                         

 

             ALKALINE PHOSPHATASE (BEAKER) (test 78 U/L                   

        



             code = 346)                                         

 

             AST (SGOT) (BEAKER) (test code = 19 U/L       5-34                 

     



             353)                                                

 

             ALT (SGPT) (BEAKER) (test code = 14 U/L       6-55                 

     



             347)                                                



 ID - AMANDA CRAD, CHEST, 2 [id number] 10:21:00NEW CARDIOLOGIST 
OBERTONReason for Exam:-&gt;heart transplant annual follow upFINAL REPORT 
PATIENT ID:   [id number] INDICATION: heart transplant annual follow up COMPARISON:
None TECHNIQUE: Frontal and lateral views of the chest. FINDINGS: Lungs and 
pleura: Clear lungs. No effusion.Heart and mediastinum: Normal heart size. 
Unremarkable mediastinal contours.Osseous structures: No acute 
abnormality.Additional findings: None.  IMPRESSION: No acute intrathoracic 
abnormality. Signed:Shikha Jarvsi MDReport Verified Date/Time:  2020 
10:21:38 Reading Location: [facility] Radiology Reading Room  Electronically 
signed by: SHIKHA JARVIS MD on 2020 10:21 [id number] 
10:10:00





             Test Item    Value        Reference Range Interpretation Comments

 

             PROSTATE SPECIFIC ANTIGEN (BEAKER) 0.6 ng/mL    0.0-4.0            

       



             (test code = 844)                                        



 ID - AMANDA [facility] W/PLT COUNT &amp; AUTO [id number] 09:25:00





             Test Item    Value        Reference Range Interpretation Comments

 

             WHITE BLOOD CELL COUNT (BEAKER) 4.5 K/ L     3.5-10.5              

    



             (test code = 775)                                        

 

             RED BLOOD CELL COUNT (BEAKER) 5.64 M/ L    4.63-6.08               

  



             (test code = 761)                                        

 

             HEMOGLOBIN (BEAKER) (test code = 16.9 GM/DL   13.7-17.5            

     



             410)                                                

 

             HEMATOCRIT (BEAKER) (test code = 48.8 %       40.1-51.0            

     



             411)                                                

 

             MEAN CORPUSCULAR VOLUME (BEAKER) 86.5 fL      79.0-92.2            

     



             (test code = 753)                                        

 

             MEAN CORPUSCULAR HEMOGLOBIN 30.0 pg      25.7-32.2                 



             (BEAKER) (test code = 751)                                        

 

             MEAN CORPUSCULAR HEMOGLOBIN CONC 34.6 GM/DL   32.3-36.5            

     



             (BEAKER) (test code = 752)                                        

 

             RED CELL DISTRIBUTION WIDTH 13.3 %       11.6-14.4                 



             (BEAKER) (test code = 412)                                        

 

             PLATELET COUNT (BEAKER) (test 152 K/CU MM  150-450                 

  



             code = 756)                                         

 

             MEAN PLATELET VOLUME (BEAKER) 9.5 fL       9.4-12.4                

  



             (test code = 754)                                        

 

             NUCLEATED RED BLOOD CELLS 0 /100 WBC   0-0                       



             (BEAKER) (test code = 413)                                        

 

             NEUTROPHILS RELATIVE PERCENT 50 %                                  

 



             (BEAKER) (test code = 429)                                        

 

             LYMPHOCYTES RELATIVE PERCENT 32 %                                  

 



             (BEAKER) (test code = 430)                                        

 

             MONOCYTES RELATIVE PERCENT 11 %                                   



             (BEAKER) (test code = 431)                                        

 

             EOSINOPHILS RELATIVE PERCENT 6 %                                   

 



             (BEAKER) (test code = 432)                                        

 

             BASOPHILS RELATIVE PERCENT 1 %                                    



             (BEAKER) (test code = 437)                                        

 

             NEUTROPHILS ABSOLUTE COUNT 2.25 K/ L    1.78-5.38                 



             (BEAKER) (test code = 670)                                        

 

             LYMPHOCYTES ABSOLUTE COUNT 1.45 K/ L    1.32-3.57                 



             (BEAKER) (test code = 414)                                        

 

             MONOCYTES ABSOLUTE COUNT (BEAKER) 0.50 K/ L    0.30-0.82           

      



             (test code = 415)                                        

 

             EOSINOPHILS ABSOLUTE COUNT 0.25 K/ L    0.04-0.54                 



             (BEAKER) (test code = 416)                                        

 

             BASOPHILS ABSOLUTE COUNT (BEAKER) 0.03 K/ L    0.01-0.08           

      



             (test code = 417)                                        

 

             IMMATURE GRANULOCYTES-RELATIVE 0 %          0-1                    

   



             PERCENT (BEAKER) (test code =                                      

  



             2801)                                               



TACROLIMUS [id number] 11:25:00





             Test Item    Value        Reference Range Interpretation Comments

 

             TACROLIMUS BLOOD (BEAKER) (test 6.9 ng/mL    10.0-20.0    L        

    



             code = 657)                                         



BASIC METABOLIC [id number] 08:24:00





             Test Item    Value        Reference Range Interpretation Comments

 

             SODIUM (BEAKER) 137 meq/L    136-145                   



             (test code = 381)                                        

 

             POTASSIUM (BEAKER) 4.3 meq/L    3.5-5.1                   



             (test code = 379)                                        

 

             CHLORIDE (BEAKER) 103 meq/L                        



             (test code = 382)                                        

 

             CO2 (BEAKER) (test 28 meq/L     22-29                     



             code = 355)                                         

 

             BLOOD UREA NITROGEN 18 mg/dL     7-21                      



             (BEAKER) (test code                                        



             = 354)                                              

 

             CREATININE (BEAKER) 1.12 mg/dL   0.57-1.25                 



             (test code = 358)                                        

 

             GLUCOSE RANDOM 98 mg/dL                         



             (BEAKER) (test code                                        



             = 652)                                              

 

             CALCIUM (BEAKER) 9.1 mg/dL    8.4-10.2                  



             (test code = 697)                                        

 

             EGFR (BEAKER) (test 69 mL/min/1.73                           ESTIMA

DANNY GFR IS



             code = 1092) sq m                                   NOT AS ACCURATE

 AS



                                                                 CREATININE



                                                                 CLEARANCE IN



                                                                 PREDICTING



                                                                 GLOMERULAR



                                                                 FILTRATION RATE

.



                                                                 ESTIMATED GFR I

S



                                                                 NOT APPLICABLE 

FOR



                                                                 DIALYSIS PATIEN

TS.



CBC W/PLT COUNT &amp; AUTO [id number] 08:01:00





             Test Item    Value        Reference Range Interpretation Comments

 

             WHITE BLOOD CELL COUNT (BEAKER) 5.4 K/ L     3.5-10.5              

    



             (test code = 775)                                        

 

             RED BLOOD CELL COUNT (BEAKER) 5.35 M/ L    4.63-6.08               

  



             (test code = 761)                                        

 

             HEMOGLOBIN (BEAKER) (test code = 15.6 GM/DL   13.7-17.5            

     



             410)                                                

 

             HEMATOCRIT (BEAKER) (test code = 46.9 %       40.1-51.0            

     



             411)                                                

 

             MEAN CORPUSCULAR VOLUME (BEAKER) 87.7 fL      79.0-92.2            

     



             (test code = 753)                                        

 

             MEAN CORPUSCULAR HEMOGLOBIN 29.2 pg      25.7-32.2                 



             (BEAKER) (test code = 751)                                        

 

             MEAN CORPUSCULAR HEMOGLOBIN CONC 33.3 GM/DL   32.3-36.5            

     



             (BEAKER) (test code = 752)                                        

 

             RED CELL DISTRIBUTION WIDTH 13.6 %       11.6-14.4                 



             (BEAKER) (test code = 412)                                        

 

             PLATELET COUNT (BEAKER) (test 132 K/CU MM  150-450      L          

  



             code = 756)                                         

 

             MEAN PLATELET VOLUME (BEAKER) 9.3 fL       9.4-12.4     L          

  



             (test code = 754)                                        

 

             NUCLEATED RED BLOOD CELLS 0 /100 WBC   0-0                       



             (BEAKER) (test code = 413)                                        

 

             NEUTROPHILS RELATIVE PERCENT 70 %                                  

 



             (BEAKER) (test code = 429)                                        

 

             LYMPHOCYTES RELATIVE PERCENT 16 %                                  

 



             (BEAKER) (test code = 430)                                        

 

             MONOCYTES RELATIVE PERCENT 8 %                                    



             (BEAKER) (test code = 431)                                        

 

             EOSINOPHILS RELATIVE PERCENT 5 %                                   

 



             (BEAKER) (test code = 432)                                        

 

             BASOPHILS RELATIVE PERCENT 1 %                                    



             (BEAKER) (test code = 437)                                        

 

             NEUTROPHILS ABSOLUTE COUNT 3.77 K/ L    1.78-5.38                 



             (BEAKER) (test code = 670)                                        

 

             LYMPHOCYTES ABSOLUTE COUNT 0.83 K/ L    1.32-3.57    L            



             (BEAKER) (test code = 414)                                        

 

             MONOCYTES ABSOLUTE COUNT (BEAKER) 0.45 K/ L    0.30-0.82           

      



             (test code = 415)                                        

 

             EOSINOPHILS ABSOLUTE COUNT 0.25 K/ L    0.04-0.54                 



             (BEAKER) (test code = 416)                                        

 

             BASOPHILS ABSOLUTE COUNT (BEAKER) 0.04 K/ L    0.01-0.08           

      



             (test code = 417)                                        

 

             IMMATURE GRANULOCYTES-RELATIVE 0 %          0-1                    

   



             PERCENT (BEAKER) (test code =                                      

  



             2801)                                               



CT, BRAIN, WITHOUT [id number] 17:41:00NEW CARDIOLOGIST OBERTONFINAL 
REPORT PATIENT ID:   [id number] CT, BRAIN, WITHOUT CONTRAST INDICATION: head 
injury TECHNIQUE: Noncontrast axial imaging was obtained from the vertex to the 
skull base. Axial images were reconstructed using a bone algorithm. DOSE 
REDUCTION: Dose modulation, iterative reconstruction, and/or weight-based 
adjustment of the mA/kV was utilized to reduce the radiation dose to as low as 
reasonably achievable. COMPARISON: None. FINDINGS: Cerebral parenchyma: Mild 
cerebral volume loss is present. No recent infarct or parenchymal hemorrhage is 
present.Midline structures: Normally positioned.Cerebellum and brainstem: 
Commensurate volume loss.Ventricles: Normal volume.Extra-axial spaces: 
Unremarkable. Calvarium and skull base: Intact.Paranasal sinuses and mastoid air
cells: Visible chambers are clear.Orbital contents: Included portions 
unremarkable. Additional findings: None.  IMPRESSION:  Chronic involutional 
changes without acute or traumatic intracranial abnormality. Signed: 
JR Aguila RobertMDRepstephan Verified Date/Time:  2019 17:41:54 
Reading Location: [facility] Radiology Reading Room    Electronically signed 
by: MOHIT AGUILA on 2019 05:41 PMTACROLIMUS [id number] 
10:33:00





             Test Item    Value        Reference Range Interpretation Comments

 

             TACROLIMUS BLOOD (BEAKER) (test 6.2 ng/mL    10.0-20.0    L        

    



             code = 657)                                         



[id number] 09:15:00





             Test Item    Value        Reference Range Interpretation Comments

 

             PROSTATE SPECIFIC ANTIGEN (BEAKER) 0.5 ng/mL    0.0-4.0            

       



             (test code = 844)                                        



LIPID [id number] 09:01:00





             Test Item    Value        Reference Range Interpretation Comments

 

             TRIGLYCERIDES (BEAKER) (test code = 63 mg/dL                       

        



             540)                                                

 

             CHOLESTEROL (BEAKER) (test code = 126 mg/dL                        

      



             631)                                                

 

             HDL CHOLESTEROL (BEAKER) (test code 42 mg/dL                       

        



             = 976)                                              

 

             LDL CHOLESTEROL CALCULATED (BEAKER) 71 mg/dL                       

        



             (test code = 633)                                        



Triglyceride Reference Range:   Low Risk         &lt;150   Borderline    150-199
  High Risk     200-499   Very High Risk  &gt;=500Cholesterol Reference Range:  
Low Risk         &lt;200   Borderline 200-239    High Risk        &gt;240HDL 
Cholesterol Reference Range:   Low Risk         &gt;=60   High Risk         
&lt;40LDL Cholesterol Reference Range:   Optimal          &lt;100   Near Optimal
 100-129   Borderline    130-159   High          160-189   Very High       
&gt;=190BASIC METABOLIC [id number] 09:01:00





             Test Item    Value        Reference Range Interpretation Comments

 

             SODIUM (BEAKER) 143 meq/L    136-145                   



             (test code = 381)                                        

 

             POTASSIUM (BEAKER) 4.3 meq/L    3.5-5.1                   



             (test code = 379)                                        

 

             CHLORIDE (BEAKER) 105 meq/L                        



             (test code = 382)                                        

 

             CO2 (BEAKER) (test 30 meq/L     22-29        H            



             code = 355)                                         

 

             BLOOD UREA NITROGEN 16 mg/dL     7-21                      



             (BEAKER) (test code                                        



             = 354)                                              

 

             CREATININE (BEAKER) 0.96 mg/dL   0.57-1.25                 



             (test code = 358)                                        

 

             GLUCOSE RANDOM 108 mg/dL           H            



             (BEAKER) (test code                                        



             = 652)                                              

 

             CALCIUM (BEAKER) 9.7 mg/dL    8.4-10.2                  



             (test code = 697)                                        

 

             EGFR (BEAKER) (test 83 mL/min/1.73                           ESTIMA

DANNY GFR IS



             code = 1092) sq m                                   NOT AS ACCURATE

 AS



                                                                 CREATININE



                                                                 CLEARANCE IN



                                                                 PREDICTING



                                                                 GLOMERULAR



                                                                 FILTRATION RATE

.



                                                                 ESTIMATED GFR I

S



                                                                 NOT APPLICABLE 

FOR



                                                                 DIALYSIS PATIEN

TS.



HEPATIC FUNCTION [id number] 09:01:00





             Test Item    Value        Reference Range Interpretation Comments

 

             TOTAL PROTEIN (BEAKER) (test code = 7.0 gm/dL    6.0-8.3           

        



             770)                                                

 

             ALBUMIN (BEAKER) (test code = 1145) 4.5 g/dL     3.5-5.0           

        

 

             BILIRUBIN TOTAL (BEAKER) (test code 0.5 mg/dL    0.2-1.2           

        



             = 377)                                              

 

             BILIRUBIN DIRECT (BEAKER) (test 0.3 mg/dL    0.1-0.5               

    



             code = 706)                                         

 

             ALKALINE PHOSPHATASE (BEAKER) (test 81 U/L                   

        



             code = 346)                                         

 

             AST (SGOT) (BEAKER) (test code = 18 U/L       5-34                 

     



             353)                                                

 

             ALT (SGPT) (BEAKER) (test code = 14 U/L       6-55                 

     



             347)                                                



RAD, CHEST, 2 [id number] 08:48:00NEW CARDIOLOGIST OBERTONReason for Exam:-
&gt;heart transplant annual follow upFINAL REPORT PATIENT ID:   [id number] 
INDICATION: heart transplant annual follow up COMPARISON: May 23, 2018 
TECHNIQUE: Chest radiograph, two views, PA and lateral. FINDINGS / 
IMPRESSION:Lung volumes arenormal and lungs are clear. Intact median sternotomy 
wires and left axillary/subclavian surgical clips again demonstrated from prior 
heart transplant. Cardiac and mediastinal contours normal. No pleural effusion 
or pneumothorax. Osseous structures unremarkable. Signed: Marlee Julio 
MDReport Verified Date/Time:  2019 08:48:33 Reading Location: [facility] Radiology Reading Room Electronically signed by: MARLEE JULIO M.D. on 2019 08:48 AMCBC W/PLT COUNT &amp; AUTO [id number] 
08:28:00





             Test Item    Value        Reference Range Interpretation Comments

 

             WHITE BLOOD CELL COUNT (BEAKER) 5.2 K/ L     3.5-10.5              

    



             (test code = 775)                                        

 

             RED BLOOD CELL COUNT (BEAKER) 5.18 M/ L    4.63-6.08               

  



             (test code = 761)                                        

 

             HEMOGLOBIN (BEAKER) (test code = 15.1 GM/DL   13.7-17.5            

     



             410)                                                

 

             HEMATOCRIT (BEAKER) (test code = 45.4 %       40.1-51.0            

     



             411)                                                

 

             MEAN CORPUSCULAR VOLUME (BEAKER) 87.6 fL      79.0-92.2            

     



             (test code = 753)                                        

 

             MEAN CORPUSCULAR HEMOGLOBIN 29.2 pg      25.7-32.2                 



             (BEAKER) (test code = 751)                                        

 

             MEAN CORPUSCULAR HEMOGLOBIN CONC 33.3 GM/DL   32.3-36.5            

     



             (BEAKER) (test code = 752)                                        

 

             RED CELL DISTRIBUTION WIDTH 13.3 %       11.6-14.4                 



             (BEAKER) (test code = 412)                                        

 

             PLATELET COUNT (BEAKER) (test 135 K/CU MM  150-450      L          

  



             code = 756)                                         

 

             MEAN PLATELET VOLUME (BEAKER) 9.6 fL       9.4-12.4                

  



             (test code = 754)                                        

 

             NUCLEATED RED BLOOD CELLS 0 /100 WBC   0-0                       



             (BEAKER) (test code = 413)                                        

 

             NEUTROPHILS RELATIVE PERCENT 55 %                                  

 



             (BEAKER) (test code = 429)                                        

 

             LYMPHOCYTES RELATIVE PERCENT 29 %                                  

 



             (BEAKER) (test code = 430)                                        

 

             MONOCYTES RELATIVE PERCENT 10 %                                   



             (BEAKER) (test code = 431)                                        

 

             EOSINOPHILS RELATIVE PERCENT 5 %                                   

 



             (BEAKER) (test code = 432)                                        

 

             BASOPHILS RELATIVE PERCENT 1 %                                    



             (BEAKER) (test code = 437)                                        

 

             NEUTROPHILS ABSOLUTE COUNT 2.88 K/ L    1.78-5.38                 



             (BEAKER) (test code = 670)                                        

 

             LYMPHOCYTES ABSOLUTE COUNT 1.52 K/ L    1.32-3.57                 



             (BEAKER) (test code = 414)                                        

 

             MONOCYTES ABSOLUTE COUNT (BEAKER) 0.51 K/ L    0.30-0.82           

      



             (test code = 415)                                        

 

             EOSINOPHILS ABSOLUTE COUNT 0.24 K/ L    0.04-0.54                 



             (BEAKER) (test code = 416)                                        

 

             BASOPHILS ABSOLUTE COUNT (BEAKER) 0.05 K/ L    0.01-0.08           

      



             (test code = 417)                                        

 

             IMMATURE GRANULOCYTES-RELATIVE 1 %          0-1                    

   



             PERCENT (BEAKER) (test code =                                      

  



             2801)                                               



TACROLIMUS [id number] 13:15:00





             Test Item    Value        Reference Range Interpretation Comments

 

             TACROLIMUS BLOOD (BEAKER) (test 6.9 ng/mL    10.0-20.0    L        

    



             code = 657)                                         



BASIC METABOLIC [id number] 10:45:00





             Test Item    Value        Reference Range Interpretation Comments

 

             SODIUM (BEAKER) 139 meq/L    136-145                   



             (test code = 381)                                        

 

             POTASSIUM (BEAKER) 4.8 meq/L    3.5-5.1                   



             (test code = 379)                                        

 

             CHLORIDE (BEAKER) 102 meq/L                        



             (test code = 382)                                        

 

             CO2 (BEAKER) (test 30 meq/L     22-29        H            



             code = 355)                                         

 

             BLOOD UREA NITROGEN 14 mg/dL     7-21                      



             (BEAKER) (test code                                        



             = 354)                                              

 

             CREATININE (BEAKER) 0.95 mg/dL   0.57-1.25                 



             (test code = 358)                                        

 

             GLUCOSE RANDOM 88 mg/dL                         



             (BEAKER) (test code                                        



             = 652)                                              

 

             CALCIUM (BEAKER) 10.1 mg/dL   8.4-10.2                  



             (test code = 697)                                        

 

             EGFR (BEAKER) (test 84 mL/min/1.73                           ESTIMA

DANNY GFR IS



             code = 1092) sq m                                   NOT AS ACCURATE

 AS



                                                                 CREATININE



                                                                 CLEARANCE IN



                                                                 PREDICTING



                                                                 GLOMERULAR



                                                                 FILTRATION RATE

.



                                                                 ESTIMATED GFR I

S



                                                                 NOT APPLICABLE 

FOR



                                                                 DIALYSIS PATIEN

TS.



CBC W/PLT COUNT &amp; AUTO [id number] 10:31:00





             Test Item    Value        Reference Range Interpretation Comments

 

             WHITE BLOOD CELL COUNT (BEAKER) 5.0 K/ L     3.5-10.5              

    



             (test code = 775)                                        

 

             RED BLOOD CELL COUNT (BEAKER) 5.55 M/ L    4.63-6.08               

  



             (test code = 761)                                        

 

             HEMOGLOBIN (BEAKER) (test code = 15.8 GM/DL   13.7-17.5            

     



             410)                                                

 

             HEMATOCRIT (BEAKER) (test code = 47.9 %       40.1-51.0            

     



             411)                                                

 

             MEAN CORPUSCULAR VOLUME (BEAKER) 86.3 fL      79.0-92.2            

     



             (test code = 753)                                        

 

             MEAN CORPUSCULAR HEMOGLOBIN 28.5 pg      25.7-32.2                 



             (BEAKER) (test code = 751)                                        

 

             MEAN CORPUSCULAR HEMOGLOBIN CONC 33.0 GM/DL   32.3-36.5            

     



             (BEAKER) (test code = 752)                                        

 

             RED CELL DISTRIBUTION WIDTH 13.2 %       11.6-14.4                 



             (BEAKER) (test code = 412)                                        

 

             PLATELET COUNT (BEAKER) (test 131 K/CU MM  150-450      L          

  



             code = 756)                                         

 

             MEAN PLATELET VOLUME (BEAKER) 9.5 fL       9.4-12.4                

  



             (test code = 754)                                        

 

             NUCLEATED RED BLOOD CELLS 0 /100 WBC   0-0                       



             (BEAKER) (test code = 413)                                        

 

             NEUTROPHILS RELATIVE PERCENT 56 %                                  

 



             (BEAKER) (test code = 429)                                        

 

             LYMPHOCYTES RELATIVE PERCENT 26 %                                  

 



             (BEAKER) (test code = 430)                                        

 

             MONOCYTES RELATIVE PERCENT 12 %                                   



             (BEAKER) (test code = 431)                                        

 

             EOSINOPHILS RELATIVE PERCENT 4 %                                   

 



             (BEAKER) (test code = 432)                                        

 

             BASOPHILS RELATIVE PERCENT 1 %                                    



             (BEAKER) (test code = 437)                                        

 

             NEUTROPHILS ABSOLUTE COUNT 2.80 K/ L    1.78-5.38                 



             (BEAKER) (test code = 670)                                        

 

             LYMPHOCYTES ABSOLUTE COUNT 1.32 K/ L    1.32-3.57                 



             (BEAKER) (test code = 414)                                        

 

             MONOCYTES ABSOLUTE COUNT (BEAKER) 0.59 K/ L    0.30-0.82           

      



             (test code = 415)                                        

 

             EOSINOPHILS ABSOLUTE COUNT 0.21 K/ L    0.04-0.54                 



             (BEAKER) (test code = 416)                                        

 

             BASOPHILS ABSOLUTE COUNT (BEAKER) 0.04 K/ L    0.01-0.08           

      



             (test code = 417)                                        

 

             IMMATURE GRANULOCYTES-RELATIVE 1 %          0-1                    

   



             PERCENT (BEAKER) (test code =                                      

  



             2801)                                               



BASIC METABOLIC [id number] 09:28:00





             Test Item    Value        Reference Range Interpretation Comments

 

             SODIUM (BEAKER) 140 meq/L    136-145                   



             (test code = 381)                                        

 

             POTASSIUM (BEAKER) 4.7 meq/L    3.5-5.1                   



             (test code = 379)                                        

 

             CHLORIDE (BEAKER) 103 meq/L                        



             (test code = 382)                                        

 

             CO2 (BEAKER) (test 30 meq/L     22-29        H            



             code = 355)                                         

 

             BLOOD UREA NITROGEN 20 mg/dL     7-21                      



             (BEAKER) (test code                                        



             = 354)                                              

 

             CREATININE (BEAKER) 1.08 mg/dL   0.57-1.25                 



             (test code = 358)                                        

 

             GLUCOSE RANDOM 110 mg/dL           H            



             (BEAKER) (test code                                        



             = 652)                                              

 

             CALCIUM (BEAKER) 10.0 mg/dL   8.4-10.2                  



             (test code = 697)                                        

 

             EGFR (BEAKER) (test 73 mL/min/1.73                           ESTIMA

DANNY GFR IS



             code = 1092) sq m                                   NOT AS ACCURATE

 AS



                                                                 CREATININE



                                                                 CLEARANCE IN



                                                                 PREDICTING



                                                                 GLOMERULAR



                                                                 FILTRATION RATE

.



                                                                 ESTIMATED GFR I

S



                                                                 NOT APPLICABLE 

FOR



                                                                 DIALYSIS PATIEN

TS.



CBC W/PLT COUNT &amp; AUTO [id number] 08:58:00





             Test Item    Value        Reference Range Interpretation Comments

 

             WHITE BLOOD CELL COUNT (BEAKER) 4.9 K/ L     3.5-10.5              

    



             (test code = 775)                                        

 

             RED BLOOD CELL COUNT (BEAKER) 5.37 M/ L    4.63-6.08               

  



             (test code = 761)                                        

 

             HEMOGLOBIN (BEAKER) (test code = 15.5 GM/DL   13.7-17.5            

     



             410)                                                

 

             HEMATOCRIT (BEAKER) (test code = 46.8 %       40.1-51.0            

     



             411)                                                

 

             MEAN CORPUSCULAR VOLUME (BEAKER) 87.2 fL      79.0-92.2            

     



             (test code = 753)                                        

 

             MEAN CORPUSCULAR HEMOGLOBIN 28.9 pg      25.7-32.2                 



             (BEAKER) (test code = 751)                                        

 

             MEAN CORPUSCULAR HEMOGLOBIN CONC 33.1 GM/DL   32.3-36.5            

     



             (BEAKER) (test code = 752)                                        

 

             RED CELL DISTRIBUTION WIDTH 13.3 %       11.6-14.4                 



             (BEAKER) (test code = 412)                                        

 

             PLATELET COUNT (BEAKER) (test 130 K/CU MM  150-450      L          

  



             code = 756)                                         

 

             MEAN PLATELET VOLUME (BEAKER) 9.6 fL       9.4-12.4                

  



             (test code = 754)                                        

 

             NUCLEATED RED BLOOD CELLS 0 /100 WBC   0-0                       



             (BEAKER) (test code = 413)                                        

 

             NEUTROPHILS RELATIVE PERCENT 65 %                                  

 



             (BEAKER) (test code = 429)                                        

 

             LYMPHOCYTES RELATIVE PERCENT 20 %                                  

 



             (BEAKER) (test code = 430)                                        

 

             MONOCYTES RELATIVE PERCENT 10 %                                   



             (BEAKER) (test code = 431)                                        

 

             EOSINOPHILS RELATIVE PERCENT 4 %                                   

 



             (BEAKER) (test code = 432)                                        

 

             BASOPHILS RELATIVE PERCENT 1 %                                    



             (BEAKER) (test code = 437)                                        

 

             NEUTROPHILS ABSOLUTE COUNT 3.16 K/ L    1.78-5.38                 



             (BEAKER) (test code = 670)                                        

 

             LYMPHOCYTES ABSOLUTE COUNT 0.99 K/ L    1.32-3.57    L            



             (BEAKER) (test code = 414)                                        

 

             MONOCYTES ABSOLUTE COUNT (BEAKER) 0.49 K/ L    0.30-0.82           

      



             (test code = 415)                                        

 

             EOSINOPHILS ABSOLUTE COUNT 0.17 K/ L    0.04-0.54                 



             (BEAKER) (test code = 416)                                        

 

             BASOPHILS ABSOLUTE COUNT (BEAKER) 0.04 K/ L    0.01-0.08           

      



             (test code = 417)                                        

 

             IMMATURE GRANULOCYTES-RELATIVE 1 %          0-1                    

   



             PERCENT (BEAKER) (test code =                                      

  



             2801)                                               



TACROLIMUS [id number] 13:26:00





             Test Item    Value        Reference Range Interpretation Comments

 

             TACROLIMUS BLOOD (BEAKER) (test 4.1 ng/mL    10.0-20.0    L        

    



             code = 657)                                         



RAD, CHEST, 2 [id number] 14:13:00NEW CARDIOLOGIST OBERTONReason for Exam:-
&gt;heart transplant annual follow upFINAL REPORT PATIENT ID:   [id number] Chest 
two views INDICATION: Heart transplant annual follow-up. COMPARISON: 2017 
IMPRESSION: There is no focal consolidation, vascular congestion, pleural 
effusion, or pneumothorax. Heart size is within normal limits. Median sternotomy
changes, left axillary surgical clips, and gastric sleeve with small hiatal 
hernia are again noted. No significant change since 2017. Signed: Ac Nickerson MDReport Verified Date/Time:  2018 14:13:48 Reading Location: 
[facility] C013 Consult Reading Room   Electronically signed by: AC NICKERSON M.D. on 2018 02:13 PMTACROLIMUS [id number] 13:55:00





             Test Item    Value        Reference Range Interpretation Comments

 

             TACROLIMUS BLOOD (BEAKER) (test 4.7 ng/mL    10.0-20.0    L        

    



             code = 657)                                         



COMPREHENSIVE METABOLIC [id number] 12:11:00





             Test Item    Value        Reference Range Interpretation Comments

 

             TOTAL PROTEIN 6.5 gm/dL    6.0-8.3                   



             (BEAKER) (test code =                                        



             770)                                                

 

             ALBUMIN (BEAKER) 4.2 g/dL     3.5-5.0                   



             (test code = 1145)                                        

 

             ALKALINE PHOSPHATASE 98 U/L                           



             (BEAKER) (test code =                                        



             346)                                                

 

             BILIRUBIN TOTAL 0.4 mg/dL    0.2-1.2                   



             (BEAKER) (test code =                                        



             377)                                                

 

             SODIUM (BEAKER) (test 141 meq/L    136-145                   



             code = 381)                                         

 

             POTASSIUM (BEAKER) 4.3 meq/L    3.5-5.1                   



             (test code = 379)                                        

 

             CHLORIDE (BEAKER) 103 meq/L                        



             (test code = 382)                                        

 

             CO2 (BEAKER) (test 29 meq/L     22-29                     



             code = 355)                                         

 

             BLOOD UREA NITROGEN 17 mg/dL     7-21                      



             (BEAKER) (test code =                                        



             354)                                                

 

             CREATININE (BEAKER) 1.00 mg/dL   0.57-1.25                 



             (test code = 358)                                        

 

             GLUCOSE RANDOM 101 mg/dL                        



             (BEAKER) (test code =                                        



             652)                                                

 

             CALCIUM (BEAKER) 9.5 mg/dL    8.4-10.2                  



             (test code = 697)                                        

 

             AST (SGOT) (BEAKER) 15 U/L       5-34                      



             (test code = 353)                                        

 

             ALT (SGPT) (BEAKER) 12 U/L       6-55                      



             (test code = 347)                                        

 

             EGFR (BEAKER) (test 79 mL/min/1.73                           ESTIMA

DANNY GFR IS



             code = 1092) sq m                                   NOT AS ACCURATE

 AS



                                                                 CREATININE



                                                                 CLEARANCE IN



                                                                 PREDICTING



                                                                 GLOMERULAR



                                                                 FILTRATION RATE

.



                                                                 ESTIMATED GFR I

S



                                                                 NOT APPLICABLE 

FOR



                                                                 DIALYSIS PATIEN

TS.



LIPID [id number] 11:47:00





             Test Item    Value        Reference Range Interpretation Comments

 

             TRIGLYCERIDES (BEAKER) (test code = 87 mg/dL                       

        



             540)                                                

 

             CHOLESTEROL (BEAKER) (test code = 127 mg/dL                        

      



             631)                                                

 

             HDL CHOLESTEROL (BEAKER) (test code 37 mg/dL                       

        



             = 976)                                              

 

             LDL CHOLESTEROL CALCULATED (BEAKER) 73 mg/dL                       

        



             (test code = 633)                                        



Triglyceride Reference Range:   Low Risk         &lt;150   Borderline    150-199
  High Risk     200-499   Very High Risk  &gt;=500Cholesterol Reference Range:  
Low Risk         &lt;200   Borderline 200-239    High Risk        &gt;240HDL 
Cholesterol Reference Range:   Low Risk         &gt;=60   High Risk         
&lt;40LDL Cholesterol Reference Range:   Optimal          &lt;100   Near Optimal
 100-129   Borderline    130-159   High          160-189   Very High       
&gt;=190PROTHROMBIN [id number] 11:30:00





             Test Item    Value        Reference Range Interpretation Comments

 

             PROTIME (BEAKER) (test code = 14.7 seconds 11.7-14.7               

  



             759)                                                

 

             INR (BEAKER) (test code = 370) 1.2          <=5.9                  

   



RECOMMENDED COUMADIN/WARFARIN INR THERAPY RANGESSTANDARD DOSE: 2.0 - 3.0   
Includes: PROPHYLAXIS forvenous thrombosis, systemic embolization; TREATMENT for
venous thrombosis and/or pulmonary embolus.HIGH RISK: Target INR is 2.5-3.5 for 
patients with mechanical heart valves.CBC W/PLT COUNT &amp; AUTO DIFFERENTIAL
2018 11:23:00





             Test Item    Value        Reference Range Interpretation Comments

 

             WHITE BLOOD CELL COUNT (BEAKER) 4.8 K/ L     3.5-10.5              

    



             (test code = 775)                                        

 

             RED BLOOD CELL COUNT (BEAKER) 5.26 M/ L    4.63-6.08               

  



             (test code = 761)                                        

 

             HEMOGLOBIN (BEAKER) (test code = 15.1 GM/DL   13.7-17.5            

     



             410)                                                

 

             HEMATOCRIT (BEAKER) (test code = 45.8 %       40.1-51.0            

     



             411)                                                

 

             MEAN CORPUSCULAR VOLUME (BEAKER) 87.1 fL      79.0-92.2            

     



             (test code = 753)                                        

 

             MEAN CORPUSCULAR HEMOGLOBIN 28.7 pg      25.7-32.2                 



             (BEAKER) (test code = 751)                                        

 

             MEAN CORPUSCULAR HEMOGLOBIN CONC 33.0 GM/DL   32.3-36.5            

     



             (BEAKER) (test code = 752)                                        

 

             RED CELL DISTRIBUTION WIDTH 13.4 %       11.6-14.4                 



             (BEAKER) (test code = 412)                                        

 

             PLATELET COUNT (BEAKER) (test 161 K/CU MM  150-450                 

  



             code = 756)                                         

 

             MEAN PLATELET VOLUME (BEAKER) 9.0 fL       9.4-12.4     L          

  



             (test code = 754)                                        

 

             NUCLEATED RED BLOOD CELLS 0 /100 WBC   0-0                       



             (BEAKER) (test code = 413)                                        

 

             NEUTROPHILS RELATIVE PERCENT 67 %                                  

 



             (BEAKER) (test code = 429)                                        

 

             LYMPHOCYTES RELATIVE PERCENT 19 %                                  

 



             (BEAKER) (test code = 430)                                        

 

             MONOCYTES RELATIVE PERCENT 10 %                                   



             (BEAKER) (test code = 431)                                        

 

             EOSINOPHILS RELATIVE PERCENT 3 %                                   

 



             (BEAKER) (test code = 432)                                        

 

             BASOPHILS RELATIVE PERCENT 1 %                                    



             (BEAKER) (test code = 437)                                        

 

             NEUTROPHILS ABSOLUTE COUNT 3.21 K/ L    1.78-5.38                 



             (BEAKER) (test code = 670)                                        

 

             LYMPHOCYTES ABSOLUTE COUNT 0.93 K/ L    1.32-3.57    L            



             (BEAKER) (test code = 414)                                        

 

             MONOCYTES ABSOLUTE COUNT (BEAKER) 0.48 K/ L    0.30-0.82           

      



             (test code = 415)                                        

 

             EOSINOPHILS ABSOLUTE COUNT 0.14 K/ L    0.04-0.54                 



             (BEAKER) (test code = 416)                                        

 

             BASOPHILS ABSOLUTE COUNT (BEAKER) 0.04 K/ L    0.01-0.08           

      



             (test code = 417)                                        

 

             IMMATURE GRANULOCYTES-RELATIVE 1 %          0-1                    

   



             PERCENT (BEAKER) (test code =                                      

  



             2801)                                               



[[facility]] CBC (INCLUDES DIFF/PLT)2018 17:10:01





             Test Item    Value        Reference Range Interpretation Comments

 

             WBC (test code = 6690-2) 4.7 {K/CMM}  3.7-10.4                  

 

             RBC (test code = 789-8) 5.33 {M/CMM} 4.70-6.10                 

 

             Hgb (test code = 718-7) 15.3 g/dl    14.0-18.0                 

 

             Hct (test code = 73774-2) 46.4 %       42.0-54.0                 

 

             MCV (test code = 787-2) 87.1 fL      80.0-94.0                 

 

             MCH (test code = 785-6) 28.7 pg      27.0-31.0                 

 

             MCHC (test code = 786-4) 33.0 g/dl    32.0-36.0                 

 

             RDW (test code = 788-0) 13.9 %       11.5-14.5                 

 

             Platelet (test code = 02825-5) 154 {K/CMM}  133-450                

   

 

             Mean Platelet Volume (test code 8.8 fL       7.4-10.4              

    



             = 08309-6)                                          



[facility]] [id number] 17:10:01





             Test Item    Value        Reference Range Interpretation Comments

 

             Segmented Neutrophils (test code 59.8 %       45.0-75.0            

     



             = 32540-5)                                          

 

             Monocytes (test code = 26485-3) 11.4 %       2.0-12.0              

    

 

             Lymphocytes (test code = 83379-7) 23.9 %       20.0-40.0           

      

 

             Eosinophils; Above High Threshold 4.1 %        0.0-4.0             

      



             (test code = 75132-9)                                        

 

             Basophils (test code = 706-2) 0.8 %        0.0-1.0                 

  

 

             Segs-Bands # (test code = 2.8 {K/CMM}  1.5-8.1                   



             37135-9)                                            

 

             Lymphocytes # (test code = 1.1 {K/CMM}  1.0-5.5                   



             90009-0)                                            

 

             Monocytes # (test code = 07599-4) 0.5 {K/CMM}  0.0-0.8             

      

 

             Eosinophils # (test code = 0.2 {K/CMM}  0.0-0.5                   



             73055-3)                                            



[facility]] PTH, INTACT (WITHOUT CALCIUM)2018 
17:10:01





             Test Item    Value        Reference Range Interpretation Comments

 

             Parathyroid Hormone Intact; Above 99.2 pg/ml   11.1-79.5           

      



             High Threshold (test code =                                        



             2731-8)                                             



[facility]] CMP [id number] 17:10:01





             Test Item    Value        Reference Range Interpretation Comments

 

             Sodium Level 140 {mEq/l}  135-145                   



             (test code =                                        



             2951-2)                                             

 

             Potassium Level 4.5 {mEq/l}  3.5-5.1                   



             (test code =                                        



             2823-3)                                             

 

             Chloride Level 105 {mEq/l}                      



             (test code =                                        



             2075-0)                                             

 

             Carbon Dioxide 28 {mEq/l}   24-32                     



             (test code =                                        



             -9)                                             

 

             AGAP (test code = 11.5 {mEq/l} 10.0-20.0                 



             70190-6)                                            

 

             Glucose Lvl (test 95 mg/dl     70-99                     Adult refe

rence range



             code = 2345-7)                                        values reflec

t the



                                                                 clinical guidel

inesof the



                                                                 American Diabet

es



                                                                 Association.

 

             Creatinine Lvl 1.20 mg/dl   0.50-1.40                 



             (test code =                                        



             2160-0)                                             

 

             Blood Urea   22 mg/dl     7-22                      



             Nitrogen (test                                        



             code = 3094-0)                                        

 

             BUN/Creatinine 18           6-25                      



             Ratio (test code                                        



             = 3097-3)                                           

 

             Total Protein 7.2 g/dl     6.4-8.4                   



             (test code =                                        



             2885-2)                                             

 

             Albumin Lvl (test 4.4 g/dl     3.5-5.0                   



             code = 1751-7)                                        

 

             Globulin (test 2.8 g/dl     2.7-4.2                   



             code = 79304-1)                                        

 

             A/G Ratio (test 1.6          0.7-1.6                   



             code = 1759-0)                                        

 

             Calcium Level 9.1 mg/dl    8.5-10.5                  



             Total (test code                                        



             = 21973-3)                                          

 

             ALT (test code = 21 u/l       0-65                      



             3-4)                                             

 

             AST (test code = 18 u/l       0-37                      



             65956-2)                                            

 

             Bili Total (test 0.4 mg/dl    0.2-1.3                   



             code = -2)                                        

 

             Alk Phos (test 112 u/l                          



             code = 1783-0)                                        

 

             eGFR (test code = 71                                     The eGFR i

s calculated



             23540-6)     {ML/MIN/1.7}                           using the CKD-E

PI



                                                                 formula. In mos

t young,



                                                                 healthyindividu

als the



                                                                 eGFR will be >9

0



                                                                 mL/min/1.73m2. 

The eGFR



                                                                 declines with a

ge. AneGFR



                                                                 of 60-89 may be

 normal in



                                                                 some population

s,



                                                                 particularly th

e elderly,



                                                                 forwhom the CKD

-EPI



                                                                 formula has not

 been



                                                                 extensively marielena

idated.



                                                                 Use of the eGFR

 isnot



                                                                 recommended in 

the



                                                                 following



                                                                 populations:Ind

ividuals



                                                                 with unstable c

reatinine



                                                                 concentrations,

 including



                                                                 pregnantpatient

s and



                                                                 those with seri

ous



                                                                 co-morbid



                                                                 conditions.Elsa

ents with



                                                                 extremes in mus

rick mass



                                                                 or diet.The albina

a above



                                                                 are obtained fr

om the



                                                                 National Kidney

 Disease



                                                                 Education Progr

am(NKDEP)



                                                                 which sergio wilburn



                                                                 recommends that

 when the



                                                                 eGFR is used in



                                                                 patientswith ex

tremes of



                                                                 body mass index

 for



                                                                 purposes of [place]

g dosing,



                                                                 the eGFR should

be



                                                                 multiplied by t

he



                                                                 estimated BMI.



[facility]] FOLATE, [id number] 17:10:01





             Test Item    Value        Reference Range Interpretation Comments

 

             Folate Level (test code = 2284-8) 9.3 ng/ml    >=3.0               

      



[facility]] IRON, [id number] 17:10:01





             Test Item    Value        Reference Range Interpretation Comments

 

             Iron (test code = 2498-4) 85 ug/dL                         



[facility]] LIPID [id number] 17:10:01





             Test Item    Value        Reference Range Interpretation Comments

 

             Chol (test code = 2093-3) 124 mg/dl    <=199                     

 

             Trig (test code = 2571-8) 77 mg/dl     <=149                     

 

             HDL Cholesterol; Below Low 42 mg/dl     >=61                      



             Threshold (test code = 2085-9)                                     

   

 

             CHD Risk; Below Low Threshold (test 2.95         4.00-7.30         

        



             code = 32963-9)                                        

 

             LDL (test code = 00392-9) 67 mg/dl     <=99                      

 

             VLDL (test code = VLDL) 15                                     



[facility]] VITAMIN [id number] 17:10:01





             Test Item    Value        Reference Range Interpretation Comments

 

             Vitamin B12 Level; Below Low 235 pg/ml    254-1320                 

 



             Threshold (test code = 2132-9)                                     

   



[facility]] TSH, 3RD GENERATION W/REFLEX TO FT4
2018 17:10:01





             Test Item    Value        Reference Range Interpretation Comments

 

             TSH (test code = 54741-6) 1.090 {uIU/ml} 0.360-3.740               



[facility]] HEMOGLOBIN [id number] 17:10:01





             Test Item    Value        Reference Range Interpretation Comments

 

             Hemoglobin A1c (test code = 4548-4) 5.2 %        <=5.6             

        



[facility][] Vitamin E [id number] 17:10:01





             Test Item    Value        Reference Range Interpretation Comments

 

             Vitamin E Lvl (test Cancel Reason: Modified                        

   



             code = Vitamin E Lvl) Order                                  



[facility]] VITAMIN B1, WHOLE [id number] 17:10:01





             Test Item    Value        Reference Range Interpretation Comments

 

             Vitamin B1   147.6        66.5-200.0                This test was d

eveloped and its



             Level (test  nmol/L                                 performance



             code =                                              characteristics

determined by



             Vitamin B1                                          [facility]. It has

 not been



             Level)                                              cleared orappro

mateo by the Food



                                                                 and Drug



                                                                 Administration.

Performed At: [facility] [place], [place]



                                                                 [id number]

shantal CODY MD



                                                                 Ph:[phone]



[facility][H] Vit  17:10:01





             Test Item    Value        Reference Range Interpretation Comments

 

             Vitamin A Level (test 52 ug/dL     24-85                     **Effe

ctive ,



             code = Vitamin A                                         Vitami

n A, Serum



             Level)                                              will be**  ni

ging to



                                                                 the LC/MS-MS



                                                                 methodology. Th

e



                                                                 reference  inte

rval



                                                                 will be annabel carvajal to:



                                                                 



                                                                 < 6 years      

Not



                                                                 Estab.



                                                                        6 - 11 y

ears



                                                                  22.8 -  54.4



                                                                              11

 - 18



                                                                 years      28.9

 -  75.7



                                                                 



                                                                 20 - 39 years  

    31.2



                                                                 -  89.1



                                                                        40 - 59 

years



                                                                   33.1 - 100.0



                                                                                

   >58



                                                                 years      36.4

 -



                                                                 108.0Performed 

At: [facility]



                                                                 [facility] [place]

[facility]



                                                                 [place] [id number]

abelardo CODY MD



                                                                 Ph:[phone]



[facility][H] [facility] [id number] 17:09:01





             Test Item    Value        Reference Range Interpretation Comments

 

             [facility] (test COMMENT                                Test Orde

red: 707843



             code = Misc [facility])                                        25-Hydr

oxyvitamin D LCMS



                                                                 D2+D325-Hydroxy

, Vitamin



                                                                 D          25  

        [L



                                                                 ] ng/mL    ESRe

ference



                                                                 Range:All Ages:

 Target



                                                                 levels 30 -



                                                                 10025-Hydroxy, 

Vitamin



                                                                 D-2        <1.0



                                                                   ng/mL    ES25

-Hydroxy,



                                                                 Vitamin D-3    

    24



                                                                           ng/mL



                                                                 ESPerformed At:

 



                                                                 [facility] [id number]



                                                                 [place], [place]



                                                                 [id number]

shantal CODY MD



                                                                 Ph:[phone]

rformed



                                                                 At:  [facility]



                                                                 [id number]

[address] [place] [address]



                                                                 [id number]

paddy BURKS MD Ph:[phone]

[phone]



[facility][H] Vitamin E [id number] 17:09:01





             Test Item    Value        Reference Range Interpretation Comments

 

             Alpha-Tocopherol 8.5 mg/L     5.3-17.5                  **Effective

 2018



             (test code =                                        248029 Vitamin 

E, Serum



             Alpha-Tocopherol)                                        will bemad

e**



                                                                 non-orderable. 

[facility]



                                                                 will offer 0701

40 Vitamin



                                                                 Eperformed   by

 LC/MS-MS



                                                                 methodology [facility]

ch will



                                                                 includealpha to

copherol



                                                                 and gamma tocop

herol. This



                                                                 will affectany 

existing



                                                                 profile.   For 

further



                                                                 information, co

andrew



                                                                 local [facility]



                                                                 Representative.

Performed



                                                                 At:  [facility]



                                                                 [id number]

shantal CODY MD Ph:[phone]

[phone]



[facility], WITHOUT [id number] 09:10:00NEW 
CARDIOLOGIST OBERTONFINAL REPORT PATIENT ID:   [id number] INDICATION: 47-year-old 
male with chest wall pain and history ofheart transplant. COMPARISON:Chest 
radiograph 2017 TECHNIQUE: Chest CT exam WITHOUT intravenous contrast. 
The exam was performed according to our department dose-optimization protocol, 
which includes automated exposure control, adjustments of mA and kV according to
 patient size. Iterative reconstructions are also sometimes employed. 
FINDINGS:No chest wall mass, chest wall hematoma, rib fracture, or rib lesion is
 demonstrated. There is a healed median sternotomy. Heart is normal in size and 
there is no pericardial effusion. Mild atherosclerotic plaque of the ascending 
thoracic aorta is demonstrated. Thoracic aorta is normal in caliber. 8 mm 
calcified nodule in the right lower lobe represents prior granulomatous 
infection. No pneumonia, pulmonary edema, pleural effusion, or pneumothorax is d
emonstrated. There is no mediastinal or hilar lymphadenopathy. Thyroid gland is 
unremarkable. Upper and mid esophagus are unremarkable. Patient is status post 
sleeve gastrectomy and there is a small part of the sleeve herniated in the low 
posterior mediastinum. Partial imaging of the upper abdomen is otherwise 
unremarkable. IMPRESSION: No chest wall mass, muscle tear, rib fracture, or rib 
lesion. No other CT explanation for the patient's chest wall pain. Clear lungs. 
Unremarkable heart transplant. Prior gastric sleeve with small hiatal hernia. 
Signed: Marlee Julio MDReport Verified Date/Time:  2017 09:10:45 
Reading Location: [facility] Reading Room - [place] [id number] 
Electronically signed by: MARLEE JULIO M.D. on 2017 09:10 AM
TACROLIMUS [id number] 13:49:00





             Test Item    Value        Reference Range Interpretation Comments

 

             TACROLIMUS BLOOD (BEAKER) (test 5.3 ng/mL    10.0-20.0    L        

    



             code = 657)                                         



BASIC METABOLIC [id number] 10:44:00





             Test Item    Value        Reference Range Interpretation Comments

 

             SODIUM (BEAKER) 141 meq/L    136-145                   



             (test code = 381)                                        

 

             POTASSIUM (BEAKER) 4.5 meq/L    3.5-5.1                   



             (test code = 379)                                        

 

             CHLORIDE (BEAKER) 103 meq/L                        



             (test code = 382)                                        

 

             CO2 (BEAKER) (test 29 meq/L     22-29                     



             code = 355)                                         

 

             BLOOD UREA NITROGEN 17 mg/dL     7-21                      



             (BEAKER) (test code                                        



             = 354)                                              

 

             CREATININE (BEAKER) 1.09 mg/dL   0.57-1.25                 



             (test code = 358)                                        

 

             GLUCOSE RANDOM 97 mg/dL                         



             (BEAKER) (test code                                        



             = 652)                                              

 

             CALCIUM (BEAKER) 9.3 mg/dL    8.4-10.2                  



             (test code = 697)                                        

 

             EGFR (BEAKER) (test 72 mL/min/1.73                           ESTIMA

DANNY GFR IS



             code = 1092) sq m                                   NOT AS ACCURATE

 AS



                                                                 CREATININE



                                                                 CLEARANCE IN



                                                                 PREDICTING



                                                                 GLOMERULAR



                                                                 FILTRATION RATE

.



                                                                 ESTIMATED GFR I

S



                                                                 NOT APPLICABLE 

FOR



                                                                 DIALYSIS PATIEN

TS.



CBC W/PLT COUNT &amp; AUTO [id number] 09:54:00





             Test Item    Value        Reference Range Interpretation Comments

 

             WHITE BLOOD CELL COUNT (BEAKER) 4.0 K/ L     3.5-10.5              

    



             (test code = 775)                                        

 

             RED BLOOD CELL COUNT (BEAKER) 5.42 M/ L    4.63-6.08               

  



             (test code = 761)                                        

 

             HEMOGLOBIN (BEAKER) (test code = 15.4 GM/DL   13.7-17.5            

     



             410)                                                

 

             HEMATOCRIT (BEAKER) (test code = 47.6 %       40.1-51.0            

     



             411)                                                

 

             MEAN CORPUSCULAR VOLUME (BEAKER) 87.8 fL      79.0-92.2            

     



             (test code = 753)                                        

 

             MEAN CORPUSCULAR HEMOGLOBIN 28.4 pg      25.7-32.2                 



             (BEAKER) (test code = 751)                                        

 

             MEAN CORPUSCULAR HEMOGLOBIN CONC 32.4 GM/DL   32.3-36.5            

     



             (BEAKER) (test code = 752)                                        

 

             RED CELL DISTRIBUTION WIDTH 13.8 %       11.6-14.4                 



             (BEAKER) (test code = 412)                                        

 

             PLATELET COUNT (BEAKER) (test 152 K/CU MM  150-450                 

  



             code = 756)                                         

 

             MEAN PLATELET VOLUME (BEAKER) 9.9 fL       9.4-12.4                

  



             (test code = 754)                                        

 

             NUCLEATED RED BLOOD CELLS 0 /100 WBC   0-0                       



             (BEAKER) (test code = 413)                                        

 

             NEUTROPHILS RELATIVE PERCENT 66 %                                  

 



             (BEAKER) (test code = 429)                                        

 

             LYMPHOCYTES RELATIVE PERCENT 20 %                                  

 



             (BEAKER) (test code = 430)                                        

 

             MONOCYTES RELATIVE PERCENT 11 %                                   



             (BEAKER) (test code = 431)                                        

 

             EOSINOPHILS RELATIVE PERCENT 2 %                                   

 



             (BEAKER) (test code = 432)                                        

 

             BASOPHILS RELATIVE PERCENT 1 %                                    



             (BEAKER) (test code = 437)                                        

 

             NEUTROPHILS ABSOLUTE COUNT 2.65 K/ L    1.78-5.38                 



             (BEAKER) (test code = 670)                                        

 

             LYMPHOCYTES ABSOLUTE COUNT 0.82 K/ L    1.32-3.57    L            



             (BEAKER) (test code = 414)                                        

 

             MONOCYTES ABSOLUTE COUNT (BEAKER) 0.44 K/ L    0.30-0.82           

      



             (test code = 415)                                        

 

             EOSINOPHILS ABSOLUTE COUNT 0.08 K/ L    0.04-0.54                 



             (BEAKER) (test code = 416)                                        

 

             BASOPHILS ABSOLUTE COUNT (BEAKER) 0.04 K/ L    0.01-0.08           

      



             (test code = 417)                                        

 

             IMMATURE GRANULOCYTES-RELATIVE 0 %          0-1                    

   



             PERCENT (BEAKER) (test code =                                      

  



             2801)                                               



[id number] 14:30:000.1[facility] TjnuaoxLLSFGSYLIN0117-74-46 14:30:002.4
[facility] CyhhqzbAJXMRXLSOG1497-83-82 14:30:000.7[facility]ann[facility]MATOLOGY
2017 14:30:0010.7[facility] SyfkgmiJHFBLDCDER6847-59-89 14:30:0019.7[facility]
 WkhhupiKERXHOCJFL9146-24-39 14:30:0066.5[facility] KhadxczMKIYSLYOBO4459-83-41 
14:30:008.5[facility] PhrqznkVGJSGAMJOJ0973-11-03 14:30:43728Woiimkax [place]
YCPETMVEJZ1871-16-48 14:30:0013.9[facility] TabhrmzYLUQJCCWNX4040-98-47 14:30:00
33.4[facility] AxujggeCBALTIYQMT4050-34-86 14:30:00





             Test Item    Value        Reference Range Interpretation Comments

 

             MCH (test code = MCH) 28.7 pg      27.0-31.0                 



[facility] YeqmcheBLERETXHYH6537-31-56 14:30:0085.8[facility] HermannHEMATOLOGY
2017 14:30:0044.0[facility] IvzkynaNOPIAITVCP6109-38-39 14:30:0014.7[facility]
 WhaigzkXDKRSEDXER7169-54-10 14:30:005.13[facility] FecoxawTOLTOEMSTX5895-35-77 
14:30:004.1[facility] JjesxmhJWTHOVHLCPBX6442-98-01 14:30:0015.3[facility] [place]
CQLDDJMCLRMS8787-22-29 14:30:0091[facility] ZvkctcyHBXFPHYOAOZX9524-53-84 14:30:00
13[facility] OlcglxsQELNGMBDOJWY5097-18-84 14:30:000.98[facility] Jeff
NHSXNIBQROXI2237-66-70 14:30:84271Rzqboyrp QiofapmCEKHLHPJGWVF4416-87-67 
14:30:004.3[facility] IbusyyiGMVTPKGZXYPZ2135-80-83 14:30:50306Djpzinfv Jeff
PYAYELTPDWSF1809-99-40 14:30:009.1[facility] HaeizjxHDQQOYWNKEHF9592-17-06 
14:30:0028[facility] SzifttbUMKAMZREBDPA2905-16-07 14:30:0077[facility] [place]
YBVWKIRKVL0382-58-10 14:30:000.8[facility] HchifcuWBYRLAMIGW3479-35-88 14:30:002.8
[facility] UwrlhplKZQCFCKMPU3060-02-91 14:30:000.4[facility] HermannTACROLIMUS LEVEL
2017 14:05:00





             Test Item    Value        Reference Range Interpretation Comments

 

             TACROLIMUS BLOOD (BEAKER) (test 6.5 ng/mL    10.0-20.0    L        

    



             code = 657)                                         



BASIC METABOLIC [id number] 10:13:00





             Test Item    Value        Reference Range Interpretation Comments

 

             SODIUM (BEAKER) 140 meq/L    136-145                   



             (test code = 381)                                        

 

             POTASSIUM (BEAKER) 4.1 meq/L    3.5-5.1                   



             (test code = 379)                                        

 

             CHLORIDE (BEAKER) 109 meq/L           H            



             (test code = 382)                                        

 

             CO2 (BEAKER) (test 22 meq/L     22-29                     



             code = 355)                                         

 

             BLOOD UREA NITROGEN 15 mg/dL     7-21                      



             (BEAKER) (test code                                        



             = 354)                                              

 

             CREATININE (BEAKER) 0.95 mg/dL   0.57-1.25                 



             (test code = 358)                                        

 

             GLUCOSE RANDOM 99 mg/dL                         



             (BEAKER) (test code                                        



             = 652)                                              

 

             CALCIUM (BEAKER) 8.8 mg/dL    8.4-10.2                  



             (test code = 697)                                        

 

             EGFR (BEAKER) (test 85 mL/min/1.73                           ESTIMA

DANNY GFR IS



             code = 1092) sq m                                   NOT AS ACCURATE

 AS



                                                                 CREATININE



                                                                 CLEARANCE IN



                                                                 PREDICTING



                                                                 GLOMERULAR



                                                                 FILTRATION RATE

.



                                                                 ESTIMATED GFR I

S



                                                                 NOT APPLICABLE 

FOR



                                                                 DIALYSIS PATIEN

TS.



CBC W/PLT COUNT &amp; AUTO [id number] 09:42:00





             Test Item    Value        Reference Range Interpretation Comments

 

             WHITE BLOOD CELL COUNT (BEAKER) 4.7 K/ L     4.0-10.0              

    



             (test code = 775)                                        

 

             RED BLOOD CELL COUNT (BEAKER) 5.18 M/ L    4.20-5.80               

  



             (test code = 761)                                        

 

             HEMOGLOBIN (BEAKER) (test code = 15.3 GM/DL   13.0-16.8            

     



             410)                                                

 

             HEMATOCRIT (BEAKER) (test code = 47.1 %       40.0-50.0            

     



             411)                                                

 

             MEAN CORPUSCULAR VOLUME (BEAKER) 90.8 fL      82.0-98.0            

     



             (test code = 753)                                        

 

             MEAN CORPUSCULAR HEMOGLOBIN 29.6 pg      27.0-33.0                 



             (BEAKER) (test code = 751)                                        

 

             MEAN CORPUSCULAR HEMOGLOBIN CONC 32.6 GM/DL   32.0-36.0            

     



             (BEAKER) (test code = 752)                                        

 

             RED CELL DISTRIBUTION WIDTH 15.1 %       10.3-14.2    H            



             (BEAKER) (test code = 412)                                        

 

             PLATELET COUNT (BEAKER) (test 129 K/CU MM  150-430      L          

  



             code = 756)                                         

 

             MEAN PLATELET VOLUME (BEAKER) 7.7 fL       6.5-10.5                

  



             (test code = 754)                                        

 

             NUCLEATED RED BLOOD CELLS 0 /100 WBC   0-0                       



             (BEAKER) (test code = 413)                                        

 

             NEUTROPHILS RELATIVE PERCENT 72 %                                  

 



             (BEAKER) (test code = 429)                                        

 

             LYMPHOCYTES RELATIVE PERCENT 18 %                                  

 



             (BEAKER) (test code = 430)                                        

 

             MONOCYTES RELATIVE PERCENT 8 %                                    



             (BEAKER) (test code = 431)                                        

 

             EOSINOPHILS RELATIVE PERCENT 1 %                                   

 



             (BEAKER) (test code = 432)                                        

 

             BASOPHILS RELATIVE PERCENT 1 %                                    



             (BEAKER) (test code = 437)                                        

 

             NEUTROPHILS ABSOLUTE COUNT 3.37 K/ L    1.80-8.00                 



             (BEAKER) (test code = 670)                                        

 

             LYMPHOCYTES ABSOLUTE COUNT 0.85 K/ L    1.48-4.50    L            



             (BEAKER) (test code = 414)                                        

 

             MONOCYTES ABSOLUTE COUNT (BEAKER) 0.40 K/ L    0.00-1.30           

      



             (test code = 415)                                        

 

             EOSINOPHILS ABSOLUTE COUNT 0.07 K/ L    0.00-0.50                 



             (BEAKER) (test code = 416)                                        

 

             BASOPHILS ABSOLUTE COUNT (BEAKER) 0.03 K/ L    0.00-0.20           

      



             (test code = 417)                                        



[id number] 10:38:00





             Test Item    Value        Reference Range Interpretation Comments

 

             MAGNESIUM (BEAKER) (test code = 2.2 mg/dL    1.6-2.6               

    



             627)                                                



TACROLIMUS [id number] 10:33:00





             Test Item    Value        Reference Range Interpretation Comments

 

             TACROLIMUS BLOOD (BEAKER) (test 7.1 ng/mL    10.0-20.0    L        

    



             code = 657)                                         



CBC W/PLT COUNT &amp; AUTO [id number] 08:47:00





             Test Item    Value        Reference Range Interpretation Comments

 

             WHITE BLOOD CELL COUNT (BEAKER) 4.1 K/ L     4.0-10.0              

    



             (test code = 775)                                        

 

             RED BLOOD CELL COUNT (BEAKER) 5.02 M/ L    4.20-5.80               

  



             (test code = 761)                                        

 

             HEMOGLOBIN (BEAKER) (test code = 14.8 GM/DL   13.0-16.8            

     



             410)                                                

 

             HEMATOCRIT (BEAKER) (test code = 43.6 %       40.0-50.0            

     



             411)                                                

 

             MEAN CORPUSCULAR VOLUME (BEAKER) 87.0 fL      82.0-98.0            

     



             (test code = 753)                                        

 

             MEAN CORPUSCULAR HEMOGLOBIN 29.5 pg      27.0-33.0                 



             (BEAKER) (test code = 751)                                        

 

             MEAN CORPUSCULAR HEMOGLOBIN CONC 33.9 GM/DL   32.0-36.0            

     



             (BEAKER) (test code = 752)                                        

 

             RED CELL DISTRIBUTION WIDTH 14.1 %       10.3-14.2                 



             (BEAKER) (test code = 412)                                        

 

             PLATELET COUNT (BEAKER) (test 160 K/CU MM  150-430                 

  



             code = 756)                                         

 

             MEAN PLATELET VOLUME (BEAKER) 7.2 fL       6.5-10.5                

  



             (test code = 754)                                        

 

             NUCLEATED RED BLOOD CELLS 0 /100 WBC   0-0                       



             (BEAKER) (test code = 413)                                        

 

             NEUTROPHILS RELATIVE PERCENT 64 %                                  

 



             (BEAKER) (test code = 429)                                        

 

             LYMPHOCYTES RELATIVE PERCENT 21 %                                  

 



             (BEAKER) (test code = 430)                                        

 

             MONOCYTES RELATIVE PERCENT 12 %                                   



             (BEAKER) (test code = 431)                                        

 

             EOSINOPHILS RELATIVE PERCENT 2 %                                   

 



             (BEAKER) (test code = 432)                                        

 

             BASOPHILS RELATIVE PERCENT 0 %                                    



             (BEAKER) (test code = 437)                                        

 

             NEUTROPHILS ABSOLUTE COUNT 2.61 K/ L    1.80-8.00                 



             (BEAKER) (test code = 670)                                        

 

             LYMPHOCYTES ABSOLUTE COUNT 0.87 K/ L    1.48-4.50    L            



             (BEAKER) (test code = 414)                                        

 

             MONOCYTES ABSOLUTE COUNT (BEAKER) 0.49 K/ L    0.00-1.30           

      



             (test code = 415)                                        

 

             EOSINOPHILS ABSOLUTE COUNT 0.09 K/ L    0.00-0.50                 



             (BEAKER) (test code = 416)                                        

 

             BASOPHILS ABSOLUTE COUNT (BEAKER) 0.01 K/ L    0.00-0.20           

      



             (test code = 417)                                        



0.00BASIC METABOLIC [id number] 08:47:00





             Test Item    Value        Reference Range Interpretation Comments

 

             SODIUM (BEAKER) 142 meq/L    136-145                   



             (test code = 381)                                        

 

             POTASSIUM (BEAKER) 3.8 meq/L    3.5-5.1                   



             (test code = 379)                                        

 

             CHLORIDE (BEAKER) 109 meq/L           H            



             (test code = 382)                                        

 

             CO2 (BEAKER) (test 21 meq/L     22-29        L            



             code = 355)                                         

 

             BLOOD UREA NITROGEN 22 mg/dL     7-21         H            



             (BEAKER) (test code                                        



             = 354)                                              

 

             CREATININE (BEAKER) 1.18 mg/dL   0.57-1.25                 



             (test code = 358)                                        

 

             GLUCOSE RANDOM 99 mg/dL                         



             (BEAKER) (test code                                        



             = 652)                                              

 

             CALCIUM (BEAKER) 9.1 mg/dL    8.4-10.2                  



             (test code = 697)                                        

 

             EGFR (BEAKER) (test 66 mL/min/1.73                           ESTIMA

DANNY GFR IS



             code = 1092) sq m                                   NOT AS ACCURATE

 AS



                                                                 CREATININE



                                                                 CLEARANCE IN



                                                                 PREDICTING



                                                                 GLOMERULAR



                                                                 FILTRATION RATE

.



                                                                 ESTIMATED GFR I

S



                                                                 NOT APPLICABLE 

FOR



                                                                 DIALYSIS PATIEN

TS.



URINALYSIS W/ [id number] 00:20:00





             Test Item    Value        Reference Range Interpretation Comments

 

             COLOR (BEAKER) (test code Yellow                                 



             = 470)                                              

 

             CLARITY (BEAKER) (test Slightly Hazy                           



             code = 469)                                         

 

             SPECIFIC GRAVITY UA 1.050        1.001-1.035  H            



             (BEAKER) (test code = 468)                                        

 

             PH UA (BEAKER) (test code 5.5          5.0-8.0                   



             = 467)                                              

 

             PROTEIN UA (BEAKER) (test 20 mg/dL     Negative     A            



             code = 464)                                         

 

             GLUCOSE UA (BEAKER) (test Negative     Negative                  



             code = 365)                                         

 

             KETONES UA (BEAKER) (test 40 mg/dL     Negative     A            



             code = 371)                                         

 

             BILIRUBIN UA (BEAKER) Negative     Negative                  



             (test code = 462)                                        

 

             BLOOD UA (BEAKER) (test Negative     Negative                  



             code = 461)                                         

 

             NITRITE UA (BEAKER) (test Negative     Negative                  



             code = 465)                                         

 

             LEUKOCYTE ESTERASE UA Negative     Negative                  



             (BEAKER) (test code = 466)                                        

 

             UROBILINOGEN UA (BEAKER) 0.2 mg/dL    0.2-1.0                   



             (test code = 463)                                        

 

             RBC UA (BEAKER) (test code 1 /HPF                                 



             = 519)                                              

 

             WBC UA (BEAKER) (test code 3 /HPF                                 



             = 520)                                              

 

             MUCUS (BEAKER) (test code Many                                   



             = 1574)                                             

 

             HYALINE CASTS (BEAKER) 6 /LPF                                 



             (test code = 514)                                        

 

             SOURCE(BEAKER) (test code Urine, Clean Catch                       

    



             = 2795)                                             



BASIC METABOLIC [id number] 21:55:00





             Test Item    Value        Reference Range Interpretation Comments

 

             SODIUM (BEAKER) 139 meq/L    136-145                   



             (test code = 381)                                        

 

             POTASSIUM (BEAKER) 5.1 meq/L    3.5-5.1                   



             (test code = 379)                                        

 

             CHLORIDE (BEAKER) 107 meq/L                        



             (test code = 382)                                        

 

             CO2 (BEAKER) (test 18 meq/L     22-29        L            



             code = 355)                                         

 

             BLOOD UREA NITROGEN 26 mg/dL     7-21         H            



             (BEAKER) (test code                                        



             = 354)                                              

 

             CREATININE (BEAKER) 1.26 mg/dL   0.57-1.25    H            



             (test code = 358)                                        

 

             GLUCOSE RANDOM 89 mg/dL                         



             (BEAKER) (test code                                        



             = 652)                                              

 

             CALCIUM (BEAKER) 9.1 mg/dL    8.4-10.2                  



             (test code = 697)                                        

 

             EGFR (BEAKER) (test 61 mL/min/1.73                           ESTIMA

DANNY GFR IS



             code = 1092) sq m                                   NOT AS ACCURATE

 AS



                                                                 CREATININE



                                                                 CLEARANCE IN



                                                                 PREDICTING



                                                                 GLOMERULAR



                                                                 FILTRATION RATE

.



                                                                 ESTIMATED GFR I

S



                                                                 NOT APPLICABLE 

FOR



                                                                 DIALYSIS PATIEN

TS.



HEPATIC FUNCTION [id number] 21:55:00





             Test Item    Value        Reference Range Interpretation Comments

 

             TOTAL PROTEIN (BEAKER) (test code = 7.1 gm/dL    6.0-8.3           

        



             770)                                                

 

             ALBUMIN (BEAKER) (test code = 1145) 4.2 g/dL     3.5-5.0           

        

 

             BILIRUBIN TOTAL (BEAKER) (test code 0.7 mg/dL    0.2-1.2           

        



             = 377)                                              

 

             BILIRUBIN DIRECT (BEAKER) (test 0.2 mg/dL    0.1-0.5               

    



             code = 706)                                         

 

             ALKALINE PHOSPHATASE (BEAKER) (test 86 U/L                   

        



             code = 346)                                         

 

             AST (SGOT) (BEAKER) (test code = 28 U/L       5-34                 

     



             353)                                                

 

             ALT (SGPT) (BEAKER) (test code = 20 U/L       6-55                 

     



             347)                                                



[id number] 21:52:00





             Test Item    Value        Reference Range Interpretation Comments

 

             LIPASE (BEAKER) (test code = 749) 40 U/L       8-78                

      



B-TYPE NATRIURETIC FACTOR (BNP)2017 21:46:00





             Test Item    Value        Reference Range Interpretation Comments

 

             B-TYPE NATRIURETIC PEPTIDE (BEAKER) 21 pg/mL     0-100             

        



             (test code = 700)                                        



CREATINE KINASE (CK), TOTAL AND [id number] 21:45:00





             Test Item    Value        Reference Range Interpretation Comments

 

             CREATINE KINASE TOTAL (BEAKER) 32 U/L                        

   



             (test code = 380)                                        

 

             CREATINE KINASE-MB (BEAKER) (test 0.6 ng/mL    0.0-6.6             

      



             code = 750)                                         

 

             CREATINE KINASE-MB INDEX (BEAKER) 1.9 %                            

      



             (test code = 395)                                        



Effective 2014: CK-MB Reference Range ChangeNew: 0.0-6.6    Previous: 
0.0-4.9CK-MB Reference Range:&lt;6.7      Normal6.7-10.0  Borderline&gt;10.0    
 AbnormalTROPONIN -97-63 21:45:00





             Test Item    Value        Reference Range Interpretation Comments

 

             TROPONIN I (BEAKER) (test code = 397) < ng/mL      0.00-0.03       

          



Effective 2014: Reference Range ChangeNew: 0.00-0.03   Previous 0.00-
0.15Troponin I (TnI) levels must be interpreted in the context of the presenting
 symptoms and the clinical findings. Elevated TnI levels indicate myocardial 
damage, but are not specific for ischemic heart disease. Elevated TnI levels are
 seen in patients with other cardiac conditions (including myocarditis and 
congestive heartfailure), and slight TnI elevations occur in patients with other
 conditions, including sepsis, renalfailure, acidosis, acute neurological 
disease, and persistent tachyarrhythmia.CBC W/PLT COUNT &amp; AUTO DIFFERENTIAL
2017 21:24:00





             Test Item    Value        Reference Range Interpretation Comments

 

             WHITE BLOOD CELL COUNT (BEAKER) 4.7 K/ L     4.0-10.0              

    



             (test code = 775)                                        

 

             RED BLOOD CELL COUNT (BEAKER) 5.75 M/ L    4.20-5.80               

  



             (test code = 761)                                        

 

             HEMOGLOBIN (BEAKER) (test code = 17.4 GM/DL   13.0-16.8    H       

     



             410)                                                

 

             HEMATOCRIT (BEAKER) (test code = 49.2 %       40.0-50.0            

     



             411)                                                

 

             MEAN CORPUSCULAR VOLUME (BEAKER) 85.6 fL      82.0-98.0            

     



             (test code = 753)                                        

 

             MEAN CORPUSCULAR HEMOGLOBIN 30.3 pg      27.0-33.0                 



             (BEAKER) (test code = 751)                                        

 

             MEAN CORPUSCULAR HEMOGLOBIN CONC 35.4 GM/DL   32.0-36.0            

     



             (BEAKER) (test code = 752)                                        

 

             RED CELL DISTRIBUTION WIDTH 12.9 %       10.3-14.2                 



             (BEAKER) (test code = 412)                                        

 

             PLATELET COUNT (BEAKER) (test code 98 K/CU MM   150-430      L     

       



             = 756)                                              

 

             MEAN PLATELET VOLUME (BEAKER) 8.7 fL       6.5-10.5                

  



             (test code = 754)                                        

 

             NUCLEATED RED BLOOD CELLS (BEAKER) 0 /100 WBC   0-0                

       



             (test code = 413)                                        

 

             NEUTROPHILS RELATIVE PERCENT 69 %                                  

 



             (BEAKER) (test code = 429)                                        

 

             LYMPHOCYTES RELATIVE PERCENT 22 %                                  

 



             (BEAKER) (test code = 430)                                        

 

             MONOCYTES RELATIVE PERCENT 8 %                                    



             (BEAKER) (test code = 431)                                        

 

             EOSINOPHILS RELATIVE PERCENT 1 %                                   

 



             (BEAKER) (test code = 432)                                        

 

             BASOPHILS RELATIVE PERCENT 0 %                                    



             (BEAKER) (test code = 437)                                        

 

             NEUTROPHILS ABSOLUTE COUNT 3.26 K/ L    1.80-8.00                 



             (BEAKER) (test code = 670)                                        

 

             LYMPHOCYTES ABSOLUTE COUNT 1.05 K/ L    1.48-4.50    L            



             (BEAKER) (test code = 414)                                        

 

             MONOCYTES ABSOLUTE COUNT (BEAKER) 0.38 K/ L    0.00-1.30           

      



             (test code = 415)                                        

 

             EOSINOPHILS ABSOLUTE COUNT 0.03 K/ L    0.00-0.50                 



             (BEAKER) (test code = 416)                                        

 

             BASOPHILS ABSOLUTE COUNT (BEAKER) 0.02 K/ L    0.00-0.20           

      



             (test code = 417)                                        



0.00CMV PCR, [id number] 17:55:00





             Test Item    Value        Reference Range Interpretation Comments

 

             CMV VIRAL LOAD - Negative or below the                           



             NEGATIVE (BEAKER) (test linear range of the                        

   



             code = 8798) assay (<375 copies/mL)                           



Cytomegalovirus (CMV) infection can cause significant disease in 
immunosuppressed patients. However,it is common for CMV to manifest as a limited
 infection which is of no clinical significance in immunosuppressed patients or 
in healthy individuals.Viral load measurements are helpful to identify clinical 
CMV infection and to guide the pre-emptive management of antiviral therapy.  For
 treatment of CMVinfection due to reactivation in transplant recipients, a 
threshold between 4,000 and 5,000 copies/mL is suggested.  For treatment of 
primary CMV infection, a lower threshold can be used.CMV infection may also be 
monitored using weekly serial measurements. Serial measurements of CMV DNA viral
 load canbe evaluated by identifying a 10-fold change, as well as assessing the 
CMV DNA viral load and the clinical context for each patient.The plasma CMV DNA 
viral load was detected using quantitative polymerase chain reaction and 
fluorescent monitoring of a specific hybridized probe. Genetic variation and ot
her factors can affect the accuracy of nucleic acid testing. Therefore, the 
results should be interpreted in light of clinical data. A negative result may 
not exclude the presence of CMV disease.This test was developed and its 
performance characteristics determined by the [facility] Path
ology Department, Section of Molecular Pathology. It has not been cleared or 
approved by the U.S. Food and Drug Administration (FDA), since FDA approval is 
not required for clinical use of the test. Validation was done as required by 
The Clinical Laboratory Improvement Amendments of 1988.CREATINE KINASE (CK), 
TOTAL AND [id number] 18:40:00





             Test Item    Value        Reference Range Interpretation Comments

 

             CREATINE KINASE TOTAL (BEAKER) 36 U/L                        

   



             (test code = 380)                                        

 

             CREATINE KINASE-MB (BEAKER) (test 0.3 ng/mL    0.0-6.6             

      



             code = 750)                                         

 

             CREATINE KINASE-MB INDEX (BEAKER) 0.8 %                            

      



             (test code = 395)                                        



Effective 2014: CK-MB Reference Range ChangeNew: 0.0-6.6    Previous: 
0.0-4.9CK-MB Reference Range:&lt;6.7      Normal6.7-10.0  Borderline&gt;10.0    
 AbnormalTROPONIN -78-66 18:40:00





             Test Item    Value        Reference Range Interpretation Comments

 

             TROPONIN I (BEAKER) (test code = 397) < ng/mL      0.00-0.03       

          



Effective 2014: Reference Range ChangeNew: 0.00-0.03   Previous 0.00-
0.15Troponin I (TnI) levels must be interpreted in the context of the presenting
 symptoms and the clinical findings. Elevated TnI levels indicate myocardial 
damage, but are not specific for ischemic heart disease. Elevated TnI levels are
 seen in patients with other cardiac conditions (including myocarditis and 
congestive heartfailure), and slight TnI elevations occur in patients with other
 conditions, including sepsis, renalfailure, acidosis, acute neurological 
disease, and persistent tachyarrhythmia.B-TYPE NATRIURETIC FACTOR (BNP)
2017 18:40:00





             Test Item    Value        Reference Range Interpretation Comments

 

             B-TYPE NATRIURETIC PEPTIDE (BEAKER) 23 pg/mL     0-100             

        



             (test code = 700)                                        



[id number] 18:33:00





             Test Item    Value        Reference Range Interpretation Comments

 

             MAGNESIUM (BEAKER) (test code = 1.8 mg/dL    1.6-2.6               

    



             627)                                                



BASIC METABOLIC [id number] 18:33:00





             Test Item    Value        Reference Range Interpretation Comments

 

             SODIUM (BEAKER) 140 meq/L    136-145                   



             (test code = 381)                                        

 

             POTASSIUM (BEAKER) 4.1 meq/L    3.5-5.1                   



             (test code = 379)                                        

 

             CHLORIDE (BEAKER) 105 meq/L                        



             (test code = 382)                                        

 

             CO2 (BEAKER) (test 23 meq/L     22-29                     



             code = 355)                                         

 

             BLOOD UREA NITROGEN 18 mg/dL     7-21                      



             (BEAKER) (test code                                        



             = 354)                                              

 

             CREATININE (BEAKER) 1.34 mg/dL   0.57-1.25    H            



             (test code = 358)                                        

 

             GLUCOSE RANDOM 100 mg/dL                        



             (BEAKER) (test code                                        



             = 652)                                              

 

             CALCIUM (BEAKER) 9.2 mg/dL    8.4-10.2                  



             (test code = 697)                                        

 

             EGFR (BEAKER) (test 57 mL/min/1.73                           ESTIMA

DANNY GFR IS



             code = 1092) sq m                                   NOT AS ACCURATE

 AS



                                                                 CREATININE



                                                                 CLEARANCE IN



                                                                 PREDICTING



                                                                 GLOMERULAR



                                                                 FILTRATION RATE

.



                                                                 ESTIMATED GFR I

S



                                                                 NOT APPLICABLE 

FOR



                                                                 DIALYSIS PATIEN

TS.



CBC W/PLT COUNT &amp; AUTO [id number] 18:24:00





             Test Item    Value        Reference Range Interpretation Comments

 

             WHITE BLOOD CELL COUNT (BEAKER) 2.6 K/ L     4.0-10.0     L        

    



             (test code = 775)                                        

 

             RED BLOOD CELL COUNT (BEAKER) 5.66 M/ L    4.20-5.80               

  



             (test code = 761)                                        

 

             HEMOGLOBIN (BEAKER) (test code = 16.5 GM/DL   13.0-16.8            

     



             410)                                                

 

             HEMATOCRIT (BEAKER) (test code = 49.0 %       40.0-50.0            

     



             411)                                                

 

             MEAN CORPUSCULAR VOLUME (BEAKER) 86.6 fL      82.0-98.0            

     



             (test code = 753)                                        

 

             MEAN CORPUSCULAR HEMOGLOBIN 29.1 pg      27.0-33.0                 



             (BEAKER) (test code = 751)                                        

 

             MEAN CORPUSCULAR HEMOGLOBIN CONC 33.6 GM/DL   32.0-36.0            

     



             (BEAKER) (test code = 752)                                        

 

             RED CELL DISTRIBUTION WIDTH 13.0 %       10.3-14.2                 



             (BEAKER) (test code = 412)                                        

 

             PLATELET COUNT (BEAKER) (test code 87 K/CU MM   150-430      L     

       



             = 756)                                              

 

             MEAN PLATELET VOLUME (BEAKER) 8.3 fL       6.5-10.5                

  



             (test code = 754)                                        

 

             NUCLEATED RED BLOOD CELLS (BEAKER) 0 /100 WBC   0-0                

       



             (test code = 413)                                        

 

             NEUTROPHILS RELATIVE PERCENT 51 %                                  

 



             (BEAKER) (test code = 429)                                        

 

             LYMPHOCYTES RELATIVE PERCENT 30 %                                  

 



             (BEAKER) (test code = 430)                                        

 

             MONOCYTES RELATIVE PERCENT 18 %                                   



             (BEAKER) (test code = 431)                                        

 

             EOSINOPHILS RELATIVE PERCENT 1 %                                   

 



             (BEAKER) (test code = 432)                                        

 

             BASOPHILS RELATIVE PERCENT 0 %                                    



             (BEAKER) (test code = 437)                                        

 

             NEUTROPHILS ABSOLUTE COUNT 1.33 K/ L    1.80-8.00    L            



             (BEAKER) (test code = 670)                                        

 

             LYMPHOCYTES ABSOLUTE COUNT 0.78 K/ L    1.48-4.50    L            



             (BEAKER) (test code = 414)                                        

 

             MONOCYTES ABSOLUTE COUNT (BEAKER) 0.47 K/ L    0.00-1.30           

      



             (test code = 415)                                        

 

             EOSINOPHILS ABSOLUTE COUNT 0.02 K/ L    0.00-0.50                 



             (BEAKER) (test code = 416)                                        

 

             BASOPHILS ABSOLUTE COUNT (BEAKER) 0.01 K/ L    0.00-0.20           

      



             (test code = 417)                                        



0.00POCT-GLUCOSE [id number] 15:37:00





             Test Item    Value        Reference Range Interpretation Comments

 

             POC-GLUCOSE METER 97 mg/dL                         TESTED AT 

[facility] [address]



             [facility] (test code =                                        [place] [address]



             1538)                                               



CHEM [id number] 10:27:003.6[facility]annCHE[facility] [id number] 10:27:002.3
[facility] [id number] 10:27:0012.6[facility] HermannELECTROLYTES
2017 10:27:0055[facility] OlfozpuUHBBSKLLQHNQ7928-64-00 10:27:008.6[facility] 
BkfeopzKEEUPKKBNHRN4342-13-65 10:27:0023[facility] AucebaoNQGOABDGOMPN0857-83-02 
10:27:23979Nxypgsuz CqwdenuXGIWWOHRJRLR9227-61-12 10:27:004.6[facility]
PJOERNAPEQCI3678-74-00 10:27:23009Asoassfw MaazizuHFGINVEQFGTO0528-40-10 
10:27:001.49[facility] IexloieUBYJDEECLLSW8771-12-41 10:27:0026[facility]
JSGWACOSZIGH0070-90-67 10:27:44667Phifkcwp WyejqkzSPGIVZOOYI9544-49-64 10:27:00
0.[facility] TzzmcfdBRSJIZUROB3922-37-94 10:27:000.5[facility] HermannHEMATOLOGY
2017 10:27:008.3[facility] JpbmkzxNKMGQEUNSV0201-56-54 10:27:005.1[facility] 
XkpemvyCLOLGSRDRC9627-90-68 10:27:0088.1[facility] HwvtoteILOVIWNFAM9652-30-77 
10:27:006.6[facility] ZphxsuoPXQGAQHHQB3980-44-71 10:27:000.2[facility]
SOBTNTLYMA2115-87-63 10:27:00





             Test Item    Value        Reference Range Interpretation Comments

 

             MCH (test code = MCH) 28.5 pg      27.0-31.0                 



[facility] XclmuepUBVJUXGFSC0128-97-46 10:27:12093Djqdtufx HermannHEMATOLOGY
2017 10:27:0033.6[facility] AnyrmnsFZJPZEWJIF1965-59-23 10:27:0014.1[facility]
 LnhgsybKOVYKZAXWD1533-29-65 10:27:0084.9[facility] FjjyerkFVCZKMDBTD8762-65-95 
10:27:005.28[facility] BlfazfpHMHZHETDXW0746-33-53 10:27:0015.0[facility]
QIVUWYXSDP4243-61-42 10:27:009.4[facility] EmxnltlKPZFNCXLCD2554-99-10 10:27:00
44.8[facility] GdqktixDDHSXPHRSI6481-29-15 10:27:008.2[facility] HermannPARATHYROID 
QETHYBD6657-09-98 10:27:001.08[facility] HermannPARATHYROID QNROGUJ8711-22-30 
10:27:001.05[facility] HermannBLOOD BANK IVIWAQX4036-01-97 16:09:00Negative 
(17 10:09 AM)[facility] HermannCHEM MKDMV9520-57-48 19:30:001.6[facility] 
HermannCHEM IOHMZ6568-92-62 19:30:002.6[facility] HermannCHEM URSEU8562-51-81 
19:30:0020[facility] HermannCHEM NNWXQ8722-26-92 19:30:0015.4[facility] HermannCHEM 
YQNSD1063-12-97 19:30:0067[facility] HermannCHEM CRPIT7313-47-68 19:30:0024
[facility] HermannCHEM NULKL1570-08-96 19:30:008.8[facility] HermannCHEM PANEL
2017 19:30:006.8[facility] HermannCHEM TJJRG5195-95-58 19:30:0025[facility] 
HermannCHEM UOSWP8090-49-63 19:30:004.2[facility] HermannCHEM XRTID6812-73-84 
19:30:0012[facility] HermannCHEM RGWTH7267-93-71 19:30:0083[facility] HermannCHEM 
BJVLC9747-90-93 19:30:66116Kugkbjfp HermannCHEM XLJPI8102-53-74 19:30:000.5
[facility] HermannCHEM MJXUJ7417-81-96 19:30:004.4[facility] HermannCHEM PANEL
2017 19:30:03908Flmmiglc HermannCHEM JEHPP6491-86-85 19:30:001.26[facility] 
HermannCHEM RQKWD7352-41-43 19:30:0025[facility] HermannCHEM FCPGX2580-07-02 
19:30:0091[facility] SnpaxilEUYLRGSKHO1957-82-78 19:30:0014.2[facility]
LZVUTXOEEX8324-84-24 19:30:0045.7[facility] CqzjdgvUUYPRMRAUJ3602-51-29 19:30:00
149[facility] NvpapbhBHAGDXIURR4546-73-44 19:30:008.4[facility] HermannHEMATOLOGY
2017 19:30:0082.9[facility] TbubovmNQSHURAZYL3199-81-67 19:30:0034.2[facility]
 QwgevudLCQKGIMJZN0855-58-37 19:30:00





             Test Item    Value        Reference Range Interpretation Comments

 

             MCH (test code = MCH) 28.3 pg      27.0-31.0                 



[facility] LrjwaibJEWMNACYLJ8411-26-63 19:30:006.1[facility] HermannHEMATOLOGY
2017 19:30:0015.6[facility] HvzhaufVMBKXPAZNO1413-14-69 19:30:005.52[facility]
 DfuexreEORXRGEFUZ4576-04-11 19:30:000.7[facility] BijrrnjGFMYMQZCSV4086-33-46 
19:30:004.4[facility] GymsdkeNGTNFTRCHH3335-13-61 19:30:000.6[facility]
MJMNBTVEIL6442-79-34 19:30:001.1[facility] QbgppznZRSEHATGXP7902-77-75 19:30:000.1
[facility] UuogxeqPMENYJOAPE4709-27-76 19:30:000.9[facility] HermannHEMATOLOGY
2017 19:30:0017.6[facility] LtslwekZIHDPFFVCV2893-13-17 19:30:009.3[facility] 
GraoanfQRWPWFFKOJ0718-15-70 19:30:0071.5[facility] HermannSPECIAL CHEMISTRY
2017 19:30:005.3[facility]

## 2021-08-08 NOTE — RAD REPORT
EXAM DESCRIPTION:  RAD - Foot Right 3 View - 8/8/2021 9:58 pm

 

CLINICAL HISTORY:  PAIN

 

COMPARISON:  No comparisons

 

FINDINGS:  Transversely oriented fracture of the fifth proximal phalangeal diaphysis. No definite int
ra-articular extension is seen. The fracture is mildly angulated and displaced by 1/3 of the shaft wi
dth. No radiopaque foreign bodies. Degenerative changes are present the first MTP joint. No other acu
te findings are identified.

 

IMPRESSION:  Fifth proximal phalangeal diaphyseal fracture.

## 2021-08-09 VITALS — SYSTOLIC BLOOD PRESSURE: 137 MMHG | DIASTOLIC BLOOD PRESSURE: 89 MMHG | OXYGEN SATURATION: 96 %

## 2021-08-09 VITALS — TEMPERATURE: 98.3 F

## 2021-08-09 PROCEDURE — 0QSQXZZ REPOSITION RIGHT TOE PHALANX, EXTERNAL APPROACH: ICD-10-PCS

## 2021-08-09 NOTE — RAD REPORT
EXAM DESCRIPTION:  RAD - Foot Right 2 View - 8/9/2021 12:43 am

 

CLINICAL HISTORY:   Right foot pain status post injury

 

FINDINGS:  Fracture involving the fifth proximal phalanx is in better alignment than the prior exam. 
It is mildly displaced. No dislocation. Less angulation present at fracture site

## 2021-08-09 NOTE — EDPHYS
Physician Documentation                                                                           

 [facility]                                                                 

Name: Gomez Romero                                                                               

Age: 51 yrs                                                                                       

Sex: Male                                                                                         

: 1970                                                                                   

MRN: [id number]                                                                                   

Arrival Date: 2021                                                                          

Time: 20:36                                                                                       

Account#: [id number]                                                                            

Bed 12                                                                                            

Private MD:                                                                                       

ED Physician Rasheed Pretty                                                                             

HPI:                                                                                              

                                                                                             

00:15 This 51 yrs old  Male presents to ER via Ambulatory with complaints of Toe     pkl 

      Injury.                                                                                     

00:15 The patient presents with a deformity, an injury, pain, that is acute. The complaints   pkl 

      affect the right 5 th toe. Context: resulted from the patient falling, a gout flare up,     

      a heavy object falling, stubbing toe on the patient tripping, right 5 th toe. Onset:        

      The symptoms/episode began/occurred just prior to arrival. Associated signs and             

      symptoms: The patient has no apparent associated signs or symptoms.                         

                                                                                                  

Historical:                                                                                       

- Allergies:                                                                                      

                                                                                             

21:32 falsecarinate;                                                                          kg  

- Home Meds:                                                                                      

21:32 Prograf 1 mg oral cap every 12 hours [Active]; micofenolato [Active];                   kg  

- PMHx:                                                                                           

21:32 Heart Attack;                                                                           kg  

- PSHx:                                                                                           

21:32 Heart transplant; Hernia Sx;                                                            kg  

                                                                                                  

- Immunization history:: Adult Immunizations up to date, .                                        

- Social history:: Smoking status: Patient denies any tobacco usage or history of.                

                                                                                                  

                                                                                                  

ROS:                                                                                              

                                                                                             

00:15 MS/extremity: Positive for deformity, pain, tenderness.                                 pkl 

      Eyes: Negative for injury, pain, redness, and discharge, ENT: Negative for injury,          

      pain, and discharge, Neck: Negative for injury, pain, and swelling, Cardiovascular:         

      Negative for chest pain, palpitations, and edema, Respiratory: Negative for shortness       

      of breath, cough, wheezing, and pleuritic chest pain, Abdomen/GI: Negative for              

      abdominal pain, nausea, vomiting, diarrhea, and constipation, Back: Negative for injury     

      and pain, : Negative for injury, bleeding, discharge, and swelling, Skin: Negative        

      for injury, rash, and discoloration, Neuro: Negative for headache, weakness, numbness,      

      tingling, and seizure.                                                                      

                                                                                                  

Exam:                                                                                             

00:15 Head/Face:  Normocephalic, atraumatic. Eyes:  Pupils equal round and reactive to light, pkl 

      extra-ocular motions intact.  Lids and lashes normal.  Conjunctiva and sclera are           

      non-icteric and not injected.  Cornea within normal limits.  Periorbital areas with no      

      swelling, redness, or edema. ENT:  Nares patent. No nasal discharge, no septal              

      abnormalities noted.  Tympanic membranes are normal and external auditory canals are        

      clear.  Oropharynx with no redness, swelling, or masses, exudates, or evidence of           

      obstruction, uvula midline.  Mucous membranes moist. Neck:  Trachea midline, no             

      thyromegaly or masses palpated, and no cervical lymphadenopathy.  Supple, full range of     

      motion without nuchal rigidity, or vertebral point tenderness.  No Meningismus.             

      Chest/axilla:  Normal chest wall appearance and motion.  Nontender with no deformity.       

      No lesions are appreciated. Cardiovascular:  Regular rate and rhythm with a normal S1       

      and S2.  No gallops, murmurs, or rubs.  Normal PMI, no JVD.  No pulse deficits.             

      Respiratory:  Lungs have equal breath sounds bilaterally, clear to auscultation and         

      percussion.  No rales, rhonchi or wheezes noted.  No increased work of breathing, no        

      retractions or nasal flaring. Abdomen/GI:  Soft, non-tender, with normal bowel sounds.      

      No distension or tympany.  No guarding or rebound.  No evidence of tenderness               

      throughout. Back:  No spinal tenderness.  No costovertebral tenderness.  Full range of      

      motion. Neuro:  Awake and alert, GCS 15, oriented to person, place, time, and               

      situation.  Cranial nerves II-XII grossly intact.  Motor strength 5/5 in all                

      extremities.  Sensory grossly intact.  Cerebellar exam normal.  Normal gait.                

00:15 Musculoskeletal/extremity: Extremities: grossly normal except: noted in the right  5 th     

       toe: deformity, pain, tenderness.                                                          

                                                                                                  

Vital Signs:                                                                                      

                                                                                             

21:30  / 76; Pulse 76; Resp 20; Temp 98.3(TE); Pulse Ox 100% on R/A; Weight 104.33 kg   kg  

      (R); Height 6 ft. 4 in. (193.04 cm) (R); Pain 10/10;                                        

                                                                                             

01:03  / 89; Pulse 66; Resp 16; Pulse Ox 96% on R/A;                                    lp1 

                                                                                             

21:30 Body Mass Index 28.00 (104.33 kg, 193.04 cm)                                            kg  

                                                                                                  

Procedures:                                                                                       

00:15 Reduction: of the right 5 th toe, using traction, manipulation, Immobilized with toes   pkl 

      taped. Patient tolerated well.                                                              

                                                                                                  

MDM:                                                                                              

                                                                                             

23:52 Patient medically screened.                                                             pkl 

                                                                                             

00:15 Data reviewed: vital signs, nurses notes, radiologic studies, plain films.              pkl 

                                                                                                  

                                                                                             

21:38 Order name: XRAY Foot RIGHT 3 View; Complete Time: 23:52                                kg  

                                                                                             

00:29 Order name: Foot Right 2 View XRAY                                                      pkl 

                                                                                                  

Administered Medications:                                                                         

No medications were administered                                                                  

                                                                                                  

                                                                                                  

Disposition Summary:                                                                              

21 00:24                                                                                    

Discharge Ordered                                                                                 

      Location: Home                                                                          pkl 

      Problem: new                                                                            pkl 

      Symptoms: have improved                                                                 pkl 

      Condition: Stable                                                                       pkl 

      Diagnosis                                                                                   

        - Fracture  right  5 th  toe                                                          pkl 

      Followup:                                                                               pkl 

        - With: Wilbert Li MD                                                                

        - When: 2 - 3 days                                                                         

        - Reason: Re-evaluation by your physician                                                  

      Forms:                                                                                      

        - Medication Reconciliation Form                                                      pkl 

        - Thank You Letter                                                                    pkl 

        - Antibiotic Education                                                                pkl 

        - Prescription Opioid Use                                                             pkl 

Signatures:                                                                                       

Dispatcher [employer]                           EDRasheed Rees MD MD   pkl                                                  

Belen Delaney, RN                     RN   kg                                                   

                                                                                                  

**************************************************************************************************

## 2021-08-09 NOTE — ER
Nurse's Notes                                                                                     

 [facility]                                                                 

Name: Gomez Romero                                                                               

Age: 51 yrs                                                                                       

Sex: Male                                                                                         

: 1970                                                                                   

MRN: [id number]                                                                                   

Arrival Date: 2021                                                                          

Time: 20:36                                                                                       

Account#: [id number]                                                                            

Bed 12                                                                                            

Private MD:                                                                                       

Diagnosis: Fracture right 5 th toe                                                                

                                                                                                  

Presentation:                                                                                     

                                                                                             

21:30 Chief complaint: Patient states: Pain and deformity to right pinky toe. Pt fell and     kg  

      stumped toe. Coronavirus screen: Client denies travel out of the [place] in the last 14        

      days. At this time, unable to obtain information related to travel outside the [place] At      

      this time, the client does not indicate any symptoms associated with coronavirus-19.        

      Ebola Screen: Patient negative for fever greater than or equal to 101.5 degrees             

      Fahrenheit, and additional compatible Ebola Virus Disease symptoms Patient denies           

      exposure to infectious person. Patient denies travel to an Ebola-affected area in the       

      21 days before illness onset. Initial Sepsis Screen: Does the patient meet any 2            

      criteria? No. Patient's initial sepsis screen is negative. Does the patient have a          

      suspected source of infection? No. Patient's initial sepsis screen is negative. Risk        

      Assessment: Do you want to hurt yourself or someone else? Patient reports no desire to      

      harm self or others. Onset of symptoms was 2021 at 19:30.                        

21:30 Method Of Arrival: Ambulatory                                                           kg  

21:30 Acuity: MILTON 4                                                                           kg  

                                                                                                  

Triage Assessment:                                                                                

21:32 General: Appears in no apparent distress. Behavior is calm, cooperative, appropriate    kg  

      for age, quiet. Pain: Complains of pain in right foot.                                      

                                                                                                  

Historical:                                                                                       

- Allergies:                                                                                      

21:32 falsecarinate;                                                                          kg  

- Home Meds:                                                                                      

21:32 Prograf 1 mg oral cap every 12 hours [Active]; micofenolato [Active];                   kg  

- PMHx:                                                                                           

21:32 Heart Attack;                                                                           kg  

- PSHx:                                                                                           

21:32 Heart transplant; Hernia Sx;                                                            kg  

                                                                                                  

- Immunization history:: Adult Immunizations up to date, .                                        

- Social history:: Smoking status: Patient denies any tobacco usage or history of.                

                                                                                                  

                                                                                                  

Screenin/09                                                                                             

01:04 Abuse screen: Denies threats or abuse. Denies injuries from another. Nutritional        lp1 

      screening: No deficits noted. Tuberculosis screening: No symptoms or risk factors           

      identified. Fall Risk None identified.                                                      

                                                                                                  

Assessment:                                                                                       

01:03 Reassessment: Patient is alert, oriented x 3, equal unlabored respirations, skin        lp1 

      warm/dry/pink. readiness for discharge.                                                     

                                                                                                  

Vital Signs:                                                                                      

                                                                                             

21:30  / 76; Pulse 76; Resp 20; Temp 98.3(TE); Pulse Ox 100% on R/A; Weight 104.33 kg   kg  

      (R); Height 6 ft. 4 in. (193.04 cm) (R); Pain 10/10;                                        

                                                                                             

01:03  / 89; Pulse 66; Resp 16; Pulse Ox 96% on R/A;                                    lp1 

                                                                                             

21:30 Body Mass Index 28.00 (104.33 kg, 193.04 cm)                                            kg  

                                                                                                  

ED Course:                                                                                        

                                                                                             

20:36 Patient arrived in ED.                                                                  ds1 

21:32 Triage completed.                                                                       kg  

21:32 Arm band placed on right wrist.                                                         kg  

21:58 XRAY Foot RIGHT 3 View In Process Unspecified.                                          EDMS

23:52 Rasheed Pretty MD is Attending Physician.                                                    pkl 

                                                                                             

00:23 Wilbert Li MD is Referral Physician.                                              pkl 

00:43 Foot Right 2 View XRAY In Process Unspecified.                                          EDMS

01:04 Patient did not have IV access during this emergency room visit.                        lp1 

                                                                                                  

Administered Medications:                                                                         

No medications were administered                                                                  

                                                                                                  

                                                                                                  

Outcome:                                                                                          

00:24 Discharge ordered by MD.                                                                pkl 

01:04 Discharged to home ambulatory, with significant other.                                  lp1 

01:04 Condition: good                                                                             

01:04 Discharge instructions given to patient, Instructed on discharge instructions, follow       

      up and referral plans. Demonstrated understanding of instructions, follow-up care.          

01:04 Patient left the ED.                                                                    lp1 

                                                                                                  

Signatures:                                                                                       

Dispatcher [employer]                           EDMS                                                 

Rasheed Pretty MD MD   pkRachel Ruiz                                ds1                                                  

Angie Seo, RN                         RN   lp1                                                  

Belen Delaney RN                     RN   kg                                                   

                                                                                                  

**************************************************************************************************

## 2021-10-17 ENCOUNTER — HOSPITAL ENCOUNTER (EMERGENCY)
Dept: HOSPITAL 97 - ER | Age: 51
Discharge: HOME | End: 2021-10-17
Payer: COMMERCIAL

## 2021-10-17 VITALS — SYSTOLIC BLOOD PRESSURE: 149 MMHG | DIASTOLIC BLOOD PRESSURE: 98 MMHG | OXYGEN SATURATION: 99 % | TEMPERATURE: 98.6 F

## 2021-10-17 DIAGNOSIS — S20.211A: Primary | ICD-10-CM

## 2021-10-17 DIAGNOSIS — Z88.8: ICD-10-CM

## 2021-10-17 DIAGNOSIS — Z94.1: ICD-10-CM
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
## 2021-10-17 PROCEDURE — 71250 CT THORAX DX C-: CPT

## 2021-10-17 PROCEDURE — 99283 EMERGENCY DEPT VISIT LOW MDM: CPT

## 2021-10-17 NOTE — EDPHYS
Physician Documentation                                                                           

 [facility]                                                                 

Name: Gomez Romero                                                                               

Age: 51 yrs                                                                                       

Sex: Male                                                                                         

: 1970                                                                                   

MRN: [id number]                                                                                   

Arrival Date: 10/17/2021                                                                          

Time: 20:58                                                                                       

Account#: [id number]                                                                            

Bed 14                                                                                            

Private MD:                                                                                       

ED Physician Rasheed Pretty                                                                             

HPI:                                                                                              

10/17                                                                                             

21:37 This 51 yrs old  Male presents to ER via Ambulatory with complaints of Rib     pkl 

      Injury.                                                                                     

21:37 The patient or guardian reports chest pain that is located primarily in the right ribs. pkl 

      Onset: yesterday. The pain does not radiate. Associated signs and symptoms: Pertinent       

      positives: shortness of breath. Patient fell and hit right rib cage against lawn       

      handle.                                                                                     

                                                                                                  

Historical:                                                                                       

- Allergies:                                                                                      

21:14 falsecarinate;                                                                          dc2 

21:14 unknown rejection medication;                                                           dc2 

- Home Meds:                                                                                      

21:14 micofenolato [Active]; Prograf 1 mg Oral cap every 12 hours [Active];                   dc2 

- PMHx:                                                                                           

21:14 heart attack;                                                                           dc2 

- PSHx:                                                                                           

21:14 Heart transplant; Hernia sx;                                                            dc2 

                                                                                                  

- Immunization history:: Adult Immunizations up to date, Client reports having NOT                

  received the Covid vaccine. Flu vaccine is up to date.                                          

- Social history:: Smoking status: Patient denies any tobacco usage or history of.                

                                                                                                  

                                                                                                  

ROS:                                                                                              

21:37 Eyes: Negative for injury, pain, redness, and discharge, ENT: Negative for injury,      pkl 

      pain, and discharge, Neck: Negative for injury, pain, and swelling, Cardiovascular:         

      Negative for chest pain, palpitations, and edema.                                           

21:37 Respiratory: Positive for shortness of breath, at rest.                                     

21:37 Abdomen/GI: Negative for abdominal pain, nausea, vomiting, and diarrhea.                    

21:37 Back: Negative for injury or acute deformity.                                               

21:37 : Negative for urinary symptoms.                                                          

21:37 MS/extremity: Negative for acute changes, injury or acute deformity.                        

21:37 Skin: Negative for rash.                                                                    

21:37 Neuro: Negative for altered mental status, loss of consciousness.                           

                                                                                                  

Exam:                                                                                             

21:37 Head/Face:  Normocephalic, atraumatic. Eyes:  Pupils equal round and reactive to light, pkl 

      extra-ocular motions intact.  Lids and lashes normal.  Conjunctiva and sclera are           

      non-icteric and not injected.  Cornea within normal limits.  Periorbital areas with no      

      swelling, redness, or edema. ENT:  Nares patent. No nasal discharge, no septal              

      abnormalities noted.  Tympanic membranes are normal and external auditory canals are        

      clear.  Oropharynx with no redness, swelling, or masses, exudates, or evidence of           

      obstruction, uvula midline.  Mucous membranes moist. Neck:  Trachea midline, no             

      thyromegaly or masses palpated, and no cervical lymphadenopathy.  Supple, full range of     

      motion without nuchal rigidity, or vertebral point tenderness.  No Meningismus.             

21:37 Chest/axilla: Palpation: tenderness, that is moderate, of the  right  ribs.                 

21:37 Cardiovascular: Rate: normal, Rhythm: regular.                                              

21:37 Respiratory: the patient does not display signs of respiratory distress,  Respirations:     

      normal, Breath sounds: are clear throughout.                                                

21:37 Abdomen/GI: Bowel sounds: normal, Palpation: abdomen is soft and non-tender, in all         

      quadrants.                                                                                  

21:37 Back: Exam negative for acute changes.                                                      

21:37 : Exam negative for acute changes.                                                        

21:37 Musculoskeletal/extremity: Exam is negative for acute changes.                              

21:37 Skin: Exam negative for rash.                                                               

21:37 Neuro: Orientation: is normal, Mentation: is normal, Cranial nerves: grossly normal,        

      Motor: is normal.                                                                           

                                                                                                  

Vital Signs:                                                                                      

21:11  / 105; Pulse 77; Temp 97.0; Pulse Ox 100% ; Weight 99.79 kg; Height 6 ft. 4 in.  dc2 

      (193.04 cm); Pain 2/10;                                                                     

21:15  / 105; Pulse 72; Resp 18; Temp 97.0; Pulse Ox 100% ; Pain 2/10;                  dc2 

23:07  / 98; Pulse 85; Resp 15; Temp 98.6(O); Pulse Ox 99% on R/A; Pain 0/10;           bc5 

21:11 Body Mass Index 26.78 (99.79 kg, 193.04 cm)                                             dc2 

                                                                                                  

MDM:                                                                                              

21:23 Patient medically screened.                                                             pkl 

22:48 Data reviewed: vital signs, nurses notes, radiologic studies, CT scan.                  pkl 

22:52 ED course: Discussed CT Scan result with patient. Advised to follow up with PCP in 2 to pkl 

      3 days. To return if necessary. Patient understood instructions.                            

                                                                                                  

10/17                                                                                             

21:36 Order name: CT Chest Wo Con                                                             pkl 

                                                                                                  

Administered Medications:                                                                         

No medications were administered                                                                  

                                                                                                  

                                                                                                  

Disposition Summary:                                                                              

10/17/21 22:52                                                                                    

Discharge Ordered                                                                                 

      Location: Home                                                                          pkl 

      Problem: new                                                                            pkl 

      Symptoms: are unchanged                                                                 pkl 

      Condition: Stable                                                                       pkl 

      Diagnosis                                                                                   

        - Contusion  right  ribs                                                              pkl 

      Followup:                                                                               pkl 

        - With: Private Physician                                                                  

        - When: 2 - 3 days                                                                         

        - Reason: Re-evaluation by your physician                                                  

      Discharge Instructions:                                                                     

        - Discharge Summary Sheet                                                             bc5 

      Forms:                                                                                      

        - Medication Reconciliation Form                                                      pkl 

        - Thank You Letter                                                                    pkl 

        - Antibiotic Education                                                                pkl 

        - Prescription Opioid Use                                                             pkl 

        - Work release form                                                                   bc5 

Signatures:                                                                                       

Dispatcher [employer]                           EDRashede Rees MD MD   pkl                                                  

Cee Cheng RN                       RN   bc5                                                  

Leyla Burt RN                    RN   dc2                                                  

                                                                                                  

Corrections: (The following items were deleted from the chart)                                    

23:08 23:08 PMHx: pt yossi; bc5                                                              bc5 

                                                                                                  

**************************************************************************************************

## 2021-10-17 NOTE — ER
Nurse's Notes                                                                                     

 [facility]                                                                 

Name: Gomez Romero                                                                               

Age: 51 yrs                                                                                       

Sex: Male                                                                                         

: 1970                                                                                   

MRN: [id number]                                                                                   

Arrival Date: 10/17/2021                                                                          

Time: 20:58                                                                                       

Account#: [id number]                                                                            

Bed 14                                                                                            

Private MD:                                                                                       

Diagnosis: Contusion right ribs                                                                   

                                                                                                  

Presentation:                                                                                     

10/17                                                                                             

21:11 Chief complaint: Patient states: while cutting grass, pt fell onto right armrest        dc2 

      hurting ribs, co hard to take deep breath. Coronavirus screen: Vaccine status: Patient      

      reports being unvaccinated. Client denies travel out of the [place] in the last 14 days.       

      Client presents with at least one sign or symptom that may indicate coronavirus-19. At      

      this time, the client does not indicate any symptoms associated with coronavirus-19.        

      The client is unsure as to whether or not they have had previous COVID-19 testing.          

      Ebola Screen: Patient negative for fever greater than or equal to 101.5 degrees             

      Fahrenheit, and additional compatible Ebola Virus Disease symptoms Patient denies           

      exposure to infectious person. Patient denies travel to an Ebola-affected area in the       

      21 days before illness onset. Initial Sepsis Screen: Does the patient meet any 2            

      criteria? No. Patient's initial sepsis screen is negative. Does the patient have a          

      suspected source of infection? No. Patient's initial sepsis screen is negative. Risk        

      Assessment: Do you want to hurt yourself or someone else? Patient reports no desire to      

      harm self or others. Onset of symptoms was 2021 at 15:00.                       

21:11 Method Of Arrival: Ambulatory                                                           dc2 

21:11 Acuity: MILTON 3                                                                           dc2 

                                                                                                  

Triage Assessment:                                                                                

21:14 General: Appears in no apparent distress. Behavior is calm, cooperative. Pain:          dc2 

      Complains of pain in right upper rib when yawns and takes deep breath.                      

                                                                                                  

Historical:                                                                                       

- Allergies:                                                                                      

21:14 falsecarinate;                                                                          dc2 

21:14 unknown rejection medication;                                                           dc2 

- Home Meds:                                                                                      

21:14 micofenolato [Active]; Prograf 1 mg Oral cap every 12 hours [Active];                   dc2 

- PMHx:                                                                                           

21:14 heart attack;                                                                           dc2 

- PSHx:                                                                                           

21:14 Heart transplant; Hernia sx;                                                            dc2 

                                                                                                  

- Immunization history:: Adult Immunizations up to date, Client reports having NOT                

  received the Covid vaccine. Flu vaccine is up to date.                                          

- Social history:: Smoking status: Patient denies any tobacco usage or history of.                

                                                                                                  

                                                                                                  

Screenin:08 Abuse screen: Denies threats or abuse. Denies injuries from another. Nutritional        bc5 

      screening: No deficits noted. Tuberculosis screening: No symptoms or risk factors           

      identified. Fall Risk None identified.                                                      

                                                                                                  

Assessment:                                                                                       

23:08 Neuro: No deficits noted. Cardiovascular: No deficits noted. Respiratory: No deficits   bc5 

      noted.                                                                                      

                                                                                                  

Vital Signs:                                                                                      

21:11  / 105; Pulse 77; Temp 97.0; Pulse Ox 100% ; Weight 99.79 kg; Height 6 ft. 4 in.  dc2 

      (193.04 cm); Pain 2/10;                                                                     

21:15  / 105; Pulse 72; Resp 18; Temp 97.0; Pulse Ox 100% ; Pain 2/10;                  dc2 

23:07  / 98; Pulse 85; Resp 15; Temp 98.6(O); Pulse Ox 99% on R/A; Pain 0/10;           bc5 

21:11 Body Mass Index 26.78 (99.79 kg, 193.04 cm)                                             dc2 

                                                                                                  

ED Course:                                                                                        

20:58 Patient arrived in ED.                                                                  bp1 

21:13 Triage completed.                                                                       dc2 

21:23 Rasheed Pretty MD is Attending Physician.                                                    pkl 

22:15 CT Chest Wo Con In Process Unspecified.                                                 EDMS

22:58 Cee Cheng, RN is Primary Nurse.                                                     bc5 

23:08 Allergy band placed. Bed in low position. Call light in reach. Side rails up X2. Adult  bc5 

      w/ patient.                                                                                 

23:08 Arm band placed on.                                                                     bc5 

23:09 No provider procedures requiring assistance completed. Patient did not have IV access   bc5 

      during this emergency room visit.                                                           

                                                                                                  

Administered Medications:                                                                         

No medications were administered                                                                  

                                                                                                  

                                                                                                  

Outcome:                                                                                          

22:52 Discharge ordered by MD.                                                                pkl 

23:09 Discharged to home ambulatory, with family.                                             bc5 

23:09 Condition: improved                                                                         

23:09 Discharge instructions given to patient, family, Instructed on discharge instructions,      

      follow up and referral plans.                                                               

23:09 Patient left the ED.                                                                    bc5 

                                                                                                  

Signatures:                                                                                       

Dispatcher [employer]                           EDMS                                                 

Rasheed Pretty MD MD   pkl                                                  

Jazmine Johnson                           bp1                                                  

Cee Cheng, RN                       RN   bc5                                                  

Carmel, EAN Mayer                    RN   dc2                                                  

                                                                                                  

Corrections: (The following items were deleted from the chart)                                    

23:08 23:08 PMHx: pt denies; bc5                                                              bc5 

                                                                                                  

**************************************************************************************************

## 2021-10-18 NOTE — RAD REPORT
EXAM DESCRIPTION:  CT - Thorax Wo Con - 10/18/2021 7:11 am

 

CLINICAL HISTORY:  Hit   right   side   chest   against   lawn      handle;Pain

 

COMPARISON:  None.

 

TECHNIQUE:  CT CHEST WITHOUT IV CONTRAST on 10/17/2021 9:36 PM CDT

This exam was performed according to our departmental dose-optimization program, which includes autom
ated exposure control, adjustment of the mA and/or kV according to patient size and/or use of iterati
ve reconstruction technique.

 

FINDINGS:  The heart is normal in size. Sternotomy was performed. There are postoperative changes of 
presumed gastric bypass. There is no pericardial effusion. Intrathoracic lymph nodes are not enlarged
.

There is no pleural effusion, pleural thickening or pneumothorax. Central airways are patent. There i
s a small calcified right lower lobe granuloma.

There are no acute abnormalities within the limited images of the upper abdomen.   There are no acute
 osseous findings. No suspicious bony lesions.

 

IMPRESSION:  No definite acute findings.

 

Electronically signed by:   Yonathan Matthew MD   10/17/2021 10:41 PM CDT Workstation: [id number]

 

 

Due to temporary technical issues with the PACS/Fluency reporting system, reports are being signed by
 the in house radiologist without review as a courtesy to ensure prompt reporting. The interpreting r
adiologist is fully responsible for the content of the report.

## 2021-11-27 ENCOUNTER — HOSPITAL ENCOUNTER (EMERGENCY)
Dept: HOSPITAL 97 - ER | Age: 51
Discharge: HOME | End: 2021-11-27
Payer: COMMERCIAL

## 2021-11-27 VITALS — TEMPERATURE: 97.4 F | DIASTOLIC BLOOD PRESSURE: 93 MMHG | SYSTOLIC BLOOD PRESSURE: 120 MMHG | OXYGEN SATURATION: 96 %

## 2021-11-27 DIAGNOSIS — Z94.1: ICD-10-CM

## 2021-11-27 DIAGNOSIS — M25.551: Primary | ICD-10-CM

## 2021-11-27 DIAGNOSIS — Z88.8: ICD-10-CM
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
## 2021-11-27 PROCEDURE — 99283 EMERGENCY DEPT VISIT LOW MDM: CPT

## 2021-11-27 NOTE — XMS REPORT
Continuity of Care Document

                          Created on:2021



Patient:TYRA BRUNO

Sex:Male

:1970

External Reference #:[id number]





Demographics







                          Address                   [address]



                                                    [place] [address]

 

                          Home Phone                [phone]

 

                          Work Phone                [phone]

 

                          Mobile Phone              [phone]

 

                          Email Address             [email or web address]

 

                          Preferred Language        English

 

                          Marital Status            Unknown

 

                          Caodaism Affiliation     Unknown

 

                          Race                      Unknown

 

                          Additional Race(s)        Unavailable

 

                          Ethnic Group              Unknown









Author







                          Organization              [facility]

t

 

                          Address                   [address]



                                                    [place] [address]

 

                          Phone                     [phone]









Support







                Name            Relationship    Address         Phone

 

                KIANA COATS               Unavailable     Unavailable

 

                KIANA MCCURDY               Unavailable     Unavailable

 

                KIANA MCCURDY               Unavailable     Unavailable

 

                Unavailable     NG              Unavailable     Unavailable









Care Team Providers







                    Name                Role                Phone

 

                    MARY CELESTIN     Attending Clinician Unavailable

 

                    NAYELI DIAZ       Attending Clinician Unavailable

 

                    SIMONE               Attending Clinician Unavailable

 

                    MARKO AWAD      Attending Clinician Unavailable

 

                    MAEVE SILVESTRE     Attending Clinician Unavailable

 

                    TJ        Attending Clinician Unavailable

 

                    MARY CELESTIN     Admitting Clinician Unavailable









Payers







           Payer Name Policy Type Policy Number Effective Date Expiration Date S

[facility]               [id number] 2017            



           POS/PPO/EPO                       00:00:00              

 

           MEDICARE A B            [id number] 2017 



                                            00:00:00   00:00:00   







Problems







       Condition Condition Condition Status Onset  Resolution Last   Treating Co

mments 

Source



       Name   Details Category        Date   Date   Treatment Clinician        



                                                 Date                 

 

       RIGHT         Diagnosis Active 2017               Mem

oria



       RECURRENT                      -18          06:05:00               l



       INGUINAL   RIGHT               00:00:                             [place]



       HERNIA AND RECURRENT               00                                 



       INCI   INGUINAL                                                  



              HERNIA AND                                                  



              INCI                                                    



                                                                      



                                                                      



              Active                                                  



                                                                      



                                                                      



              2017                                                  



                                                                      



                                                                      



                                                                      



                                                                      



                                                                      



                                                                      



                                                                      



                                                                      



                     [facility]                                                  



                                                                      



                                                                      

 

       MORBID        Diagnosis Active 2017               Mem

oria



       OBESITY                      0-14          07:33:00               l



                MORBID               00:00:                             Jeff



              OBESITY               00                                 



                                                                      



                                                                      



              Active                                                  



                                                                      



                                                                      



              10/14/2016                                                  



                                                                      



                                                                      



                                                                      



                                                                      



                                                                      



                                                                      



                                                                      



                                                                      



                     [facility]                                                  



                                                                      



                                                                      

 

       History of History of Problem Resolve                                    

[facility]



       malnutriti malnutriti HL7.CCDAR2 d                                       

  ity of



       on     on                                                      [place]



                                                                      [facility]



                                                                      ans

 

       Vitamin D Vitamin D Problem Active                                    Uni

vers



       deficiency deficiency HL7.CCDAR2                                         

  ity of



                                                                      [place]



                                                                      [facility]



                                                                      ans

 

       Hiatal        Problem Active               2017               Memor

ia



       hernia                                    01:10:50               l



       (disorder)   Hiatal                                                  Herm

ina



              hernia                                                  



              (disorder)                                                  



                                                                      



                                                                      



               Active                                                  



                                                                      



                                                                      



                                                                      



                                                                      



              Problem                                                  



                                                                      



                                                                      



              2017                                                  



                                                                      



                                                                      



                                                                      



                                                                      



                 [facility]                                                  



                                                                      



                                                                      

 

       OBESITY,        Diagnosis Active               2017               M

emoria



       UNSPECIFIE                                    07:33:00               l



       D        OBESITY,                                                  [place]

n



              UNSPECIFIE                                                  



              D                                                       



                                                                      



                 Active                                                  



                                                                      



                                                                      



                                                                      



                                                                      



                                                                      



                                                                      



                                                                      



                                                                      



                                                                      



                                                                      



                      [facility]                                                  



                                                                      



                                                                      

 

       Myocardial        Problem Resolve               2017               

[facility]



       infarction               d                    01:10:50               l



       (disorder)                                                         [place]

n



              Myocardial                                                  



              infarction                                                  



              (disorder)                                                  



                                                                      



                                                                      



               Resolved                                                  



                                                                      



                                                                      



                                                                      



                                                                      



                Problem                                                  



                                                                      



                                                                      



              2017                                                  



                                                                      



                                                                      



                                                                      



                                                                      



                 [facility]                                                  



                                                                      



                                                                      

 

       Hypertensi        Problem Active               2017               M

emoria



       ve                                        01:10:50               l



       disorder,                                                         [place]



       systemic Hypertensi                                                  



       arterial ve                                                      



       (disorder) disorder,                                                  



              systemic                                                  



              arterial                                                  



              (disorder)                                                  



                                                                      



                                                                      



               Active                                                  



                                                                      



                                                                      



                                                                      



                                                                      



              Problem                                                  



                                                                      



                                                                      



              2017                                                  



                                                                      



                                                                      



                                                                      



                                                                      



                 [facility]                                                  



                                                                      



                                                                      

 

       Routine Routine Problem Active                                    [facility]



       lab draw lab draw HL7.CCDAR2                                           it

y of



                                                                      [place]



                                                                      [facility]



                                                                      ans

 

       Malnutriti Malnutriti Problem Active                                    U

nivers



       on     on     HL7.CCDAR2                                           ity of



                                                                      [place]



                                                                      [facility]



                                                                      ans

 

       Morbid Morbid Problem Active                                    [facility]



       obesity obesity HL7.CCDAR2                                           ity 

of



       due to due to                                                  [place]



       excess excess                                                  Physici



       calories calories                                                  ans

 

       Primary Primary Problem Active                                    [facility]



       dysthymia dysthymia HL7.CCDAR2                                           

ity of



                                                                      [place]



                                                                      [facility]



                                                                      ans

 

       Adult BMI Adult BMI Problem Active                                    Uni

vers



       40.0-44.9 40.0-44.9 HL7.CCDAR2                                           

ity of



       kg/sq m kg/sq m                                                  [place]



                                                                      [facility]



                                                                      ans

 

       Heart  Heart  Problem Active                                    [facility]



       replaced replaced HL7.CCDAR2                                           it

y of



       by     by                                                      [place]



       transplant transplant                                                  Ph

ysici



                                                                      ans

 

       Malabsorpt Malabsorpt Problem Active                                    U

nivers



       ion    ion    HL7.CCDAR2                                           ity of



                                                                      [place]



                                                                      [facility]



                                                                      ans

 

       History of History of Problem Resolve                                    

Univers



       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                                       

  ity of



       malabsorpt malabsorpt                                                  [place]

[place]



       ion    ion                                                     [facility]



                                                                      ans







Allergies, Adverse Reactions, Alerts







       Allergy Allergy Status Severity Reaction(s) Onset  Inactive Treating Comm

ents 

Source



       Name   Type                        Date   Date   Clinician        

 

       FOSCARNE Allergy Active High   N\T\V  -                      [facility]



       T                                                          [facility]



                                          00:00:                      [facility]



                                          [address] [place] [address]







Social History







                Smoking Status  Start Date      Stop Date       Source

 

                Social History                                  [facility]







Medications







       Ordered Filled Start  Stop   Current Ordering Indication Dosage Frequency

 Signature

                    Comments            Components          Source



     Medication Medication Date Date Medication? Clinician                (SIG) 

          



     Name Name                                                   

 

     Vitamin D Vitamin D       Yes  BRANDY                TAKE 1          

 [facility]



     (Ergocalcif (Ergocalcif 4-03           [employer]                CAPSULE   

        ity of



     danyel) [address] danyel) [address] 00:00:                               WEEKLY.       

    [place]



     UNIT Oral UNIT Oral 00                                                Physi

ci



     Capsule Capsule                                                   ans

 

     gabapentin            No                       300 mg, 1           Me

moria



     300 MG Oral                                     cap,           l



     Capsule      18:37:                               Route: PO,           Herm

                                 Drug form:           



                                                  CAP, ONCE,           



                                                  Dosing           



                                                  Weight           



                                                  102.273,           



                                                  kg,            



                                                  Priority:           



                                                  STAT,           



                                                  Start           



                                                  date:           



                                                  17           



                                                  13:37:00           



                                                  CDT, Stop           



                                                  date:           



                                                  17           



                                                  13:37:00           



                                                  CDT            

 

     Tramadol            No                       100 mg,           Mem[facility]

a



                                              Route: PO,           l



               18:36:                               Drug form:           Jeff                                 TAB, ONCE,           



                                                  Dosing           



                                                  Weight           



                                                  102.273,           



                                                  kg,            



                                                  Priority:           



                                                  STAT,           



                                                  Start           



                                                  date:           



                                                  17           



                                                  13:36:00           



                                                  CDT, Stop           



                                                  date:           



                                                  17           



                                                  13:36:00           



                                                  CDT            

 

     ketOROLAC            No                       IV, ONCE           Geraldo

jensen



     (ANES)                                                    l



               18:03:                                              [place]                                                

 

     ondansetron            No                       Route: IV,           

[facility]



     (ANES)                                     Drug form:           l



               18:03:                               INJ, ONCE,                                            Stop date:           



                                                  17           



                                                  13:03:00           



                                                  CDT            

 

     tramadol            Yes                      50 mg = 1           Geraldo

jensen



     hydrochlori                                     tab, PO,           l



     de 50 MG      17:57:                               Q6H, PRN           Kaylee

nn



     Oral Tablet      00                                 Pain, X 10           



                                                  day, # 40           



                                                  tab, 0           



                                                  Refill(s)           

 

     Ondansetron            No                       4 mg,           Memor

ia



                                              Route:           l



               17:00:                               IVP, Q6H,                                            Dosing           



                                                  Weight           



                                                  102.273,           



                                                  kg, Start           



                                                  date:           



                                                  17           



                                                  12:00:00           



                                                  CDT,           



                                                  Duration:           



                                                  30 day,           



                                                  Stop date:           



                                                  10/21/17           



                                                  6:00:00           



                                                  CDT            

 

     propofol            No                       Route: IV,           [facility]

[facility]



     (ANES)                                     Drug form:           l



               15:08:                               INJ, ONCE,                                            Stop date:           



                                                  17           



                                                  10:08:00           



                                                  CDT            

 

     succinylcho            No                       Route: IV,           

[facility]



     line (ANES)                                     Drug form:           l



               15:08:                               INJ, ONCE,                                            Stop date:           



                                                  17           



                                                  10:08:00           



                                                  CDT            

 

     fentaNYL      0      No                       Route: IV,           [facility]

[facility]



     (ANES)                                     Drug form:           l



               15:08:                               INJ, ONCE,                                            Stop date:           



                                                  17           



                                                  10:08:00           



                                                  CDT            

 

     lidocaine            No                       Route: IV,           Me

[facility]ia



     (ANES)                                     Drug form:           l



               15:03:                               INJ, ONCE,                                            Stop date:           



                                                  17           



                                                  10:03:00           



                                                  CDT            

 

     Acetaminoph            No                       1,000 mg,           M

[facility]



     en                                       Route: PO,           l



               15:00:                               Drug form:                                            LIQ,           



                                                  Q6Hnow,           



                                                  Dosing           



                                                  Weight           



                                                  102.273,           



                                                  kg, Start           



                                                  date:           



                                                  17           



                                                  10:00:00           



                                                  CDT,           



                                                  Duration:           



                                                  30 day,           



                                                  Stop date:           



                                                  10/21/17           



                                                  4:00:00           



                                                  CDT            

 

     Tramadol            No                       100 mg,           [facility]

a



                                              Route: PO,           l



               15:00:                               Drug form:                                            SUSP,           



                                                  Q6Hnow,           



                                                  Dosing           



                                                  Weight           



                                                  102.273,           



                                                  kg, Start           



                                                  date:           



                                                  17           



                                                  10:00:00           



                                                  CDT,           



                                                  Duration:           



                                                  30 day,           



                                                  Stop date:           



                                                  10/21/17           



                                                  4:00:00           



                                                  CDT            

 

     gabapentin            No                       300 mg,           [facility]

jensen



                                              Route: PO,           l



               15:00:                               Drug form:                                            SUSP,           



                                                  Q8Hnow,           



                                                  Dosing           



                                                  Weight           



                                                  102.273,           



                                                  kg, Start           



                                                  date:           



                                                  17           



                                                  10:00:00           



                                                  CDT,           



                                                  Duration:           



                                                  30 day,           



                                                  Stop date:           



                                                  10/21/17           



                                                  2:00:00           



                                                  CDT            

 

     celecoxib      0      No                       200 mg,           [facility]

ia



                                              Route: PO,           l



               15:00:                               [id number],                                            Dosing           



                                                  Weight           



                                                  102.273,           



                                                  kg, Start           



                                                  date:           



                                                  17           



                                                  10:00:00           



                                                  CDT,           



                                                  Duration:           



                                                  30 day,           



                                                  Stop date:           



                                                  10/20/17           



                                                  22:00:00           



                                                  CDT            

 

     rocuronium            No                       Route: IV,           M

[facility]



     (ANES)                                     Drug form:           l



               14:43:                               INJ, ONCE,                                            Stop date:           



                                                  17           



                                                  9:43:00           



                                                  CDT            

 

     famotidine            No                       Route: IV,           M

[facility]



     (ANES)                                     Drug form:           l



               14:33:                               INJ, ONCE,                                            Stop date:           



                                                  17           



                                                  9:33:00           



                                                  CDT            

 

     ceFAZolin            No                       Route: IV,           Me

[facility]



     (ANES)                                     Drug form:           l



               14:33:                               INJ, ONCE,                                            Stop date:           



                                                  17           



                                                  9:33:00           



                                                  CDT            

 

     dexamethaso            No                       Route: IV,           

[facility]



     ne (ANES)                                     Drug form:           l



               14:18:                               INJ, ONCE,                                            Stop date:           



                                                  17           



                                                  9:18:00           



                                                  CDT            

 

     acetaminoph            No                       Route: IV,           

[facility]



     en (ANES)                                     Drug form:           l



     (ANES)      13:54:                               INJ, Start                                            date:           



                                                  17           



                                                  8:54:00           



                                                  CDT, Stop           



                                                  date:           



                                                  17           



                                                  9:54:00           



                                                  CDT            

 

     LR 1000 mL            No                       Route: IV,           M

[facility]



     INJ (ANES)                                     Total           l



               13:23:                               Volume:                                            1,000,           



                                                  Start           



                                                  date:           



                                                  17           



                                                  8:23:00           



                                                  CDT, Stop           



                                                  date:           



                                                  17           



                                                  9:23:00           



                                                  CDT            

 

     Flomax            No                       Notes:           [facility]



                                              (Same As:           l



               10:00:                               Flomax)                                            "Do Not           



                                                  Crush"           

 

     Ofirmev            No                       Notes:           [facility]



                                              Infuse           l



               10:00:                               over 15                                            minutes           



                                                  Do not           



                                                  exceed           



                                                  4gm/day of           



                                                  acetaminop           



                                                  hen    ***           



                                                  MEDICATION           



                                                  WASTE ***           



                                                  Product           



                                                  Size:           



                                                  1000 mg           



                                                  Product           



                                                  Wasted:           



                                                  ___ mg           

 

     ceFAZolin            No                       Notes:           [facility]

a



                                              Same as:           l



               10:00:                               Ancef                                                           

 

     Tacrolimus            Yes                      2 mg, PO,           Me

[facility]



                                              QPM            l



               14:03:                                                                                              

 

     Amitriptyli Amitriptyli       Yes  KULVINDER                TAKE 1   

        [facility]



     ne HCl - 25 ne HCl - 25 3-21           SIMONE M.PILO                TABLET 3  

         ity of



     MG Oral MG Oral 00:00:                               TIMES           [place]



     Tablet Tablet 00                                 DAILY AS           [facility]



                                                  NEEDED.           ans

 

     Sucralfate            No                       Notes: May           M

[facility]



     100 MG/ML                                     interfere           l



     Oral      00:00:                               w/enteral           [facility]



     Suspension      00                                 feeds -           



                                                  Take 1 hr           



                                                  before or           



                                                  2 hr after           



                                                  antacids,           



                                                  dairy pdt,           



                                                  meals           



                                                  &amp;           



                                                  minerals -           



                                                   On empty           



                                                  stomach.           



                                                  For            



                                                  patients           



                                                  unable to           



                                                  swallow           



                                                  tablet,           



                                                  dissolve           



                                                  in 10mL -           



                                                  30mL of           



                                                  water or           



                                                  juice and           



                                                  stir           



                                                  before           



                                                  giving.           



                                                  (Same As:           



                                                  [place])           

 

     Protonix            No                       Notes:           [facility]



               -                               Tablet           l



               15:00:                               should not           [facility]



               00                                 be chewed           



                                                  or             



                                                  crushed.           



                                                  (Same as:           



                                                  Protonix)           

 

     Cartia XT            No                       Notes:           [facility]

a



                                              (Same as:           l



               15:00:                               Cardizem           [facility]



                                                CD)            



                                                  Before           



                                                  meals.  DO           



                                                  NOT CRUSH.           

 

     Prednisone            No                       Notes:           [facility]

ia



                                              (Same as:           l



               15:00:                               PredniSONE           [place]



                                                )  Take           



                                                  with food.           

 

     Acetaminoph            Yes                      1,000 mg =           

[facility]



     en                                       31.23 mL,           l



               13:15:                               PO,            [facility]



               00                                 Q6Hnow, 0           



                                                  Refill(s)           

 

     gabapentin            Yes                      300 mg = 6           M

[facility]



     50 MG/ML      -26                               mL, PO,           l



     Oral      13:15:                               Q8Hnow, #           [facility]



     Solution      00                                 378 mL, 0           



                                                  Refill(s)           

 

     tramadol            Yes                      50 mg = 1           [facility]

jensen



     hydrochlori                                     tab, PO,           l



     de 50 MG      13:15:                               Q6Hnow,           Hussein

n



     Oral Tablet      00                                 PRN Pain           



                                                  Score           



                                                  6-10, X 14           



                                                  day, # 56           



                                                  tab, 0           



                                                  Refill(s)           

 

     Enoxaparin            No                       Notes:           [facility]

ia



                                              (Same as:           l



               12:00:                               Lovenox)           [place]



               00                                                

 

     Solu-CORTEF            No                       Notes:           [facility]

jensen



                                              (Same as:           l



               04:00:                               Solu-RUPA           [facility]



               00                                 F)             

 

     Prograf            No                       Notes:           [facility]



               -                               Avoid           l



               03:26:                               grapefruit           [facility]



                                                and            



                                                  grapefruit           



                                                  juice.           



                                                  (Same As:           



                                                  Prograf)           

 

     hydrocortis            No                       100 mg,           [facility]

[facility]



     one 100 mg                                     Route: IV,           l



     injection      03:00:                               Q6H,           [facility]



                                                Dosing           



                                                  Weight           



                                                  136.136,           



                                                  kg, Start           



                                                  date:           



                                                  17           



                                                  21:00:00           



                                                  CST,           



                                                  Duration:           



                                                  30 day,           



                                                  Stop date:           



                                                  17           



                                                  18:00:00           



                                                  CST            

 

     Ondansetron            No                       Notes:           [facility]

jensen



                                              (Same as:           l



               00:00:                               Zofran)           Jeff                                 ***            



                                                  MEDICATION           



                                                  WASTE ***           



                                                  Product           



                                                  Size:  4           



                                                  mg Product           



                                                  Wasted:           



                                                  ___ mg           

 

     Acetaminoph            No                       1,000 mg,           M

[facility]



     en                                       Route: IV,           l



               23:36:                               ONCE,           [place]                                 Dosing           



                                                  Weight           



                                                  136.136,           



                                                  kg, Start           



                                                  date:           



                                                  17           



                                                  17:36:00           



                                                  CST, Stop           



                                                  date:           



                                                  17           



                                                  17:36:00           



                                                  CST            

 

     Docusate            No                       Notes:           [facility]



               1-                               (Same as:           l



               23:00:                               Colace)           [place]                                                

 

     Cellcept            No                       Notes:           [facility]



               1-25                               SEPARATE           l



               23:00:                               ANTACIDS                                            from           



                                                  Cellcept           



                                                  by 2 hrs.           



                                                  (Same As:           



                                                  CellCept)           

 

     Hydralazine            No                       Notes:           [facility]

jensen



     Hydrochlori                                     (Same as:           l



     de 100 MG      23:00:                               Apresoline           He

rmann



     Oral Tablet                                       )  May           



                                                  interfere           



                                                  w/enteral           



                                                  feedings           



                                                  Take With           



                                                  Food           

 

     Al              No                       Notes:           [facility]



     hydroxide/M                                     (aluminum           l



     g         21:00:                               hydroxide-           [facility]



     hydroxide/s      00                                 magnesium           



     imethicone                                         hyd-simeth           



     200 mg-200                                         icone           



     mg-20 mg/5                                         200-200-20           



     mL oral                                         mg/5ml 30           



     suspension                                         ml ud TINY)           

 

     Ondansetron            No                       Notes:           [facility]

jensen



               -                               (Same as:           l



               21:00:                               Zofran)           [place]                                 ***            



                                                  MEDICATION           



                                                  WASTE ***           



                                                  Product           



                                                  Size:  4           



                                                  mg Product           



                                                  Wasted:           



                                                  ___ mg           

 

     Hydromorpho            No                       Notes:           [facility]

jensen



     ne        -                               Same as           l



               21:00:                               Dilaudid           [facility]



                                                               

 

     Oxycodone            No                       Notes:           [facility]

a



     Hydrochlori      -25                               (Same           l



     de 5 MG      21:00:                               as:'[place]           [facility]

ina



     Oral Tablet      00                                 done) To           



                                                  be drawn           



                                                  up in 3 mL           



                                                  syr            

 

     gabapentin            No                       300 mg,           [facility]

jensen



               1-25                               Route: PO,           l



               21:00:                               Q8Hnow,           [facility]                                 Dosing           



                                                  Weight           



                                                  136.136,           



                                                  kg, Start           



                                                  date:           



                                                  17           



                                                  15:00:00           



                                                  CST,           



                                                  Duration:           



                                                  30 day,           



                                                  Stop date:           



                                                  17           



                                                  7:00:00           



                                                  CST            

 

     Tramadol            No                       Notes: Not           [facility]

[facility]



                                              to exceed           l



               21:00:                               400mg/day.           [place]                                 (Same As:           



                                                  Ultram)           

 

     Acetaminoph            No                       1,000 mg,           M

[facility]



     en                                       Route:           l



               21:00:                               IVPB,           [place]



                                                Q6Hnow,           



                                                  Dosing           



                                                  Weight           



                                                  136.136,           



                                                  kg, Start           



                                                  date:           



                                                  17           



                                                  15:00:00           



                                                  CST,           



                                                  Duration:           



                                                  30 day,           



                                                  Stop date:           



                                                  17           



                                                  9:00:00           



                                                  CST            

 

     Al              No                       30 ml,           [facility]



     hydroxide/M                                     Route: PO,           l



     g         20:54:                               Drug Form:           [place]



     hydroxide/s      00                                 SUSP,           



     imethicone                                         Dosing           



     200 mg-200                                         Weight           



     mg-20 mg/5                                         136.136,           



     mL oral                                         kg, Q6H,           



     suspension                                         PRN            



                                                  Indigestio           



                                                  n, Start           



                                                  date:           



                                                  17           



                                                  14:54:00           



                                                  CST,           



                                                  Duration:           



                                                  30 day,           



                                                  Stop date:           



                                                  17           



                                                  14:53:00           



                                                  CST            

 

     Calcium            No                       1,000 mL,           [facility]

ia



     Chloride                                     Rate: 125           l



     0.0014      20:54:                               ml/hr,           



     MEQ/ML /      00                                 Infuse           



     Potassium                                         over: 8           



     Chloride                                         hr, Route:           



     0.004                                         IV, Dosing           



     MEQ/ML /                                         Weight           



     Sodium                                         136.136           



     Chloride                                         kg, Total           



     0.103                                         Volume:           



     MEQ/ML /                                         1,000,           



     Sodium                                         Start           



     Lactate                                         date:           



     0.028                                         17           



     MEQ/ML                                         14:54:00           



     Injectable                                         CST,           



     Solution                                         Duration:           



                                                  30 day,           



                                                  Stop date:           



                                                  17           



                                                  14:53:00           



                                                  CST            

 

     Cefoxitin            No                       1 gm,           [facility]



                                              Route:           l



               20:10:                               IVPB,           [place]



               00                                 ONCE,           



                                                  Dosing           



                                                  Weight           



                                                  136.136,           



                                                  kg, Start           



                                                  date:           



                                                  17           



                                                  14:10:00           



                                                  CST, Stop           



                                                  date:           



                                                  17           



                                                  14:10:00           



                                                  CST            

 

     gabapentin            No                       Notes:           [facility]

[facility]



                                              (Same as:           l



               19:00:                               Neurontin)           [facility]                                                

 

     Acetaminoph            No                       Notes: Max           

[facility]



     en                                       acetaminop           l



               19:00:                               hen =                                            4000mg/day           



                                                  (4             



                                                  gm/day).           



                                                  (Same as:           



                                                  Tylenol)           

 

     Promethazin            No                       Notes: Do           M

[facility]



     e                                        not give           l



               18:26:                               IV push.                                            (Same as:           



                                                  Phenergan)           

 

     Promethazin            No                       12.5 mg,           Me

[facility]



     e                                        Route: PO,           l



               18:24:                               Q6H,                                            Dosing           



                                                  Weight           



                                                  136.136,           



                                                  kg, PRN           



                                                  Nausea &           



                                                  Vomiting,           



                                                  Start           



                                                  date:           



                                                  17           



                                                  12:24:00           



                                                  CST,           



                                                  Duration:           



                                                  30 day,           



                                                  Stop date:           



                                                  17           



                                                  12:23:00           



                                                  CST            

 

     Tramadol            No                       Notes: Not           [facility]

[facility]



                                              to exceed           l



               18:24:                               400mg/day.                                            (Same As:           



                                                  Ultram)           

 

     bupivacaine            No                       Notes:           [facility]

jensen



     liposome                                     (Same as:           l



               18:00:                               Exparel)                                            *** NOT           



                                                  FOR IV           



                                                  use****           



                                                  Postoperat           



                                                  braeden            



                                                  analgesia:           



                                                  Infiltrati           



                                                  on             



                                                  (local):           



                                                  Dose is           



                                                  based on           



                                                  surgical           



                                                  site and           



                                                  volume           



                                                  required           



                                                  to cover           



                                                  the area           



                                                  (in            



                                                  general,           



                                                  the            



                                                  maximum           



                                                  total dose           



                                                  is 266           



                                                  mg).           



                                                  Bunionecto           



                                                  my: 7 mL           



                                                  into the           



                                                  tissues           



                                                  surroundin           



                                                  g the           



                                                  osteotomy           



                                                  and 1 mL           



                                                  into the           



                                                  subcutaneo           



                                                  us tissue           



                                                  of the           



                                                  surgical           



                                                  site           



                                                  (total           



                                                  dose = 8           



                                                  mL [106           



                                                  mg])           



                                                  Hemorrhoid           



                                                  ectomy: 30           



                                                  mL (20 mL           



                                                  vial           



                                                  diluted           



                                                  with 10 mL           



                                                  NS)            



                                                  divided           



                                                  and            



                                                  administer           



                                                  ed as 6           



                                                  injections           



                                                  of 5 mL           



                                                  each           



                                                  (total           



                                                  dose = 30           



                                                  mL [266           



                                                  mg])           

 

     Lovenox            No                       Notes:           [facility]



               1-25                               (Same as:           l



               17:00:                               Lovenox)                                                           

 

     scopolamine            No                       Notes:           [facility]

jensen



               -25                               Change           l



               12:00:                               patch                                            every 72           



                                                  hours           



                                                  (Same as:           



                                                  Transderm-           



                                                  Scop)           

 

     heparin            No                       Notes:           [facility]



               1-25                               porcine           l



               12:00:                               heparin                                                           

 

     Lovenox            No                       Notes:           [facility]



               1-25                               (Same as:           l



               12:00:                               Lovenox)                                                           

 

     Mefoxin            No                       Notes:           [facility]



               1-25                               (Same As:           l



               11:37:                               Mefoxin)           [facility]



               00                                 ***            



                                                  MEDICATION           



                                                  WASTE ***           



                                                  Product           



                                                  Size:           



                                                  2000 mg           



                                                  Product           



                                                  Wasted:           



                                                  ___ mg           

 

     Ofirmev            No                       Notes:           [facility]



               1-25                               Infuse           l



               11:36:                               over 15           [facility]



               00                                 minutes           



                                                  Do not           



                                                  exceed           



                                                  4gm/day of           



                                                  acetaminop           



                                                  hen    ***           



                                                  MEDICATION           



                                                  WASTE ***           



                                                  Product           



                                                  Size:           



                                                  1000 mg           



                                                  Product           



                                                  Wasted:           



                                                  ___ mg           

 

     Ofirmev            No                       Notes:           [facility]



               1-25                               Infuse           l



               11:35:                               over 15           [facility]



               00                                 minutes           



                                                  Do not           



                                                  exceed           



                                                  4gm/day of           



                                                  acetaminop           



                                                  hen    ***           



                                                  MEDICATION           



                                                  WASTE ***           



                                                  Product           



                                                  Size:           



                                                  1000 mg           



                                                  Product           



                                                  Wasted:           



                                                  ___ mg           

 

     Pravastatin            Yes                      40 mg = 1           [facility]

[facility]



     Sodium 40      1-17                               tab, PO,           l



     MG Oral      17:47:                               Bedtime, #           [facility]

[facility]



     Tablet      00                                 30 tab, 0           



     [Pravachol]                                         Refill(s)           

 

     Hydralazine            Yes                      100 mg = 1           

[facility]



     Hydrochlori      -17                               tab, PO,           l



     de 100 MG      17:47:                               TID, # 90           [facility]

merino



     Oral Tablet      00                                 tab, 3           



                                                  Refill(s)           

 

     Hydralazine            No                       0              [facility]

a



               1-17                               Refill(s)           l



               17:47:                                              [facility]



               00                                                

 

     mycophenola            Yes                      1,000 mg =           

[facility]



     te mofetil      1-17                               2 tab, PO,           l



     500 MG Oral      17:47:                               BID, # 120           

[facility]



     Tablet      00                                 tab, 0           



     [Cellcept]                                         Refill(s)           

 

     pantoprazol            Yes                      40 mg = 1           [facility]

[facility]



     e 40 MG      1-17                               tab, PO,           l



     Enteric      17:47:                               BID, # 30           Kaylee

nn



     Coated      00                                 tab, 0           



     Tablet                                         Refill(s)           



     [Protonix]                                                        

 

     24 HR            Yes                      240 mg = 1           [facility]

a



     Diltiazem      1-17                               cap, PO,           l



     Hydrochlori      17:47:                               Daily, #           [facility]

rmann



     de 240 MG      00                                 30 cap, 0           



     Extended                                         Refill(s)           



     Release                                                        



     Capsule                                                        



     [Cartia]                                                        

 

     magnesium            Yes                      400 mg = 1           Me

moria



     oxide 400      1-17                               tab, PO,           l



     mg oral      17:47:                               Daily, 0           Hussein

n



     tablet      00                                 Refill(s)           

 

     calcium            No                       CHEW, BID,           Geraldo

jensen



     carbonate      -17                               0              l



     1000 mg      17:47:                               Refill(s)           Kaylee

nn



     oral      00                                                



     tablet,                                                        



     chewable                                                        

 

     predniSONE            Yes                      10 mg = 1           Me

moria



     10 mg oral      -17                               tab, PO,           l



     tablet      17:47:                               Daily, #           [place]



               00                                 30 tab, 3           



                                                  Refill(s)           

 

     Prograf      2017      Yes                      2.5 mg,           [facility]



               -17                               PO, Q12H,           l



               17:47:                               0              [place]



               00                                 Refill(s)           

 

     Sulfamethox            No                       1 tab, PO,           

[facility]



     azole 800      1-17                               M-W-F, 0           l



     MG /      17:47:                               Refill(s)           [place]



     Trimethopri      00                                                



     m 160 MG                                                        



     Oral Tablet                                                        



     [Bactrim]                                                        







Vital Signs







             Vital Name   Observation Time Observation Value Comments     Source

 

             HEIGHT       2021 05:49:00 193 cm                    

 

             WEIGHT       2021 05:49:00 108.863 kg                

 

             HEIGHT       2021 09:01:00 188 cm                    

 

             WEIGHT       2021 09:01:00 109.181 kg                

 

             HEIGHT       2021 05:49:00 193 cm                    

 

             WEIGHT       2021 05:49:00 108.863 kg                

 

             HEIGHT       2021 17:35:30 193 cm                    

 

             WEIGHT       2021 17:35:30 99.791 kg                 

 

             HEIGHT       2021 17:35:30 193 cm                    

 

             WEIGHT       2021 17:35:30 99.791 kg                 

 

             WEIGHT       2020 00:00:00 99.791 kg                 

 

             HEIGHT       2020 00:00:00 193 cm                    

 

             WEIGHT       2020 00:00:00 99.791 kg                 

 

             HEIGHT       2020 00:00:00 193 cm                    

 

             Systolic (mm Hg) 2017 19:18:00                           G[facility]do

rial [place]

 

             Diastolic (mm Hg) 2017 19:18:00                           [facility]

[facility]

 

             Respitory Rate 2017 19:18:00                           [facility]

al [place]

 

             Respitory Rate 2017 19:00:00                           [facility]

al [facility]

 

             Systolic (mm Hg) 2017 19:00:00                           Geraldo

rial [facility]

 

             Diastolic (mm Hg) 2017 19:00:00                           Mem

orial [place]

 

             Respitory Rate 2017 18:45:00                           [facility]

al [facility]

 

             Systolic (mm Hg) 2017 18:45:00                           Geraldo

rial [place]

 

             Diastolic (mm Hg) 2017 18:45:00                           [facility]

[facility]

 

             Heart Rate   2017 11:54:00                           [facility]

 [facility]

 

             Weight       2017 19:48:00                           [facility]

 [facility]

 

             BMI Calculated 2017 19:48:00                           [facility]

al [facility]

 

             Height       2017 19:48:00 193.04 cm                 [facility]

 [place]

 

             Temperature Oral (F) 2017 14:35:00 98.0 F                    

[facility]

 

             Systolic (mm Hg) 2017 14:35:00                           Geraldo

rial [place]

 

             Diastolic (mm Hg) 2017 14:35:00                           Mem

[facility]

 

             Respitory Rate 2017 14:35:00                           [facility]

al [place]

 

             Heart Rate   2017 14:35:00                           [facility]

 [place]

 

             Heart Rate   2017 10:35:00                           [facility]

 [place]

 

             Respitory Rate 2017 10:35:00                           [facility]

al [place]

 

             Temperature Oral (F) 2017 10:35:00 97.6 F                    

[facility] [place]

 

             Systolic (mm Hg) 2017 10:35:00                           G[facility]do

rial [facility]

 

             Diastolic (mm Hg) 2017 10:35:00                           Mem

[facility]

 

             Respitory Rate 2017 05:24:00                           [facility]

al [facility]

 

             Heart Rate   2017 05:24:00                           [facility]

 [place]

 

             Systolic (mm Hg) 2017 05:24:00                           Geraldo

rial [place]

 

             Diastolic (mm Hg) 2017 05:24:00                           Mem

orial [facility]

 

             Temperature Oral (F) 2017 05:24:00 98.3 F                    

[facility]

 

             Weight       2017 03:00:00                           [facility]

 [place]

 

             Height       2017 03:00:00 193.04 cm                 [facility]

 [facility]

 

             BMI Calculated 2017 03:00:00                           [facility]

al [facility]

 

             Weight       2017 16:16:00                           [facility]

 [place]

 

             Height       2017 16:16:00 193.04 cm                 [facility]

 [place]

 

             BMI Calculated 2017 16:16:00                           [facility]

al [facility]

 

             Weight       2017 21:05:00                           [facility]

 

             BMI Calculated 2017 21:05:00                           Jorge Villalobos

 

             Height       2017 21:05:00 193.04 cm                 [facility]







Procedures







                Procedure       Date / Time     Performing Clinician Source



                                Performed                       

 

                [H] Vitamin E Level 2018 00:00:00                 [facility]

[facility] [place]



                                                                [facility]

 

                [L] Vitamin D,  2018 00:00:00                 [place] [facility]

[facility]



                25-Hydroxy, Total -                                 Physicians



                [employer]                                        

 

                [QLH] CBC (INCLUDES 2018 00:00:00                 [facility]

[facility] [place]



                DIFF/PLT)                                       Physicians

 

                [QLH] CMP W/EGFR 2018 00:00:00                 [facility] 

[facility] [place]



                                                                [facility]

 

                [QL] FOLATE, SERUM 2018 00:00:00                 [facility]

[facility] [place]



                                                                Phys[facility]

 

                [QLH] HEMOGLOBIN A1c 2018 00:00:00                 [facility]



                                                                Physicians

 

                [QLH] IRON, TOTAL 2018 00:00:00                 [facility] [place]



                                                                Physicians

 

                [QL] LIPID PANEL 2018 00:00:00                 [facility] [place]



                                                                Physicians

 

                [QLH] PTH, INTACT 2018 00:00:00                 [facility] [place]



                (WITHOUT CALCIUM)                                 Physicians

 

                [QLH] TSH, 3RD  2018 00:00:00                 [facility]



                GENERATION W/REFLEX TO                                 Physician

s



                FT4                                             

 

                [QLH] VITAMIN A 2018 00:00:00                 [facility]



                (RETINOL)                                       Physicians

 

                [QLH] VITAMIN B1, WHOLE 2018 00:00:00                 [facility]



                BLOOD                                           Physicians

 

                [QLH] VITAMIN B12 2018 00:00:00                 [facility] [place]



                                                                Physicians

 

                Heart transplant                                 Miky Savage

n

 

                Hernia repair                                   [facility]

 

                Laparoscopic sleeve                                 [facility] [place]



                gastrectomy                                     







Encounters







        Start   End     Encounter Admission Attending Care    Care    Encounter 

Source



        Date/Time Date/Time Type    Type    Clinicians Facility Department ID   

   

 

        2021-10-09         Outpatient         [facility]    Surgery [id number]

5 [facility]



        08:06:39                         [facility]                           

 

        2021 Outpatient EL      JENISEBRITTANYBRANNON [facility]    [id number]

[id number] [facility]



        00:00:00 00:00:00                 [facility]                           

 

        2021 Outpatient [facility]    [id number]

116 [facility]



        00:00:00 00:00:00                                                 

 

        2021 Outpatient EL              [facility]    [facility]    [id number]

681 [facility]



        00:00:00 00:00:00                                                 

 

        2021 Outpatient EL              [facility]    [facility]    [id number]

352 [facility]



        00:00:00 00:00:00                                                 

 

        2021 Outpatient EL      OBERTOBRANNON, [facility]    [facility]    [id number]

1432 [facility]



        00:00:00 00:00:00                 [facility]                           

 

        2021 Outpatient JW CELESTIN, [facility]    [facility]    [id number]

1677 [facility]



        00:00:00 00:00:00                 [facility]                           

 

        2021 Emergency ER              [facility]    Emergency [id number]

[id number] [facility]



        17:22:00 17:22:00                                                 

 

        2020 Outpatient                 [facility]    [facility]    [id number]

0-2 [facility]



        00:00:00 00:00:00                                         [id number] 

 

        2020 Outpatient JW CELESTIN, [facility]    [facility]    [id number]

[id number] [facility]



        00:00:00 00:00:00                 [facility]                           

 

        2020 Outpatient                 [facility]    [facility]    [id number]

0-2 [facility]



        00:00:00 00:00:00                                         [id number] 

 

        2020 Outpatient JW ECLESTIN, [facility]    [facility]    [id number]

[id number] [facility]



        00:00:00 00:00:00                 [facility]                           

 

        2020 Outpatient EL              [facility]    [facility]    [id number]

[id number] [facility]



        00:00:00 00:00:00                                                 

 

        2020 Outpatient EL              [facility]    [facility]    [id number]

839 [facility]



        00:00:00 00:00:00                                                 

 

        2018 Outpatient EL              [facility]    [facility]    [id number]

471 [facility]



        00:00:00 00:00:00                                                 

 

        2018 AppointPURNIMA Mock [id number]

99 [facility]



        16:00:00 16:00:00 t; ERMIAS NICHOLS,         Surgery         i

ty of



                        CORBIN MONSON            Specialty         [place]

as



                        M.DDanisha                                            Physici



                                                                        ans

 

        2017 Appointmen         PURNIMA NICHOLS     [facility]     [id number]

2 [facility]



        13:00:00 13:00:00 t; ERMIAS NICHOLS i

ty of



                        COR[facility] MONSON                            [place]



                        M.PILO                                            Physici



                                                                        ans

 

        2017-10-10 2017-10-10 Appointmen         PURNIMA NICHOLS     [facility]     [id number]

4 [facility]



        15:30:00 15:30:00 t; ERMIAS NICHOLS                         i

ty of



                        CORBIN MONSON                            [place]



                        M.PILO                                            Physici



                                                                        ans

 

        2017-10-03 2017-10-03 Appointmen         PURNIMA NICHOLS     [facility]     [id number]

6 [facility]



        13:30:00 13:30:00 t; ERMIAS NICHOLS i

ty of



                        CORBIN MONSON                            [place]



                        MDANIEL                                            Physici



                                                                        ans

 

        2017 [facility] [id number]

775 [facility]



        11:05:00 04:59:00 Surgery                 [address]

 

        2017 Appointmen         PURNIMA NICHOLS     [facility]     [id number]

0 [facility]



        09:00:00 09:00:00 t; ERMIAS NICHOLS i

ty of



                        CORBIN MONSON                            [place]



                        MDANIEL                                            Physici



                                                                        ans

 

        2017 Appointmen         PRUNIMA NICHOLS     [facility]     [id number]

2 [facility]



        09:30:00 09:30:00 t; ERMIAS NICHOLS i

ty of



                        CORBIN MONSON                            [place]



                        MDANIEL                                            Physici



                                                                        ans

 

        2017 Appointmen         PURNIMA NICHOLS     [facility]     [id number]

8 [facility]



        09:00:00 09:00:00 t; ERMIAS NICHOLS                         i

ty of



                        CORBIN MONSON                            [place]



                        M.D.                                            Physici



                                                                        ans

 

        2017 Appointmen         PURNIMA NICHOLS     [facility]     [id number]

3 [facility]



        10:00:00 10:00:00 t; ERMIAS NICHOLS                         i

ty of



                        CORBIN MONSON                            [place]



                        MDANIEL                                            Physici



                                                                        ans

 

        2017 Inpatient                 [facility] [id number]

[id number] [facility]



        15:03:00 17:30:00                         r       [place] 00      l



                                                        [facility]

 

        2017 Appointmen         PURNIMA NICHOLS     [id number]

7 [facility]



        09:00:00 09:00:00 t; ERMIAS NICHOLS i

ty of



                        CORBIN MONSON M.D.                                            Physici



                                                                        ans

 

        2016-10-26 2016-10-26 Appointmen         FABI FELTON     [facility]     [id number]

[id number] [facility]



        15:00:00 15:00:00 t;              N, CHRISTIE,                         ity 

of



                        BERTHA KIM                              [place]



                        IN, CHRISTIE,                                         Physi

ci



                        RD                                              ans

 

        2016-10-14 2016-10-14 Appointmen         PURNIMA NICHOLS     [facility]     [id number]

0 [facility]



        10:00:00 10:00:00 t; ERMIAS NICHOLS i

ty of



                        CORBIN MONSON M.D.                                            Physici



                                                                        ans







Results







           Test Description Test Time  Test Comments Results    Result     Sourc

e



                                                       Comments   

 

           RAD, CHEST, 2 2021 NEW CARDIOLOGIST *******************        

    



           VIEWS      11:56:00   OBERTONReason for *******************          

  



                                 Exam:->heart *******************            



                                 transplant annual ***[facility] -            



                                 follow up  [facility] CENTERName:            



                                            TYRA BRUNO            



                                                   :            



                                            1970            



                                            Sex:                  



                                            M******************            



                                            *******************            



                                            *******************            



                                            ****FINAL REPORT            



                                            PATIENT ID:            



                                            [id number]              



                                            INDICATION: heart            



                                            transplant annual            



                                            follow up             



                                            COMPARISON: None            



                                            TECHNIQUE: Frontal            



                                            and lateral views            



                                            of the chest.            



                                            FINDINGS: Lungs and            



                                            pleura: Clear            



                                            lungs. No             



                                            effusion.Heart and            



                                            mediastinum: Normal            



                                            heart size.            



                                            Unremarkable            



                                            mediastinal            



                                            contours.Osseous            



                                            structures: No            



                                            acute                 



                                            abnormality.Additio            



                                            nal findings: None.            



                                             IMPRESSION: No            



                                            acute intrathoracic            



                                            abnormality.            



                                            Signed: Tatyana Jarvis MDReport            



                                            Verified Date/Time:            



                                             2021            



                                            11:56:35 Reading            



                                            Location:  [facility]            



                                            [facility]            



                                            Reading Room            



                                            Electronically            



                                            signed by: TATYANA JARVIS MD on 2021            



                                            11:56 AM              









                    TACROLIMUS LEVEL    2021 12:37:00 









                      Test Item  Value      Reference Range Interpretation Comme

nts









             TACROLIMUS BLOOD (BEAKER) 10.6 ng/mL   10.0-20.0                 Te

st performed on [employer] 





             (test code = 657)                                        [employer]

y system with



                                                                 Chemiluminescen

t Microparticle



                                                                 Immunoassay (CM

IA) technology.



 ID - RMBASIC METABOLIC [id number] 06:33:00





             Test Item    Value        Reference Range Interpretation Comments

 

             SODIUM (BEAKER) 139 meq/L    136-145                   



             (test code = 381)                                        

 

             POTASSIUM (BEAKER) 4.0 meq/L    3.5-5.1                   



             (test code = 379)                                        

 

             CHLORIDE (BEAKER) 102 meq/L                        



             (test code = 382)                                        

 

             CO2 (BEAKER) (test 29 meq/L     22-29                     



             code = 355)                                         

 

             BLOOD UREA NITROGEN 16 mg/dL     7-21                      



             (BEAKER) (test code                                        



             = 354)                                              

 

             CREATININE (BEAKER) 1.04 mg/dL   0.57-1.25                 



             (test code = 358)                                        

 

             GLUCOSE RANDOM 97 mg/dL                         



             (BEAKER) (test code                                        



             = 652)                                              

 

             CALCIUM (BEAKER) 8.9 mg/dL    8.4-10.2                  



             (test code = 697)                                        

 

             EGFR (BEAKER) (test 75 mL/min/1.73                           ESTIMA

DANNY GFR IS



             code = 1092) sq m                                   NOT AS ACCURATE

 AS



                                                                 CREATININE



                                                                 CLEARANCE IN



                                                                 PREDICTING



                                                                 GLOMERULAR



                                                                 FILTRATION RATE

.



                                                                 ESTIMATED GFR I

S



                                                                 NOT APPLICABLE 

FOR



                                                                 DIALYSIS PATIEN

TS.



 ID - PIAYA LPROTHROMBIN [id number] 06:30:00





             Test Item    Value        Reference Range Interpretation Comments

 

             PROTIME (BEAKER) 12.9 seconds 11.9-14.2                 



             (test code = 759)                                        

 

             INR (BEAKER) (test 1.00         See_Comment                [Automat

ed message]



             code = 370)                                         The system [employer]



                                                                 generated this 

result



                                                                 transmitted ref

erence



                                                                 range: <=5.90. 

The



                                                                 reference range

 was



                                                                 not used to int

erpret



                                                                 this result as



                                                                 normal/abnormal

.



RECOMMENDED COUMADIN/WARFARIN INR THERAPY RANGESSTANDARD DOSE: 2.0 - 3.0   
Includes: PROPHYLAXIS forvenous thrombosis, systemic embolization; TREATMENT for
venous thrombosis and/or pulmonary embolus.HIGH RISK: Target INR is 2.5-3.5 for 
patients with mechanical heart valves.CBC (HEMOGRAM ONLY)2021 06:20:00





             Test Item    Value        Reference Range Interpretation Comments

 

             WHITE BLOOD CELL COUNT (BEAKER) 4.8 K/ L     3.5-10.5              

    



             (test code = 775)                                        

 

             RED BLOOD CELL COUNT (BEAKER) 5.14 M/ L    4.63-6.08               

  



             (test code = 761)                                        

 

             HEMOGLOBIN (BEAKER) (test code = 14.7 GM/DL   13.7-17.5            

     



             410)                                                

 

             HEMATOCRIT (BEAKER) (test code = 43.3 %       40.1-51.0            

     



             411)                                                

 

             MEAN CORPUSCULAR VOLUME (BEAKER) 84.2 fL      79.0-92.2            

     



             (test code = 753)                                        

 

             MEAN CORPUSCULAR HEMOGLOBIN 28.6 pg      25.7-32.2                 



             (BEAKER) (test code = 751)                                        

 

             MEAN CORPUSCULAR HEMOGLOBIN CONC 33.9 GM/DL   32.3-36.5            

     



             (BEAKER) (test code = 752)                                        

 

             RED CELL DISTRIBUTION WIDTH 13.5 %       11.6-14.4                 



             (BEAKER) (test code = 412)                                        

 

             PLATELET COUNT (BEAKER) (test 149 K/CU MM  150-450      L          

  



             code = 756)                                         

 

             MEAN PLATELET VOLUME (BEAKER) 9.4 fL       9.4-12.4                

  



             (test code = 754)                                        

 

             NUCLEATED RED BLOOD CELLS 0 /100 WBC   0-0                       



             (BEAKER) (test code = 413)                                        



BLOOD [id number] 22:00:00





             Test Item    Value        Reference Range Interpretation Comments

 

             CULTURE (BEAKER) (test No growth in 5 days                         

  



             code = 1095)                                        



BLOOD [id number] 22:00:00





             Test Item    Value        Reference Range Interpretation Comments

 

             CULTURE (BEAKER) (test No growth in 5 days                         

  



             code = 1095)                                        



SARS-COV2/INFLUENZA/RSV [id number] 21:08:00





             Test Item    Value        Reference Range Interpretation Comments

 

             SARS-COV2/RT-PCR Positive     Negative     AA           



             (test code =                                        



             1040973)                                            

 

             INFLUENZA A RT-PCR Negative     Negative                  



             (test code =                                        



             3103959)                                            

 

             INFLUENZA B RT-PCR Negative     Negative                  



             (test code =                                        



             9598970)                                            

 

             RSV  RT-PCR (test Negative     Negative                  Performanc

e of the Xpert



             code = 0854132)                                        Xpress SARS-

CoV-2/Flu/RSV



                                                                 test has only b

een



                                                                 established in



                                                                 nasopharyngeal 

swab



                                                                 specimens. Use 

of the



                                                                 Xpert Xpress



                                                                 SARS-CoV-2/Flu/

RSV test



                                                                 with other spec

imen types



                                                                 has not been as

sessed and



                                                                 performance



                                                                 characteristics

 are



                                                                 unknown.  As wi

th any



                                                                 molecular test,

 mutations



                                                                 within the targ

eted



                                                                 genetic regions



                                                                 identified by t

 Xpert



                                                                 Xpress SARS-CoV

-2/Flu/RSV



                                                                 test could affe

ct primer



                                                                 and/or probe bi

nding



                                                                 resulting in fa

ilure to



                                                                 detect the pres

ence of



                                                                 virus or the vi

sabra being



                                                                 detected less



                                                                 predictably.Neg

ative



                                                                 results do not 

preclude



                                                                 SARS-CoV-2, Inf

luenza



                                                                 A/B, or RSV inf

ection and



                                                                 should not be u

sed as the



                                                                 sole basis for 

treatment



                                                                 or other patien

t



                                                                 management deci

sions.



                                                                 Results from 

e Xpert



                                                                 Xpress SARS-CoV

-2/Flu/RSV



                                                                 test should be 

correlated



                                                                 with the clinic

al



                                                                 history, epidem

iological



                                                                 data, and other

 data



                                                                 available to 

e



                                                                 clinician evalu

ating the



                                                                 patient.  Inval

id test



                                                                 results may occ

ur from



                                                                 improper specim

en



                                                                 collection; jasmeet

lure to



                                                                 follow the oralia

mmended



                                                                 sample collecti

on,



                                                                 handling, and s

torage



                                                                 procedures; gretchen

hnical



                                                                 error. False ne

gative



                                                                 results may occ

ur if



                                                                 virus is presen

t at



                                                                 levels below 

e



                                                                 analytical limi

t of



                                                                 detection (LOD:

 131



                                                                 copies/mL).  Vi

ral



                                                                 nucleic acid ma

y persist



                                                                 in vivo, indepe

ndent of



                                                                 virus viability

.



                                                                 Detection of an

alyte



                                                                 target(s) does 

not imply



                                                                 that the corres

ponding



                                                                 virus(es) are i

nfectious



                                                                 or are the caus

ative



                                                                 agents for clin

ical



                                                                 symptoms.  Rece

nt patient



                                                                 exposure to Flu

Mist  or



                                                                 other live atte

nuated



                                                                 influenza vacci

sylvester may



                                                                 cause inaccurat

e positive



                                                                 results.This te

st has



                                                                 been authorized

 by FDA



                                                                 under an EUA fo

r use by



                                                                 authorized labo

ratories.



                                                                 This test is on

ly



                                                                 authorized for 

the



                                                                 duration of the



                                                                 declaration nereida

t



                                                                 circumstances e

xist



                                                                 justifying the



                                                                 authorization o

f



                                                                 emergency use o

f in vitro



                                                                 diagnostic test

s for



                                                                 detection and/o

r



                                                                 diagnosis of CO

VID-19



                                                                 under Section 5

64(b)(1)



                                                                 of the Federal 

Food, Drug



                                                                 and Cosmetic Ac

t, 21



                                                                 [place]   360bbb

-3(b)(1),



                                                                 unless the auth

orization



                                                                 is terminated o

r revoked



                                                                 sooner.Fact She

et for



                                                                 Healthcare Prov

iders:



                                                                 https://www.[employer]/D



                                                                 ocuments/Xpert%

20Xpress%2



                                                                 7TFUU-WhQ-4-Flu

-RSV/302-4



                                                                 508%20Rev.%20B%

20HCP%20Fa



                                                                 ct%20Sheet.pdfF

act Sheet



                                                                 for Healthcare 

Patients:



                                                                 https://www.[employer]/D



                                                                 ocuments/Xpert%

20Xpress%2



                                                                 6XBSS-ApK-0-Flu

-RSV/302-4



                                                                 507%20Rev.%20B%

20Patient%



                                                                 20Fact%20Sheet.

pdf



TROPONIN -54-05 20:50:00





             Test Item    Value        Reference Range Interpretation Comments

 

             TROPONIN I (BEAKER) (test code = 397) < ng/mL      0.00-0.03       

          



Troponin I (TnI) levels must be interpreted in the context of the presenting 
symptoms and the clinical findings. Elevated TnI levels indicate myocardial 
damage, but are not specific for ischemic heart disease. Elevated TnI levels are
seen in patients with other cardiac conditions (including myocarditis and 
congestive heart failure), and slight TnI elevations occur in patients with 
other conditions, including sepsis, renal failure, acidosis, acute neurological 
disease, and persistent tachyarrhythmia. ID - DBB-TYPE NATRIURETIC 
FACTOR (BNP)2021 20:48:00





             Test Item    Value        Reference Range Interpretation Comments

 

             B-TYPE NATRIURETIC PEPTIDE (BEAKER) 48 pg/mL     0-100             

        



             (test code = 700)                                        



 ID - [id number] 20:44:00





             Test Item    Value        Reference Range Interpretation Comments

 

             MAGNESIUM (BEAKER) 1.7 mg/dL    1.6-2.6                   Specimen 

slightly



             (test code = 627)                                        hemolyzed



 ID - DBBASIC METABOLIC [id number] 20:44:00





             Test Item    Value        Reference Range Interpretation Comments

 

             SODIUM (BEAKER) 139 meq/L    136-145                   



             (test code = 381)                                        

 

             POTASSIUM (BEAKER) 4.0 meq/L    3.5-5.1                   Specimen 

slightly



             (test code = 379)                                        hemolyzed

 

             CHLORIDE (BEAKER) 101 meq/L                        



             (test code = 382)                                        

 

             CO2 (BEAKER) (test 24 meq/L     22-29                     



             code = 355)                                         

 

             BLOOD UREA NITROGEN 17 mg/dL     7-21                      



             (BEAKER) (test code                                        



             = 354)                                              

 

             CREATININE (BEAKER) 1.13 mg/dL   0.57-1.25                 Specimen

 slightly



             (test code = 358)                                        hemolyzed

 

             GLUCOSE RANDOM 107 mg/dL           H            



             (BEAKER) (test code                                        



             = 652)                                              

 

             CALCIUM (BEAKER) 9.1 mg/dL    8.4-10.2                  



             (test code = 697)                                        

 

             EGFR (BEAKER) (test 68 mL/min/1.73                           ESTIMA

DANNY GFR IS



             code = 1092) sq m                                   NOT AS ACCURATE

 AS



                                                                 CREATININE



                                                                 CLEARANCE IN



                                                                 PREDICTING



                                                                 GLOMERULAR



                                                                 FILTRATION RATE

.



                                                                 ESTIMATED GFR I

S



                                                                 NOT APPLICABLE 

FOR



                                                                 DIALYSIS PATIEN

TS.



 ID - DBLACTIC ACID, [id number] 20:38:00





             Test Item    Value        Reference Range Interpretation Comments

 

             LACTATE BLOOD VENOUS 2.17 mmol/L  0.50-2.20                 Specime

n slightly



             (2) (BEAKER) (test                                        hemolyzed



             code = 1622)                                        



 ID - DBCBC W/PLT COUNT &amp; AUTO [id number] 20:21:00





             Test Item    Value        Reference Range Interpretation Comments

 

             WHITE BLOOD CELL COUNT (BEAKER) 4.1 K/ L     3.5-10.5              

    



             (test code = 775)                                        

 

             RED BLOOD CELL COUNT (BEAKER) 5.73 M/ L    4.63-6.08               

  



             (test code = 761)                                        

 

             HEMOGLOBIN (BEAKER) (test code = 16.0 GM/DL   13.7-17.5            

     



             410)                                                

 

             HEMATOCRIT (BEAKER) (test code = 49.1 %       40.1-51.0            

     



             411)                                                

 

             MEAN CORPUSCULAR VOLUME (BEAKER) 85.7 fL      79.0-92.2            

     



             (test code = 753)                                        

 

             MEAN CORPUSCULAR HEMOGLOBIN 27.9 pg      25.7-32.2                 



             (BEAKER) (test code = 751)                                        

 

             MEAN CORPUSCULAR HEMOGLOBIN CONC 32.6 GM/DL   32.3-36.5            

     



             (BEAKER) (test code = 752)                                        

 

             RED CELL DISTRIBUTION WIDTH 13.2 %       11.6-14.4                 



             (BEAKER) (test code = 412)                                        

 

             PLATELET COUNT (BEAKER) (test code 98 K/CU MM   150-450      L     

       



             = 756)                                              

 

             MEAN PLATELET VOLUME (BEAKER) 9.9 fL       9.4-12.4                

  



             (test code = 754)                                        

 

             NUCLEATED RED BLOOD CELLS (BEAKER) 0 /100 WBC   0-0                

       



             (test code = 413)                                        

 

             NEUTROPHILS RELATIVE PERCENT 48 %                                  

 



             (BEAKER) (test code = 429)                                        

 

             LYMPHOCYTES RELATIVE PERCENT 30 %                                  

 



             (BEAKER) (test code = 430)                                        

 

             MONOCYTES RELATIVE PERCENT 20 %                                   



             (BEAKER) (test code = 431)                                        

 

             EOSINOPHILS RELATIVE PERCENT 0 %                                   

 



             (BEAKER) (test code = 432)                                        

 

             BASOPHILS RELATIVE PERCENT 1 %                                    



             (BEAKER) (test code = 437)                                        

 

             NEUTROPHILS ABSOLUTE COUNT 1.97 K/ L    1.78-5.38                 



             (BEAKER) (test code = 670)                                        

 

             LYMPHOCYTES ABSOLUTE COUNT 1.25 K/ L    1.32-3.57    L            



             (BEAKER) (test code = 414)                                        

 

             MONOCYTES ABSOLUTE COUNT (BEAKER) 0.84 K/ L    0.30-0.82    H      

      



             (test code = 415)                                        

 

             EOSINOPHILS ABSOLUTE COUNT 0.01 K/ L    0.04-0.54    L            



             (BEAKER) (test code = 416)                                        

 

             BASOPHILS ABSOLUTE COUNT (BEAKER) 0.02 K/ L    0.01-0.08           

      



             (test code = 417)                                        

 

             IMMATURE GRANULOCYTES-RELATIVE 1 %          0-1                    

   



             PERCENT (BEAKER) (test code =                                      

  



             2801)                                               



RAD, CHEST, 1 VIEW, NON [id number] 19:56:00NEW CARDIOLOGIST OBERTONReason 
for exam:-&gt;FEVERReason for exam:-&gt;NASAL CONGESTIONShould this be performed
at the bedside?-&gt;Yes
************************************************************[facility]        : 1970        Sex: 
M************************************************************FINAL REPORT 
PATIENT ID:   [id number]   AP view of the chest dated 3/11/2021 CLINICAL INFORMAT
ION: FEVERNASAL CONGESTION Comment:  Heart is normal in size. Pulmonary 
vasculature is unremarkable.Lungs are clear. No pulmonary infiltrate or pleural 
effusion is present.  Impression:  No active cardiopulmonary disease. Signed: 
Kiran James MDReport Verified Date/Time:  2021 19:56:24 Reading Location: 
[facility] B1 [id number] Consult Reading Room      Electronically signed by: KIRAN JAMES M.D. on 2021 07:56 PMTACROLIMUS [id number] 12:44:00





             Test Item    Value        Reference Range Interpretation Comments

 

             TACROLIMUS BLOOD (BEAKER) (test 7.6 ng/mL    10.0-20.0    L        

    



             code = 657)                                         



 ID - AAHAMIDLIPID [id number] 10:29:00





             Test Item    Value        Reference Range Interpretation Comments

 

             TRIGLYCERIDES (BEAKER) (test code = 81 mg/dL                       

        



             540)                                                

 

             CHOLESTEROL (BEAKER) (test code = 155 mg/dL                        

      



             631)                                                

 

             HDL CHOLESTEROL (BEAKER) (test code 50 mg/dL                       

        



             = 976)                                              

 

             LDL CHOLESTEROL CALCULATED (BEAKER) 89 mg/dL                       

        



             (test code = 633)                                        



Triglyceride Reference Range:   Low Risk         &lt;150   Borderline    150-199
  High Risk     200-499   Very High Risk  &gt;=500Cholesterol Reference Range:  
Low Risk         &lt;200   Borderline 200-239    High Risk        &gt;240HDL 
Cholesterol Reference Range:   Low Risk         &gt;=60   High Risk         
&lt;40LDL Cholesterol Reference Range:   Optimal          &lt;100   Near Optimal
 100-129   Borderline    130-159   High          160-189   Very High       
&gt;=190    ID - AMANDA CBASIC METABOLIC [id number] 10:29:00





             Test Item    Value        Reference Range Interpretation Comments

 

             SODIUM (BEAKER) 140 meq/L    136-145                   



             (test code = 381)                                        

 

             POTASSIUM (BEAKER) 4.6 meq/L    3.5-5.1                   



             (test code = 379)                                        

 

             CHLORIDE (BEAKER) 104 meq/L                        



             (test code = 382)                                        

 

             CO2 (BEAKER) (test 29 meq/L     22-29                     



             code = 355)                                         

 

             BLOOD UREA NITROGEN 19 mg/dL     7-21                      



             (BEAKER) (test code                                        



             = 354)                                              

 

             CREATININE (BEAKER) 1.13 mg/dL   0.57-1.25                 



             (test code = 358)                                        

 

             GLUCOSE RANDOM 105 mg/dL                        



             (BEAKER) (test code                                        



             = 652)                                              

 

             CALCIUM (BEAKER) 9.7 mg/dL    8.4-10.2                  



             (test code = 697)                                        

 

             EGFR (BEAKER) (test 68 mL/min/1.73                           ESTIMA

DANNY GFR IS



             code = 1092) sq m                                   NOT AS ACCURATE

 AS



                                                                 CREATININE



                                                                 CLEARANCE IN



                                                                 PREDICTING



                                                                 GLOMERULAR



                                                                 FILTRATION RATE

.



                                                                 ESTIMATED GFR I

S



                                                                 NOT APPLICABLE 

FOR



                                                                 DIALYSIS PATIEN

TS.



 ID - AMANDA CHEPATIC FUNCTION [id number] 10:29:00





             Test Item    Value        Reference Range Interpretation Comments

 

             TOTAL PROTEIN (BEAKER) (test code = 7.5 gm/dL    6.0-8.3           

        



             770)                                                

 

             ALBUMIN (BEAKER) (test code = 1145) 4.7 g/dL     3.5-5.0           

        

 

             BILIRUBIN TOTAL (BEAKER) (test code 0.7 mg/dL    0.2-1.2           

        



             = 377)                                              

 

             BILIRUBIN DIRECT (BEAKER) (test 0.3 mg/dL    0.1-0.5               

    



             code = 706)                                         

 

             ALKALINE PHOSPHATASE (BEAKER) (test 78 U/L                   

        



             code = 346)                                         

 

             AST (SGOT) (BEAKER) (test code = 19 U/L       5-34                 

     



             353)                                                

 

             ALT (SGPT) (BEAKER) (test code = 14 U/L       6-55                 

     



             347)                                                



 ID - AMANDA ANN, CHEST, 2 [id number] 10:21:00NEW CARDIOLOGIST 
OBERTONReason for Exam:-&gt;heart transplant annual follow upFINAL REPORT 
PATIENT ID:   [id number] INDICATION: heart transplant annual follow up COMPARISON:
None TECHNIQUE: Frontal and lateral views of the chest. FINDINGS: Lungs and 
pleura: Clear lungs. No effusion.Heart and mediastinum: Normal heart size. 
Unremarkable mediastinal contours.Osseous structures: No acute 
abnormality.Additional findings: None.  IMPRESSION: No acute intrathoracic 
abnormality. Signed:Tatyana Jarvis MDReport Verified Date/Time:  2020 
10:21:38 Reading Location: [facility] Radiology Reading Room  Electronically 
signed by: TATYANA JARVIS MD on 2020 10:21 [id number] 
10:10:00





             Test Item    Value        Reference Range Interpretation Comments

 

             PROSTATE SPECIFIC ANTIGEN (BEAKER) 0.6 ng/mL    0.0-4.0            

       



             (test code = 844)                                        



 ID - AMANDA CCBC W/PLT COUNT &amp; AUTO [id number] 09:25:00





             Test Item    Value        Reference Range Interpretation Comments

 

             WHITE BLOOD CELL COUNT (BEAKER) 4.5 K/ L     3.5-10.5              

    



             (test code = 775)                                        

 

             RED BLOOD CELL COUNT (BEAKER) 5.64 M/ L    4.63-6.08               

  



             (test code = 761)                                        

 

             HEMOGLOBIN (BEAKER) (test code = 16.9 GM/DL   13.7-17.5            

     



             410)                                                

 

             HEMATOCRIT (BEAKER) (test code = 48.8 %       40.1-51.0            

     



             411)                                                

 

             MEAN CORPUSCULAR VOLUME (BEAKER) 86.5 fL      79.0-92.2            

     



             (test code = 753)                                        

 

             MEAN CORPUSCULAR HEMOGLOBIN 30.0 pg      25.7-32.2                 



             (BEAKER) (test code = 751)                                        

 

             MEAN CORPUSCULAR HEMOGLOBIN CONC 34.6 GM/DL   32.3-36.5            

     



             (BEAKER) (test code = 752)                                        

 

             RED CELL DISTRIBUTION WIDTH 13.3 %       11.6-14.4                 



             (BEAKER) (test code = 412)                                        

 

             PLATELET COUNT (BEAKER) (test 152 K/CU MM  150-450                 

  



             code = 756)                                         

 

             MEAN PLATELET VOLUME (BEAKER) 9.5 fL       9.4-12.4                

  



             (test code = 754)                                        

 

             NUCLEATED RED BLOOD CELLS 0 /100 WBC   0-0                       



             (BEAKER) (test code = 413)                                        

 

             NEUTROPHILS RELATIVE PERCENT 50 %                                  

 



             (BEAKER) (test code = 429)                                        

 

             LYMPHOCYTES RELATIVE PERCENT 32 %                                  

 



             (BEAKER) (test code = 430)                                        

 

             MONOCYTES RELATIVE PERCENT 11 %                                   



             (BEAKER) (test code = 431)                                        

 

             EOSINOPHILS RELATIVE PERCENT 6 %                                   

 



             (BEAKER) (test code = 432)                                        

 

             BASOPHILS RELATIVE PERCENT 1 %                                    



             (BEAKER) (test code = 437)                                        

 

             NEUTROPHILS ABSOLUTE COUNT 2.25 K/ L    1.78-5.38                 



             (BEAKER) (test code = 670)                                        

 

             LYMPHOCYTES ABSOLUTE COUNT 1.45 K/ L    1.32-3.57                 



             (BEAKER) (test code = 414)                                        

 

             MONOCYTES ABSOLUTE COUNT (BEAKER) 0.50 K/ L    0.30-0.82           

      



             (test code = 415)                                        

 

             EOSINOPHILS ABSOLUTE COUNT 0.25 K/ L    0.04-0.54                 



             (BEAKER) (test code = 416)                                        

 

             BASOPHILS ABSOLUTE COUNT (BEAKER) 0.03 K/ L    0.01-0.08           

      



             (test code = 417)                                        

 

             IMMATURE GRANULOCYTES-RELATIVE 0 %          0-1                    

   



             PERCENT (BEAKER) (test code =                                      

  



             2801)                                               



TACROLIMUS [id number] 11:25:00





             Test Item    Value        Reference Range Interpretation Comments

 

             TACROLIMUS BLOOD (BEAKER) (test 6.9 ng/mL    10.0-20.0    L        

    



             code = 657)                                         



BASIC METABOLIC [id number] 08:24:00





             Test Item    Value        Reference Range Interpretation Comments

 

             SODIUM (BEAKER) 137 meq/L    136-145                   



             (test code = 381)                                        

 

             POTASSIUM (BEAKER) 4.3 meq/L    3.5-5.1                   



             (test code = 379)                                        

 

             CHLORIDE (BEAKER) 103 meq/L                        



             (test code = 382)                                        

 

             CO2 (BEAKER) (test 28 meq/L     22-29                     



             code = 355)                                         

 

             BLOOD UREA NITROGEN 18 mg/dL     7-21                      



             (BEAKER) (test code                                        



             = 354)                                              

 

             CREATININE (BEAKER) 1.12 mg/dL   0.57-1.25                 



             (test code = 358)                                        

 

             GLUCOSE RANDOM 98 mg/dL                         



             (BEAKER) (test code                                        



             = 652)                                              

 

             CALCIUM (BEAKER) 9.1 mg/dL    8.4-10.2                  



             (test code = 697)                                        

 

             EGFR (BEAKER) (test 69 mL/min/1.73                           ESTIMA

DANNY GFR IS



             code = 1092) sq m                                   NOT AS ACCURATE

 AS



                                                                 CREATININE



                                                                 CLEARANCE IN



                                                                 PREDICTING



                                                                 GLOMERULAR



                                                                 FILTRATION RATE

.



                                                                 ESTIMATED GFR I

S



                                                                 NOT APPLICABLE 

FOR



                                                                 DIALYSIS PATIEN

TS.



CBC W/PLT COUNT &amp; AUTO [id number] 08:01:00





             Test Item    Value        Reference Range Interpretation Comments

 

             WHITE BLOOD CELL COUNT (BEAKER) 5.4 K/ L     3.5-10.5              

    



             (test code = 775)                                        

 

             RED BLOOD CELL COUNT (BEAKER) 5.35 M/ L    4.63-6.08               

  



             (test code = 761)                                        

 

             HEMOGLOBIN (BEAKER) (test code = 15.6 GM/DL   13.7-17.5            

     



             410)                                                

 

             HEMATOCRIT (BEAKER) (test code = 46.9 %       40.1-51.0            

     



             411)                                                

 

             MEAN CORPUSCULAR VOLUME (BEAKER) 87.7 fL      79.0-92.2            

     



             (test code = 753)                                        

 

             MEAN CORPUSCULAR HEMOGLOBIN 29.2 pg      25.7-32.2                 



             (BEAKER) (test code = 751)                                        

 

             MEAN CORPUSCULAR HEMOGLOBIN CONC 33.3 GM/DL   32.3-36.5            

     



             (BEAKER) (test code = 752)                                        

 

             RED CELL DISTRIBUTION WIDTH 13.6 %       11.6-14.4                 



             (BEAKER) (test code = 412)                                        

 

             PLATELET COUNT (BEAKER) (test 132 K/CU MM  150-450      L          

  



             code = 756)                                         

 

             MEAN PLATELET VOLUME (BEAKER) 9.3 fL       9.4-12.4     L          

  



             (test code = 754)                                        

 

             NUCLEATED RED BLOOD CELLS 0 /100 WBC   0-0                       



             (BEAKER) (test code = 413)                                        

 

             NEUTROPHILS RELATIVE PERCENT 70 %                                  

 



             (BEAKER) (test code = 429)                                        

 

             LYMPHOCYTES RELATIVE PERCENT 16 %                                  

 



             (BEAKER) (test code = 430)                                        

 

             MONOCYTES RELATIVE PERCENT 8 %                                    



             (BEAKER) (test code = 431)                                        

 

             EOSINOPHILS RELATIVE PERCENT 5 %                                   

 



             (BEAKER) (test code = 432)                                        

 

             BASOPHILS RELATIVE PERCENT 1 %                                    



             (BEAKER) (test code = 437)                                        

 

             NEUTROPHILS ABSOLUTE COUNT 3.77 K/ L    1.78-5.38                 



             (BEAKER) (test code = 670)                                        

 

             LYMPHOCYTES ABSOLUTE COUNT 0.83 K/ L    1.32-3.57    L            



             (BEAKER) (test code = 414)                                        

 

             MONOCYTES ABSOLUTE COUNT (BEAKER) 0.45 K/ L    0.30-0.82           

      



             (test code = 415)                                        

 

             EOSINOPHILS ABSOLUTE COUNT 0.25 K/ L    0.04-0.54                 



             (BEAKER) (test code = 416)                                        

 

             BASOPHILS ABSOLUTE COUNT (BEAKER) 0.04 K/ L    0.01-0.08           

      



             (test code = 417)                                        

 

             IMMATURE GRANULOCYTES-RELATIVE 0 %          0-1                    

   



             PERCENT (BEAKER) (test code =                                      

  



             2801)                                               



CT, BRAIN, WITHOUT [id number] 17:41:00NEW CARDIOLOGIST OBERTONFINAL 
REPORT PATIENT ID:   [id number] CT, BRAIN, WITHOUT CONTRAST INDICATION: head 
injury TECHNIQUE: Noncontrast axial imaging was obtained from the vertex to the 
skull base. Axial images were reconstructed using a bone algorithm. DOSE 
REDUCTION: Dose modulation, iterative reconstruction, and/or weight-based 
adjustment of the mA/kV was utilized to reduce the radiation dose to as low as 
reasonably achievable. COMPARISON: None. FINDINGS: Cerebral parenchyma: Mild 
cerebral volume loss is present. No recent infarct or parenchymal hemorrhage is 
present.Midline structures: Normally positioned.Cerebellum and brainstem: 
Commensurate volume loss.Ventricles: Normal volume.Extra-axial spaces: 
Unremarkable. Calvarium and skull base: Intact.Paranasal sinuses and mastoid air
cells: Visible chambers are clear.Orbital contents: Included portions 
unremarkable. Additional findings: None.  IMPRESSION:  Chronic involutional 
changes without acute or traumatic intracranial abnormality. Signed: 
JR Aguila RobertMDReport Verified Date/Time:  2019 17:41:54 
Reading Location: [facility] Radiology Reading Room    Electronically signed 
by: MOHIT AGUILA on 2019 05:41 PMTACROLIMUS [id number] 
10:33:00





             Test Item    Value        Reference Range Interpretation Comments

 

             TACROLIMUS BLOOD (BEAKER) (test 6.2 ng/mL    10.0-20.0    L        

    



             code = 657)                                         



[id number] 09:15:00





             Test Item    Value        Reference Range Interpretation Comments

 

             PROSTATE SPECIFIC ANTIGEN (BEAKER) 0.5 ng/mL    0.0-4.0            

       



             (test code = 844)                                        



LIPID [id number] 09:01:00





             Test Item    Value        Reference Range Interpretation Comments

 

             TRIGLYCERIDES (BEAKER) (test code = 63 mg/dL                       

        



             540)                                                

 

             CHOLESTEROL (BEAKER) (test code = 126 mg/dL                        

      



             631)                                                

 

             HDL CHOLESTEROL (BEAKER) (test code 42 mg/dL                       

        



             = 976)                                              

 

             LDL CHOLESTEROL CALCULATED (BEAKER) 71 mg/dL                       

        



             (test code = 633)                                        



Triglyceride Reference Range:   Low Risk         &lt;150   Borderline    150-199
  High Risk     200-499   Very High Risk  &gt;=500Cholesterol Reference Range:  
Low Risk         &lt;200   Borderline 200-239    High Risk        &gt;240HDL 
Cholesterol Reference Range:   Low Risk         &gt;=60   High Risk         
&lt;40LDL Cholesterol Reference Range:   Optimal          &lt;100   Near Optimal
 100-129   Borderline    130-159   High          160-189   Very High       
&gt;=190BASIC METABOLIC [id number] 09:01:00





             Test Item    Value        Reference Range Interpretation Comments

 

             SODIUM (BEAKER) 143 meq/L    136-145                   



             (test code = 381)                                        

 

             POTASSIUM (BEAKER) 4.3 meq/L    3.5-5.1                   



             (test code = 379)                                        

 

             CHLORIDE (BEAKER) 105 meq/L                        



             (test code = 382)                                        

 

             CO2 (BEAKER) (test 30 meq/L     22-29        H            



             code = 355)                                         

 

             BLOOD UREA NITROGEN 16 mg/dL     7-21                      



             (BEAKER) (test code                                        



             = 354)                                              

 

             CREATININE (BEAKER) 0.96 mg/dL   0.57-1.25                 



             (test code = 358)                                        

 

             GLUCOSE RANDOM 108 mg/dL           H            



             (BEAKER) (test code                                        



             = 652)                                              

 

             CALCIUM (BEAKER) 9.7 mg/dL    8.4-10.2                  



             (test code = 697)                                        

 

             EGFR (BEAKER) (test 83 mL/min/1.73                           ESTIMA

DANNY GFR IS



             code = 1092) sq m                                   NOT AS ACCURATE

 AS



                                                                 CREATININE



                                                                 CLEARANCE IN



                                                                 PREDICTING



                                                                 GLOMERULAR



                                                                 FILTRATION RATE

.



                                                                 ESTIMATED GFR I

S



                                                                 NOT APPLICABLE 

FOR



                                                                 DIALYSIS PATIEN

TS.



HEPATIC FUNCTION [id number] 09:01:00





             Test Item    Value        Reference Range Interpretation Comments

 

             TOTAL PROTEIN (BEAKER) (test code = 7.0 gm/dL    6.0-8.3           

        



             770)                                                

 

             ALBUMIN (BEAKER) (test code = 1145) 4.5 g/dL     3.5-5.0           

        

 

             BILIRUBIN TOTAL (BEAKER) (test code 0.5 mg/dL    0.2-1.2           

        



             = 377)                                              

 

             BILIRUBIN DIRECT (BEAKER) (test 0.3 mg/dL    0.1-0.5               

    



             code = 706)                                         

 

             ALKALINE PHOSPHATASE (BEAKER) (test 81 U/L                   

        



             code = 346)                                         

 

             AST (SGOT) (BEAKER) (test code = 18 U/L       5-34                 

     



             353)                                                

 

             ALT (SGPT) (BEAKER) (test code = 14 U/L       6-55                 

     



             347)                                                



RAD, CHEST, 2 [id number] 08:48:00NEW CARDIOLOGIST OBERTONReason for Exam:-
&gt;heart transplant annual follow upFINAL REPORT PATIENT ID:   [id number] 
INDICATION: heart transplant annual follow up COMPARISON: May 23, 2018 
TECHNIQUE: Chest radiograph, two views, PA and lateral. FINDINGS / 
IMPRESSION:Lung volumes arenormal and lungs are clear. Intact median sternotomy 
wires and left axillary/subclavian surgical clips again demonstrated from prior 
heart transplant. Cardiac and mediastinal contours normal. No pleural effusion 
or pneumothorax. Osseous structures unremarkable. Signed: Dennis Julio 
MDReport Verified Date/Time:  2019 08:48:33 Reading Location: [facility] Radiology Reading Room Electronically signed by: DENNIS JULIO M.D. on 2019 08:48 AMCBC W/PLT COUNT &amp; AUTO [id number] 
08:28:00





             Test Item    Value        Reference Range Interpretation Comments

 

             WHITE BLOOD CELL COUNT (BEAKER) 5.2 K/ L     3.5-10.5              

    



             (test code = 775)                                        

 

             RED BLOOD CELL COUNT (BEAKER) 5.18 M/ L    4.63-6.08               

  



             (test code = 761)                                        

 

             HEMOGLOBIN (BEAKER) (test code = 15.1 GM/DL   13.7-17.5            

     



             410)                                                

 

             HEMATOCRIT (BEAKER) (test code = 45.4 %       40.1-51.0            

     



             411)                                                

 

             MEAN CORPUSCULAR VOLUME (BEAKER) 87.6 fL      79.0-92.2            

     



             (test code = 753)                                        

 

             MEAN CORPUSCULAR HEMOGLOBIN 29.2 pg      25.7-32.2                 



             (BEAKER) (test code = 751)                                        

 

             MEAN CORPUSCULAR HEMOGLOBIN CONC 33.3 GM/DL   32.3-36.5            

     



             (BEAKER) (test code = 752)                                        

 

             RED CELL DISTRIBUTION WIDTH 13.3 %       11.6-14.4                 



             (BEAKER) (test code = 412)                                        

 

             PLATELET COUNT (BEAKER) (test 135 K/CU MM  150-450      L          

  



             code = 756)                                         

 

             MEAN PLATELET VOLUME (BEAKER) 9.6 fL       9.4-12.4                

  



             (test code = 754)                                        

 

             NUCLEATED RED BLOOD CELLS 0 /100 WBC   0-0                       



             (BEAKER) (test code = 413)                                        

 

             NEUTROPHILS RELATIVE PERCENT 55 %                                  

 



             (BEAKER) (test code = 429)                                        

 

             LYMPHOCYTES RELATIVE PERCENT 29 %                                  

 



             (BEAKER) (test code = 430)                                        

 

             MONOCYTES RELATIVE PERCENT 10 %                                   



             (BEAKER) (test code = 431)                                        

 

             EOSINOPHILS RELATIVE PERCENT 5 %                                   

 



             (BEAKER) (test code = 432)                                        

 

             BASOPHILS RELATIVE PERCENT 1 %                                    



             (BEAKER) (test code = 437)                                        

 

             NEUTROPHILS ABSOLUTE COUNT 2.88 K/ L    1.78-5.38                 



             (BEAKER) (test code = 670)                                        

 

             LYMPHOCYTES ABSOLUTE COUNT 1.52 K/ L    1.32-3.57                 



             (BEAKER) (test code = 414)                                        

 

             MONOCYTES ABSOLUTE COUNT (BEAKER) 0.51 K/ L    0.30-0.82           

      



             (test code = 415)                                        

 

             EOSINOPHILS ABSOLUTE COUNT 0.24 K/ L    0.04-0.54                 



             (BEAKER) (test code = 416)                                        

 

             BASOPHILS ABSOLUTE COUNT (BEAKER) 0.05 K/ L    0.01-0.08           

      



             (test code = 417)                                        

 

             IMMATURE GRANULOCYTES-RELATIVE 1 %          0-1                    

   



             PERCENT (BEAKER) (test code =                                      

  



             2801)                                               



TACROLIMUS [id number] 13:15:00





             Test Item    Value        Reference Range Interpretation Comments

 

             TACROLIMUS BLOOD (BEAKER) (test 6.9 ng/mL    10.0-20.0    L        

    



             code = 657)                                         



BASIC METABOLIC [id number] 10:45:00





             Test Item    Value        Reference Range Interpretation Comments

 

             SODIUM (BEAKER) 139 meq/L    136-145                   



             (test code = 381)                                        

 

             POTASSIUM (BEAKER) 4.8 meq/L    3.5-5.1                   



             (test code = 379)                                        

 

             CHLORIDE (BEAKER) 102 meq/L                        



             (test code = 382)                                        

 

             CO2 (BEAKER) (test 30 meq/L     22-29        H            



             code = 355)                                         

 

             BLOOD UREA NITROGEN 14 mg/dL     7-21                      



             (BEAKER) (test code                                        



             = 354)                                              

 

             CREATININE (BEAKER) 0.95 mg/dL   0.57-1.25                 



             (test code = 358)                                        

 

             GLUCOSE RANDOM 88 mg/dL                         



             (BEAKER) (test code                                        



             = 652)                                              

 

             CALCIUM (BEAKER) 10.1 mg/dL   8.4-10.2                  



             (test code = 697)                                        

 

             EGFR (BEAKER) (test 84 mL/min/1.73                           ESTIMA

DANNY GFR IS



             code = 1092) sq m                                   NOT AS ACCURATE

 AS



                                                                 CREATININE



                                                                 CLEARANCE IN



                                                                 PREDICTING



                                                                 GLOMERULAR



                                                                 FILTRATION RATE

.



                                                                 ESTIMATED GFR I

S



                                                                 NOT APPLICABLE 

FOR



                                                                 DIALYSIS PATIEN

TS.



CBC W/PLT COUNT &amp; AUTO [id number] 10:31:00





             Test Item    Value        Reference Range Interpretation Comments

 

             WHITE BLOOD CELL COUNT (BEAKER) 5.0 K/ L     3.5-10.5              

    



             (test code = 775)                                        

 

             RED BLOOD CELL COUNT (BEAKER) 5.55 M/ L    4.63-6.08               

  



             (test code = 761)                                        

 

             HEMOGLOBIN (BEAKER) (test code = 15.8 GM/DL   13.7-17.5            

     



             410)                                                

 

             HEMATOCRIT (BEAKER) (test code = 47.9 %       40.1-51.0            

     



             411)                                                

 

             MEAN CORPUSCULAR VOLUME (BEAKER) 86.3 fL      79.0-92.2            

     



             (test code = 753)                                        

 

             MEAN CORPUSCULAR HEMOGLOBIN 28.5 pg      25.7-32.2                 



             (BEAKER) (test code = 751)                                        

 

             MEAN CORPUSCULAR HEMOGLOBIN CONC 33.0 GM/DL   32.3-36.5            

     



             (BEAKER) (test code = 752)                                        

 

             RED CELL DISTRIBUTION WIDTH 13.2 %       11.6-14.4                 



             (BEAKER) (test code = 412)                                        

 

             PLATELET COUNT (BEAKER) (test 131 K/CU MM  150-450      L          

  



             code = 756)                                         

 

             MEAN PLATELET VOLUME (BEAKER) 9.5 fL       9.4-12.4                

  



             (test code = 754)                                        

 

             NUCLEATED RED BLOOD CELLS 0 /100 WBC   0-0                       



             (BEAKER) (test code = 413)                                        

 

             NEUTROPHILS RELATIVE PERCENT 56 %                                  

 



             (BEAKER) (test code = 429)                                        

 

             LYMPHOCYTES RELATIVE PERCENT 26 %                                  

 



             (BEAKER) (test code = 430)                                        

 

             MONOCYTES RELATIVE PERCENT 12 %                                   



             (BEAKER) (test code = 431)                                        

 

             EOSINOPHILS RELATIVE PERCENT 4 %                                   

 



             (BEAKER) (test code = 432)                                        

 

             BASOPHILS RELATIVE PERCENT 1 %                                    



             (BEAKER) (test code = 437)                                        

 

             NEUTROPHILS ABSOLUTE COUNT 2.80 K/ L    1.78-5.38                 



             (BEAKER) (test code = 670)                                        

 

             LYMPHOCYTES ABSOLUTE COUNT 1.32 K/ L    1.32-3.57                 



             (BEAKER) (test code = 414)                                        

 

             MONOCYTES ABSOLUTE COUNT (BEAKER) 0.59 K/ L    0.30-0.82           

      



             (test code = 415)                                        

 

             EOSINOPHILS ABSOLUTE COUNT 0.21 K/ L    0.04-0.54                 



             (BEAKER) (test code = 416)                                        

 

             BASOPHILS ABSOLUTE COUNT (BEAKER) 0.04 K/ L    0.01-0.08           

      



             (test code = 417)                                        

 

             IMMATURE GRANULOCYTES-RELATIVE 1 %          0-1                    

   



             PERCENT (BEAKER) (test code =                                      

  



             2801)                                               



BASIC METABOLIC [id number] 09:28:00





             Test Item    Value        Reference Range Interpretation Comments

 

             SODIUM (BEAKER) 140 meq/L    136-145                   



             (test code = 381)                                        

 

             POTASSIUM (BEAKER) 4.7 meq/L    3.5-5.1                   



             (test code = 379)                                        

 

             CHLORIDE (BEAKER) 103 meq/L                        



             (test code = 382)                                        

 

             CO2 (BEAKER) (test 30 meq/L     22-29        H            



             code = 355)                                         

 

             BLOOD UREA NITROGEN 20 mg/dL     7-21                      



             (BEAKER) (test code                                        



             = 354)                                              

 

             CREATININE (BEAKER) 1.08 mg/dL   0.57-1.25                 



             (test code = 358)                                        

 

             GLUCOSE RANDOM 110 mg/dL           H            



             (BEAKER) (test code                                        



             = 652)                                              

 

             CALCIUM (BEAKER) 10.0 mg/dL   8.4-10.2                  



             (test code = 697)                                        

 

             EGFR (BEAKER) (test 73 mL/min/1.73                           ESTIMA

DANNY GFR IS



             code = 1092) sq m                                   NOT AS ACCURATE

 AS



                                                                 CREATININE



                                                                 CLEARANCE IN



                                                                 PREDICTING



                                                                 GLOMERULAR



                                                                 FILTRATION RATE

.



                                                                 ESTIMATED GFR I

S



                                                                 NOT APPLICABLE 

FOR



                                                                 DIALYSIS PATIEN

TS.



CBC W/PLT COUNT &amp; AUTO [id number] 08:58:00





             Test Item    Value        Reference Range Interpretation Comments

 

             WHITE BLOOD CELL COUNT (BEAKER) 4.9 K/ L     3.5-10.5              

    



             (test code = 775)                                        

 

             RED BLOOD CELL COUNT (BEAKER) 5.37 M/ L    4.63-6.08               

  



             (test code = 761)                                        

 

             HEMOGLOBIN (BEAKER) (test code = 15.5 GM/DL   13.7-17.5            

     



             410)                                                

 

             HEMATOCRIT (BEAKER) (test code = 46.8 %       40.1-51.0            

     



             411)                                                

 

             MEAN CORPUSCULAR VOLUME (BEAKER) 87.2 fL      79.0-92.2            

     



             (test code = 753)                                        

 

             MEAN CORPUSCULAR HEMOGLOBIN 28.9 pg      25.7-32.2                 



             (BEAKER) (test code = 751)                                        

 

             MEAN CORPUSCULAR HEMOGLOBIN CONC 33.1 GM/DL   32.3-36.5            

     



             (BEAKER) (test code = 752)                                        

 

             RED CELL DISTRIBUTION WIDTH 13.3 %       11.6-14.4                 



             (BEAKER) (test code = 412)                                        

 

             PLATELET COUNT (BEAKER) (test 130 K/CU MM  150-450      L          

  



             code = 756)                                         

 

             MEAN PLATELET VOLUME (BEAKER) 9.6 fL       9.4-12.4                

  



             (test code = 754)                                        

 

             NUCLEATED RED BLOOD CELLS 0 /100 WBC   0-0                       



             (BEAKER) (test code = 413)                                        

 

             NEUTROPHILS RELATIVE PERCENT 65 %                                  

 



             (BEAKER) (test code = 429)                                        

 

             LYMPHOCYTES RELATIVE PERCENT 20 %                                  

 



             (BEAKER) (test code = 430)                                        

 

             MONOCYTES RELATIVE PERCENT 10 %                                   



             (BEAKER) (test code = 431)                                        

 

             EOSINOPHILS RELATIVE PERCENT 4 %                                   

 



             (BEAKER) (test code = 432)                                        

 

             BASOPHILS RELATIVE PERCENT 1 %                                    



             (BEAKER) (test code = 437)                                        

 

             NEUTROPHILS ABSOLUTE COUNT 3.16 K/ L    1.78-5.38                 



             (BEAKER) (test code = 670)                                        

 

             LYMPHOCYTES ABSOLUTE COUNT 0.99 K/ L    1.32-3.57    L            



             (BEAKER) (test code = 414)                                        

 

             MONOCYTES ABSOLUTE COUNT (BEAKER) 0.49 K/ L    0.30-0.82           

      



             (test code = 415)                                        

 

             EOSINOPHILS ABSOLUTE COUNT 0.17 K/ L    0.04-0.54                 



             (BEAKER) (test code = 416)                                        

 

             BASOPHILS ABSOLUTE COUNT (BEAKER) 0.04 K/ L    0.01-0.08           

      



             (test code = 417)                                        

 

             IMMATURE GRANULOCYTES-RELATIVE 1 %          0-1                    

   



             PERCENT (BEAKER) (test code =                                      

  



             2801)                                               



TACROLIMUS [id number] 13:26:00





             Test Item    Value        Reference Range Interpretation Comments

 

             TACROLIMUS BLOOD (BEAKER) (test 4.1 ng/mL    10.0-20.0    L        

    



             code = 657)                                         



RAD, CHEST, 2 [id number] 14:13:00NEW CARDIOLOGIST OBERTONReason for Exam:-
&gt;heart transplant annual follow upFINAL REPORT PATIENT ID:   [id number] Chest 
two views INDICATION: Heart transplant annual follow-up. COMPARISON: 2017 
IMPRESSION: There is no focal consolidation, vascular congestion, pleural 
effusion, or pneumothorax. Heart size is within normal limits. Median sternotomy
changes, left axillary surgical clips, and gastric sleeve with small hiatal 
hernia are again noted. No significant change since 2017. Signed: Richard Nickerson [facility] Verified Date/Time:  2018 14:13:48 Reading Location: 
[facility] C0[facility] Consult Reading Room   Electronically signed by: RICHARD NICKERSON M.D. on 2018 02:13 PMTACROLIMUS [id number] 13:55:00





             Test Item    Value        Reference Range Interpretation Comments

 

             TACROLIMUS BLOOD (BEAKER) (test 4.7 ng/mL    10.0-20.0    L        

    



             code = 657)                                         



COMPREHENSIVE METABOLIC [id number] 12:11:00





             Test Item    Value        Reference Range Interpretation Comments

 

             TOTAL PROTEIN 6.5 gm/dL    6.0-8.3                   



             (BEAKER) (test code =                                        



             770)                                                

 

             ALBUMIN (BEAKER) 4.2 g/dL     3.5-5.0                   



             (test code = 1145)                                        

 

             ALKALINE PHOSPHATASE 98 U/L                           



             (BEAKER) (test code =                                        



             346)                                                

 

             BILIRUBIN TOTAL 0.4 mg/dL    0.2-1.2                   



             (BEAKER) (test code =                                        



             377)                                                

 

             SODIUM (BEAKER) (test 141 meq/L    136-145                   



             code = 381)                                         

 

             POTASSIUM (BEAKER) 4.3 meq/L    3.5-5.1                   



             (test code = 379)                                        

 

             CHLORIDE (BEAKER) 103 meq/L                        



             (test code = 382)                                        

 

             CO2 (BEAKER) (test 29 meq/L     22-29                     



             code = 355)                                         

 

             BLOOD UREA NITROGEN 17 mg/dL     7-21                      



             (BEAKER) (test code =                                        



             354)                                                

 

             CREATININE (BEAKER) 1.00 mg/dL   0.57-1.25                 



             (test code = 358)                                        

 

             GLUCOSE RANDOM 101 mg/dL                        



             (BEAKER) (test code =                                        



             652)                                                

 

             CALCIUM (BEAKER) 9.5 mg/dL    8.4-10.2                  



             (test code = 697)                                        

 

             AST (SGOT) (BEAKER) 15 U/L       5-34                      



             (test code = 353)                                        

 

             ALT (SGPT) (BEAKER) 12 U/L       6-55                      



             (test code = 347)                                        

 

             EGFR (BEAKER) (test 79 mL/min/1.73                           ESTIMA

DANNY GFR IS



             code = 1092) sq m                                   NOT AS ACCURATE

 AS



                                                                 CREATININE



                                                                 CLEARANCE IN



                                                                 PREDICTING



                                                                 GLOMERULAR



                                                                 FILTRATION RATE

.



                                                                 ESTIMATED GFR I

S



                                                                 NOT APPLICABLE 

FOR



                                                                 DIALYSIS PATIEN

TS.



LIPID [id number] 11:47:00





             Test Item    Value        Reference Range Interpretation Comments

 

             TRIGLYCERIDES (BEAKER) (test code = 87 mg/dL                       

        



             540)                                                

 

             CHOLESTEROL (BEAKER) (test code = 127 mg/dL                        

      



             631)                                                

 

             HDL CHOLESTEROL (BEAKER) (test code 37 mg/dL                       

        



             = 976)                                              

 

             LDL CHOLESTEROL CALCULATED (BEAKER) 73 mg/dL                       

        



             (test code = 633)                                        



Triglyceride Reference Range:   Low Risk         &lt;150   Borderline    150-199
  High Risk     200-499   Very High Risk  &gt;=500Cholesterol Reference Range:  
Low Risk         &lt;200   Borderline 200-239    High Risk        &gt;240HDL 
Cholesterol Reference Range:   Low Risk         &gt;=60   High Risk         
&lt;40LDL Cholesterol Reference Range:   Optimal          &lt;100   Near Optimal
 100-129   Borderline    130-159   High          160-189   Very High       
&gt;=190PROTHROMBIN [id number] 11:30:00





             Test Item    Value        Reference Range Interpretation Comments

 

             PROTIME (BEAKER) (test code = 14.7 seconds 11.7-14.7               

  



             759)                                                

 

             INR (BEAKER) (test code = 370) 1.2          <=5.9                  

   



RECOMMENDED COUMADIN/WARFARIN INR THERAPY RANGESSTANDARD DOSE: 2.0 - 3.0   
Includes: PROPHYLAXIS forvenous thrombosis, systemic embolization; TREATMENT for
venous thrombosis and/or pulmonary embolus.HIGH RISK: Target INR is 2.5-3.5 for 
patients with mechanical heart valves.CBC W/PLT COUNT &amp; AUTO DIFFERENTIAL
2018 11:23:00





             Test Item    Value        Reference Range Interpretation Comments

 

             WHITE BLOOD CELL COUNT (BEAKER) 4.8 K/ L     3.5-10.5              

    



             (test code = 775)                                        

 

             RED BLOOD CELL COUNT (BEAKER) 5.26 M/ L    4.63-6.08               

  



             (test code = 761)                                        

 

             HEMOGLOBIN (BEAKER) (test code = 15.1 GM/DL   13.7-17.5            

     



             410)                                                

 

             HEMATOCRIT (BEAKER) (test code = 45.8 %       40.1-51.0            

     



             411)                                                

 

             MEAN CORPUSCULAR VOLUME (BEAKER) 87.1 fL      79.0-92.2            

     



             (test code = 753)                                        

 

             MEAN CORPUSCULAR HEMOGLOBIN 28.7 pg      25.7-32.2                 



             (BEAKER) (test code = 751)                                        

 

             MEAN CORPUSCULAR HEMOGLOBIN CONC 33.0 GM/DL   32.3-36.5            

     



             (BEAKER) (test code = 752)                                        

 

             RED CELL DISTRIBUTION WIDTH 13.4 %       11.6-14.4                 



             (BEAKER) (test code = 412)                                        

 

             PLATELET COUNT (BEAKER) (test 161 K/CU MM  150-450                 

  



             code = 756)                                         

 

             MEAN PLATELET VOLUME (BEAKER) 9.0 fL       9.4-12.4     L          

  



             (test code = 754)                                        

 

             NUCLEATED RED BLOOD CELLS 0 /100 WBC   0-0                       



             (BEAKER) (test code = 413)                                        

 

             NEUTROPHILS RELATIVE PERCENT 67 %                                  

 



             (BEAKER) (test code = 429)                                        

 

             LYMPHOCYTES RELATIVE PERCENT 19 %                                  

 



             (BEAKER) (test code = 430)                                        

 

             MONOCYTES RELATIVE PERCENT 10 %                                   



             (BEAKER) (test code = 431)                                        

 

             EOSINOPHILS RELATIVE PERCENT 3 %                                   

 



             (BEAKER) (test code = 432)                                        

 

             BASOPHILS RELATIVE PERCENT 1 %                                    



             (BEAKER) (test code = 437)                                        

 

             NEUTROPHILS ABSOLUTE COUNT 3.21 K/ L    1.78-5.38                 



             (BEAKER) (test code = 670)                                        

 

             LYMPHOCYTES ABSOLUTE COUNT 0.93 K/ L    1.32-3.57    L            



             (BEAKER) (test code = 414)                                        

 

             MONOCYTES ABSOLUTE COUNT (BEAKER) 0.48 K/ L    0.30-0.82           

      



             (test code = 415)                                        

 

             EOSINOPHILS ABSOLUTE COUNT 0.14 K/ L    0.04-0.54                 



             (BEAKER) (test code = 416)                                        

 

             BASOPHILS ABSOLUTE COUNT (BEAKER) 0.04 K/ L    0.01-0.08           

      



             (test code = 417)                                        

 

             IMMATURE GRANULOCYTES-RELATIVE 1 %          0-1                    

   



             PERCENT (BEAKER) (test code =                                      

  



             2801)                                               



[[facility] [facility] CBC (INCLUDES DIFF/PLT)2018 17:10:01





             Test Item    Value        Reference Range Interpretation Comments

 

             WBC (test code = 6690-2) 4.7 {K/CMM}  3.7-10.4                  

 

             RBC (test code = 789-8) 5.33 {M/CMM} 4.70-6.10                 

 

             Hgb (test code = 718-7) 15.3 g/dl    14.0-18.0                 

 

             Hct (test code = 62629-2) 46.4 %       42.0-54.0                 

 

             MCV (test code = 787-2) 87.1 fL      80.0-94.0                 

 

             MCH (test code = 785-6) 28.7 pg      27.0-31.0                 

 

             MCHC (test code = 786-4) 33.0 g/dl    32.0-36.0                 

 

             RDW (test code = 788-0) 13.9 %       11.5-14.5                 

 

             Platelet (test code = 67131-4) 154 {K/CMM}  133-450                

   

 

             Mean Platelet Volume (test code 8.8 fL       7.4-10.4              

    



             = 45498-0)                                          



[facility]] [id number] 17:10:01





             Test Item    Value        Reference Range Interpretation Comments

 

             Segmented Neutrophils (test code 59.8 %       45.0-75.0            

     



             = 14394-7)                                          

 

             Monocytes (test code = 26485-3) 11.4 %       2.0-12.0              

    

 

             Lymphocytes (test code = 87637-0) 23.9 %       20.0-40.0           

      

 

             Eosinophils; Above High Threshold 4.1 %        0.0-4.0             

      



             (test code = 05422-7)                                        

 

             Basophils (test code = 706-2) 0.8 %        0.0-1.0                 

  

 

             Segs-Bands # (test code = 2.8 {K/CMM}  1.5-8.1                   



             64656-1)                                            

 

             Lymphocytes # (test code = 1.1 {K/CMM}  1.0-5.5                   



             66005-1)                                            

 

             Monocytes # (test code = 07011-2) 0.5 {K/CMM}  0.0-0.8             

      

 

             Eosinophils # (test code = 0.2 {K/CMM}  0.0-0.5                   



             86197-7)                                            



[facility]] PTH, INTACT (WITHOUT CALCIUM)2018 
17:10:01





             Test Item    Value        Reference Range Interpretation Comments

 

             Parathyroid Hormone Intact; Above 99.2 pg/ml   11.1-79.5           

      



             High Threshold (test code =                                        



             2731-8)                                             



[facility]] CMP [id number] 17:10:01





             Test Item    Value        Reference Range Interpretation Comments

 

             Sodium Level 140 {mEq/l}  135-145                   



             (test code =                                        



             2951-2)                                             

 

             Potassium Level 4.5 {mEq/l}  3.5-5.1                   



             (test code =                                        



             2823-3)                                             

 

             Chloride Level 105 {mEq/l}                      



             (test code =                                        



             5-0)                                             

 

             Carbon Dioxide 28 {mEq/l}   24-32                     



             (test code =                                        



             -9)                                             

 

             AGAP (test code = 11.5 {mEq/l} 10.0-20.0                 



             27657-4)                                            

 

             Glucose Lvl (test 95 mg/dl     70-99                     Adult refe

rence range



             code = 2345-7)                                        values reflec

t the



                                                                 clinical guidel

inesof the



                                                                 American Diabet

es



                                                                 Association.

 

             Creatinine Lvl 1.20 mg/dl   0.50-1.40                 



             (test code =                                        



             2160-0)                                             

 

             Blood Urea   22 mg/dl     7-22                      



             Nitrogen (test                                        



             code = 3094-0)                                        

 

             BUN/Creatinine 18           6-25                      



             Ratio (test code                                        



             = 3097-3)                                           

 

             Total Protein 7.2 g/dl     6.4-8.4                   



             (test code =                                        



             2885-2)                                             

 

             Albumin Lvl (test 4.4 g/dl     3.5-5.0                   



             code = 1751-7)                                        

 

             Globulin (test 2.8 g/dl     2.7-4.2                   



             code = 08977-6)                                        

 

             A/G Ratio (test 1.6          0.7-1.6                   



             code = 1759-0)                                        

 

             Calcium Level 9.1 mg/dl    8.5-10.5                  



             Total (test code                                        



             = 43514-4)                                          

 

             ALT (test code = 21 u/l       0-65                      



             1743-4)                                             

 

             AST (test code = 18 u/l       0-37                      



             07404-7)                                            

 

             Bili Total (test 0.4 mg/dl    0.2-1.3                   



             code = 1975-2)                                        

 

             Alk Phos (test 112 u/l                          



             code = 1783-0)                                        

 

             eGFR (test code = 71                                     The eGFR i

s calculated



             38413-1)     {ML/MIN/1.7}                           using the CKD-E

PI



                                                                 formula. In mos

t young,



                                                                 healthyindividu

als the



                                                                 eGFR will be >9

0



                                                                 mL/min/1.73m2. 

The eGFR



                                                                 declines with a

ge. AneGFR



                                                                 of 60-89 may be

 normal in



                                                                 some population

s,



                                                                 particularly th

e elderly,



                                                                 forwhom the CKD

-EPI



                                                                 formula has not

 been



                                                                 extensively marielena

idated.



                                                                 Use of the eGFR

 isnot



                                                                 recommended in 

the



                                                                 following



                                                                 populations:Ind

ividuals



                                                                 with unstable c

reatinine



                                                                 concentrations,

 including



                                                                 pregnantpatient

s and



                                                                 those with seri

ous



                                                                 co-morbid



                                                                 conditions.Elsa

ents with



                                                                 extremes in mus

rick mass



                                                                 or diet.The albina

a above



                                                                 are obtained fr

om the



                                                                 National Kidney

 Disease



                                                                 Education Progr

am(NKDEP)



                                                                 which sergio wilburn



                                                                 recommends that

 when the



                                                                 eGFR is used in



                                                                 patientswith ex

tremes of



                                                                 body mass index

 for



                                                                 purposes of [place]ett

g dosing,



                                                                 the eGFR should

be



                                                                 multiplied by t

he



                                                                 estimated BMI.



[facility]] FOLATE, [id number] 17:10:01





             Test Item    Value        Reference Range Interpretation Comments

 

             Folate Level (test code = 2284-8) 9.3 ng/ml    >=3.0               

      



[facility]] IRON, [id number] 17:10:01





             Test Item    Value        Reference Range Interpretation Comments

 

             Iron (test code = 2498-4) 85 ug/dL                         



[facility]] LIPID [id number] 17:10:01





             Test Item    Value        Reference Range Interpretation Comments

 

             Chol (test code = 2093-3) 124 mg/dl    <=199                     

 

             Trig (test code = 2571-8) 77 mg/dl     <=149                     

 

             HDL Cholesterol; Below Low 42 mg/dl     >=61                      



             Threshold (test code = 2085-9)                                     

   

 

             CHD Risk; Below Low Threshold (test 2.95         4.00-7.30         

        



             code = 38783-3)                                        

 

             LDL (test code = 24070-7) 67 mg/dl     <=99                      

 

             VLDL (test code = VLDL) 15                                     



[facility]] VITAMIN [id number] 17:10:01





             Test Item    Value        Reference Range Interpretation Comments

 

             Vitamin B12 Level; Below Low 235 pg/ml    254-1320                 

 



             Threshold (test code = 2132-9)                                     

   



[facility]] TSH, 3RD GENERATION W/REFLEX TO FT4
2018 17:10:01





             Test Item    Value        Reference Range Interpretation Comments

 

             TSH (test code = 82615-5) 1.090 {uIU/ml} 0.360-3.740               



[facility]] HEMOGLOBIN [id number] 17:10:01





             Test Item    Value        Reference Range Interpretation Comments

 

             Hemoglobin A1c (test code = 4548-4) 5.2 %        <=5.6             

        



[facility][] Vitamin E [id number] 17:10:01





             Test Item    Value        Reference Range Interpretation Comments

 

             Vitamin E Lvl (test Cancel Reason: Modified                        

   



             code = Vitamin E Lvl) Order                                  



[facility]] VITAMIN B1, WHOLE [id number] 17:10:01





             Test Item    Value        Reference Range Interpretation Comments

 

             Vitamin B1   147.6        66.5-200.0                This test was d

eveloped and its



             Level (test  nmol/L                                 performance



             code =                                              characteristics

determined by



             Vitamin B1                                          [facility]. It has

 not been



             Level)                                              cleared orappro

mateo by the Food



                                                                 and Drug



                                                                 Administration.

Performed At: 



                                                                 [facility]

[id number] [place], [place]



                                                                 [id number]

k Teofilo CODY MD



                                                                 Ph:[phone]



[facility]] Vit  17:10:01





             Test Item    Value        Reference Range Interpretation Comments

 

             Vitamin A Level (test 52 ug/dL     -85                     **Effe

ctive ,



             code = Vitamin A                                        2018 Vitami

n A, Serum



             Level)                                              will be**  ni

ging to



                                                                 the LC/MS-MS



                                                                 methodology. Th

e



                                                                 reference  inte

rval



                                                                 will be annabel carvajal to:



                                                                 



                                                                 < 6 years      

Not



                                                                 Estab.



                                                                        6 - 11 y

ears



                                                                  22.8 -  54.4



                                                                              12

 - 19



                                                                 years      28.9

 -  75.7



                                                                 



                                                                 20 - 39 years  

    31.2



                                                                 -  89.1



                                                                        40 - 59 

years



                                                                   33.1 - 100.0



                                                                                

   >59



                                                                 years      36.4

 -



                                                                 108.0Performed 

At: [address] [id number]

abelardo CODY MD



                                                                 Ph:[phone]



[facility][H] [facility] [id number] 17:09:01





             Test Item    Value        Reference Range Interpretation Comments

 

             [facility] (test COMMENT                                Test Orde

red: 908388



             code = Misc [facility])                                        25-Hydr

oxyvitamin D LCMS



                                                                 D2+D325-Hydroxy

, Vitamin



                                                                 D          25  

        [L



                                                                 ] ng/mL    ESRe

ference



                                                                 Range:All Ages:

 Target



                                                                 levels 30 -



                                                                 10025-Hydroxy, 

Vitamin



                                                                 D-2        <1.0



                                                                   ng/mL    ES25

-Hydroxy,



                                                                 Vitamin D-3    

    24



                                                                           ng/mL



                                                                 ESPerformed At:

 [facility]



                                                                 [id number] CODY MD



                                                                 Ph:[phone]

rformed



                                                                 At:  [facility]



                                                                 [id number]

[address] [place] [address]



                                                                 [id number]

paddy BURKS MD Ph:[phone]

[phone]



[facility]] Vitamin E [id number] 17:09:01





             Test Item    Value        Reference Range Interpretation Comments

 

             Alpha-Tocopherol 8.5 mg/L     5.3-17.5                  **Effective

 2018



             (test code =                                        753770 Vitamin 

E, Serum



             Alpha-Tocopherol)                                        will bemad

e**



                                                                 non-orderable. 

[facility]



                                                                 will offer 0701

40 Vitamin



                                                                 Eperformed   by

 LC/MS-MS



                                                                 methodology whi

ch will



                                                                 includealpha to

copherol



                                                                 and gamma tocop

herol. This



                                                                 will affectany 

existing



                                                                 profile.   For 

further



                                                                 information, co

andrew



                                                                 [facility]



                                                                 Representative.

Performed



                                                                 At: [address]



                                                                 [id number]

k Teofilo CODY MD Ph:[phone]

4



[facility], WITHOUT [id number] 09:10:00NEW 
CARDIOLOGIST OBERTONFINAL REPORT PATIENT ID:   [id number] INDICATION: 47-year-old 
male with chest wall pain and history ofheart transplant. COMPARISON:Chest 
radiograph 2017 TECHNIQUE: Chest CT exam WITHOUT intravenous contrast. 
The exam was performed according to our department dose-optimization protocol, 
which includes automated exposure control, adjustments of mA and kV according to
 patient size. Iterative reconstructions are also sometimes employed. 
FINDINGS:No chest wall mass, chest wall hematoma, rib fracture, or rib lesion is
 demonstrated. There is a healed median sternotomy. Heart is normal in size and 
there is no pericardial effusion. Mild atherosclerotic plaque of the ascending 
thoracic aorta is demonstrated. Thoracic aorta is normal in caliber. 8 mm 
calcified nodule in the right lower lobe represents prior granulomatous 
infection. No pneumonia, pulmonary edema, pleural effusion, or pneumothorax is d
emonstrated. There is no mediastinal or hilar lymphadenopathy. Thyroid gland is 
unremarkable. Upper and mid esophagus are unremarkable. Patient is status post 
sleeve gastrectomy and there is a small part of the sleeve herniated in the low 
posterior mediastinum. Partial imaging of the upper abdomen is otherwise 
unremarkable. IMPRESSION: No chest wall mass, muscle tear, rib fracture, or rib 
lesion. No other CT explanation for the patient's chest wall pain. Clear lungs. 
Unremarkable heart transplant. Prior gastric sleeve with small hiatal hernia. 
Signed: Dennis Julio MDReport Verified Date/Time:  2017 09:10:45 
Reading Location: [facility] Reading Room - [place] [id number] 
Electronically signed by: DENNIS JULIO M.D. on 2017 09:10 AM
TACROLIMUS [id number] 13:49:00





             Test Item    Value        Reference Range Interpretation Comments

 

             TACROLIMUS BLOOD (BEAKER) (test 5.3 ng/mL    10.0-20.0    L        

    



             code = 657)                                         



BASIC METABOLIC [id number] 10:44:00





             Test Item    Value        Reference Range Interpretation Comments

 

             SODIUM (BEAKER) 141 meq/L    136-145                   



             (test code = 381)                                        

 

             POTASSIUM (BEAKER) 4.5 meq/L    3.5-5.1                   



             (test code = 379)                                        

 

             CHLORIDE (BEAKER) 103 meq/L                        



             (test code = 382)                                        

 

             CO2 (BEAKER) (test 29 meq/L     22-29                     



             code = 355)                                         

 

             BLOOD UREA NITROGEN 17 mg/dL     7-21                      



             (BEAKER) (test code                                        



             = 354)                                              

 

             CREATININE (BEAKER) 1.09 mg/dL   0.57-1.25                 



             (test code = 358)                                        

 

             GLUCOSE RANDOM 97 mg/dL                         



             (BEAKER) (test code                                        



             = 652)                                              

 

             CALCIUM (BEAKER) 9.3 mg/dL    8.4-10.2                  



             (test code = 697)                                        

 

             EGFR (BEAKER) (test 72 mL/min/1.73                           ESTIMA

DANNY GFR IS



             code = 1092) sq m                                   NOT AS ACCURATE

 AS



                                                                 CREATININE



                                                                 CLEARANCE IN



                                                                 PREDICTING



                                                                 GLOMERULAR



                                                                 FILTRATION RATE

.



                                                                 ESTIMATED GFR I

S



                                                                 NOT APPLICABLE 

FOR



                                                                 DIALYSIS PATIEN

TS.



CBC W/PLT COUNT &amp; AUTO [id number] 09:54:00





             Test Item    Value        Reference Range Interpretation Comments

 

             WHITE BLOOD CELL COUNT (BEAKER) 4.0 K/ L     3.5-10.5              

    



             (test code = 775)                                        

 

             RED BLOOD CELL COUNT (BEAKER) 5.42 M/ L    4.63-6.08               

  



             (test code = 761)                                        

 

             HEMOGLOBIN (BEAKER) (test code = 15.4 GM/DL   13.7-17.5            

     



             410)                                                

 

             HEMATOCRIT (BEAKER) (test code = 47.6 %       40.1-51.0            

     



             411)                                                

 

             MEAN CORPUSCULAR VOLUME (BEAKER) 87.8 fL      79.0-92.2            

     



             (test code = 753)                                        

 

             MEAN CORPUSCULAR HEMOGLOBIN 28.4 pg      25.7-32.2                 



             (BEAKER) (test code = 751)                                        

 

             MEAN CORPUSCULAR HEMOGLOBIN CONC 32.4 GM/DL   32.3-36.5            

     



             (BEAKER) (test code = 752)                                        

 

             RED CELL DISTRIBUTION WIDTH 13.8 %       11.6-14.4                 



             (BEAKER) (test code = 412)                                        

 

             PLATELET COUNT (BEAKER) (test 152 K/CU MM  150-450                 

  



             code = 756)                                         

 

             MEAN PLATELET VOLUME (BEAKER) 9.9 fL       9.4-12.4                

  



             (test code = 754)                                        

 

             NUCLEATED RED BLOOD CELLS 0 /100 WBC   0-0                       



             (BEAKER) (test code = 413)                                        

 

             NEUTROPHILS RELATIVE PERCENT 66 %                                  

 



             (BEAKER) (test code = 429)                                        

 

             LYMPHOCYTES RELATIVE PERCENT 20 %                                  

 



             (BEAKER) (test code = 430)                                        

 

             MONOCYTES RELATIVE PERCENT 11 %                                   



             (BEAKER) (test code = 431)                                        

 

             EOSINOPHILS RELATIVE PERCENT 2 %                                   

 



             (BEAKER) (test code = 432)                                        

 

             BASOPHILS RELATIVE PERCENT 1 %                                    



             (BEAKER) (test code = 437)                                        

 

             NEUTROPHILS ABSOLUTE COUNT 2.65 K/ L    1.78-5.38                 



             (BEAKER) (test code = 670)                                        

 

             LYMPHOCYTES ABSOLUTE COUNT 0.82 K/ L    1.32-3.57    L            



             (BEAKER) (test code = 414)                                        

 

             MONOCYTES ABSOLUTE COUNT (BEAKER) 0.44 K/ L    0.30-0.82           

      



             (test code = 415)                                        

 

             EOSINOPHILS ABSOLUTE COUNT 0.08 K/ L    0.04-0.54                 



             (BEAKER) (test code = 416)                                        

 

             BASOPHILS ABSOLUTE COUNT (BEAKER) 0.04 K/ L    0.01-0.08           

      



             (test code = 417)                                        

 

             IMMATURE GRANULOCYTES-RELATIVE 0 %          0-1                    

   



             PERCENT (BEAKER) (test code =                                      

  



             2801)                                               



YCLVCIRNJEEB0170-55-50 14:30:0015.3[facility] CmmpqwlHTEAXQCUSXBO4543-22-36 
14:30:0091[facility] EaywpepLFJTDVKAFHJS5371-75-86 14:30:0013[facility]
UERGBSQGJDYL5453-33-28 14:30:000.98[facility] XvdrcuyIAGMXOPRSZOT9735-65-32 
14:30:89790Younyden VbsgjmnVEFHIPLHEMFN4053-15-76 14:30:004.3[facility]
SSZPMLICBDXH9609-72-68 14:30:88845Adhbysaz GjajpmtVZLYFPDVFBFJ3174-41-74 
14:30:009.1[facility] JcjueliBFKYQDULFIYX6641-07-33 14:30:0028[facility]
NWYKUZPCUPHN9649-56-26 14:30:0077[facility] NasonkvGXWNZLPXSM5412-02-80 14:30:00
0.8[facility] DcwuwygRSUHOFCDVT2523-09-56 14:30:002.8[facility]
2017 14:30:000.4[facility] SkijzsvGHQJRWJAQL7490-84-83 14:30:000.1[facility] 
EqirbveVZCULMVOYO1108-38-21 14:30:002.4[facility] YexjqcoYPLFBMLIJM9237-24-16 
14:30:000.7[facility] RraeatiJXNLFGNIMP8690-59-99 14:30:0010.7[facility]
EPVVDWQUJP5230-78-70 14:30:0019.7[facility] RepmbnmBEHMDVWUFB8293-89-79 14:30:00
66.5[facility] AsrgjkzGRWSZIHHVQ6779-26-28 14:30:008.5[facility]
2017 14:30:11074Pllhpidf NwhnrvnAZSRTSNVHI9105-80-52 14:30:0013.9[facility] 
HkytdmuWKIMXNTJQA3346-61-83 14:30:0033.4[facility] ZfkmutpHSTPBORZPD9122-71-18 
14:30:00





             Test Item    Value        Reference Range Interpretation Comments

 

             MCH (test code = MCH) 28.7 pg      27.0-31.0                 



[facility] ZbtnqonJOLTGHVGJN8149-05-27 14:30:0085.8[facility]ann[facility]
2017 14:30:0044.0[facility] QujhzflTKTCPFZZCG0878-94-11 14:30:0014.7[facility]
 TfpedmdOKFJKCJKMQ9952-26-12 14:30:005.13[facility] VdtdpufPATXLQAZVF8194-18-25 
14:30:004.1[facility] HermannTACROLIMUS IDHUX6632-05-84 14:05:00





             Test Item    Value        Reference Range Interpretation Comments

 

             TACROLIMUS BLOOD (BEAKER) (test 6.5 ng/mL    10.0-20.0    L        

    



             code = 657)                                         



BASIC METABOLIC [id number] 10:13:00





             Test Item    Value        Reference Range Interpretation Comments

 

             SODIUM (BEAKER) 140 meq/L    136-145                   



             (test code = 381)                                        

 

             POTASSIUM (BEAKER) 4.1 meq/L    3.5-5.1                   



             (test code = 379)                                        

 

             CHLORIDE (BEAKER) 109 meq/L           H            



             (test code = 382)                                        

 

             CO2 (BEAKER) (test 22 meq/L     22-29                     



             code = 355)                                         

 

             BLOOD UREA NITROGEN 15 mg/dL     7-21                      



             (BEAKER) (test code                                        



             = 354)                                              

 

             CREATININE (BEAKER) 0.95 mg/dL   0.57-1.25                 



             (test code = 358)                                        

 

             GLUCOSE RANDOM 99 mg/dL                         



             (BEAKER) (test code                                        



             = 652)                                              

 

             CALCIUM (BEAKER) 8.8 mg/dL    8.4-10.2                  



             (test code = 697)                                        

 

             EGFR (BEAKER) (test 85 mL/min/1.73                           ESTIMA

DANNY GFR IS



             code = 1092) sq m                                   NOT AS ACCURATE

 AS



                                                                 CREATININE



                                                                 CLEARANCE IN



                                                                 PREDICTING



                                                                 GLOMERULAR



                                                                 FILTRATION RATE

.



                                                                 ESTIMATED GFR I

S



                                                                 NOT APPLICABLE 

FOR



                                                                 DIALYSIS PATIEN

TS.



CBC W/PLT COUNT &amp; AUTO [id number] 09:42:00





             Test Item    Value        Reference Range Interpretation Comments

 

             WHITE BLOOD CELL COUNT (BEAKER) 4.7 K/ L     4.0-10.0              

    



             (test code = 775)                                        

 

             RED BLOOD CELL COUNT (BEAKER) 5.18 M/ L    4.20-5.80               

  



             (test code = 761)                                        

 

             HEMOGLOBIN (BEAKER) (test code = 15.3 GM/DL   13.0-16.8            

     



             410)                                                

 

             HEMATOCRIT (BEAKER) (test code = 47.1 %       40.0-50.0            

     



             411)                                                

 

             MEAN CORPUSCULAR VOLUME (BEAKER) 90.8 fL      82.0-98.0            

     



             (test code = 753)                                        

 

             MEAN CORPUSCULAR HEMOGLOBIN 29.6 pg      27.0-33.0                 



             (BEAKER) (test code = 751)                                        

 

             MEAN CORPUSCULAR HEMOGLOBIN CONC 32.6 GM/DL   32.0-36.0            

     



             (BEAKER) (test code = 752)                                        

 

             RED CELL DISTRIBUTION WIDTH 15.1 %       10.3-14.2    H            



             (BEAKER) (test code = 412)                                        

 

             PLATELET COUNT (BEAKER) (test 129 K/CU MM  150-430      L          

  



             code = 756)                                         

 

             MEAN PLATELET VOLUME (BEAKER) 7.7 fL       6.5-10.5                

  



             (test code = 754)                                        

 

             NUCLEATED RED BLOOD CELLS 0 /100 WBC   0-0                       



             (BEAKER) (test code = 413)                                        

 

             NEUTROPHILS RELATIVE PERCENT 72 %                                  

 



             (BEAKER) (test code = 429)                                        

 

             LYMPHOCYTES RELATIVE PERCENT 18 %                                  

 



             (BEAKER) (test code = 430)                                        

 

             MONOCYTES RELATIVE PERCENT 8 %                                    



             (BEAKER) (test code = 431)                                        

 

             EOSINOPHILS RELATIVE PERCENT 1 %                                   

 



             (BEAKER) (test code = 432)                                        

 

             BASOPHILS RELATIVE PERCENT 1 %                                    



             (BEAKER) (test code = 437)                                        

 

             NEUTROPHILS ABSOLUTE COUNT 3.37 K/ L    1.80-8.00                 



             (BEAKER) (test code = 670)                                        

 

             LYMPHOCYTES ABSOLUTE COUNT 0.85 K/ L    1.48-4.50    L            



             (BEAKER) (test code = 414)                                        

 

             MONOCYTES ABSOLUTE COUNT (BEAKER) 0.40 K/ L    0.00-1.30           

      



             (test code = 415)                                        

 

             EOSINOPHILS ABSOLUTE COUNT 0.07 K/ L    0.00-0.50                 



             (BEAKER) (test code = 416)                                        

 

             BASOPHILS ABSOLUTE COUNT (BEAKER) 0.03 K/ L    0.00-0.20           

      



             (test code = 417)                                        



[id number] 10:38:00





             Test Item    Value        Reference Range Interpretation Comments

 

             MAGNESIUM (BEAKER) (test code = 2.2 mg/dL    1.6-2.6               

    



             627)                                                



TACROLIMUS [id number] 10:33:00





             Test Item    Value        Reference Range Interpretation Comments

 

             TACROLIMUS BLOOD (BEAKER) (test 7.1 ng/mL    10.0-20.0    L        

    



             code = 657)                                         



CBC W/PLT COUNT &amp; AUTO [id number] 08:47:00





             Test Item    Value        Reference Range Interpretation Comments

 

             WHITE BLOOD CELL COUNT (BEAKER) 4.1 K/ L     4.0-10.0              

    



             (test code = 775)                                        

 

             RED BLOOD CELL COUNT (BEAKER) 5.02 M/ L    4.20-5.80               

  



             (test code = 761)                                        

 

             HEMOGLOBIN (BEAKER) (test code = 14.8 GM/DL   13.0-16.8            

     



             410)                                                

 

             HEMATOCRIT (BEAKER) (test code = 43.6 %       40.0-50.0            

     



             411)                                                

 

             MEAN CORPUSCULAR VOLUME (BEAKER) 87.0 fL      82.0-98.0            

     



             (test code = 753)                                        

 

             MEAN CORPUSCULAR HEMOGLOBIN 29.5 pg      27.0-33.0                 



             (BEAKER) (test code = 751)                                        

 

             MEAN CORPUSCULAR HEMOGLOBIN CONC 33.9 GM/DL   32.0-36.0            

     



             (BEAKER) (test code = 752)                                        

 

             RED CELL DISTRIBUTION WIDTH 14.1 %       10.3-14.2                 



             (BEAKER) (test code = 412)                                        

 

             PLATELET COUNT (BEAKER) (test 160 K/CU MM  150-430                 

  



             code = 756)                                         

 

             MEAN PLATELET VOLUME (BEAKER) 7.2 fL       6.5-10.5                

  



             (test code = 754)                                        

 

             NUCLEATED RED BLOOD CELLS 0 /100 WBC   0-0                       



             (BEAKER) (test code = 413)                                        

 

             NEUTROPHILS RELATIVE PERCENT 64 %                                  

 



             (BEAKER) (test code = 429)                                        

 

             LYMPHOCYTES RELATIVE PERCENT 21 %                                  

 



             (BEAKER) (test code = 430)                                        

 

             MONOCYTES RELATIVE PERCENT 12 %                                   



             (BEAKER) (test code = 431)                                        

 

             EOSINOPHILS RELATIVE PERCENT 2 %                                   

 



             (BEAKER) (test code = 432)                                        

 

             BASOPHILS RELATIVE PERCENT 0 %                                    



             (BEAKER) (test code = 437)                                        

 

             NEUTROPHILS ABSOLUTE COUNT 2.61 K/ L    1.80-8.00                 



             (BEAKER) (test code = 670)                                        

 

             LYMPHOCYTES ABSOLUTE COUNT 0.87 K/ L    1.48-4.50    L            



             (BEAKER) (test code = 414)                                        

 

             MONOCYTES ABSOLUTE COUNT (BEAKER) 0.49 K/ L    0.00-1.30           

      



             (test code = 415)                                        

 

             EOSINOPHILS ABSOLUTE COUNT 0.09 K/ L    0.00-0.50                 



             (BEAKER) (test code = 416)                                        

 

             BASOPHILS ABSOLUTE COUNT (BEAKER) 0.01 K/ L    0.00-0.20           

      



             (test code = 417)                                        



0.00BASIC METABOLIC [id number] 08:47:00





             Test Item    Value        Reference Range Interpretation Comments

 

             SODIUM (BEAKER) 142 meq/L    136-145                   



             (test code = 381)                                        

 

             POTASSIUM (BEAKER) 3.8 meq/L    3.5-5.1                   



             (test code = 379)                                        

 

             CHLORIDE (BEAKER) 109 meq/L           H            



             (test code = 382)                                        

 

             CO2 (BEAKER) (test 21 meq/L     22-29        L            



             code = 355)                                         

 

             BLOOD UREA NITROGEN 22 mg/dL     7-21         H            



             (BEAKER) (test code                                        



             = 354)                                              

 

             CREATININE (BEAKER) 1.18 mg/dL   0.57-1.25                 



             (test code = 358)                                        

 

             GLUCOSE RANDOM 99 mg/dL                         



             (BEAKER) (test code                                        



             = 652)                                              

 

             CALCIUM (BEAKER) 9.1 mg/dL    8.4-10.2                  



             (test code = 697)                                        

 

             EGFR (BEAKER) (test 66 mL/min/1.73                           ESTIMA

DANNY GFR IS



             code = 1092) sq m                                   NOT AS ACCURATE

 AS



                                                                 CREATININE



                                                                 CLEARANCE IN



                                                                 PREDICTING



                                                                 GLOMERULAR



                                                                 FILTRATION RATE

.



                                                                 ESTIMATED GFR I

S



                                                                 NOT APPLICABLE 

FOR



                                                                 DIALYSIS PATIEN

TS.



URINALYSIS W/ [id number] 00:20:00





             Test Item    Value        Reference Range Interpretation Comments

 

             COLOR (BEAKER) (test code Yellow                                 



             = 470)                                              

 

             CLARITY (BEAKER) (test Slightly Hazy                           



             code = 469)                                         

 

             SPECIFIC GRAVITY UA 1.050        1.001-1.035  H            



             (BEAKER) (test code = 468)                                        

 

             PH UA (BEAKER) (test code 5.5          5.0-8.0                   



             = 467)                                              

 

             PROTEIN UA (BEAKER) (test 20 mg/dL     Negative     A            



             code = 464)                                         

 

             GLUCOSE UA (BEAKER) (test Negative     Negative                  



             code = 365)                                         

 

             KETONES UA (BEAKER) (test 40 mg/dL     Negative     A            



             code = 371)                                         

 

             BILIRUBIN UA (BEAKER) Negative     Negative                  



             (test code = 462)                                        

 

             BLOOD UA (BEAKER) (test Negative     Negative                  



             code = 461)                                         

 

             NITRITE UA (BEAKER) (test Negative     Negative                  



             code = 465)                                         

 

             LEUKOCYTE ESTERASE UA Negative     Negative                  



             (BEAKER) (test code = 466)                                        

 

             UROBILINOGEN UA (BEAKER) 0.2 mg/dL    0.2-1.0                   



             (test code = 463)                                        

 

             RBC UA (BEAKER) (test code 1 /HPF                                 



             = 519)                                              

 

             WBC UA (BEAKER) (test code 3 /HPF                                 



             = 520)                                              

 

             MUCUS (BEAKER) (test code Many                                   



             = 1574)                                             

 

             HYALINE CASTS (BEAKER) 6 /LPF                                 



             (test code = 514)                                        

 

             SOURCE(BEAKER) (test code Urine, Clean Catch                       

    



             = 0285)                                             



BASIC METABOLIC [id number] 21:55:00





             Test Item    Value        Reference Range Interpretation Comments

 

             SODIUM (BEAKER) 139 meq/L    136-145                   



             (test code = 381)                                        

 

             POTASSIUM (BEAKER) 5.1 meq/L    3.5-5.1                   



             (test code = 379)                                        

 

             CHLORIDE (BEAKER) 107 meq/L                        



             (test code = 382)                                        

 

             CO2 (BEAKER) (test 18 meq/L     22-29        L            



             code = 355)                                         

 

             BLOOD UREA NITROGEN 26 mg/dL     7-21         H            



             (BEAKER) (test code                                        



             = 354)                                              

 

             CREATININE (BEAKER) 1.26 mg/dL   0.57-1.25    H            



             (test code = 358)                                        

 

             GLUCOSE RANDOM 89 mg/dL                         



             (BEAKER) (test code                                        



             = 652)                                              

 

             CALCIUM (BEAKER) 9.1 mg/dL    8.4-10.2                  



             (test code = 697)                                        

 

             EGFR (BEAKER) (test 61 mL/min/1.73                           ESTIMA

DANNY GFR IS



             code = 1092) sq m                                   NOT AS ACCURATE

 AS



                                                                 CREATININE



                                                                 CLEARANCE IN



                                                                 PREDICTING



                                                                 GLOMERULAR



                                                                 FILTRATION RATE

.



                                                                 ESTIMATED GFR I

S



                                                                 NOT APPLICABLE 

FOR



                                                                 DIALYSIS PATIEN

TS.



HEPATIC FUNCTION [id number] 21:55:00





             Test Item    Value        Reference Range Interpretation Comments

 

             TOTAL PROTEIN (BEAKER) (test code = 7.1 gm/dL    6.0-8.3           

        



             770)                                                

 

             ALBUMIN (BEAKER) (test code = 1145) 4.2 g/dL     3.5-5.0           

        

 

             BILIRUBIN TOTAL (BEAKER) (test code 0.7 mg/dL    0.2-1.2           

        



             = 377)                                              

 

             BILIRUBIN DIRECT (BEAKER) (test 0.2 mg/dL    0.1-0.5               

    



             code = 706)                                         

 

             ALKALINE PHOSPHATASE (BEAKER) (test 86 U/L                   

        



             code = 346)                                         

 

             AST (SGOT) (BEAKER) (test code = 28 U/L       5-34                 

     



             353)                                                

 

             ALT (SGPT) (BEAKER) (test code = 20 U/L       6-55                 

     



             347)                                                



[id number] 21:52:00





             Test Item    Value        Reference Range Interpretation Comments

 

             LIPASE (BEAKER) (test code = 749) 40 U/L       8-78                

      



B-TYPE NATRIURETIC FACTOR (BNP)2017 21:46:00





             Test Item    Value        Reference Range Interpretation Comments

 

             B-TYPE NATRIURETIC PEPTIDE (BEAKER) 21 pg/mL     0-100             

        



             (test code = 700)                                        



CREATINE KINASE (CK), TOTAL AND [id number] 21:45:00





             Test Item    Value        Reference Range Interpretation Comments

 

             CREATINE KINASE TOTAL (BEAKER) 32 U/L                        

   



             (test code = 380)                                        

 

             CREATINE KINASE-MB (BEAKER) (test 0.6 ng/mL    0.0-6.6             

      



             code = 750)                                         

 

             CREATINE KINASE-MB INDEX (BEAKER) 1.9 %                            

      



             (test code = 395)                                        



Effective 2014: CK-MB Reference Range ChangeNew: 0.0-6.6    Previous: 
0.0-4.9CK-MB Reference Range:&lt;6.7      Normal6.7-10.0  Borderline&gt;10.0    
 AbnormalTROPONIN -53-63 21:45:00





             Test Item    Value        Reference Range Interpretation Comments

 

             TROPONIN I (BEAKER) (test code = 397) < ng/mL      0.00-0.03       

          



Effective 2014: Reference Range ChangeNew: 0.00-0.03   Previous 0.00-
0.15Troponin I (TnI) levels must be interpreted in the context of the presenting
 symptoms and the clinical findings. Elevated TnI levels indicate myocardial 
damage, but are not specific for ischemic heart disease. Elevated TnI levels are
 seen in patients with other cardiac conditions (including myocarditis and 
congestive heartfailure), and slight TnI elevations occur in patients with other
 conditions, including sepsis, renalfailure, acidosis, acute neurological 
disease, and persistent tachyarrhythmia.CBC W/PLT COUNT &amp; AUTO DIFFERENTIAL
2017 21:24:00





             Test Item    Value        Reference Range Interpretation Comments

 

             WHITE BLOOD CELL COUNT (BEAKER) 4.7 K/ L     4.0-10.0              

    



             (test code = 775)                                        

 

             RED BLOOD CELL COUNT (BEAKER) 5.75 M/ L    4.20-5.80               

  



             (test code = 761)                                        

 

             HEMOGLOBIN (BEAKER) (test code = 17.4 GM/DL   13.0-16.8    H       

     



             410)                                                

 

             HEMATOCRIT (BEAKER) (test code = 49.2 %       40.0-50.0            

     



             411)                                                

 

             MEAN CORPUSCULAR VOLUME (BEAKER) 85.6 fL      82.0-98.0            

     



             (test code = 753)                                        

 

             MEAN CORPUSCULAR HEMOGLOBIN 30.3 pg      27.0-33.0                 



             (BEAKER) (test code = 751)                                        

 

             MEAN CORPUSCULAR HEMOGLOBIN CONC 35.4 GM/DL   32.0-36.0            

     



             (BEAKER) (test code = 752)                                        

 

             RED CELL DISTRIBUTION WIDTH 12.9 %       10.3-14.2                 



             (BEAKER) (test code = 412)                                        

 

             PLATELET COUNT (BEAKER) (test code 98 K/CU MM   150-430      L     

       



             = 756)                                              

 

             MEAN PLATELET VOLUME (BEAKER) 8.7 fL       6.5-10.5                

  



             (test code = 754)                                        

 

             NUCLEATED RED BLOOD CELLS (BEAKER) 0 /100 WBC   0-0                

       



             (test code = 413)                                        

 

             NEUTROPHILS RELATIVE PERCENT 69 %                                  

 



             (BEAKER) (test code = 429)                                        

 

             LYMPHOCYTES RELATIVE PERCENT 22 %                                  

 



             (BEAKER) (test code = 430)                                        

 

             MONOCYTES RELATIVE PERCENT 8 %                                    



             (BEAKER) (test code = 431)                                        

 

             EOSINOPHILS RELATIVE PERCENT 1 %                                   

 



             (BEAKER) (test code = 432)                                        

 

             BASOPHILS RELATIVE PERCENT 0 %                                    



             (BEAKER) (test code = 437)                                        

 

             NEUTROPHILS ABSOLUTE COUNT 3.26 K/ L    1.80-8.00                 



             (BEAKER) (test code = 670)                                        

 

             LYMPHOCYTES ABSOLUTE COUNT 1.05 K/ L    1.48-4.50    L            



             (BEAKER) (test code = 414)                                        

 

             MONOCYTES ABSOLUTE COUNT (BEAKER) 0.38 K/ L    0.00-1.30           

      



             (test code = 415)                                        

 

             EOSINOPHILS ABSOLUTE COUNT 0.03 K/ L    0.00-0.50                 



             (BEAKER) (test code = 416)                                        

 

             BASOPHILS ABSOLUTE COUNT (BEAKER) 0.02 K/ L    0.00-0.20           

      



             (test code = 417)                                        



0.00CMV PCR, [id number] 17:55:00





             Test Item    Value        Reference Range Interpretation Comments

 

             CMV VIRAL LOAD - Negative or below the                           



             NEGATIVE (BEAKER) (test linear range of the                        

   



             code = 2558) assay (<375 copies/mL)                           



Cytomegalovirus (CMV) infection can cause significant disease in 
immunosuppressed patients. However,it is common for CMV to manifest as a limited
 infection which is of no clinical significance in immunosuppressed patients or 
in healthy individuals.Viral load measurements are helpful to identify clinical 
CMV infection and to guide the pre-emptive management of antiviral therapy.  For
 treatment of CMVinfection due to reactivation in transplant recipients, a 
threshold between 4,000 and 5,000 copies/mL is suggested.  For treatment of 
primary CMV infection, a lower threshold can be used.CMV infection may also be 
monitored using weekly serial measurements. Serial measurements of CMV DNA viral
 load canbe evaluated by identifying a 10-fold change, as well as assessing the 
CMV DNA viral load and the clinical context for each patient.The plasma CMV DNA 
viral load was detected using quantitative polymerase chain reaction and 
fluorescent monitoring of a specific hybridized probe. Genetic variation and ot
her factors can affect the accuracy of nucleic acid testing. Therefore, the 
results should be interpreted in light of clinical data. A negative result may 
not exclude the presence of CMV disease.This test was developed and its 
performance characteristics determined by the [facility] Path
ology Department, Section of Molecular Pathology. It has not been cleared or 
approved by the U.S. Food and Drug Administration (FDA), since FDA approval is 
not required for clinical use of the test. Validation was done as required by 
The Clinical Laboratory Improvement Amendments of 1988.CREATINE KINASE (CK), 
TOTAL AND [id number] 18:40:00





             Test Item    Value        Reference Range Interpretation Comments

 

             CREATINE KINASE TOTAL (BEAKER) 36 U/L                        

   



             (test code = 380)                                        

 

             CREATINE KINASE-MB (BEAKER) (test 0.3 ng/mL    0.0-6.6             

      



             code = 750)                                         

 

             CREATINE KINASE-MB INDEX (BEAKER) 0.8 %                            

      



             (test code = 395)                                        



Effective 2014: CK-MB Reference Range ChangeNew: 0.0-6.6    Previous: 
0.0-4.9CK-MB Reference Range:&lt;6.7      Normal6.7-10.0  Borderline&gt;10.0    
 AbnormalTROPONIN -01-34 18:40:00





             Test Item    Value        Reference Range Interpretation Comments

 

             TROPONIN I (BEAKER) (test code = 397) < ng/mL      0.00-0.03       

          



Effective 2014: Reference Range ChangeNew: 0.00-0.03   Previous 0.00-
0.15Troponin I (TnI) levels must be interpreted in the context of the presenting
 symptoms and the clinical findings. Elevated TnI levels indicate myocardial 
damage, but are not specific for ischemic heart disease. Elevated TnI levels are
 seen in patients with other cardiac conditions (including myocarditis and 
congestive heartfailure), and slight TnI elevations occur in patients with other
 conditions, including sepsis, renalfailure, acidosis, acute neurological 
disease, and persistent tachyarrhythmia.B-TYPE NATRIURETIC FACTOR (BNP)
2017 18:40:00





             Test Item    Value        Reference Range Interpretation Comments

 

             B-TYPE NATRIURETIC PEPTIDE (BEAKER) 23 pg/mL     0-100             

        



             (test code = 700)                                        



[id number] 18:33:00





             Test Item    Value        Reference Range Interpretation Comments

 

             MAGNESIUM (BEAKER) (test code = 1.8 mg/dL    1.6-2.6               

    



             627)                                                



BASIC METABOLIC [id number] 18:33:00





             Test Item    Value        Reference Range Interpretation Comments

 

             SODIUM (BEAKER) 140 meq/L    136-145                   



             (test code = 381)                                        

 

             POTASSIUM (BEAKER) 4.1 meq/L    3.5-5.1                   



             (test code = 379)                                        

 

             CHLORIDE (BEAKER) 105 meq/L                        



             (test code = 382)                                        

 

             CO2 (BEAKER) (test 23 meq/L     22-29                     



             code = 355)                                         

 

             BLOOD UREA NITROGEN 18 mg/dL     7-21                      



             (BEAKER) (test code                                        



             = 354)                                              

 

             CREATININE (BEAKER) 1.34 mg/dL   0.57-1.25    H            



             (test code = 358)                                        

 

             GLUCOSE RANDOM 100 mg/dL                        



             (BEAKER) (test code                                        



             = 652)                                              

 

             CALCIUM (BEAKER) 9.2 mg/dL    8.4-10.2                  



             (test code = 697)                                        

 

             EGFR (BEAKER) (test 57 mL/min/1.73                           ESTIMA

DANNY GFR IS



             code = 1092) sq m                                   NOT AS ACCURATE

 AS



                                                                 CREATININE



                                                                 CLEARANCE IN



                                                                 PREDICTING



                                                                 GLOMERULAR



                                                                 FILTRATION RATE

.



                                                                 ESTIMATED GFR I

S



                                                                 NOT APPLICABLE 

FOR



                                                                 DIALYSIS PATIEN

TS.



CBC W/PLT COUNT &amp; AUTO [id number] 18:24:00





             Test Item    Value        Reference Range Interpretation Comments

 

             WHITE BLOOD CELL COUNT (BEAKER) 2.6 K/ L     4.0-10.0     L        

    



             (test code = 775)                                        

 

             RED BLOOD CELL COUNT (BEAKER) 5.66 M/ L    4.20-5.80               

  



             (test code = 761)                                        

 

             HEMOGLOBIN (BEAKER) (test code = 16.5 GM/DL   13.0-16.8            

     



             410)                                                

 

             HEMATOCRIT (BEAKER) (test code = 49.0 %       40.0-50.0            

     



             411)                                                

 

             MEAN CORPUSCULAR VOLUME (BEAKER) 86.6 fL      82.0-98.0            

     



             (test code = 753)                                        

 

             MEAN CORPUSCULAR HEMOGLOBIN 29.1 pg      27.0-33.0                 



             (BEAKER) (test code = 751)                                        

 

             MEAN CORPUSCULAR HEMOGLOBIN CONC 33.6 GM/DL   32.0-36.0            

     



             (BEAKER) (test code = 752)                                        

 

             RED CELL DISTRIBUTION WIDTH 13.0 %       10.3-14.2                 



             (BEAKER) (test code = 412)                                        

 

             PLATELET COUNT (BEAKER) (test code 87 K/CU MM   150-430      L     

       



             = 756)                                              

 

             MEAN PLATELET VOLUME (BEAKER) 8.3 fL       6.5-10.5                

  



             (test code = 754)                                        

 

             NUCLEATED RED BLOOD CELLS (BEAKER) 0 /100 WBC   0-0                

       



             (test code = 413)                                        

 

             NEUTROPHILS RELATIVE PERCENT 51 %                                  

 



             (BEAKER) (test code = 429)                                        

 

             LYMPHOCYTES RELATIVE PERCENT 30 %                                  

 



             (BEAKER) (test code = 430)                                        

 

             MONOCYTES RELATIVE PERCENT 18 %                                   



             (BEAKER) (test code = 431)                                        

 

             EOSINOPHILS RELATIVE PERCENT 1 %                                   

 



             (BEAKER) (test code = 432)                                        

 

             BASOPHILS RELATIVE PERCENT 0 %                                    



             (BEAKER) (test code = 437)                                        

 

             NEUTROPHILS ABSOLUTE COUNT 1.33 K/ L    1.80-8.00    L            



             (BEAKER) (test code = 670)                                        

 

             LYMPHOCYTES ABSOLUTE COUNT 0.78 K/ L    1.48-4.50    L            



             (BEAKER) (test code = 414)                                        

 

             MONOCYTES ABSOLUTE COUNT (BEAKER) 0.47 K/ L    0.00-1.30           

      



             (test code = 415)                                        

 

             EOSINOPHILS ABSOLUTE COUNT 0.02 K/ L    0.00-0.50                 



             (BEAKER) (test code = 416)                                        

 

             BASOPHILS ABSOLUTE COUNT (BEAKER) 0.01 K/ L    0.00-0.20           

      



             (test code = 417)                                        



0.00POCT-GLUCOSE [id number] 15:37:00





             Test Item    Value        Reference Range Interpretation Comments

 

             POC-GLUCOSE METER 97 mg/dL                         TESTED AT 

[facility] [address]



             ([facility]) (test code =                                        [place] [address]



             1538)                                               



CHEM [id number] 10:27:003.6[facility]annCHEM XWQTZ8384-05-12 10:27:002.3
[facility] XxoeswsRGUTXJMPKQUX7304-81-14 10:27:0012.6[facility] HermannELECTROLYTES
2017 10:27:0055[facility] JbqgnnnSTDJFWYSNLWG0760-12-26 10:27:008.6[facility] 
XivqgxgHHMOZBXIBHAI3405-18-42 10:27:0023[facility] YfqbeuoNNSHLBGGEJWD2110-33-50 
10:27:70577Qwcwgxpg ZsdyoogFJCMOQNQLPRW2110-05-27 10:27:004.6[facility]
IHWIWPTQUENS8364-30-87 10:27:98070Wjiyfcbu HkwllhnKXVFULHHJCJL3267-38-77 
10:27:001.49[facility] KlcvsdxNJXEZJVOAQIN4123-13-15 10:27:0026[facility]
SOUBWIUCIBNY8389-83-11 10:27:41742Vmgaifay OiajyynZILJPBIHND2527-52-00 10:27:00
0.6[facility] UyqvmbmSVCBNBTNWU4821-99-74 10:27:000.5[facility] HermannHEMATOLOGY
2017 10:27:008.3[facility] KseazwiAPMTJAVEXU8164-94-04 10:27:005.1[facility] 
CktwktyBIBNQXODEH9693-89-39 10:27:0088.1[facility] UsifcqzKSOUQWVJNB7314-51-98 
10:27:006.emorial JrmcnfdCDYHJFAFIJ6233-30-54 10:27:000.2[facility]
XFICLYTIDH0940-42-15 10:27:00





             Test Item    Value        Reference Range Interpretation Comments

 

             MCH (test code = MCH) 28.5 pg      27.0-31.0                 



[facility] HdlsguiKKWSZNPEBY4758-54-68 10:27:53887Yicbuqas HermannHEMATOLOGY
2017 10:27:0033.6[facility] IrfgmpaUFTPVJZVLP9598-83-75 10:27:0014.1[facility]
 CfhrckdXOLOVGGEBQ8489-11-83 10:27:0084.9[facility] PxfjztoIXSDSSHNWZ7561-94-59 
10:27:005.28[facility] QnwakpgHRLWSRWRCO2240-00-58 10:27:0015.0[facility]
RKGQMLMEWP2539-00-37 10:27:009.4[facility] DuvnaprFKDIJWBHAH1906-97-50 10:27:00
44.8[facility] UtqivniLIYIYIEBBT4121-00-06 10:27:008.2[facility] HermannPARATHYROID 
DHXIJCX0899-84-41 10:27:001.08[facility] HermannPARATHYROID JIOJBIR7402-90-44 
10:27:001.05[facility] HermannBLOOD BANK SIWWCLD1049-69-98 16:09:00Negative 
(17 10:09 AM)[facility] HermannCHEM AGMVZ4123-95-95 19:30:001.6[facility] 
HermannCHEM SKOXW1055-39-27 19:30:002.6[facility] HermannCHEM XXUPS5815-04-93 
19:30:0020[facility] HermannCHEM WTRLF9401-27-86 19:30:0015.4[facility] HermannCHEM 
GEARA5417-52-69 19:30:0067[facility] HermannCHEM BZRJQ5442-09-19 19:30:0024
[facility] HermannCHEM IOVDL4790-00-30 19:30:008.8[facility] HermannCHEM PANEL
2017 19:30:006.8[facility] HermannCHEM UOSVB0830-44-52 19:30:0025[facility] 
HermannCHEM MNJHC6735-93-42 19:30:004.2[facility] HermannCHEM WCQOX2648-22-47 
19:30:0012[facility] HermannCHEM KWBQC3821-66-47 19:30:0083[facility] HermannCHEM 
DSCLH0782-73-90 19:30:08724Jympfyci HermannCHEM ZSTAX0143-99-17 19:30:000.5
[facility] HermannCHEM HZRRX5768-40-22 19:30:004.4[facility] HermannCHEM PANEL
2017 19:30:15327Nyzrcwas HermannCHEM AOTON9990-64-00 19:30:001.26[facility] 
HermannCHEM ATYHT3294-87-41 19:30:0025[facility] HermannCHEM RENGC7052-09-80 
19:30:0091[facility] AsppkgtUIAFMAVFVA6158-27-07 19:30:0014.2[facility]
MJPMBRXAGR9625-71-51 19:30:0045.7[facility] AsshuyfMAEKCAIIIF9434-12-99 19:30:00
149[facility] GtxlvhwPOJXKVDPDE5757-96-41 19:30:008.4[facility] HermannHEMATOLOGY
2017 19:30:0082.9[facility] DvgsmdxBWUBKUMZYN4826-08-43 19:30:0034.2[facility]
 NebqbabHCYFLGZZJY2498-82-83 19:30:00





             Test Item    Value        Reference Range Interpretation Comments

 

             MCH (test code = MCH) 28.3 pg      27.0-31.0                 



[facility] GmpxvnfGUNTDAMENG8804-69-64 19:30:006.1[facility] HermannHEMATOLOGY
2017 19:30:0015.6[facility] JbthitlTGIATFFHTT9713-96-90 19:30:005.52[facility]
 VfvizfzFAVGQKUYYS8988-16-91 19:30:000.7[facility] QhmknsvFVYVXSKFEQ1596-23-88 
19:30:004.4[facility] FdjclhyXUKTUKNOUK6898-11-21 19:30:000.6[facility]
YPMKRNLJTO0398-29-23 19:30:001.1[facility] KdblbshVWUTOJMGRL1547-05-28 19:30:000.1
[facility] SorxxifHSQWVCLAGA8574-60-45 19:30:000.9[facility] HermannHEMATOLOGY
2017 19:30:0017.6[facility] EfsteviLYWDHLZVUZ2826-69-31 19:30:009.3[facility] 
QjquegzQNSIKNTAKM0008-72-19 19:30:0071.5[facility] HermannSPECIAL CHEMISTRY
2017 19:30:005.3[facility] Jeff

## 2021-11-27 NOTE — ER
Nurse's Notes                                                                                     

 [facility]                                                                 

Name: Gomez Romero                                                                               

Age: 51 yrs                                                                                       

Sex: Male                                                                                         

: 1970                                                                                   

MRN: [id number]                                                                                   

Arrival Date: 2021                                                                          

Time: 13:49                                                                                       

Account#: [id number]                                                                            

Bed 11                                                                                            

Private MD:                                                                                       

Diagnosis: Fall on same level, unspecified;Pain in right shoulder;Pain in right hip               

                                                                                                  

Presentation:                                                                                     

                                                                                             

15:01 Chief complaint: Patient states: on  night I busted my ass getting out of   iw  

      the shower, landed on the tile floor and small ledge of tub, has pain to right              

      shoulder, right hip, no back pain , has had pain and decreased mobility since then , no     

      LOC. Coronavirus screen: At this time, the client does not indicate any symptoms            

      associated with coronavirus-19.                                                             

15:01 Method Of Arrival: Ambulatory                                                           iw  

15:03 Ebola Screen: Patient negative for fever greater than or equal to 101.5 degrees         iw  

      Fahrenheit, and additional compatible Ebola Virus Disease symptoms Patient denies           

      exposure to infectious person. Patient denies travel to an Ebola-affected area in the       

      21 days before illness onset. No symptoms or risks identified at this time. Initial         

      Sepsis Screen: Does the patient meet any 2 criteria? No. Patient's initial sepsis           

      screen is negative. Does the patient have a suspected source of infection? No.              

      Patient's initial sepsis screen is negative. Risk Assessment: Do you want to hurt           

      yourself or someone else? Patient reports no desire to harm self or others. Onset of        

      symptoms was 2021.                                                             

15:03 Acuity: MILTON 4                                                                           iw  

                                                                                                  

Triage Assessment:                                                                                

17:50 General: Appears in no apparent distress. Behavior is calm, cooperative.                iw  

                                                                                                  

Historical:                                                                                       

- Allergies:                                                                                      

15:03 falsecarinate;                                                                          iw  

15:03 unknown rejection medication;                                                           iw  

- PMHx:                                                                                           

15:03 heart attack;                                                                           iw  

- PSHx:                                                                                           

15:03 Heart transplant; Hernia sx;                                                            iw  

                                                                                                  

- Immunization history:: Adult Immunizations unknown.                                             

- Social history:: Smoking status: .                                                              

                                                                                                  

                                                                                                  

Screenin:50 Abuse screen: Denies threats or abuse. Denies injuries from another. Nutritional        iw  

      screening: No deficits noted. Tuberculosis screening: No symptoms or risk factors           

      identified. Fall Risk None identified.                                                      

                                                                                                  

Assessment:                                                                                       

16:50 General: Appears in no apparent distress. Behavior is calm, cooperative. Pain:          iw  

      Complains of pain in right arm and right leg. Neuro: Level of Consciousness is awake,       

      alert, obeys commands, Oriented to person, place, time, situation, Moves all                

      extremities. Full function. Cardiovascular: Patient's skin is warm and dry.                 

      Respiratory: GI: Derm: Skin is intact, is healthy with good turgor. Musculoskeletal:        

      Range of motion: intact in all extremities.                                                 

                                                                                                  

Vital Signs:                                                                                      

15:00  / 93; Pulse 87; Resp 16; Temp 97.4; Pulse Ox 96% on R/A;                         iw  

                                                                                                  

ED Course:                                                                                        

13:49 Patient arrived in ED.                                                                  as  

15:03 Triage completed.                                                                       iw  

15:04 Arm band placed on.                                                                     iw  

15:10 Vera Romero, EAN is Primary Nurse.                                                   iw  

15:23 Reynaldo Payan PA is PHCP.                                                                cp  

15:23 Reynaldo Garcia MD is Attending Physician.                                             cp  

16:28 XRAY Femur RIGHT In Process Unspecified.                                                EDMS

16:28 XRAY Shoulder RIGHT 2 view In Process Unspecified.                                      EDMS

16:50 Patient has correct armband on for positive identification.                             iw  

17:03 Solis Desai MD is Referral Physician.                                            cp  

17:50 No provider procedures requiring assistance completed. Patient did not have IV access   iw  

      during this emergency room visit.                                                           

                                                                                                  

Administered Medications:                                                                         

20:17 Not Given (Patient Refused): Ibuprofen 800 mg PO once                                   iw  

                                                                                                  

                                                                                                  

Outcome:                                                                                          

17:05 Discharge ordered by MD.                                                                cp  

17:49 Discharged to home ambulatory.                                                          iw  

17:49 Condition: good                                                                             

17:49 Discharge instructions given to patient, Instructed on discharge instructions, follow       

      up and referral plans. Demonstrated understanding of instructions, follow-up care.          

17:50 Patient left the ED.                                                                    iw  

                                                                                                  

Signatures:                                                                                       

Dispatcher [employer]                           Melinda Lara                             as                                                   

Vera Romero, RN                     RN   iw                                                   

Reynaldo Payan PA                         PA   cp                                                   

                                                                                                  

Corrections: (The following items were deleted from the chart)                                    

15:05 15:00 Pulse 87bpm; Pulse Ox 96% RA; iw                                                  iw  

15:06 15:00 Pulse 87bpm; Resp 16bpm; Pulse Ox 96% RA; Temp 97.4F; iw                          iw  

                                                                                                  

**************************************************************************************************

## 2021-11-27 NOTE — EDPHYS
Physician Documentation                                                                           

 [facility]                                                                 

Name: Gomez Romero                                                                               

Age: 51 yrs                                                                                       

Sex: Male                                                                                         

: 1970                                                                                   

MRN: [id number]                                                                                   

Arrival Date: 2021                                                                          

Time: 13:49                                                                                       

Account#: [id number]                                                                            

Bed 11                                                                                            

Private MD:                                                                                       

ED Physician Reynaldo Garcia                                                                      

HPI:                                                                                              

                                                                                             

15:35 This 51 yrs old Male presents to ER via Ambulatory with complaints of Fall Injury, Leg  cp  

      Pain.                                                                                       

15:35 Details of fall: The patient fell from an upright position, while walking, and struck a cp  

      tile surface. Onset: The symptoms/episode began/occurred yesterday. Associated              

      injuries: The patient sustained right shoulder, decreased range of motion, painful          

      injury, right upper leg, painful injury. Severity of symptoms: in the emergency             

      department the symptoms are unchanged, despite home interventions.                          

15:35 Patient reports slip and fall in bathroom yesterday causing him to strike counter and   cp  

      floor. Denies striking head. Reports pain to right shoulder and right upper leg.            

                                                                                                  

Historical:                                                                                       

- Allergies:                                                                                      

15:03 falsecarinate;                                                                          iw  

15:03 unknown rejection medication;                                                           iw  

- PMHx:                                                                                           

15:03 heart attack;                                                                           iw  

- PSHx:                                                                                           

15:03 Heart transplant; Hernia sx;                                                            iw  

                                                                                                  

- Immunization history:: Adult Immunizations unknown.                                             

- Social history:: Smoking status: .                                                              

                                                                                                  

                                                                                                  

ROS:                                                                                              

15:40 MS/extremity: Positive for pain, tenderness, of the right shoulder and right upper leg, cp  

      Negative for deformity.                                                                     

15:40 Constitutional: Negative for body aches, chills, fever.                                 cp  

15:40 Neck: Negative for pain with movement, pain at rest, stiffness.                             

15:40 Cardiovascular: Negative for chest pain.                                                    

15:40 Respiratory: Negative for cough, shortness of breath, wheezing.                             

15:40 Abdomen/GI: Negative for abdominal pain, nausea, vomiting, and diarrhea.                    

15:40 Back: Negative for pain at rest, pain with movement.                                        

15:40 Neuro: Negative for altered mental status, headache, loss of consciousness, numbness,       

      syncope, weakness.                                                                          

15:40 All other systems are negative.                                                             

                                                                                                  

Exam:                                                                                             

15:45 Constitutional: The patient appears in no acute distress, alert, awake,                 cp  

      non-diaphoretic, non-toxic, well developed, well nourished.                                 

15:45 Head/Face:  Normocephalic, atraumatic.                                                  cp  

15:45 Neck: C-spine: vertebral tenderness, is not appreciated, crepitus, is not appreciated,      

      ROM/movement: pain, is not appreciated, limited range of motion, is not appreciated.        

15:45 Chest/axilla: Inspection: normal, Palpation: is normal, no crepitus, no tenderness.         

15:45 Cardiovascular: Rate: normal, Rhythm: regular.                                              

15:45 Respiratory: the patient does not display signs of respiratory distress,  Respirations:     

      normal, no use of accessory muscles, no retractions.                                        

15:45 Abdomen/GI: Exam negative for discomfort, distension, guarding, Inspection: abdomen         

      appears normal.                                                                             

15:45 Back: pain, is absent, ROM is normal, vertebral tenderness, is not appreciated.             

15:45 Musculoskeletal/extremity: Extremities: grossly normal except: noted in the right           

      shoulder: decreased ROM, pain, tenderness, There is no evidence of  deformity, noted in     

      the right upper leg: pain, tenderness, no evidence of  decreased ROM, deformity, ROM:       

15:45 Neuro: Orientation: to person, place \T\ time. Mentation: is normal.                        

                                                                                                  

Vital Signs:                                                                                      

15:00  / 93; Pulse 87; Resp 16; Temp 97.4; Pulse Ox 96% on R/A;                         iw  

                                                                                                  

MDM:                                                                                              

15:28 Patient medically screened.                                                             cp  

16:00 Differential diagnosis: closed head injury, contusion, fracture, multiple trauma,       cp  

      sprain, strain.                                                                             

17:05 Data reviewed: vital signs, nurses notes, radiologic studies, plain films.              cp  

17:05 Test interpretation: by ED physician or midlevel provider: plain radiologic studies.    cp  

      Counseling: I had a detailed discussion with the patient and/or guardian regarding: the     

      historical points, exam findings, and any diagnostic results supporting the                 

      discharge/admit diagnosis, radiology results, the need for outpatient follow up, a          

      orthopedic surgeon, to return to the emergency department if symptoms worsen or persist     

      or if there are any questions or concerns that arise at home. Response to treatment:        

      the patient's symptoms have mildly improved after treatment, and as a result, I will        

      discharge patient.                                                                          

                                                                                                  

                                                                                             

15: Order name: XRAY Femur RIGHT; Complete Time: 16:52                                      cp  

                                                                                             

16:52 Interpretation: Report reviewed.                                                        cp  

                                                                                             

15: Order name: XRAY Shoulder RIGHT 2 view; Complete Time: 16:52                            cp  

                                                                                             

16:52 Interpretation: Reviewed.                                                               cp  

                                                                                                  

Administered Medications:                                                                         

20:17 Not Given (Patient Refused): Ibuprofen 800 mg PO once                                   iw  

                                                                                                  

                                                                                                  

Disposition:                                                                                      

17:15 Chart complete.                                                                         cp  

                                                                                             

09:21 Co-signature as Attending Physician, Reynaldo Garcia MD I agree with the assessment and  nena 

      plan of care.                                                                               

                                                                                                  

Disposition Summary:                                                                              

21 17:05                                                                                    

Discharge Ordered                                                                                 

      Location: Home                                                                          cp  

      Problem: new                                                                            cp  

      Symptoms: have improved                                                                 cp  

      Condition: Stable                                                                       cp  

      Diagnosis                                                                                   

        - Fall on same level, unspecified                                                     cp  

        - Pain in right shoulder                                                              cp  

        - Pain in right hip                                                                   cp  

      Followup:                                                                               cp  

        - With: Solis Desai MD                                                              

        - When: 2 - 3 days                                                                         

        - Reason: right shoulder pain                                                              

      Discharge Instructions:                                                                     

        - Discharge Summary Sheet                                                             cp  

        - Shoulder Pain                                                                       cp  

        - Shoulder Range of Motion Exercises                                                  cp  

        - Hip Pain                                                                            cp  

        - Heat Therapy                                                                        cp  

      Forms:                                                                                      

        - Medication Reconciliation Form                                                      cp  

        - Thank You Letter                                                                    cp  

        - Antibiotic Education                                                                cp  

        - Prescription Opioid Use                                                             cp  

      Prescriptions:                                                                              

        - Diclofenac Sodium 75 mg Oral Tablet Sustained Release                                    

            - take 1 tablet by ORAL route 2 times per day; 30 tablet; Refills: 0, Product     cp  

      Selection Permitted                                                                         

Signatures:                                                                                       

Dispatcher [employer]                           Reynaldo Stover MD MD cha Williams, Irene, RN                     RN   Reynaldo Gatica PA                         PA   cp                                                   

                                                                                                  

**************************************************************************************************

## 2021-11-27 NOTE — RAD REPORT
EXAM DESCRIPTION:  RAD - Femur Right - 11/27/2021 4:28 pm

 

CLINICAL HISTORY:  PAIN

 

COMPARISON:  <Comparisons>

 

FINDINGS:  No acute fracture. No malalignment. No significant focal degenerative changes.

 

IMPRESSION:  No acute osseous abnormality involving the right femur.

## 2021-11-27 NOTE — RAD REPORT
EXAM DESCRIPTION:  RAD - Shoulder  Right 2 View - 11/27/2021 4:28 pm

 

CLINICAL HISTORY:  PAIN

 

COMPARISON:  No comparisons

 

FINDINGS:  No acute fracture. No malalignment. No significant focal degenerative changes.

 

IMPRESSION:  No acute osseous abnormality involving the right shoulder.

## 2022-03-28 ENCOUNTER — HOSPITAL ENCOUNTER (EMERGENCY)
Dept: HOSPITAL 97 - ER | Age: 52
Discharge: HOME | End: 2022-03-28
Payer: COMMERCIAL

## 2022-03-28 VITALS — TEMPERATURE: 97.9 F | DIASTOLIC BLOOD PRESSURE: 96 MMHG | SYSTOLIC BLOOD PRESSURE: 133 MMHG | OXYGEN SATURATION: 100 %
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
## 2022-03-28 DIAGNOSIS — Z94.1: ICD-10-CM

## 2022-03-28 DIAGNOSIS — I25.2: ICD-10-CM
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
## 2022-03-28 DIAGNOSIS — Z88.8: ICD-10-CM

## 2022-03-28 DIAGNOSIS — S20.212A: Primary | ICD-10-CM

## 2022-03-28 PROCEDURE — 99283 EMERGENCY DEPT VISIT LOW MDM: CPT

## 2022-03-28 NOTE — RAD REPORT
EXAM DESCRIPTION:  RAD - Ribs  Left - 3/28/2022 4:42 pm

 

CLINICAL HISTORY:  Fall with left-sided rib pain

 

COMPARISON:  Chest and left rib films April 2019

 

FINDINGS:  No displaced rib fracture is seen and no non-displaced rib fractures suspected. No aggress
braeden rib lesion. No underlying pneumothorax, effusion, infiltrate or pulmonary contusion. Sternotomy w
ires are in place.

 

IMPRESSION:  Negative left rib series.

## 2022-03-28 NOTE — XMS REPORT
Continuity of Care Document

                            Created on:2022



Patient:TYRA BRUNO

Sex:Male

:1970

External Reference #:[id number]





Demographics







                          Address                   [address]



                                                    [place] [address]

 

                          Home Phone                [phone]

 

                          Work Phone                [phone]

 

                          Mobile Phone              [phone]

 

                          Email Address             [email or web address]

 

                          Preferred Language        English

 

                          Marital Status            Unknown

 

                          Congregational Affiliation     Unknown

 

                          Race                      Unknown

 

                          Additional Race(s)        Unavailable



                                                    White

 

                          Ethnic Group              Unknown









Author







                          Organization              [facility]

t

 

                          Address                   [address]



                                                    [place] [address]

 

                          Phone                     [phone]









Support







                Name            Relationship    Address         Phone

 

                KIANA PURVIS               Unavailable     [phone]

 

                KIANA        KAZ               Unavailable     [phone]

 

                KIANA MCCURDY               Unavailable     Unavailable

 

                Unavailable     NG              Unavailable     Unavailable









Care Team Providers







                    Name                Role                Phone

 

                    SCOTT LOO     Primary Care Physician Unavailable

 

                    SCOTT LOO     Attending Clinician Unavailable

 

                    Scott Loo MD  Attending Clinician [phone]

 

                    Sincere MCKOY            Attending Clinician Unavailable

 

                    [place]                Attending Clinician Unavailable

 

                    Jame            Attending Clinician Unavailable

 

                    Jesu Cummins MD    Attending Clinician [phone]

 

                    SIMONE               Attending Clinician Unavailable

 

                    MARKO AWAD      Attending Clinician Unavailable

 

                    MAEVE SILVESTRE     Attending Clinician Unavailable

 

                    TJ        Attending Clinician Unavailable

 

                    SCOTT LOO     Admitting Clinician Unavailable









Payers







           Payer Name Policy Type Policy Number Effective Date Expiration Date S

omar

 

           [employer]BS OS               [id number] 2017            



           POS/PPO/EPO                       00:00:00              

 

           MEDICARE A B            [id number] 2017 



                                            00:00:00   00:00:00   







Problems







       Condition Condition Condition Status Onset  Resolution Last   Treating Co

mments 

Source



       Name   Details Category        Date   Date   Treatment Clinician        



                                                 Date                 

 

       RIGHT         Diagnosis Active 2017               Mem

oria



       RECURRENT                      -18          06:05:00               l



       INGUINAL   RIGHT               00:00:                             Jeff



       HERNIA AND RECURRENT               00                                 



       INCI   INGUINAL                                                  



              HERNIA AND                                                  



              INCI                                                    



                                                                      



                                                                      



              Active                                                  



                                                                      



                                                                      



              2017                                                  



                                                                      



                                                                      



                                                                      



                                                                      



                                                                      



                                                                      



                                                                      



                                                                      



                     [facility]                                                  



                                                                      



                                                                      

 

       MORBID        Diagnosis Active 2017               Mem

oria



       OBESITY                      0-14          07:33:00               l



                MORBID               00:00:                             Jeff



              OBESITY               00                                 



                                                                      



                                                                      



              Active                                                  



                                                                      



                                                                      



              10/14/2016                                                  



                                                                      



                                                                      



                                                                      



                                                                      



                                                                      



                                                                      



                                                                      



                                                                      



                     [facility]                                                  



                                                                      



                                                                      

 

       Heart  Heart  Disease Active                              [facility]



       transplant transplant               3-30                               [facility]

[facility] -



       ,      ,                    00:00:                             Medical



       orthotopic orthotopic               00                                 Ce

nter



       , status , status                                                  

 

       Heart  Heart  Disease Active 2015                      Overview: [facility]



       transplant transplant               -                        [facility] -



       rejection rejection               00:00:                      g of this M

edical



                                   00                          note   Center



                                                               might be 



                                                               different 



                                                               from the 



                                                               original. 



                                                               - 2R   



                                                               rejection 



                                                               on low 



                                                               dose   



                                                               immunosup 



                                                               pression 



                                                               10/22/15 



                                                               treated 



                                                               with   



                                                               prednison 



                                                               e pulse- 



                                                               2R     



                                                               rejection 



                                                               on low 



                                                               dose   



                                                               immunosup 



                                                               pression 



                                                               10/28/15 



                                                               treated 



                                                               with IV 



                                                               solumedro 



                                                               l,     



                                                               increased 



                                                               MMF,   



                                                               increased 



                                                               Prograf- 



                                                               2R biopsy 



                                                               on     



                                                               12/2/15 



                                                               treated 



                                                               with ATG 



                                                               x 4    

 

       History of History of Disease Active                              C

[facility]



       Cytomegalo Cytomegalo               -                               [facility]

[facility] -



       virus  virus                00:00:                             Medical



       (CMV)  (CMV)                00                                 Center



       viremia viremia                                                  

 

       ICM s/p ICM s/p Disease Active                              [facility]



       orthotopic orthotopic               5-                               [facility]

kes -



       heart  heart                00:00:                             Medical



       transplant transplant               00                                 Ce

nter



       5/14/15 5/14/15                                                  

 

       Dyslipidem Dyslipidem Disease Active                              C

[facility]



       ia     ia                   1-                               [facility]



                                   00:00:                             Medical



                                   00                                 Center

 

       Hiatal        Problem Active               2017               Memor

ia



       hernia                                    01:10:50               l



       (disorder)   Hiatal                                                  [facility]

ina



              hernia                                                  



              (disorder)                                                  



                                                                      



                                                                      



               Active                                                  



                                                                      



                                                                      



                                                                      



                                                                      



              Problem                                                  



                                                                      



                                                                      



              2017                                                  



                                                                      



                                                                      



                                                                      



                                                                      



                 [facility]                                                  



                                                                      



                                                                      

 

       OBESITY,        Diagnosis Active               2017               M

em[facility]



       UNSPECIFIE                                    07:33:00               l



       D        OBESITY,                                                  Hussein

n



              UNSPECIFIE                                                  



              D                                                       



                                                                      



                 Active                                                  



                                                                      



                                                                      



                                                                      



                                                                      



                                                                      



                                                                      



                                                                      



                                                                      



                                                                      



                                                                      



                      [facility]                                                  



                                                                      



                                                                      

 

       Myocardial        Problem Resolve               2017               

[facility]



       infarction               d                    01:10:50               l



       (disorder)                                                         Hussein

n



              Myocardial                                                  



              infarction                                                  



              (disorder)                                                  



                                                                      



                                                                      



               Resolved                                                  



                                                                      



                                                                      



                                                                      



                                                                      



                Problem                                                  



                                                                      



                                                                      



              2017                                                  



                                                                      



                                                                      



                                                                      



                                                                      



                 [facility]                                                  



                                                                      



                                                                      

 

       Hypertensi Hypertensi Disease Active                                    C

[facility]



       on     on                                                      [facility]

 

       Ischemic Ischemic Disease Active                                    [facility]

t



       cardiomyop cardiomyop                                                  [facility]

kes -



       athy with athy with                                                  Medi

lizabeth



       MI 2015 MI 2015                                                  Ce

nter

 

       Routine Routine Problem Active                                    [facility]



       lab draw lab draw HL7.CCDAR2                                           it

y of



                                                                      [place]



                                                                      [facility]



                                                                      ans

 

       Malnutriti Malnutriti Problem Active                                    U

nivers



       on     on     HL7.CCDAR2                                           ity of



                                                                      [place]



                                                                      [facility]



                                                                      ans

 

       Morbid Morbid Problem Active                                    [facility]



       obesity obesity HL7.CCDAR2                                           ity 

of



       due to due to                                                  [place]



       excess excess                                                  [facility]ici



       calories calories                                                  ans

 

       Primary Primary Problem Active                                    [facility]



       dysthymia dysthymia HL7.CCDAR2                                           

ity of



                                                                      [place]



                                                                      [facility]



                                                                      ans

 

       Adult BMI Adult BMI Problem Active                                    Uni

vers



       40.0-44.9 40.0-44.9 HL7.CCDAR2                                           

ity of



       kg/sq m kg/sq m                                                  [place]



                                                                      [facility]



                                                                      ans

 

       Malabsorpt Malabsorpt Problem Active                                    U

nivers



       ion    ion    HL7.CCDAR2                                           ity of



                                                                      [place]



                                                                      [facility]



                                                                      ans

 

       History of History of Problem Resolve                                    

[facility]



       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                                       

  ity of



       malabsorpt malabsorpt                                                  [place]



       [place]    ion                                                     [facility]



                                                                      ans

 

       History of History of Problem Resolve                                    

[facility]



       malnutriti malnutriti HL7.CCDAR2 d                                       

  ity of



       on     on                                                      [place]



                                                                      [facility]



                                                                      ans

 

       Vitamin D Vitamin D Problem Active                                    Uni

vers



       deficiency deficiency HL7.CCDAR2                                         

  ity of



                                                                      [place]



                                                                      Phys[facility]



                                                                      ans







Allergies, Adverse Reactions, Alerts







       Allergy Allergy Status Severity Reaction(s) Onset  Inactive Treating Comm

ents 

Source



       Name   Type                        Date   Date   Clinician        

 

       FOSCARNE Allergy Active High   N\T\V                        [facility]



       T                                                          [facility]



                                          00:00:                      Medical



                                          00                          [place]

 

       Foscarne Drug   Active        Nausea And 2015 C

[facility]



       t      Allergy               Vomiting,                  Pt has no Rad

es -



                                   Swelling 00:00:               reaction Medica

l



                                          00                   to     Center



                                                               current 



                                                               medicatio 



                                                               n      



                                                               administr 



                                                               ation  



                                                               regimen: 



                                                               premedica 



                                                               te with 



                                                               Benadryl, 



                                                               Zofran, 



                                                               Tylenol, 



                                                               then   



                                                               500cc NS 



                                                               bolus, 



                                                               then   



                                                               diluted 



                                                               foscarnet 



                                                               over 2 



                                                               hours, 



                                                               then   



                                                               another 



                                                               500cc NS 



                                                               bolus. 



                                                               Electroly 



                                                               te labs 



                                                               after  



                                                               every  



                                                               dose,  



                                                               electroly 



                                                               te     



                                                               replaceme 



                                                               nt     



                                                               protocol 



                                                               in     



                                                               place.8/1 



                                                               3/2015Pt 



                                                               has no 



                                                               reaction 



                                                               to     



                                                               current 



                                                               medicatio 



                                                               n      



                                                               administr 



                                                               ation  



                                                               regimen: 



                                                               premedica 



                                                               te with 



                                                               Benadryl, 



                                                               Zofran, 



                                                               Tylenol, 



                                                               then   



                                                               500cc NS 



                                                               bolus, 



                                                               then   



                                                               diluted 



                                                               foscarnet 



                                                               over 2 



                                                               hours, 



                                                               then   



                                                               another 



                                                               500cc NS 



                                                               bolus. 



                                                               Electroly 



                                                               te labs 



                                                               after  



                                                               every  



                                                               dose,  



                                                               electroly 



                                                               te     



                                                               replaceme 



                                                               nt     



                                                               protocol 



                                                               in place. 







Family History







           Family Member Diagnosis  Comments   Start Date Stop Date  Source

 

           Maternal grandfather Cancer                                      [facility]

 

           Maternal grandfather Heart disease                                  C

[facility]

 

           Natural mother Diabetes                                    [facility]







Social History







           Social Habit Start Date Stop Date  Quantity   Comments   Source

 

           Alcohol intake 2021 Current               [facility]

es -



                      00:00:00   00:00:00   non-drinker of            Medical Ce

nter



                                            alcohol (finding)            

 

           Tobacco use and 2015 Never used            [facility]



           [facility]   [facility]   [facility]

 

           Sex Assigned At 1970                       LEXI Baldwins -



           B[facility]      [facility]   [facility]00                         [facility]









                Smoking Status  Start Date      Stop Date       Source

 

                Social History                                  [facility]







Medications







       Ordered Filled Start  Stop   Current Ordering Indication Dosage Frequency

 Signature

                    Comments            Components          Source



     Medication Medication Date Date Medication? Clinician                (SIG) 

          



     Name Name                                                   

 

     tacrolimus            Yes                      Take 2           [facility]

[facility]



     (PROGRAF) 1      1-19                               capsules           [facility]

s -



     MG capsule      00:00:                               by mouth           Med

ical



               00                                 twice           Center



                                                  daily.           

 

     tacrolimus            Yes                      Take 2           [facility]

[facility]



     (PROGRAF) 1      1-19                               capsules           [facility]

s -



     MG capsule      00:00:                               by mouth           Med

ical



               00                                 twice           Center



                                                  daily.           

 

     mycophenola      2021- No             250mg Q.5D Take 1           [facility]



     te        --                          capsule           [facility]



     (CELLCEPT)      00:00: 23:59                          (250 mg           Med

ical



     250 mg      00   :00                           total) by           Center



     capsule                                         mouth 2           



                                                  (two)           



                                                  times           



                                                  daily.           

 

     mycophenola      2021 No             250mg Q.5D Take 1           [facility]



     te        2-16 -16                          capsule           [facility]



     (CELLCEPT)      00:00: 23:59                          (250 mg           Med

ical



     250 mg      00   :00                           total) by           Center



     capsule                                         mouth 2           



                                                  (two)           



                                                  times           



                                                  daily.           

 

     tacrolimus      2021- No                       2mg am and           

[facility]



     (PROGRAF) 1                                1mg at           [facility]

 -



     MG capsule      00:00: 00:00                          night.           Medi

[facility]



               00   :00                                          Center

 

     tacrolimus      2021- No                       2mg am and           

[facility]



     (PROGRAF) 1      -                          1mg at           [facility]

 -



     MG capsule      00:00: 00:00                          night.           Medi

[facility]



               00   :00                                          Center

 

     amLODIPine      2021- No             5mg  QD   Take 1           [facility]



     (NORVASC) 5      1-30 11-30                          tablet (5           [facility]

[facility] -



     MG tablet      00:00: 23:59                          mg total)           Me

dical



               00   :00                           by mouth           Center



                                                  daily.           

 

     amLODIPine      2021- No             5mg  QD   Take 1           [facility]



     (NORVASC) 5      1-30 11-30                          tablet (5           [facility]

[facility] -



     MG tablet      00:00: 23:59                          mg total)           Me

dical



               00   :00                           by mouth           Center



                                                  daily.           

 

     pravastatin            Yes                      Take 1           [facility]



     (PRAVACHOL)      4-05                               tablet by           Rad

es -



     40 MG      00:00:                               mouth           Medical



     tablet      00                                 daily           Center

 

     pravastatin            Yes                      Take 1           [facility]



     (PRAVACHOL)      4-05                               tablet by           Rad

es -



     40 MG      00:00:                               mouth           Medical



     tablet      00                                 daily           Center

 

     mycophenola      2021- No                       Take 1           [facility]



     te        4-05 12-16                          capsule           [facility]



     (CELLCEPT)      00:00: 00:00                          (250mg)           Med

ical



     250 mg      00   :00                           by mouth           Center



     capsule                                         twice           



                                                  daily           

 

     mycophenola      2021- No                       Take 1           [facility]



     te        4-05 12-16                          capsule           [facility]



     (CELLCEPT)      00:00: 00:00                          (250mg)           Med

ical



     250 mg      00   :00                           by mouth           Center



     capsule                                         twice           



                                                  daily           

 

     HYDROcodone      2021- No        Tooth pain 1{tbl}      Take 1      

     [facility]



     -acetaminop      3-10 03-20                          tablet by           [facility] (NORCO      00:00: 23:59                          mouth           Medic

al



     )      00   :00                           every 6           Center



      mg                                         (six)           



     per tablet                                         hours as           



                                                  needed for           



                                                  Pain for           



                                                  up to 10           



                                                  days. Max           



                                                  Daily           



                                                  Amount: 4           



                                                  tablets           

 

     HYDROcodone      2021- No        Tooth pain 1{tbl}      Take 1      

     [facility]



     -acetaminop      3-10 03-20                          tablet by           [facility] (NORCO      00:00: 23:59                          mouth           Medic

al



     )      00   :00                           every 6           Center



      mg                                         (six)           



     per tablet                                         hours as           



                                                  needed for           



                                                  Pain for           



                                                  up to 10           



                                                  days. Max           



                                                  Daily           



                                                  Amount: 4           



                                                  tablets           

 

     tacrolimus      2021- No                       Take 2           [facility]



     (PROGRAF) 1      - 12-13                          capsules           Rad

es -



     MG capsule      00:00: 00:00                          by mouth           Me

dical



               00   :00                           twice           Center



                                                  daily           



                                                  (DIFF'T           



                                                  LOOK SAME           



                                                  MEDICATION           



                                                  )              

 

     tacrolimus      2021- No                       Take 2           [facility]



     (PROGRAF) 1       12-13                          capsules           Rad

es -



     MG capsule      00:00: 00:00                          by mouth           Me

dical



               00   :00                           twice           Center



                                                  daily           



                                                  (DIFF'T           



                                                  LOOK SAME           



                                                  MEDICATION           



                                                  )              

 

     famotidine      2021- No             20mg Q.5D Take 1           [facility]



     (PEPCID) 20       07-07                          tablet (20           L

ukes -



     MG tablet      00:00: 23:59                          mg total)           Me

dical



               00   :00                           by mouth 2           Center



                                                  (two)           



                                                  times           



                                                  daily.           

 

     famotidine      2021- No             20mg Q.5D Take 1           [facility]



     (PEPCID) 20      -                          tablet (20           L

ukes -



     MG tablet      00:00: 23:59                          mg total)           Me

dical



               00   :00                           by mouth 2           Center



                                                  (two)           



                                                  times           



                                                  daily.           

 

     omeprazole      2021- No             20mg QD   Take 1           [facility]



     (PRILOSEC)      6-23                          capsule           [facility]

 -



     20 MG      00:00: 23:59                          (20 mg           Medical



     capsule      00   :00                           total) by           Center



                                                  mouth           



                                                  daily.           

 

     omeprazole      2021- No             20mg QD   Take 1           [facility]



     (PRILOSEC)      -23                          capsule           [facility]

 -



     20 MG      00:00: 23:59                          (20 mg           Medical



     capsule      00   :00                           total) by           Center



                                                  mouth           



                                                  daily.           

 

     pravastatin      2021- No             40mg QD   Take 1           [facility]



     (PRAVACHOL)       04-05                          tablet (40           L

ukes -



     40 MG      00:00: 00:00                          mg total)           Medica

l



     tablet      00   :00                           by mouth           Center



                                                  daily.           

 

     pravastatin      2021- No             40mg QD   Take 1           [facility]



     (PRAVACHOL)       04-05                          tablet (40           L

ukes -



     40 MG      00:00: 00:00                          mg total)           Medica

l



     tablet      00   :00                           by mouth           Center



                                                  daily.           

 

     mycophenola      2021- No             250mg Q.5D Take 1           [facility]



     te         03-11                          capsule           [facility]



     (CELLCEPT)      00:00: 00:00                          (250 mg           Med

ical



     250 mg      00   :00                           total) by           Center



     capsule                                         mouth 2           



                                                  (two)           



                                                  times           



                                                  daily.           

 

     mycophenola      2021- No             250mg Q.5D Take 1           [facility]



     te         03-11                          capsule           [facility]



     (CELLCEPT)      00:00: 00:00                          (250 mg           Med

ical



     250 mg      00   :00                           total) by           Center



     capsule                                         mouth 2           



                                                  (two)           



                                                  times           



                                                  daily.           

 

     Vitamin D Vitamin D       Yes  BRANDY                TAKE 1          

 [facility]



     (Ergocalcif (Ergocalcif 4-03           [employer] [employer]                CAPSULE   

        ity of



     danyel) [id number] danyel) [address] 00:00:                               WEEKLY.       

    [place]



     UNIT Oral UNIT Oral 00                                                Physi

ci



     Capsule Capsule                                                   ans

 

     gabapentin            No                       300 mg, 1           Me

moria



     300 MG Oral                                     cap,           l



     Capsule      18:37:                               Route: PO,           Herm

                                 Drug form:           



                                                  CAP, ONCE,           



                                                  Dosing           



                                                  Weight           



                                                  102.273,           



                                                  kg,            



                                                  Priority:           



                                                  STAT,           



                                                  Start           



                                                  date:           



                                                  17           



                                                  13:37:00           



                                                  CDT, Stop           



                                                  date:           



                                                  17           



                                                  13:37:00           



                                                  CDT            

 

     Tramadol            No                       100 mg,           [facility]

a



                                              Route: PO,           l



               18:36:                               Drug form:           [place]



               00                                 TAB, ONCE,           



                                                  Dosing           



                                                  Weight           



                                                  102.273,           



                                                  kg,            



                                                  Priority:           



                                                  STAT,           



                                                  Start           



                                                  date:           



                                                  17           



                                                  13:36:00           



                                                  CDT, Stop           



                                                  date:           



                                                  17           



                                                  13:36:00           



                                                  CDT            

 

     ketOROLAC      0      No                       IV, ONCE           [facility]

[facility]



     (ANES)                                                    l



               18:03:                                              Jeff                                                

 

     ondansetron            No                       Route: IV,           

[facility]



     (ANES)                                     Drug form:           l



               18:03:                               INJ, ONCE,                                            Stop date:           



                                                  17           



                                                  13:03:00           



                                                  CDT            

 

     tramadol            Yes                      50 mg = 1           [facility]

[facility]



     hydrochlori                                     tab, PO,           l



     de 50 MG      17:57:                               Q6H, PRN           [facility]

nn



     Oral Tablet      00                                 Pain, X 10           



                                                  day, # 40           



                                                  tab, 0           



                                                  Refill(s)           

 

     Ondansetron            No                       4 mg,           [facility]

ia



                                              Route:           l



               17:00:                               IVP, Q6H,           [place]                                 Dosing           



                                                  Weight           



                                                  102.273,           



                                                  kg, Start           



                                                  date:           



                                                  17           



                                                  12:00:00           



                                                  CDT,           



                                                  Duration:           



                                                  30 day,           



                                                  Stop date:           



                                                  10/21/17           



                                                  6:00:00           



                                                  CDT            

 

     propofol      0      No                       Route: IV,           Mem

oria



     (ANES)                                     Drug form:           l



               15:08:                               INJ, ONCE,                                            Stop date:           



                                                  17           



                                                  10:08:00           



                                                  CDT            

 

     succinylcho      -0      No                       Route: IV,           

[facility]



     line (ANES)                                     Drug form:           l



               15:08:                               INJ, ONCE,                                            Stop date:           



                                                  17           



                                                  10:08:00           



                                                  CDT            

 

     fentaNYL      -0      No                       Route: IV,           Mem

oria



     (ANES)                                     Drug form:           l



               15:08:                               INJ, ONCE,                                            Stop date:           



                                                  17           



                                                  10:08:00           



                                                  CDT            

 

     lidocaine      -0      No                       Route: IV,           Me

[facility]ia



     (ANES)                                     Drug form:           l



               15:03:                               INJ, ONCE,                                            Stop date:           



                                                  17           



                                                  10:03:00           



                                                  CDT            

 

     Acetaminoph      -0      No                       1,000 mg,           M

[facility]



     en                                       Route: PO,           l



               15:00:                               Drug form:           Jeff                                 LIQ,           



                                                  Q6Hnow,           



                                                  Dosing           



                                                  Weight           



                                                  102.273,           



                                                  kg, Start           



                                                  date:           



                                                  17           



                                                  10:00:00           



                                                  CDT,           



                                                  Duration:           



                                                  30 day,           



                                                  Stop date:           



                                                  10/21/17           



                                                  4:00:00           



                                                  CDT            

 

     Tramadol      -0      No                       100 mg,           [facility]

a



                                              Route: PO,           l



               15:00:                               Drug form:                                            SUSP,           



                                                  Q6Hnow,           



                                                  Dosing           



                                                  Weight           



                                                  102.273,           



                                                  kg, Start           



                                                  date:           



                                                  17           



                                                  10:00:00           



                                                  CDT,           



                                                  Duration:           



                                                  30 day,           



                                                  Stop date:           



                                                  10/21/17           



                                                  4:00:00           



                                                  CDT            

 

     gabapentin      -0      No                       300 mg,           [facility]

jensen



                                              Route: PO,           l



               15:00:                               Drug form:                                            SUSP,           



                                                  Q8Hnow,           



                                                  Dosing           



                                                  Weight           



                                                  102.273,           



                                                  kg, Start           



                                                  date:           



                                                  17           



                                                  10:00:00           



                                                  CDT,           



                                                  Duration:           



                                                  30 day,           



                                                  Stop date:           



                                                  10/21/17           



                                                  2:00:00           



                                                  CDT            

 

     celecoxib      -0      No                       200 mg,           [facility]

ia



                                              Route: PO,           l



               15:00:                               [id number],                                            Dosing           



                                                  Weight           



                                                  102.273,           



                                                  kg, Start           



                                                  date:           



                                                  17           



                                                  10:00:00           



                                                  CDT,           



                                                  Duration:           



                                                  30 day,           



                                                  Stop date:           



                                                  10/20/17           



                                                  22:00:00           



                                                  CDT            

 

     rocuronium      -0      No                       Route: IV,           M

[facility]



     (ANES)                                     Drug form:           l



               14:43:                               INJ, ONCE,                                            Stop date:           



                                                  17           



                                                  9:43:00           



                                                  CDT            

 

     famotidine            No                       Route: IV,           M

[facility]



     (ANES)                                     Drug form:           l



               14:33:                               INJ, ONCE,                                            Stop date:           



                                                  17           



                                                  9:33:00           



                                                  CDT            

 

     ceFAZolin            No                       Route: IV,           Me

[facility]



     (ANES)                                     Drug form:           l



               14:33:                               INJ, ONCE,                                            Stop date:           



                                                  17           



                                                  9:33:00           



                                                  CDT            

 

     dexamethaso            No                       Route: IV,           

[facility]



     ne (ANES)                                     Drug form:           l



               14:18:                               INJ, ONCE,                                            Stop date:           



                                                  17           



                                                  9:18:00           



                                                  CDT            

 

     acetaminoph            No                       Route: IV,           

[facility]



     en (ANES)                                     Drug form:           l



     (ANES)      13:54:                               INJ, Start                                            date:           



                                                  17           



                                                  8:54:00           



                                                  CDT, Stop           



                                                  date:           



                                                  17           



                                                  9:54:00           



                                                  CDT            

 

     LR 1000 mL            No                       Route: IV,           M

[facility]



     INJ (ANES)                                     Total           l



               13:23:                               Volume:                                            1,000,           



                                                  Start           



                                                  date:           



                                                  17           



                                                  8:23:00           



                                                  CDT, Stop           



                                                  date:           



                                                  17           



                                                  9:23:00           



                                                  CDT            

 

     Flomax            No                       Notes:           [facility]



                                              (Same As:           l



               10:00:                               Flomax)                                            "Do Not           



                                                  Crush"           

 

     Ofirmev            No                       Notes:           [facility]



                                              Infuse           l



               10:00:                               over 15                                            minutes           



                                                  Do not           



                                                  exceed           



                                                  4gm/day of           



                                                  acetaminop           



                                                  hen    ***           



                                                  MEDICATION           



                                                  WASTE ***           



                                                  Product           



                                                  Size:           



                                                  1000 mg           



                                                  Product           



                                                  Wasted:           



                                                  ___ mg           

 

     ceFAZolin            No                       Notes:           [facility]

a



                                              Same as:           l



               10:00:                               Ancef                                                           

 

     Tacrolimus            Yes                      2 mg, PO,           Me

[facility]



                                              QPM            l



               14:03:                                                                                              

 

     Amitriptyli Amitriptyli       Yes  [facility]UL[facility]INDER                TAKE 1   

        [facility]



     ne HCl - 25 ne HCl - 25 3-21           SIMONE ALANIZ                TABLET 3  

         ity of



     MG Oral MG Oral 00:00:                               TIMES           [place]



     Tablet Tablet 00                                 DAILY AS           [facility]



                                                  NEEDED.           ans

 

     Sucralfate            No                       Notes: May           M

[facility]



     100 MG/ML                                     interfere           l



     Oral      00:00:                               w/enteral           [place]



     Suspension      00                                 feeds -           



                                                  Take 1 hr           



                                                  before or           



                                                  2 hr after           



                                                  antacids,           



                                                  dairy pdt,           



                                                  meals           



                                                  &amp;           



                                                  minerals -           



                                                   On empty           



                                                  stomach.           



                                                  For            



                                                  patients           



                                                  unable to           



                                                  swallow           



                                                  tablet,           



                                                  dissolve           



                                                  in 10mL -           



                                                  30mL of           



                                                  water or           



                                                  juice and           



                                                  stir           



                                                  before           



                                                  giving.           



                                                  (Same As:           



                                                  [place])           

 

     Protonix            No                       Notes:           [facility]



                                              Tablet           l



               15:00:                               should not           [facility]



               00                                 be chewed           



                                                  or             



                                                  crushed.           



                                                  (Same as:           



                                                  Protonix)           

 

     Cartia XT            No                       Notes:           [facility]

a



                                              (Same as:           l



               15:00:                               Cardizem           [facility]



               00                                 CD)            



                                                  Before           



                                                  meals.  DO           



                                                  NOT CRUSH.           

 

     Prednisone            No                       Notes:           [facility]

ia



                                              (Same as:           l



               15:00:                               PredniSONE           [place]



               00                                 )  Take           



                                                  with food.           

 

     Acetaminoph            Yes                      1,000 mg =           

[facility]



     en                                       31.23 mL,           l



               13:15:                               PO,            [place]



               00                                 Q6Hnow, 0           



                                                  Refill(s)           

 

     gabapentin            Yes                      300 mg = 6           M

[facility]



     50 MG/ML      -26                               mL, PO,           l



     Oral      13:15:                               Q8Hnow, #           [facility]



     Solution      00                                 378 mL, 0           



                                                  Refill(s)           

 

     tramadol            Yes                      50 mg = 1           [facility]

j[facility]



     hydrochlori                                     tab, PO,           l



     de 50 MG      13:15:                               Q6Hnow,           [facility]

n



     Oral Tablet      00                                 PRN Pain           



                                                  Score           



                                                  6-10, X 14           



                                                  day, # 56           



                                                  tab, 0           



                                                  Refill(s)           

 

     Enoxaparin            No                       Notes:           [facility]

ia



                                              (Same as:           l



               12:00:                               Lovenox)           [place]



               00                                                

 

     Solu-CORTEF            No                       Notes:           [facility]

jensen



                                              (Same as:           l



               04:00:                               Solu-RUPA           [facility]



               00                                 F)             

 

     Prograf            No                       Notes:           [facility]



               -                               Avoid           l



               03:26:                               grapefruit           [place]



               00                                 and            



                                                  grapefruit           



                                                  juice.           



                                                  (Same As:           



                                                  Prograf)           

 

     hydrocortis            No                       100 mg,           [facility]

[facility]



     one 100 mg                                     Route: IV,           l



     injection      03:00:                               Q6H,                                            Dosing           



                                                  Weight           



                                                  136.136,           



                                                  kg, Start           



                                                  date:           



                                                  17           



                                                  21:00:00           



                                                  CST,           



                                                  Duration:           



                                                  30 day,           



                                                  Stop date:           



                                                  17           



                                                  18:00:00           



                                                  CST            

 

     Ondansetron            No                       Notes:           [facility]

j[facility]



                                              (Same as:           l



               00:00:                               Zofran)           [facility]                                 ***            



                                                  MEDICATION           



                                                  WASTE ***           



                                                  Product           



                                                  Size:  4           



                                                  mg Product           



                                                  Wasted:           



                                                  ___ mg           

 

     Acetaminoph            No                       1,000 mg,           M

[facility]



     en                                       Route: IV,           l



               23:36:                               ONCE,                                            Dosing           



                                                  Weight           



                                                  136.136,           



                                                  kg, Start           



                                                  date:           



                                                  17           



                                                  17:36:00           



                                                  CST, Stop           



                                                  date:           



                                                  17           



                                                  17:36:00           



                                                  CST            

 

     Docusate            No                       Notes:           [facility]



               1-25                               (Same as:           l



               23:00:                               Colace)           [facility]



                                                               

 

     Cellcept            No                       Notes:           [facility]



               1-25                               SEPARATE           l



               23:00:                               ANTACIDS                                            from           



                                                  Cellcept           



                                                  by 2 hrs.           



                                                  (Same As:           



                                                  CellCept)           

 

     Hydralazine            No                       Notes:           [facility]

j[facility]



     Hydrochlori      -25                               (Same as:           l



     de 100 MG      23:00:                               Apresoline           He

rmann



     Oral Tablet                                       )  May           



                                                  interfere           



                                                  w/enteral           



                                                  feedings           



                                                  Take With           



                                                  Food           

 

     Al              No                       Notes:           [facility]



     hydroxide/M      -25                               (aluminum           l



     g         21:00:                               hydroxide-           [place]



     hydroxide/s      00                                 magnesium           



     imethicone                                         hyd-simeth           



     200 mg-200                                         icone           



     mg-20 mg/5                                         200-200-20           



     mL oral                                         mg/5ml 30           



     suspension                                         ml ud TINY)           

 

     Ondansetron            No                       Notes:           [facility]

jensen



               1-25                               (Same as:           l



               21:00:                               Zofran)           [place]



                                                ***            



                                                  MEDICATION           



                                                  WASTE ***           



                                                  Product           



                                                  Size:  4           



                                                  mg Product           



                                                  Wasted:           



                                                  ___ mg           

 

     Hydromorpho            No                       Notes:           Geraldo

jensen



     ne        -                               Same as           l



               21:00:                               Dilaudid           Jeff



                                                               

 

     Oxycodone            No                       Notes:           [facility]

a



     Hydrochlori      -25                               (Same           l



     de 5 MG      21:00:                               as:'[place]           [facility]

ina



     Oral Tablet      00                                 done) To           



                                                  be drawn           



                                                  up in 3 mL           



                                                  syr            

 

     gabapentin            No                       300 mg,           Geraldo

jensen



                                              Route: PO,           l



               21:00:                               Q8Hnow,           Jeff



                                                Dosing           



                                                  Weight           



                                                  136.136,           



                                                  kg, Start           



                                                  date:           



                                                  17           



                                                  15:00:00           



                                                  CST,           



                                                  Duration:           



                                                  30 day,           



                                                  Stop date:           



                                                  17           



                                                  7:00:00           



                                                  CST            

 

     Tramadol            No                       Notes: Not           [facility]

[facility]



                                              to exceed           l



               21:00:                               400mg/day.           Jeff



                                                (Same As:           



                                                  Ultram)           

 

     Acetaminoph            No                       1,000 mg,           M

[facility]



     en                                       Route:           l



               21:00:                               IVPB,           [facility]



               00                                 Q6Hnow,           



                                                  Dosing           



                                                  Weight           



                                                  136.136,           



                                                  kg, Start           



                                                  date:           



                                                  17           



                                                  15:00:00           



                                                  CST,           



                                                  Duration:           



                                                  30 day,           



                                                  Stop date:           



                                                  17           



                                                  9:00:00           



                                                  CST            

 

     Al              No                       30 ml,           [facility]



     hydroxide/M                                     Route: PO,           l



     g         20:54:                               Drug Form:           [facility]



     hydroxide/s      00                                 SUSP,           



     imethicone                                         Dosing           



     200 mg-200                                         Weight           



     mg-20 mg/5                                         136.136,           



     mL oral                                         kg, Q6H,           



     suspension                                         PRN            



                                                  Indigestio           



                                                  n, Start           



                                                  date:           



                                                  17           



                                                  14:54:00           



                                                  CST,           



                                                  Duration:           



                                                  30 day,           



                                                  Stop date:           



                                                  17           



                                                  14:53:00           



                                                  CST            

 

     Calcium            No                       1,000 mL,           [facility]

ia



     Chloride                                     Rate: 125           l



     0.0014      20:54:                               ml/hr,           Jeff



     MEQ/ML /      00                                 Infuse           



     Potassium                                         over: 8           



     Chloride                                         hr, Route:           



     0.004                                         IV, Dosing           



     MEQ/ML /                                         Weight           



     Sodium                                         136.136           



     Chloride                                         kg, Total           



     0.103                                         Volume:           



     MEQ/ML /                                         1,000,           



     Sodium                                         Start           



     Lactate                                         date:           



     0.028                                         17           



     MEQ/ML                                         14:54:00           



     Injectable                                         CST,           



     Solution                                         Duration:           



                                                  30 day,           



                                                  Stop date:           



                                                  17           



                                                  14:53:00           



                                                  CST            

 

     Cefoxitin            No                       1 gm,           [facility]



                                              Route:           l



               20:10:                               IVPB,           [place]



               00                                 ONCE,           



                                                  Dosing           



                                                  Weight           



                                                  136.136,           



                                                  kg, Start           



                                                  date:           



                                                  17           



                                                  14:10:00           



                                                  CST, Stop           



                                                  date:           



                                                  17           



                                                  14:10:00           



                                                  CST            

 

     gabapentin            No                       Notes:           [facility]

ia



                                              (Same as:           l



               19:00:                               Neurontin)                                                           

 

     Acetaminoph            No                       Notes: Max           

[facility]



     en        -                               acetaminop           l



               19:00:                               hen =                                            4000mg/day           



                                                  (4             



                                                  gm/day).           



                                                  (Same as:           



                                                  Tylenol)           

 

     Promethazin            No                       Notes: Do           M

[facility]



     e                                        not give           l



               18:26:                               IV push.                                            (Same as:           



                                                  Phenergan)           

 

     Promethazin            No                       12.5 mg,           Me

[facility]



     e                                        Route: PO,           l



               18:24:                               Q6H,                                            Dosing           



                                                  Weight           



                                                  136.136,           



                                                  kg, PRN           



                                                  Nausea &           



                                                  Vomiting,           



                                                  Start           



                                                  date:           



                                                  17           



                                                  12:24:00           



                                                  CST,           



                                                  Duration:           



                                                  30 day,           



                                                  Stop date:           



                                                  17           



                                                  12:23:00           



                                                  CST            

 

     Tramadol            No                       Notes: Not           [facility]

[facility]



                                              to exceed           l



               18:24:                               400mg/day.                                            (Same As:           



                                                  Ultram)           

 

     bupivacaine            No                       Notes:           Geraldo

jensen



     liposome                                     (Same as:           l



               18:00:                               Exparel)                                            *** NOT           



                                                  FOR IV           



                                                  use****           



                                                  Postoperat           



                                                  [place]            



                                                  analgesia:           



                                                  Infiltrati           



                                                  on             



                                                  (local):           



                                                  Dose is           



                                                  based on           



                                                  surgical           



                                                  site and           



                                                  volume           



                                                  required           



                                                  to cover           



                                                  the area           



                                                  (in            



                                                  general,           



                                                  the            



                                                  maximum           



                                                  total dose           



                                                  is 266           



                                                  mg).           



                                                  Bunionecto           



                                                  my: 7 mL           



                                                  into the           



                                                  tissues           



                                                  surroundin           



                                                  g the           



                                                  osteotomy           



                                                  and 1 mL           



                                                  into the           



                                                  subcutaneo           



                                                  us tissue           



                                                  of the           



                                                  surgical           



                                                  site           



                                                  (total           



                                                  dose = 8           



                                                  mL [106           



                                                  mg])           



                                                  Hemorrhoid           



                                                  ectomy: 30           



                                                  mL (20 mL           



                                                  vial           



                                                  diluted           



                                                  with 10 mL           



                                                  NS)            



                                                  divided           



                                                  and            



                                                  administer           



                                                  ed as 6           



                                                  injections           



                                                  of 5 mL           



                                                  each           



                                                  (total           



                                                  dose = 30           



                                                  mL [266           



                                                  mg])           

 

     Lovenox            No                       Notes:           [facility]



               1-25                               (Same as:           l



               17:00:                               Lovenox)                                                           

 

     scopolamine            No                       Notes:           Geraldo

jensen



               1-25                               Change           l



               12:00:                               patch                                            every 72           



                                                  hours           



                                                  (Same as:           



                                                  Transderm-           



                                                  Scop)           

 

     heparin            No                       Notes:           [facility]



               1-25                               porcine           l



               12:00:                               heparin                                                           

 

     Lovenox            No                       Notes:           [facility]



               1-25                               (Same as:           l



               12:00:                               Lovenox)                                                           

 

     Mefoxin            No                       Notes:           [facility]



               1-25                               (Same As:           l



               11:37:                               Mefoxin)           [facility]



                                                ***            



                                                  MEDICATION           



                                                  WASTE ***           



                                                  Product           



                                                  Size:           



                                                  2000 mg           



                                                  Product           



                                                  Wasted:           



                                                  ___ mg           

 

     Ofirmev            No                       Notes:           [facility]



               1-25                               Infuse           l



               11:36:                               over 15           [place]



               00                                 minutes           



                                                  Do not           



                                                  exceed           



                                                  4gm/day of           



                                                  acetaminop           



                                                  hen    ***           



                                                  MEDICATION           



                                                  WASTE ***           



                                                  Product           



                                                  Size:           



                                                  1000 mg           



                                                  Product           



                                                  Wasted:           



                                                  ___ mg           

 

     Ofirmev            No                       Notes:           [facility]



               1-25                               Infuse           l



               11:35:                               over 15           [place]



               00                                 minutes           



                                                  Do not           



                                                  exceed           



                                                  4gm/day of           



                                                  acetaminop           



                                                  hen    ***           



                                                  MEDICATION           



                                                  WASTE ***           



                                                  Product           



                                                  Size:           



                                                  1000 mg           



                                                  Product           



                                                  Wasted:           



                                                  ___ mg           

 

     Pravastatin            Yes                      40 mg = 1           M

[facility]



     Sodium 40      1-17                               tab, PO,           l



     MG Oral      17:47:                               Bedtime, #           [facility]

ina



     Tablet      00                                 30 tab, 0           



     [Pravachol]                                         Refill(s)           

 

     Hydralazine            Yes                      100 mg = 1           

[facility]



     Hydrochlori      1-17                               tab, PO,           l



     de 100 MG      17:47:                               TID, # 90           Her

merino



     Oral Tablet      00                                 tab, 3           



                                                  Refill(s)           

 

     Hydralazine            No                       0              [facility]

a



               1-17                               Refill(s)           l



               17:47:                                              [facility]



               00                                                

 

     mycophenola            Yes                      1,000 mg =           

[facility]



     te mofetil      1-17                               2 tab, PO,           l



     500 MG Oral      17:47:                               BID, # 120           

Jeff



     Tablet      00                                 tab, 0           



     [Cellcept]                                         Refill(s)           

 

     pantoprazol            Yes                      40 mg = 1           M

[facility]



     e 40 MG      1-17                               tab, PO,           l



     Enteric      17:47:                               BID, # 30           Kaylee

nn



     Coated      00                                 tab, 0           



     Tablet                                         Refill(s)           



     [Protonix]                                                        

 

     24 HR            Yes                      240 mg = 1           [facility]

a



     Diltiazem      1-17                               cap, PO,           l



     Hydrochlori      17:47:                               Daily, #           He

rmann



     de 240 MG      00                                 30 cap, 0           



     Extended                                         Refill(s)           



     Release                                                        



     Capsule                                                        



     [Cartia]                                                        

 

     magnesium      2017-0      Yes                      400 mg = 1           Me

moria



     oxide 400      -17                               tab, PO,           l



     mg oral      17:47:                               Daily, 0           Hus[facility]

n



     tablet      00                                 Refill(s)           

 

     calcium            No                       CHEW, BID,           Geraldo

jensen



     carbonate      -17                               0              l



     1000 mg      17:47:                               Refill(s)           Kaylee

nn



     oral      00                                                



     tablet,                                                        



     chewable                                                        

 

     predniSONE            Yes                      10 mg = 1           Me

moria



     10 mg oral      -17                               tab, PO,           l



     tablet      17:47:                               Daily, #           [facility]



               00                                 30 tab, 3           



                                                  Refill(s)           

 

     Prograf            Yes                      2.5 mg,           [facility]



               1-17                               PO, Q12H,           l



               17:47:                               0              Jeff



               00                                 Refill(s)           

 

     Sulfamethox            No                       1 tab, PO,           

[facility]



     azole 800      1-17                               M-W-F, 0           l



     MG /      17:47:                               Refill(s)           [place]



     Trimethopri      00                                                



     m 160 MG                                                        



     Oral Tablet                                                        



     [Bactrim]                                                        







Immunizations







           Ordered Immunization Filled Immunization Date       Status     Commen

ts   Source



           Name       Name                                        

 

           INFLUENZA TRIVALENT            2019 [facility]



           ADJUVANTED PF IM            00:00:00                         [facility]

 

           INFLUENZA TRIVALENT            2019 Completed             [facility]



           ADJUVANTED PF IM            00:00:00                         [facility]

 

           Influenza High Dose            2018 Completed             [facility]



           Preservative Free IM            00:00:00                         [facility]



           ([id number])                                               

 

           Influenza High Dose            2018 Completed             [facility]



           Preservative Free IM            00:00:00                         [facility]



           ([id number])                                               

 

           Influenza TIV (IM)            2017-10-09 Completed             [facility]



                                 00:00:00                         [facility]

 

           Influenza TIV (IM)            2017-10-09 Completed             [facility]



                                 00:00:00                         [facility]

 

           Influenza High Dose            2015 Completed             [facility]



           Preservative Free            00:00:00                         [facility]                                             

 

           Influenza High Dose            2015 Completed             [facility]



           Preservative Free            00:00:00                         [facility]



           [id number]                                             

 

           Rho (D) Immune            2015-05-15 Completed             [facility]

[facility] -



           Globulin              00:00:00                         [facility]

 

           Rho (D) Immune            2015-05-15 Completed             [facility]

[facility] -



           Globulin              00:00:00                         [facility] Center







Vital Signs







             Vital Name   Observation Time Observation Value Comments     Source

 

             HEIGHT       2021 05:49:00 193 cm                    

 

             WEIGHT       2021 05:49:00 108.863 kg                

 

             WEIGHT       2021 11:10:00 107.775 kg                

 

             WEIGHT       2021 11:10:00 107.775 kg                

 

             HEIGHT       2021 09:01:00 188 cm                    

 

             WEIGHT       2021 09:01:00 109.181 kg                

 

             HEIGHT       2021 09:01:00 188 cm                    

 

             WEIGHT       2021 09:01:00 109.181 kg                

 

             HEIGHT       2021 05:49:00 193 cm                    

 

             WEIGHT       2021 05:49:00 108.863 kg                

 

             HEIGHT       2021 17:35:30 193 cm                    

 

             WEIGHT       2021 17:35:30 99.791 kg                 

 

             HEIGHT       2021 17:35:30 193 cm                    

 

             WEIGHT       2021 17:35:30 99.791 kg                 

 

             WEIGHT       2020 00:00:00 99.791 kg                 

 

             HEIGHT       2020 00:00:00 193 cm                    

 

             WEIGHT       2020 00:00:00 99.791 kg                 

 

             HEIGHT       2020 00:00:00 193 cm                    

 

             Systolic blood 2021 11:10:00 149 mm[Hg]                [facility]

 

             Diastolic blood 2021 11:10:00 98 mm[Hg]                 [facility]

 

             Heart rate   2021 11:10:00 86 /min                   [facility]

 

             Body temperature 2021 11:10:00 37.11 Eunice                 [facility]

 

             Respiratory rate 2021 11:10:00 18 /min                   [facility]

 

             Body weight  2021 11:10:00 107.775 kg                [facility]

 

             BMI          2021 11:10:00 30.51 kg/m2               [facility]

 

             Oxygen saturation in 2021 11:10:00 97 /min                   

[facility]



             Arterial blood by                                        Medical Ce

nter



             Pulse oximetry                                        

 

             Body height  2021 09:01:00 188 cm                    [facility]

 

             Systolic (mm Hg) 2017 19:18:00                           [facility]

rial [place]

 

             Diastolic (mm Hg) 2017 19:18:00                           [facility]

[facility]

 

             Respitory Rate 2017 19:18:00                           [facility]

al [place]

 

             Respitory Rate 2017 19:00:00                           [facility]

al [place]

 

             Systolic (mm Hg) 2017 19:00:00                           [facility]

rial [place]

 

             Diastolic (mm Hg) 2017 19:00:00                           Mem

orial [place]

 

             Respitory Rate 2017 18:45:00                           [facility]

al [place]

 

             Systolic (mm Hg) 2017 18:45:00                           [facility]

rial [place]

 

             Diastolic (mm Hg) 2017 18:45:00                           Mem

orial [place]

 

             Heart Rate   2017 11:54:00                           [facility]

 [place]

 

             Weight       2017 19:48:00                           [facility]

 [facility]

 

             BMI Calculated 2017 19:48:00                           [facility]

al [place]

 

             Height       2017 19:48:00 193.04 cm                 [facility]

 [place]

 

             Temperature Oral (F) 2017 14:35:00 98.0 F                    

[facility]

 

             Systolic (mm Hg) 2017 14:35:00                           [facility]

rial [place]

 

             Diastolic (mm Hg) 2017 14:35:00                           [facility]

[facility]

 

             Respitory Rate 2017 14:35:00                           [facility]

al [facility]

 

             Heart Rate   2017 14:35:00                           [facility]

 [place]

 

             Heart Rate   2017 10:35:00                           [facility]

 [facility]

 

             Respitory Rate 2017 10:35:00                           [facility]

al [place]

 

             Temperature Oral (F) 2017 10:35:00 97.6 F                    

[facility] [place]

 

             Systolic (mm Hg) 2017 10:35:00                           [facility]

rial [facility]

 

             Diastolic (mm Hg) 2017 10:35:00                           Mem

[facility]

 

             Respitory Rate 2017 05:24:00                           [facility]

al [facility]

 

             Heart Rate   2017 05:24:00                           [facility]

 [facility]

 

             Systolic (mm Hg) 2017 05:24:00                           [facility]

rial [facility]

 

             Diastolic (mm Hg) 2017 05:24:00                           Mem

orial [place]

 

             Temperature Oral (F) 2017 05:24:00 98.3 F                    

[facility]

 

             Weight       2017 03:00:00                           [facility]

 [facility]

 

             Height       2017 03:00:00 193.04 cm                 [facility]

 [facility]

 

             BMI Calculated 2017 03:00:00                           [facility]

al [place]

 

             Weight       2017 16:16:00                           [facility]

 [place]

 

             Height       2017 16:16:00 193.04 cm                 [facility]

 [place]

 

             BMI Calculated 2017 16:16:00                           [facility]

al [place]

 

             Weight       2017 21:05:00                           [facility]

 [place]

 

             BMI Calculated 2017 21:05:00                           [facility]

al [facility]

 

             Height       2017 21:05:00 193.04 cm                 [facility]

 [facility]







Procedures







                Procedure       Date / Time     Performing Clinician Source



                                Performed                       

 

                TACROLIMUS LEVEL 2021 10:39:00 Alexei Prince [facility]

 

                BASIC METABOLIC PANEL 2021 10:39:00 Shirley Loo

[facility]



                (7)                                             [facility]

 

                CBC W/PLT COUNT & AUTO 2021 10:39[facility]00 [facility]

 

                CBC W/PLT COUNT & AUTO 2021 10:39:00 Fawad [facility]

 

                XR CHEST 2 VIEWS 2021 11:34:00 Fawad [facility]

 

                2D ECHO W/ DOPPLER 2021 10:44:42 donna Shirley [facility]



                (CW/PW/COLOR)                                   [facility]

 

                TACROLIMUS LEVEL 2021 09:15:00 F[facility]

 

                R & L CATH / CORONARY 2021 07:07:00 Shirley Loo

[facility]



                ANGIOS / BIOPSY                                 [facility]

 

                ECG 12-LEAD     2021 06:31:28 [facility]

 

                CBC (HEMOGRAM ONLY) 2021 06:04:00 EmanuelzahraShirley [facility]

 

                PROTHROMBIN TIME/INR 2021 06:04:00 DarriandonnaShirley [facility]

 

                BASIC METABOLIC PANEL 2021 06:04:00 DarriandonnaShirley

[facility]



                ()                                             [facility]

 

                CARDIAC CATH REPORT - 2021 00:00:00 Wojciech [facility]



                SCAN                            Scanning        [facility]

 

                SARS-COV2/INFLUENZA/RSV 2021 20:00:00 Maria G [facility]



                RT-PCR                                          [facility]

 

                BLOOD CULTURE   2021 20:00:00 Maria G [facility]

 

                B-TYPE NATRIURETIC 2021 19:59:00 Maria G car[facility]

[facility]



                FACTOR (BNP)                                    [facility]

 

                CBC W/PLT COUNT & AUTO 2021 19:59:00 Tresa Cummins

[facility]

 

                BASIC METABOLIC PANEL 2021 19:59:00 Tresa Cummins [facility]



                ()                                             [facility]

 

                MAGNESIUM       2021 19:59:00 Maria G [facility]

 

                TROPONIN I      2021 19:59:00 Maria G [facility]

 

                LACTIC ACID, VENOUS 2021 19:59:00 Maria G [facility]

 

                CBC W/PLT COUNT & AUTO 2021 19:59:00 Tresa Cummins

[facility]

 

                ECG 12-LEAD     2021 19:50:21 Unknown, [facility]

 

                ECG 12-LEAD     2021 19:50:21 Unknown, [facility]

 

                XR CHEST 1 VIEW 2021 18:40:00 Maria G [facility] -



                PORTABLE / BEDSIDE                                 Medical Cente

r

 

                REPORT OF PROCEDURE - 2021 00:00:00 [facility]



                ENDOSCOPY SCAN                  Scanning        [facility]

 

                [QL] CMP W/EGFR 2018 00:00:00                 [facility] [place]



                                                                [facility]

 

                [QL] FOLATE, SERUM 2018 00:00:00                 [facility] [place]



                                                                [facility]

 

                [QLH] HEMOGLOBIN A1c 2018 00:00:00                 [facility]



                                                                Physicians

 

                [QLH] IRON, TOTAL 2018 00:00:00                 [facility] [place]



                                                                [facility]

 

                [QL] LIPID PANEL 2018 00:00:00                 [facility] [place]



                                                                [facility]

 

                [QL] PTH, INTACT 2018 00:00:00                 [facility] [place]



                (WITHOUT CALCIUM)                                 [facility]

 

                [QL] TSH, 3RD  2018 00:00:00                 [facility] [place]



                GENERATION W/REFLEX TO                                 Physician

s



                FT4                                             

 

                [QLH] VITAMIN A 2018 00:00:00                 [facility]

[facility]



                (RETINOL)                                       Physicians

 

                [QL] VITAMIN B1, WHOLE 2018 00:00:00                 [facility]



                BLOOD                                           Physicians

 

                [QL] VITAMIN B12 2018 00:00:00                 [facility] [place]



                                                                [facility]

 

                [H] Vitamin E Level 2018 00:00:00                 [facility] [place]



                                                                [facility]

 

                [L] Vitamin D,  2018 00:00:00                 [facility]



                25-Hydroxy, Total -                                 Physicians



                [employer]                                        

 

                [QL] CBC (INCLUDES 2018 00:00:00                 [facility] [place]



                DIFF/PLT)                                       Physicians

 

                Heart transplant                                 [facility]

 

                Hernia repair                                   [facility]

 

                Laparoscopic sleeve                                 [facility]



                gastrectomy                                     







Plan of Care







             Planned Activity Planned Date Details      Comments     Source

 

             Future Scheduled 2023   Lipid panel               [facility] -



             Test         00:00:00     (procedure) [code =              [facility]



                                       66481949]                 

 

             Future Scheduled 2023   Lipid panel               [facility]

[facility] -



             Test         00:00:00     (procedure) [code =              [facility]



                                       83872356]                 

 

             Future Scheduled 2022   DEPRESSION SCREENING              [facility]



             Test         00:00:00     (12+) [code =              [facility]



                                       DEPRESSION SCREENING              



                                       (12+)]                    

 

             Future Scheduled 2022   DEPRESSION SCREENING              [facility]



             Test         00:00:00     (12+) [code =              Medical Center



                                       DEPRESSION SCREENING              



                                       (12+)]                    

 

             Future Scheduled 2021   INFLUENZA VACCINE (#1)              [facility]

[facility]



             Test         00:00:00     [code = INFLUENZA              Medical Ce

nter



                                       VACCINE (#1)]              

 

             Future Scheduled 2021   INFLUENZA VACCINE (#1)              [facility]

[facility]



             Test         00:00:00     [code = INFLUENZA              Medical Ce

nter



                                       VACCINE (#1)]              

 

             Future Scheduled 2019   SHINGLES VACCINES (1              [facility]



             Test         00:00:00     of 2) [code = SHINGLES              Medic

al Center



                                       VACCINES (1 of 2)]              

 

             Future Scheduled 2019   SHINGLES VACCINES (1              [facility]



             Test         00:00:00     of 2) [code = SHINGLES              Medic

al Center



                                       VACCINES (1 of 2)]              

 

             Future Scheduled 2018   Hemoglobin A1c              [facility]

[facility] -



             Test         00:00:00     measurement               Medical Center



                                       (procedure) [code =              



                                       63497035]                 

 

             Future Scheduled 2018   Hemoglobin A1c              [facility]

[facility] -



             Test         00:00:00     measurement               Medical Center



                                       (procedure) [code =              



                                       70495653]                 

 

             Future Scheduled 1988   DTAP/TDAP/TD VACCINES              [facility]

[facility]



             Test         00:00:00     (1 - Tdap) [code =              Medical C

enter



                                       DTAP/TDAP/TD VACCINES              



                                       (1 - Tdap)]               

 

             Future Scheduled 1988   DTAP/TDAP/TD VACCINES              [facility]



             Test         00:00:00     (1 - Tdap) [code =              Medical C

enter



                                       DTAP/TDAP/TD VACCINES              



                                       (1 - Tdap)]               

 

             Future Scheduled 1981   COVID-19 VACCINE (1)              [facility]



             Test         00:00:00     [code = COVID-19              Medical Alia

ter



                                       VACCINE (1)]              

 

             Future Scheduled 1981   COVID-19 VACCINE (1)              [facility]



             Test         00:00:00     [code = COVID-19              Medical Alia

ter



                                       VACCINE (1)]              

 

             Future Scheduled 1979   DIABETIC EYE EXAM              [facility]



             Test         00:00:00     [code = DIABETIC EYE              Medical

 Center



                                       EXAM]                     

 

             Future Scheduled 1979   Diabetic foot              [facility]

[facility] -



             Test         00:00:00     examination               Medical Center



                                       (regime/therapy) [code              



                                       = 401293673]              

 

             Future Scheduled 1979   Urine screening for              [facility]



             Test         00:00:00     protein (procedure)              Medical 

Center



                                       [code = 818295899]              

 

             Future Scheduled 1979   DIABETIC EYE EXAM              [facility]



             Test         00:00:00     [code = DIABETIC EYE              Medical

 Center



                                       EXAM]                     

 

             Future Scheduled 1979   Diabetic foot              [facility]

[facility] -



             Test         00:00:00     examination               Medical Center



                                       (regime/therapy) [code              



                                       = 651057455]              

 

             Future Scheduled 1979   Urine screening for              [facility]



             Test         00:00:00     protein (procedure)              Medical 

Center



                                       [code = 238539950]              

 

             Future Scheduled 1975   PNEUMOCOCCAL VACCINE              [facility]



             Test         00:00:00     0-64 YRS (1 of 4 -              Medical C

enter



                                       PCV13) [code =              



                                       PNEUMOCOCCAL VACCINE              



                                       0-64 YRS (1 of 4 -              



                                       PCV13)]                   

 

             Future Scheduled 1975   PNEUMOCOCCAL VACCINE              [facility]



             Test         00:00:00     0-64 YRS (1 of 4 -              Medical C

enter



                                       PCV13) [code =              



                                       PNEUMOCOCCAL VACCINE              



                                       0-64 YRS (1 of 4 -              



                                       PCV13)]                   

 

             Future Scheduled 1969   Screening for              [facility]

es -



             Test         00:00:00     malignant neoplasm of              Medica

l Center



                                       colon (procedure)              



                                       [code = 056398102]              

 

             Future Scheduled 1969   Screening for              [facility]

es -



             Test         00:00:00     malignant neoplasm of              Medica

l Center



                                       colon (procedure)              



                                       [code = 393534985]              







Encounters







        Start   End     Encounter Admission Attending Care    Care    Encounter 

Source



        Date/Time Date/Time Type    Type    Clinicians Facility Department ID   

   

 

        2021-10-09         [facility] [id number]

[id number] [facility]



        08:06:39                         SHIRLEY                           

 

        2022 Outpatient [facility]    [id number]

[id number] SLE



        00:00:00 00:00:00                                                 

 

        2022 Outpatient [facility]    [id number]

[id number] [facility]



        00:00:00 00:00:00                                                 

 

        2022 Outpatient [facility] L[facility]    [id number]

[id number] SLE



        00:00:00 00:00:00                 SHIRLEY                           

 

        2022 [facility]   [id number]

[id number] [facility]



        00:00:00 00:00:00                 [facility]

 

        2021 Documentat         Sincere,  [facility]   [id number]

[id number] [facility]



        00:00:00 00:00:00 ion             [facility]

 

        2021 Orders          Sincere,  [facility]   [id number]

[address] [facility]



        00:00:00 00:00:00 Only            [facility]

 

        2021 Follow-Up JW Loo, [facility]   [id number]

[id number] [facility]



        10:12:59 10:27:59                 [facility]

 

        2021 Outpatient JW LOO, [facility]    [facility]    [id number]

[id number] [facility]



        10:12:59 10:12:59                 [place]                           

 

        2021 Orders          Sincere,  [facility]   [id number]

[address] [facility]



        00:00:00 00:00:00 Only            [facility]

 

        2021 Telephone         Sincere,  [facility]   [id number]

[address] [facility]



        00:00:00 00:00:00                 [facility]

 

        2021 Telephone         Sincere,  [facility]   [id number]

[address]67 [facility]



        00:00:00 00:00:00                 [facility]

 

        2021 Telephone         Sincere,  [facility]   [id number]

[address] [facility]



        00:00:00 00:00:00                 [facility]

 

        2021 Telephone         Sincere,  [facility]   [id number]

[address]57 [facility]



        00:00:00 00:00:00                 [facility]

 

        2021-10-22 2021-10-22 Telephone         Sincere,  [facility]   [id number]

[address]04 [facility]



        00:00:00 00:00:00                 [facility]

 

        2021-10-21 2021-10-21 Shila Mayes [facility]   [id number]

[id number] [facility]



        00:00:00 00:00:00                                                 [facility]

 

        2021 Outpatient EL              [facility]    [facility]    [id number]

116 [facility]



        00:00:00 00:00:00                                                 

 

        2021 Outpatient EL              [facility]    [facility]    [id number]

681 [facility]



        00:00:00 00:00:00                                                 

 

        2021 Outpatient EL      [facility]    [id number]

[id number] [facility]



        00:00:00 00:00:00                 [place]                           

 

        2021 Shila Mayes [facility]   [id number]

[id number] [facility]



        00:00:00 00:00:00                                                 [facility]

 

        2021 [facility]   [id number]

47154 [facility]



        11:15:00 23:59:00 Encounter         [facility]

 

        2021 [facility]   [id number]

50785 [facility]



        10:00:00 11:14:00 Encounter         [facility]

 

        2021 Follow-Up         [facility]   [id number]

[id number] [facility]



        08:57:33 09:12:33                 [facility]

 

        2021 Outpatient EL              [facility]    [facility]    [id number]

352 [facility]



        00:00:00 00:00:00                                                 

 

        2021 Outpatient EL      [facility], [facility]    [facility]    [id number]

1432 [facility]



        00:00:00 00:00:00                 [place]                           

 

        2021 Outpatient EL      OBERTON, [facility]    [facility]    [id number]

1677 [facility]



        00:00:00 00:00:00                 [place]                           

 

        2021 Shila Mayes [facility]   [id number]

[id number] [facility]



        00:00:00 00:00:00                                                 [facility]

 

        2021 Orders          Sincere,  [facility]   [id number]

[address] [facility]



        00:00:00 00:00:00 Only            [facility]

 

        2021 Hospital [facility], [facility]   [id number]

[address] [facility]



        05:33:00 11:38:00 Encounter         [facility]

 

        2021 Surgery         [facility], [facility]   [id number]

[address] [facility]



        07:03:00 09:24:00                 [facility]

 

        2021 Travel                  [facility]   [id number]

 [facility]



        00:00:00 00:00:00                                                 [facility]

 

        2021 Telephone         Sincere,  [facility]   [id number]

[address]47 [facility]



        00:00:00 00:00:00                 [facility]

 

        2021 Documentat         Jame, [facility]   [id number]

[id number] [facility]



        00:00:00 00:00:00 ion             [facility]

 

        2021 [facility]   [id number]

[address] [facility]



        00:00:00 00:00:00                 [facility]

 

        2021 Telephone         Sincere,  [facility]   [id number]

66989 [facility]



        00:00:00 00:00:00                 [facility]

 

        2021 Emergency ER      [facility], [facility]   [id number]

85441 [facility]



        18:43:00 22:58:00                 [facility]

 

        2021 Emergency ER              [facility]    Emergency [id number]

[id number] [facility]



        17:22:00 17:22:00                                                 

 

        2021 Travel                  [facility]   [id number]

 [facility]



        00:00:00 00:00:00                                                 [facility]

 

        2021 Telephone         Sincere,  [facility]   [id number]

[address]75 [facility]



        00:00:00 00:00:00                 [facility]

 

        2021-03-10 2021-03-10 Refill          Obdonna, [facility]   [id number]

[address] [facility]



        00:00:00 00:00:00                 [facility]

 

        2021-03-10 2021-03-10 Orders          Sincere,  [facility]   [id number]

[address] [facility]



        00:00:00 00:00:00 Only            [facility]

 

        2021 Orders          Sincere,  [facility]   [id number]

[address] [facility]



        00:00:00 00:00:00 Only            [facility]

 

        2020 Outpatient                 [facility]    [facility]    [id number]

0-2 [facility]



        00:00:00 00:00:00                                         [id number] 

 

        2020 Outpatient JW LOO, [facility]    [facility]    [id number]

8851 [facility]



        00:00:00 00:00:00                 [place]                           

 

        2020 Outpatient                 [facility]    [facility]    [id number]

0-2 [facility]



        00:00:00 00:00:00                                         [id number] 

 

        2020 Outpatient JW LOO, [facility]    [facility]    [id number]

8840 [facility]



        00:00:00 00:00:00                 [place]                           

 

        2020 Outpatient EL              [facility]    [facility]    [id number]

838 [facility]



        00:00:00 00:00:00                                                 

 

        2020 Outpatient EL              [facility]    [facility]    [id number]

839 [facility]



        00:00:00 00:00:00                                                 

 

        2018 Outpatient EL              [facility]    [facility]    [id number]

471 [facility]



        00:00:00 00:00:00                                                 

 

        2018 PURNIMA Yeeaire [id number]

99 [facility]



        16:00:00 16:00:00 t; ERMIAS NICHOLS,         Surgery         i

ty of



                        CORBIN MONSON            Specialty         [place]

as



                        M.PILO                                            Physici



                                                                        ans

 

        2017 Appointmen         PURNIMA NICHOLS     [id number]

2 [facility]



        13:00:00 13:00:00 t; ERMIAS NICHOLS,                         i

ty of



                        CORBIN MONSON                            [place]



                        M.PILO                                            Physici



                                                                        ans

 

        2017-10-10 2017-10-10 Appointmen         PURNIMA NICHOLS     [facility]     [id number]

4 [facility]



        15:30:00 15:30:00 t; ERMIAS NICHOLS,                         i

ty of



                        CORBIN MONSON                            [place]



                        KAZ.PILO                                            Physici



                                                                        ans

 

        2017-10-03 2017-10-03 Appointmen         PURNIMA NICHOLS     [id number]

6 [facility]



        13:30:00 13:30:00 t; ERMIAS NICHOLS,                         i

ty of



                        CORBIN MONSON                            [place]



                        KAZ.PILO                                            Physici



                                                                        ans

 

        2017 [facility]                     [facility] [id number]

[facility]



        11:05:00 04:59:00 Surgery                 [address]

 

        2017 Appointmen         PURNIMA NICHOLS     [facility]     [id number]

0 [facility]



        09:00:00 09:00:00 t; ERMIAS NICHOLS,                         i

ty of



                        CORBIN MONSON                            [place]



                        KAZ.PILO                                            Physici



                                                                        ans

 

        2017 Appointmen         PURNIMA NICHOLS     [facility]     [id number]

2 [facility]



        09:30:00 09:30:00 t; ERMIAS NICHOLS,                         i

ty of



                        CORBIN MONSON                            [place]



                        KAZ.PILO                                            Physici



                                                                        ans

 

        2017 Appointmen         PURNIMA NICHOLS     [facility]     [id number]

8 [facility]



        09:00:00 09:00:00 t; ERMIAS NICHOLS,                         i

ty of



                        CORBIN MONSON                            [place]



                        M.D.                                            Physici



                                                                        ans

 

        2017 Appointmen         PURNIMA NICHOLS     [facility]     [id number]

3 [facility]



        10:00:00 10:00:00 t; ERMIAS NICHOLS i

ty of



                        CORBIN MONSON                            [place]



                        MDANIEL                                            Physici



                                                                        ans

 

        2017 Inpatient                 [facility] [id number]

[id number] [facility]



        15:03:00 17:30:00                         [address]

 

        2017 Appointmen         PURNIMA NICHOLS     [facility]     [id number]

7 [facility]



        09:00:00 09:00:00 t; ERMIAS NICHOLS i

ty of



                        CORBIN MONSON                            [place]



                        MDANIEL                                            Physici



                                                                        ans

 

        2016-10-26 2016-10-26 Appointmen         [facility]     [facility]     [id number]

[id number] [facility]



        15:00:00 15:00:00 t;              CHRISTIE SOUTH ity 

of



                        BERTHA KIM                              [place]



                        INCHRISTIE                                         Physi

ci



                        RD                                              ans

 

        2016-10-14 2016-10-14 Appointmen         PURNIMA NICHOLS     [facility]     [id number]

0 [facility]



        10:00:00 10:00:00 t; ERMIAS NICHOLS i

ty of



                        CORBIN MONSON                            [place]



                        MDANIEL                                            Physici



                                                                        ans







Results







           Test Description Test Time  Test Comments Results    Result Comments 

Source









                    Tacrolimus level    2021 12:43:19 









                      Test Item  Value      Reference Range Interpretation Comme

nts









             Tacrolimus Lvl (test code = 8.3 ng/mL    10.0-20.0    L            

Test performed on [employer]



             69637-7)                                             Immun

oassay system



                                                                 with Chemilumin

escent



                                                                 Microparticle I

mmunoassay



                                                                 (CMIA) technolo

herberth.

 

             FAINA (test code = FAINA)  ID - RM                           

 

             Lab Interpretation (test Abnormal                               



             code = 23040-8)                                        



[id number] 12[facility]43[facility]19





             Test Item    Value        Reference Range Interpretation Comments

 

             Tacrolimus Lvl (test 8.3 ng/mL    10.0-20.0    L            Test pe

rformed on



             code = 51114-6)                                        [employer]

tect



                                                                 Immunoassay sys

tem



                                                                 with Chemilumin

escent



                                                                 Microparticle



                                                                 Immunoassay (CM

IA)



                                                                 technology.

 

             FAINA (test code =  ID -                           



             FAINA)         RM                                     

 

             Lab Interpretation Abnormal                               



             (test code =                                        



             78621-0)                                            



[facility] [id number] 12:43:19





             Test Item    Value        Reference Range Interpretation Comments

 

             TACROLIMUS BLOOD 8.3 ng/mL    10.0-20.0    L            Test perfor

med on [employer]



             (BEAKER) (test code                                        Architec

t Immunoassay



             = 657)                                              system with



                                                                 Chemiluminescen

t



                                                                 Microparticle I

mmunoassay



                                                                 (CMIA) technolo

herberth.



 ID - [facility] Metabolic [id number] 11:09:09





             Test Item    Value        Reference Range Interpretation Comments

 

             Sodium (test code = 137 meq/L    136-145                   



             2951-2)                                             

 

             Potassium (test 4.1 meq/L    3.5-5.1                   



             code = 2823-3)                                        

 

             Chloride (test code 103 meq/L                        



             = 2075-0)                                           

 

             CO2 (test code = 26 meq/L     -2028)                                             

 

             BUN (test code = 16 mg/dL                           



             3094-0)                                             

 

             Creatinine (test 1.07 mg/dL   0.57-1.25                 



             code = 2160-0)                                        

 

             Glucose (test code 99 mg/dL                         



             = 2345-7)                                           

 

             Calcium (test code 9.3 mg/dL    8.4-10.2                  



             = 10546-9)                                          

 

             EGFR (test code = 73           mL/min/1.73 sq m              ESTIMA

PHOEBE GFR IS



             33914-3)                                            NOT AS ACCURATE

 AS



                                                                 CREATININE



                                                                 CLEARANCE IN



                                                                 PREDICTING



                                                                 GLOMERULAR



                                                                 FILTRATION RATE

.



                                                                 ESTIMATED GFR I

S



                                                                 NOT APPLICABLE 

FOR



                                                                 DIALYSIS PATIEN

HERMINIO

 

             FAINA (test code =  ID - DB                           



             FAINA)                                                



[facility] Metabolic [id number] 11:09:09





             Test Item    Value        Reference Range Interpretation Comments

 

             Sodium (test code = 137 meq/L    136-145                   



             2951-2)                                             

 

             Potassium (test 4.1 meq/L    3.5-5.1                   



             code = 2823-3)                                        

 

             Chloride (test code 103 meq/L                        



             = 2075-0)                                           

 

             CO2 (test code = 26 meq/L     -2028)                                             

 

             BUN (test code = 16 mg/dL                           



             3094-0)                                             

 

             Creatinine (test 1.07 mg/dL   0.57-1.25                 



             code = 2160-0)                                        

 

             Glucose (test code 99 mg/dL                         



             = 2345-7)                                           

 

             Calcium (test code 9.3 mg/dL    8.4-10.2                  



             = 49526-7)                                          

 

             EGFR (test code = 73           mL/min/1.73 sq m              ESTIMA

PHOEBE GFR IS



             33914-3)                                            NOT AS ACCURATE

 AS



                                                                 CREATININE



                                                                 CLEARANCE IN



                                                                 PREDICTING



                                                                 GLOMERULAR



                                                                 FILTRATION RATE

.



                                                                 ESTIMATED GFR I

S



                                                                 NOT APPLICABLE 

FOR



                                                                 DIALYSIS PATIEN

TS.

 

             FAINA (test code =  ID - DB                           



             FAINA)                                                



[facility] MET[facility] [id number] 11:09:09





             Test Item    Value        Reference Range Interpretation Comments

 

             SODIUM (BEAKER) 137 meq/L    136-145                   



             (test code = 381)                                        

 

             POTASSIUM (BEAKER) 4.1 meq/L    3.5-5.1                   



             (test code = 379)                                        

 

             CHLORIDE (BEAKER) 103 meq/L                        



             (test code = 382)                                        

 

             CO2 (BEAKER) (test 26 meq/L     22-29                     



             code = 355)                                         

 

             BLOOD UREA NITROGEN 16 mg/dL     7-21                      



             (BEAKER) (test code                                        



             = 354)                                              

 

             CREATININE (BEAKER) 1.07 mg/dL   0.57-1.25                 



             (test code = 358)                                        

 

             GLUCOSE RANDOM 99 mg/dL                         



             (BEAKER) (test code                                        



             = 652)                                              

 

             CALCIUM (BEAKER) 9.3 mg/dL    8.4-10.2                  



             (test code = 697)                                        

 

             EGFR (BEAKER) (test 73 mL/min/1.73                           ESTIMA

PHOEBE GFR IS



             code = 1092) sq m                                   NOT AS ACCURATE

 AS



                                                                 CREATININE



                                                                 CLEARANCE IN



                                                                 PREDICTING



                                                                 GLOMERULAR



                                                                 FILTRATION RATE

.



                                                                 ESTIMATED GFR I

S



                                                                 NOT APPLICABLE 

FOR



                                                                 DIALYSIS PATIEN

TS.



 ID - DBCBC with platelet count + automated [id number] 10:49:14





             Test Item    Value        Reference Range Interpretation Comments

 

             WBC (test code = 6690-2) 8.4          See_Comment                [A

utomated message]



                                                                 The system [employer].



                                                                 generated this 

result



                                                                 transmitted ref

erence



                                                                 range: 3.5 - 10

.5



                                                                 K/L. The refe

rence



                                                                 range was not u

sed to



                                                                 interpret this 

result



                                                                 as normal/abnor

mal.

 

             RBC (test code = 789-8) 5.35         See_Comment                [Au

tomated message]



                                                                 The system UB.



                                                                 generated this 

result



                                                                 transmitted ref

erence



                                                                 range: 4.63 - 6

.08



                                                                 M/L. The refe

rence



                                                                 range was not u

sed to



                                                                 interpret this 

result



                                                                 as normal/abnor

mal.

 

             MCHC (test code = 786-4) 33.8         See_Comment                [A

utomated message]



                                                                 The system [employer]B.



                                                                 generated this 

result



                                                                 transmitted ref

erence



                                                                 range: 32.3 - 3

6.5



                                                                 GM/DL. The refe

rence



                                                                 range was not u

sed to



                                                                 interpret this 

result



                                                                 as normal/abnor

mal.

 

             Hematocrit (test code = 45.2 %       40.1-51.0                 



             4544-3)                                             

 

             MCV (test code = 787-2) 84.5 fL      79.0-92.2                 

 

             MCH (test code = 785-6) 28.6 pg      25.7-32.2                 

 

             RDW (test code = 788-0) 13.2 %       11.6-14.4                 

 

             Platelets (test code = 162          See_Comment                [Aut

omated message]



             777-3)                                              The system UB.



                                                                 generated this 

result



                                                                 transmitted ref

erence



                                                                 range: 150 - 45

0 K/CU



                                                                 MM. The referen

ce



                                                                 range was not u

sed to



                                                                 interpret this 

result



                                                                 as normal/abnor

mal.

 

             MPV (test code = 9.5 fL       9.4-12.4                  



             25966-5)                                            

 

             nRBC (test code = 413) 0            See_Comment                [Aut

omated message]



                                                                 The system UB.



                                                                 generated this 

result



                                                                 transmitted ref

erence



                                                                 range: 0 - 0 /1

00



                                                                 WBC. The refere

nce



                                                                 range was not u

sed to



                                                                 interpret this 

result



                                                                 as normal/abnor

mal.

 

             % Neutros (test code = 73 %                                   



             429)                                                

 

             % Lymphs (test code = 17 %                                   



             430)                                                

 

             % Monos (test code = 7 %                                    



             431)                                                

 

             % Eos (test code = 432) 3 %                                    

 

             % Baso (test code = 437) 1 %                                    

 

             # Neutros (test code = 6.15         See_Comment  H             [Aut

omated message]



             670)                                                The system UB.



                                                                 generated this 

result



                                                                 transmitted ref

erence



                                                                 range: 1.78 - 5

.38



                                                                 K/L. The refe

rence



                                                                 range was not u

sed to



                                                                 interpret this 

result



                                                                 as normal/abnor

mal.

 

             # Lymphs (test code = 1.40         See_Comment                [Auto

mated message]



             414)                                                The system UB.



                                                                 generated this 

result



                                                                 transmitted ref

erence



                                                                 range: 1.32 - 3

.57



                                                                 K/L. The refe

rence



                                                                 range was not u

sed to



                                                                 interpret this 

result



                                                                 as normal/abnor

mal.

 

             # Monos (test code = 0.60         See_Comment                [Autom

ated message]



             415)                                                The system UB.



                                                                 generated this 

result



                                                                 transmitted ref

erence



                                                                 range: 0.30 - 0

.82



                                                                 K/L. The refe

rence



                                                                 range was not u

sed to



                                                                 interpret this 

result



                                                                 as normal/abnor

mal.

 

             # Eos (test code = 416) 0.21         See_Comment                [Au

tomated message]



                                                                 The system UB.



                                                                 generated this 

result



                                                                 transmitted ref

erence



                                                                 range: 0.04 - 0

.54



                                                                 K/L. The refe

rence



                                                                 range was not u

sed to



                                                                 interpret this 

result



                                                                 as normal/abnor

mal.

 

             # Baso (test code = 417) 0.05         See_Comment                [A

utomated message]



                                                                 The system UB.



                                                                 generated this 

result



                                                                 transmitted ref

erence



                                                                 range: 0.01 - 0

.08



                                                                 K/L. The refe

rence



                                                                 range was not u

sed to



                                                                 interpret this 

result



                                                                 as normal/abnor

mal.

 

             Immature     0 %          0-1                       



             Granulocytes-Relative                                        



             (test code = 2801)                                        

 

             Lab Interpretation (test Abnormal                               



             code = 57975-3)                                        



[facility] with platelet count + automated [id number] 
10:49:14





             Test Item    Value        Reference Range Interpretation Comments

 

             WBC (test code = 6690-2) 8.4          See_Comment                [A

utomated message]



                                                                 The system UB.



                                                                 generated this 

result



                                                                 transmitted ref

erence



                                                                 range: 3.5 - 10

.5



                                                                 K/L. The refe

rence



                                                                 range was not u

sed to



                                                                 interpret this 

result



                                                                 as normal/abnor

mal.

 

             RBC (test code = 789-8) 5.35         See_Comment                [Au

tomated message]



                                                                 The system UB.



                                                                 generated this 

result



                                                                 transmitted ref

erence



                                                                 range: 4.63 - 6

.08



                                                                 M/L. The refe

rence



                                                                 range was not u

sed to



                                                                 interpret this 

result



                                                                 as normal/abnor

mal.

 

             MCHC (test code = 786-4) 33.8         See_Comment                [A

utomated message]



                                                                 The system UB.



                                                                 generated this 

result



                                                                 transmitted ref

erence



                                                                 range: 32.3 - 3

6.5



                                                                 GM/DL. The refe

rence



                                                                 range was not u

sed to



                                                                 interpret this 

result



                                                                 as normal/abnor

mal.

 

             Hematocrit (test code = 45.2 %       40.1-51.0                 



             4544-3)                                             

 

             MCV (test code = 787-2) 84.5 fL      79.0-92.2                 

 

             MCH (test code = 785-6) 28.6 pg      25.7-32.2                 

 

             RDW (test code = 788-0) 13.2 %       11.6-14.4                 

 

             Platelets (test code = 162          See_Comment                [Aut

omated message]



             777-3)                                              The system UB.



                                                                 generated this 

result



                                                                 transmitted ref

erence



                                                                 range: 150 - 45

0 K/CU



                                                                 MM. The referen

ce



                                                                 range was not u

sed to



                                                                 interpret this 

result



                                                                 as normal/abnor

mal.

 

             MPV (test code = 9.5 fL       9.4-12.4                  



             25080-0)                                            

 

             nRBC (test code = 413) 0            See_Comment                [Aut

omated message]



                                                                 The system UB.



                                                                 generated this 

result



                                                                 transmitted ref

erence



                                                                 range: 0 - 0 /1

00



                                                                 WBC. The refere

nce



                                                                 range was not u

sed to



                                                                 interpret this 

result



                                                                 as normal/abnor

mal.

 

             % Neutros (test code = 73 %                                   



             429)                                                

 

             % Lymphs (test code = 17 %                                   



             430)                                                

 

             % Monos (test code = 7 %                                    



             431)                                                

 

             % Eos (test code = 432) 3 %                                    

 

             % Baso (test code = 437) 1 %                                    

 

             # Neutros (test code = 6.15         See_Comment  H             [Aut

omated message]



             670)                                                The system UB.



                                                                 generated this 

result



                                                                 transmitted ref

erence



                                                                 range: 1.78 - 5

.38



                                                                 K/L. The refe

rence



                                                                 range was not u

sed to



                                                                 interpret this 

result



                                                                 as normal/abnor

mal.

 

             # Lymphs (test code = 1.40         See_Comment                [Auto

mated message]



             414)                                                The system UB.



                                                                 generated this 

result



                                                                 transmitted ref

erence



                                                                 range: 1.32 - 3

.57



                                                                 K/L. The refe

rence



                                                                 range was not u

sed to



                                                                 interpret this 

result



                                                                 as normal/abnor

mal.

 

             # Monos (test code = 0.60         See_Comment                [Autom

ated message]



             415)                                                The system UB.



                                                                 generated this 

result



                                                                 transmitted ref

erence



                                                                 range: 0.30 - 0

.82



                                                                 K/L. The refe

rence



                                                                 range was not u

sed to



                                                                 interpret this 

result



                                                                 as normal/abnor

mal.

 

             # Eos (test code = 416) 0.21         See_Comment                [Au

tomated message]



                                                                 The system UB.



                                                                 generated this 

result



                                                                 transmitted ref

erence



                                                                 range: 0.04 - 0

.54



                                                                 K/L. The refe

rence



                                                                 range was not u

sed to



                                                                 interpret this 

result



                                                                 as normal/abnor

mal.

 

             # Baso (test code = 417) 0.05         See_Comment                [A

utomated message]



                                                                 The system UB.



                                                                 generated this 

result



                                                                 transmitted ref

erence



                                                                 range: 0.01 - 0

.08



                                                                 K/L. The refe

rence



                                                                 range was not u

sed to



                                                                 interpret this 

result



                                                                 as normal/abnor

mal.

 

             Immature     0 %          0-1                       



             Granulocytes-Relative                                        



             (test code = 2801)                                        

 

             Lab Interpretation (test Abnormal                               



             code = 36149-8)                                        



[facility] W/PLT COUNT &amp; AUTO [id number] 
10:49:14





             Test Item    Value        Reference Range Interpretation Comments

 

             WHITE BLOOD CELL COUNT (BEAKER) 8.4 K/ L     3.5-10.5              

    



             (test code = 775)                                        

 

             RED BLOOD CELL COUNT (BEAKER) 5.35 M/ L    4.63-6.08               

  



             (test code = 761)                                        

 

             HEMOGLOBIN (BEAKER) (test code = 15.3 GM/DL   13.7-17.5            

     



             410)                                                

 

             HEMATOCRIT (BEAKER) (test code = 45.2 %       40.1-51.0            

     



             411)                                                

 

             MEAN CORPUSCULAR VOLUME (BEAKER) 84.5 fL      79.0-92.2            

     



             (test code = 753)                                        

 

             MEAN CORPUSCULAR HEMOGLOBIN 28.6 pg      25.7-32.2                 



             (BEAKER) (test code = 751)                                        

 

             MEAN CORPUSCULAR HEMOGLOBIN CONC 33.8 GM/DL   32.3-36.5            

     



             (BEAKER) (test code = 752)                                        

 

             RED CELL DISTRIBUTION WIDTH 13.2 %       11.6-14.4                 



             (BEAKER) (test code = 412)                                        

 

             PLATELET COUNT (BEAKER) (test 162 K/CU MM  150-450                 

  



             code = 756)                                         

 

             MEAN PLATELET VOLUME (BEAKER) 9.5 fL       9.4-12.4                

  



             (test code = 754)                                        

 

             NUCLEATED RED BLOOD CELLS 0 /100 WBC   0-0                       



             (BEAKER) (test code = 413)                                        

 

             NEUTROPHILS RELATIVE PERCENT 73 %                                  

 



             (BEAKER) (test code = 429)                                        

 

             LYMPHOCYTES RELATIVE PERCENT 17 %                                  

 



             (BEAKER) (test code = 430)                                        

 

             MONOCYTES RELATIVE PERCENT 7 %                                    



             (BEAKER) (test code = 431)                                        

 

             EOSINOPHILS RELATIVE PERCENT 3 %                                   

 



             (BEAKER) (test code = 432)                                        

 

             BASOPHILS RELATIVE PERCENT 1 %                                    



             (BEAKER) (test code = 437)                                        

 

             NEUTROPHILS ABSOLUTE COUNT 6.15 K/ L    1.78-5.38    H            



             (BEAKER) (test code = 670)                                        

 

             LYMPHOCYTES ABSOLUTE COUNT 1.40 K/ L    1.32-3.57                 



             (BEAKER) (test code = 414)                                        

 

             MONOCYTES ABSOLUTE COUNT (BEAKER) 0.60 K/ L    0.30-0.82           

      



             (test code = 415)                                        

 

             EOSINOPHILS ABSOLUTE COUNT 0.21 K/ L    0.04-0.54                 



             (BEAKER) (test code = 416)                                        

 

             BASOPHILS ABSOLUTE COUNT (BEAKER) 0.05 K/ L    0.01-0.08           

      



             (test code = 417)                                        

 

             IMMATURE GRANULOCYTES-RELATIVE 0 %          0-1                    

   



             PERCENT (BEAKER) (test code =                                      

  



             2801)                                               



2D echo w/ doppler (cw/pw/color)2021 15:29:47Ejection FractionSLEH ECHO 
[facility]2021 15:29:47Ejection [facility]ractionSLEH [facility]CHO [facility] [id number] 11:56:00NEW 
CARDIOLOGIST OBERTONReason for Exam:-&gt;heart transplant annual follow up
********************************************************[facility]        : 1970        Sex: 
M************************************************************FINAL REPORT 
PATIENT ID:   [id number] INDICATION: heart transplant annual follow up COMPARISON:
None TECHNIQUE: Frontal and lateral views of the chest. FINDINGS: Lungs and 
pleura: Clear lungs. No effusion.Heart and mediastinum: Normal heart size. 
Unremarkable mediastinal contours.Osseous structures: No acute 
abnormality.Additional findings: None.  IMPRESSION: No acute intrathoracic 
abnormality. Signed: Shikha Jarviseport Verified Date/Time:  2021 
11:56:35 Reading Location:  [facility] Reading Room  Electronically 
signed by: SHIKHA JARVIS MD on 2021 11:56 AMTACROLIMUS LEVEL
2021 12:37:00





             Test Item    Value        Reference Range Interpretation Comments

 

             TACROLIMUS BLOOD 10.6 ng/mL   10.0-20.0                 Test perfor

med on [employer]



             (BEAKER) (test code                                        [employer]

t Immunoassay



             = 657)                                              system with



                                                                 Chemiluminescen

t



                                                                 Microparticle



                                                                 Immunoassay (CM

IA)



                                                                 technology.



 ID - RMBASIC METABOLIC [id number] 06:33:00





             Test Item    Value        Reference Range Interpretation Comments

 

             SODIUM (BEAKER) 139 meq/L    136-145                   



             (test code = 381)                                        

 

             POTASSIUM (BEAKER) 4.0 meq/L    3.5-5.1                   



             (test code = 379)                                        

 

             CHLORIDE (BEAKER) 102 meq/L                        



             (test code = 382)                                        

 

             CO2 (BEAKER) (test 29 meq/L     22-29                     



             code = 355)                                         

 

             BLOOD UREA NITROGEN 16 mg/dL     7-21                      



             (BEAKER) (test code                                        



             = 354)                                              

 

             CREATININE (BEAKER) 1.04 mg/dL   0.57-1.25                 



             (test code = 358)                                        

 

             GLUCOSE RANDOM 97 mg/dL                         



             (BEAKER) (test code                                        



             = 652)                                              

 

             CALCIUM (BEAKER) 8.9 mg/dL    8.4-10.2                  



             (test code = 697)                                        

 

             EGFR (BEAKER) (test 75 mL/min/1.73                           ESTIMA

PHOEBE GFR IS



             code = 1092) sq m                                   NOT AS ACCURATE

 AS



                                                                 CREATININE



                                                                 CLEARANCE IN



                                                                 PREDICTING



                                                                 GLOMERULAR



                                                                 FILTRATION RATE

.



                                                                 ESTIMATED GFR I

S



                                                                 NOT APPLICABLE 

FOR



                                                                 DIALYSIS PATIEN

TS.



 ID - PIAYA LProthrombin time/[id number] 06:30:00





             Test Item    Value        Reference    Interpretation Comments



                                       Range                     

 

             Protime (test code = 12.9         See_Comment                [Autom

ated



             0242-2)                                             message] The



                                                                 system which



                                                                 generated this



                                                                 result



                                                                 transmitted



                                                                 reference range

:



                                                                 11.9 - 14.2



                                                                 seconds. The



                                                                 reference range



                                                                 was not used to



                                                                 interpret this



                                                                 result as



                                                                 normal/abnormal

.

 

             INR (test code = 1.00         See_Comment                [Automated



             2871-6)                                             message] The



                                                                 system which



                                                                 generated this



                                                                 result



                                                                 transmitted



                                                                 reference range

:



                                                                 <=5.90. The



                                                                 reference range



                                                                 was not used to



                                                                 interpret this



                                                                 result as



                                                                 normal/abnormal

.

 

             FAINA (test code = RECOMMENDED                            



             FAINA)         COUMADIN/WARFARIN                           



                          INR THERAPY                            



                          RANGESSTANDARD DOSE:                           



                          2.0 - 3.0                              



                          Includes:                              



                          PROPHYLAXIS for                           



                          venous thrombosis,                           



                          systemic                               



                          embolization;                           



                          TREATMENT for venous                           



                          thrombosis and/or                           



                          pulmonary                              



                          embolus.HIGH RISK:                           



                          Target INR is                           



                          2.5-3.5 for patients                           



                          with mechanical                           



                          heart valves.                           

 

             Lab Interpretation Normal                                 



             (test code =                                        



             61609-1)                                            



[id number]





             Test Item    Value        Reference    Interpretation Comments



                                       Range                     

 

             Protime (test code = 12.9         See_Comment                [Autom

ated



             5902-2)                                             message] The



                                                                 system which



                                                                 generated this



                                                                 result



                                                                 transmitted



                                                                 reference range

:



                                                                 11.9 - 14.2



                                                                 seconds. The



                                                                 reference range



                                                                 was not used to



                                                                 interpret this



                                                                 result as



                                                                 normal/abnormal

.

 

             INR (test code = 1.00         See_Comment                [Automated



             6301-6)                                             message] The



                                                                 system which



                                                                 generated this



                                                                 result



                                                                 transmitted



                                                                 reference range

:



                                                                 <=5.90. The



                                                                 reference range



                                                                 was not used to



                                                                 interpret this



                                                                 result as



                                                                 normal/abnormal

.

 

             FAINA (test code = RECOMMENDED                            



             FAINA)         COUMADIN/WARFARIN                           



                          INR THERAPY                            



                          RANGESSTANDARD DOSE:                           



                          2.0 - 3.0                              



                          Includes:                              



                          PROPHYLAXIS for                           



                          venous thrombosis,                           



                          systemic                               



                          embolization;                           



                          TREATMENT for venous                           



                          thrombosis and/or                           



                          pulmonary                              



                          embolus.HIGH RISK:                           



                          Target INR is                           



                          2.5-3.5 for patients                           



                          with mechanical                           



                          heart valves.                           

 

             Lab Interpretation Normal                                 



             (test code =                                        



             50467-9)                                            



[facility][id number]





             Test Item    Value        Reference Range Interpretation Comments

 

             PROTIME (BEAKER) 12.9 seconds 11.9-14.2                 



             (test code = 759)                                        

 

             INR (BEAKER) (test 1.00         See_Comment                [Automat

ed message]



             code = 370)                                         The system [employer]

h



                                                                 generated this 

result



                                                                 transmitted ref

erence



                                                                 range: <=5.90. 

The



                                                                 reference range

 was



                                                                 not used to int

erpret



                                                                 this result as



                                                                 normal/abnormal

.



RECOMMENDED COUMADIN/WARFARIN INR THERAPY RANGESSTANDARD DOSE: 2.0 - 3.0   
Includes: PROPHYLAXIS forvenous thrombosis, systemic embolization; TREATMENT for
venous thrombosis and/or pulmonary embolus.HIGH RISK: Target INR is 2.5-3.5 for 
patients with mechanical heart valves.CBC (Hemogram only)2021 06:20:00





             Test Item    Value        Reference Range Interpretation Comments

 

             WBC (test code = 6690-2) 4.8          See_Comment                [A

utomated message]



                                                                 The system UB.



                                                                 generated this 

result



                                                                 transmitted ref

erence



                                                                 range: 3.5 - 10

.5



                                                                 K/L. The refe

rence



                                                                 range was not u

sed to



                                                                 interpret this 

result



                                                                 as normal/abnor

mal.

 

             RBC (test code = 789-8) 5.14         See_Comment                [Au

tomated message]



                                                                 The system UB.



                                                                 generated this 

result



                                                                 transmitted ref

erence



                                                                 range: 4.63 - 6

.08



                                                                 M/L. The refe

rence



                                                                 range was not u

sed to



                                                                 interpret this 

result



                                                                 as normal/abnor

mal.

 

             MCHC (test code = 786-4) 33.9         See_Comment                [A

utomated message]



                                                                 The system UB.



                                                                 generated this 

result



                                                                 transmitted ref

erence



                                                                 range: 32.3 - 3

6.5



                                                                 GM/DL. The refe

rence



                                                                 range was not u

sed to



                                                                 interpret this 

result



                                                                 as normal/abnor

mal.

 

             Hematocrit (test code = 43.3 %       40.1-51.0                 



             4544-3)                                             

 

             MCV (test code = 787-2) 84.2 fL      79.0-92.2                 

 

             MCH (test code = 785-6) 28.6 pg      25.7-32.2                 

 

             RDW (test code = 788-0) 13.5 %       11.6-14.4                 

 

             Platelets (test code = 149          See_Comment  L             [Aut

omated message]



             777-3)                                              The system UB.



                                                                 generated this 

result



                                                                 transmitted ref

erence



                                                                 range: 150 - 45

0 K/CU



                                                                 MM. The referen

ce



                                                                 range was not u

sed to



                                                                 interpret this 

result



                                                                 as normal/abnor

mal.

 

             MPV (test code = 9.4 fL       9.4-12.4                  



             23426-0)                                            

 

             nRBC (test code = 413) 0            See_Comment                [Aut

omated message]



                                                                 The system UB.



                                                                 generated this 

result



                                                                 transmitted ref

erence



                                                                 range: 0 - 0 /1

00



                                                                 WBC. The refere

nce



                                                                 range was not u

sed to



                                                                 interpret this 

result



                                                                 as normal/abnor

mal.

 

             Lab Interpretation (test Abnormal                               



             code = 57242-8)                                        



[facility] (Hemogram only)2021 06:20:00





             Test Item    Value        Reference Range Interpretation Comments

 

             WBC (test code = 6690-2) 4.8          See_Comment                [A

utomated message]



                                                                 The system UB.



                                                                 generated this 

result



                                                                 transmitted ref

erence



                                                                 range: 3.5 - 10

.5



                                                                 K/L. The refe

rence



                                                                 range was not u

sed to



                                                                 interpret this 

result



                                                                 as normal/abnor

mal.

 

             RBC (test code = 789-8) 5.14         See_Comment                [Au

tomated message]



                                                                 The system UB.



                                                                 generated this 

result



                                                                 transmitted ref

erence



                                                                 range: 4.63 - 6

.08



                                                                 M/L. The refe

rence



                                                                 range was not u

sed to



                                                                 interpret this 

result



                                                                 as normal/abnor

mal.

 

             MCHC (test code = 786-4) 33.9         See_Comment                [A

utomated message]



                                                                 The system UB.



                                                                 generated this 

result



                                                                 transmitted ref

erence



                                                                 range: 32.3 - 3

6.5



                                                                 GM/DL. The refe

rence



                                                                 range was not u

sed to



                                                                 interpret this 

result



                                                                 as normal/abnor

mal.

 

             Hematocrit (test code = 43.3 %       40.1-51.0                 



             4544-3)                                             

 

             MCV (test code = 787-2) 84.2 fL      79.0-92.2                 

 

             MCH (test code = 785-6) 28.6 pg      25.7-32.2                 

 

             RDW (test code = 788-0) 13.5 %       11.6-14.4                 

 

             Platelets (test code = 149          See_Comment  L             [Aut

omated message]



             777-3)                                              The system UB.



                                                                 generated this 

result



                                                                 transmitted ref

erence



                                                                 range: 150 - 45

0 K/CU



                                                                 MM. The referen

ce



                                                                 range was not u

sed to



                                                                 interpret this 

result



                                                                 as normal/abnor

mal.

 

             MPV (test code = 9.4 fL       9.4-12.4                  



             57663-5)                                            

 

             nRBC (test code = 413) 0            See_Comment                [Aut

omated message]



                                                                 The system UB.



                                                                 generated this 

result



                                                                 transmitted ref

erence



                                                                 range: 0 - 0 /1

00



                                                                 WBC. The refere

nce



                                                                 range was not u

sed to



                                                                 interpret this 

result



                                                                 as normal/abnor

mal.

 

             Lab Interpretation (test Abnormal                               



             code = 32137-5)                                        



[facility] (HEMOGRAM ONLY)2021 06:20:00





             Test Item    Value        Reference Range Interpretation Comments

 

             WHITE BLOOD CELL COUNT (BEAKER) 4.8 K/ L     3.5-10.5              

    



             (test code = 775)                                        

 

             RED BLOOD CELL COUNT (BEAKER) 5.14 M/ L    4.63-6.08               

  



             (test code = 761)                                        

 

             HEMOGLOBIN (BEAKER) (test code = 14.7 GM/DL   13.7-17.5            

     



             410)                                                

 

             HEMATOCRIT (BEAKER) (test code = 43.3 %       40.1-51.0            

     



             411)                                                

 

             MEAN CORPUSCULAR VOLUME (BEAKER) 84.2 fL      79.0-92.2            

     



             (test code = 753)                                        

 

             MEAN CORPUSCULAR HEMOGLOBIN 28.6 pg      25.7-32.2                 



             (BEAKER) (test code = 751)                                        

 

             MEAN CORPUSCULAR HEMOGLOBIN CONC 33.9 GM/DL   32.3-36.5            

     



             (BEAKER) (test code = 752)                                        

 

             RED CELL DISTRIBUTION WIDTH 13.5 %       11.6-14.4                 



             (BEAKER) (test code = 412)                                        

 

             PLATELET COUNT (BEAKER) (test 149 K/CU MM  150-450      L          

  



             code = 756)                                         

 

             MEAN PLATELET VOLUME (BEAKER) 9.4 fL       9.4-12.4                

  



             (test code = 754)                                        

 

             NUCLEATED RED BLOOD CELLS 0 /100 WBC   0-0                       



             (BEAKER) (test code = 413)                                        



Blood Culture - Routine (Left Venipuncture)2021 22:00:00





             Test Item    Value        Reference Range Interpretation Comments

 

             Result (test code = No growth in 5 days                           



             6463-4)                                             



[facility]
20[facility] 22:00:00





             Test Item    Value        Reference Range Interpretation Comments

 

             Result (test code = No growth in 5 days                           



             6463-4)                                             



[id number]





             Test Item    Value        Reference Range Interpretation Comments

 

             CULTURE (BEAKER) (test No growth in 5 days                         

  



             code = 1095)                                        



BLOOD [id number] 22:00:00





             Test Item    Value        Reference Range Interpretation Comments

 

             CULTURE (BEAKER) (test No growth in 5 days                         

  



             code = 1095)                                        



SARS-CoV2/Influenza/RSV RT-PCR (Symptomatic ONLY)2021 21:08:00





             Test Item    Value        Reference Range Interpretation Comments

 

             SARS-COV2/RT-PCR (test Positive     Negative     AA           



             code = 37189-4)                                        

 

             Influenza A RT-PCR Negative     Negative                  



             (test code = 13794-1)                                        

 

             Influenza B RT-PCR Negative     Negative                  



             (test code = 42996-5)                                        

 

             RSV by RT-PCR (test Negative     Negative                  Performa

nce of the



             code = 67863-8)                                        [facility]pert [facility]press



                                                                 SARS-CoV-2/Flu/

RSV test



                                                                 has only been



                                                                 established in



                                                                 nasopharyngeal 

swab



                                                                 specimens. Use 

of the



                                                                 Xpert Xpress



                                                                 SARS-CoV-2/Flu/

RSV test



                                                                 with other spec

imen



                                                                 types has not b

een



                                                                 assessed and



                                                                 performance



                                                                 characteristics

 are



                                                                 unknown.  As wi

th any



                                                                 molecular test,



                                                                 mutations withi

n the



                                                                 targeted geneti

c



                                                                 regions identif

ied by



                                                                 the Xpert Xpres

s



                                                                 SARS-CoV-2/Flu/

RSV test



                                                                 could affect pr

carlton



                                                                 and/or probe bi

nding



                                                                 resulting in fa

ilure to



                                                                 detect the pres

ence of



                                                                 virus or the vi

sabra



                                                                 being detected 

less



                                                                 predictably.Neg

ative



                                                                 results do not 

preclude



                                                                 SARS-CoV-2, Inf

luenza



                                                                 A/B, or RSV inf

ection



                                                                 and should not 

be used



                                                                 as the sole bas

is for



                                                                 treatment or ot

her



                                                                 patient managem

ent



                                                                 decisions.  Res

ults



                                                                 from the Xpert 

Xpress



                                                                 SARS-CoV-2/Flu/

RSV test



                                                                 should be corre

lated



                                                                 with the clinic

al



                                                                 history,



                                                                 epidemiological

 data,



                                                                 and other data



                                                                 available to th

e



                                                                 clinician evalu

ating



                                                                 the patient.  I

nvalid



                                                                 test results ma

y occur



                                                                 from improper s

pecimen



                                                                 collection; jasmeet

lure to



                                                                 follow the oralia

mmended



                                                                 sample collecti

on,



                                                                 handling, and s

torage



                                                                 procedures; gretchen

hnical



                                                                 error. False ne

gative



                                                                 results may occ

ur if



                                                                 virus is presen

t at



                                                                 levels below th

e



                                                                 analytical limi

t of



                                                                 detection (LOD:

 131



                                                                 copies/mL).  Vi

ral



                                                                 nucleic acid ma

y



                                                                 persist in vivo

,



                                                                 independent of 

virus



                                                                 viability. Dete

ction of



                                                                 analyte target(

s) does



                                                                 not imply that 

the



                                                                 corresponding v

irus(es)



                                                                 are infectious 

or are



                                                                 the causative a

gents



                                                                 for clinical sy

mptoms.



                                                                 Recent patient 

exposure



                                                                 to FluMist or

 other



                                                                 live attenuated



                                                                 influenza vacci

sylvester may



                                                                 cause inaccurat

e



                                                                 positive result

s.This



                                                                 test has been



                                                                 authorized by [employer] under



                                                                 an EUA for use 

by



                                                                 authorized



                                                                 laboratories.  

This



                                                                 test is only au

thorized



                                                                 for the duratio

n of the



                                                                 declaration nereida

t



                                                                 circumstances e

xist



                                                                 justifying the



                                                                 authorization o

f



                                                                 emergency use o

f in



                                                                 vitro diagnosti

c tests



                                                                 for detection a

nd/or



                                                                 diagnosis of CO

VID-19



                                                                 under Section 5

64(b)(1)



                                                                 of the Federal 

Food,



                                                                 Drug and Cosmet

ic Act,



                                                                 21 U.[place]C. 



                                                                 360bbb-3(b)(1),

 unless



                                                                 the authorizati

on is



                                                                 terminated or r

evoked



                                                                 sooner. Fact Sh

eet for



                                                                 Healthcare Prov

iders:



                                                                 https://www.[email or web address]



                                                                 /Documents/Xper

t%20Xpre



                                                                 ss%13DGJZ-BzJ-1

-Flu-RSV



                                                                 /302-4508%20Rev

.%20B%20



                                                                 HCP%20Fact%20Sh

eet.pdf



                                                                 Fact Sheet for



                                                                 Healthcare Elsa

ents:



                                                                 https://www.[employer]



                                                                 /Documents/Xper

t%20Xpre



                                                                 ss%94IQLY-SjB-9

-Flu-RSV



                                                                 /302-4507%20Rev

.%20B%20



                                                                 Patient%20Fact%

20Sheet.



                                                                 pdf

 

             Lab Interpretation Abnormal                               



             (test code = 87211-6)                                        



[facility]pt[facility]
2021 21:08:00





             Test Item    Value        Reference Range Interpretation Comments

 

             SARS-COV2/RT-PCR (test Positive     Negative     AA           



             code = 69367-4)                                        

 

             Influenza A RT-PCR Negative     Negative                  



             (test code = 20556-3)                                        

 

             Influenza B RT-PCR Negative     Negative                  



             (test code = 37225-1)                                        

 

             RSV by RT-PCR (test Negative     Negative                  Performa

nce of the



             code = 47194-6)                                        Xpert Xpress



                                                                 SARS-CoV-2/Flu/

RSV test



                                                                 has only been



                                                                 established in



                                                                 nasopharyngeal 

swab



                                                                 specimens. Use 

of the



                                                                 Xpert Xpress



                                                                 SARS-CoV-2/Flu/

RSV test



                                                                 with other spec

imen



                                                                 types has not b

een



                                                                 assessed and



                                                                 performance



                                                                 characteristics

 are



                                                                 unknown.  As wi

th any



                                                                 molecular test,



                                                                 mutations withi

n the



                                                                 targeted geneti

c



                                                                 regions identif

ied by



                                                                 the Xpert Xpres

s



                                                                 SARS-CoV-2/Flu/

RSV test



                                                                 could affect pr

carlton



                                                                 and/or probe bi

nding



                                                                 resulting in fa

ilure to



                                                                 detect the pres

ence of



                                                                 virus or the vi

sabra



                                                                 being detected 

less



                                                                 predictably.Neg

ative



                                                                 results do not 

preclude



                                                                 SARS-CoV-2, Inf

luenza



                                                                 A/B, or RSV inf

ection



                                                                 and should not 

be used



                                                                 as the sole bas

is for



                                                                 treatment or ot

her



                                                                 patient managem

ent



                                                                 decisions.  Res

ults



                                                                 from the Xpert 

Xpress



                                                                 SARS-CoV-2/Flu/

RSV test



                                                                 should be corre

lated



                                                                 with the clinic

al



                                                                 history,



                                                                 epidemiological

 data,



                                                                 and other data



                                                                 available to th

e



                                                                 clinician evalu

ating



                                                                 the patient.  I

nvalid



                                                                 test results ma

y occur



                                                                 from improper s

pecimen



                                                                 collection; jasmeet

lure to



                                                                 follow the oralia

mmended



                                                                 sample collecti

on,



                                                                 handling, and s

torage



                                                                 procedures; gretchen

hnical



                                                                 error. False ne

gative



                                                                 results may occ

ur if



                                                                 virus is presen

t at



                                                                 levels below th

e



                                                                 analytical limi

t of



                                                                 detection (LOD:

 131



                                                                 copies/mL).  Vi

ral



                                                                 nucleic acid ma

y



                                                                 persist in vivo

,



                                                                 independent of 

virus



                                                                 viability. Dete

ction of



                                                                 analyte target(

s) does



                                                                 not imply that 

the



                                                                 corresponding v

irus(es)



                                                                 are infectious 

or are



                                                                 the causative a

gents



                                                                 for clinical sy

mptoms.



                                                                 Recent patient 

exposure



                                                                 to FluMist or

 other



                                                                 live attenuated



                                                                 influenza vacci

sylvester may



                                                                 cause inaccurat

e



                                                                 positive result

s.This



                                                                 test has been



                                                                 authorized by [employer] under



                                                                 an EUA for use 

by



                                                                 authorized



                                                                 laboratories.  

This



                                                                 test is only au

thorized



                                                                 for the duratio

n of the



                                                                 declaration nereida

t



                                                                 circumstances e

xist



                                                                 justifying the



                                                                 authorization o

f



                                                                 emergency use o

f in



                                                                 vitro diagnosti

c tests



                                                                 for detection a

nd/or



                                                                 diagnosis of CO

VID-19



                                                                 under Section 5

64(b)(1)



                                                                 of the Federal 

Food,



                                                                 Drug and Cosmet

ic Act,



                                                                 21 U.S.C. 



                                                                 360bbb-3(b)(1),

 unless



                                                                 the authorizati

on is



                                                                 terminated or r

evoked



                                                                 sooner. Fact Sh

eet for



                                                                 Healthcare Prov

iders:



                                                                 https://www.[employer]



                                                                 /Documents/Xper

t%20Xpre



                                                                 ss%09IWGP-CiP-5

-Flu-RSV



                                                                 /3024508%20Rev

.%20B%20



                                                                 HCP%20Fact%20Sh

eet.pdf



                                                                 Fact Sheet for



                                                                 Healthcare Elsa

ents:



                                                                 https://www.[employer]



                                                                 /Documents/Xper

t%20Xpre



                                                                 ss%65BMBS-ZzJ-0

-Flu-RSV



                                                                 /302-4507%20Rev

.%20B%20



                                                                 Patient%20Fact%

20Sheet.



                                                                 pdf

 

             Lab Interpretation Abnormal                               



             (test code = 79902-0)                                        



[id number] 21:08:00





             Test Item    Value        Reference Range Interpretation Comments

 

             SARS-COV2/RT-PCR Positive     Negative     AA           



             (test code =                                        



             9530974)                                            

 

             INFLUENZA A RT-PCR Negative     Negative                  



             (test code =                                        



             8582890)                                            

 

             INFLUENZA B RT-PCR Negative     Negative                  



             (test code =                                        



             4622530)                                            

 

             RSV  RT-PCR (test Negative     Negative                  Performanc

e of the Xpert



             code = 0740726)                                        Xpress SARS-

CoV-2/Flu/RSV



                                                                 test has only b

een



                                                                 established in



                                                                 nasopharyngeal 

swab



                                                                 specimens. Use 

of the



                                                                 Xpert Xpress



                                                                 SARS-CoV-2/Flu/

RSV test



                                                                 with other spec

imen types



                                                                 has not been as

sessed and



                                                                 performance



                                                                 characteristics

 are



                                                                 unknown.  As wi

th any



                                                                 molecular test,

 mutations



                                                                 within the targ

eted



                                                                 genetic regions



                                                                 identified by t

he Xpert



                                                                 Xpress SARS-CoV

-2/Flu/RSV



                                                                 test could affe

ct primer



                                                                 and/or probe bi

nding



                                                                 resulting in fa

ilure to



                                                                 detect the pres

ence of



                                                                 virus or the vi

sabra being



                                                                 detected less



                                                                 predictably.Neg

ative



                                                                 results do not 

preclude



                                                                 SARS-CoV-2, Inf

luenza



                                                                 A/B, or RSV inf

ection and



                                                                 should not be u

sed as the



                                                                 sole basis for 

treatment



                                                                 or other patien

t



                                                                 management deci

sions.



                                                                 Results from 

e Xpert



                                                                 Xpress SARS-CoV

-2/Flu/RSV



                                                                 test should be 

correlated



                                                                 with the clinic

al



                                                                 history, epidem

iological



                                                                 data, and other

 data



                                                                 available to 

e



                                                                 clinician evalu

ating the



                                                                 patient.  Inval

id test



                                                                 results may occ

ur from



                                                                 improper specim

en



                                                                 collection; jasmeet

lure to



                                                                 follow the oralia

mmended



                                                                 sample collecti

on,



                                                                 handling, and s

torage



                                                                 procedures; gretchen

hnical



                                                                 error. False ne

gative



                                                                 results may occ

ur if



                                                                 virus is presen

t at



                                                                 levels below th

e



                                                                 analytical limi

t of



                                                                 detection (LOD:

 131



                                                                 copies/mL).  Vi

ral



                                                                 nucleic acid ma

y persist



                                                                 in vivo, indepe

ndent of



                                                                 virus viability

.



                                                                 Detection of an

alyte



                                                                 target(s) does 

not imply



                                                                 that the corres

ponding



                                                                 virus(es) are i

nfectious



                                                                 or are the caus

ative



                                                                 agents for clin

ical



                                                                 symptoms.  Rece

nt patient



                                                                 exposure to Flu

Mist  or



                                                                 other live atte

nuated



                                                                 influenza vacci

sylvester may



                                                                 cause inaccurat

e positive



                                                                 results.This te

st has



                                                                 been authorized

 by FDA



                                                                 under an EUA fo

r use by



                                                                 authorized labo

ratories.



                                                                 This test is on

ly



                                                                 authorized for 

the



                                                                 duration of the



                                                                 declaration nereida

t



                                                                 circumstances e

xist



                                                                 justifying the



                                                                 authorization o

f



                                                                 emergency use o

f in vitro



                                                                 diagnostic test

s for



                                                                 detection and/o

r



                                                                 diagnosis of CO

VID-19



                                                                 under Section 5

64(b)(1)



                                                                 of the Federal 

Food, Drug



                                                                 and Cosmetic Ac

t, 21



                                                                 [place]   360bbb

-3(b)(1),



                                                                 unless the auth

orization



                                                                 is terminated o

r revoked



                                                                 sooner.Fact She

et for



                                                                 Healthcare Prov

iders:



                                                                 https://www.[employer]/D



                                                                 ocuments/Xpert%

20Xpress%2



                                                                 5GZPE-JtQ-5-Flu

-RSV/302-4



                                                                 508%20Rev.%20B%

20HCP%20Fa



                                                                 ct%20Sheet.pdfF

act Sheet



                                                                 for Healthcare 

Patients:



                                                                 https://www.[employer]/D



                                                                 ocuments/Xpert%

20Xpress%2



                                                                 7UMTE-HnF-5-Flu

-RSV/302-4



                                                                 507%20Rev.%20B%

20Patient%



                                                                 20Fact%20Sheet.

pdf



Troponin -36-99 20:50:00





             Test Item    Value        Reference Range Interpretation Comments

 

             Troponin I (test code = <0.01        0.00-0.03                 



             52741-3)                                            

 

             FAINA (test code = FAINA) Troponin I (TnI)                           



                          levels must be                           



                          interpreted in the                           



                          context of the                           



                          presenting symptoms                           



                          and the clinical                           



                          findings. Elevated                           



                          TnI levels indicate                           



                          myocardial damage,                           



                          but are not specific                           



                          for ischemic heart                           



                          disease. Elevated TnI                           



                          levels are seen in                           



                          patients with other                           



                          cardiac conditions                           



                          (including                             



                          myocarditis and                           



                          congestive heart                           



                          failure), and slight                           



                          TnI elevations occur                           



                          in patients with                           



                          other conditions,                           



                          including sepsis,                           



                          renal failure,                           



                          acidosis, acute                           



                          neurological disease,                           



                          and persistent                           



                          tachyarrhythmia.Opera                           



                          tor ID - DB                            

 

             Lab Interpretation (test Normal                                 



             code = 68993-8)                                        



[facility]





             Test Item    Value        Reference Range Interpretation Comments

 

             Troponin I (test code = <0.01        0.00-0.03                 



             75984-9)                                            

 

             FAINA (test code = FAINA) Troponin I (TnI)                           



                          levels must be                           



                          interpreted in the                           



                          context of the                           



                          presenting symptoms                           



                          and the clinical                           



                          findings. Elevated                           



                          TnI levels indicate                           



                          myocardial damage,                           



                          but are not specific                           



                          for ischemic heart                           



                          disease. Elevated TnI                           



                          levels are seen in                           



                          patients with other                           



                          cardiac conditions                           



                          (including                             



                          myocarditis and                           



                          congestive heart                           



                          failure), and slight                           



                          TnI elevations occur                           



                          in patients with                           



                          other conditions,                           



                          including sepsis,                           



                          renal failure,                           



                          acidosis, acute                           



                          neurological disease,                           



                          and persistent                           



                          tachyarrhythmia.Opera                           



                          tor ID - DB                            

 

             Lab Interpretation (test Normal                                 



             code = 77096-1)                                        



[facility]





             Test Item    Value        Reference Range Interpretation Comments

 

             TROPONIN I (BEAKER) (test code = 397) < ng/mL      0.00-0.03       

          



Troponin I (TnI) levels must be interpreted in the context of the presenting 
symptoms and the clinical findings. Elevated TnI levels indicate myocardial 
damage, but are not specific for ischemic heart disease. Elevated TnI levels are
seen in patients with other cardiac conditions (including myocarditis and 
congestive heart failure), and slight TnI elevations occur in patients with 
other conditions, including sepsis, renal failure, acidosis, acute neurological 
disease, and persistent tachyarrhythmia. ID - DBB-type Natriuretic 
Factor (BNP)2021 20:48:00





             Test Item    Value        Reference Range Interpretation Comments

 

             BNP (test code = 67129-1) 48 pg/mL     0-100                     

 

             FAINA (test code = FAINA)  ID - DB                           

 

             Lab Interpretation (test Normal                                 



             code = 87304-0)                                        



[facility] 20:48:00





             Test Item    Value        Reference Range Interpretation Comments

 

             BNP (test code = 76361-4) 48 pg/mL     0-100                     

 

             FAINA (test code = FAINA)  ID - DB                           

 

             Lab Interpretation (test Normal                                 



             code = 83705-7)                                        



[facility]2021 20:48:00





             Test Item    Value        Reference Range Interpretation Comments

 

             B-TYPE NATRIURETIC PEPTIDE (BEAKER) 48 pg/mL     0-100             

        



             (test code = 700)                                        



 ID - [id number] 20:44:00





             Test Item    Value        Reference Range Interpretation Comments

 

             Magnesium (test code = 1.7 mg/dL    1.6-2.6                   Speci

men



             28395-8)                                            slightly



                                                                 hemolyzed

 

             FAINA (test code = FAINA)  ID - DB                           

 

             Lab Interpretation Normal                                 



             (test code = 73204-8)                                        



[facility]44[facility]00





             Test Item    Value        Reference Range Interpretation Comments

 

             Magnesium (test code = 1.7 mg/dL    1.6-2.6                   Speci

men



             01736-4)                                            slightly



                                                                 hemolyzed

 

             FAIAN (test code = FAINA)  ID - DB                           

 

             Lab Interpretation Normal                                 



             (test code = 80316-0)                                        



[facility]44:00





             Test Item    Value        Reference Range Interpretation Comments

 

             MAGNESIUM (BEAKER) 1.7 mg/dL    1.6-2.6                   Specimen 

slightly



             (test code = 627)                                        hemolyzed



 ID - DBBASIC METABOLIC [id number] 20:44:00





             Test Item    Value        Reference Range Interpretation Comments

 

             SODIUM (BEAKER) 139 meq/L    136-145                   



             (test code = 381)                                        

 

             POTASSIUM (BEAKER) 4.0 meq/L    3.5-5.1                   Specimen 

slightly



             (test code = 379)                                        hemolyzed

 

             CHLORIDE (BEAKER) 101 meq/L                        



             (test code = 382)                                        

 

             CO2 (BEAKER) (test 24 meq/L     22-29                     



             code = 355)                                         

 

             BLOOD UREA NITROGEN 17 mg/dL     7-21                      



             (BEAKER) (test code                                        



             = 354)                                              

 

             CREATININE (BEAKER) 1.13 mg/dL   0.57-1.25                 Specimen

 slightly



             (test code = 358)                                        hemolyzed

 

             GLUCOSE RANDOM 107 mg/dL           H            



             (BEAKER) (test code                                        



             = 652)                                              

 

             CALCIUM (BEAKER) 9.1 mg/dL    8.4-10.2                  



             (test code = 697)                                        

 

             EGFR (BEAKER) (test 68 mL/min/1.73                           ESTIMA

PHOEBE GFR IS



             code = 1092) sq m                                   NOT AS ACCURATE

 AS



                                                                 CREATININE



                                                                 CLEARANCE IN



                                                                 PREDICTING



                                                                 GLOMERULAR



                                                                 FILTRATION RATE

.



                                                                 ESTIMATED GFR I

S



                                                                 NOT APPLICABLE 

FOR



                                                                 DIALYSIS PATIEN

TS.



 ID - DBLactic acid, [id number] 20:38:00





             Test Item    Value        Reference Range Interpretation Comments

 

             Lactate, Venous (test 2.17 mmol/L  0.50-2.20                 Specim

en



             code = 2872)                                        slightly



                                                                 hemolyzed

 

             FAINA (test code = FAINA)  ID - DB                           

 

             Lab Interpretation Normal                                 



             (test code = 05116-6)                                        



[facility] Hospital[facility], [id number] 20:38:00





             Test Item    Value        Reference Range Interpretation Comments

 

             Lactate, Venous (test 2.17 mmol/L  0.50-2.20                 Specim

en



             code = 2872)                                        slightly



                                                                 hemolyzed

 

             FAINA (test code = FAINA)  ID - DB                           

 

             Lab Interpretation Normal                                 



             (test code = 78001-6)                                        



[facility] [id number] 20:38:00





             Test Item    Value        Reference Range Interpretation Comments

 

             LACTATE BLOOD VENOUS 2.17 mmol/L  0.50-2.20                 Specime

n slightly



             (2) (BEAKER) (test                                        hemolyzed



             code = 2872)                                        



 ID - DBCBC W/PLT COUNT &amp; AUTO [id number] 20:21:00





             Test Item    Value        Reference Range Interpretation Comments

 

             WHITE BLOOD CELL COUNT (BEAKER) 4.1 K/ L     3.5-10.5              

    



             (test code = 775)                                        

 

             RED BLOOD CELL COUNT (BEAKER) 5.73 M/ L    4.63-6.08               

  



             (test code = 761)                                        

 

             HEMOGLOBIN (BEAKER) (test code = 16.0 GM/DL   13.7-17.5            

     



             410)                                                

 

             HEMATOCRIT (BEAKER) (test code = 49.1 %       40.1-51.0            

     



             411)                                                

 

             MEAN CORPUSCULAR VOLUME (BEAKER) 85.7 fL      79.0-92.2            

     



             (test code = 753)                                        

 

             MEAN CORPUSCULAR HEMOGLOBIN 27.9 pg      25.7-32.2                 



             (BEAKER) (test code = 751)                                        

 

             MEAN CORPUSCULAR HEMOGLOBIN CONC 32.6 GM/DL   32.3-36.5            

     



             (BEAKER) (test code = 752)                                        

 

             RED CELL DISTRIBUTION WIDTH 13.2 %       11.6-14.4                 



             (BEAKER) (test code = 412)                                        

 

             PLATELET COUNT (BEAKER) (test code 98 K/CU MM   150-450      L     

       



             = 756)                                              

 

             MEAN PLATELET VOLUME (BEAKER) 9.9 fL       9.4-12.4                

  



             (test code = 754)                                        

 

             NUCLEATED RED BLOOD CELLS (BEAKER) 0 /100 WBC   0-0                

       



             (test code = 413)                                        

 

             NEUTROPHILS RELATIVE PERCENT 48 %                                  

 



             (BEAKER) (test code = 429)                                        

 

             LYMPHOCYTES RELATIVE PERCENT 30 %                                  

 



             (BEAKER) (test code = 430)                                        

 

             MONOCYTES RELATIVE PERCENT 20 %                                   



             (BEAKER) (test code = 431)                                        

 

             EOSINOPHILS RELATIVE PERCENT 0 %                                   

 



             (BEAKER) (test code = 432)                                        

 

             BASOPHILS RELATIVE PERCENT 1 %                                    



             (BEAKER) (test code = 437)                                        

 

             NEUTROPHILS ABSOLUTE COUNT 1.97 K/ L    1.78-5.38                 



             (BEAKER) (test code = 670)                                        

 

             LYMPHOCYTES ABSOLUTE COUNT 1.25 K/ L    1.32-3.57    L            



             (BEAKER) (test code = 414)                                        

 

             MONOCYTES ABSOLUTE COUNT (BEAKER) 0.84 K/ L    0.30-0.82    H      

      



             (test code = 415)                                        

 

             EOSINOPHILS ABSOLUTE COUNT 0.01 K/ L    0.04-0.54    L            



             (BEAKER) (test code = 416)                                        

 

             BASOPHILS ABSOLUTE COUNT (BEAKER) 0.02 K/ L    0.01-0.08           

      



             (test code = 417)                                        

 

             IMMATURE GRANULOCYTES-RELATIVE 1 %          0-1                    

   



             PERCENT (BEAKER) (test code =                                      

  



             2801)                                               



RAD, CHEST, 1 VIEW, NON [id number] 19:56:00NEW CARDIOLOGIST OBERTONReason 
for exam:-&gt;FEVERReason for exam:-&gt;NASAL CONGESTIONShould this be performed
at the bedside?-&gt;Yes
************************************************************[facility]        : 1970        Sex: 
M************************************************************FINAL REPORT 
PATIENT ID:   [id number]   AP view of the chest dated 3/11/2021 CLINICAL INFORMAT
ION: FEVERNASAL CONGESTION Comment:  Heart is normal in size. Pulmonary 
vasculature is unremarkable.Lungs are clear. No pulmonary infiltrate or pleural 
effusion is present.  Impression:  No active cardiopulmonary disease. Signed: 
Kiran James MDReport Verified Date/Time:  2021 19:56:24 Reading Location: 
[address] Consult Reading Room      Electronically signed by: KIRAN JAMES M.D. on 2021 07:56 PMTACROLIMUS [id number] 12:44:00





             Test Item    Value        Reference Range Interpretation Comments

 

             TACROLIMUS BLOOD (BEAKER) (test 7.6 ng/mL    10.0-20.0    L        

    



             code = 657)                                         



 ID - AAHAMIDLIPID [id number] 10:29:00





             Test Item    Value        Reference Range Interpretation Comments

 

             TRIGLYCERIDES (BEAKER) (test code = 81 mg/dL                       

        



             540)                                                

 

             CHOLESTEROL (BEAKER) (test code = 155 mg/dL                        

      



             631)                                                

 

             HDL CHOLESTEROL (BEAKER) (test code 50 mg/dL                       

        



             = 976)                                              

 

             LDL CHOLESTEROL CALCULATED (BEAKER) 89 mg/dL                       

        



             (test code = 633)                                        



Triglyceride Reference Range:   Low Risk         &lt;150   Borderline    150-199
  High Risk     200-499   Very High Risk  &gt;=500Cholesterol Reference Range:  
Low Risk         &lt;200   Borderline 200-239    High Risk        &gt;240HDL 
Cholesterol Reference Range:   Low Risk         &gt;=60   High Risk         
&lt;40LDL Cholesterol Reference Range:   Optimal          &lt;100   Near Optimal
 100-129   Borderline    130-159   High          160-189   Very High       
&gt;=190    ID - AMANDA CBASIC METABOLIC [id number] 10:29:00





             Test Item    Value        Reference Range Interpretation Comments

 

             SODIUM (BEAKER) 140 meq/L    136-145                   



             (test code = 381)                                        

 

             POTASSIUM (BEAKER) 4.6 meq/L    3.5-5.1                   



             (test code = 379)                                        

 

             CHLORIDE (BEAKER) 104 meq/L                        



             (test code = 382)                                        

 

             CO2 (BEAKER) (test 29 meq/L     22-29                     



             code = 355)                                         

 

             BLOOD UREA NITROGEN 19 mg/dL     7-21                      



             (BEAKER) (test code                                        



             = 354)                                              

 

             CREATININE (BEAKER) 1.13 mg/dL   0.57-1.25                 



             (test code = 358)                                        

 

             GLUCOSE RANDOM 105 mg/dL                        



             (BEAKER) (test code                                        



             = 652)                                              

 

             CALCIUM (BEAKER) 9.7 mg/dL    8.4-10.2                  



             (test code = 697)                                        

 

             EGFR (BEAKER) (test 68 mL/min/1.73                           ESTIMA

PHOEBE GFR IS



             code = 1092) sq m                                   NOT AS ACCURATE

 AS



                                                                 CREATININE



                                                                 CLEARANCE IN



                                                                 PREDICTING



                                                                 GLOMERULAR



                                                                 FILTRATION RATE

.



                                                                 ESTIMATED GFR I

S



                                                                 NOT APPLICABLE 

FOR



                                                                 DIALYSIS PATIEN

TS.



 ID - AMANDA CHEPATIC FUNCTION [id number] 10:29:00





             Test Item    Value        Reference Range Interpretation Comments

 

             TOTAL PROTEIN (BEAKER) (test code = 7.5 gm/dL    6.0-8.3           

        



             770)                                                

 

             ALBUMIN (BEAKER) (test code = 1145) 4.7 g/dL     3.5-5.0           

        

 

             BILIRUBIN TOTAL (BEAKER) (test code 0.7 mg/dL    0.2-1.2           

        



             = 377)                                              

 

             BILIRUBIN DIRECT (BEAKER) (test 0.3 mg/dL    0.1-0.5               

    



             code = 706)                                         

 

             ALKALINE PHOSPHATASE (BEAKER) (test 78 U/L                   

        



             code = 346)                                         

 

             AST (SGOT) (BEAKER) (test code = 19 U/L       5-34                 

     



             353)                                                

 

             ALT (SGPT) (BEAKER) (test code = 14 U/L       6-55                 

     



             347)                                                



 ID - AMANDA CRAD, CHEST, 2 [id number] 10:21:00NEW CARDIOLOGIST 
OBERTONReason for Exam:-&gt;heart transplant annual follow upFINAL REPORT 
PATIENT ID:   [id number] INDICATION: heart transplant annual follow up COMPARISON:
None TECHNIQUE: Frontal and lateral views of the chest. FINDINGS: Lungs and 
pleura: Clear lungs. No effusion.Heart and mediastinum: Normal heart size. 
Unremarkable mediastinal contours.Osseous structures: No acute 
abnormality.Additional findings: None.  IMPRESSION: No acute intrathoracic 
abnormality. Signed:Shikha Jarvis MDReport Verified Date/Time:  2020 
10:21:38 Reading Location: [facility] Radiology Reading Room  Electronically 
signed by: SHIKHA JARVIS MD on 2020 10:21 [id number] 
10:10:00





             Test Item    Value        Reference Range Interpretation Comments

 

             PROSTATE SPECIFIC ANTIGEN (BEAKER) 0.6 ng/mL    0.0-4.0            

       



             (test code = 844)                                        



 ID - AMANDA CCBC W/PLT COUNT &amp; AUTO [id number] 09:25:00





             Test Item    Value        Reference Range Interpretation Comments

 

             WHITE BLOOD CELL COUNT (BEAKER) 4.5 K/ L     3.5-10.5              

    



             (test code = 775)                                        

 

             RED BLOOD CELL COUNT (BEAKER) 5.64 M/ L    4.63-6.08               

  



             (test code = 761)                                        

 

             HEMOGLOBIN (BEAKER) (test code = 16.9 GM/DL   13.7-17.5            

     



             410)                                                

 

             HEMATOCRIT (BEAKER) (test code = 48.8 %       40.1-51.0            

     



             411)                                                

 

             MEAN CORPUSCULAR VOLUME (BEAKER) 86.5 fL      79.0-92.2            

     



             (test code = 753)                                        

 

             MEAN CORPUSCULAR HEMOGLOBIN 30.0 pg      25.7-32.2                 



             (BEAKER) (test code = 751)                                        

 

             MEAN CORPUSCULAR HEMOGLOBIN CONC 34.6 GM/DL   32.3-36.5            

     



             (BEAKER) (test code = 752)                                        

 

             RED CELL DISTRIBUTION WIDTH 13.3 %       11.6-14.4                 



             (BEAKER) (test code = 412)                                        

 

             PLATELET COUNT (BEAKER) (test 152 K/CU MM  150-450                 

  



             code = 756)                                         

 

             MEAN PLATELET VOLUME (BEAKER) 9.5 fL       9.4-12.4                

  



             (test code = 754)                                        

 

             NUCLEATED RED BLOOD CELLS 0 /100 WBC   0-0                       



             (BEAKER) (test code = 413)                                        

 

             NEUTROPHILS RELATIVE PERCENT 50 %                                  

 



             (BEAKER) (test code = 429)                                        

 

             LYMPHOCYTES RELATIVE PERCENT 32 %                                  

 



             (BEAKER) (test code = 430)                                        

 

             MONOCYTES RELATIVE PERCENT 11 %                                   



             (BEAKER) (test code = 431)                                        

 

             EOSINOPHILS RELATIVE PERCENT 6 %                                   

 



             (BEAKER) (test code = 432)                                        

 

             BASOPHILS RELATIVE PERCENT 1 %                                    



             (BEAKER) (test code = 437)                                        

 

             NEUTROPHILS ABSOLUTE COUNT 2.25 K/ L    1.78-5.38                 



             (BEAKER) (test code = 670)                                        

 

             LYMPHOCYTES ABSOLUTE COUNT 1.45 K/ L    1.32-3.57                 



             (BEAKER) (test code = 414)                                        

 

             MONOCYTES ABSOLUTE COUNT (BEAKER) 0.50 K/ L    0.30-0.82           

      



             (test code = 415)                                        

 

             EOSINOPHILS ABSOLUTE COUNT 0.25 K/ L    0.04-0.54                 



             (BEAKER) (test code = 416)                                        

 

             BASOPHILS ABSOLUTE COUNT (BEAKER) 0.03 K/ L    0.01-0.08           

      



             (test code = 417)                                        

 

             IMMATURE GRANULOCYTES-RELATIVE 0 %          0-1                    

   



             PERCENT (BEAKER) (test code =                                      

  



             2801)                                               



TACROLIMUS [id number] 11:25:00





             Test Item    Value        Reference Range Interpretation Comments

 

             TACROLIMUS BLOOD (BEAKER) (test 6.9 ng/mL    10.0-20.0    L        

    



             code = 657)                                         



BASIC METABOLIC [id number] 08:24:00





             Test Item    Value        Reference Range Interpretation Comments

 

             SODIUM (BEAKER) 137 meq/L    136-145                   



             (test code = 381)                                        

 

             POTASSIUM (BEAKER) 4.3 meq/L    3.5-5.1                   



             (test code = 379)                                        

 

             CHLORIDE (BEAKER) 103 meq/L                        



             (test code = 382)                                        

 

             CO2 (BEAKER) (test 28 meq/L     22-29                     



             code = 355)                                         

 

             BLOOD UREA NITROGEN 18 mg/dL     7-21                      



             (BEAKER) (test code                                        



             = 354)                                              

 

             CREATININE (BEAKER) 1.12 mg/dL   0.57-1.25                 



             (test code = 358)                                        

 

             GLUCOSE RANDOM 98 mg/dL                         



             (BEAKER) (test code                                        



             = 652)                                              

 

             CALCIUM (BEAKER) 9.1 mg/dL    8.4-10.2                  



             (test code = 697)                                        

 

             EGFR (BEAKER) (test 69 mL/min/1.73                           ESTIMA

PHOEBE GFR IS



             code = 1092) sq m                                   NOT AS ACCURATE

 AS



                                                                 CREATININE



                                                                 CLEARANCE IN



                                                                 PREDICTING



                                                                 GLOMERULAR



                                                                 FILTRATION RATE

.



                                                                 ESTIMATED GFR I

S



                                                                 NOT APPLICABLE 

FOR



                                                                 DIALYSIS PATIEN

TS.



CBC W/PLT COUNT &amp; AUTO [id number] 08:01:00





             Test Item    Value        Reference Range Interpretation Comments

 

             WHITE BLOOD CELL COUNT (BEAKER) 5.4 K/ L     3.5-10.5              

    



             (test code = 775)                                        

 

             RED BLOOD CELL COUNT (BEAKER) 5.35 M/ L    4.63-6.08               

  



             (test code = 761)                                        

 

             HEMOGLOBIN (BEAKER) (test code = 15.6 GM/DL   13.7-17.5            

     



             410)                                                

 

             HEMATOCRIT (BEAKER) (test code = 46.9 %       40.1-51.0            

     



             411)                                                

 

             MEAN CORPUSCULAR VOLUME (BEAKER) 87.7 fL      79.0-92.2            

     



             (test code = 753)                                        

 

             MEAN CORPUSCULAR HEMOGLOBIN 29.2 pg      25.7-32.2                 



             (BEAKER) (test code = 751)                                        

 

             MEAN CORPUSCULAR HEMOGLOBIN CONC 33.3 GM/DL   32.3-36.5            

     



             (BEAKER) (test code = 752)                                        

 

             RED CELL DISTRIBUTION WIDTH 13.6 %       11.6-14.4                 



             (BEAKER) (test code = 412)                                        

 

             PLATELET COUNT (BEAKER) (test 132 K/CU MM  150-450      L          

  



             code = 756)                                         

 

             MEAN PLATELET VOLUME (BEAKER) 9.3 fL       9.4-12.4     L          

  



             (test code = 754)                                        

 

             NUCLEATED RED BLOOD CELLS 0 /100 WBC   0-0                       



             (BEAKER) (test code = 413)                                        

 

             NEUTROPHILS RELATIVE PERCENT 70 %                                  

 



             (BEAKER) (test code = 429)                                        

 

             LYMPHOCYTES RELATIVE PERCENT 16 %                                  

 



             (BEAKER) (test code = 430)                                        

 

             MONOCYTES RELATIVE PERCENT 8 %                                    



             (BEAKER) (test code = 431)                                        

 

             EOSINOPHILS RELATIVE PERCENT 5 %                                   

 



             (BEAKER) (test code = 432)                                        

 

             BASOPHILS RELATIVE PERCENT 1 %                                    



             (BEAKER) (test code = 437)                                        

 

             NEUTROPHILS ABSOLUTE COUNT 3.77 K/ L    1.78-5.38                 



             (BEAKER) (test code = 670)                                        

 

             LYMPHOCYTES ABSOLUTE COUNT 0.83 K/ L    1.32-3.57    L            



             (BEAKER) (test code = 414)                                        

 

             MONOCYTES ABSOLUTE COUNT (BEAKER) 0.45 K/ L    0.30-0.82           

      



             (test code = 415)                                        

 

             EOSINOPHILS ABSOLUTE COUNT 0.25 K/ L    0.04-0.54                 



             (BEAKER) (test code = 416)                                        

 

             BASOPHILS ABSOLUTE COUNT (BEAKER) 0.04 K/ L    0.01-0.08           

      



             (test code = 417)                                        

 

             IMMATURE GRANULOCYTES-RELATIVE 0 %          0-1                    

   



             PERCENT (BEAKER) (test code =                                      

  



             2801)                                               



CT, BRAIN, WITHOUT [id number] 17:41:00NEW CARDIOLOGIST OBERTONFINAL 
REPORT PATIENT ID:   [id number] CT, BRAIN, WITHOUT CONTRAST INDICATION: head 
injury TECHNIQUE: Noncontrast axial imaging was obtained from the vertex to the 
skull base. Axial images were reconstructed using a bone algorithm. DOSE 
REDUCTION: Dose modulation, iterative reconstruction, and/or weight-based 
adjustment of the mA/kV was utilized to reduce the radiation dose to as low as 
reasonably achievable. COMPARISON: None. FINDINGS: Cerebral parenchyma: Mild 
cerebral volume loss is present. No recent infarct or parenchymal hemorrhage is 
present.Midline structures: Normally positioned.Cerebellum and brainstem: 
Commensurate volume loss.Ventricles: Normal volume.Extra-axial spaces: 
Unremarkable. Calvarium and skull base: Intact.Paranasal sinuses and mastoid air
cells: Visible chambers are clear.Orbital contents: Included portions 
unremarkable. Additional findings: None.  IMPRESSION:  Chronic involutional 
changes without acute or traumatic intracranial abnormality. Signed: 
JR Aguila RobertMDReport Verified Date/Time:  2019 17:41:54 
Reading Location: [facility] Radiology Reading Room    Electronically signed 
by: MOHIT AGUILA on 2019 05:41 PMTACROLIMUS [id number] 
10:33:00





             Test Item    Value        Reference Range Interpretation Comments

 

             TACROLIMUS BLOOD (BEAKER) (test 6.2 ng/mL    10.0-20.0    L        

    



             code = 657)                                         



[id number] 09:15:00





             Test Item    Value        Reference Range Interpretation Comments

 

             PROSTATE SPECIFIC ANTIGEN (BEAKER) 0.5 ng/mL    0.0-4.0            

       



             (test code = 844)                                        



LIPID [id number] 09:01:00





             Test Item    Value        Reference Range Interpretation Comments

 

             TRIGLYCERIDES (BEAKER) (test code = 63 mg/dL                       

        



             540)                                                

 

             CHOLESTEROL (BEAKER) (test code = 126 mg/dL                        

      



             631)                                                

 

             HDL CHOLESTEROL (BEAKER) (test code 42 mg/dL                       

        



             = 976)                                              

 

             LDL CHOLESTEROL CALCULATED (BEAKER) 71 mg/dL                       

        



             (test code = 633)                                        



Triglyceride Reference Range:   Low Risk         &lt;150   Borderline    150-199
  High Risk     200-499   Very High Risk  &gt;=500Cholesterol Reference Range:  
Low Risk         &lt;200   Borderline 200-239    High Risk        &gt;240HDL 
Cholesterol Reference Range:   Low Risk         &gt;=60   High Risk         
&lt;40LDL Cholesterol Reference Range:   Optimal          &lt;100   Near Optimal
 100-129   Borderline    130-159   High          160-189   Very High       
&gt;=190BASIC METABOLIC [id number] 09:01:00





             Test Item    Value        Reference Range Interpretation Comments

 

             SODIUM (BEAKER) 143 meq/L    136-145                   



             (test code = 381)                                        

 

             POTASSIUM (BEAKER) 4.3 meq/L    3.5-5.1                   



             (test code = 379)                                        

 

             CHLORIDE (BEAKER) 105 meq/L                        



             (test code = 382)                                        

 

             CO2 (BEAKER) (test 30 meq/L     22-29        H            



             code = 355)                                         

 

             BLOOD UREA NITROGEN 16 mg/dL     7-21                      



             (BEAKER) (test code                                        



             = 354)                                              

 

             CREATININE (BEAKER) 0.96 mg/dL   0.57-1.25                 



             (test code = 358)                                        

 

             GLUCOSE RANDOM 108 mg/dL           H            



             (BEAKER) (test code                                        



             = 652)                                              

 

             CALCIUM (BEAKER) 9.7 mg/dL    8.4-10.2                  



             (test code = 697)                                        

 

             EGFR (BEAKER) (test 83 mL/min/1.73                           ESTIMA

PHOEBE GFR IS



             code = 1092) sq m                                   NOT AS ACCURATE

 AS



                                                                 CREATININE



                                                                 CLEARANCE IN



                                                                 PREDICTING



                                                                 GLOMERULAR



                                                                 FILTRATION RATE

.



                                                                 ESTIMATED GFR I

S



                                                                 NOT APPLICABLE 

FOR



                                                                 DIALYSIS PATIEN

TS.



HEPATIC FUNCTION [id number] 09:01:00





             Test Item    Value        Reference Range Interpretation Comments

 

             TOTAL PROTEIN (BEAKER) (test code = 7.0 gm/dL    6.0-8.3           

        



             770)                                                

 

             ALBUMIN (BEAKER) (test code = 1145) 4.5 g/dL     3.5-5.0           

        

 

             BILIRUBIN TOTAL (BEAKER) (test code 0.5 mg/dL    0.2-1.2           

        



             = 377)                                              

 

             BILIRUBIN DIRECT (BEAKER) (test 0.3 mg/dL    0.1-0.5               

    



             code = 706)                                         

 

             ALKALINE PHOSPHATASE (BEAKER) (test 81 U/L                   

        



             code = 346)                                         

 

             AST (SGOT) (BEAKER) (test code = 18 U/L       5-34                 

     



             353)                                                

 

             ALT (SGPT) (BEAKER) (test code = 14 U/L       6-55                 

     



             347)                                                



RAD, CHEST, 2 [id number] 08:48:00NEW CARDIOLOGIST OBERTONReason for Exam:-
&gt;heart transplant annual follow upFINAL REPORT PATIENT ID:   [id number] 
INDICATION: heart transplant annual follow up COMPARISON: May 23, 2018 
TECHNIQUE: Chest radiograph, two views, PA and lateral. FINDINGS / 
IMPRESSION:Lung volumes arenormal and lungs are clear. Intact median sternotomy 
wires and left axillary/subclavian surgical clips again demonstrated from prior 
heart transplant. Cardiac and mediastinal contours normal. No pleural effusion 
or pneumothorax. Osseous structures unremarkable. Signed: Marlee Julio 
MDReport Verified Date/Time:  2019 08:48:33 Reading Location: [facility] Radiology Reading Room Electronically signed by: MARLEE JULIO M.D. on 2019 08:48 AMCBC W/PLT COUNT &amp; AUTO [id number] 
08:28:00





             Test Item    Value        Reference Range Interpretation Comments

 

             WHITE BLOOD CELL COUNT (BEAKER) 5.2 K/ L     3.5-10.5              

    



             (test code = 775)                                        

 

             RED BLOOD CELL COUNT (BEAKER) 5.18 M/ L    4.63-6.08               

  



             (test code = 761)                                        

 

             HEMOGLOBIN (BEAKER) (test code = 15.1 GM/DL   13.7-17.5            

     



             410)                                                

 

             HEMATOCRIT (BEAKER) (test code = 45.4 %       40.1-51.0            

     



             411)                                                

 

             MEAN CORPUSCULAR VOLUME (BEAKER) 87.6 fL      79.0-92.2            

     



             (test code = 753)                                        

 

             MEAN CORPUSCULAR HEMOGLOBIN 29.2 pg      25.7-32.2                 



             (BEAKER) (test code = 751)                                        

 

             MEAN CORPUSCULAR HEMOGLOBIN CONC 33.3 GM/DL   32.3-36.5            

     



             (BEAKER) (test code = 752)                                        

 

             RED CELL DISTRIBUTION WIDTH 13.3 %       11.6-14.4                 



             (BEAKER) (test code = 412)                                        

 

             PLATELET COUNT (BEAKER) (test 135 K/CU MM  150-450      L          

  



             code = 756)                                         

 

             MEAN PLATELET VOLUME (BEAKER) 9.6 fL       9.4-12.4                

  



             (test code = 754)                                        

 

             NUCLEATED RED BLOOD CELLS 0 /100 WBC   0-0                       



             (BEAKER) (test code = 413)                                        

 

             NEUTROPHILS RELATIVE PERCENT 55 %                                  

 



             (BEAKER) (test code = 429)                                        

 

             LYMPHOCYTES RELATIVE PERCENT 29 %                                  

 



             (BEAKER) (test code = 430)                                        

 

             MONOCYTES RELATIVE PERCENT 10 %                                   



             (BEAKER) (test code = 431)                                        

 

             EOSINOPHILS RELATIVE PERCENT 5 %                                   

 



             (BEAKER) (test code = 432)                                        

 

             BASOPHILS RELATIVE PERCENT 1 %                                    



             (BEAKER) (test code = 437)                                        

 

             NEUTROPHILS ABSOLUTE COUNT 2.88 K/ L    1.78-5.38                 



             (BEAKER) (test code = 670)                                        

 

             LYMPHOCYTES ABSOLUTE COUNT 1.52 K/ L    1.32-3.57                 



             (BEAKER) (test code = 414)                                        

 

             MONOCYTES ABSOLUTE COUNT (BEAKER) 0.51 K/ L    0.30-0.82           

      



             (test code = 415)                                        

 

             EOSINOPHILS ABSOLUTE COUNT 0.24 K/ L    0.04-0.54                 



             (BEAKER) (test code = 416)                                        

 

             BASOPHILS ABSOLUTE COUNT (BEAKER) 0.05 K/ L    0.01-0.08           

      



             (test code = 417)                                        

 

             IMMATURE GRANULOCYTES-RELATIVE 1 %          0-1                    

   



             PERCENT (BEAKER) (test code =                                      

  



             2801)                                               



TACROLIMUS [id number] 13:15:00





             Test Item    Value        Reference Range Interpretation Comments

 

             TACROLIMUS BLOOD (BEAKER) (test 6.9 ng/mL    10.0-20.0    L        

    



             code = 657)                                         



BASIC METABOLIC [id number] 10:45:00





             Test Item    Value        Reference Range Interpretation Comments

 

             SODIUM (BEAKER) 139 meq/L    136-145                   



             (test code = 381)                                        

 

             POTASSIUM (BEAKER) 4.8 meq/L    3.5-5.1                   



             (test code = 379)                                        

 

             CHLORIDE (BEAKER) 102 meq/L                        



             (test code = 382)                                        

 

             CO2 (BEAKER) (test 30 meq/L     22-29        H            



             code = 355)                                         

 

             BLOOD UREA NITROGEN 14 mg/dL     7-21                      



             (BEAKER) (test code                                        



             = 354)                                              

 

             CREATININE (BEAKER) 0.95 mg/dL   0.57-1.25                 



             (test code = 358)                                        

 

             GLUCOSE RANDOM 88 mg/dL                         



             (BEAKER) (test code                                        



             = 652)                                              

 

             CALCIUM (BEAKER) 10.1 mg/dL   8.4-10.2                  



             (test code = 697)                                        

 

             EGFR (BEAKER) (test 84 mL/min/1.73                           ESTIMA

PHOEBE GFR IS



             code = 1092) sq m                                   NOT AS ACCURATE

 AS



                                                                 CREATININE



                                                                 CLEARANCE IN



                                                                 PREDICTING



                                                                 GLOMERULAR



                                                                 FILTRATION RATE

.



                                                                 ESTIMATED GFR I

S



                                                                 NOT APPLICABLE 

FOR



                                                                 DIALYSIS PATIEN

TS.



CBC W/PLT COUNT &amp; AUTO [id number] 10:31:00





             Test Item    Value        Reference Range Interpretation Comments

 

             WHITE BLOOD CELL COUNT (BEAKER) 5.0 K/ L     3.5-10.5              

    



             (test code = 775)                                        

 

             RED BLOOD CELL COUNT (BEAKER) 5.55 M/ L    4.63-6.08               

  



             (test code = 761)                                        

 

             HEMOGLOBIN (BEAKER) (test code = 15.8 GM/DL   13.7-17.5            

     



             410)                                                

 

             HEMATOCRIT (BEAKER) (test code = 47.9 %       40.1-51.0            

     



             411)                                                

 

             MEAN CORPUSCULAR VOLUME (BEAKER) 86.3 fL      79.0-92.2            

     



             (test code = 753)                                        

 

             MEAN CORPUSCULAR HEMOGLOBIN 28.5 pg      25.7-32.2                 



             (BEAKER) (test code = 751)                                        

 

             MEAN CORPUSCULAR HEMOGLOBIN CONC 33.0 GM/DL   32.3-36.5            

     



             (BEAKER) (test code = 752)                                        

 

             RED CELL DISTRIBUTION WIDTH 13.2 %       11.6-14.4                 



             (BEAKER) (test code = 412)                                        

 

             PLATELET COUNT (BEAKER) (test 131 K/CU MM  150-450      L          

  



             code = 756)                                         

 

             MEAN PLATELET VOLUME (BEAKER) 9.5 fL       9.4-12.4                

  



             (test code = 754)                                        

 

             NUCLEATED RED BLOOD CELLS 0 /100 WBC   0-0                       



             (BEAKER) (test code = 413)                                        

 

             NEUTROPHILS RELATIVE PERCENT 56 %                                  

 



             (BEAKER) (test code = 429)                                        

 

             LYMPHOCYTES RELATIVE PERCENT 26 %                                  

 



             (BEAKER) (test code = 430)                                        

 

             MONOCYTES RELATIVE PERCENT 12 %                                   



             (BEAKER) (test code = 431)                                        

 

             EOSINOPHILS RELATIVE PERCENT 4 %                                   

 



             (BEAKER) (test code = 432)                                        

 

             BASOPHILS RELATIVE PERCENT 1 %                                    



             (BEAKER) (test code = 437)                                        

 

             NEUTROPHILS ABSOLUTE COUNT 2.80 K/ L    1.78-5.38                 



             (BEAKER) (test code = 670)                                        

 

             LYMPHOCYTES ABSOLUTE COUNT 1.32 K/ L    1.32-3.57                 



             (BEAKER) (test code = 414)                                        

 

             MONOCYTES ABSOLUTE COUNT (BEAKER) 0.59 K/ L    0.30-0.82           

      



             (test code = 415)                                        

 

             EOSINOPHILS ABSOLUTE COUNT 0.21 K/ L    0.04-0.54                 



             (BEAKER) (test code = 416)                                        

 

             BASOPHILS ABSOLUTE COUNT (BEAKER) 0.04 K/ L    0.01-0.08           

      



             (test code = 417)                                        

 

             IMMATURE GRANULOCYTES-RELATIVE 1 %          0-1                    

   



             PERCENT (BEAKER) (test code =                                      

  



             2801)                                               



BASIC METABOLIC [id number] 09:28:00





             Test Item    Value        Reference Range Interpretation Comments

 

             SODIUM (BEAKER) 140 meq/L    136-145                   



             (test code = 381)                                        

 

             POTASSIUM (BEAKER) 4.7 meq/L    3.5-5.1                   



             (test code = 379)                                        

 

             CHLORIDE (BEAKER) 103 meq/L                        



             (test code = 382)                                        

 

             CO2 (BEAKER) (test 30 meq/L     22-29        H            



             code = 355)                                         

 

             BLOOD UREA NITROGEN 20 mg/dL     7-21                      



             (BEAKER) (test code                                        



             = 354)                                              

 

             CREATININE (BEAKER) 1.08 mg/dL   0.57-1.25                 



             (test code = 358)                                        

 

             GLUCOSE RANDOM 110 mg/dL           H            



             (BEAKER) (test code                                        



             = 652)                                              

 

             CALCIUM (BEAKER) 10.0 mg/dL   8.4-10.2                  



             (test code = 697)                                        

 

             EGFR (BEAKER) (test 73 mL/min/1.73                           ESTIMA

PHOEBE GFR IS



             code = 1092) sq m                                   NOT AS ACCURATE

 AS



                                                                 CREATININE



                                                                 CLEARANCE IN



                                                                 PREDICTING



                                                                 GLOMERULAR



                                                                 FILTRATION RATE

.



                                                                 ESTIMATED GFR I

S



                                                                 NOT APPLICABLE 

FOR



                                                                 DIALYSIS PATIEN

TS.



CBC W/PLT COUNT &amp; AUTO [id number] 08:58:00





             Test Item    Value        Reference Range Interpretation Comments

 

             WHITE BLOOD CELL COUNT (BEAKER) 4.9 K/ L     3.5-10.5              

    



             (test code = 775)                                        

 

             RED BLOOD CELL COUNT (BEAKER) 5.37 M/ L    4.63-6.08               

  



             (test code = 761)                                        

 

             HEMOGLOBIN (BEAKER) (test code = 15.5 GM/DL   13.7-17.5            

     



             410)                                                

 

             HEMATOCRIT (BEAKER) (test code = 46.8 %       40.1-51.0            

     



             411)                                                

 

             MEAN CORPUSCULAR VOLUME (BEAKER) 87.2 fL      79.0-92.2            

     



             (test code = 753)                                        

 

             MEAN CORPUSCULAR HEMOGLOBIN 28.9 pg      25.7-32.2                 



             (BEAKER) (test code = 751)                                        

 

             MEAN CORPUSCULAR HEMOGLOBIN CONC 33.1 GM/DL   32.3-36.5            

     



             (BEAKER) (test code = 752)                                        

 

             RED CELL DISTRIBUTION WIDTH 13.3 %       11.6-14.4                 



             (BEAKER) (test code = 412)                                        

 

             PLATELET COUNT (BEAKER) (test 130 K/CU MM  150-450      L          

  



             code = 756)                                         

 

             MEAN PLATELET VOLUME (BEAKER) 9.6 fL       9.4-12.4                

  



             (test code = 754)                                        

 

             NUCLEATED RED BLOOD CELLS 0 /100 WBC   0-0                       



             (BEAKER) (test code = 413)                                        

 

             NEUTROPHILS RELATIVE PERCENT 65 %                                  

 



             (BEAKER) (test code = 429)                                        

 

             LYMPHOCYTES RELATIVE PERCENT 20 %                                  

 



             (BEAKER) (test code = 430)                                        

 

             MONOCYTES RELATIVE PERCENT 10 %                                   



             (BEAKER) (test code = 431)                                        

 

             EOSINOPHILS RELATIVE PERCENT 4 %                                   

 



             (BEAKER) (test code = 432)                                        

 

             BASOPHILS RELATIVE PERCENT 1 %                                    



             (BEAKER) (test code = 437)                                        

 

             NEUTROPHILS ABSOLUTE COUNT 3.16 K/ L    1.78-5.38                 



             (BEAKER) (test code = 670)                                        

 

             LYMPHOCYTES ABSOLUTE COUNT 0.99 K/ L    1.32-3.57    L            



             (BEAKER) (test code = 414)                                        

 

             MONOCYTES ABSOLUTE COUNT (BEAKER) 0.49 K/ L    0.30-0.82           

      



             (test code = 415)                                        

 

             EOSINOPHILS ABSOLUTE COUNT 0.17 K/ L    0.04-0.54                 



             (BEAKER) (test code = 416)                                        

 

             BASOPHILS ABSOLUTE COUNT (BEAKER) 0.04 K/ L    0.01-0.08           

      



             (test code = 417)                                        

 

             IMMATURE GRANULOCYTES-RELATIVE 1 %          0-1                    

   



             PERCENT (BEAKER) (test code =                                      

  



             2801)                                               



TACROLIMUS [id number] 13:26:00





             Test Item    Value        Reference Range Interpretation Comments

 

             TACROLIMUS BLOOD (BEAKER) (test 4.1 ng/mL    10.0-20.0    L        

    



             code = 657)                                         



RAD, CHEST, 2 [id number] 14:13:00NEW CARDIOLOGIST OBERTONReason for Exam:-
&gt;heart transplant annual follow upFINAL REPORT PATIENT ID:   [id number] Chest 
two views INDICATION: Heart transplant annual follow-up. COMPARISON: 2017 
IMPRESSION: There is no focal consolidation, vascular congestion, pleural 
effusion, or pneumothorax. Heart size is within normal limits. Median sternotomy
changes, left axillary surgical clips, and gastric sleeve with small hiatal 
hernia are again noted. No significant change since 2017. Signed: Ac Nickerson Verified Date/Time:  2018 14:13:48 Reading Location: 
[address] Consult Reading Room   Electronically signed by: AC NICKERSON M.D. on 2018 02:13 PMTACROLIMUS [id number] 13:55:00





             Test Item    Value        Reference Range Interpretation Comments

 

             TACROLIMUS BLOOD (BEAKER) (test 4.7 ng/mL    10.0-20.0    L        

    



             code = 657)                                         



COMPREHENSIVE METABOLIC [id number] 12:11:00





             Test Item    Value        Reference Range Interpretation Comments

 

             TOTAL PROTEIN 6.5 gm/dL    6.0-8.3                   



             (BEAKER) (test code =                                        



             770)                                                

 

             ALBUMIN (BEAKER) 4.2 g/dL     3.5-5.0                   



             (test code = 1145)                                        

 

             ALKALINE PHOSPHATASE 98 U/L                           



             (BEAKER) (test code =                                        



             346)                                                

 

             BILIRUBIN TOTAL 0.4 mg/dL    0.2-1.2                   



             (BEAKER) (test code =                                        



             377)                                                

 

             SODIUM (BEAKER) (test 141 meq/L    136-145                   



             code = 381)                                         

 

             POTASSIUM (BEAKER) 4.3 meq/L    3.5-5.1                   



             (test code = 379)                                        

 

             CHLORIDE (BEAKER) 103 meq/L                        



             (test code = 382)                                        

 

             CO2 (BEAKER) (test 29 meq/L     22-29                     



             code = 355)                                         

 

             BLOOD UREA NITROGEN 17 mg/dL     7-21                      



             (BEAKER) (test code =                                        



             354)                                                

 

             CREATININE (BEAKER) 1.00 mg/dL   0.57-1.25                 



             (test code = 358)                                        

 

             GLUCOSE RANDOM 101 mg/dL                        



             (BEAKER) (test code =                                        



             652)                                                

 

             CALCIUM (BEAKER) 9.5 mg/dL    8.4-10.2                  



             (test code = 697)                                        

 

             AST (SGOT) (BEAKER) 15 U/L       5-34                      



             (test code = 353)                                        

 

             ALT (SGPT) (BEAKER) 12 U/L       6-55                      



             (test code = 347)                                        

 

             EGFR (BEAKER) (test 79 mL/min/1.73                           ESTIMA

PHOEBE GFR IS



             code = 1092) sq m                                   NOT AS ACCURATE

 AS



                                                                 CREATININE



                                                                 CLEARANCE IN



                                                                 PREDICTING



                                                                 GLOMERULAR



                                                                 FILTRATION RATE

.



                                                                 ESTIMATED GFR I

S



                                                                 NOT APPLICABLE 

FOR



                                                                 DIALYSIS PATIEN

TS.



LIPID [id number] 11:47:00





             Test Item    Value        Reference Range Interpretation Comments

 

             TRIGLYCERIDES (BEAKER) (test code = 87 mg/dL                       

        



             540)                                                

 

             CHOLESTEROL (BEAKER) (test code = 127 mg/dL                        

      



             631)                                                

 

             HDL CHOLESTEROL (BEAKER) (test code 37 mg/dL                       

        



             = 976)                                              

 

             LDL CHOLESTEROL CALCULATED (BEAKER) 73 mg/dL                       

        



             (test code = 633)                                        



Triglyceride Reference Range:   Low Risk         &lt;150   Borderline    150-199
  High Risk     200-499   Very High Risk  &gt;=500Cholesterol Reference Range:  
Low Risk         &lt;200   Borderline 200-239    High Risk        &gt;240HDL 
Cholesterol Reference Range:   Low Risk         &gt;=60   High Risk         
&lt;40LDL Cholesterol Reference Range:   Optimal          &lt;100   Near Optimal
 100-129   Borderline    130-159   High          160-189   Very High       
&gt;=190PROTHROMBIN [id number] 11:30:00





             Test Item    Value        Reference Range Interpretation Comments

 

             PROTIME (BEAKER) (test code = 14.7 seconds 11.7-14.7               

  



             759)                                                

 

             INR (BEAKER) (test code = 370) 1.2          <=5.9                  

   



RECOMMENDED COUMADIN/WARFARIN INR THERAPY RANGESSTANDARD DOSE: 2.0 - 3.0   
Includes: PROPHYLAXIS forvenous thrombosis, systemic embolization; TREATMENT for
venous thrombosis and/or pulmonary embolus.HIGH RISK: Target INR is 2.5-3.5 for 
patients with mechanical heart valves.CBC W/PLT COUNT &amp; AUTO DIFFERENTIAL
2018 11:23:00





             Test Item    Value        Reference Range Interpretation Comments

 

             WHITE BLOOD CELL COUNT (BEAKER) 4.8 K/ L     3.5-10.5              

    



             (test code = 775)                                        

 

             RED BLOOD CELL COUNT (BEAKER) 5.26 M/ L    4.63-6.08               

  



             (test code = 761)                                        

 

             HEMOGLOBIN (BEAKER) (test code = 15.1 GM/DL   13.7-17.5            

     



             410)                                                

 

             HEMATOCRIT (BEAKER) (test code = 45.8 %       40.1-51.0            

     



             411)                                                

 

             MEAN CORPUSCULAR VOLUME (BEAKER) 87.1 fL      79.0-92.2            

     



             (test code = 753)                                        

 

             MEAN CORPUSCULAR HEMOGLOBIN 28.7 pg      25.7-32.2                 



             (BEAKER) (test code = 751)                                        

 

             MEAN CORPUSCULAR HEMOGLOBIN CONC 33.0 GM/DL   32.3-36.5            

     



             (BEAKER) (test code = 752)                                        

 

             RED CELL DISTRIBUTION WIDTH 13.4 %       11.6-14.4                 



             (BEAKER) (test code = 412)                                        

 

             PLATELET COUNT (BEAKER) (test 161 K/CU MM  150-450                 

  



             code = 756)                                         

 

             MEAN PLATELET VOLUME (BEAKER) 9.0 fL       9.4-12.4     L          

  



             (test code = 754)                                        

 

             NUCLEATED RED BLOOD CELLS 0 /100 WBC   0-0                       



             (BEAKER) (test code = 413)                                        

 

             NEUTROPHILS RELATIVE PERCENT 67 %                                  

 



             (BEAKER) (test code = 429)                                        

 

             LYMPHOCYTES RELATIVE PERCENT 19 %                                  

 



             (BEAKER) (test code = 430)                                        

 

             MONOCYTES RELATIVE PERCENT 10 %                                   



             (BEAKER) (test code = 431)                                        

 

             EOSINOPHILS RELATIVE PERCENT 3 %                                   

 



             (BEAKER) (test code = 432)                                        

 

             BASOPHILS RELATIVE PERCENT 1 %                                    



             (BEAKER) (test code = 437)                                        

 

             NEUTROPHILS ABSOLUTE COUNT 3.21 K/ L    1.78-5.38                 



             (BEAKER) (test code = 670)                                        

 

             LYMPHOCYTES ABSOLUTE COUNT 0.93 K/ L    1.32-3.57    L            



             (BEAKER) (test code = 414)                                        

 

             MONOCYTES ABSOLUTE COUNT (BEAKER) 0.48 K/ L    0.30-0.82           

      



             (test code = 415)                                        

 

             EOSINOPHILS ABSOLUTE COUNT 0.14 K/ L    0.04-0.54                 



             (BEAKER) (test code = 416)                                        

 

             BASOPHILS ABSOLUTE COUNT (BEAKER) 0.04 K/ L    0.01-0.08           

      



             (test code = 417)                                        

 

             IMMATURE GRANULOCYTES-RELATIVE 1 %          0-1                    

   



             PERCENT (BEAKER) (test code =                                      

  



             2801)                                               



[[facility]] CBC (INCLUDES DIFF/PLT)2018 17:10:01





             Test Item    Value        Reference Range Interpretation Comments

 

             WBC (test code = 6690-2) 4.7 {K/CMM}  3.7-10.4                  

 

             RBC (test code = 789-8) 5.33 {M/CMM} 4.70-6.10                 

 

             Hgb (test code = 718-7) 15.3 g/dl    14.0-18.0                 

 

             Hct (test code = 83738-9) 46.4 %       42.0-54.0                 

 

             MCV (test code = 787-2) 87.1 fL      80.0-94.0                 

 

             MCH (test code = 785-6) 28.7 pg      27.0-31.0                 

 

             MCHC (test code = 786-4) 33.0 g/dl    32.0-36.0                 

 

             RDW (test code = 788-0) 13.9 %       11.5-14.5                 

 

             Platelet (test code = 03179-9) 154 {K/CMM}  133-450                

   

 

             Mean Platelet Volume (test code 8.8 fL       7.4-10.4              

    



             = 43815-7)                                          



[facility]] [id number] 17:10:01





             Test Item    Value        Reference Range Interpretation Comments

 

             Segmented Neutrophils (test code 59.8 %       45.0-75.0            

     



             = 74587-5)                                          

 

             Monocytes (test code = 26485-3) 11.4 %       2.0-12.0              

    

 

             Lymphocytes (test code = 95568-3) 23.9 %       20.0-40.0           

      

 

             Eosinophils; Above High Threshold 4.1 %        0.0-4.0             

      



             (test code = 90257-5)                                        

 

             Basophils (test code = 706-2) 0.8 %        0.0-1.0                 

  

 

             Segs-Bands # (test code = 2.8 {K/CMM}  1.5-8.1                   



             17249-6)                                            

 

             Lymphocytes # (test code = 1.1 {K/CMM}  1.0-5.5                   



             44396-8)                                            

 

             Monocytes # (test code = 15525-6) 0.5 {K/CMM}  0.0-0.8             

      

 

             Eosinophils # (test code = 0.2 {K/CMM}  0.0-0.5                   



             51584-2)                                            



[facility]] PTH, INTACT (WITHOUT CALCIUM)2018 
17:10:01





             Test Item    Value        Reference Range Interpretation Comments

 

             Parathyroid Hormone Intact; Above 99.2 pg/ml   11.1-79.5           

      



             High Threshold (test code =                                        



             2731-8)                                             



[facility]] CMP [id number] 17:10:01





             Test Item    Value        Reference Range Interpretation Comments

 

             Sodium Level 140 {mEq/l}  135-145                   



             (test code =                                        



             2951-2)                                             

 

             Potassium Level 4.5 {mEq/l}  3.5-5.1                   



             (test code =                                        



             2823-3)                                             

 

             Chloride Level 105 {mEq/l}                      



             (test code =                                        



             2075-0)                                             

 

             Carbon Dioxide 28 {mEq/l}   24-32                     



             (test code =                                        



             8-9)                                             

 

             AGAP (test code = 11.5 {mEq/l} 10.0-20.0                 



             20089-2)                                            

 

             Glucose Lvl (test 95 mg/dl     70-99                     Adult refe

rence range



             code = 2345-7)                                        values reflec

t the



                                                                 clinical guidel

inesof the



                                                                 American Diabet

es



                                                                 Association.

 

             Creatinine Lvl 1.20 mg/dl   0.50-1.40                 



             (test code =                                        



             2160-0)                                             

 

             Blood Urea   22 mg/dl     7-22                      



             Nitrogen (test                                        



             code = 3094-0)                                        

 

             BUN/Creatinine 18           6-25                      



             Ratio (test code                                        



             = 3097-3)                                           

 

             Total Protein 7.2 g/dl     6.4-8.4                   



             (test code =                                        



             2885-2)                                             

 

             Albumin Lvl (test 4.4 g/dl     3.5-5.0                   



             code = 1751-7)                                        

 

             Globulin (test 2.8 g/dl     2.7-4.2                   



             code = 46529-4)                                        

 

             A/G Ratio (test 1.6          0.7-1.6                   



             code = 1759-0)                                        

 

             Calcium Level 9.1 mg/dl    8.5-10.5                  



             Total (test code                                        



             = 17515-4)                                          

 

             ALT (test code = 21 u/l       0-65                      



             1743-4)                                             

 

             AST (test code = 18 u/l       0-37                      



             28069-0)                                            

 

             Bili Total (test 0.4 mg/dl    0.2-1.3                   



             code = 1975-2)                                        

 

             Alk Phos (test 112 u/l                          



             code = 1783-0)                                        

 

             eGFR (test code = 71                                     The eGFR i

s calculated



             14418-2)     {ML/MIN/1.7}                           using the CKD-E

PI



                                                                 formula. In mos

t young,



                                                                 healthyindividu

als the



                                                                 eGFR will be >9

0



                                                                 mL/min/1.73m2. 

The eGFR



                                                                 declines with a

ge. AneGFR



                                                                 of 60-89 may be

 normal in



                                                                 some population

s,



                                                                 particularly th

e elderly,



                                                                 forwhom the CKD

-EPI



                                                                 formula has not

 been



                                                                 extensively marielena

idated.



                                                                 Use of the eGFR

 isnot



                                                                 recommended in 

the



                                                                 following



                                                                 populations:Ind

ividuals



                                                                 with unstable c

reatinine



                                                                 concentrations,

 including



                                                                 pregnantpatient

s and



                                                                 those with seri

ous



                                                                 co-morbid



                                                                 conditions.Elsa

ents with



                                                                 extremes in mus

rick mass



                                                                 or diet.The albina

a above



                                                                 are obtained fr

om the



                                                                 National Kidney

 Disease



                                                                 Education Progr

am(NKDEP)



                                                                 which sergio wilburn



                                                                 recommends that

 when the



                                                                 eGFR is used in



                                                                 patientswith ex

tremes of



                                                                 body mass index

 for



                                                                 purposes of lissett

g dosing,



                                                                 the eGFR should

be



                                                                 multiplied by t

he



                                                                 estimated BMI.



[facility]] FOLATE, [id number] 17:10:01





             Test Item    Value        Reference Range Interpretation Comments

 

             Folate Level (test code = 2284-8) 9.3 ng/ml    >=3.0               

      



[facility]] IRON, [id number] 17:10:01





             Test Item    Value        Reference Range Interpretation Comments

 

             Iron (test code = 2498-4) 85 ug/dL                         



[facility]] LIPID [id number] 17:10:01





             Test Item    Value        Reference Range Interpretation Comments

 

             Chol (test code = 2093-3) 124 mg/dl    <=199                     

 

             Trig (test code = 2571-8) 77 mg/dl     <=149                     

 

             HDL Cholesterol; Below Low 42 mg/dl     >=61                      



             Threshold (test code = 2085-9)                                     

   

 

             CHD Risk; Below Low Threshold (test 2.95         4.00-7.30         

        



             code = 70231-4)                                        

 

             LDL (test code = 28210-0) 67 mg/dl     <=99                      

 

             VLDL (test code = VLDL) 15                                     



[facility]] VITAMIN [id number] 17:10:01





             Test Item    Value        Reference Range Interpretation Comments

 

             Vitamin B12 Level; Below Low 235 pg/ml    254-1320                 

 



             Threshold (test code = 2132-9)                                     

   



[facility]] TSH, 3RD GENERATION W/REFLEX TO FT4
2018 17:10:01





             Test Item    Value        Reference Range Interpretation Comments

 

             TSH (test code = 39177-5) 1.090 {uIU/ml} 0.360-3.740               



[facility]] HEMOGLOBIN [id number] 17:10:01





             Test Item    Value        Reference Range Interpretation Comments

 

             Hemoglobin A1c (test code = 4548-4) 5.2 %        <=5.6             

        



[facility][] Vitamin E [id number] 17:10:01





             Test Item    Value        Reference Range Interpretation Comments

 

             Vitamin E Lvl (test Cancel Reason: Modified                        

   



             code = Vitamin E Lvl) Order                                  



[facility]] VITAMIN B1, WHOLE [id number] 17:10:01





             Test Item    Value        Reference Range Interpretation Comments

 

             Vitamin B1   147.6        66.5-200.0                This test was d

eveloped and its



             Level (test  nmol/L                                 performance



             code =                                              characteristics

determined by



             Vitamin B1                                          [facility]. It has

 not been



             Level)                                              cleared orappro

mateo by the Food



                                                                 and Drug



                                                                 Administration.

Performed At: [facility] [place] [address]



                                                                 [id number]

shantal CODY MD



                                                                 Ph:[phone]



[facility][H] Vit  17:10:01





             Test Item    Value        Reference Range Interpretation Comments

 

             Vitamin A Level (test 52 ug/dL     24-85                     **Effe

ctive ,



             code = Vitamin A                                        2018 Vitami

n A, Serum



             Level)                                              will be**  raine

ging to



                                                                 the LC/MS-MS



                                                                 methodology. Th

e



                                                                 reference  inte

rval



                                                                 will be annabel carvajal to:



                                                                 



                                                                 < 6 years      

Not



                                                                 Estab.



                                                                        6 - 11 y

ears



                                                                  22.8 -  54.4



                                                                              12

 - 19



                                                                 years      28.9

 -  75.7



                                                                 



                                                                 20 - 39 years  

    31.2



                                                                 -  89.1



                                                                        40 - 59 

years



                                                                   33.1 - 100.0



                                                                                

   >59



                                                                 years      36.4

 -



                                                                 108.0Performed 

At: [facility]

[id number]



                                                                 [facility] Tarun kelly



                                                                 [place] [id number]

abelardo CODY MD



                                                                 Ph:[phone]



[facility][H] Mis [id number] 17:09:01





             Test Item    Value        Reference Range Interpretation Comments

 

             [facility] (test COMMENT                                Test Orde

red: 247743



             code = Misc [facility])                                        25-Hydr

oxyvitamin D LCMS



                                                                 D2+D325-Hydroxy

, Vitamin



                                                                 D          25  

        [L



                                                                 ] ng/mL    ESRe

ference



                                                                 Range:All Ages:

 Target



                                                                 levels 30 -



                                                                 10025-Hydroxy, 

Vitamin



                                                                 D-2        <1.0



                                                                   ng/mL    ES25

-Hydroxy,



                                                                 Vitamin D-3    

    24



                                                                           ng/mL



                                                                 ESPerformed At:

 



                                                                 [facility] [place] [id number]



                                                                 [place], [place]



                                                                 [id number] CODY MD



                                                                 Ph:[phone]

rformed



                                                                 At: [facility]



                                                                 [id number]

[address] [place] [address]



                                                                 [phone]

paddy BURKS MD Ph:[phone]

111



[facility][H] Vitamin E [id number] 17:09:01





             Test Item    Value        Reference Range Interpretation Comments

 

             Alpha-Tocopherol 8.5 mg/L     5.3-17.5                  **Effective

 2018



             (test code =                                        871291 Vitamin 

E, Serum



             Alpha-Tocopherol)                                        will bemad

e**



                                                                 non-orderable. 

[facility]



                                                                 will offer 0701

40 Vitamin



                                                                 Eperformed   by

 LC/MS-MS



                                                                 methodology [facility]

ch will



                                                                 includealpha to

copherol



                                                                 and gamma tocop

herol. This



                                                                 will affectany 

existing



                                                                 profile.   For 

further



                                                                 information, co

andrew



                                                                 local [facility]



                                                                 Representative.

Performed



                                                                 At:  [facility]



                                                                 [id number] CODY MD Ph:[phone]

4



[facility], WITHOUT [id number] 09:10:00NEW 
CARDIOLOGIST OBERTONFINAL REPORT PATIENT ID:   [id number] INDICATION: 47-year-old 
male with chest wall pain and history ofheart transplant. COMPARISON:Chest 
radiograph 2017 TECHNIQUE: Chest CT exam WITHOUT intravenous contrast. 
The exam was performed according to our department dose-optimization protocol, 
which includes automated exposure control, adjustments of mA and kV according to
 patient size. Iterative reconstructions are also sometimes employed. 
FINDINGS:No chest wall mass, chest wall hematoma, rib fracture, or rib lesion is
 demonstrated. There is a healed median sternotomy. Heart is normal in size and 
there is no pericardial effusion. Mild atherosclerotic plaque of the ascending 
thoracic aorta is demonstrated. Thoracic aorta is normal in caliber. 8 mm 
calcified nodule in the right lower lobe represents prior granulomatous 
infection. No pneumonia, pulmonary edema, pleural effusion, or pneumothorax is d
emonstrated. There is no mediastinal or hilar lymphadenopathy. Thyroid gland is 
unremarkable. Upper and mid esophagus are unremarkable. Patient is status post 
sleeve gastrectomy and there is a small part of the sleeve herniated in the low 
posterior mediastinum. Partial imaging of the upper abdomen is otherwise 
unremarkable. IMPRESSION: No chest wall mass, muscle tear, rib fracture, or rib 
lesion. No other CT explanation for the patient's chest wall pain. Clear lungs. 
Unremarkable heart transplant. Prior gastric sleeve with small hiatal hernia. 
Signed: Marlee Julio MDReport Verified Date/Time:  2017 09:10:45 
Reading Location: [facility] Reading Room - [place] [address] 
Electronically signed by: MARLEE JULIO M.D. on 2017 09:10 AM
TACROLIMUS [id number] 13:49:00





             Test Item    Value        Reference Range Interpretation Comments

 

             TACROLIMUS BLOOD (BEAKER) (test 5.3 ng/mL    10.0-20.0    L        

    



             code = 657)                                         



BASIC METABOLIC [id number] 10:44:00





             Test Item    Value        Reference Range Interpretation Comments

 

             SODIUM (BEAKER) 141 meq/L    136-145                   



             (test code = 381)                                        

 

             POTASSIUM (BEAKER) 4.5 meq/L    3.5-5.1                   



             (test code = 379)                                        

 

             CHLORIDE (BEAKER) 103 meq/L                        



             (test code = 382)                                        

 

             CO2 (BEAKER) (test 29 meq/L     22-29                     



             code = 355)                                         

 

             BLOOD UREA NITROGEN 17 mg/dL     7-21                      



             (BEAKER) (test code                                        



             = 354)                                              

 

             CREATININE (BEAKER) 1.09 mg/dL   0.57-1.25                 



             (test code = 358)                                        

 

             GLUCOSE RANDOM 97 mg/dL                         



             (BEAKER) (test code                                        



             = 652)                                              

 

             CALCIUM (BEAKER) 9.3 mg/dL    8.4-10.2                  



             (test code = 697)                                        

 

             EGFR (BEAKER) (test 72 mL/min/1.73                           ESTIMA

PHOEBE GFR IS



             code = 1092) sq m                                   NOT AS ACCURATE

 AS



                                                                 CREATININE



                                                                 CLEARANCE IN



                                                                 PREDICTING



                                                                 GLOMERULAR



                                                                 FILTRATION RATE

.



                                                                 ESTIMATED GFR I

S



                                                                 NOT APPLICABLE 

FOR



                                                                 DIALYSIS PATIEN

TS.



CBC W/PLT COUNT &amp; AUTO [id number] 09:54:00





             Test Item    Value        Reference Range Interpretation Comments

 

             WHITE BLOOD CELL COUNT (BEAKER) 4.0 K/ L     3.5-10.5              

    



             (test code = 775)                                        

 

             RED BLOOD CELL COUNT (BEAKER) 5.42 M/ L    4.63-6.08               

  



             (test code = 761)                                        

 

             HEMOGLOBIN (BEAKER) (test code = 15.4 GM/DL   13.7-17.5            

     



             410)                                                

 

             HEMATOCRIT (BEAKER) (test code = 47.6 %       40.1-51.0            

     



             411)                                                

 

             MEAN CORPUSCULAR VOLUME (BEAKER) 87.8 fL      79.0-92.2            

     



             (test code = 753)                                        

 

             MEAN CORPUSCULAR HEMOGLOBIN 28.4 pg      25.7-32.2                 



             (BEAKER) (test code = 751)                                        

 

             MEAN CORPUSCULAR HEMOGLOBIN CONC 32.4 GM/DL   32.3-36.5            

     



             (BEAKER) (test code = 752)                                        

 

             RED CELL DISTRIBUTION WIDTH 13.8 %       11.6-14.4                 



             (BEAKER) (test code = 412)                                        

 

             PLATELET COUNT (BEAKER) (test 152 K/CU MM  150-450                 

  



             code = 756)                                         

 

             MEAN PLATELET VOLUME (BEAKER) 9.9 fL       9.4-12.4                

  



             (test code = 754)                                        

 

             NUCLEATED RED BLOOD CELLS 0 /100 WBC   0-0                       



             (BEAKER) (test code = 413)                                        

 

             NEUTROPHILS RELATIVE PERCENT 66 %                                  

 



             (BEAKER) (test code = 429)                                        

 

             LYMPHOCYTES RELATIVE PERCENT 20 %                                  

 



             (BEAKER) (test code = 430)                                        

 

             MONOCYTES RELATIVE PERCENT 11 %                                   



             (BEAKER) (test code = 431)                                        

 

             EOSINOPHILS RELATIVE PERCENT 2 %                                   

 



             (BEAKER) (test code = 432)                                        

 

             BASOPHILS RELATIVE PERCENT 1 %                                    



             (BEAKER) (test code = 437)                                        

 

             NEUTROPHILS ABSOLUTE COUNT 2.65 K/ L    1.78-5.38                 



             (BEAKER) (test code = 670)                                        

 

             LYMPHOCYTES ABSOLUTE COUNT 0.82 K/ L    1.32-3.57    L            



             (BEAKER) (test code = 414)                                        

 

             MONOCYTES ABSOLUTE COUNT (BEAKER) 0.44 K/ L    0.30-0.82           

      



             (test code = 415)                                        

 

             EOSINOPHILS ABSOLUTE COUNT 0.08 K/ L    0.04-0.54                 



             (BEAKER) (test code = 416)                                        

 

             BASOPHILS ABSOLUTE COUNT (BEAKER) 0.04 K/ L    0.01-0.08           

      



             (test code = 417)                                        

 

             IMMATURE GRANULOCYTES-RELATIVE 0 %          0-1                    

   



             PERCENT (BEAKER) (test code =                                      

  



             2801)                                               



[id number] 14:30:00





             Test Item    Value        Reference Range Interpretation Comments

 

             AGAP (test code = AGAP) 15.3         10.0-20.0                 



[facility] CenterCywarelXVXSIKGQPZNN1366-27-15 14:30:00





             Test Item    Value        Reference Range Interpretation Comments

 

             eGFR (test code = eGFR) 91                                     



[facility] CenterNvlaqfwQNRMITWFCVIX5293-60-15 14:30:00





             Test Item    Value        Reference Range Interpretation Comments

 

             BUN (test code = BUN) 13           7-22                      



[facility] CenterFtpytjcXZUCVHGAMUOV4836-84-67 14:30:00





             Test Item    Value        Reference Range Interpretation Comments

 

             Creatinine Lvl (test code = Creatinine 0.98         0.50-1.40      

           



             Lvl)                                                



[facility] CenterCdjimgqWIKEBHSJMNYP7169-34-68 14:30:00





             Test Item    Value        Reference Range Interpretation Comments

 

             Sodium Lvl (test code = Sodium Lvl) 144          135-145           

        



[facility] CenterSunnlykAQAXZOEGBTMG1589-76-31 14:30:00





             Test Item    Value        Reference Range Interpretation Comments

 

             Potassium Lvl (test code = Potassium 4.3          3.5-5.1          

         



             Lvl)                                                



[facility] CenterPzdxuywPQAQZZPRQEFO9390-73-33 14:30:00





             Test Item    Value        Reference Range Interpretation Comments

 

             Chloride Lvl (test code = Chloride Lvl) 105                  

            



[facility] CenterTfntidoECBHEDMULEYW8589-40-90 14:30:00





             Test Item    Value        Reference Range Interpretation Comments

 

             Calcium Lvl (test code = Calcium Lvl) 9.1          8.5-10.5        

          



[facility] CenterMasyjtxMWVYGQNFGGMR7044-84-45 14:30:00





             Test Item    Value        Reference Range Interpretation Comments

 

             CO2 (test code = CO2) 28           24-32                     



[facility] CenterEydguspZAXUABMIPWAV4581-52-95 14:30:00





             Test Item    Value        Reference Range Interpretation Comments

 

             Glucose Lvl (test code = Glucose Lvl) 77           70-99           

          



[facility]MirsosxOBKMJMDWTC2850-16-90 14:30:00





             Test Item    Value        Reference Range Interpretation Comments

 

             Lymphocytes # (test code = Lymphocytes 0.8          1.0-5.5        

           



             #)                                                  



[facility] HospitalClakhwtBJMIPMSFBO1255-34-45 14:30:00





             Test Item    Value        Reference Range Interpretation Comments

 

             Segs-Bands # (test code = Segs-Bands #) 2.8          1.5-8.1       

            



[facility]kinCgyrkhpRXNBVVSKIW3975-01-68 14:30:00





             Test Item    Value        Reference Range Interpretation Comments

 

             Monocytes # (test code 0.4          See_Comment                [Aut

omated message] The



             = Monocytes #)                                        system which 

generated



                                                                 this result tra

nsmitted



                                                                 reference range

: <=0.8.



                                                                 The reference r

klaudia was



                                                                 not used to int

erpret



                                                                 this result as



                                                                 normal/abnormal

.



[facility]k[id number] 14:30:00





             Test Item    Value        Reference Range Interpretation Comments

 

             Eosinophils # (test code 0.1          See_Comment                [A

utomated message] The



             = Eosinophils #)                                        system whic

h generated



                                                                 this result tra

nsmitted



                                                                 reference range

: <=0.5.



                                                                 The reference r

klaudia was



                                                                 not used to int

erpret



                                                                 this result as



                                                                 normal/abnormal

.



[facility]ufkinRjiblkzOYRQMIDDJT0144-14-22 14:30:00





             Test Item    Value        Reference Range Interpretation Comments

 

             Eosinophils (test code = 2.4          See_Comment                [A

utomated message] The



             Eosinophils)                                        system which ge

nerated



                                                                 this result tra

nsmitted



                                                                 reference range

: <=4.0.



                                                                 The reference r

klaudia was



                                                                 not used to int

erpret



                                                                 this result as



                                                                 normal/abnormal

.



[facility]kinA[id number] 14:30:00





             Test Item    Value        Reference Range Interpretation Comments

 

             Basophils (test code = 0.7          See_Comment                [Aut

omated message] The



             Basophils)                                          system which ge

nerated



                                                                 this result tra

nsmitted



                                                                 reference range

: <=1.0.



                                                                 The reference r

klaudia was



                                                                 not used to int

erpret



                                                                 this result as



                                                                 normal/abnormal

.



[facility]ufkinRrvpmolPNURMNIVTK6044-97-13 14:30:00





             Test Item    Value        Reference Range Interpretation Comments

 

             Monocytes (test code = Monocytes) 10.7         2.0-12.0            

      



[facility]ufkinIxcraswJNJLRXXYNF4480-28-52 14:30:00





             Test Item    Value        Reference Range Interpretation Comments

 

             Lymphocytes (test code = Lymphocytes) 19.7         20.0-40.0       

          



[facility]ufkinXujvaxdPOQEOWVAZP9569-57-03 14:30:00





             Test Item    Value        Reference Range Interpretation Comments

 

             Segs (test code = Segs) 66.5         45.0-75.0                 



[facility]ufkinOpaokkgOFKJJSDMEZ1427-24-67 14:30:00





             Test Item    Value        Reference Range Interpretation Comments

 

             MPV (test code = MPV) 8.5          7.4-10.4                  



[facility] HospitalGtnacxhFVVVWHSFRG8833-66-81 14:30:00





             Test Item    Value        Reference Range Interpretation Comments

 

             Platelet (test code = Platelet) 133          133-450               

    



[facility] HospitalKbabmncJVBOHXCWBJ9095-80-13 14:30:00





             Test Item    Value        Reference Range Interpretation Comments

 

             RDW (test code = RDW) 13.9         11.5-14.5                 



[facility] HospitalSfbxpxzTZXFZGZGQB4815-12-61 14:30:00





             Test Item    Value        Reference Range Interpretation Comments

 

             MCHC (test code = MCHC) 33.4         32.0-36.0                 



[facility] HospitalPzgmjxiQITKJRVASW0109-50-76 14:30:00





             Test Item    Value        Reference Range Interpretation Comments

 

             MCH (test code = MCH) 28.7 pg      27.0-31.0                 



[facility] HospitalTovmshuUACGRKTOQZ4555-49-21 14:30:00





             Test Item    Value        Reference Range Interpretation Comments

 

             MCV (test code = MCV) 85.8         80.0-94.0                 



[facility] HospitalPgkiljxYPRZDFZJEL9813-71-80 14:30:00





             Test Item    Value        Reference Range Interpretation Comments

 

             Hct (test code = Hct) 44.0         42.0-54.0                 



[facility]ufkinBrjwmhvANSFUGKDQA1525-64-35 14:30:00





             Test Item    Value        Reference Range Interpretation Comments

 

             Hgb (test code = Hgb) 14.7         14.0-18.0                 



[facility] HospitalLzitufeQVYAAWSUQQ8911-78-20 14:30:00





             Test Item    Value        Reference Range Interpretation Comments

 

             RBC (test code = RBC) 5.13         4.70-6.10                 



[facility] HospitalMtzackoIHYBHHWOGE2993-43-90 14:30:00





             Test Item    Value        Reference Range Interpretation Comments

 

             WBC (test code = WBC) 4.1          3.7-10.4                  



[facility]villeTACROLIMUS [id number] 14:05:00





             Test Item    Value        Reference Range Interpretation Comments

 

             TACROLIMUS BLOOD (BEAKER) (test 6.5 ng/mL    10.0-20.0    L        

    



             code = 657)                                         



BASIC METABOLIC [id number] 10:13:00





             Test Item    Value        Reference Range Interpretation Comments

 

             SODIUM (BEAKER) 140 meq/L    136-145                   



             (test code = 381)                                        

 

             POTASSIUM (BEAKER) 4.1 meq/L    3.5-5.1                   



             (test code = 379)                                        

 

             CHLORIDE (BEAKER) 109 meq/L           H            



             (test code = 382)                                        

 

             CO2 (BEAKER) (test 22 meq/L     22-29                     



             code = 355)                                         

 

             BLOOD UREA NITROGEN 15 mg/dL     7-21                      



             (BEAKER) (test code                                        



             = 354)                                              

 

             CREATININE (BEAKER) 0.95 mg/dL   0.57-1.25                 



             (test code = 358)                                        

 

             GLUCOSE RANDOM 99 mg/dL                         



             (BEAKER) (test code                                        



             = 652)                                              

 

             CALCIUM (BEAKER) 8.8 mg/dL    8.4-10.2                  



             (test code = 697)                                        

 

             EGFR (BEAKER) (test 85 mL/min/1.73                           ESTIMA

PHOEBE GFR IS



             code = 1092) sq m                                   NOT AS ACCURATE

 AS



                                                                 CREATININE



                                                                 CLEARANCE IN



                                                                 PREDICTING



                                                                 GLOMERULAR



                                                                 FILTRATION RATE

.



                                                                 ESTIMATED GFR I

S



                                                                 NOT APPLICABLE 

FOR



                                                                 DIALYSIS PATIEN

TS.



CBC W/PLT COUNT &amp; AUTO [id number] 09:42:00





             Test Item    Value        Reference Range Interpretation Comments

 

             WHITE BLOOD CELL COUNT (BEAKER) 4.7 K/ L     4.0-10.0              

    



             (test code = 775)                                        

 

             RED BLOOD CELL COUNT (BEAKER) 5.18 M/ L    4.20-5.80               

  



             (test code = 761)                                        

 

             HEMOGLOBIN (BEAKER) (test code = 15.3 GM/DL   13.0-16.8            

     



             410)                                                

 

             HEMATOCRIT (BEAKER) (test code = 47.1 %       40.0-50.0            

     



             411)                                                

 

             MEAN CORPUSCULAR VOLUME (BEAKER) 90.8 fL      82.0-98.0            

     



             (test code = 753)                                        

 

             MEAN CORPUSCULAR HEMOGLOBIN 29.6 pg      27.0-33.0                 



             (BEAKER) (test code = 751)                                        

 

             MEAN CORPUSCULAR HEMOGLOBIN CONC 32.6 GM/DL   32.0-36.0            

     



             (BEAKER) (test code = 752)                                        

 

             RED CELL DISTRIBUTION WIDTH 15.1 %       10.3-14.2    H            



             (BEAKER) (test code = 412)                                        

 

             PLATELET COUNT (BEAKER) (test 129 K/CU MM  150-430      L          

  



             code = 756)                                         

 

             MEAN PLATELET VOLUME (BEAKER) 7.7 fL       6.5-10.5                

  



             (test code = 754)                                        

 

             NUCLEATED RED BLOOD CELLS 0 /100 WBC   0-0                       



             (BEAKER) (test code = 413)                                        

 

             NEUTROPHILS RELATIVE PERCENT 72 %                                  

 



             (BEAKER) (test code = 429)                                        

 

             LYMPHOCYTES RELATIVE PERCENT 18 %                                  

 



             (BEAKER) (test code = 430)                                        

 

             MONOCYTES RELATIVE PERCENT 8 %                                    



             (BEAKER) (test code = 431)                                        

 

             EOSINOPHILS RELATIVE PERCENT 1 %                                   

 



             (BEAKER) (test code = 432)                                        

 

             BASOPHILS RELATIVE PERCENT 1 %                                    



             (BEAKER) (test code = 437)                                        

 

             NEUTROPHILS ABSOLUTE COUNT 3.37 K/ L    1.80-8.00                 



             (BEAKER) (test code = 670)                                        

 

             LYMPHOCYTES ABSOLUTE COUNT 0.85 K/ L    1.48-4.50    L            



             (BEAKER) (test code = 414)                                        

 

             MONOCYTES ABSOLUTE COUNT (BEAKER) 0.40 K/ L    0.00-1.30           

      



             (test code = 415)                                        

 

             EOSINOPHILS ABSOLUTE COUNT 0.07 K/ L    0.00-0.50                 



             (BEAKER) (test code = 416)                                        

 

             BASOPHILS ABSOLUTE COUNT (BEAKER) 0.03 K/ L    0.00-0.20           

      



             (test code = 417)                                        



[id number] 10:38:00





             Test Item    Value        Reference Range Interpretation Comments

 

             MAGNESIUM (BEAKER) (test code = 2.2 mg/dL    1.6-2.6               

    



             627)                                                



TACROLIMUS [id number] 10:33:00





             Test Item    Value        Reference Range Interpretation Comments

 

             TACROLIMUS BLOOD (BEAKER) (test 7.1 ng/mL    10.0-20.0    L        

    



             code = 657)                                         



CBC W/PLT COUNT &amp; AUTO [id number] 08:47:00





             Test Item    Value        Reference Range Interpretation Comments

 

             WHITE BLOOD CELL COUNT (BEAKER) 4.1 K/ L     4.0-10.0              

    



             (test code = 775)                                        

 

             RED BLOOD CELL COUNT (BEAKER) 5.02 M/ L    4.20-5.80               

  



             (test code = 761)                                        

 

             HEMOGLOBIN (BEAKER) (test code = 14.8 GM/DL   13.0-16.8            

     



             410)                                                

 

             HEMATOCRIT (BEAKER) (test code = 43.6 %       40.0-50.0            

     



             411)                                                

 

             MEAN CORPUSCULAR VOLUME (BEAKER) 87.0 fL      82.0-98.0            

     



             (test code = 753)                                        

 

             MEAN CORPUSCULAR HEMOGLOBIN 29.5 pg      27.0-33.0                 



             (BEAKER) (test code = 751)                                        

 

             MEAN CORPUSCULAR HEMOGLOBIN CONC 33.9 GM/DL   32.0-36.0            

     



             (BEAKER) (test code = 752)                                        

 

             RED CELL DISTRIBUTION WIDTH 14.1 %       10.3-14.2                 



             (BEAKER) (test code = 412)                                        

 

             PLATELET COUNT (BEAKER) (test 160 K/CU MM  150-430                 

  



             code = 756)                                         

 

             MEAN PLATELET VOLUME (BEAKER) 7.2 fL       6.5-10.5                

  



             (test code = 754)                                        

 

             NUCLEATED RED BLOOD CELLS 0 /100 WBC   0-0                       



             (BEAKER) (test code = 413)                                        

 

             NEUTROPHILS RELATIVE PERCENT 64 %                                  

 



             (BEAKER) (test code = 429)                                        

 

             LYMPHOCYTES RELATIVE PERCENT 21 %                                  

 



             (BEAKER) (test code = 430)                                        

 

             MONOCYTES RELATIVE PERCENT 12 %                                   



             (BEAKER) (test code = 431)                                        

 

             EOSINOPHILS RELATIVE PERCENT 2 %                                   

 



             (BEAKER) (test code = 432)                                        

 

             BASOPHILS RELATIVE PERCENT 0 %                                    



             (BEAKER) (test code = 437)                                        

 

             NEUTROPHILS ABSOLUTE COUNT 2.61 K/ L    1.80-8.00                 



             (BEAKER) (test code = 670)                                        

 

             LYMPHOCYTES ABSOLUTE COUNT 0.87 K/ L    1.48-4.50    L            



             (BEAKER) (test code = 414)                                        

 

             MONOCYTES ABSOLUTE COUNT (BEAKER) 0.49 K/ L    0.00-1.30           

      



             (test code = 415)                                        

 

             EOSINOPHILS ABSOLUTE COUNT 0.09 K/ L    0.00-0.50                 



             (BEAKER) (test code = 416)                                        

 

             BASOPHILS ABSOLUTE COUNT (BEAKER) 0.01 K/ L    0.00-0.20           

      



             (test code = 417)                                        



0.00BASI METABOLIC [id number] 08:47:00





             Test Item    Value        Reference Range Interpretation Comments

 

             SODIUM (BEAKER) 142 meq/L    136-145                   



             (test code = 381)                                        

 

             POTASSIUM (BEAKER) 3.8 meq/L    3.5-5.1                   



             (test code = 379)                                        

 

             CHLORIDE (BEAKER) 109 meq/L           H            



             (test code = 382)                                        

 

             CO2 (BEAKER) (test 21 meq/L     22-29        L            



             code = 355)                                         

 

             BLOOD UREA NITROGEN 22 mg/dL     7-21         H            



             (BEAKER) (test code                                        



             = 354)                                              

 

             CREATININE (BEAKER) 1.18 mg/dL   0.57-1.25                 



             (test code = 358)                                        

 

             GLUCOSE RANDOM 99 mg/dL                         



             (BEAKER) (test code                                        



             = 652)                                              

 

             CALCIUM (BEAKER) 9.1 mg/dL    8.4-10.2                  



             (test code = 697)                                        

 

             EGFR (BEAKER) (test 66 mL/min/1.73                           ESTIMA

PHOEBE GFR IS



             code = 1092) sq m                                   NOT AS ACCURATE

 AS



                                                                 CREATININE



                                                                 CLEARANCE IN



                                                                 PREDICTING



                                                                 GLOMERULAR



                                                                 FILTRATION RATE

.



                                                                 ESTIMATED GFR I

S



                                                                 NOT APPLICABLE 

FOR



                                                                 DIALYSIS PATIEN

TS.



URINALYSIS W/ [id number] 00:20:00





             Test Item    Value        Reference Range Interpretation Comments

 

             COLOR (BEAKER) (test code Yellow                                 



             = 470)                                              

 

             CLARITY (BEAKER) (test Slightly Hazy                           



             code = 469)                                         

 

             SPECIFIC GRAVITY UA 1.050        1.001-1.035  H            



             (BEAKER) (test code = 468)                                        

 

             PH UA (BEAKER) (test code 5.5          5.0-8.0                   



             = 467)                                              

 

             PROTEIN UA (BEAKER) (test 20 mg/dL     Negative     A            



             code = 464)                                         

 

             GLUCOSE UA (BEAKER) (test Negative     Negative                  



             code = 365)                                         

 

             KETONES UA (BEAKER) (test 40 mg/dL     Negative     A            



             code = 371)                                         

 

             BILIRUBIN UA (BEAKER) Negative     Negative                  



             (test code = 462)                                        

 

             BLOOD UA (BEAKER) (test Negative     Negative                  



             code = 461)                                         

 

             NITRITE UA (BEAKER) (test Negative     Negative                  



             code = 465)                                         

 

             LEUKOCYTE ESTERASE UA Negative     Negative                  



             (BEAKER) (test code = 466)                                        

 

             UROBILINOGEN UA (BEAKER) 0.2 mg/dL    0.2-1.0                   



             (test code = 463)                                        

 

             RBC UA (BEAKER) (test code 1 /HPF                                 



             = 519)                                              

 

             WBC UA (BEAKER) (test code 3 /HPF                                 



             = 520)                                              

 

             MUCUS (BEAKER) (test code Many                                   



             = 1574)                                             

 

             HYALINE CASTS (BEAKER) 6 /LPF                                 



             (test code = 514)                                        

 

             SOURCE(BEAKER) (test code Urine, Clean Catch                       

    



             = 5553)                                             



BASIC METABOLIC [id number] 21:55:00





             Test Item    Value        Reference Range Interpretation Comments

 

             SODIUM (BEAKER) 139 meq/L    136-145                   



             (test code = 381)                                        

 

             POTASSIUM (BEAKER) 5.1 meq/L    3.5-5.1                   



             (test code = 379)                                        

 

             CHLORIDE (BEAKER) 107 meq/L                        



             (test code = 382)                                        

 

             CO2 (BEAKER) (test 18 meq/L     22-29        L            



             code = 355)                                         

 

             BLOOD UREA NITROGEN 26 mg/dL     7-21         H            



             (BEAKER) (test code                                        



             = 354)                                              

 

             CREATININE (BEAKER) 1.26 mg/dL   0.57-1.25    H            



             (test code = 358)                                        

 

             GLUCOSE RANDOM 89 mg/dL                         



             (BEAKER) (test code                                        



             = 652)                                              

 

             CALCIUM (BEAKER) 9.1 mg/dL    8.4-10.2                  



             (test code = 697)                                        

 

             EGFR (BEAKER) (test 61 mL/min/1.73                           ESTIMA

PHOEBE GFR IS



             code = 1092) sq m                                   NOT AS ACCURATE

 AS



                                                                 CREATININE



                                                                 CLEARANCE IN



                                                                 PREDICTING



                                                                 GLOMERULAR



                                                                 FILTRATION RATE

.



                                                                 ESTIMATED GFR I

S



                                                                 NOT APPLICABLE 

FOR



                                                                 DIALYSIS PATIEN

TS.



HEPATIC FUNCTION [id number] 21:55:00





             Test Item    Value        Reference Range Interpretation Comments

 

             TOTAL PROTEIN (BEAKER) (test code = 7.1 gm/dL    6.0-8.3           

        



             770)                                                

 

             ALBUMIN (BEAKER) (test code = 1145) 4.2 g/dL     3.5-5.0           

        

 

             BILIRUBIN TOTAL (BEAKER) (test code 0.7 mg/dL    0.2-1.2           

        



             = 377)                                              

 

             BILIRUBIN DIRECT (BEAKER) (test 0.2 mg/dL    0.1-0.5               

    



             code = 706)                                         

 

             ALKALINE PHOSPHATASE (BEAKER) (test 86 U/L                   

        



             code = 346)                                         

 

             AST (SGOT) (BEAKER) (test code = 28 U/L       5-34                 

     



             353)                                                

 

             ALT (SGPT) (BEAKER) (test code = 20 U/L       6-55                 

     



             347)                                                



[id number] 21:52:00





             Test Item    Value        Reference Range Interpretation Comments

 

             LIPASE (BEAKER) (test code = 749) 40 U/L       8-78                

      



B-TYPE NATRIURETIC FACTOR (BNP)2017 21:46:00





             Test Item    Value        Reference Range Interpretation Comments

 

             B-TYPE NATRIURETIC PEPTIDE (BEAKER) 21 pg/mL     0-100             

        



             (test code = 700)                                        



CREATINE KINASE (CK), TOTAL AND [id number] 21:45:00





             Test Item    Value        Reference Range Interpretation Comments

 

             CREATINE KINASE TOTAL (BEAKER) 32 U/L                        

   



             (test code = 380)                                        

 

             CREATINE KINASE-MB (BEAKER) (test 0.6 ng/mL    0.0-6.6             

      



             code = 750)                                         

 

             CREATINE KINASE-MB INDEX (BEAKER) 1.9 %                            

      



             (test code = 395)                                        



Effective 2014: CK-MB Reference Range ChangeNew: 0.0-6.6    Previous: 
0.0-4.9CK-MB Reference Range:&lt;6.7      Normal6.7-10.0  Borderline&gt;10.0    
AbnormalTROPONIN -91-05 21:45:00





             Test Item    Value        Reference Range Interpretation Comments

 

             TROPONIN I (BEAKER) (test code = 397) < ng/mL      0.00-0.03       

          



Effective 2014: Reference Range ChangeNew: 0.00-0.03   Previous 0.00-
0.15Troponin I (TnI) levels must be interpreted in the context of the presenting
symptoms and the clinical findings. Elevated TnI levels indicate myocardial 
damage, but are not specific for ischemic heart disease. Elevated TnI levels are
seen in patients with other cardiac conditions (including myocarditis and 
congestive heartfailure), and slight TnI elevations occur in patients with other
conditions, including sepsis, renalfailure, acidosis, acute neurological 
disease, and persistent tachyarrhythmia.CBC W/PLT COUNT &amp; AUTO DIFFERENTIAL
2017 21:24:00





             Test Item    Value        Reference Range Interpretation Comments

 

             WHITE BLOOD CELL COUNT (BEAKER) 4.7 K/ L     4.0-10.0              

    



             (test code = 775)                                        

 

             RED BLOOD CELL COUNT (BEAKER) 5.75 M/ L    4.20-5.80               

  



             (test code = 761)                                        

 

             HEMOGLOBIN (BEAKER) (test code = 17.4 GM/DL   13.0-16.8    H       

     



             410)                                                

 

             HEMATOCRIT (BEAKER) (test code = 49.2 %       40.0-50.0            

     



             411)                                                

 

             MEAN CORPUSCULAR VOLUME (BEAKER) 85.6 fL      82.0-98.0            

     



             (test code = 753)                                        

 

             MEAN CORPUSCULAR HEMOGLOBIN 30.3 pg      27.0-33.0                 



             (BEAKER) (test code = 751)                                        

 

             MEAN CORPUSCULAR HEMOGLOBIN CONC 35.4 GM/DL   32.0-36.0            

     



             (BEAKER) (test code = 752)                                        

 

             RED CELL DISTRIBUTION WIDTH 12.9 %       10.3-14.2                 



             (BEAKER) (test code = 412)                                        

 

             PLATELET COUNT (BEAKER) (test code 98 K/CU MM   150-430      L     

       



             = 756)                                              

 

             MEAN PLATELET VOLUME (BEAKER) 8.7 fL       6.5-10.5                

  



             (test code = 754)                                        

 

             NUCLEATED RED BLOOD CELLS (BEAKER) 0 /100 WBC   0-0                

       



             (test code = 413)                                        

 

             NEUTROPHILS RELATIVE PERCENT 69 %                                  

 



             (BEAKER) (test code = 429)                                        

 

             LYMPHOCYTES RELATIVE PERCENT 22 %                                  

 



             (BEAKER) (test code = 430)                                        

 

             MONOCYTES RELATIVE PERCENT 8 %                                    



             (BEAKER) (test code = 431)                                        

 

             EOSINOPHILS RELATIVE PERCENT 1 %                                   

 



             (BEAKER) (test code = 432)                                        

 

             BASOPHILS RELATIVE PERCENT 0 %                                    



             (BEAKER) (test code = 437)                                        

 

             NEUTROPHILS ABSOLUTE COUNT 3.26 K/ L    1.80-8.00                 



             (BEAKER) (test code = 670)                                        

 

             LYMPHOCYTES ABSOLUTE COUNT 1.05 K/ L    1.48-4.50    L            



             (BEAKER) (test code = 414)                                        

 

             MONOCYTES ABSOLUTE COUNT (BEAKER) 0.38 K/ L    0.00-1.30           

      



             (test code = 415)                                        

 

             EOSINOPHILS ABSOLUTE COUNT 0.03 K/ L    0.00-0.50                 



             (BEAKER) (test code = 416)                                        

 

             BASOPHILS ABSOLUTE COUNT (BEAKER) 0.02 K/ L    0.00-0.20           

      



             (test code = 417)                                        



0.00CMV PCR, [id number] 17:55:00





             Test Item    Value        Reference Range Interpretation Comments

 

             CMV VIRAL LOAD - Negative or below the                           



             NEGATIVE (BEAKER) (test linear range of the                        

   



             code = 2558) assay (<375 copies/mL)                           



Cytomegalovirus (CMV) infection can cause significant disease in 
immunosuppressed patients. However,it is common for CMV to manifest as a limited
infection which is of no clinical significance in immunosuppressed patients or 
in healthy individuals.Viral load measurements are helpful to identify clinical 
CMV infection and to guide the pre-emptive management of antiviral therapy.  For
treatment of CMVinfection due to reactivation in transplant recipients, a 
threshold between 4,000 and 5,000 copies/mL is suggested.  For treatment of 
primary CMV infection, a lower threshold can be used.CMV infection may also be 
monitored using weekly serial measurements. Serial measurements of CMV DNA viral
load canbe evaluated by identifying a 10-fold change, as well as assessing the 
CMV DNA viral load and the clinical context for each patient.The plasma CMV DNA 
viral load was detected using quantitative polymerase chain reaction and 
fluorescent monitoring of a specific hybridized probe. Genetic variation and ot
her factors can affect the accuracy of nucleic acid testing. Therefore, the 
results should be interpreted in light of clinical data. A negative result may 
not exclude the presence of CMV disease.This test was developed and its 
performance characteristics determined by the [facility] Path
ology Department, Section of Molecular Pathology. It has not been cleared or 
approved by the U.S. Food and Drug Administration (FDA), since FDA approval is 
not required for clinical use of the test. Validation was done as required by 
The Clinical Laboratory Improvement Amendments of 1988.CREATINE KINASE (CK), 
TOTAL AND [id number] 18:40:00





             Test Item    Value        Reference Range Interpretation Comments

 

             CREATINE KINASE TOTAL (BEAKER) 36 U/L                        

   



             (test code = 380)                                        

 

             CREATINE KINASE-MB (BEAKER) (test 0.3 ng/mL    0.0-6.6             

      



             code = 750)                                         

 

             CREATINE KINASE-MB INDEX (BEAKER) 0.8 %                            

      



             (test code = 395)                                        



Effective 2014: CK-MB Reference Range ChangeNew: 0.0-6.6    Previous: 
0.0-4.9CK-MB Reference Range:&lt;6.7      Normal6.7-10.0  Borderline&gt;10.0    
AbnormalTROPONIN -54-32 18:40:00





             Test Item    Value        Reference Range Interpretation Comments

 

             TROPONIN I (BEAKER) (test code = 397) < ng/mL      0.00-0.03       

          



Effective 2014: Reference Range ChangeNew: 0.00-0.03   Previous 0.00-
0.15Troponin I (TnI) levels must be interpreted in the context of the presenting
symptoms and the clinical findings. Elevated TnI levels indicate myocardial 
damage, but are not specific for ischemic heart disease. Elevated TnI levels are
seen in patients with other cardiac conditions (including myocarditis and 
congestive heartfailure), and slight TnI elevations occur in patients with other
conditions, including sepsis, renalfailure, acidosis, acute neurological 
disease, and persistent tachyarrhythmia.B-TYPE NATRIURETIC FACTOR (BNP)
2017 18:40:00





             Test Item    Value        Reference Range Interpretation Comments

 

             B-TYPE NATRIURETIC PEPTIDE (BEAKER) 23 pg/mL     0-100             

        



             (test code = 700)                                        



[id number] 18:33:00





             Test Item    Value        Reference Range Interpretation Comments

 

             MAGNESIUM (BEAKER) (test code = 1.8 mg/dL    1.6-2.6               

    



             627)                                                



BASIC METABOLIC [id number] 18:33:00





             Test Item    Value        Reference Range Interpretation Comments

 

             SODIUM (BEAKER) 140 meq/L    136-145                   



             (test code = 381)                                        

 

             POTASSIUM (BEAKER) 4.1 meq/L    3.5-5.1                   



             (test code = 379)                                        

 

             CHLORIDE (BEAKER) 105 meq/L                        



             (test code = 382)                                        

 

             CO2 (BEAKER) (test 23 meq/L     22-29                     



             code = 355)                                         

 

             BLOOD UREA NITROGEN 18 mg/dL     7-21                      



             (BEAKER) (test code                                        



             = 354)                                              

 

             CREATININE (BEAKER) 1.34 mg/dL   0.57-1.25    H            



             (test code = 358)                                        

 

             GLUCOSE RANDOM 100 mg/dL                        



             (BEAKER) (test code                                        



             = 652)                                              

 

             CALCIUM (BEAKER) 9.2 mg/dL    8.4-10.2                  



             (test code = 697)                                        

 

             EGFR (BEAKER) (test 57 mL/min/1.73                           ESTIMA

PHOEBE GFR IS



             code = 1092) sq m                                   NOT AS ACCURATE

 AS



                                                                 CREATININE



                                                                 CLEARANCE IN



                                                                 PREDICTING



                                                                 GLOMERULAR



                                                                 FILTRATION RATE

.



                                                                 ESTIMATED GFR I

S



                                                                 NOT APPLICABLE 

FOR



                                                                 DIALYSIS PATIEN

TS.



CBC W/PLT COUNT &amp; AUTO [id number] 18:24:00





             Test Item    Value        Reference Range Interpretation Comments

 

             WHITE BLOOD CELL COUNT (BEAKER) 2.6 K/ L     4.0-10.0     L        

    



             (test code = 775)                                        

 

             RED BLOOD CELL COUNT (BEAKER) 5.66 M/ L    4.20-5.80               

  



             (test code = 761)                                        

 

             HEMOGLOBIN (BEAKER) (test code = 16.5 GM/DL   13.0-16.8            

     



             410)                                                

 

             HEMATOCRIT (BEAKER) (test code = 49.0 %       40.0-50.0            

     



             411)                                                

 

             MEAN CORPUSCULAR VOLUME (BEAKER) 86.6 fL      82.0-98.0            

     



             (test code = 753)                                        

 

             MEAN CORPUSCULAR HEMOGLOBIN 29.1 pg      27.0-33.0                 



             (BEAKER) (test code = 751)                                        

 

             MEAN CORPUSCULAR HEMOGLOBIN CONC 33.6 GM/DL   32.0-36.0            

     



             (BEAKER) (test code = 752)                                        

 

             RED CELL DISTRIBUTION WIDTH 13.0 %       10.3-14.2                 



             (BEAKER) (test code = 412)                                        

 

             PLATELET COUNT (BEAKER) (test code 87 K/CU MM   150-430      L     

       



             = 756)                                              

 

             MEAN PLATELET VOLUME (BEAKER) 8.3 fL       6.5-10.5                

  



             (test code = 754)                                        

 

             NUCLEATED RED BLOOD CELLS (BEAKER) 0 /100 WBC   0-0                

       



             (test code = 413)                                        

 

             NEUTROPHILS RELATIVE PERCENT 51 %                                  

 



             (BEAKER) (test code = 429)                                        

 

             LYMPHOCYTES RELATIVE PERCENT 30 %                                  

 



             (BEAKER) (test code = 430)                                        

 

             MONOCYTES RELATIVE PERCENT 18 %                                   



             (BEAKER) (test code = 431)                                        

 

             EOSINOPHILS RELATIVE PERCENT 1 %                                   

 



             (BEAKER) (test code = 432)                                        

 

             BASOPHILS RELATIVE PERCENT 0 %                                    



             (BEAKER) (test code = 437)                                        

 

             NEUTROPHILS ABSOLUTE COUNT 1.33 K/ L    1.80-8.00    L            



             (BEAKER) (test code = 670)                                        

 

             LYMPHOCYTES ABSOLUTE COUNT 0.78 K/ L    1.48-4.50    L            



             (BEAKER) (test code = 414)                                        

 

             MONOCYTES ABSOLUTE COUNT (BEAKER) 0.47 K/ L    0.00-1.30           

      



             (test code = 415)                                        

 

             EOSINOPHILS ABSOLUTE COUNT 0.02 K/ L    0.00-0.50                 



             (BEAKER) (test code = 416)                                        

 

             BASOPHILS ABSOLUTE COUNT (BEAKER) 0.01 K/ L    0.00-0.20           

      



             (test code = 417)                                        



0.00POCT-GLUCOSE [id number] 15:37:00





             Test Item    Value        Reference Range Interpretation Comments

 

             POC-GLUCOSE METER 97 mg/dL                         TESTED AT 

[facility] [address]



             [facility] (test code =                                        [place] [address]



             1538)                                               



CHEM [id number] 10:27:00





             Test Item    Value        Reference Range Interpretation Comments

 

             Phosphorus (test code = Phosphorus) 3.6          2.5-4.5           

        



[facility] [id number] 10:27:00





             Test Item    Value        Reference Range Interpretation Comments

 

             Magnesium Lvl (test code = Magnesium 2.3          1.8-2.4          

         



             Lvl)                                                



[facility]rozkbaJJJDBBJNULYE4727-50-75 10:27:00





             Test Item    Value        Reference Range Interpretation Comments

 

             AGAP (test code = AGAP) 12.6         10.0-20.0                 



[facility] CentralXzwqipoXUXEOTGMZHSJ6318-33-98 10:27:00





             Test Item    Value        Reference Range Interpretation Comments

 

             eGFR (test code = eGFR) 55                                     



[facility] CentralRvjdgrnLFOOJNOHJGND3625-81-79 10:27:00





             Test Item    Value        Reference Range Interpretation Comments

 

             Calcium Lvl (test code = Calcium Lvl) 8.6          8.5-10.5        

          



[facility]qtaxsuRDUEASWVNFYW4309-99-58 10:27:00





             Test Item    Value        Reference Range Interpretation Comments

 

             CO2 (test code = CO2) 23           24-32                     



[facility] CenterTqxhhvoDYXUDATNWPLS6408-68-90 10:27:00





             Test Item    Value        Reference Range Interpretation Comments

 

             Chloride Lvl (test code = Chloride Lvl) 102                  

            



[facility] CenterLxyaygfYYDBLORODSRS4447-22-23 10:27:00





             Test Item    Value        Reference Range Interpretation Comments

 

             Potassium Lvl (test code = Potassium 4.6          3.5-5.1          

         



             Lvl)                                                



[facility] CenterBmafftfCSIOMPFALIUO3126-77-82 10:27:00





             Test Item    Value        Reference Range Interpretation Comments

 

             Sodium Lvl (test code = Sodium Lvl) 133          135-145           

        



[facility] CenterGqdoopnVNLJMXOMATCP7111-18-24 10:27:00





             Test Item    Value        Reference Range Interpretation Comments

 

             Creatinine Lvl (test code = Creatinine 1.49         0.50-1.40      

           



             Lvl)                                                



[facility] CenterCgdkrtkZCYCWXJLKJPL4146-11-07 10:27:00





             Test Item    Value        Reference Range Interpretation Comments

 

             BUN (test code = BUN) 26           7-22                      



[facility] CenterIzkelicUUTIOIUJZTIJ2281-24-09 10:27:00





             Test Item    Value        Reference Range Interpretation Comments

 

             Glucose Lvl (test code = Glucose Lvl) 151          70-99           

          



[facility]kinEugzshqYTPDXQHJFN3460-57-06 10:27:00





             Test Item    Value        Reference Range Interpretation Comments

 

             Lymphocytes # (test code = Lymphocytes 0.6          1.0-5.5        

           



             #)                                                  



[facility] LufkinLapfaseVSDEAVCJDQ4858-80-57 10:27:00





             Test Item    Value        Reference Range Interpretation Comments

 

             Monocytes # (test code 0.5          See_Comment                [Aut

omated message] The



             = Monocytes #)                                        system which 

generated



                                                                 this result tra

nsmitted



                                                                 reference range

: <=0.8.



                                                                 The reference r

klaudia was



                                                                 not used to int

erpret



                                                                 this result as



                                                                 normal/abnormal

.



[facility]ufkinEgvdhhdXYEGHPAWBF8052-69-36 10:27:00





             Test Item    Value        Reference Range Interpretation Comments

 

             Segs-Bands # (test code = Segs-Bands #) 8.3          1.5-8.1       

            



[facility]kinCkwleciYWPDYZLXBU7230-27-18 10:27:00





             Test Item    Value        Reference Range Interpretation Comments

 

             Monocytes (test code = Monocytes) 5.1          2.0-12.0            

      



[facility]ufkinKxisuwmPMVBIXQBAY1820-56-85 10:27:00





             Test Item    Value        Reference Range Interpretation Comments

 

             Segs (test code = Segs) 88.1         45.0-75.0                 



[facility]ufkinCtkhdclDLWQECQQMG8026-91-40 10:27:00





             Test Item    Value        Reference Range Interpretation Comments

 

             Lymphocytes (test code = Lymphocytes) 6.6          20.0-40.0       

          



[facility]ufkinCijsioaPDTMPYYJIK1781-31-06 10:27:00





             Test Item    Value        Reference Range Interpretation Comments

 

             Basophils (test code = 0.2          See_Comment                [Aut

omated message] The



             Basophils)                                          system which ge

nerated



                                                                 this result tra

nsmitted



                                                                 reference range

: <=1.0.



                                                                 The reference r

klaudia was



                                                                 not used to int

erpret



                                                                 this result as



                                                                 normal/abnormal

.



[facility]ufkinWtggbybNKNQJCOQBE4821-77-62 10:27:00





             Test Item    Value        Reference Range Interpretation Comments

 

             MCH (test code = MCH) 28.5 pg      27.0-31.0                 



[facility]ufkinEpnhtkbPCGZTSPQXP2721-06-22 10:27:00





             Test Item    Value        Reference Range Interpretation Comments

 

             Platelet (test code = Platelet) 158          133-450               

    



[facility]ufkinJtmalyqNZQGBXAOIL8648-96-22 10:27:00





             Test Item    Value        Reference Range Interpretation Comments

 

             MCHC (test code = MCHC) 33.6         32.0-36.0                 



[facility]ufkinQviodvoZFZTEQBMXU9809-68-80 10:27:00





             Test Item    Value        Reference Range Interpretation Comments

 

             RDW (test code = RDW) 14.1         11.5-14.5                 



[facility] LufkinHgiobpxNYHLDGTJEF9834-54-05 10:27:00





             Test Item    Value        Reference Range Interpretation Comments

 

             MCV (test code = MCV) 84.9         80.0-94.0                 



[facility] LufkinFfrubnmRXMYYNYOLT8842-22-63 10:27:00





             Test Item    Value        Reference Range Interpretation Comments

 

             RBC (test code = RBC) 5.28         4.70-6.10                 



[facility]ufkinEiqapsdSOSBPOLVQW5590-56-69 10:27:00





             Test Item    Value        Reference Range Interpretation Comments

 

             Hgb (test code = Hgb) 15.0         14.0-18.0                 



[facility] LufkinDilkiikKZDSAXCXVE8487-96-82 10:27:00





             Test Item    Value        Reference Range Interpretation Comments

 

             WBC (test code = WBC) 9.4          3.7-10.4                  



[facility] LufkinUjddbwuSOWTKZZOPY7945-27-94 10:27:00





             Test Item    Value        Reference Range Interpretation Comments

 

             Hct (test code = Hct) 44.8         42.0-54.0                 



[facility]villeVrksdlcYIOAVBHCGR8404-97-48 10:27:00





             Test Item    Value        Reference Range Interpretation Comments

 

             MPV (test code = MPV) 8.2          7.4-10.4                  



[facility]villePARATHYROID KRRIHUQ1639-94-21 10:27:00





             Test Item    Value        Reference Range Interpretation Comments

 

             Ca Ion WB (test code = Ca Ion WB) 1.08         1.05-1.25           

      



[facility]annPARATHYROID ADUXBIP2145-01-14 10:27:00





             Test Item    Value        Reference Range Interpretation Comments

 

             Ca Norm WB (test code = Ca Norm WB) 1.05         1.05-1.25         

        



[facility] [place] GEXPCXX3908-11-27 16:09:00





             Test Item    Value        Reference Range Interpretation Comments

 

             Antibody Scrn (test Negative (17                           



             code = Antibody Scrn) 10:09 AM)                              



[facility] [place] [id number] 16:09:00





             Test Item    Value        Reference Range Interpretation Comments

 

             ABO/Rh (test code = ABO/Rh) O NEG                                  



[facility] [place] [id number] 19:30:00





             Test Item    Value        Reference Range Interpretation Comments

 

             A/G Ratio (test code = A/G Ratio) 1.6          0.7-1.6             

      



[facility] [place] [id number] 19:30:00





             Test Item    Value        Reference Range Interpretation Comments

 

             Globulin (test code = Globulin) 2.6          2.7-4.2               

    



[facility] [place] [id number] 19:30:00





             Test Item    Value        Reference Range Interpretation Comments

 

             B/C Ratio (test code = B/C Ratio) 20           -                

      



[facility] [place] [id number] 19:30:00





             Test Item    Value        Reference Range Interpretation Comments

 

             AGAP (test code = AGAP) 15.4         10.0-20.0                 



[facility] [place] [id number] 19:30:00





             Test Item    Value        Reference Range Interpretation Comments

 

             eGFR (test code = eGFR) 67                                     



[facility] [place] [id number] 19:30:00





             Test Item    Value        Reference Range Interpretation Comments

 

             CO2 (test code = CO2) 24           24-32                     



[facility] [place] [id number] 19:30:00





             Test Item    Value        Reference Range Interpretation Comments

 

             Calcium Lvl (test code = Calcium Lvl) 8.8          8.5-10.5        

          



[facility]2017-01-17 19:30:00





             Test Item    Value        Reference Range Interpretation Comments

 

             Total Protein (test code = Total 6.8          6.4-8.4              

     



             Protein)                                            



[facility]2017-01-17 19:30:00





             Test Item    Value        Reference Range Interpretation Comments

 

             ALT (test code = ALT) 25           See_Comment                [Auto

mated message] The



                                                                 system which ge

nerated this



                                                                 result transmit

phoebe



                                                                 reference range

: <=65. The



                                                                 reference range

 was not



                                                                 used to interpr

et this



                                                                 result as maame

l/abnormal.



[facility]2017-01-17 19:30:00





             Test Item    Value        Reference Range Interpretation Comments

 

             Albumin Lvl (test code = Albumin Lvl) 4.2          3.5-5.0         

          



[facility]2017-01-17 19:30:00





             Test Item    Value        Reference Range Interpretation Comments

 

             AST (test code = AST) 12           See_Comment                [Auto

mated message] The



                                                                 system which ge

nerated this



                                                                 result transmit

phoebe



                                                                 reference range

: <=37. The



                                                                 reference range

 was not



                                                                 used to interpr

et this



                                                                 result as maame

l/abnormal.



[facility]2017-01-17 19:30:00





             Test Item    Value        Reference Range Interpretation Comments

 

             Alk Phos (test code = Alk Phos) 83                           

    



[facility]2017-01-17 19:30:00





             Test Item    Value        Reference Range Interpretation Comments

 

             Chloride Lvl (test code = Chloride Lvl) 106                  

            



[facility]2017-01-17 19:30:00





             Test Item    Value        Reference Range Interpretation Comments

 

             Bili Total (test code = Bili Total) 0.5          0.2-1.3           

        



[facility]2017-01-17 19:30:00





             Test Item    Value        Reference Range Interpretation Comments

 

             Potassium Lvl (test code = Potassium 4.4          3.5-5.1          

         



             Lvl)                                                



[facility]2017-01-17 19:30:00





             Test Item    Value        Reference Range Interpretation Comments

 

             Sodium Lvl (test code = Sodium Lvl) 141          135-145           

        



[facility]2017-01-17 19:30:00





             Test Item    Value        Reference Range Interpretation Comments

 

             Creatinine Lvl (test code = Creatinine 1.26         0.50-1.40      

           



             Lvl)                                                



[facility]2017-01-17 19:30:00





             Test Item    Value        Reference Range Interpretation Comments

 

             BUN (test code = BUN) 25           7-22                      



[facility]2017-01-17 19:30:00





             Test Item    Value        Reference Range Interpretation Comments

 

             Glucose Lvl (test code = Glucose Lvl) 91           70-99           

          



[facility]kinInutirbNDKTRAMYWT2593-07-52 19:30:00





             Test Item    Value        Reference Range Interpretation Comments

 

             RDW (test code = RDW) 14.2         11.5-14.5                 



[place]337-01-17 19:30:00





             Test Item    Value        Reference Range Interpretation Comments

 

             Hct (test code = Hct) 45.7         42.0-54.0                 



[facility]kinCflbomzRTXTYYIXRT5147-93-77 19:30:00





             Test Item    Value        Reference Range Interpretation Comments

 

             Platelet (test code = Platelet) 149          133-450               

    



[facility]ufkinKppgojrPHCXBFKYTP0171-00-69 19:30:00





             Test Item    Value        Reference Range Interpretation Comments

 

             MPV (test code = MPV) 8.4          7.4-10.4                  



[facility] LufkinXugesljXHCZSAOLRP7334-93-02 19:30:00





             Test Item    Value        Reference Range Interpretation Comments

 

             MCV (test code = MCV) 82.9         80.0-94.0                 



[facility]ufkinKcljikfYOTHSQXFDI1044-94-68 19:30:00





             Test Item    Value        Reference Range Interpretation Comments

 

             MCHC (test code = MCHC) 34.2         32.0-36.0                 



[facility] LufkinAexvgstFGLOHBONHJ6580-99-53 19:30:00





             Test Item    Value        Reference Range Interpretation Comments

 

             MCH (test code = MCH) 28.3 pg      27.0-31.0                 



[facility] LufkinVbcduqpOKCBDQMPZF8014-93-83 19:30:00





             Test Item    Value        Reference Range Interpretation Comments

 

             WBC (test code = WBC) 6.1          3.7-10.4                  



[facility]ufkinKntolrnSUHFEVKYXJ8286-19-58 19:30:00





             Test Item    Value        Reference Range Interpretation Comments

 

             Hgb (test code = Hgb) 15.6         14.0-18.0                 



[facility]ufkinTgnzdkyELECYBVNNY1099-57-67 19:30:00





             Test Item    Value        Reference Range Interpretation Comments

 

             RBC (test code = RBC) 5.52         4.70-6.10                 



[facility]ufkinFjcfrxsPIGLIQMSXM9871-10-48 19:30:00





             Test Item    Value        Reference Range Interpretation Comments

 

             Basophils (test code = 0.7          See_Comment                [Aut

omated message] The



             Basophils)                                          system which ge

nerated



                                                                 this result tra

nsmitted



                                                                 reference range

: <=1.0.



                                                                 The reference r

klaudia was



                                                                 not used to int

erpret



                                                                 this result as



                                                                 normal/abnormal

.



[facility]ufkinCwuxhyyBJBZOOUYGQ2811-29-99 19:30:00





             Test Item    Value        Reference Range Interpretation Comments

 

             Segs-Bands # (test code = Segs-Bands #) 4.4          1.5-8.1       

            



[facility]ufkinCpugaylLMKEVVXCMQ2664-30-49 19:30:00





             Test Item    Value        Reference Range Interpretation Comments

 

             Monocytes # (test code 0.6          See_Comment                [Aut

omated message] The



             = Monocytes #)                                        system which 

generated



                                                                 this result tra

nsmitted



                                                                 reference range

: <=0.8.



                                                                 The reference r

klaudia was



                                                                 not used to int

erpret



                                                                 this result as



                                                                 normal/abnormal

.



[facility]ufkinFfpfrzwLERTWLTBAU6060-43-96 19:30:00





             Test Item    Value        Reference Range Interpretation Comments

 

             Lymphocytes # (test code = Lymphocytes 1.1          1.0-5.5        

           



             #)                                                  



[facility]ufkinOngbiefPFPMWDMOKU6472-21-92 19:30:00





             Test Item    Value        Reference Range Interpretation Comments

 

             Eosinophils # (test code 0.1          See_Comment                [A

utomated message] The



             = Eosinophils #)                                        system [facility]

h generated



                                                                 this result tra

nsmitted



                                                                 reference range

: <=0.5.



                                                                 The reference r

klaudia was



                                                                 not used to int

erpret



                                                                 this result as



                                                                 normal/abnormal

.



[facility] LufkinSqmlvdcVQQIERIODU8352-70-32 19:30:00





             Test Item    Value        Reference Range Interpretation Comments

 

             Eosinophils (test code = 0.9          See_Comment                [A

utomated message] The



             Eosinophils)                                        system which ge

nerated



                                                                 this result tra

nsmitted



                                                                 reference range

: <=4.0.



                                                                 The reference r

klaudia was



                                                                 not used to int

erpret



                                                                 this result as



                                                                 normal/abnormal

.



[facility] LufkinXfzucuhVWIHJVPDDF3358-79-92 19:30:00





             Test Item    Value        Reference Range Interpretation Comments

 

             Lymphocytes (test code = Lymphocytes) 17.6         20.0-40.0       

          



[facility] LufkinQtpirrtXGEJDLCAIX7012-83-49 19:30:00





             Test Item    Value        Reference Range Interpretation Comments

 

             Monocytes (test code = Monocytes) 9.3          2.0-12.0            

      



[facility] LufkinHwozsmyQNWMBQTLXH6605-23-15 19:30:00





             Test Item    Value        Reference Range Interpretation Comments

 

             Segs (test code = Segs) 71.5         45.0-75.0                 



[facility] [id number] 19:30:00





             Test Item    Value        Reference Range Interpretation Comments

 

             Hgb A1C (test code = Hgb A1C) 5.3                                  

  



[facility]

## 2022-03-28 NOTE — EDPHYS
Physician Documentation                                                                           

 [facility]                                                                 

Name: Gomez Romero                                                                               

Age: 51 yrs                                                                                       

Sex: Male                                                                                         

: 1970                                                                                   

MRN: [id number]                                                                                   

Arrival Date: 2022                                                                          

Time: 14:54                                                                                       

Account#: [id number]                                                                            

Bed 10                                                                                            

Private MD:                                                                                       

ED Physician Reynaldo Garcia                                                                      

HPI:                                                                                              

                                                                                             

15:28 This 51 yrs old Male presents to ER via Ambulatory with complaints of rib pain.         jr8 

15:28 Onset: The symptoms/episode began/occurred acutely, today. Severity of symptoms: At     jr8 

      their worst the symptoms were mild in the emergency department the symptoms are             

      unchanged. The patient has not experienced similar symptoms in the past. The patient        

      has not recently seen a physician. This is a 51-year-old male that presented to the         

      emergency room with left-sided rib pain after having an accidental fall onto one of the     

      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 Denies hitting head or neck or having loss of             

      consciousness. Complains only of rib pain at this time. Denies shortness of breath..        

                                                                                                  

Historical:                                                                                       

- Allergies:                                                                                      

14:57 falsecarinate;                                                                          tw2 

- Home Meds:                                                                                      

14:57 Prograf 1 mg Oral cap every 12 hours [Active]; micofenolato [Active];                   tw2 

- PMHx:                                                                                           

14:57 heart attack;                                                                           tw2 

- PSHx:                                                                                           

14:57 Heart transplant; Hernia sx;                                                            tw2 

                                                                                                  

- Immunization history:: Client reports having NOT received the Covid vaccine. Flu                

  vaccine is not up to date.                                                                      

- Social history:: Smoking status: Patient denies any tobacco usage or history of.                

                                                                                                  

                                                                                                  

ROS:                                                                                              

15:28 Eyes: Negative for injury, pain, redness, and discharge, ENT: Negative for injury,      jr8 

      pain, and discharge, Neck: Negative for injury, pain, and swelling, Respiratory:            

      Negative for shortness of breath, cough, wheezing, and pleuritic chest pain,                

      Abdomen/GI: Negative for abdominal pain, nausea, vomiting, diarrhea, and constipation,      

      Back: Negative for injury and pain, MS/Extremity: Negative for injury and deformity,        

      Skin: Negative for injury, rash, and discoloration, Neuro: Negative for headache,           

      weakness, numbness, tingling, and seizure.                                                  

15:28 Cardiovascular: Positive for chest pain, with movement, Negative for edema, orthopnea,      

      palpitations, paroxysmal nocturnal dyspnea.                                                 

                                                                                                  

Exam:                                                                                             

15:28 Constitutional:  This is a well developed, well nourished patient who is awake, alert,  jr8 

      and in no acute distress. Neck:  Trachea midline, no thyromegaly or masses palpated,        

      and no cervical lymphadenopathy.  Supple, full range of motion without nuchal rigidity,     

      or vertebral point tenderness.  No Meningismus. Cardiovascular:  Regular rate and           

      rhythm with a normal S1 and S2.  No gallops, murmurs, or rubs.  Normal PMI, no JVD.  No     

      pulse deficits. Respiratory:  Lungs have equal breath sounds bilaterally, clear to          

      auscultation and percussion.  No rales, rhonchi or wheezes noted.  No increased work of     

      breathing, no retractions or nasal flaring. Abdomen/GI:  Soft, non-tender, with normal      

      bowel sounds.  No distension or tympany.  No guarding or rebound.  No evidence of           

      tenderness throughout. Back:  No spinal tenderness.  No costovertebral tenderness.          

      Full range of motion. MS/ Extremity:  Pulses equal, no cyanosis.  Neurovascular intact.     

       Full, normal range of motion. Neuro:  Awake and alert, GCS 15, oriented to person,         

      place, time, and situation.  Cranial nerves II-XII grossly intact.  Motor strength 5/5      

      in all extremities.  Sensory grossly intact.  Cerebellar exam normal.  Normal gait.         

15:28 Chest/axilla: Inspection: normal, Palpation: tenderness, that is mild, of the  left         

      lateral anterior chest, that totally reproduces the patient's complaints, that does not     

      reproduce the patient's complaints.                                                         

                                                                                                  

Vital Signs:                                                                                      

14:58  / 96; Pulse 79; Resp 17; Temp 97.9(TE); Pulse Ox 100% on R/A; Weight 104.33 kg;  tw2 

      Height 6 ft. 4 in. (193.04 cm); Pain 8/10;                                                  

14:58 Body Mass Index 28.00 (104.33 kg, 193.04 cm)                                            tw2 

                                                                                                  

MDM:                                                                                              

15:00 Patient medically screened.                                                             jr8 

16:56 Data reviewed: vital signs, nurses notes, radiologic studies, plain films. Data         jr8 

      interpreted: Pulse oximetry: on room air is 100 %. Interpretation: normal. Counseling:      

      I had a detailed discussion with the patient and/or guardian regarding: the historical      

      points, exam findings, and any diagnostic results supporting the discharge/admit            

      diagnosis, radiology results, the need for outpatient follow up, a family practitioner,     

      to return to the emergency department if symptoms worsen or persist or if there are any     

      questions or concerns that arise at home. ED course: Patient with minimal pain at this      

      time and hemodynamically stable. No acute osseous findings on plain films. Recommended      

      getting up walking and make sure he is taking good deep breaths every hour. Pain            

      management with ibuprofen and Tylenol as he is a  and has certain            

      stipulations. If he were to worsen to come back for further evaluation. Patient good        

      with plan at this time..                                                                    

                                                                                                  

                                                                                             

15:06 Order name: Ribs Left XRAY; Complete Time: 17:03                                        jr8 

                                                                                                  

Administered Medications:                                                                         

No medications were administered                                                                  

                                                                                                  

                                                                                                  

Disposition Summary:                                                                              

22 16:57                                                                                    

Discharge Ordered                                                                                 

      Location: Home                                                                          jr8 

      Problem: new                                                                            jr8 

      Symptoms: have improved                                                                 jr8 

      Condition: Stable                                                                       jr8 

      Diagnosis                                                                                   

        - Rib contusion left side                                                             jr8 

      Followup:                                                                               jr8 

        - With: Private Physician                                                                  

        - When: 1 week                                                                             

        - Reason: Recheck today's complaints, Continuance of care, Re-evaluation by your           

      physician                                                                                   

      Discharge Instructions:                                                                     

        - Rib Contusion                                                                       jr8 

        - Discharge Summary Sheet                                                             tw2 

      Forms:                                                                                      

        - Medication Reconciliation Form                                                      jr8 

        - Thank You Letter                                                                    jr8 

        - Antibiotic Education                                                                jr8 

        - Prescription Opioid Use                                                             jr8 

        - Work release form                                                                   tw2 

Addendum:                                                                                         

2022                                                                                        

     06:31 Co-signature as Attending Physician, Reynaldo Garcia MD I agree with the assessment and  c
ha

           plan of care.                                                                          

                                                                                                  

Signatures:                                                                                       

Dispatcher [employer]                           EDReynaldo Santana MD MD cha Roszak, Josh, PA PA   jr8                                                  

Caro Coleman, RN                          RN   tw2                                                  

                                                                                                  

**************************************************************************************************

## 2022-03-28 NOTE — ER
Nurse's Notes                                                                                     

 [facility]                                                                 

Name: Gomez Romero                                                                               

Age: 51 yrs                                                                                       

Sex: Male                                                                                         

: 1970                                                                                   

MRN: [id number]                                                                                   

Arrival Date: 2022                                                                          

Time: 14:54                                                                                       

Account#: [id number]                                                                            

Bed 10                                                                                            

Private MD:                                                                                       

Diagnosis: Rib contusion left side                                                                

                                                                                                  

Presentation:                                                                                     

                                                                                             

14:56 Chief complaint: Patient states: a couple of hours ago i slipped and fell on a rail     tw2 

      cart, like the metal barriers around it. on my LEFT side of my ribs. the pain is just       

      getting worse i think i might have broke it.                                                

14:56 Method Of Arrival: Ambulatory                                                           tw2 

14:59 Coronavirus screen: At this time, the client does not indicate any symptoms associated  tw2 

      with coronavirus-19. Ebola Screen: Patient denies travel to an Ebola-affected area in       

      the 21 days before illness onset. Initial Sepsis Screen: Does the patient meet any 2        

      criteria? No. Patient's initial sepsis screen is negative. Does the patient have a          

      suspected source of infection? No. Patient's initial sepsis screen is negative. Risk        

      Assessment: Do you want to hurt yourself or someone else? Patient reports no desire to      

      harm self or others. Onset of symptoms was 2022.                                  

14:59 Acuity: [place] 4                                                                           tw2 

                                                                                                  

Triage Assessment:                                                                                

14:58 General: Appears in no apparent distress. uncomfortable, Behavior is calm, cooperative, tw2 

      appropriate for age. Pain: Complains of pain in left ribs. Respiratory: Reports pain        

      with respiration.                                                                           

                                                                                                  

Historical:                                                                                       

- Allergies:                                                                                      

14:57 falsecarinate;                                                                          tw2 

- Home Meds:                                                                                      

14:57 Prograf 1 mg Oral cap every 12 hours [Active]; micofenolato [Active];                   tw2 

- PMHx:                                                                                           

14:57 heart attack;                                                                           tw2 

- PSHx:                                                                                           

14:57 Heart transplant; Hernia sx;                                                            tw2 

                                                                                                  

- Immunization history:: Client reports having NOT received the Covid vaccine. Flu                

  vaccine is not up to date.                                                                      

- Social history:: Smoking status: Patient denies any tobacco usage or history of.                

                                                                                                  

                                                                                                  

Screening:                                                                                        

15:01 Abuse screen: Denies threats or abuse. Nutritional screening: No deficits noted.        tw2 

      Tuberculosis screening: No symptoms or risk factors identified. Fall Risk None              

      identified.                                                                                 

                                                                                                  

Assessment:                                                                                       

15:06 General: Appears in no apparent distress. uncomfortable, Behavior is calm, cooperative. vg1 

      Pain: Complains of pain in left lateral anterior chest Pain currently is 8 out of 10 on     

      a pain scale. Neuro: Level of Consciousness is awake, alert, obeys commands, Oriented       

      to person, place, time, situation.                                                          

15:06 Cardiovascular: Patient's skin is warm and dry. Respiratory: Reports pain with          vg1 

      respiration Airway is patent Respiratory effort is even, unlabored, Denies shortness of     

      breath. GI: No signs and/or symptoms were reported involving the gastrointestinal           

      system. : No signs and/or symptoms were reported regarding the genitourinary system.      

      EENT: No signs and/or symptoms were reported regarding the EENT system. Derm: Skin is       

      intact, is healthy with good turgor. Musculoskeletal: Circulation, motion, and              

      sensation intact.                                                                           

15:15 Reassessment: Patient appears in no apparent distress at this time. No changes from     vg1 

      previously documented assessment. Patient and/or family updated on plan of care and         

      expected duration. Pain level reassessed. Patient is alert, oriented x 3, equal             

      unlabored respirations, skin warm/dry/pink.                                                 

                                                                                                  

Vital Signs:                                                                                      

14:58  / 96; Pulse 79; Resp 17; Temp 97.9(TE); Pulse Ox 100% on R/A; Weight 104.33 kg;  tw2 

      Height 6 ft. 4 in. (193.04 cm); Pain 8/10;                                                  

14:58 Body Mass Index 28.00 (104.33 kg, 193.04 cm)                                            tw2 

                                                                                                  

ED Course:                                                                                        

14:54 Patient arrived in ED.                                                                  am2 

14:58 Arm band placed on.                                                                     tw2 

14:59 Triage completed.                                                                       tw2 

14:59 Bed in low position. Call light in reach.                                               tw2 

15:00 Rosy Novoa, RN is Primary Nurse.                                                  vg1 

15:00 Geoff Carlson PA is PHCP.                                                               jr8 

15:00 Reynaldo Garcia MD is Attending Physician.                                             jr8 

16:44 Ribs Left XRAY In Process Unspecified.                                                  EDMS

17:10 No provider procedures requiring assistance completed. Patient did not have IV access   vg1 

      during this emergency room visit.                                                           

                                                                                                  

Administered Medications:                                                                         

No medications were administered                                                                  

                                                                                                  

                                                                                                  

Outcome:                                                                                          

16:57 Discharge ordered by MD.                                                                jr8 

17:10 Discharged to home ambulatory, with family.                                             vg1 

17:10 Condition: good                                                                             

17:10 Discharge instructions given to patient, Instructed on discharge instructions, follow       

      up and referral plans. Demonstrated understanding of instructions, follow-up care.          

17:10 Patient left the ED.                                                                    vg1 

                                                                                                  

Signatures:                                                                                       

Dispatcher [employer]                           EDGeoff Hernandez PA PA   jr8                                                  

Caro Coleman, RN                          RN   tw2                                                  

Renita Rushing am2                                                  

Rosy Novoa, RN                    RN   vg1                                                  

                                                                                                  

Corrections: (The following items were deleted from the chart)                                    

15: 15:06 Neuro: Level of Consciousness is awake, alert, obeys commands, Oriented to        vg1 

      person, place, time, situation, vg1                                                         

15:07 15:06 Pain: Complains of pain in left lateral anterior chest Pain vg1                   vg1 

                                                                                                  

**************************************************************************************************

## 2022-06-20 ENCOUNTER — HOSPITAL ENCOUNTER (EMERGENCY)
Dept: HOSPITAL 97 - ER | Age: 52
Discharge: HOME | End: 2022-06-20
Payer: COMMERCIAL

## 2022-06-20 VITALS — SYSTOLIC BLOOD PRESSURE: 129 MMHG | DIASTOLIC BLOOD PRESSURE: 82 MMHG | OXYGEN SATURATION: 100 %

## 2022-06-20 VITALS — TEMPERATURE: 98.2 F

## 2022-06-20 DIAGNOSIS — Z88.8: ICD-10-CM

## 2022-06-20 DIAGNOSIS — M62.830: Primary | ICD-10-CM

## 2022-06-20 DIAGNOSIS — Z94.1: ICD-10-CM

## 2022-06-20 PROCEDURE — 72100 X-RAY EXAM L-S SPINE 2/3 VWS: CPT

## 2022-06-20 PROCEDURE — 99284 EMERGENCY DEPT VISIT MOD MDM: CPT

## 2022-06-20 PROCEDURE — 96372 THER/PROPH/DIAG INJ SC/IM: CPT

## 2022-06-20 NOTE — XMS REPORT
Continuity of Care Document

                            Created on:2022



Patient:TYRA BRUNO

Sex:Male

:1970

External Reference #:[id number]





Demographics







                          Address                   [address]



                                                    [place] [address]

 

                          Home Phone                [phone]

 

                          Work Phone                [phone]

 

                          Mobile Phone              [phone]

 

                          Email Address             [email or web address]

 

                          Preferred Language        English

 

                          Marital Status            Unknown

 

                          Zoroastrian Affiliation     Unknown

 

                          Race                      Unknown

 

                          Additional Race(s)        Unavailable

 

                          Ethnic Group              Unknown









Author







                          Organization              [facility]

t

 

                          Address                   [address]



                                                    [place] [address]

 

                          Phone                     [phone]









Support







                Name            Relationship    Address         Phone

 

                KIANA PURVIS               Unavailable     [phone]

 

                IKANA        KAZ               Unavailable     [phone]

 

                KIANA MCCURDY               Unavailable     Unavailable

 

                Unavailable     NG              Unavailable     Unavailable









Care Team Providers







                    Name                Role                Phone

 

                    SCOTT LOO     Primary Care Physician Unavailable

 

                    SCOTT LOO     Attending Clinician Unavailable

 

                    Scott Loo MD  Attending Clinician [phone]

 

                    Sincere MCKOY            Attending Clinician Unavailable

 

                    [place]                Attending Clinician Unavailable

 

                    Jame            Attending Clinician Unavailable

 

                    Jesu Cummins MD    Attending Clinician [phone]

 

                    SIMONE               Attending Clinician Unavailable

 

                    MARKO AWAD      Attending Clinician Unavailable

 

                    MAEVE SILVESTRE     Attending Clinician Unavailable

 

                    TJ        Attending Clinician Unavailable

 

                    SCOTT LOO     Admitting Clinician Unavailable









Payers







           Payer Name Policy Type Policy Number Effective Date Expiration Date S

omar

 

           [employer] OS               [id number] 2017            



           POS/PPO/EPO                       00:00:00              

 

           MEDICARE A B            [id number] 2017 



                                            00:00:00   00:00:00   







Problems







       Condition Condition Condition Status Onset  Resolution Last   Treating Co

mments 

Source



       Name   Details Category        Date   Date   Treatment Clinician        



                                                 Date                 

 

       RIGHT         Diagnosis Active 2017               [facility]

[facility]



       RECURRENT                      -18          06:05:00               l



       INGUINAL   RIGHT               00:00:                             [place]



       HERNIA AND RECURRENT               00                                 



       INCI   INGUINAL                                                  



              HERNIA AND                                                  



              INCI                                                    



                                                                      



                                                                      



              Active                                                  



                                                                      



                                                                      



              2017                                                  



                                                                      



                                                                      



                                                                      



                                                                      



                                                                      



                                                                      



                                                                      



                                                                      



                     [facility]                                                  



                                                                      



                                                                      

 

       MORBID        Diagnosis Active 2017               Mem

oria



       OBESITY                      0-14          07:33:00               l



                MORBID               00:00:                             [place]



              OBESITY               00                                 



                                                                      



                                                                      



              Active                                                  



                                                                      



                                                                      



              10/14/2016                                                  



                                                                      



                                                                      



                                                                      



                                                                      



                                                                      



                                                                      



                                                                      



                                                                      



                     [facility]                                                  



                                                                      



                                                                      

 

       Heart  Heart  Disease Active                              [facility]



       transplant transplant               3-30                               [facility]

[facility]



       ,      ,                    00:00:                             Medical



       orthotopic orthotopic               00                                 Ce

nter



       , status , status                                                  

 

       Heart  Heart  Disease Active 2015                      Overview: [facility]



       transplant transplant               2-                        [facility]



       rejection rejection               00:00:                      g of this M

edical



                                   00                          note   Center



                                                               might be 



                                                               different 



                                                               from the 



                                                               original. 



                                                               - 2R   



                                                               rejection 



                                                               on low 



                                                               dose   



                                                               immunosup 



                                                               pression 



                                                               10/22/15 



                                                               treated 



                                                               with   



                                                               prednison 



                                                               e pulse- 



                                                               2R     



                                                               rejection 



                                                               on low 



                                                               dose   



                                                               immunosup 



                                                               pression 



                                                               10/28/15 



                                                               treated 



                                                               with IV 



                                                               solumedro 



                                                               l,     



                                                               increased 



                                                               MMF,   



                                                               increased 



                                                               Prograf- 



                                                               2R biopsy 



                                                               on     



                                                               12/2/15 



                                                               treated 



                                                               with ATG 



                                                               x 4    

 

       History of History of Disease Active                              C

[facility]



       Cytomegalo Cytomegalo               -                               [facility]



       virus  virus                00:00:                             Medical



       (CMV)  (CMV)                00                                 Center



       viremia viremia                                                  

 

       ICM s/p ICM s/p Disease Active                              [facility]



       orthotopic orthotopic               -                               [facility]



       heart  heart                00:00:                             Medical



       transplant transplant               00                                 Ce

nter



       5/14/15 5/14/15                                                  

 

       Dyslipidem Dyslipidem Disease Active                              C

[facility]



       ia     ia                   -                               [facility]



                                   00:00:                             Medical



                                                                    Center

 

       Hiatal        Problem Active               2017               Mem[facility]

ia



       hernia                                    01:10:50               l



       (disorder)   Hiatal                                                  [facility]

ina



              hernia                                                  



              (disorder)                                                  



                                                                      



                                                                      



               Active                                                  



                                                                      



                                                                      



                                                                      



                                                                      



              Problem                                                  



                                                                      



                                                                      



              2017                                                  



                                                                      



                                                                      



                                                                      



                                                                      



                 [facility]                                                  



                                                                      



                                                                      

 

       OBESITY,        Diagnosis Active               2017               M

[facility]



       UNSPECIFIE                                    07:33:00               l



       D        OBESITY,                                                  [facility]

n



              UNSPECIFIE                                                  



              D                                                       



                                                                      



                 Active                                                  



                                                                      



                                                                      



                                                                      



                                                                      



                                                                      



                                                                      



                                                                      



                                                                      



                                                                      



                                                                      



                      [facility]                                                  



                                                                      



                                                                      

 

       Myocardial        Problem Resolve               2017               

[facility]



       infarction               d                    01:10:50               l



       (disorder)                                                         [facility]

n



              Myocardial                                                  



              infarction                                                  



              (disorder)                                                  



                                                                      



                                                                      



               Resolved                                                  



                                                                      



                                                                      



                                                                      



                                                                      



                Problem                                                  



                                                                      



                                                                      



              2017                                                  



                                                                      



                                                                      



                                                                      



                                                                      



                 [facility]                                                  



                                                                      



                                                                      

 

       Hypertensi Hypertensi Disease Active                                    C

[facility]



       on     on                                                      [facility]

 

       Ischemic Ischemic Disease Active                                    [facility]

t



       cardiomyop cardiomyop                                                  [facility]



       athy with athy with                                                  [facility]

lizabeth



       MI 2015 MI 2015                                                  Ce

nter

 

       Routine Routine Problem Active                                    [facility]



       lab draw lab draw HL7.CCDAR2                                           Ph

ysici



                                                                      ans

 

       Malnutriti Malnutriti Problem Active                                    U

T



       on     on     HL7.CCDAR2                                           Physic

i



                                                                      ans

 

       Morbid Morbid Problem Active                                    [facility]



       obesity obesity HL7.CCDAR2                                           Phys

ici



       due to due to                                                  ans



       excess excess                                                  



       calories calories                                                  

 

       Primary Primary Problem Active                                    [facility]



       dysthymia dysthymia HL7.CCDAR2                                           

Physici



                                                                      ans

 

       Adult BMI Adult BMI Problem Active                                    [facility]



       40.0-44.9 40.0-44.9 HL7.CCDAR2                                           

Physici



       kg/sq m kg/sq m                                                  ans

 

       Malabsorpt Malabsorpt Problem Active                                    U

T



       ion    ion    HL7.CCDAR2                                           Physic

i



                                                                      ans

 

       History of History of Problem Resolve                                    

[facility]



       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                                       

  Physici



       malabsorpt malabsorpt                                                  an

s



       ion    ion                                                     

 

       History of History of Problem Resolve                                    

[facility]



       malnutriti malnutriti HL7.CCDAR2 d                                       

  Physici



       on     on                                                      ans

 

       Vitamin D Vitamin D Problem Active                                    [facility]



       deficiency deficiency HL7.CCDAR2                                         

  Physici



                                                                      ans







Allergies, Adverse Reactions, Alerts







       Allergy Allergy Status Severity Reaction(s) Onset  Inactive Treating Comm

ents 

Source



       Name   Type                        Date   Date   Clinician        

 

       FOSCARNE Allergy Active High   N\T\V                        [facility]



       T                                                          [facility]



                                          [facility]                      [facility]



                                          00                          Center

 

       Foscarne Drug   Active        Nausea And 2015 C

[facility]



       t      Allergy               Vomiting,                  Pt has no Rad

es



                                   Swelling 00:00:               reaction [facility]

l



                                          00                   to     Center



                                                               current 



                                                               medicatio 



                                                               n      



                                                               administr 



                                                               ation  



                                                               regimen: 



                                                               premedica 



                                                               te with 



                                                               Benadryl, 



                                                               Zofran, 



                                                               Tylenol, 



                                                               then   



                                                               500cc NS 



                                                               bolus, 



                                                               then   



                                                               diluted 



                                                               foscarnet 



                                                               over 2 



                                                               hours, 



                                                               then   



                                                               another 



                                                               500cc NS 



                                                               bolus. 



                                                               Electroly 



                                                               te labs 



                                                               after  



                                                               every  



                                                               dose,  



                                                               electroly 



                                                               te     



                                                               replaceme 



                                                               nt     



                                                               protocol 



                                                               in     



                                                               place.8/1 



                                                               3/2015Pt 



                                                               has no 



                                                               reaction 



                                                               to     



                                                               current 



                                                               medicatio 



                                                               n      



                                                               administr 



                                                               ation  



                                                               regimen: 



                                                               premedica 



                                                               te with 



                                                               Benadryl, 



                                                               Zofran, 



                                                               Tylenol, 



                                                               then   



                                                               500cc NS 



                                                               bolus, 



                                                               then   



                                                               diluted 



                                                               foscarnet 



                                                               over 2 



                                                               hours, 



                                                               then   



                                                               another 



                                                               500cc NS 



                                                               bolus. 



                                                               Electroly 



                                                               te labs 



                                                               after  



                                                               every  



                                                               dose,  



                                                               electroly 



                                                               te     



                                                               replaceme 



                                                               nt     



                                                               protocol 



                                                               in place. 







Family History







           Family Member Diagnosis  Comments   Start Date Stop Date  Source

 

           Maternal grandfather Cancer                                      [facility]

 

           Maternal grandfather Heart disease                                  C

[facility]

 

           Natural mother Diabetes                                    [facility]







Social History







           Social Habit Start Date Stop Date  Quantity   Comments   Source

 

           Alcohol intake 2021 Current               [facility]

es



                      00:00:00   00:00:00   non-drinker of            Medical Ce

nter



                                            alcohol (finding)            

 

           Tobacco use and 2015 Never used            [facility]

kes



           exposure   00:00[facility]00   [facility]00                         [facility]

 

           Sex Assigned At 1970                       [facility]

kes



           B[facility]      00:00[facility]00   [facility]00                         [facility]









                Smoking Status  Start Date      Stop Date       Source

 

                Social History                                  [facility]







Medications







       Ordered Filled Start  Stop   Current Ordering Indication Dosage Frequency

 Signature

                    Comments            Components          Source



     Medication Medication Date Date Medication? Clinician                (SIG) 

          



     Name Name                                                   

 

     tacrolimus            Yes                      Take 2           [facility]

[facility]



     (PROGRAF) 1      1-19                               capsules           [facility]

s



     MG capsule      00:00:                               by mouth           Med

ical



               00                                 twice           Center



                                                  daily.           

 

     tacrolimus            Yes                      Take 2           [facility]

[facility]



     (PROGRAF) 1      1-19                               capsules           [facility]

s



     MG capsule      00:00:                               by mouth           Med

ical



               00                                 twice           Center



                                                  daily.           

 

     mycophenola      2021- No             250mg Q.5D Take 1           [facility]



     te        2-16 12-16                          capsule           [facility]



     (CELLCEPT)      00:00: 23:59                          (250 mg           Med

ical



     250 mg      00   :00                           total) by           Center



     capsule                                         mouth 2           



                                                  (two)           



                                                  times           



                                                  daily.           

 

     mycophenola      2021- No             250mg Q.5D Take 1           [facility]



     te        2-16 12-16                          capsule           [facility]



     (CELLCEPT)      00:00: 23:59                          (250 mg           Med

ical



     250 mg      00   :00                           total) by           Center



     capsule                                         mouth 2           



                                                  (two)           



                                                  times           



                                                  daily.           

 

     tacrolimus      2021- No                       2mg am and           

[facility]



     (PROGRAF) 1      -                          1mg at           [facility]



     [facility] capsule      00:00: 00:00                          night.           [facility]

[facility]



               00   :00                                          Center

 

     tacrolimus      2021- No                       2mg am and           

[facility]



     (PROGRAF) 1      -                          1mg at           [facility]



     [facility] capsule      00:00: 00:00                          night.           [facility]i

[facility]



               00   :00                                          Center

 

     amLODIPine      2021- No             5mg  QD   Take 1           [facility]



     (NORVASC) 5      1-30 11-30                          tablet (5           [facility]

[facility]



     MG tablet      00:00: 23:59                          mg total)           Me

dical



               00   :00                           by mouth           Center



                                                  daily.           

 

     amLODIPine      2021- No             5mg  QD   Take 1           [facility]



     (NORVASC) 5      1-30 11-30                          tablet (5           [facility]

[facility]



     MG tablet      00:00: 23:59                          mg total)           Me

dical



               00   :00                           by mouth           Center



                                                  daily.           

 

     pravastatin            Yes                      Take 1           [facility]



     (PRAVACHOL)      4-05                               tablet by           [facility]

es



     40 MG      00:00:                               mouth           Medical



     tablet      00                                 daily           Center

 

     pravastatin            Yes                      Take 1           [facility]



     (PRAVACHOL)      4-05                               tablet by           [facility]

es



     40 MG      00:00:                               mouth           Medical



     tablet      00                                 daily           Center

 

     mycophenola      2021- No                       Take 1           [facility]



     te        4-05 12-16                          capsule           [facility]



     (CELLCEPT)      00:00: 00:00                          (250mg)           Med

ical



     250 mg      00   :00                           by mouth           Center



     capsule                                         twice           



                                                  daily           

 

     mycophenola      2021- No                       Take 1           [facility]



     te        4-05 12-16                          capsule           [facility]



     (CELLCEPT)      00:00: 00:00                          (250mg)           Med

ical



     250 mg      00   :00                           by mouth           Center



     capsule                                         twice           



                                                  daily           

 

     HYDROcodone      2021- No        Tooth pain 1{tbl}      Take 1      

     [facility]



     -acetaminop      3-10 03-20                          tablet by           [facility] (NORCO      00:00: 23:59                          mouth           Medic

al



     )      00   :00                           every 6           Center



      mg                                         (six)           



     per tablet                                         hours as           



                                                  needed for           



                                                  Pain for           



                                                  up to 10           



                                                  days. Max           



                                                  Daily           



                                                  Amount: 4           



                                                  tablets           

 

     HYDROcodone      2021- No        Tooth pain 1{tbl}      Take 1      

     [facility]



     -acetaminop      3-10 03-20                          tablet by           [facility] (NORCO      00:00: 23:59                          mouth           Medic

al



     )      00   :00                           every 6           Center



      mg                                         (six)           



     per tablet                                         hours as           



                                                  needed for           



                                                  Pain for           



                                                  up to 10           



                                                  days. Max           



                                                  Daily           



                                                  Amount: 4           



                                                  tablets           

 

     tacrolimus      2021- No                       Take 2           [facility]



     (PROGRAF) 1      - 12-13                          capsules           Rad

es



     MG capsule      00:00: 00:00                          by mouth           Me

dical



               00   :00                           twice           Center



                                                  daily           



                                                  (DIFF'T           



                                                  LOOK SAME           



                                                  MEDICATION           



                                                  )              

 

     tacrolimus      2021- No                       Take 2           [facility]



     (PROGRAF) 1      -26 12-13                          capsules           Rad

es



     MG capsule      00:00: 00:00                          by mouth           Me

dical



               00   :00                           twice           Center



                                                  daily           



                                                  (DIFF'T           



                                                  LOOK SAME           



                                                  MEDICATION           



                                                  )              

 

     famotidine      2021- No             20mg Q.5D Take 1           [facility]



     (PEPCID) 20      7-07 07-07                          tablet (20           L

ukes



     MG tablet      00:00: 23:59                          mg total)           Me

dical



               00   :00                           by mouth 2           Center



                                                  (two)           



                                                  times           



                                                  daily.           

 

     famotidine      2021- No             20mg Q.5D Take 1           [facility]



     (PEPCID) 20      7- 07-07                          tablet (20           L

ukes



     MG tablet      00:00: 23:59                          mg total)           Me

dical



               00   :00                           by mouth 2           Center



                                                  (two)           



                                                  times           



                                                  daily.           

 

     omeprazole      2021- No             20mg QD   Take 1           [facility]



     (PRILOSEC)      6 06-23                          capsule           [facility]



     20 MG      00:00: 23:59                          (20 mg           Medical



     capsule      00   :00                           total) by           Center



                                                  mouth           



                                                  daily.           

 

     omeprazole      2021- No             20mg QD   Take 1           [facility]



     (PRILOSEC)      6--23                          capsule           [facility]



     20 MG      00:00: 23:59                          (20 mg           Medical



     capsule      00   :00                           total) by           Center



                                                  mouth           



                                                  daily.           

 

     pravastatin      2021- No             40mg QD   Take 1           [facility]



     (PRAVACHOL)       04-05                          tablet (40           [facility]

ukes



     40 MG      00:00: 00:00                          mg total)           Medica

l



     tablet      00   :00                           by mouth           Center



                                                  daily.           

 

     pravastatin      2021- No             40mg QD   Take 1           [facility]



     (PRAVACHOL)      - 04-05                          tablet (40           L

ukes



     40 MG      00:00: 00:00                          mg total)           Medica

l



     tablet      00   :00                           by mouth           Center



                                                  daily.           

 

     mycophenola      2021- No             250mg Q.5D Take 1           [facility]



     te        - 03-11                          capsule           [facility]



     (CELLCEPT)      00:00: 00:00                          (250 mg           Med

ical



     250 mg      00   :00                           total) by           Center



     capsule                                         mouth 2           



                                                  (two)           



                                                  times           



                                                  daily.           

 

     mycophenola      0 - No             250mg Q.5D Take 1           [facility]



     te        4- 03-11                          capsule           [facility]



     (CELLCEPT)      00:00: 00:00                          (250 mg           Med

ical



     250 mg      00   :00                           total) by           Center



     capsule                                         mouth 2           



                                                  (two)           



                                                  times           



                                                  daily.           

 

     Vitamin D Vitamin D       Yes  BRANDY                TAKE 1          

 [facility]



     (Ergocalcif (Ergocalcif 4-03           [facility]                CAPSULE   

        Physici



     danyel) 87425 danyel) 88418 00:00:                               WEEKLY.       

    ans



     UNIT Oral UNIT Oral 00                                                



     Capsule Capsule                                                   

 

     gabapentin            No                       300 mg, 1           Me

moria



     300 MG Oral                                     cap,           l



     Capsule      18:37:                               Route: PO,                                            Drug form:           



                                                  CAP, ONCE,           



                                                  Dosing           



                                                  Weight           



                                                  102.273,           



                                                  kg,            



                                                  Priority:           



                                                  STAT,           



                                                  Start           



                                                  date:           



                                                  17           



                                                  13:37:00           



                                                  CDT, Stop           



                                                  date:           



                                                  17           



                                                  13:37:00           



                                                  CDT            

 

     Tramadol            No                       100 mg,           [facility]

a



                                              Route: PO,           l



               18:36:                               Drug form:           [place]



                                                TAB, ONCE,           



                                                  Dosing           



                                                  Weight           



                                                  102.273,           



                                                  kg,            



                                                  Priority:           



                                                  STAT,           



                                                  Start           



                                                  date:           



                                                  17           



                                                  13:36:00           



                                                  CDT, Stop           



                                                  date:           



                                                  17           



                                                  13:36:00           



                                                  CDT            

 

     ketOROLAC            No                       IV, ONCE           [facility]

[facility]ensen



     (ANES)                                                    l



               18:03:                                              Jeff                                                

 

     ondansetron            No                       Route: IV,           

[facility]



     (ANES)                                     Drug form:           l



               18:03:                               INJ, ONCE,                                            Stop date:           



                                                  17           



                                                  13:03:00           



                                                  CDT            

 

     tramadol            Yes                      50 mg = 1           [facility]

jensen



     hydrochlori                                     tab, PO,           l



     de 50 MG      17:57:                               Q6H, PRN           [facility]

nn



     Oral Tablet      00                                 Pain, X 10           



                                                  day, # 40           



                                                  tab, 0           



                                                  Refill(s)           

 

     Ondansetron            No                       4 mg,           [facility]

ia



                                              Route:           l



               17:00:                               IVP, Q6H,                                            Dosing           



                                                  Weight           



                                                  102.273,           



                                                  kg, Start           



                                                  date:           



                                                  17           



                                                  12:00:00           



                                                  CDT,           



                                                  Duration:           



                                                  30 day,           



                                                  Stop date:           



                                                  10/21/17           



                                                  6:00:00           



                                                  CDT            

 

     propofol            No                       Route: IV,           Mem

oria



     (ANES)                                     Drug form:           l



               15:08:                               INJ, ONCE,                                            Stop date:           



                                                  17           



                                                  10:08:00           



                                                  CDT            

 

     succinylcho            No                       Route: IV,           

[facility]



     line (ANES)                                     Drug form:           l



               15:08:                               INJ, ONCE,                                            Stop date:           



                                                  17           



                                                  10:08:00           



                                                  CDT            

 

     fentaNYL            No                       Route: IV,           Mem

oria



     (ANES)                                     Drug form:           l



               15:08:                               INJ, ONCE,                                            Stop date:           



                                                  17           



                                                  10:08:00           



                                                  CDT            

 

     lidocaine      -0      No                       Route: IV,           Me

moria



     (ANES)                                     Drug form:           l



               15:03:                               INJ, ONCE,                                            Stop date:           



                                                  17           



                                                  10:03:00           



                                                  CDT            

 

     Acetaminoph      -0      No                       1,000 mg,           M

[facility]



     en                                       Route: PO,           l



               15:00:                               Drug form:           Jeff                                 LIQ,           



                                                  Q6Hnow,           



                                                  Dosing           



                                                  Weight           



                                                  102.273,           



                                                  kg, Start           



                                                  date:           



                                                  17           



                                                  10:00:00           



                                                  CDT,           



                                                  Duration:           



                                                  30 day,           



                                                  Stop date:           



                                                  10/21/17           



                                                  4:00:00           



                                                  CDT            

 

     Tramadol      -0      No                       100 mg,           [facility]

a



                                              Route: PO,           l



               15:00:                               Drug form:           [facility]                                 SUSP,           



                                                  Q6Hnow,           



                                                  Dosing           



                                                  Weight           



                                                  102.273,           



                                                  kg, Start           



                                                  date:           



                                                  17           



                                                  10:00:00           



                                                  CDT,           



                                                  Duration:           



                                                  30 day,           



                                                  Stop date:           



                                                  10/21/17           



                                                  4:00:00           



                                                  CDT            

 

     gabapentin      -0      No                       300 mg,           [facility]

jensen



                                              Route: PO,           l



               15:00:                               Drug form:                                            SUSP,           



                                                  Q8Hnow,           



                                                  Dosing           



                                                  Weight           



                                                  102.273,           



                                                  kg, Start           



                                                  date:           



                                                  17           



                                                  10:00:00           



                                                  CDT,           



                                                  Duration:           



                                                  30 day,           



                                                  Stop date:           



                                                  10/21/17           



                                                  2:00:00           



                                                  CDT            

 

     celecoxib      -0      No                       200 mg,           [facility]

ia



                                              Route: PO,           l



               15:00:                               Q23Ibgm,                                            Dosing           



                                                  Weight           



                                                  102.273,           



                                                  kg, Start           



                                                  date:           



                                                  17           



                                                  10:00:00           



                                                  CDT,           



                                                  Duration:           



                                                  30 day,           



                                                  Stop date:           



                                                  10/20/17           



                                                  22:00:00           



                                                  CDT            

 

     rocuronium      -0      No                       Route: IV,           [facility]

[facility]



     (ANES)                                     Drug form:           l



               14:43:                               INJ, ONCE,                                            Stop date:           



                                                  17           



                                                  9:43:00           



                                                  CDT            

 

     famotidine      -0      No                       Route: IV,           M

[facility]



     (ANES)                                     Drug form:           l



               14:33:                               INJ, ONCE,                                            Stop date:           



                                                  17           



                                                  9:33:00           



                                                  CDT            

 

     ceFAZolin            No                       Route: IV,           Me

[facility]



     (ANES)                                     Drug form:           l



               14:33:                               INJ, ONCE,           [facility]



                                                Stop date:           



                                                  17           



                                                  9:33:00           



                                                  CDT            

 

     dexamethaso            No                       Route: IV,           

[facility]



     ne (ANES)                                     Drug form:           l



               14:18:                               INJ, ONCE,           [facility]                                 Stop date:           



                                                  17           



                                                  9:18:00           



                                                  CDT            

 

     acetaminoph            No                       Route: IV,           

[facility]



     en (ANES)                                     Drug form:           l



     (ANES)      13:54:                               INJ, Start           Kaylee

nn



                                                date:           



                                                  17           



                                                  8:54:00           



                                                  CDT, Stop           



                                                  date:           



                                                  17           



                                                  9:54:00           



                                                  CDT            

 

     LR 1000 mL            No                       Route: IV,           M

[facility]



     INJ (ANES)                                     Total           l



               13:23:                               Volume:           [facility]



                                                1,000,           



                                                  Start           



                                                  date:           



                                                  17           



                                                  8:23:00           



                                                  CDT, Stop           



                                                  date:           



                                                  17           



                                                  9:23:00           



                                                  CDT            

 

     Flomax            No                       Notes:           [facility]



                                              (Same As:           l



               10:00:                               Flomax)           [facility]                                 "Do Not           



                                                  Crush"           

 

     Ofirmev            No                       Notes:           [facility]



                                              Infuse           l



               10:00:                               over 15           [facility]                                 minutes           



                                                  Do not           



                                                  exceed           



                                                  4gm/day of           



                                                  acetaminop           



                                                  hen    ***           



                                                  MEDICATION           



                                                  WASTE ***           



                                                  Product           



                                                  Size:           



                                                  1000 mg           



                                                  Product           



                                                  Wasted:           



                                                  ___ mg           

 

     ceFAZolin            No                       Notes:           [facility]

a



                                              Same as:           l



               10:00:                               Ancef           [facility]



               00                                                

 

     Tacrolimus            Yes                      2 mg, PO,           Me

[facility]



               19                               QPM            l



               14:03:                                              [facility]



                                                               

 

     Amitriptyli Amitriptyli       Yes  KULVINDER                TAKE 1   

        UT



     ne HCl - 25 ne HCl - 25 3-21           SIMONE ALANIZ                TABLET 3  

         Physici



     MG Oral MG Oral 00:00:                               TIMES           ans



     Tablet Tablet 00                                 DAILY AS           



                                                  NEEDED.           

 

     Sucralfate            No                       Notes: May           M

[facility]



     100 MG/ML      1-28                               interfere           l



     Oral      00:00:                               w/enteral           [facility]



     Suspension      00                                 feeds -           



                                                  Take 1 hr           



                                                  before or           



                                                  2 hr after           



                                                  antacids,           



                                                  dairy pdt,           



                                                  meals           



                                                  &amp;           



                                                  minerals -           



                                                   On empty           



                                                  stomach.           



                                                  For            



                                                  patients           



                                                  unable to           



                                                  swallow           



                                                  tablet,           



                                                  dissolve           



                                                  in 10mL -           



                                                  30mL of           



                                                  water or           



                                                  juice and           



                                                  stir           



                                                  before           



                                                  giving.           



                                                  (Same As:           



                                                  Carafate)           

 

     Protonix            No                       Notes:           [facility]



               -26                               Tablet           l



               15:00:                               should not           [facility]



               00                                 be chewed           



                                                  or             



                                                  crushed.           



                                                  (Same as:           



                                                  Protonix)           

 

     Cartia XT            No                       Notes:           [facility]

a



                                              (Same as:           l



               15:00:                               Cardizem           [facility]



                                                CD)            



                                                  Before           



                                                  meals.  DO           



                                                  NOT CRUSH.           

 

     Prednisone            No                       Notes:           [facility]

[facility]



                                              (Same as:           l



               15:00:                               PredniSONE           [facility]                                 )  Take           



                                                  with food.           

 

     Acetaminoph            Yes                      1,000 mg =           

[facility]



     en                                       31.23 mL,           l



               13:15:                               PO,            [facility]



               00                                 Q6Hnow, 0           



                                                  Refill(s)           

 

     gabapentin            Yes                      300 mg = 6           [facility]

[facility]



     50 MG/ML      -26                               mL, PO,           l



     Oral      13:15:                               Q8Hnow, #           [facility]



     Solution      00                                 378 mL, 0           



                                                  Refill(s)           

 

     tramadol            Yes                      50 mg = 1           [facility]

j[facility]



     hydrochlori                                     tab, PO,           l



     de 50 MG      13:15:                               Q6Hnow,           Hussein

n



     Oral Tablet      00                                 PRN Pain           



                                                  Score           



                                                  6-10, X 14           



                                                  day, # 56           



                                                  tab, 0           



                                                  Refill(s)           

 

     Enoxaparin            No                       Notes:           [facility]

[facility]



                                              (Same as:           l



               12:00:                               Lovenox)           [facility]



               00                                                

 

     Solu-CORTEF            No                       Notes:           [facility]

j[facility]



                                              (Same as:           l



               04:00:                               Solu-RUPA                                            F)             

 

     Prograf            No                       Notes:           [facility]



               -                               Avoid           l



               03:26:                               grapefruit                                            and            



                                                  grapefruit           



                                                  juice.           



                                                  (Same As:           



                                                  Prograf)           

 

     hydrocortis            No                       100 mg,           [facility]

[facility]



     one 100 mg                                     Route: IV,           l



     injection      03:00:                               Q6H,           [facility]                                 Dosing           



                                                  Weight           



                                                  136.136,           



                                                  kg, Start           



                                                  date:           



                                                  17           



                                                  21:00:00           



                                                  CST,           



                                                  Duration:           



                                                  30 day,           



                                                  Stop date:           



                                                  17           



                                                  18:00:00           



                                                  CST            

 

     Ondansetron            No                       Notes:           [facility]

j[facility]



               -                               (Same as:           l



               00:00:                               Zofran)           [facility]



                                                ***            



                                                  MEDICATION           



                                                  WASTE ***           



                                                  Product           



                                                  Size:  4           



                                                  mg Product           



                                                  Wasted:           



                                                  ___ mg           

 

     Acetaminoph            No                       1,000 mg,           M

[facility]



     en                                       Route: IV,           l



               23:36:                               ONCE,           [facility]                                 Dosing           



                                                  Weight           



                                                  136.136,           



                                                  kg, Start           



                                                  date:           



                                                  17           



                                                  17:36:00           



                                                  CST, Stop           



                                                  date:           



                                                  17           



                                                  17:36:00           



                                                  CST            

 

     Docusate            No                       Notes:           [facility]



               1-25                               (Same as:           l



               23:00:                               Colace)           [facility]



                                                               

 

     Cellcept            No                       Notes:           [facility]



               1-25                               SEPARATE           l



               23:00:                               ANTACIDS           [facility]                                 from           



                                                  Cellcept           



                                                  by 2 hrs.           



                                                  (Same As:           



                                                  CellCept)           

 

     Hydralazine            No                       Notes:           [facility]

j[facility]



     Hydrochlori      -25                               (Same as:           l



     de 100 MG      23:00:                               Apresoline           He

rmann



     Oral Tablet                                       )  May           



                                                  interfere           



                                                  w/enteral           



                                                  feedings           



                                                  Take With           



                                                  Food           

 

     Al              No                       Notes:           [facility]



     hydroxide/M                                     (aluminum           l



     g         21:00:                               hydroxide-           [facility]



     hydroxide/s      00                                 magnesium           



     imethicone                                         hyd-simeth           



     200 mg-200                                         icone           



     mg-20 mg/5                                         200-200-20           



     mL oral                                         mg/5ml 30           



     suspension                                         ml ud TINY)           

 

     Ondansetron            No                       Notes:           [facility]

j[facility]



               -25                               (Same as:           l



               21:00:                               Zofran)           [facility]



                                                ***            



                                                  MEDICATION           



                                                  WASTE ***           



                                                  Product           



                                                  Size:  4           



                                                  mg Product           



                                                  Wasted:           



                                                  ___ mg           

 

     Hydromorpho            No                       Notes:           [facility]

j[facility]



     ne                                       Same as           l



               21:00:                               Dilaudid           [facility]



                                                               

 

     Oxycodone            No                       Notes:           [facility]

a



     Hydrochlori      -25                               (Same           l



     de 5 MG      21:00:                               as:'[place]           [facility]

ina



     Oral Tablet      00                                 done) To           



                                                  be drawn           



                                                  up in 3 mL           



                                                  syr            

 

     gabapentin            No                       300 mg,           [facility]

j[facility]



               -25                               Route: PO,           l



               21:00:                               Q8Hnow,           [facility]                                 Dosing           



                                                  Weight           



                                                  136.136,           



                                                  kg, Start           



                                                  date:           



                                                  17           



                                                  15:00:00           



                                                  CST,           



                                                  Duration:           



                                                  30 day,           



                                                  Stop date:           



                                                  17           



                                                  7:00:00           



                                                  CST            

 

     Tramadol            No                       Notes: Not           [facility]

[facility]



                                              to exceed           l



               21:00:                               400mg/day.           [facility]                                 (Same As:           



                                                  Ultram)           

 

     Acetaminoph            No                       1,000 mg,           M

[facility]



     en                                       Route:           l



               21:00:                               IVPB,           [facility]



               00                                 Q6Hnow,           



                                                  Dosing           



                                                  Weight           



                                                  136.136,           



                                                  kg, Start           



                                                  date:           



                                                  17           



                                                  15:00:00           



                                                  CST,           



                                                  Duration:           



                                                  30 day,           



                                                  Stop date:           



                                                  17           



                                                  9:00:00           



                                                  CST            

 

     Al              No                       30 ml,           [facility]



     hydroxide/M                                     Route: PO,           l



     g         20:54:                               Drug Form:           [facility]



     hydroxide/s      00                                 SUSP,           



     imethicone                                         Dosing           



     200 mg-200                                         Weight           



     mg-20 mg/5                                         136.136,           



     mL oral                                         kg, Q6H,           



     suspension                                         PRN            



                                                  Indigestio           



                                                  n, Start           



                                                  date:           



                                                  17           



                                                  14:54:00           



                                                  CST,           



                                                  Duration:           



                                                  30 day,           



                                                  Stop date:           



                                                  17           



                                                  14:53:00           



                                                  CST            

 

     Calcium            No                       1,000 mL,           [facility]

ia



     Chloride                                     Rate: 125           l



     0.0014      20:54:                               ml/hr,           [facility]



     MEQ/ML /      00                                 Infuse           



     Potassium                                         over: 8           



     Chloride                                         hr, Route:           



     0.004                                         IV, Dosing           



     MEQ/ML /                                         Weight           



     Sodium                                         136.136           



     Chloride                                         kg, Total           



     0.103                                         Volume:           



     MEQ/ML /                                         1,000,           



     Sodium                                         Start           



     Lactate                                         date:           



     0.028                                         17           



     MEQ/ML                                         14:54:00           



     Injectable                                         CST,           



     Solution                                         Duration:           



                                                  30 day,           



                                                  Stop date:           



                                                  17           



                                                  14:53:00           



                                                  CST            

 

     Cefoxitin            No                       1 gm,           [facility]



                                              Route:           l



               20:10:                               IVPB,           [facility]



               00                                 ONCE,           



                                                  Dosing           



                                                  Weight           



                                                  136.136,           



                                                  kg, Start           



                                                  date:           



                                                  17           



                                                  14:10:00           



                                                  CST, Stop           



                                                  date:           



                                                  17           



                                                  14:10:00           



                                                  CST            

 

     gabapentin            No                       Notes:           [facility]

[facility]



                                              (Same as:           l



               19:00:                               Neurontin)           [facility]                                                

 

     Acetaminoph            No                       Notes: Max           

[facility]



     en                                       acetaminop           l



               19:00:                               hen =                                            4000mg/day           



                                                  (4             



                                                  gm/day).           



                                                  (Same as:           



                                                  Tylenol)           

 

     Promethazin            No                       Notes: Do           M

[facility]



     e                                        not give           l



               18:26:                               IV push.                                            (Same as:           



                                                  Phenergan)           

 

     Promethazin            No                       12.5 mg,           Me

[facility]



     e                                        Route: PO,           l



               18:24:                               Q6H,                                            Dosing           



                                                  Weight           



                                                  136.136,           



                                                  kg, PRN           



                                                  Nausea &           



                                                  Vomiting,           



                                                  Start           



                                                  date:           



                                                  17           



                                                  12:24:00           



                                                  CST,           



                                                  Duration:           



                                                  30 day,           



                                                  Stop date:           



                                                  17           



                                                  12:23:00           



                                                  CST            

 

     Tramadol            No                       Notes: Not           [facility]

[facility]



               -                               to exceed           l



               18:24:                               400mg/day.                                            (Same As:           



                                                  Ultram)           

 

     bupivacaine            No                       Notes:           [facility]

jensen



     liposome                                     (Same as:           l



               18:00:                               Exparel)                                            *** NOT           



                                                  FOR IV           



                                                  use****           



                                                  Postoperat           



                                                  braeden            



                                                  analgesia:           



                                                  Infiltrati           



                                                  on             



                                                  (local):           



                                                  Dose is           



                                                  based on           



                                                  surgical           



                                                  site and           



                                                  volume           



                                                  required           



                                                  to cover           



                                                  the area           



                                                  (in            



                                                  general,           



                                                  the            



                                                  maximum           



                                                  total dose           



                                                  is 266           



                                                  mg).           



                                                  Bunionecto           



                                                  my: 7 mL           



                                                  into the           



                                                  tissues           



                                                  surroundin           



                                                  g the           



                                                  osteotomy           



                                                  and 1 mL           



                                                  into the           



                                                  subcutaneo           



                                                  us tissue           



                                                  of the           



                                                  surgical           



                                                  site           



                                                  (total           



                                                  dose = 8           



                                                  mL [106           



                                                  mg])           



                                                  Hemorrhoid           



                                                  ectomy: 30           



                                                  mL (20 mL           



                                                  vial           



                                                  diluted           



                                                  with 10 mL           



                                                  NS)            



                                                  divided           



                                                  and            



                                                  administer           



                                                  ed as 6           



                                                  injections           



                                                  of 5 mL           



                                                  each           



                                                  (total           



                                                  dose = 30           



                                                  mL [266           



                                                  mg])           

 

     Lovenox            No                       Notes:           [facility]



               1-25                               (Same as:           l



               17:00:                               Lovenox)                                                           

 

     scopolamine            No                       Notes:           Geraldo

jensen



               -25                               Change           l



               12:00:                               patch                                            every 72           



                                                  hours           



                                                  (Same as:           



                                                  Transderm-           



                                                  Scop)           

 

     heparin            No                       Notes:           [facility]



               1-25                               porcine           l



               12:00:                               heparin                                                           

 

     Lovenox            No                       Notes:           [facility]



               1-25                               (Same as:           l



               12:00:                               Lovenox)                                                           

 

     Mefoxin            No                       Notes:           [facility]



               1-25                               (Same As:           l



               11:37:                               Mefoxin)                                            ***            



                                                  MEDICATION           



                                                  WASTE ***           



                                                  Product           



                                                  Size:           



                                                  2000 mg           



                                                  Product           



                                                  Wasted:           



                                                  ___ mg           

 

     Ofirmev            No                       Notes:           [facility]



               1-25                               Infuse           l



               11:36:                               over 15           [facility]



               00                                 minutes           



                                                  Do not           



                                                  exceed           



                                                  4gm/day of           



                                                  acetaminop           



                                                  hen    ***           



                                                  MEDICATION           



                                                  WASTE ***           



                                                  Product           



                                                  Size:           



                                                  1000 mg           



                                                  Product           



                                                  Wasted:           



                                                  ___ mg           

 

     Ofirmev            No                       Notes:           [facility]



               1-25                               Infuse           l



               11:35:                               over 15           [facility]



               00                                 minutes           



                                                  Do not           



                                                  exceed           



                                                  4gm/day of           



                                                  acetaminop           



                                                  hen    ***           



                                                  MEDICATION           



                                                  WASTE ***           



                                                  Product           



                                                  Size:           



                                                  1000 mg           



                                                  Product           



                                                  Wasted:           



                                                  ___ mg           

 

     Pravastatin            Yes                      40 mg = 1           M

[facility]



     Sodium 40      1-17                               tab, PO,           l



     MG Oral      17:47:                               Bedtime, #           [facility]

[facility]



     Tablet      00                                 30 tab, 0           



     [Pravachol]                                         Refill(s)           

 

     Hydralazine            Yes                      100 mg = 1           

[facility]



     Hydrochlori      1-17                               tab, PO,           l



     de 100 MG      17:47:                               TID, # 90           Her

merino



     Oral Tablet      00                                 tab, 3           



                                                  Refill(s)           

 

     Hydralazine            No                       0              [facility]

a



               1-17                               Refill(s)           l



               17:47:                                              [facility]



               00                                                

 

     mycophenola            Yes                      1,000 mg =           

[facility]



     te mofetil      1-17                               2 tab, PO,           l



     500 MG Oral      17:47:                               BID, # 120           

[facility]



     Tablet      00                                 tab, 0           



     [Cellcept]                                         Refill(s)           

 

     pantoprazol            Yes                      40 mg = 1           [facility]

[facility]



     e 40 MG      1-17                               tab, PO,           l



     Enteric      17:47:                               BID, # 30           Kaylee

nn



     Coated      00                                 tab, 0           



     Tablet                                         Refill(s)           



     [Protonix]                                                        

 

     24 HR            Yes                      240 mg = 1           [facility]

a



     Diltiazem      1-17                               cap, PO,           l



     Hydrochlori      17:47:                               Daily, #           He

rmann



     de 240 MG      00                                 30 cap, 0           



     Extended                                         Refill(s)           



     Release                                                        



     Capsule                                                        



     [Cartia]                                                        

 

     magnesium            Yes                      400 mg = 1           Me

[facility]



     oxide 400      1-17                               tab, PO,           l



     mg oral      17:47:                               Daily, 0           [facility]us[facility]

n



     tablet      00                                 Refill(s)           

 

     calcium            No                       CHEW, BID,           [facility]

jensen



     carbonate      -17                               0              l



     1000 mg      17:47:                               Refill(s)           Kaylee

nn



     oral      00                                                



     tablet,                                                        



     chewable                                                        

 

     predniSONE            Yes                      10 mg = 1           Me

mor[facility]



     10 mg oral      1-17                               tab, PO,           l



     tablet      17:47:                               Daily, #           [facility]



               00                                 30 tab, 3           



                                                  Refill(s)           

 

     Prograf      2017-0      Yes                      2.5 mg,           [facility]



               1-17                               PO, Q12H,           l



               17:47:                               0              [facility]



               00                                 Refill(s)           

 

     Sulfamethox      2017-0      No                       1 tab, PO,           

[facility]



     azole 800      1-17                               M-W-F, 0           l



     MG /      17:47:                               Refill(s)           [facility]



     Trimethopri      00                                                



     m 160 MG                                                        



     Oral Tablet                                                        



     [Bactrim]                                                        







Immunizations







           Ordered Immunization Filled Immunization Date       Status     Commen

ts   Source



           Name       Name                                        

 

           INFLUENZA TRIVALENT            2019 Completed             [facility]



           ADJUVANTED PF IM            00:00:00                         [facility]

 

           INFLUENZA TRIVALENT            2019 Completed             [facility]



           ADJUVANTED PF IM            00:00:00                         [facility]

 

           Influenza High Dose            2018 Com[facility]             [facility]



           Preservative Free IM            00:00:00                         [facility]



           ([id number])                                               

 

           Influenza High Dose            2018 Completed             [facility]



           Preservative Free IM            00:00:00                         [facility]



           ([id number])                                               

 

           Influenza TIV (IM)            2017-10-09 Completed             [facility]



                                 00:00:00                         [facility]

 

           Influenza TIV (IM)            2017-10-09 Completed             [facility]



                                 00:00:00                         [facility]

 

           Influenza High Dose            2015 Completed             [facility]



           Preservative Free            00:00:00                         [facility]                                             

 

           Influenza High Dose            2015 Completed             [facility]



           Preservative Free            00:00:00                         [facility]                                             

 

           Rho (D) Immune            2015-05-15 Completed             [facility]

[facility]



           Globulin              00:00:00                         [facility]

 

           Rho (D) Immune            2015-05-15 Completed             [facility]

[facility]



           Globulin              00:00:00                         [facility]







Vital Signs







             Vital Name   Observation Time Observation Value Comments     Source

 

             HEIGHT       2021 05:49:00 193 cm                    

 

             WEIGHT       2021 05:49:00 108.863 kg                

 

             WEIGHT       2021 11:10:00 107.775 kg                

 

             WEIGHT       2021 11:10:00 107.775 kg                

 

             HEIGHT       2021 09:01:00 188 cm                    

 

             WEIGHT       2021 09:01:00 109.181 kg                

 

             HEIGHT       2021 09:01:00 188 cm                    

 

             WEIGHT       2021 09:01:00 109.181 kg                

 

             HEIGHT       2021 05:49:00 193 cm                    

 

             WEIGHT       2021 05:49:00 108.863 kg                

 

             HEIGHT       2021 17:35:30 193 cm                    

 

             WEIGHT       2021 17:35:30 99.791 kg                 

 

             HEIGHT       2021 17:35:30 193 cm                    

 

             WEIGHT       2021 17:35:30 99.791 kg                 

 

             WEIGHT       2020 00:00:00 99.791 kg                 

 

             HEIGHT       2020 00:00:00 193 cm                    

 

             WEIGHT       2020 00:00:00 99.791 kg                 

 

             HEIGHT       2020 00:00:00 193 cm                    

 

             Systolic blood 2021 11:10:00 149 mm[Hg]                [facility]

 

             Diastolic blood 2021 11:10:00 98 mm[Hg]                 [facility]

 

             Heart rate   2021 11:10:00 86 /min                   [facility]

 

             Body temperature 2021 11:10:00 37.11 Eunice                 [facility]

 

             Respiratory rate 2021 11:10:00 18 /min                   [facility]

 

             Body weight  2021 11:10:00 107.775 kg                [facility]

 

             BMI          2021 11:10:00 30.51 kg/m2               [facility]

 

             Oxygen saturation in 2021 11:10:00 97 /min                   

[facility]



             Arterial blood by                                        Medical Ce

nter



             Pulse oximetry                                        

 

             Body height  2021 09:01:00 188 cm                    [facility]

 

             Systolic (mm Hg) 2017 19:18:00                           [facility]

[facility] [place]

 

             Diastolic (mm Hg) 2017 19:18:00                           [facility]

[facility] [place]

 

             Respitory Rate 2017 19:18:00                           [facility]

[facility] [place]

 

             Respitory Rate 2017 19:00:00                           [facility]

[facility] [place]

 

             Systolic (mm Hg) 2017 19:00:00                           [facility]

[facility] [place]

 

             Diastolic (mm Hg) 2017 19:00:00                           [facility]

[facility]

 

             Respitory Rate 2017 18:45:00                           [facility]

[facility]

 

             Systolic (mm Hg) 2017 18:45:00                           [facility]

[facility] [place]

 

             Diastolic (mm Hg) 2017 18:45:00                           [facility]

[facility]

 

             Heart Rate   2017 11:54:00                           [facility]

 [facility]

 

             Weight       2017 19:48:00                           [facility]

 [facility]

 

             BMI Calculated 2017 19:48:00                           [facility]

[facility]

 

             Height       2017 19:48:00 193.04 cm                 [facility]

 

             Temperature Oral (F) 2017 14:35:00 98.0 F                    

[facility]

 

             Systolic (mm Hg) 2017 14:35:00                           [facility]

[facility]

 

             Diastolic (mm Hg) 2017 14:35:00                           [facility]

[facility]

 

             Respitory Rate 2017 14:35:00                           [facility]

[facility] [place]

 

             Heart Rate   2017 14:35:00                           [facility]

 [place]

 

             Heart Rate   2017 10:35:00                           [facility]

 [place]

 

             Respitory Rate 2017 10:35:00                           [facility]

al [facility]

 

             Temperature Oral (F) 2017 10:35:00 97.6 F                    

[facility] [place]

 

             Systolic (mm Hg) 2017 10:35:00                           [facility]

[facility]

 

             Diastolic (mm Hg) 2017 10:35:00                           [facility]

[facility]

 

             Respitory Rate 2017 05:24:00                           [facility]

al [facility]

 

             Heart Rate   2017 05:24:00                           [facility]

 [facility]

 

             Systolic (mm Hg) 2017 05:24:00                           [facility]

[facility]

 

             Diastolic (mm Hg) 2017 05:24:00                           [facility]

[facility] [place]

 

             Temperature Oral (F) 2017 05:24:00 98.3 F                    

[facility] [place]

 

             Weight       2017 03:00:00                           [facility]

 [facility]

 

             Height       2017 03:00:00 193.04 cm                 [facility]

 [facility]

 

             BMI Calculated 2017 03:00:00                           [facility]

[facility] [place]

 

             Weight       2017 16:16:00                           [facility]

 [place]

 

             Height       2017 16:16:00 193.04 cm                 [facility]

 [place]

 

             BMI Calculated 2017 16:16:00                           Jorge Villalobos

 

             Weight       2017 21:05:00                           [facility]

 [place]

 

             BMI Calculated 2017 21:05:00                           Jorge Villalobos

 

             Height       2017 21:05:00 193.04 cm                 [facility]







Procedures







                Procedure       Date / Time     Performing Clinician Source



                                Performed                       

 

                TACROLIMUS LEVEL 2021 10:39:00 Alexei [facility]

 

                BASIC METABOLIC PANEL 2021 10:39:00 Shirley Loo

[facility]



                (7)                                             [facility]

 

                CBC W/PLT COUNT & AUTO 2021 10:39:00 [facility]



                DIFFERENTIAL                                    [facility]

 

                CBC W/PLT COUNT & AUTO 2021 10:39:00 [facility]



                DIFFERENTIAL                                    [facility]

 

                XR CHEST 2 VIEWS 2021 [facility]34[facility]

 

                2D ECHO W/ DOPPLER 2021 10:44:42 [facility]



                (CW/PW/COLOR)                                   [facility]

 

                TACROLIMUS LEVEL 2021 09:15:00 [facility]

 

                R & L CATH / CORONARY 2021 07:07:00 Shirley Loo

[facility]



                ANGIOS / BIOPSY                                 Center

 

                ECG 12-LEAD     2021 06:31:28 [facility]

 

                CBC (HEMOGRAM ONLY) 2021 06:04:[facility]

 

                PROTHROMBIN TIME/INR 2021 06:04:00 Fawad Bombaymike Chavez 

I [facility]

 

                BASIC METABOLIC PANEL 2021 06:04:00 Shirley Loo

[facility]



                ()                                             Center

 

                CARDIAC CATH REPORT - 2021 00:00:00 Rajwinder [facility]



                SCAN                            Scanning        Center

 

                SARS-COV2/INFLUENZA/RSV 2021 20:00:00 [facility]



                RT-PCR                                          Center

 

                BLOOD CULTURE   2021 20:00:00 [facility]

 

                B-TYPE NATRIURETIC 2021 19:59:00 [facility]



                FACTOR (BNP)                                    Center

 

                CBC W/PLT COUNT & AUTO 2021 19:59:00 [facility] Tresa Jesu VERDUGO

[facility]



                DIFFERENTIAL                                    Center

 

                BASIC METABOLIC PANEL 2021 19:59:00 [facility]

[facility]



                (7)                                             Center

 

                MAGNESIUM       2021 19:59:00 [facility]

 

                TROPONIN I      2021 19:59:00 [facility]

 

                LACTIC ACID, VENOUS 2021 19:59:00 [facility]

 

                CBC W/PLT COUNT & AUTO 2021 19:59:00 [facility] carlos [facility]



                DIFFERENTIAL                                    Center

 

                ECG 12-LEAD     2021 19:50:21 Unknown, Hl7 [facility]

 

                ECG 12-LEAD     2021 19:50:21 Unknown, 7 [facility]

 

                XR CHEST 1 VIEW 2021 18:40:00 [facility]



                PORTABLE / BEDSIDE                                 Center

 

                REPORT OF PROCEDURE - 2021 00:00:00 Provider, [facility]



                ENDOSCOPY SCAN                  Scanning        Center

 

                [QL] CMP W/EGFR 2018 [facility]:00:00                 [facility]

 

                [QL] FOLATE, SERUM 2018 00:00:00                 [facility]

 

                [QL] HEMOGLOBIN A1c 2018 00:00:00                 [facility]

 

                [QL] IRON, TOTAL 2018 00:00:00                 [facility]

[facility]

 

                [QL] LIPID PANEL 2018 00:00:00                 [facility]

[facility]

 

                [QL] PTH, INTACT 2018 00:00:00                 [facility]

[facility]



                (WITHOUT CALCIUM)                                 

 

                [QLH] TSH, 3RD  2018 00:00:00                 [facility]

[facility]



                GENERATION W/REFLEX TO                                 



                FT4                                             

 

                [QLH] VITAMIN A 2018 00:00:00                 [facility]

[facility]



                (RETINOL)                                       

 

                [QLH] VITAMIN B1, WHOLE 2018 00:00:00                 [facility]

[facility]



                BLOOD                                           

 

                [QLH] VITAMIN B12 2018 00:00:00                 [facility]

[facility]

 

                [H] Vitamin E Level 2018 00:00:00                 [facility]

 

                [L] Vitamin D,  2018 00:00:00                 [facility]

[facility]



                25-Hydroxy, Total -                                 



                [employer]                                        

 

                [QLH] CBC (INCLUDES 2018 00:00:00                 [facility]



                DIFF/PLT)                                       

 

                Heart transplant                                 [facility]

 

                Hernia repair                                   [facility]

 

                Laparoscopic sleeve                                 [facility]



                gastrectomy                                     







Plan of Care







             Planned Activity Planned Date Details      Comments     Source

 

             Future Scheduled 2023   Lipid panel               [facility]

[facility]



             Test         00:00:00     (procedure) [code =              [facility]



                                       29063829]                 

 

             Future Scheduled 2023   Lipid panel               [facility]

s



             Test         00:00:00     (procedure) [code =              [facility]



                                       75977694]                 

 

             Future Scheduled 2022   DEPRESSION SCREENING              [facility]



             Test         00:00:00     (12+) [code =              [facility]



                                       DEPRESSION SCREENING              



                                       (12+)]                    

 

             Future Scheduled 2022   DEPRESSION SCREENING              [facility]



             Test         00:00:00     (12+) [code =              [facility]



                                       DEPRESSION SCREENING              



                                       (12+)]                    

 

             Future Scheduled 2021   INFLUENZA VACCINE (#1)              [facility]

[facility]



             Test         00:00:00     [code = INFLUENZA              Medical Ce

nter



                                       VACCINE (#1)]              

 

             Future Scheduled 2021   INFLUENZA VACCINE (#1)              [facility]

[facility]



             Test         00:00:00     [code = INFLUENZA              Medical Ce

nter



                                       VACCINE (#1)]              

 

             Future Scheduled 2019   SHINGLES VACCINES (1              [facility]



             Test         00:00:00     of 2) [code = SHINGLES              Medic

al Center



                                       VACCINES (1 of 2)]              

 

             Future Scheduled 2019   SHINGLES VACCINES (1              [facility]



             Test         00:00:00     of 2) [code = SHINGLES              Medic

al Center



                                       VACCINES (1 of 2)]              

 

             Future Scheduled 2018   Hemoglobin A1c              [facility]

[facility]



             Test         00:00:00     measurement               Medical Center



                                       (procedure) [code =              



                                       28232106]                 

 

             Future Scheduled 2018   Hemoglobin A1c              [facility]

[facility]



             Test         00:00:00     measurement               Medical Center



                                       (procedure) [code =              



                                       84204463]                 

 

             Future Scheduled 1988   DTAP/TDAP/TD VACCINES              [facility]



             Test         00:00:00     (1 - Tdap) [code =              Medical C

enter



                                       DTAP/TDAP/TD VACCINES              



                                       (1 - Tdap)]               

 

             Future Scheduled 1988   DTAP/TDAP/TD VACCINES              [facility]



             Test         00:00:00     (1 - Tdap) [code =              Medical C

enter



                                       DTAP/TDAP/TD VACCINES              



                                       (1 - Tdap)]               

 

             Future Scheduled 1981   COVID-19 VACCINE (1)              [facility]



             Test         00:00:00     [code = COVID-19              Medical Alia

ter



                                       VACCINE (1)]              

 

             Future Scheduled 1981   COVID-19 VACCINE (1)              [facility]



             Test         00:00:00     [code = COVID-19              Medical Alia

ter



                                       VACCINE (1)]              

 

             Future Scheduled 1979   DIABETIC EYE EXAM              [facility]



             Test         00:00:00     [code = DIABETIC EYE              Medical

 Center



                                       EXAM]                     

 

             Future Scheduled 1979   Diabetic foot              [facility]

es



             Test         00:00:00     examination               Medical Center



                                       (regime/therapy) [code              



                                       = 298801732]              

 

             Future Scheduled 1979   Urine screening for              [facility]



             Test         00:00:00     protein (procedure)              Medical 

Center



                                       [code = 974000496]              

 

             Future Scheduled 1979   DIABETIC EYE EXAM              [facility]



             Test         00:00:00     [code = DIABETIC EYE              Medical

 Center



                                       EXAM]                     

 

             Future Scheduled 1979   Diabetic foot              [facility]

es



             Test         00:00:00     examination               Medical Center



                                       (regime/therapy) [code              



                                       = 187098146]              

 

             Future Scheduled 1979   Urine screening [facility]



             Test         00:00:00     protein (procedure)              Medical 

Center



                                       [code = 353602832]              

 

             Future Scheduled 1975   PNEUMOCOCCAL VACCINE              [facility]



             Test         00:00:00     0-64 YRS (1 of 4 -              Medical C

enter



                                       PCV13) [code =              



                                       PNEUMOCOCCAL VACCINE              



                                       0-64 YRS (1 of 4 -              



                                       PCV13)]                   

 

             Future Scheduled 1975   PNEUMOCOCCAL VACCINE              [facility]



             Test         00:00:00     0-64 YRS (1 of 4 -              Medical C

enter



                                       PCV13) [code =              



                                       PNEUMOCOCCAL VACCINE              



                                       0-64 YRS (1 of 4 -              



                                       PCV13)]                   

 

             Future Scheduled 1969   Screening for              [facility]

es



             Test         00:00:00     malignant neoplasm of              Medica

l Center



                                       colon (procedure)              



                                       [code = 560171089]              

 

             Future Scheduled 1969   Screening for              [facility]

[facility]



             Test         00:00:00     [facility]



                                       colon (procedure)              



                                       [code = 698939003]              







Encounters







        Start   End     Encounter Admission Attending Care    Care    Encounter 

Source



        Date/Time Date/Time Type    Type    Clinicians Facility Department ID   

   

 

        2021-10-09         [facility] [id number]

[id number] [facility]



        08:06:39                         [place]                           

 

        2022 Outpatient [facility]    [id number]

[id number] [facility]



        00:00:00 00:00:00                                                 

 

        2022 Outpatient [facility]    [id number]

[id number] [facility]



        00:00:00 00:00:00                                                 

 

        2022 Outpatient JW BURDENMICHELLE [facility]    [id number]

[id number] [facility]



        00:00:00 00:00:00                 [place]                           

 

        2022 Refill          Fawad [facility]   [id number]

[address] [facility]



        00:00:00 00:00:00                 [facility]

 

        2021 [id number]

[id number] [facility]



        00:00:00 00:00:00 ion             [facility]

 

        2021 Orders          Sincere  [facility]   [id number]

[id number] [facility]



        00:00:00 00:00:00 Only            [facility]

 

        2021 Follow-Up JW Loo [facility]   [id number]

[id number] [facility]



        10:12:59 10:27:59                 [facility]

 

        2021 Outpatient EL      OBMICHELLE, SLE    SLE    [id number]

[id number] [facility]



        10:12:59 10:12:59                 [facility]                           

 

        2021 Orders          Sincere,  [facility]   [id number]

[address] [facility]



        00:00:00 00:00:00 Only            [facility]

 

        2021 Telephone         Sincere,  [facility]   [id number]

[address] [facility]



        00:00:00 00:00:00                 [facility]

 

        2021 Telephone         Sincere,  [facility]   [id number]

[address] [facility]



        00:00:00 00:00:00                 [facility]

 

        2021 Telephone         Sincere,  [facility]   [id number]

[address] [facility]



        00:00:00 00:00:00                 [facility]

 

        2021 Telephone         Sincere,  [facility]   [id number]

[address] [facility]



        00:00:00 00:00:00                 [facility]

 

        2021-10-22 2021-10-22 Telephone         Sincere,  [facility]   [id number]

[id number] [facility]



        00:00:00 00:00:00                 [facility]

 

        2021-10-21 2021-10-21 Shila Mayes [facility]   [id number]

[id number] [facility]



        00:00:00 00:00:00                                                 [facility]

 

        2021 Outpatient EL              [facility]    [facility]    [id number]

[id number] [facility]



        00:00:00 00:00:00                                                 

 

        2021 Outpatient EL              [facility]    [facility]    [id number]

[id number] [facility]



        00:00:00 00:00:00                                                 

 

        2021 Outpatient JW LOO, [facility]    [facility]    [id number]

5755 [facility]



        00:00:00 00:00:00                 [place]                           

 

        2021 Shila Mayes [facility]   [id number]

[id number] [facility]



        00:00:00 00:00:00                                                 [facility]

 

        2021 [facility], [facility]   [id number]

[address] [facility]



        11:15:00 23:59:00 Encounter         [facility]

 

        2021 [facility], [facility]   [id number]

[address] [facility]



        10:00:00 11:14:00 Encounter         [facility]

 

        2021 Follow-Up         [facility]   [id number] 

[id number] [facility]



        08:57:33 09:12:33                 [facility]

 

        2021 Outpatient EL              SLE    [facility]    [id number]

352 [facility]



        00:00:00 00:00:00                                                 

 

        2021 Outpatient [facility], [facility]    [facility]    [id number]

1432 [facility]



        00:00:00 00:00:00                 [place]                           

 

        2021 Outpatient [facility], [facility]    [facility]    [id number]

1677 [facility]



        00:00:00 00:00:00                 [place]                           

 

        2021 Abstract         Shila Golden [facility]   [id number]

[id number] [facility]



        00:00:00 00:00:00                                                 [facility]

 

        2021 Orders          [place]  [facility]   [id number]

[address] [facility]



        00:00:00 00:00:00 Only            [facility]

 

        2021 Hospital [facility], [facility]   [id number]

[address] [facility]



        05:33:00 11:38:00 Encounter         [facility]

 

        2021 [facility]   [id number]

[address] [facility]



        07:03:00 09:24:00                 [facility]

 

        2021 Travel                  [facility]   [id number]

 [facility]



        00:00:00 00:00:00                                                 [facility]

 

        2021 Telephone         [place],  [facility]   [id number]

[address] [facility]



        00:00:00 00:00:00                 [facility]

 

        2021 Sara Kilgore, [facility]   [id number] 20

[id number] [facility]



        00:00:00 00:00:00 arlene             [facility]

 

        2021 Refill          [place], [facility]   [id number]

[address] [facility]



        00:00:00 00:00:00                 [facility]

 

        2021 Telephone         [place],  [facility]   [id number]

[address] [facility]



        00:00:00 00:00:00                 [facility]

 

        2021 Emergency ER      [facility], [facility]   [id number]

[address] [facility]



        18:43:00 22:58:00                 [facility]

 

        2021 Emergency ER              SLE    Emergency [id number]

4533 [facility]



        17:22:00 17:22:00                                                 

 

        2021 Travel                  [facility]   [id number]

 [facility]



        00:00:00 00:00:00                                                 [facility]

 

        2021 Telephone         [place],  [facility]   [id number]

[address] [facility]



        00:00:00 00:00:00                 [facility]

 

        2021-03-10 2021-03-10 Refill          [place], [facility]   [id number]

[address] [facility]



        00:00:00 00:00:00                 [facility]

 

        2021-03-10 2021-03-10 Orders          [place],  [facility]   [id number]

[address] [facility]



        00:00:00 00:00:00 Only            [facility]

 

        2021 Orders          [place],  [facility]   [id number]

[address] [facility]



        00:00:00 00:00:00 Only            [facility]

 

        2020 Outpatient                 [facility]    [facility]    [id number]

0-2 [facility]



        00:00:00 00:00:00                                         [id number] 

 

        2020 Outpatient EL      [place], [facility]    [facility]    [id number]

8851 [facility]



        00:00:00 00:00:00                 [place]                           

 

        2020 Outpatient                 [facility]    [facility]    [id number]

0-2 [facility]



        00:00:00 00:00:00                                         [id number] 

 

        2020 Outpatient EL      [place], [facility]    [facility]    [id number]

8840 [facility]



        00:00:00 00:00:00                 [place]                           

 

        2020 Outpatient EL              [facility]    [facility]    [id number]

838 [facility]



        00:00:00 00:00:00                                                 

 

        2020 Outpatient EL              [facility]    [facility]    [id number]

839 [facility]



        00:00:00 00:00:00                                                 

 

        2018 Outpatient EL              [facility]    [facility]    [id number]

471 [facility]



        00:00:00 00:00:00                                                 

 

        [facility]         PURNIMA [facility]OLS     [place] [id number]

99 [place]



        16:00:00 16:00:00 t; ERMIAS NICHOLS,         Nikhil MONSON M.D.            Specialty         jolanta ALANIZ                                            

 

        [facility]         PURNIMA NICHOLS     [facility]     [id number]

2 [facility]



        13:00:00 13:00:00 t; ERMIAS NICHOLS P hysici KULVINDER, M.D. ans M.D.                                            

 

        2017-10-10 2017-10-10 AppointPURNIMA Mock     [id number]

4 [facility]



        15:30:00 15:30:00 t; ERMIAS NICHOLS P hysici KULVINDER, M.D. ans M.D.                                            

 

        2017-10-03 2017-10-03 AppointPURNIMA Mock     [facility]     [id number]

6 [facility]



        13:30:00 13:30:00 t; ERMIAS NICHOLS P hysici KULVINDER, M.D. ans M.D.                                            

 

        2017 [facility] [id number]

[facility]



        11:05:00 04:59:00 Surgery                 r       [place] [address]      [facility]

 

        2017 Appointmen         PURNIMA NICHOLS     [facility]     [id number]

0 [facility]



        09:00:00 09:00:00 t; ERMIAS NICHOLS P hysici KULVINDER, M.D. ans M.D.                                            

 

        2017 AppointPURNIMA Mock     [facility]     [id number]

2 [facility]



        09:30:00 09:30:00 t; ERMIAS NICHOLS P hysici KULVINDER, M.D. ans M.D.                                            

 

        [facility]         PURNIMA NICHOLS     [facility]     [id number]

8 [facility]



        09:00:00 09:00:00 t; ERMIAS NICHOLS P hysici KULVINDER, M.D. ans M.D.                                            

 

        2017 AppointPURNIMA Mock     [facility]     [id number]

3 [facility]



        10:00:00 10:00:00 t; ERMIAS NICHOLS P hysici KULVINDER, M.D. ans M.D.                                            

 

        2017 [facility] [id number]

00020 [facility]



        15:03:00 17:30:00                         [address]

 

        [facility]         PURNIMA NICHOLS     [facility]     [id number]

7 [facility]



        09:00:00 09:00:00 t; ERMIAS NICHOLS P hysici KULVINDER, M.D. ans M.D.                                            

 

        2016-10-26 2016[facility]10[facility]         FABI FELTON     [facility]     [id number]

55404 [place]



        15:00:00 15:00:00 t;              CHRISTIE [place],                         [address]                              ans



                        [address]                                              

 

        2016-10-14 2016-10-14 Appointmen         PURNIMA NICHOLS     [facility]     [id number]

0 [facility]



        10:00:00 10:00:00 t; ERMIAS NICHOLS P hysici KULVINDER, M.D. ans M.D.                                            







Results







           Test Description Test Time  Test Comments Results    Result Comments 

Source









                    Tacrolimus level    2021 12:43:19 









                      Test Item  Value      Reference Range Interpretation Comme

nts









             Tacrolimus Lvl (test code = 8.3 ng/mL    10.0-20.0    L            

Test performed on Abbott



             35106-8)                                             Immun

oassay system



                                                                 with Chemilumin

escent



                                                                 Microparticle I

mmunoassay



                                                                 (CMIA) technolo

gy.

 

             FAINA (test code = FAINA)  ID - RM                           

 

             Lab Interpretation (test Abnormal                               



             code = 79140-3)                                        



[id number] 12[facility]19





             Test Item    Value        Reference Range Interpretation Comments

 

             Tacrolimus Lvl (test 8.3 ng/mL    10.0-20.0    L            Test pe

rformed on



             code = 11158-0)                                        [employer]

tect



                                                                 Immunoassay sys

tem



                                                                 with Chemilumin

escent



                                                                 Microparticle



                                                                 Immunoassay (CM

IA)



                                                                 technology.

 

             FAINA (test code =  ID -                           



             FAINA)         RM                                     

 

             Lab Interpretation Abnormal                               



             (test code =                                        



             79976-0)                                            



[facility] [id number] 12:43:19





             Test Item    Value        Reference Range Interpretation Comments

 

             TACROLIMUS BLOOD 8.3 ng/mL    10.0-20.0    L            Test perfor

med on Abbott



             (BEAKER) (test code                                        Architec

t Immunoassay



             = 657)                                              system with



                                                                 Chemiluminescen

t



                                                                 Microparticle I

mmunoassay



                                                                 (CMIA) technolo

gy.



 ID - Basic Metabolic [id number] 11:09:09





             Test Item    Value        Reference Range Interpretation Comments

 

             Sodium (test code = 137 meq/L    136-145                   



             2951-2)                                             

 

             Potassium (test 4.1 meq/L    3.5-5.1                   



             code = 2823-3)                                        

 

             Chloride (test code 103 meq/L                        



             = 2075-0)                                           

 

             CO2 (test code = 26 meq/L     -2028)                                             

 

             BUN (test code = 16 mg/dL     7-                      



             3094-0)                                             

 

             Creatinine (test 1.07 mg/dL   0.57-1.25                 



             code = 2160-0)                                        

 

             Glucose (test code 99 mg/dL                         



             = 2345-7)                                           

 

             Calcium (test code 9.3 mg/dL    8.4-10.2                  



             = 17242-9)                                          

 

             EGFR (test code = 73           mL/min/1.73 sq m              ESTIMA

PHOEBE GFR IS



             33914-3)                                            NOT AS ACCURATE

 AS



                                                                 CREATININE



                                                                 CLEARANCE IN



                                                                 PREDICTING



                                                                 GLOMERULAR



                                                                 FILTRATION RATE

.



                                                                 ESTIMATED GFR I

S



                                                                 NOT APPLICABLE 

FOR



                                                                 DIALYSIS PATIEN

HERMINIO

 

             FAINA (test code =  ID - DB                           



             FAINA)                                                



[facility] Metabolic [id number] 11:09:09





             Test Item    Value        Reference Range Interpretation Comments

 

             Sodium (test code = 137 meq/L    136-145                   



             2951-2)                                             

 

             Potassium (test 4.1 meq/L    3.5-5.1                   



             code = 2823-3)                                        

 

             Chloride (test code 103 meq/L                        



             = 2075-0)                                           

 

             CO2 (test code = 26 meq/L     22-29                     



             -9)                                             

 

             BUN (test code = 16 mg/dL     7-21                      



             3094-0)                                             

 

             Creatinine (test 1.07 mg/dL   0.57-1.25                 



             code = 2160-0)                                        

 

             Glucose (test code 99 mg/dL                         



             = 2345-7)                                           

 

             Calcium (test code 9.3 mg/dL    8.4-10.2                  



             = 46663-6)                                          

 

             EGFR (test code = 73           mL/min/1.73 sq m              ESTIMA

PHOEBE GFR IS



             33914-3)                                            NOT AS ACCURATE

 AS



                                                                 CREATININE



                                                                 CLEARANCE IN



                                                                 PREDICTING



                                                                 GLOMERULAR



                                                                 FILTRATION RATE

.



                                                                 ESTIMATED GFR I

S



                                                                 NOT APPLICABLE 

FOR



                                                                 DIALYSIS PATIEN

HERMINIO

 

             FAINA (test code =  ID - DB                           



             FAINA)                                                



[facility] METABOLIC [id number] 11:09:09





             Test Item    Value        Reference Range Interpretation Comments

 

             SODIUM (BEAKER) 137 meq/L    136-145                   



             (test code = 381)                                        

 

             POTASSIUM (BEAKER) 4.1 meq/L    3.5-5.1                   



             (test code = 379)                                        

 

             CHLORIDE (BEAKER) 103 meq/L                        



             (test code = 382)                                        

 

             CO2 (BEAKER) (test 26 meq/L     22-29                     



             code = 355)                                         

 

             BLOOD UREA NITROGEN 16 mg/dL     7-21                      



             (BEAKER) (test code                                        



             = 354)                                              

 

             CREATININE (BEAKER) 1.07 mg/dL   0.57-1.25                 



             (test code = 358)                                        

 

             GLUCOSE RANDOM 99 mg/dL                         



             (BEAKER) (test code                                        



             = 652)                                              

 

             CALCIUM (BEAKER) 9.3 mg/dL    8.4-10.2                  



             (test code = 697)                                        

 

             EGFR (LALY) (test 73 mL/min/1.73                           ESTIMA

PHOEBE GFR IS



             code = 1092) sq m                                   NOT AS ACCURATE

 AS



                                                                 CREATININE



                                                                 CLEARANCE IN



                                                                 PREDICTING



                                                                 GLOMERULAR



                                                                 FILTRATION RATE

.



                                                                 ESTIMATED GFR I

S



                                                                 NOT APPLICABLE 

FOR



                                                                 DIALYSIS PATIEN

TS.



 ID - DBCBC with platelet count + automated [id number] 10:49:14





             Test Item    Value        Reference Range Interpretation Comments

 

             WBC (test code = 6690-2) 8.4          See_Comment                [A

utomated message]



                                                                 The system [facility]



                                                                 generated this 

result



                                                                 transmitted ref

erence



                                                                 range: 3.5 - 10

.5



                                                                 K/L. The refe

rence



                                                                 range was not u

sed to



                                                                 interpret this 

result



                                                                 as normal/abnor

mal.

 

             RBC (test code = 789-8) 5.35         See_Comment                [Au

tomated message]



                                                                 The system [employer]



                                                                 generated this 

result



                                                                 transmitted ref

erence



                                                                 range: 4.63 - 6

.08



                                                                 M/L. The refe

rence



                                                                 range was not u

sed to



                                                                 interpret this 

result



                                                                 as normal/abnor

mal.

 

             MCHC (test code = 786-4) 33.8         See_Comment                [A

utomated message]



                                                                 The system [facility]



                                                                 generated this 

result



                                                                 transmitted ref

erence



                                                                 range: 32.3 - 3

6.5



                                                                 GM/DL. The refe

rence



                                                                 range was not u

sed to



                                                                 interpret this 

result



                                                                 as normal/abnor

mal.

 

             Hematocrit (test code = 45.2 %       40.1-51.0                 



             4544-3)                                             

 

             MCV (test code = 787-2) 84.5 fL      79.0-92.2                 

 

             MCH (test code = 785-6) 28.6 pg      25.7-32.2                 

 

             RDW (test code = 788-0) 13.2 %       11.6-14.4                 

 

             Platelets (test code = 162          See_Comment                [Aut

omated message]



             777-3)                                              The system [facility]



                                                                 generated this 

result



                                                                 transmitted ref

erence



                                                                 range: 150 - 45

0 K/CU



                                                                 MM. The referen

ce



                                                                 range was not u

sed to



                                                                 interpret this 

result



                                                                 as normal/abnor

mal.

 

             MPV (test code = 9.5 fL       9.4-12.4                  



             82025-2)                                            

 

             nRBC (test code = 413) 0            See_Comment                [Aut

omated message]



                                                                 The system [facility]



                                                                 generated this 

result



                                                                 transmitted ref

erence



                                                                 range: 0 - 0 /1

00



                                                                 WBC. The refere

nce



                                                                 range was not u

sed to



                                                                 interpret this 

result



                                                                 as normal/abnor

mal.

 

             % Neutros (test code = 73 %                                   



             429)                                                

 

             % Lymphs (test code = 17 %                                   



             430)                                                

 

             % Monos (test code = 7 %                                    



             431)                                                

 

             % Eos (test code = 432) 3 %                                    

 

             % Baso (test code = 437) 1 %                                    

 

             # Neutros (test code = 6.15         See_Comment  H             [Aut

omated message]



             670)                                                The system [facility]



                                                                 generated this 

result



                                                                 transmitted ref

erence



                                                                 range: 1.78 - 5

.38



                                                                 K/L. The refe

rence



                                                                 range was not u

sed to



                                                                 interpret this 

result



                                                                 as normal/abnor

mal.

 

             # Lymphs (test code = 1.40         See_Comment                [Auto

mated message]



             414)                                                The system [employer]



                                                                 generated this 

result



                                                                 transmitted ref

erence



                                                                 range: 1.32 - 3

.57



                                                                 K/L. The refe

rence



                                                                 range was not u

sed to



                                                                 interpret this 

result



                                                                 as normal/abnor

mal.

 

             # Monos (test code = 0.60         See_Comment                [Autom

ated message]



             415)                                                The system [employer]



                                                                 generated this 

result



                                                                 transmitted ref

erence



                                                                 range: 0.30 - 0

.82



                                                                 K/L. The refe

rence



                                                                 range was not u

sed to



                                                                 interpret this 

result



                                                                 as normal/abnor

mal.

 

             # Eos (test code = 416) 0.21         See_Comment                [Au

tomated message]



                                                                 The system [employer]



                                                                 generated this 

result



                                                                 transmitted ref

erence



                                                                 range: 0.04 - 0

.54



                                                                 K/L. The refe

rence



                                                                 range was not u

sed to



                                                                 interpret this 

result



                                                                 as normal/abnor

mal.

 

             # Baso (test code = 417) 0.05         See_Comment                [A

utomated message]



                                                                 The system [employer]



                                                                 generated this 

result



                                                                 transmitted ref

erence



                                                                 range: 0.01 - 0

.08



                                                                 K/L. The refe

rence



                                                                 range was not u

sed to



                                                                 interpret this 

result



                                                                 as normal/abnor

mal.

 

             Immature     0 %          0-1                       



             Granulocytes-Relative                                        



             (test code = 2801)                                        

 

             Lab Interpretation (test Abnormal                               



             code = 85969-5)                                        



[facility] with platelet count + automated [id number] 
10:49:14





             Test Item    Value        Reference Range Interpretation Comments

 

             WBC (test code = 6690-2) 8.4          See_Comment                [A

utomated message]



                                                                 The system [employer]



                                                                 generated this 

result



                                                                 transmitted ref

erence



                                                                 range: 3.5 - 10

.5



                                                                 K/L. The refe

rence



                                                                 range was not u

sed to



                                                                 interpret this 

result



                                                                 as normal/abnor

mal.

 

             RBC (test code = 789-8) 5.35         See_Comment                [Au

tomated message]



                                                                 The system [employer]



                                                                 generated this 

result



                                                                 transmitted ref

erence



                                                                 range: 4.63 - 6

.08



                                                                 M/L. The refe

rence



                                                                 range was not u

sed to



                                                                 interpret this 

result



                                                                 as normal/abnor

mal.

 

             MCHC (test code = 786-4) 33.8         See_Comment                [A

utomated message]



                                                                 The system [employer]



                                                                 generated this 

result



                                                                 transmitted ref

erence



                                                                 range: 32.3 - 3

6.5



                                                                 GM/DL. The refe

rence



                                                                 range was not u

sed to



                                                                 interpret this 

result



                                                                 as normal/abnor

mal.

 

             Hematocrit (test code = 45.2 %       40.1-51.0                 



             4544-3)                                             

 

             MCV (test code = 787-2) 84.5 fL      79.0-92.2                 

 

             MCH (test code = 785-6) 28.6 pg      25.7-32.2                 

 

             RDW (test code = 788-0) 13.2 %       11.6-14.4                 

 

             Platelets (test code = 162          See_Comment                [Aut

omated message]



             777-3)                                              The system [employer]



                                                                 generated this 

result



                                                                 transmitted ref

erence



                                                                 range: 150 - 45

0 K/CU



                                                                 MM. The referen

ce



                                                                 range was not u

sed to



                                                                 interpret this 

result



                                                                 as normal/abnor

mal.

 

             MPV (test code = 9.5 fL       9.4-12.4                  



             41984-5)                                            

 

             nRBC (test code = 413) 0            See_Comment                [Aut

omated message]



                                                                 The system [employer]



                                                                 generated this 

result



                                                                 transmitted ref

erence



                                                                 range: 0 - 0 /1

00



                                                                 WBC. The refere

nce



                                                                 range was not u

sed to



                                                                 interpret this 

result



                                                                 as normal/abnor

mal.

 

             % Neutros (test code = 73 %                                   



             429)                                                

 

             % Lymphs (test code = 17 %                                   



             430)                                                

 

             % Monos (test code = 7 %                                    



             431)                                                

 

             % Eos (test code = 432) 3 %                                    

 

             % Baso (test code = 437) 1 %                                    

 

             # Neutros (test code = 6.15         See_Comment  H             [Aut

omated message]



             670)                                                The system [employer]



                                                                 generated this 

result



                                                                 transmitted ref

erence



                                                                 range: 1.78 - 5

.38



                                                                 K/L. The refe

rence



                                                                 range was not u

sed to



                                                                 interpret this 

result



                                                                 as normal/abnor

mal.

 

             # Lymphs (test code = 1.40         See_Comment                [Auto

mated message]



             414)                                                The system [employer]



                                                                 generated this 

result



                                                                 transmitted ref

erence



                                                                 range: 1.32 - 3

.57



                                                                 K/L. The refe

rence



                                                                 range was not u

sed to



                                                                 interpret this 

result



                                                                 as normal/abnor

mal.

 

             # Monos (test code = 0.60         See_Comment                [Autom

ated message]



             415)                                                The system [employer]



                                                                 generated this 

result



                                                                 transmitted ref

erence



                                                                 range: 0.30 - 0

.82



                                                                 K/L. The refe

rence



                                                                 range was not u

sed to



                                                                 interpret this 

result



                                                                 as normal/abnor

mal.

 

             # Eos (test code = 416) 0.21         See_Comment                [Au

tomated message]



                                                                 The system [employer]



                                                                 generated this 

result



                                                                 transmitted ref

erence



                                                                 range: 0.04 - 0

.54



                                                                 K/L. The refe

rence



                                                                 range was not u

sed to



                                                                 interpret this 

result



                                                                 as normal/abnor

mal.

 

             # Baso (test code = 417) 0.05         See_Comment                [A

utomated message]



                                                                 The system [employer]



                                                                 generated this 

result



                                                                 transmitted ref

erence



                                                                 range: 0.01 - 0

.08



                                                                 K/L. The refe

rence



                                                                 range was not u

sed to



                                                                 interpret this 

result



                                                                 as normal/abnor

mal.

 

             Immature     0 %          0-1                       



             Granulocytes-Relative                                        



             (test code = 2801)                                        

 

             Lab Interpretation (test Abnormal                               



             code = 16108-5)                                        



[facility] W/PLT COUNT &amp; AUTO [id number] 
10:49:14





             Test Item    Value        Reference Range Interpretation Comments

 

             WHITE BLOOD CELL COUNT (BEAKER) 8.4 K/ L     3.5-10.5              

    



             (test code = 775)                                        

 

             RED BLOOD CELL COUNT (BEAKER) 5.35 M/ L    4.63-6.08               

  



             (test code = 761)                                        

 

             HEMOGLOBIN (BEAKER) (test code = 15.3 GM/DL   13.7-17.5            

     



             410)                                                

 

             HEMATOCRIT (BEAKER) (test code = 45.2 %       40.1-51.0            

     



             411)                                                

 

             MEAN CORPUSCULAR VOLUME (BEAKER) 84.5 fL      79.0-92.2            

     



             (test code = 753)                                        

 

             MEAN CORPUSCULAR HEMOGLOBIN 28.6 pg      25.7-32.2                 



             (BEAKER) (test code = 751)                                        

 

             MEAN CORPUSCULAR HEMOGLOBIN CONC 33.8 GM/DL   32.3-36.5            

     



             (BEAKER) (test code = 752)                                        

 

             RED CELL DISTRIBUTION WIDTH 13.2 %       11.6-14.4                 



             (BEAKER) (test code = 412)                                        

 

             PLATELET COUNT (BEAKER) (test 162 K/CU MM  150-450                 

  



             code = 756)                                         

 

             MEAN PLATELET VOLUME (BEAKER) 9.5 fL       9.4-12.4                

  



             (test code = 754)                                        

 

             NUCLEATED RED BLOOD CELLS 0 /100 WBC   0-0                       



             (BEAKER) (test code = 413)                                        

 

             NEUTROPHILS RELATIVE PERCENT 73 %                                  

 



             (BEAKER) (test code = 429)                                        

 

             LYMPHOCYTES RELATIVE PERCENT 17 %                                  

 



             (BEAKER) (test code = 430)                                        

 

             MONOCYTES RELATIVE PERCENT 7 %                                    



             (BEAKER) (test code = 431)                                        

 

             EOSINOPHILS RELATIVE PERCENT 3 %                                   

 



             (BEAKER) (test code = 432)                                        

 

             BASOPHILS RELATIVE PERCENT 1 %                                    



             (BEAKER) (test code = 437)                                        

 

             NEUTROPHILS ABSOLUTE COUNT 6.15 K/ L    1.78-5.38    H            



             (BEAKER) (test code = 670)                                        

 

             LYMPHOCYTES ABSOLUTE COUNT 1.40 K/ L    1.32-3.57                 



             (BEAKER) (test code = 414)                                        

 

             MONOCYTES ABSOLUTE COUNT (BEAKER) 0.60 K/ L    0.30-0.82           

      



             (test code = 415)                                        

 

             EOSINOPHILS ABSOLUTE COUNT 0.21 K/ L    0.04-0.54                 



             (BEAKER) (test code = 416)                                        

 

             BASOPHILS ABSOLUTE COUNT (BEAKER) 0.05 K/ L    0.01-0.08           

      



             (test code = 417)                                        

 

             IMMATURE GRANULOCYTES-RELATIVE 0 %          0-1                    

   



             PERCENT (BEAKER) (test code =                                      

  



             2801)                                               



2D echo w/ doppler (cw/pw/color)2021 15:29:47Ejection FractionSLEH ECHO 
[facility] 
[facility]cw/pw/color)2021 15:29:47Ejection FractionSLEH ECHO [facility] 2 [id number] 11:56:00NEW 
CARDIOLOGIST OBERTONReason for Exam:-&gt;heart transplant annual follow up
************************************************************[facility]        : 1970        Sex: 
M************************************************************FINAL REPORT 
PATIENT ID:   [id number] INDICATION: heart transplant annual follow up COMPARISON:
None TECHNIQUE: Frontal and lateral views of the chest. FINDINGS: Lungs and 
pleura: Clear lungs. No effusion.Heart and mediastinum: Normal heart size. 
Unremarkable mediastinal contours.Osseous structures: No acute 
abnormality.Additional findings: None.  IMPRESSION: No acute intrathoracic 
abnormality. Signed: Shikha Jarvis MDRepstephan Verified Date/Time:  2021 
11:56:35 Reading Location: [facility] Radiology Reading Room  Electronically 
signed by: SHIKHA JARVIS MD on 2021 11:56 AMTACROLIMUS LEVEL
2021 12:37:00





             Test Item    Value        Reference Range Interpretation Comments

 

             TACROLIMUS BLOOD 10.6 ng/mL   10.0-20.0                 Test perfor

med on [employer]



             (BEAKER) (test code                                        [employer]

t Immunoassay



             = 657)                                              system with



                                                                 Chemiluminescen

t



                                                                 Microparticle



                                                                 Immunoassay (CM

IA)



                                                                 technology.



 ID - RMBASIC METABOLIC [id number] 06:33:00





             Test Item    Value        Reference Range Interpretation Comments

 

             SODIUM (BEAKER) 139 meq/L    136-145                   



             (test code = 381)                                        

 

             POTASSIUM (BEAKER) 4.0 meq/L    3.5-5.1                   



             (test code = 379)                                        

 

             CHLORIDE (BEAKER) 102 meq/L                        



             (test code = 382)                                        

 

             CO2 (BEAKER) (test 29 meq/L     22-29                     



             code = 355)                                         

 

             BLOOD UREA NITROGEN 16 mg/dL     7-21                      



             (BEAKER) (test code                                        



             = 354)                                              

 

             CREATININE (BEAKER) 1.04 mg/dL   0.57-1.25                 



             (test code = 358)                                        

 

             GLUCOSE RANDOM 97 mg/dL                         



             (BEAKER) (test code                                        



             = 652)                                              

 

             CALCIUM (BEAKER) 8.9 mg/dL    8.4-10.2                  



             (test code = 697)                                        

 

             EGFR (BEAKER) (test 75 mL/min/1.73                           ESTIMA

PHOEBE GFR IS



             code = 1092) sq m                                   NOT AS ACCURATE

 AS



                                                                 CREATININE



                                                                 CLEARANCE IN



                                                                 PREDICTING



                                                                 GLOMERULAR



                                                                 FILTRATION RATE

.



                                                                 ESTIMATED GFR I

S



                                                                 NOT APPLICABLE 

FOR



                                                                 DIALYSIS PATIEN

TS.



 ID - PIAYA LProthrombin time/[id number] 06:30:00





             Test Item    Value        Reference    Interpretation Comments



                                       Range                     

 

             Protime (test code = 12.9         See_Comment                [Autom

ated



             5902-2)                                             message] The



                                                                 system which



                                                                 generated this



                                                                 result



                                                                 transmitted



                                                                 reference range

:



                                                                 11.9 - 14.2



                                                                 seconds. The



                                                                 reference range



                                                                 was not used to



                                                                 interpret this



                                                                 result as



                                                                 normal/abnormal

.

 

             INR (test code = 1.00         See_Comment                [Automated



             "[employer]1-6)                                             message] The



                                                                 system which



                                                                 generated this



                                                                 result



                                                                 transmitted



                                                                 reference range

:



                                                                 <=5.90. The



                                                                 reference range



                                                                 was not used to



                                                                 interpret this



                                                                 result as



                                                                 normal/abnormal

.

 

             FAINA (test code = RECOMMENDED                            



             FAINA)         COUMADIN/WARFARIN                           



                          INR THERAPY                            



                          RANGESSTANDARD DOSE:                           



                          2.0 - 3.0                              



                          Includes:                              



                          PROPHYLAXIS for                           



                          venous thrombosis,                           



                          systemic                               



                          embolization;                           



                          TREATMENT for venous                           



                          thrombosis and/or                           



                          pulmonary                              



                          embolus.HIGH RISK:                           



                          Target INR is                           



                          2.5-3.5 for patients                           



                          with mechanical                           



                          heart valves.                           

 

             Lab Interpretation Normal                                 



             (test code =                                        



             68799-1)                                            



[id number]





             Test Item    Value        Reference    Interpretation Comments



                                       Range                     

 

             Protime (test code = 12.9         See_Comment                [Autom

ated



             5902-2)                                             message] The



                                                                 system which



                                                                 generated this



                                                                 result



                                                                 transmitted



                                                                 reference range

:



                                                                 11.9 - 14.2



                                                                 seconds. The



                                                                 reference range



                                                                 was not used to



                                                                 interpret this



                                                                 result as



                                                                 normal/abnormal

.

 

             INR (test code = 1.00         See_Comment                [Automated



             "[employer]1-6)                                             message] The



                                                                 system which



                                                                 generated this



                                                                 result



                                                                 transmitted



                                                                 reference range

:



                                                                 <=5.90. The



                                                                 reference range



                                                                 was not used to



                                                                 interpret this



                                                                 result as



                                                                 normal/abnormal

.

 

             FAINA (test code = RECOMMENDED                            



             FAINA)         COUMADIN/WARFARIN                           



                          INR THERAPY                            



                          RANGESSTANDARD DOSE:                           



                          2.0 - 3.0                              



                          Includes:                              



                          PROPHYLAXIS for                           



                          venous thrombosis,                           



                          systemic                               



                          embolization;                           



                          TREATMENT for venous                           



                          thrombosis and/or                           



                          pulmonary                              



                          embolus.HIGH RISK:                           



                          Target INR is                           



                          2.5-3.5 for patients                           



                          with mechanical                           



                          heart valves.                           

 

             Lab Interpretation Normal                                 



             (test code =                                        



             19764-4)                                            



[id number]





             Test Item    Value        Reference Range Interpretation Comments

 

             PROTIME (BEAKER) 12.9 seconds 11.9-14.2                 



             (test code = 759)                                        

 

             INR (BEAKER) (test 1.00         See_Comment                [Automat

ed message]



             code = 370)                                         The system [employer]



                                                                 generated this 

result



                                                                 transmitted ref

erence



                                                                 range: <=5.90. 

The



                                                                 reference range

 was



                                                                 not used to int

erpret



                                                                 this result as



                                                                 normal/abnormal

.



RECOMMENDED COUMADIN/WARFARIN INR THERAPY RANGESSTANDARD DOSE: 2.0 - 3.0   
Includes: PROPHYLAXIS forvenous thrombosis, systemic embolization; TREATMENT for
venous thrombosis and/or pulmonary embolus.HIGH RISK: Target INR is 2.5-3.5 for 
patients with mechanical heart valves.CBC (Hemogram only)2021 06:20:00





             Test Item    Value        Reference Range Interpretation Comments

 

             WBC (test code = 6690-2) 4.8          See_Comment                [A

utomated message]



                                                                 The system [employer]



                                                                 generated this 

result



                                                                 transmitted ref

erence



                                                                 range: 3.5 - 10

.5



                                                                 K/L. The refe

rence



                                                                 range was not u

sed to



                                                                 interpret this 

result



                                                                 as normal/abnor

mal.

 

             RBC (test code = 789-8) 5.14         See_Comment                [Au

tomated message]



                                                                 The system [employer]



                                                                 generated this 

result



                                                                 transmitted ref

erence



                                                                 range: 4.63 - 6

.08



                                                                 M/L. The refe

rence



                                                                 range was not u

sed to



                                                                 interpret this 

result



                                                                 as normal/abnor

mal.

 

             MCHC (test code = 786-4) 33.9         See_Comment                [A

utomated message]



                                                                 The system [employer]



                                                                 generated this 

result



                                                                 transmitted ref

erence



                                                                 range: 32.3 - 3

6.5



                                                                 GM/DL. The refe

rence



                                                                 range was not u

sed to



                                                                 interpret this 

result



                                                                 as normal/abnor

mal.

 

             Hematocrit (test code = 43.3 %       40.1-51.0                 



             4544-3)                                             

 

             MCV (test code = 787-2) 84.2 fL      79.0-92.2                 

 

             MCH (test code = 785-6) 28.6 pg      25.7-32.2                 

 

             RDW (test code = 788-0) 13.5 %       11.6-14.4                 

 

             Platelets (test code = 149          See_Comment  L             [Aut

omated message]



             777-3)                                              The system [employer]



                                                                 generated this 

result



                                                                 transmitted ref

erence



                                                                 range: 150 - 45

0 K/CU



                                                                 MM. The referen

ce



                                                                 range was not u

sed to



                                                                 interpret this 

result



                                                                 as normal/abnor

mal.

 

             MPV (test code = 9.4 fL       9.4-12.4                  



             74576-4)                                            

 

             nRBC (test code = 413) 0            See_Comment                [Aut

omated message]



                                                                 The system [employer]



                                                                 generated this 

result



                                                                 transmitted ref

erence



                                                                 range: 0 - 0 /1

00



                                                                 WBC. The refere

nce



                                                                 range was not u

sed to



                                                                 interpret this 

result



                                                                 as normal/abnor

mal.

 

             Lab Interpretation (test Abnormal                               



             code = 00404-8)                                        



[facility]





             Test Item    Value        Reference Range Interpretation Comments

 

             WBC (test code = 6690-2) 4.8          See_Comment                [A

utomated message]



                                                                 The system [employer]



                                                                 generated this 

result



                                                                 transmitted ref

erence



                                                                 range: 3.5 - 10

.5



                                                                 K/L. The refe

rence



                                                                 range was not u

sed to



                                                                 interpret this 

result



                                                                 as normal/abnor

mal.

 

             RBC (test code = 789-8) 5.14         See_Comment                [Au

tomated message]



                                                                 The system [employer]



                                                                 generated this 

result



                                                                 transmitted ref

erence



                                                                 range: 4.63 - 6

.08



                                                                 M/L. The refe

rence



                                                                 range was not u

sed to



                                                                 interpret this 

result



                                                                 as normal/abnor

mal.

 

             MCHC (test code = 786-4) 33.9         See_Comment                [A

utomated message]



                                                                 The system [employer]



                                                                 generated this 

result



                                                                 transmitted ref

erence



                                                                 range: 32.3 - 3

6.5



                                                                 GM/DL. The refe

rence



                                                                 range was not u

sed to



                                                                 interpret this 

result



                                                                 as normal/abnor

mal.

 

             Hematocrit (test code = 43.3 %       40.1-51.0                 



             4544-3)                                             

 

             MCV (test code = 787-2) 84.2 fL      79.0-92.2                 

 

             MCH (test code = 785-6) 28.6 pg      25.7-32.2                 

 

             RDW (test code = 788-0) 13.5 %       11.6-14.4                 

 

             Platelets (test code = 149          See_Comment  L             [Aut

omated message]



             777-3)                                              The system [employer]



                                                                 generated this 

result



                                                                 transmitted ref

erence



                                                                 range: 150 - 45

0 K/CU



                                                                 MM. The referen

ce



                                                                 range was not u

sed to



                                                                 interpret this 

result



                                                                 as normal/abnor

mal.

 

             MPV (test code = 9.4 fL       9.4-12.4                  



             37330-2)                                            

 

             nRBC (test code = 413) 0            See_Comment                [Aut

omated message]



                                                                 The system [employer]



                                                                 generated this 

result



                                                                 transmitted ref

erence



                                                                 range: 0 - 0 /1

00



                                                                 WBC. The refere

nce



                                                                 range was not u

sed to



                                                                 interpret this 

result



                                                                 as normal/abnor

mal.

 

             Lab Interpretation (test Abnormal                               



             code = 65547-2)                                        



[facility] (HEMOGRAM ONLY)2021 06:20:00





             Test Item    Value        Reference Range Interpretation Comments

 

             WHITE BLOOD CELL COUNT (BEAKER) 4.8 K/ L     3.5-10.5              

    



             (test code = 775)                                        

 

             RED BLOOD CELL COUNT (BEAKER) 5.14 M/ L    4.63-6.08               

  



             (test code = 761)                                        

 

             HEMOGLOBIN (BEAKER) (test code = 14.7 GM/DL   13.7-17.5            

     



             410)                                                

 

             HEMATOCRIT (BEAKER) (test code = 43.3 %       40.1-51.0            

     



             411)                                                

 

             MEAN CORPUSCULAR VOLUME (BEAKER) 84.2 fL      79.0-92.2            

     



             (test code = 753)                                        

 

             MEAN CORPUSCULAR HEMOGLOBIN 28.6 pg      25.7-32.2                 



             (BEAKER) (test code = 751)                                        

 

             MEAN CORPUSCULAR HEMOGLOBIN CONC 33.9 GM/DL   32.3-36.5            

     



             (BEAKER) (test code = 752)                                        

 

             RED CELL DISTRIBUTION WIDTH 13.5 %       11.6-14.4                 



             (BEAKER) (test code = 412)                                        

 

             PLATELET COUNT (BEAKER) (test 149 K/CU MM  150-450      L          

  



             code = 756)                                         

 

             MEAN PLATELET VOLUME (BEAKER) 9.4 fL       9.4-12.4                

  



             (test code = 754)                                        

 

             NUCLEATED RED BLOOD CELLS 0 /100 WBC   0-0                       



             (BEAKER) (test code = 413)                                        



Blood Culture - Routine (Left Venipuncture)2021 22:00:00





             Test Item    Value        Reference Range Interpretation Comments

 

             Result (test code = No growth in 5 days                           



             6463-4)                                             



[facility]
22:00:00





             Test Item    Value        Reference Range Interpretation Comments

 

             Result (test code = No growth in 5 days                           



             6463-4)                                             



[id number]00





             Test Item    Value        Reference Range Interpretation Comments

 

             CULTURE (BEAKER) (test No growth in 5 days                         

  



             code = 1095)                                        



BLOOD [id number] 22:00:00





             Test Item    Value        Reference Range Interpretation Comments

 

             CULTURE (BEAKER) (test No growth in 5 days                         

  



             code = 1095)                                        



SARS-CoV2/Influenza/RSV RT-PCR (Symptomatic ONLY)2021 21:08:00





             Test Item    Value        Reference Range Interpretation Comments

 

             SARS-COV2/RT-PCR (test Positive     Negative     AA           



             code = 57206-9)                                        

 

             Influenza A RT-PCR Negative     Negative                  



             (test code = 07629-2)                                        

 

             Influenza B RT-PCR Negative     Negative                  



             (test code = 30593-6)                                        

 

             RSV by RT-PCR (test Negative     Negative                  Performa

nce of the



             code = 14572-4)                                        [facility]



                                                                 SARS-CoV-2/Flu/

RSV test



                                                                 has only been



                                                                 established in



                                                                 nasopharyngeal 

swab



                                                                 specimens. Use 

of the



                                                                 [facility]



                                                                 SARS-CoV-2/Flu/

RSV test



                                                                 with other spec

imen



                                                                 types has not b

een



                                                                 assessed and



                                                                 performance



                                                                 characteristics

 are



                                                                 unknown.  As wi

th any



                                                                 molecular test,



                                                                 mutations withi

n the



                                                                 targeted geneti

c



                                                                 regions identif

ied by



                                                                 the [facility]

s



                                                                 SARS-CoV-2/Flu/

RSV test



                                                                 could affect pr

[place]



                                                                 and/or probe bi

nding



                                                                 resulting in fa

ilure to



                                                                 detect the pres

ence of



                                                                 virus or the vi

sabra



                                                                 being detected 

less



                                                                 predictably.Neg

ative



                                                                 results do not 

preclude



                                                                 SARS-CoV-2, Inf

luenza



                                                                 A/B, or RSV inf

ection



                                                                 and should not 

be used



                                                                 as the sole bas

is for



                                                                 treatment or ot

her



                                                                 patient managem

ent



                                                                 decisions.  Res

ults



                                                                 from the [facility]pert 

[facility]press



                                                                 SARS-CoV-2/Flu/

RSV test



                                                                 should be corre

lated



                                                                 with the clinic

al



                                                                 history,



                                                                 epidemiological

 data,



                                                                 and other data



                                                                 available to th

e



                                                                 clinician evalu

ating



                                                                 the patient.  I

nvalid



                                                                 test results ma

y occur



                                                                 from improper s

pecimen



                                                                 collection; jasmeet

lure to



                                                                 follow the oralia

mmended



                                                                 sample collecti

on,



                                                                 handling, and s

torage



                                                                 procedures; gretchen

hnical



                                                                 error. False ne

gative



                                                                 results may occ

ur if



                                                                 virus is presen

t at



                                                                 levels below th

e



                                                                 analytical limi

t of



                                                                 detection (LOD:

 131



                                                                 copies/mL).  Vi

ral



                                                                 nucleic acid ma

y



                                                                 persist in vivo

,



                                                                 independent of 

virus



                                                                 viability. Dete

ction of



                                                                 analyte target(

s) does



                                                                 not imply that 

the



                                                                 corresponding v

irus(es)



                                                                 are infectious 

or are



                                                                 the causative a

gents



                                                                 for clinical sy

mptoms.



                                                                 Recent patient 

exposure



                                                                 to FluMist or

 other



                                                                 live attenuated



                                                                 influenza vacci

sylvester may



                                                                 cause inaccurat

e



                                                                 positive result

s.This



                                                                 test has been



                                                                 authorized by [employer] under



                                                                 an EUA for use 

by



                                                                 authorized



                                                                 laboratories.  

This



                                                                 test is only au

thorized



                                                                 for the duratio

n of the



                                                                 declaration nereida

t



                                                                 circumstances e

xist



                                                                 justifying the



                                                                 authorization o

f



                                                                 emergency use o

f in



                                                                 vitro diagnosti

c tests



                                                                 for detection a

nd/or



                                                                 diagnosis of CO

VID-19



                                                                 under Section 5

64(b)(1)



                                                                 of the Federal 

Food,



                                                                 Drug and Cosmet

ic Act,



                                                                 21 U.[place]C. 



                                                                 360bbb-3(b)(1),

 unless



                                                                 the authorizati

on is



                                                                 terminated or r

evoked



                                                                 sooner. Fact Sh

eet for



                                                                 Healthcare Prov

iders:



                                                                 https://www.[employer]



                                                                 /Documents/Xper

t%20Xpre



                                                                 ss%95WJTS-GlO-9

-Flu-RSV



                                                                 /302-4508%20Rev

.%20B%20



                                                                 HCP%20Fact%20Sh

eet.pdf



                                                                 Fact Sheet for



                                                                 Healthcare Elsa

ents:



                                                                 https://www.[employer]



                                                                 /Documents/Xper

t%20Xpre



                                                                 ss%05CCJU-HjE-5

-Flu-RSV



                                                                 /3024507%20Rev

.%20B%20



                                                                 Patient%20Fact%

20Sheet.



                                                                 pdf

 

             Lab Interpretation Abnormal                               



             (test code = 86789-1)                                        



[facility]
20[facility] 21:08:00





             Test Item    Value        Reference Range Interpretation Comments

 

             SARS-COV2/RT-PCR (test Positive     Negative     AA           



             code = 73590-2)                                        

 

             Influenza A RT-PCR Negative     Negative                  



             (test code = 42878-3)                                        

 

             Influenza B RT-PCR Negative     Negative                  



             (test code = 39874-5)                                        

 

             RSV by RT-PCR (test Negative     Negative                  Performa

nce of the



             code = 53879-5)                                        Xpert Xpress



                                                                 SARS-CoV-2/Flu/

RSV test



                                                                 has only been



                                                                 established in



                                                                 nasopharyngeal 

swab



                                                                 specimens. Use 

of the



                                                                 Xpert Xpress



                                                                 SARS-CoV-2/Flu/

RSV test



                                                                 with other spec

imen



                                                                 types has not b

een



                                                                 assessed and



                                                                 performance



                                                                 characteristics

 are



                                                                 unknown.  As wi

th any



                                                                 molecular test,



                                                                 mutations withi

n the



                                                                 targeted geneti

c



                                                                 regions identif

ied by



                                                                 the Xpert Xpres

s



                                                                 SARS-CoV-2/Flu/

RSV test



                                                                 could affect pr

carlton



                                                                 and/or probe bi

nding



                                                                 resulting in fa

ilure to



                                                                 detect the pres

ence of



                                                                 virus or the vi

sabra



                                                                 being detected 

less



                                                                 predictably.Neg

ative



                                                                 results do not 

preclude



                                                                 SARS-CoV-2, Inf

luenza



                                                                 A/B, or RSV inf

ection



                                                                 and should not 

be used



                                                                 as the sole bas

is for



                                                                 treatment or ot

her



                                                                 patient managem

ent



                                                                 decisions.  Res

ults



                                                                 from the Xpert 

Xpress



                                                                 SARS-CoV-2/Flu/

RSV test



                                                                 should be corre

lated



                                                                 with the clinic

al



                                                                 history,



                                                                 epidemiological

 data,



                                                                 and other data



                                                                 available to th

e



                                                                 clinician evalu

ating



                                                                 the patient.  I

nvalid



                                                                 test results ma

y occur



                                                                 from improper s

pecimen



                                                                 collection; jasmeet

lure to



                                                                 follow the oralia

mmended



                                                                 sample collecti

on,



                                                                 handling, and s

torage



                                                                 procedures; gretchen

hnical



                                                                 error. False ne

gative



                                                                 results may occ

ur if



                                                                 virus is presen

t at



                                                                 levels below th

e



                                                                 analytical limi

t of



                                                                 detection (LOD:

 131



                                                                 copies/mL).  Vi

ral



                                                                 nucleic acid ma

y



                                                                 persist in vivo

,



                                                                 independent of 

virus



                                                                 viability. Dete

ction of



                                                                 analyte target(

s) does



                                                                 not imply that 

the



                                                                 corresponding v

irus(es)



                                                                 are infectious 

or are



                                                                 the causative a

gents



                                                                 for clinical sy

mptoms.



                                                                 Recent patient 

exposure



                                                                 to FluMist or

 other



                                                                 live attenuated



                                                                 influenza vacci

sylvester may



                                                                 cause inaccurat

e



                                                                 positive result

s.This



                                                                 test has been



                                                                 authorized by [employer]

[employer] under



                                                                 an EUA for use 

by



                                                                 authorized



                                                                 laboratories.  

This



                                                                 test is only au

thorized



                                                                 for the duratio

n of the



                                                                 declaration nereida

t



                                                                 circumstances e

xist



                                                                 justifying the



                                                                 authorization o

f



                                                                 emergency use o

f in



                                                                 vitro diagnosti

c tests



                                                                 for detection a

nd/or



                                                                 diagnosis of CO

VID-19



                                                                 under Section 5

64(b)(1)



                                                                 of the Federal 

Food,



                                                                 Drug and Cosmet

ic Act,



                                                                 21 U.S.C. 



                                                                 360bbb-3(b)(1),

 unless



                                                                 the authorizati

on is



                                                                 terminated or r

evoked



                                                                 sooner. Fact Sh

eet for



                                                                 Healthcare Prov

iders:



                                                                 https://www.[employer]



                                                                 /Documents/Xper

t%20Xpre



                                                                 ss%86YHKU-NmH-8

-Flu-RSV



                                                                 /302-4508%20Rev

.%20B%20



                                                                 HCP%20Fact%20Sh

eet.pdf



                                                                 Fact Sheet for



                                                                 Healthcare Elsa

ents:



                                                                 https://www.[employer]



                                                                 /Documents/Xper

t%20Xpre



                                                                 ss%36XXSH-DiR-9

-Flu-RSV



                                                                 /302-4507%20Rev

.%20B%20



                                                                 Patient%20Fact%

20Sheet.



                                                                 pdf

 

             Lab Interpretation Abnormal                               



             (test code = 80377-1)                                        



[id number] 21:08:00





             Test Item    Value        Reference Range Interpretation Comments

 

             SARS-COV2/RT-PCR Positive     Negative     AA           



             (test code =                                        



             9057392)                                            

 

             INFLUENZA A RT-PCR Negative     Negative                  



             (test code =                                        



             7450964)                                            

 

             INFLUENZA B RT-PCR Negative     Negative                  



             (test code =                                        



             6179564)                                            

 

             RSV  RT-PCR (test Negative     Negative                  Performanc

e of the Xpert



             code = 5203364)                                        Xpress SARS-

CoV-2/Flu/RSV



                                                                 test has only b

een



                                                                 established in



                                                                 nasopharyngeal 

swab



                                                                 specimens. Use 

of the



                                                                 Xpert Xpress



                                                                 SARS-CoV-2/Flu/

RSV test



                                                                 with other spec

imen types



                                                                 has not been as

sessed and



                                                                 performance



                                                                 characteristics

 are



                                                                 unknown.  As wi

th any



                                                                 molecular test,

 mutations



                                                                 within the targ

eted



                                                                 genetic regions



                                                                 identified by  Xpert



                                                                 Xpress SARS-CoV

-2/Flu/RSV



                                                                 test could affe

ct primer



                                                                 and/or probe bi

nding



                                                                 resulting in fa

ilure to



                                                                 detect the pres

ence of



                                                                 virus or the vi

sabra being



                                                                 detected less



                                                                 predictably.Neg

ative



                                                                 results do not 

preclude



                                                                 SARS-CoV-2, Inf

luenza



                                                                 A/B, or RSV inf

ection and



                                                                 should not be u

sed as the



                                                                 sole basis for 

treatment



                                                                 or other patien

t



                                                                 management deci

sions.



                                                                 Results from 

e Xpert



                                                                 Xpress SARS-CoV

-2/Flu/RSV



                                                                 test should be 

correlated



                                                                 with the clinic

al



                                                                 history, epidem

iological



                                                                 data, and other

 data



                                                                 available to th

e



                                                                 clinician evalu

ating the



                                                                 patient.  Inval

id test



                                                                 results may occ

ur from



                                                                 improper specim

en



                                                                 collection; jasmeet

lure to



                                                                 follow the oralia

mmended



                                                                 sample collecti

on,



                                                                 handling, and s

torage



                                                                 procedures; gretchen

hnical



                                                                 error. False ne

gative



                                                                 results may occ

ur if



                                                                 virus is presen

t at



                                                                 levels below th

e



                                                                 analytical limi

t of



                                                                 detection (LOD:

 131



                                                                 copies/mL).  Vi

ral



                                                                 nucleic acid ma

y persist



                                                                 in vivo, indepe

ndent of



                                                                 virus viability

.



                                                                 Detection of an

alyte



                                                                 target(s) does 

not imply



                                                                 that the corres

ponding



                                                                 virus(es) are i

nfectious



                                                                 or are the caus

ative



                                                                 agents for clin

ical



                                                                 symptoms.  Rece

nt patient



                                                                 exposure to Flu

Mist  or



                                                                 other live atte

nuated



                                                                 influenza vacci

sylvester may



                                                                 cause inaccurat

e positive



                                                                 results.This te

st has



                                                                 been authorized

 by FDA



                                                                 under an EUA fo

r use by



                                                                 authorized labo

ratories.



                                                                 This test is on

ly



                                                                 authorized for 

the



                                                                 duration of the



                                                                 declaration nereida

t



                                                                 circumstances e

xist



                                                                 justifying the



                                                                 authorization o

f



                                                                 emergency use o

f in vitro



                                                                 diagnostic test

s for



                                                                 detection and/o

r



                                                                 diagnosis of CO

VID-19



                                                                 under Section 5

64(b)(1)



                                                                 of the Federal 

Food, Drug



                                                                 and Cosmetic Ac

t, 21



                                                                 [place]   360bbb

-3(b)(1),



                                                                 unless the auth

orization



                                                                 is terminated o

r revoked



                                                                 sooner.Fact She

et for



                                                                 Healthcare Prov

iders:



                                                                 [email or web address]://[email or web address]/D



                                                                 ocuments/Xpert%

20Xpress%2



                                                                 1VBJH-UxA-1-Flu

-RSV/-



                                                                 508%20Rev.%20B%

20HCP%20Fa



                                                                 ct%20Sheet.pdfF

act Sheet



                                                                 for Healthcare 

Patients:



                                                                 [email or web address]://[email or web address]



                                                                 ocuments/Xpert%

20Xpress%2



                                                                 8MFXF-RaH-7-Flu

-RSV/-



                                                                 507%20Rev.%20B%

20Patient%



                                                                 20Fact%20Sheet.

pdf



Troponin -69-13 20:50:00





             Test Item    Value        Reference Range Interpretation Comments

 

             Troponin I (test code = <0.01        0.00-0.03                 



             26495-7)                                            

 

             FAINA (test code = FAINA) Troponin I (TnI)                           



                          levels must be                           



                          interpreted in the                           



                          context of the                           



                          presenting symptoms                           



                          and the clinical                           



                          findings. Elevated                           



                          TnI levels indicate                           



                          myocardial damage,                           



                          but are not specific                           



                          for ischemic heart                           



                          disease. Elevated TnI                           



                          levels are seen in                           



                          patients with other                           



                          cardiac conditions                           



                          (including                             



                          myocarditis and                           



                          congestive heart                           



                          failure), and slight                           



                          TnI elevations occur                           



                          in patients with                           



                          other conditions,                           



                          including sepsis,                           



                          renal failure,                           



                          acidosis, acute                           



                          neurological disease,                           



                          and persistent                           



                          tachyarrhythmia.Opera                           



                          tor ID - DB                            

 

             Lab Interpretation (test Normal                                 



             code = 01478-7)                                        



[facility] -01-35 20:50:00





             Test Item    Value        Reference Range Interpretation Comments

 

             Troponin I (test code = <0.01        0.00-0.03                 



             79700-4)                                            

 

             FAINA (test code = FAINA) Troponin I (TnI)                           



                          levels must be                           



                          interpreted in the                           



                          context of the                           



                          presenting symptoms                           



                          and the clinical                           



                          findings. Elevated                           



                          TnI levels indicate                           



                          myocardial damage,                           



                          but are not specific                           



                          for ischemic heart                           



                          disease. Elevated TnI                           



                          levels are seen in                           



                          patients with other                           



                          cardiac conditions                           



                          (including                             



                          myocarditis and                           



                          congestive heart                           



                          failure), and slight                           



                          TnI elevations occur                           



                          in patients with                           



                          other conditions,                           



                          including sepsis,                           



                          renal failure,                           



                          acidosis, acute                           



                          neurological disease,                           



                          and persistent                           



                          tachyarrhythmia.Opera                           



                          tor ID - DB                            

 

             Lab Interpretation (test Normal                                 



             code = 67142-1)                                        



[facility] -21-68 20:50:00





             Test Item    Value        Reference Range Interpretation Comments

 

             TROPONIN I (BEAKER) (test code = 397) < ng/mL      0.00-0.03       

          



Troponin I (TnI) levels must be interpreted in the context of the presenting 
symptoms and the clinical findings. Elevated TnI levels indicate myocardial 
damage, but are not specific for ischemic heart disease. Elevated TnI levels are
seen in patients with other cardiac conditions (including myocarditis and 
congestive heart failure), and slight TnI elevations occur in patients with 
other conditions, including sepsis, renal failure, acidosis, acute neurological 
disease, and persistent tachyarrhythmia. ID - DBB-type Natriuretic 
Factor (BNP)2021 20:48:00





             Test Item    Value        Reference Range Interpretation Comments

 

             BNP (test code = 01460-7) 48 pg/mL     0-100                     

 

             FAINA (test code = FAINA)  ID - DB                           

 

             Lab Interpretation (test Normal                                 



             code = 95182-4)                                        



[facility]





             Test Item    Value        Reference Range Interpretation Comments

 

             BNP (test code = 65608-7) 48 pg/mL     0-100                     

 

             FAINA (test code = FAINA)  ID - DB                           

 

             Lab Interpretation (test Normal                                 



             code = 59071-6)                                        



[facility]20[facility] 20:48:00





             Test Item    Value        Reference Range Interpretation Comments

 

             B-TYPE NATRIURETIC PEPTIDE (BEAKER) 48 pg/mL     0-100             

        



             (test code = 700)                                        



 ID - [id number] 20:44:00





             Test Item    Value        Reference Range Interpretation Comments

 

             Magnesium (test code = 1.7 mg/dL    1.6-2.6                   Speci

men



             21092-2)                                            slightly



                                                                 hemolyzed

 

             FAINA (test code = FAINA)  ID - DB                           

 

             Lab Interpretation Normal                                 



             (test code = 55009-2)                                        



[facility]





             Test Item    Value        Reference Range Interpretation Comments

 

             Magnesium (test code = 1.7 mg/dL    1.6-2.6                   Speci

men



             91603-6)                                            slightly



                                                                 hemolyzed

 

             FAINA (test code = FAINA)  ID - DB                           

 

             Lab Interpretation Normal                                 



             (test code = 69430-2)                                        



[facility]00





             Test Item    Value        Reference Range Interpretation Comments

 

             MAGNESIUM (BEAKER) 1.7 mg/dL    1.6-2.6                   Specimen 

slightly



             (test code = 627)                                        hemolyzed



 ID - DBBASIC METABOLIC [id number] 20:44:00





             Test Item    Value        Reference Range Interpretation Comments

 

             SODIUM (BEAKER) 139 meq/L    136-145                   



             (test code = 381)                                        

 

             POTASSIUM (BEAKER) 4.0 meq/L    3.5-5.1                   Specimen 

slightly



             (test code = 379)                                        hemolyzed

 

             CHLORIDE (BEAKER) 101 meq/L                        



             (test code = 382)                                        

 

             CO2 (BEAKER) (test 24 meq/L     22-29                     



             code = 355)                                         

 

             BLOOD UREA NITROGEN 17 mg/dL     7-21                      



             (BEAKER) (test code                                        



             = 354)                                              

 

             CREATININE (BEAKER) 1.13 mg/dL   0.57-1.25                 Specimen

 slightly



             (test code = 358)                                        hemolyzed

 

             GLUCOSE RANDOM 107 mg/dL           H            



             (BEAKER) (test code                                        



             = 652)                                              

 

             CALCIUM (BEAKER) 9.1 mg/dL    8.4-10.2                  



             (test code = 697)                                        

 

             EGFR (BEAKER) (test 68 mL/min/1.73                           ESTIMA

PHOEBE GFR IS



             code = 1092) sq m                                   NOT AS ACCURATE

 AS



                                                                 CREATININE



                                                                 CLEARANCE IN



                                                                 PREDICTING



                                                                 GLOMERULAR



                                                                 FILTRATION RATE

.



                                                                 ESTIMATED GFR I

S



                                                                 NOT APPLICABLE 

FOR



                                                                 DIALYSIS PATIEN

TS.



 ID - DBLactic acid, [id number] 20:38:00





             Test Item    Value        Reference Range Interpretation Comments

 

             Lactate, Venous (test 2.17 mmol/L  0.50-2.20                 Specim

en



             code = 2872)                                        slightly



                                                                 hemolyzed

 

             FAINA (test code = FAINA)  ID - DB                           

 

             Lab Interpretation Normal                                 



             (test code = 85766-7)                                        



[facility] [id number] 20:38:00





             Test Item    Value        Reference Range Interpretation Comments

 

             Lactate, Venous (test 2.17 mmol/L  0.50-2.20                 Specim

en



             code = 2872)                                        slightly



                                                                 hemolyzed

 

             FAINA (test code = FAINA)  ID - DB                           

 

             Lab Interpretation Normal                                 



             (test code = 98691-9)                                        



[facility], [id number] 20:38:00





             Test Item    Value        Reference Range Interpretation Comments

 

             LACTATE BLOOD VENOUS 2.17 mmol/L  0.50-2.20                 Specime

n slightly



             (2) (BEAKER) (test                                        hemolyzed



             code = 2872)                                        



 ID - DBCBC W/PLT COUNT &amp; AUTO [id number] 20:21:00





             Test Item    Value        Reference Range Interpretation Comments

 

             WHITE BLOOD CELL COUNT (BEAKER) 4.1 K/ L     3.5-10.5              

    



             (test code = 775)                                        

 

             RED BLOOD CELL COUNT (BEAKER) 5.73 M/ L    4.63-6.08               

  



             (test code = 761)                                        

 

             HEMOGLOBIN (BEAKER) (test code = 16.0 GM/DL   13.7-17.5            

     



             410)                                                

 

             HEMATOCRIT (BEAKER) (test code = 49.1 %       40.1-51.0            

     



             411)                                                

 

             MEAN CORPUSCULAR VOLUME (BEAKER) 85.7 fL      79.0-92.2            

     



             (test code = 753)                                        

 

             MEAN CORPUSCULAR HEMOGLOBIN 27.9 pg      25.7-32.2                 



             (BEAKER) (test code = 751)                                        

 

             MEAN CORPUSCULAR HEMOGLOBIN CONC 32.6 GM/DL   32.3-36.5            

     



             (BEAKER) (test code = 752)                                        

 

             RED CELL DISTRIBUTION WIDTH 13.2 %       11.6-14.4                 



             (BEAKER) (test code = 412)                                        

 

             PLATELET COUNT (BEAKER) (test code 98 K/CU MM   150-450      L     

       



             = 756)                                              

 

             MEAN PLATELET VOLUME (BEAKER) 9.9 fL       9.4-12.4                

  



             (test code = 754)                                        

 

             NUCLEATED RED BLOOD CELLS (BEAKER) 0 /100 WBC   0-0                

       



             (test code = 413)                                        

 

             NEUTROPHILS RELATIVE PERCENT 48 %                                  

 



             (BEAKER) (test code = 429)                                        

 

             LYMPHOCYTES RELATIVE PERCENT 30 %                                  

 



             (BEAKER) (test code = 430)                                        

 

             MONOCYTES RELATIVE PERCENT 20 %                                   



             (BEAKER) (test code = 431)                                        

 

             EOSINOPHILS RELATIVE PERCENT 0 %                                   

 



             (BEAKER) (test code = 432)                                        

 

             BASOPHILS RELATIVE PERCENT 1 %                                    



             (BEAKER) (test code = 437)                                        

 

             NEUTROPHILS ABSOLUTE COUNT 1.97 K/ L    1.78-5.38                 



             (BEAKER) (test code = 670)                                        

 

             LYMPHOCYTES ABSOLUTE COUNT 1.25 K/ L    1.32-3.57    L            



             (BEAKER) (test code = 414)                                        

 

             MONOCYTES ABSOLUTE COUNT (BEAKER) 0.84 K/ L    0.30-0.82    H      

      



             (test code = 415)                                        

 

             EOSINOPHILS ABSOLUTE COUNT 0.01 K/ L    0.04-0.54    L            



             (BEAKER) (test code = 416)                                        

 

             BASOPHILS ABSOLUTE COUNT (BEAKER) 0.02 K/ L    0.01-0.08           

      



             (test code = 417)                                        

 

             IMMATURE GRANULOCYTES-RELATIVE 1 %          0-1                    

   



             PERCENT (BEAKER) (test code =                                      

  



             2801)                                               



RAD, CHEST, 1 VIEW, NON [id number] 19:56:00NEW CARDIOLOGIST OBERTONReason 
for exam:-&gt;FEVERReason for exam:-&gt;NASAL CONGESTIONShould this be performed
at the bedside?-&gt;Yes
************************************************************[facility] 1970        Sex: 
M************************************************************FINAL REPORT 
PATIENT ID:   [id number]   AP view of the chest dated 3/11/2021 CLINICAL INFORMAT
ION: FEVERNASAL CONGESTION Comment:  Heart is normal in size. Pulmonary 
vasculature is unremarkable.Lungs are clear. No pulmonary infiltrate or pleural 
effusion is present.  Impression:  No active cardiopulmonary disease. Signed: 
Kiran James MDReport Verified Date/Time:  2021 19:56:24 Reading Location: 
[address] Consult Reading Room      Electronically signed by: KIRAN JAMES M.D. on 2021 07:56 PMTACROLIMUS [id number] 12:44:00





             Test Item    Value        Reference Range Interpretation Comments

 

             TACROLIMUS BLOOD (BEAKER) (test 7.6 ng/mL    10.0-20.0    L        

    



             code = 657)                                         



 ID - AAHAMIDLIPID [id number] 10:29:00





             Test Item    Value        Reference Range Interpretation Comments

 

             TRIGLYCERIDES (BEAKER) (test code = 81 mg/dL                       

        



             540)                                                

 

             CHOLESTEROL (BEAKER) (test code = 155 mg/dL                        

      



             631)                                                

 

             HDL CHOLESTEROL (BEAKER) (test code 50 mg/dL                       

        



             = 976)                                              

 

             LDL CHOLESTEROL CALCULATED (BEAKER) 89 mg/dL                       

        



             (test code = 633)                                        



Triglyceride Reference Range:   Low Risk         &lt;150   Borderline    150-199
  High Risk     200-499   Very High Risk  &gt;=500Cholesterol Reference Range:  
Low Risk         &lt;200   Borderline 200-239    High Risk        &gt;240HDL 
Cholesterol Reference Range:   Low Risk         &gt;=60   High Risk         
&lt;40LDL Cholesterol Reference Range:   Optimal          &lt;100   Near Optimal
 100-129   Borderline    130-159   High          160-189   Very High       
&gt;=190    ID - AMANDA CBASIC METABOLIC [id number] 10:29:00





             Test Item    Value        Reference Range Interpretation Comments

 

             SODIUM (BEAKER) 140 meq/L    136-145                   



             (test code = 381)                                        

 

             POTASSIUM (BEAKER) 4.6 meq/L    3.5-5.1                   



             (test code = 379)                                        

 

             CHLORIDE (BEAKER) 104 meq/L                        



             (test code = 382)                                        

 

             CO2 (BEAKER) (test 29 meq/L     22-29                     



             code = 355)                                         

 

             BLOOD UREA NITROGEN 19 mg/dL     7-21                      



             (BEAKER) (test code                                        



             = 354)                                              

 

             CREATININE (BEAKER) 1.13 mg/dL   0.57-1.25                 



             (test code = 358)                                        

 

             GLUCOSE RANDOM 105 mg/dL                        



             (BEAKER) (test code                                        



             = 652)                                              

 

             CALCIUM (BEAKER) 9.7 mg/dL    8.4-10.2                  



             (test code = 697)                                        

 

             EGFR (BEAKER) (test 68 mL/min/1.73                           ESTIMA

PHOEBE GFR IS



             code = 1092) sq m                                   NOT AS ACCURATE

 AS



                                                                 CREATININE



                                                                 CLEARANCE IN



                                                                 PREDICTING



                                                                 GLOMERULAR



                                                                 FILTRATION RATE

.



                                                                 ESTIMATED GFR I

S



                                                                 NOT APPLICABLE 

FOR



                                                                 DIALYSIS PATIEN

TS.



 ID - [facility] FUNCTION [id number] 10:29:00





             Test Item    Value        Reference Range Interpretation Comments

 

             TOTAL PROTEIN (BEAKER) (test code = 7.5 gm/dL    6.0-8.3           

        



             770)                                                

 

             ALBUMIN (BEAKER) (test code = 1145) 4.7 g/dL     3.5-5.0           

        

 

             BILIRUBIN TOTAL (BEAKER) (test code 0.7 mg/dL    0.2-1.2           

        



             = 377)                                              

 

             BILIRUBIN DIRECT (BEAKER) (test 0.3 mg/dL    0.1-0.5               

    



             code = 706)                                         

 

             ALKALINE PHOSPHATASE (BEAKER) (test 78 U/L                   

        



             code = 346)                                         

 

             AST (SGOT) (BEAKER) (test code = 19 U/L       5-34                 

     



             353)                                                

 

             ALT (SGPT) (BEAKER) (test code = 14 U/L       6-55                 

     



             347)                                                



 ID - AMANDA KEYONNAD, CHEST, 2 [id number] 10:21:00NEW CARDIOLOGIST 
OBERTONReason for Exam:-&gt;heart transplant annual follow upFINAL REPORT 
PATIENT ID:   [id number] INDICATION: heart transplant annual follow up COMPARISON:
None TECHNIQUE: Frontal and lateral views of the chest. FINDINGS: Lungs and 
pleura: Clear lungs. No effusion.Heart and mediastinum: Normal heart size. 
Unremarkable mediastinal contours.Osseous structures: No acute 
abnormality.Additional findings: None.  IMPRESSION: No acute intrathoracic 
abnormality. Signed:Shikha Jarviseport Verified Date/Time:  2020 
10:21:38 Reading Location: [facility] Radiology Reading Room  Electronically 
signed by: SHIKHA JARVIS MD on 2020 10:21 [id number] 
10:10:00





             Test Item    Value        Reference Range Interpretation Comments

 

             PROSTATE SPECIFIC ANTIGEN (BEAKER) 0.6 ng/mL    0.0-4.0            

       



             (test code = 844)                                        



 ID - AMANDA CCBC W/PLT COUNT &amp; AUTO [id number] 09:25:00





             Test Item    Value        Reference Range Interpretation Comments

 

             WHITE BLOOD CELL COUNT (BEAKER) 4.5 K/ L     3.5-10.5              

    



             (test code = 775)                                        

 

             RED BLOOD CELL COUNT (BEAKER) 5.64 M/ L    4.63-6.08               

  



             (test code = 761)                                        

 

             HEMOGLOBIN (BEAKER) (test code = 16.9 GM/DL   13.7-17.5            

     



             410)                                                

 

             HEMATOCRIT (BEAKER) (test code = 48.8 %       40.1-51.0            

     



             411)                                                

 

             MEAN CORPUSCULAR VOLUME (BEAKER) 86.5 fL      79.0-92.2            

     



             (test code = 753)                                        

 

             MEAN CORPUSCULAR HEMOGLOBIN 30.0 pg      25.7-32.2                 



             (BEAKER) (test code = 751)                                        

 

             MEAN CORPUSCULAR HEMOGLOBIN CONC 34.6 GM/DL   32.3-36.5            

     



             (BEAKER) (test code = 752)                                        

 

             RED CELL DISTRIBUTION WIDTH 13.3 %       11.6-14.4                 



             (BEAKER) (test code = 412)                                        

 

             PLATELET COUNT (BEAKER) (test 152 K/CU MM  150-450                 

  



             code = 756)                                         

 

             MEAN PLATELET VOLUME (BEAKER) 9.5 fL       9.4-12.4                

  



             (test code = 754)                                        

 

             NUCLEATED RED BLOOD CELLS 0 /100 WBC   0-0                       



             (BEAKER) (test code = 413)                                        

 

             NEUTROPHILS RELATIVE PERCENT 50 %                                  

 



             (BEAKER) (test code = 429)                                        

 

             LYMPHOCYTES RELATIVE PERCENT 32 %                                  

 



             (BEAKER) (test code = 430)                                        

 

             MONOCYTES RELATIVE PERCENT 11 %                                   



             (BEAKER) (test code = 431)                                        

 

             EOSINOPHILS RELATIVE PERCENT 6 %                                   

 



             (BEAKER) (test code = 432)                                        

 

             BASOPHILS RELATIVE PERCENT 1 %                                    



             (BEAKER) (test code = 437)                                        

 

             NEUTROPHILS ABSOLUTE COUNT 2.25 K/ L    1.78-5.38                 



             (BEAKER) (test code = 670)                                        

 

             LYMPHOCYTES ABSOLUTE COUNT 1.45 K/ L    1.32-3.57                 



             (BEAKER) (test code = 414)                                        

 

             MONOCYTES ABSOLUTE COUNT (BEAKER) 0.50 K/ L    0.30-0.82           

      



             (test code = 415)                                        

 

             EOSINOPHILS ABSOLUTE COUNT 0.25 K/ L    0.04-0.54                 



             (BEAKER) (test code = 416)                                        

 

             BASOPHILS ABSOLUTE COUNT (BEAKER) 0.03 K/ L    0.01-0.08           

      



             (test code = 417)                                        

 

             IMMATURE GRANULOCYTES-RELATIVE 0 %          0-1                    

   



             PERCENT (BEAKER) (test code =                                      

  



             2801)                                               



TACROLIMUS [id number] 11:25:00





             Test Item    Value        Reference Range Interpretation Comments

 

             TACROLIMUS BLOOD (BEAKER) (test 6.9 ng/mL    10.0-20.0    L        

    



             code = 657)                                         



BASIC METABOLIC [id number] 08:24:00





             Test Item    Value        Reference Range Interpretation Comments

 

             SODIUM (BEAKER) 137 meq/L    136-145                   



             (test code = 381)                                        

 

             POTASSIUM (BEAKER) 4.3 meq/L    3.5-5.1                   



             (test code = 379)                                        

 

             CHLORIDE (BEAKER) 103 meq/L                        



             (test code = 382)                                        

 

             CO2 (BEAKER) (test 28 meq/L     22-29                     



             code = 355)                                         

 

             BLOOD UREA NITROGEN 18 mg/dL     7-21                      



             (BEAKER) (test code                                        



             = 354)                                              

 

             CREATININE (BEAKER) 1.12 mg/dL   0.57-1.25                 



             (test code = 358)                                        

 

             GLUCOSE RANDOM 98 mg/dL                         



             (BEAKER) (test code                                        



             = 652)                                              

 

             CALCIUM (BEAKER) 9.1 mg/dL    8.4-10.2                  



             (test code = 697)                                        

 

             EGFR (BEAKER) (test 69 mL/min/1.73                           ESTIMA

PHOEBE GFR IS



             code = 1092) sq m                                   NOT AS ACCURATE

 AS



                                                                 CREATININE



                                                                 CLEARANCE IN



                                                                 PREDICTING



                                                                 GLOMERULAR



                                                                 FILTRATION RATE

.



                                                                 ESTIMATED GFR I

S



                                                                 NOT APPLICABLE 

FOR



                                                                 DIALYSIS PATIEN

TS.



CBC W/PLT COUNT &amp; AUTO [id number] 08:01:00





             Test Item    Value        Reference Range Interpretation Comments

 

             WHITE BLOOD CELL COUNT (BEAKER) 5.4 K/ L     3.5-10.5              

    



             (test code = 775)                                        

 

             RED BLOOD CELL COUNT (BEAKER) 5.35 M/ L    4.63-6.08               

  



             (test code = 761)                                        

 

             HEMOGLOBIN (BEAKER) (test code = 15.6 GM/DL   13.7-17.5            

     



             410)                                                

 

             HEMATOCRIT (BEAKER) (test code = 46.9 %       40.1-51.0            

     



             411)                                                

 

             MEAN CORPUSCULAR VOLUME (BEAKER) 87.7 fL      79.0-92.2            

     



             (test code = 753)                                        

 

             MEAN CORPUSCULAR HEMOGLOBIN 29.2 pg      25.7-32.2                 



             (BEAKER) (test code = 751)                                        

 

             MEAN CORPUSCULAR HEMOGLOBIN CONC 33.3 GM/DL   32.3-36.5            

     



             (BEAKER) (test code = 752)                                        

 

             RED CELL DISTRIBUTION WIDTH 13.6 %       11.6-14.4                 



             (BEAKER) (test code = 412)                                        

 

             PLATELET COUNT (BEAKER) (test 132 K/CU MM  150-450      L          

  



             code = 756)                                         

 

             MEAN PLATELET VOLUME (BEAKER) 9.3 fL       9.4-12.4     L          

  



             (test code = 754)                                        

 

             NUCLEATED RED BLOOD CELLS 0 /100 WBC   0-0                       



             (BEAKER) (test code = 413)                                        

 

             NEUTROPHILS RELATIVE PERCENT 70 %                                  

 



             (BEAKER) (test code = 429)                                        

 

             LYMPHOCYTES RELATIVE PERCENT 16 %                                  

 



             (BEAKER) (test code = 430)                                        

 

             MONOCYTES RELATIVE PERCENT 8 %                                    



             (BEAKER) (test code = 431)                                        

 

             EOSINOPHILS RELATIVE PERCENT 5 %                                   

 



             (BEAKER) (test code = 432)                                        

 

             BASOPHILS RELATIVE PERCENT 1 %                                    



             (BEAKER) (test code = 437)                                        

 

             NEUTROPHILS ABSOLUTE COUNT 3.77 K/ L    1.78-5.38                 



             (BEAKER) (test code = 670)                                        

 

             LYMPHOCYTES ABSOLUTE COUNT 0.83 K/ L    1.32-3.57    L            



             (BEAKER) (test code = 414)                                        

 

             MONOCYTES ABSOLUTE COUNT (BEAKER) 0.45 K/ L    0.30-0.82           

      



             (test code = 415)                                        

 

             EOSINOPHILS ABSOLUTE COUNT 0.25 K/ L    0.04-0.54                 



             (BEAKER) (test code = 416)                                        

 

             BASOPHILS ABSOLUTE COUNT (BEAKER) 0.04 K/ L    0.01-0.08           

      



             (test code = 417)                                        

 

             IMMATURE GRANULOCYTES-RELATIVE 0 %          0-1                    

   



             PERCENT (BEAKER) (test code =                                      

  



             2801)                                               



CT, BRAIN, WITHOUT [id number] 17:41:00NEW CARDIOLOGIST OBERTONFINAL 
REPORT PATIENT ID:   [id number] CT, BRAIN, WITHOUT CONTRAST INDICATION: head 
injury TECHNIQUE: Noncontrast axial imaging was obtained from the vertex to the 
skull base. Axial images were reconstructed using a bone algorithm. DOSE 
REDUCTION: Dose modulation, iterative reconstruction, and/or weight-based 
adjustment of the mA/kV was utilized to reduce the radiation dose to as low as 
reasonably achievable. COMPARISON: None. FINDINGS: Cerebral parenchyma: Mild 
cerebral volume loss is present. No recent infarct or parenchymal hemorrhage is 
present.Midline structures: Normally positioned.Cerebellum and brainstem: 
Commensurate volume loss.Ventricles: Normal volume.Extra-axial spaces: 
Unremarkable. Calvarium and skull base: Intact.Paranasal sinuses and mastoid air
cells: Visible chambers are clear.Orbital contents: Included portions 
unremarkable. Additional findings: None.  IMPRESSION:  Chronic involutional 
changes without acute or traumatic intracranial abnormality. Signed: 
JR Aguila RobertMDRepstephan Verified Date/Time:  2019 17:41:54 
Reading Location: [facility] Radiology Reading Room    Electronically signed 
by: MOHIT AGUILA on 2019 05:41 PMTACROLIMUS [id number] 
10:33:00





             Test Item    Value        Reference Range Interpretation Comments

 

             TACROLIMUS BLOOD (BEAKER) (test 6.2 ng/mL    10.0-20.0    L        

    



             code = 657)                                         



[id number] 09:15:00





             Test Item    Value        Reference Range Interpretation Comments

 

             PROSTATE SPECIFIC ANTIGEN (BEAKER) 0.5 ng/mL    0.0-4.0            

       



             (test code = 844)                                        



LIPID [id number] 09:01:00





             Test Item    Value        Reference Range Interpretation Comments

 

             TRIGLYCERIDES (BEAKER) (test code = 63 mg/dL                       

        



             540)                                                

 

             CHOLESTEROL (BEAKER) (test code = 126 mg/dL                        

      



             631)                                                

 

             HDL CHOLESTEROL (BEAKER) (test code 42 mg/dL                       

        



             = 976)                                              

 

             LDL CHOLESTEROL CALCULATED (BEAKER) 71 mg/dL                       

        



             (test code = 633)                                        



Triglyceride Reference Range:   Low Risk         &lt;150   Borderline    150-199
  High Risk     200-499   Very High Risk  &gt;=500Cholesterol Reference Range:  
Low Risk         &lt;200   Borderline 200-239    High Risk        &gt;240HDL 
Cholesterol Reference Range:   Low Risk         &gt;=60   High Risk         
&lt;40LDL Cholesterol Reference Range:   Optimal          &lt;100   Near Optimal
 100-129   Borderline    130-159   High          160-189   Very High       
&gt;=190BASIC METABOLIC [id number] 09:01:00





             Test Item    Value        Reference Range Interpretation Comments

 

             SODIUM (BEAKER) 143 meq/L    136-145                   



             (test code = 381)                                        

 

             POTASSIUM (BEAKER) 4.3 meq/L    3.5-5.1                   



             (test code = 379)                                        

 

             CHLORIDE (BEAKER) 105 meq/L                        



             (test code = 382)                                        

 

             CO2 (BEAKER) (test 30 meq/L     22-29        H            



             code = 355)                                         

 

             BLOOD UREA NITROGEN 16 mg/dL     7-21                      



             (BEAKER) (test code                                        



             = 354)                                              

 

             CREATININE (BEAKER) 0.96 mg/dL   0.57-1.25                 



             (test code = 358)                                        

 

             GLUCOSE RANDOM 108 mg/dL           H            



             (BEAKER) (test code                                        



             = 652)                                              

 

             CALCIUM (BEAKER) 9.7 mg/dL    8.4-10.2                  



             (test code = 697)                                        

 

             EGFR (BEAKER) (test 83 mL/min/1.73                           ESTIMA

PHOEBE GFR IS



             code = 1092) sq m                                   NOT AS ACCURATE

 AS



                                                                 CREATININE



                                                                 CLEARANCE IN



                                                                 PREDICTING



                                                                 GLOMERULAR



                                                                 FILTRATION RATE

.



                                                                 ESTIMATED GFR I

S



                                                                 NOT APPLICABLE 

FOR



                                                                 DIALYSIS PATIEN

TS.



HEPATIC FUNCTION [id number] 09:01:00





             Test Item    Value        Reference Range Interpretation Comments

 

             TOTAL PROTEIN (BEAKER) (test code = 7.0 gm/dL    6.0-8.3           

        



             770)                                                

 

             ALBUMIN (BEAKER) (test code = 1145) 4.5 g/dL     3.5-5.0           

        

 

             BILIRUBIN TOTAL (BEAKER) (test code 0.5 mg/dL    0.2-1.2           

        



             = 377)                                              

 

             BILIRUBIN DIRECT (BEAKER) (test 0.3 mg/dL    0.1-0.5               

    



             code = 706)                                         

 

             ALKALINE PHOSPHATASE (BEAKER) (test 81 U/L                   

        



             code = 346)                                         

 

             AST (SGOT) (BEAKER) (test code = 18 U/L       5-34                 

     



             353)                                                

 

             ALT (SGPT) (BEAKER) (test code = 14 U/L       6-55                 

     



             347)                                                



RAD, CHEST, 2 [id number] 08:48:00NEW CARDIOLOGIST OBERTONReason for Exam:-
&gt;heart transplant annual follow upFINAL REPORT PATIENT ID:   [id number] 
INDICATION: heart transplant annual follow up COMPARISON: May 23, 2018 
TECHNIQUE: Chest radiograph, two views, PA and lateral. FINDINGS / 
IMPRESSION:Lung volumes arenormal and lungs are clear. Intact median sternotomy 
wires and left axillary/subclavian surgical clips again demonstrated from prior 
heart transplant. Cardiac and mediastinal contours normal. No pleural effusion 
or pneumothorax. Osseous structures unremarkable. Signed: Marlee Julio 
MDReport Verified Date/Time:  2019 08:48:33 Reading Location: [facility] Radiology Reading Room Electronically signed by: MARLEE JULIO M.D. on 2019 08:48 AMCBC W/PLT COUNT &amp; AUTO [id number] 
08:28:00





             Test Item    Value        Reference Range Interpretation Comments

 

             WHITE BLOOD CELL COUNT (BEAKER) 5.2 K/ L     3.5-10.5              

    



             (test code = 775)                                        

 

             RED BLOOD CELL COUNT (BEAKER) 5.18 M/ L    4.63-6.08               

  



             (test code = 761)                                        

 

             HEMOGLOBIN (BEAKER) (test code = 15.1 GM/DL   13.7-17.5            

     



             410)                                                

 

             HEMATOCRIT (BEAKER) (test code = 45.4 %       40.1-51.0            

     



             411)                                                

 

             MEAN CORPUSCULAR VOLUME (BEAKER) 87.6 fL      79.0-92.2            

     



             (test code = 753)                                        

 

             MEAN CORPUSCULAR HEMOGLOBIN 29.2 pg      25.7-32.2                 



             (BEAKER) (test code = 751)                                        

 

             MEAN CORPUSCULAR HEMOGLOBIN CONC 33.3 GM/DL   32.3-36.5            

     



             (BEAKER) (test code = 752)                                        

 

             RED CELL DISTRIBUTION WIDTH 13.3 %       11.6-14.4                 



             (BEAKER) (test code = 412)                                        

 

             PLATELET COUNT (BEAKER) (test 135 K/CU MM  150-450      L          

  



             code = 756)                                         

 

             MEAN PLATELET VOLUME (BEAKER) 9.6 fL       9.4-12.4                

  



             (test code = 754)                                        

 

             NUCLEATED RED BLOOD CELLS 0 /100 WBC   0-0                       



             (BEAKER) (test code = 413)                                        

 

             NEUTROPHILS RELATIVE PERCENT 55 %                                  

 



             (BEAKER) (test code = 429)                                        

 

             LYMPHOCYTES RELATIVE PERCENT 29 %                                  

 



             (BEAKER) (test code = 430)                                        

 

             MONOCYTES RELATIVE PERCENT 10 %                                   



             (BEAKER) (test code = 431)                                        

 

             EOSINOPHILS RELATIVE PERCENT 5 %                                   

 



             (BEAKER) (test code = 432)                                        

 

             BASOPHILS RELATIVE PERCENT 1 %                                    



             (BEAKER) (test code = 437)                                        

 

             NEUTROPHILS ABSOLUTE COUNT 2.88 K/ L    1.78-5.38                 



             (BEAKER) (test code = 670)                                        

 

             LYMPHOCYTES ABSOLUTE COUNT 1.52 K/ L    1.32-3.57                 



             (BEAKER) (test code = 414)                                        

 

             MONOCYTES ABSOLUTE COUNT (BEAKER) 0.51 K/ L    0.30-0.82           

      



             (test code = 415)                                        

 

             EOSINOPHILS ABSOLUTE COUNT 0.24 K/ L    0.04-0.54                 



             (BEAKER) (test code = 416)                                        

 

             BASOPHILS ABSOLUTE COUNT (BEAKER) 0.05 K/ L    0.01-0.08           

      



             (test code = 417)                                        

 

             IMMATURE GRANULOCYTES-RELATIVE 1 %          0-1                    

   



             PERCENT (BEAKER) (test code =                                      

  



             2801)                                               



TACROLIMUS [id number] 13:15:00





             Test Item    Value        Reference Range Interpretation Comments

 

             TACROLIMUS BLOOD (BEAKER) (test 6.9 ng/mL    10.0-20.0    L        

    



             code = 657)                                         



BASIC METABOLIC [id number] 10:45:00





             Test Item    Value        Reference Range Interpretation Comments

 

             SODIUM (BEAKER) 139 meq/L    136-145                   



             (test code = 381)                                        

 

             POTASSIUM (BEAKER) 4.8 meq/L    3.5-5.1                   



             (test code = 379)                                        

 

             CHLORIDE (BEAKER) 102 meq/L                        



             (test code = 382)                                        

 

             CO2 (BEAKER) (test 30 meq/L     22-29        H            



             code = 355)                                         

 

             BLOOD UREA NITROGEN 14 mg/dL     7-21                      



             (BEAKER) (test code                                        



             = 354)                                              

 

             CREATININE (BEAKER) 0.95 mg/dL   0.57-1.25                 



             (test code = 358)                                        

 

             GLUCOSE RANDOM 88 mg/dL                         



             (BEAKER) (test code                                        



             = 652)                                              

 

             CALCIUM (BEAKER) 10.1 mg/dL   8.4-10.2                  



             (test code = 697)                                        

 

             EGFR (BEAKER) (test 84 mL/min/1.73                           ESTIMA

PHOEBE GFR IS



             code = 1092) sq m                                   NOT AS ACCURATE

 AS



                                                                 CREATININE



                                                                 CLEARANCE IN



                                                                 PREDICTING



                                                                 GLOMERULAR



                                                                 FILTRATION RATE

.



                                                                 ESTIMATED GFR I

S



                                                                 NOT APPLICABLE 

FOR



                                                                 DIALYSIS PATIEN

TS.



CBC W/PLT COUNT &amp; AUTO [id number] 10:31:00





             Test Item    Value        Reference Range Interpretation Comments

 

             WHITE BLOOD CELL COUNT (BEAKER) 5.0 K/ L     3.5-10.5              

    



             (test code = 775)                                        

 

             RED BLOOD CELL COUNT (BEAKER) 5.55 M/ L    4.63-6.08               

  



             (test code = 761)                                        

 

             HEMOGLOBIN (BEAKER) (test code = 15.8 GM/DL   13.7-17.5            

     



             410)                                                

 

             HEMATOCRIT (BEAKER) (test code = 47.9 %       40.1-51.0            

     



             411)                                                

 

             MEAN CORPUSCULAR VOLUME (BEAKER) 86.3 fL      79.0-92.2            

     



             (test code = 753)                                        

 

             MEAN CORPUSCULAR HEMOGLOBIN 28.5 pg      25.7-32.2                 



             (BEAKER) (test code = 751)                                        

 

             MEAN CORPUSCULAR HEMOGLOBIN CONC 33.0 GM/DL   32.3-36.5            

     



             (BEAKER) (test code = 752)                                        

 

             RED CELL DISTRIBUTION WIDTH 13.2 %       11.6-14.4                 



             (BEAKER) (test code = 412)                                        

 

             PLATELET COUNT (BEAKER) (test 131 K/CU MM  150-450      L          

  



             code = 756)                                         

 

             MEAN PLATELET VOLUME (BEAKER) 9.5 fL       9.4-12.4                

  



             (test code = 754)                                        

 

             NUCLEATED RED BLOOD CELLS 0 /100 WBC   0-0                       



             (BEAKER) (test code = 413)                                        

 

             NEUTROPHILS RELATIVE PERCENT 56 %                                  

 



             (BEAKER) (test code = 429)                                        

 

             LYMPHOCYTES RELATIVE PERCENT 26 %                                  

 



             (BEAKER) (test code = 430)                                        

 

             MONOCYTES RELATIVE PERCENT 12 %                                   



             (BEAKER) (test code = 431)                                        

 

             EOSINOPHILS RELATIVE PERCENT 4 %                                   

 



             (BEAKER) (test code = 432)                                        

 

             BASOPHILS RELATIVE PERCENT 1 %                                    



             (BEAKER) (test code = 437)                                        

 

             NEUTROPHILS ABSOLUTE COUNT 2.80 K/ L    1.78-5.38                 



             (BEAKER) (test code = 670)                                        

 

             LYMPHOCYTES ABSOLUTE COUNT 1.32 K/ L    1.32-3.57                 



             (BEAKER) (test code = 414)                                        

 

             MONOCYTES ABSOLUTE COUNT (BEAKER) 0.59 K/ L    0.30-0.82           

      



             (test code = 415)                                        

 

             EOSINOPHILS ABSOLUTE COUNT 0.21 K/ L    0.04-0.54                 



             (BEAKER) (test code = 416)                                        

 

             BASOPHILS ABSOLUTE COUNT (BEAKER) 0.04 K/ L    0.01-0.08           

      



             (test code = 417)                                        

 

             IMMATURE GRANULOCYTES-RELATIVE 1 %          0-1                    

   



             PERCENT (BEAKER) (test code =                                      

  



             2801)                                               



BASIC METABOLIC [id number] 09:28:00





             Test Item    Value        Reference Range Interpretation Comments

 

             SODIUM (BEAKER) 140 meq/L    136-145                   



             (test code = 381)                                        

 

             POTASSIUM (BEAKER) 4.7 meq/L    3.5-5.1                   



             (test code = 379)                                        

 

             CHLORIDE (BEAKER) 103 meq/L                        



             (test code = 382)                                        

 

             CO2 (BEAKER) (test 30 meq/L     22-29        H            



             code = 355)                                         

 

             BLOOD UREA NITROGEN 20 mg/dL     7-21                      



             (BEAKER) (test code                                        



             = 354)                                              

 

             CREATININE (BEAKER) 1.08 mg/dL   0.57-1.25                 



             (test code = 358)                                        

 

             GLUCOSE RANDOM 110 mg/dL           H            



             (BEAKER) (test code                                        



             = 652)                                              

 

             CALCIUM (BEAKER) 10.0 mg/dL   8.4-10.2                  



             (test code = 697)                                        

 

             EGFR (BEAKER) (test 73 mL/min/1.73                           ESTIMA

PHOEBE GFR IS



             code = 1092) sq m                                   NOT AS ACCURATE

 AS



                                                                 CREATININE



                                                                 CLEARANCE IN



                                                                 PREDICTING



                                                                 GLOMERULAR



                                                                 FILTRATION RATE

.



                                                                 ESTIMATED GFR I

S



                                                                 NOT APPLICABLE 

FOR



                                                                 DIALYSIS PATIEN

TS.



CBC W/PLT COUNT &amp; AUTO [id number] 08:58:00





             Test Item    Value        Reference Range Interpretation Comments

 

             WHITE BLOOD CELL COUNT (BEAKER) 4.9 K/ L     3.5-10.5              

    



             (test code = 775)                                        

 

             RED BLOOD CELL COUNT (BEAKER) 5.37 M/ L    4.63-6.08               

  



             (test code = 761)                                        

 

             HEMOGLOBIN (BEAKER) (test code = 15.5 GM/DL   13.7-17.5            

     



             410)                                                

 

             HEMATOCRIT (BEAKER) (test code = 46.8 %       40.1-51.0            

     



             411)                                                

 

             MEAN CORPUSCULAR VOLUME (BEAKER) 87.2 fL      79.0-92.2            

     



             (test code = 753)                                        

 

             MEAN CORPUSCULAR HEMOGLOBIN 28.9 pg      25.7-32.2                 



             (BEAKER) (test code = 751)                                        

 

             MEAN CORPUSCULAR HEMOGLOBIN CONC 33.1 GM/DL   32.3-36.5            

     



             (BEAKER) (test code = 752)                                        

 

             RED CELL DISTRIBUTION WIDTH 13.3 %       11.6-14.4                 



             (BEAKER) (test code = 412)                                        

 

             PLATELET COUNT (BEAKER) (test 130 K/CU MM  150-450      L          

  



             code = 756)                                         

 

             MEAN PLATELET VOLUME (BEAKER) 9.6 fL       9.4-12.4                

  



             (test code = 754)                                        

 

             NUCLEATED RED BLOOD CELLS 0 /100 WBC   0-0                       



             (BEAKER) (test code = 413)                                        

 

             NEUTROPHILS RELATIVE PERCENT 65 %                                  

 



             (BEAKER) (test code = 429)                                        

 

             LYMPHOCYTES RELATIVE PERCENT 20 %                                  

 



             (BEAKER) (test code = 430)                                        

 

             MONOCYTES RELATIVE PERCENT 10 %                                   



             (BEAKER) (test code = 431)                                        

 

             EOSINOPHILS RELATIVE PERCENT 4 %                                   

 



             (BEAKER) (test code = 432)                                        

 

             BASOPHILS RELATIVE PERCENT 1 %                                    



             (BEAKER) (test code = 437)                                        

 

             NEUTROPHILS ABSOLUTE COUNT 3.16 K/ L    1.78-5.38                 



             (BEAKER) (test code = 670)                                        

 

             LYMPHOCYTES ABSOLUTE COUNT 0.99 K/ L    1.32-3.57    L            



             (BEAKER) (test code = 414)                                        

 

             MONOCYTES ABSOLUTE COUNT (BEAKER) 0.49 K/ L    0.30-0.82           

      



             (test code = 415)                                        

 

             EOSINOPHILS ABSOLUTE COUNT 0.17 K/ L    0.04-0.54                 



             (BEAKER) (test code = 416)                                        

 

             BASOPHILS ABSOLUTE COUNT (BEAKER) 0.04 K/ L    0.01-0.08           

      



             (test code = 417)                                        

 

             IMMATURE GRANULOCYTES-RELATIVE 1 %          0-1                    

   



             PERCENT (BEAKER) (test code =                                      

  



             2801)                                               



TACROLIMUS [id number] 13:26:00





             Test Item    Value        Reference Range Interpretation Comments

 

             TACROLIMUS BLOOD (BEAKER) (test 4.1 ng/mL    10.0-20.0    L        

    



             code = 657)                                         



RAD, CHEST, 2 [id number] 14:13:00NEW CARDIOLOGIST OBERTONReason for Exam:-
&gt;heart transplant annual follow upFINAL REPORT PATIENT ID:   [id number] Chest 
two views INDICATION: Heart transplant annual follow-up. COMPARISON: 2017 
IMPRESSION: There is no focal consolidation, vascular congestion, pleural 
effusion, or pneumothorax. Heart size is within normal limits. Median sternotomy
changes, left axillary surgical clips, and gastric sleeve with small hiatal 
hernia are again noted. No significant change since 2017. Signed: Ac Nickerson Verified Date/Time:  2018 14:13:48 Reading Location: 
[address] Consult Reading Room   Electronically signed by: AC NICKERSON M.D. on 2018 02:13 PMTACROLIMUS [id number] 13:55:00





             Test Item    Value        Reference Range Interpretation Comments

 

             TACROLIMUS BLOOD (BEAKER) (test 4.7 ng/mL    10.0-20.0    L        

    



             code = 657)                                         



COMPREHENSIVE METABOLIC [id number] 12:11:00





             Test Item    Value        Reference Range Interpretation Comments

 

             TOTAL PROTEIN 6.5 gm/dL    6.0-8.3                   



             (BEAKER) (test code =                                        



             770)                                                

 

             ALBUMIN (BEAKER) 4.2 g/dL     3.5-5.0                   



             (test code = 1145)                                        

 

             ALKALINE PHOSPHATASE 98 U/L                           



             (BEAKER) (test code =                                        



             346)                                                

 

             BILIRUBIN TOTAL 0.4 mg/dL    0.2-1.2                   



             (BEAKER) (test code =                                        



             377)                                                

 

             SODIUM (BEAKER) (test 141 meq/L    136-145                   



             code = 381)                                         

 

             POTASSIUM (BEAKER) 4.3 meq/L    3.5-5.1                   



             (test code = 379)                                        

 

             CHLORIDE (BEAKER) 103 meq/L                        



             (test code = 382)                                        

 

             CO2 (BEAKER) (test 29 meq/L     22-29                     



             code = 355)                                         

 

             BLOOD UREA NITROGEN 17 mg/dL     7-21                      



             (BEAKER) (test code =                                        



             354)                                                

 

             CREATININE (BEAKER) 1.00 mg/dL   0.57-1.25                 



             (test code = 358)                                        

 

             GLUCOSE RANDOM 101 mg/dL                        



             (BEAKER) (test code =                                        



             652)                                                

 

             CALCIUM (BEAKER) 9.5 mg/dL    8.4-10.2                  



             (test code = 697)                                        

 

             AST (SGOT) (BEAKER) 15 U/L       5-34                      



             (test code = 353)                                        

 

             ALT (SGPT) (BEAKER) 12 U/L       6-55                      



             (test code = 347)                                        

 

             EGFR (BEAKER) (test 79 mL/min/1.73                           ESTIMA

PHOEBE GFR IS



             code = 1092) sq m                                   NOT AS ACCURATE

 AS



                                                                 CREATININE



                                                                 CLEARANCE IN



                                                                 PREDICTING



                                                                 GLOMERULAR



                                                                 FILTRATION RATE

.



                                                                 ESTIMATED GFR I

S



                                                                 NOT APPLICABLE 

FOR



                                                                 DIALYSIS PATIEN

TS.



LIPID [id number] 11:47:00





             Test Item    Value        Reference Range Interpretation Comments

 

             TRIGLYCERIDES (BEAKER) (test code = 87 mg/dL                       

        



             540)                                                

 

             CHOLESTEROL (BEAKER) (test code = 127 mg/dL                        

      



             631)                                                

 

             HDL CHOLESTEROL (BEAKER) (test code 37 mg/dL                       

        



             = 976)                                              

 

             LDL CHOLESTEROL CALCULATED (BEAKER) 73 mg/dL                       

        



             (test code = 633)                                        



Triglyceride Reference Range:   Low Risk         &lt;150   Borderline    150-199
  High Risk     200-499   Very High Risk  &gt;=500Cholesterol Reference Range:  
Low Risk         &lt;200   Borderline 200-239    High Risk        &gt;240HDL 
Cholesterol Reference Range:   Low Risk         &gt;=60   High Risk         
&lt;40LDL Cholesterol Reference Range:   Optimal          &lt;100   Near Optimal
 100-129   Borderline    130-159   High          160-189   Very High       
&gt;=190PROTHROMBIN [id number] 11:30:00





             Test Item    Value        Reference Range Interpretation Comments

 

             PROTIME (BEAKER) (test code = 14.7 seconds 11.7-14.7               

  



             759)                                                

 

             INR (BEAKER) (test code = 370) 1.2          <=5.9                  

   



RECOMMENDED COUMADIN/WARFARIN INR THERAPY RANGESSTANDARD DOSE: 2.0 - 3.0   
Includes: PROPHYLAXIS forvenous thrombosis, systemic embolization; TREATMENT for
venous thrombosis and/or pulmonary embolus.HIGH RISK: Target INR is 2.5-3.5 for 
patients with mechanical heart valves.CBC W/PLT COUNT &amp; AUTO DIFFERENTIAL
2018 11:23:00





             Test Item    Value        Reference Range Interpretation Comments

 

             WHITE BLOOD CELL COUNT (BEAKER) 4.8 K/ L     3.5-10.5              

    



             (test code = 775)                                        

 

             RED BLOOD CELL COUNT (BEAKER) 5.26 M/ L    4.63-6.08               

  



             (test code = 761)                                        

 

             HEMOGLOBIN (BEAKER) (test code = 15.1 GM/DL   13.7-17.5            

     



             410)                                                

 

             HEMATOCRIT (BEAKER) (test code = 45.8 %       40.1-51.0            

     



             411)                                                

 

             MEAN CORPUSCULAR VOLUME (BEAKER) 87.1 fL      79.0-92.2            

     



             (test code = 753)                                        

 

             MEAN CORPUSCULAR HEMOGLOBIN 28.7 pg      25.7-32.2                 



             (BEAKER) (test code = 751)                                        

 

             MEAN CORPUSCULAR HEMOGLOBIN CONC 33.0 GM/DL   32.3-36.5            

     



             (BEAKER) (test code = 752)                                        

 

             RED CELL DISTRIBUTION WIDTH 13.4 %       11.6-14.4                 



             (BEAKER) (test code = 412)                                        

 

             PLATELET COUNT (BEAKER) (test 161 K/CU MM  150-450                 

  



             code = 756)                                         

 

             MEAN PLATELET VOLUME (BEAKER) 9.0 fL       9.4-12.4     L          

  



             (test code = 754)                                        

 

             NUCLEATED RED BLOOD CELLS 0 /100 WBC   0-0                       



             (BEAKER) (test code = 413)                                        

 

             NEUTROPHILS RELATIVE PERCENT 67 %                                  

 



             (BEAKER) (test code = 429)                                        

 

             LYMPHOCYTES RELATIVE PERCENT 19 %                                  

 



             (BEAKER) (test code = 430)                                        

 

             MONOCYTES RELATIVE PERCENT 10 %                                   



             (BEAKER) (test code = 431)                                        

 

             EOSINOPHILS RELATIVE PERCENT 3 %                                   

 



             (BEAKER) (test code = 432)                                        

 

             BASOPHILS RELATIVE PERCENT 1 %                                    



             (BEAKER) (test code = 437)                                        

 

             NEUTROPHILS ABSOLUTE COUNT 3.21 K/ L    1.78-5.38                 



             (BEAKER) (test code = 670)                                        

 

             LYMPHOCYTES ABSOLUTE COUNT 0.93 K/ L    1.32-3.57    L            



             (BEAKER) (test code = 414)                                        

 

             MONOCYTES ABSOLUTE COUNT (BEAKER) 0.48 K/ L    0.30-0.82           

      



             (test code = 415)                                        

 

             EOSINOPHILS ABSOLUTE COUNT 0.14 K/ L    0.04-0.54                 



             (BEAKER) (test code = 416)                                        

 

             BASOPHILS ABSOLUTE COUNT (BEAKER) 0.04 K/ L    0.01-0.08           

      



             (test code = 417)                                        

 

             IMMATURE GRANULOCYTES-RELATIVE 1 %          0-1                    

   



             PERCENT (BEAKER) (test code =                                      

  



             2801)                                               



[[facility]] CBC (INCLUDES DIFF/PLT)2018 17:10:01





             Test Item    Value        Reference Range Interpretation Comments

 

             WBC (test code = 6690-2) 4.7 {K/CMM}  3.7-10.4                  

 

             RBC (test code = 789-8) 5.33 {M/CMM} 4.70-6.10                 

 

             Hgb (test code = 718-7) 15.3 g/dl    14.0-18.0                 

 

             Hct (test code = 32783-9) 46.4 %       42.0-54.0                 

 

             MCV (test code = 787-2) 87.1 fL      80.0-94.0                 

 

             MCH (test code = 785-6) 28.7 pg      27.0-31.0                 

 

             MCHC (test code = 786-4) 33.0 g/dl    32.0-36.0                 

 

             RDW (test code = 788-0) 13.9 %       11.5-14.5                 

 

             Platelet (test code = 96620-8) 154 {K/CMM}  133-450                

   

 

             Mean Platelet Volume (test code 8.8 fL       7.4-10.4              

    



             = 94382-7)                                          



[facility][QL] [id number] 17:10:01





             Test Item    Value        Reference Range Interpretation Comments

 

             Segmented Neutrophils (test code 59.8 %       45.0-75.0            

     



             = 00760-8)                                          

 

             Monocytes (test code = 26485-3) 11.4 %       2.0-12.0              

    

 

             Lymphocytes (test code = 44370-3) 23.9 %       20.0-40.0           

      

 

             Eosinophils; Above High Threshold 4.1 %        0.0-4.0             

      



             (test code = 38453-4)                                        

 

             Basophils (test code = 706-2) 0.8 %        0.0-1.0                 

  

 

             Segs-Bands # (test code = 2.8 {K/CMM}  1.5-8.1                   



             64450-1)                                            

 

             Lymphocytes # (test code = 1.1 {K/CMM}  1.0-5.5                   



             66874-0)                                            

 

             Monocytes # (test code = 38095-9) 0.5 {K/CMM}  0.0-0.8             

      

 

             Eosinophils # (test code = 0.2 {K/CMM}  0.0-0.5                   



             15870-5)                                            



[facility][QL] PTH, INTACT (WITHOUT CALCIUM)2018 17:10:01





             Test Item    Value        Reference Range Interpretation Comments

 

             Parathyroid Hormone Intact; Above 99.2 pg/ml   11.1-79.5           

      



             High Threshold (test code =                                        



             2731-8)                                             



[facility][QL] CMP [id number] 17:10:01





             Test Item    Value        Reference Range Interpretation Comments

 

             Sodium Level 140 {mEq/l}  135-145                   



             (test code =                                        



             2951-2)                                             

 

             Potassium Level 4.5 {mEq/l}  3.5-5.1                   



             (test code =                                        



             2823-3)                                             

 

             Chloride Level 105 {mEq/l}                      



             (test code =                                        



             5-0)                                             

 

             Carbon Dioxide 28 {mEq/l}   24-32                     



             (test code =                                        



             -9)                                             

 

             AGAP (test code = 11.5 {mEq/l} 10.0-20.0                 



             70766-7)                                            

 

             Glucose Lvl (test 95 mg/dl     70-99                     Adult refe

rence range



             code = 2345-7)                                        values reflec

t the



                                                                 clinical guidel

inesof the



                                                                 American Diabet

es



                                                                 Association.

 

             Creatinine Lvl 1.20 mg/dl   0.50-1.40                 



             (test code =                                        



             2160-0)                                             

 

             Blood Urea   22 mg/dl     7-22                      



             Nitrogen (test                                        



             code = 3094-0)                                        

 

             BUN/Creatinine 18           6-25                      



             Ratio (test code                                        



             = 3097-3)                                           

 

             Total Protein 7.2 g/dl     6.4-8.4                   



             (test code =                                        



             2885-2)                                             

 

             Albumin Lvl (test 4.4 g/dl     3.5-5.0                   



             code = 1751-7)                                        

 

             Globulin (test 2.8 g/dl     2.7-4.2                   



             code = 08426-9)                                        

 

             A/G Ratio (test 1.6          0.7-1.6                   



             code = 1759-0)                                        

 

             Calcium Level 9.1 mg/dl    8.5-10.5                  



             Total (test code                                        



             = 04086-2)                                          

 

             ALT (test code = 21 u/l       0-65                      



             1743-4)                                             

 

             AST (test code = 18 u/l       0-37                      



             30045-3)                                            

 

             Bili Total (test 0.4 mg/dl    0.2-1.3                   



             code = -)                                        

 

             Alk Phos (test 112 u/l                          



             code = 1783-0)                                        

 

             eGFR (test code = 71                                     The eGFR i

s calculated



             16773-2)     {ML/MIN/1.7}                           using the CKD-E

PI



                                                                 formula. In mos

t young,



                                                                 healthyindividu

als the



                                                                 eGFR will be >9

0



                                                                 mL/min/1.73m2. 

The eGFR



                                                                 declines with a

ge. AneGFR



                                                                 of 60-89 may be

 normal in



                                                                 some population

s,



                                                                 particularly th

e elderly,



                                                                 forwhom the CKD

-EPI



                                                                 formula has not

 been



                                                                 extensively marielena

idated.



                                                                 Use of the eGFR

 isnot



                                                                 recommended in 

the



                                                                 following



                                                                 populations:Ind

ividuals



                                                                 with unstable c

reatinine



                                                                 concentrations,

 including



                                                                 pregnantpatient

s and



                                                                 those with seri

ous



                                                                 co-morbid



                                                                 conditions.Elsa

ents with



                                                                 extremes in mus

rick mass



                                                                 or diet.The albina

a above



                                                                 are obtained fr

om the



                                                                 National Kidney

 Disease



                                                                 Education Progr

am(NKDEP)



                                                                 which sergio wilburn



                                                                 recommends that

 when the



                                                                 eGFR is used in



                                                                 patientswith ex

tremes of



                                                                 body mass index

 for



                                                                 purposes of [place]

g dosing,



                                                                 the eGFR should

be



                                                                 multiplied by t

he



                                                                 estimated BMI.



[facility]] FOLATE, [id number] 17:10:01





             Test Item    Value        Reference Range Interpretation Comments

 

             Folate Level (test code = 2284-8) 9.3 ng/ml    >=3.0               

      



[facility]] IRON, [id number] 17:10:01





             Test Item    Value        Reference Range Interpretation Comments

 

             Iron (test code = 2498-4) 85 ug/dL                         



[facility]] LIPID [id number] 17:10:01





             Test Item    Value        Reference Range Interpretation Comments

 

             Chol (test code = 2093-3) 124 mg/dl    <=199                     

 

             Trig (test code = 2571-8) 77 mg/dl     <=149                     

 

             HDL Cholesterol; Below Low 42 mg/dl     >=61                      



             Threshold (test code = 2085-9)                                     

   

 

             CHD Risk; Below Low Threshold (test 2.95         4.00-7.30         

        



             code = 19117-9)                                        

 

             LDL (test code = 92685-9) 67 mg/dl     <=99                      

 

             VLDL (test code = VLDL) 15                                     



[facility]] VITAMIN [id number] 17:10:01





             Test Item    Value        Reference Range Interpretation Comments

 

             Vitamin B12 Level; Below Low 235 pg/ml    254-1320                 

 



             Threshold (test code = 2132-9)                                     

   



[facility]] TSH, 3RD GENERATION W/REFLEX TO [id number] 17:10:01





             Test Item    Value        Reference Range Interpretation Comments

 

             TSH (test code = 04924-0) 1.090 {uIU/ml} 0.360-3.740               



[facility]] HEMOGLOBIN [id number] 17:10:01





             Test Item    Value        Reference Range Interpretation Comments

 

             Hemoglobin A1c (test code = 4548-4) 5.2 %        <=5.6             

        



[facility][] Vitamin E [id number] 17:10:01





             Test Item    Value        Reference Range Interpretation Comments

 

             Vitamin E Lvl (test Cancel Reason: Modified                        

   



             code = Vitamin E Lvl) Order                                  



[facility]] VITAMIN B1, WHOLE [id number] 17:10:01





             Test Item    Value        Reference Range Interpretation Comments

 

             Vitamin B1   147.6        66.5-200.0                This test was d

eveloped and its



             Level (test  nmol/L                                 performance



             code =                                              characteristics

determined by



             Vitamin B1                                          [facility]. It has

 not been



             Level)                                              cleared orappro

mateo by the Food



                                                                 and Drug



                                                                 Administration.

Performed At: 



                                                                 [facility]



                                                                 [id number] CODY MD



                                                                 Ph:[phone]



[facility][H] Vit  17:10:01





             Test Item    Value        Reference Range Interpretation Comments

 

             Vitamin A Level (test 52 ug/dL     24-85                     **Effe

ctive ,



             code = Vitamin A                                        2018 Vitami

n A, Serum



             Level)                                              will be**  ni

ging to



                                                                 the LC/MS-MS



                                                                 methodology. Th

e



                                                                 reference  inte

rval



                                                                 will be annabel carvajal to:



                                                                 



                                                                 < 6 years      

Not



                                                                 Estab.



                                                                        6 - 11 y

ears



                                                                  22.8 -  54.4



                                                                              12

 - 19



                                                                 years      28.9

 -  75.7



                                                                 



                                                                 20 - 39 years  

    31.2



                                                                 -  89.1



                                                                        40 - 59 

years



                                                                   33.1 - 100.0



                                                                                

   >59



                                                                 years      36.4

 -



                                                                 108.0Performed 

At: 



                                                                 [facility] [id number]

abelardo CODY MD



                                                                 Ph:[phone]



[facility][H] [facility] [id number] 17:09:01





             Test Item    Value        Reference Range Interpretation Comments

 

             [facility] (test COMMENT                                Test Orde

red: 800528



             code = Misc [facility])                                        25-Hydr

oxyvitamin D LCMS



                                                                 D2+D325-Hydroxy

, Vitamin



                                                                 D          25  

        [L



                                                                 ] ng/mL    ESRe

ference



                                                                 Range:All Ages:

 Target



                                                                 levels 30 -



                                                                 10025-Hydroxy, 

Vitamin



                                                                 D-2        <1.0



                                                                   ng/mL    ES25

-Hydroxy,



                                                                 Vitamin D-3    

    24



                                                                           ng/mL



                                                                 ESPerformed At:

 [address]



                                                                 [id number] CODY MD



                                                                 Ph:[phone]

rformed



                                                                 At: [facility]



                                                                 [address]

 [place], [place]



                                                                 [id number]

paddy BURKS MD Ph:[phone]

[phone]



[facility][H] Vitamin E [id number] 17:09:01





             Test Item    Value        Reference Range Interpretation Comments

 

             Alpha-Tocopherol 8.5 mg/L     5.3-17.5                  **Effective

 2018



             (test code =                                        442513 Vitamin 

E, Serum



             Alpha-Tocopherol)                                        will bemad

e**



                                                                 non-orderable. 

[facility]



                                                                 will offer 0701

40 Vitamin



                                                                 Eperformed   by

 LC/MS-MS



                                                                 methodology whi

ch will



                                                                 includealpha to

copherol



                                                                 and gamma tocop

herol. This



                                                                 will affectany 

existing



                                                                 profile.   For 

further



                                                                 information, co

andrew



                                                                 local [facility]



                                                                 Representative.

Performed



                                                                 At:  [facility]



                                                                 [id number]

k Teofilo CODY MD Ph:[phone]

4



[facility] CHEST, WITHOUT [id number] 09:10:00NEW CARDIOLOGIST 
OBERTONFINAL REPORT PATIENT ID:   [id number] INDICATION: 47-year-old male with 
chest wall pain and history ofheart transplant. COMPARISON:Chest radiograph 2017 TECHNIQUE: Chest CT exam WITHOUT intravenous contrast. The exam was 
performed according to our department dose-optimization protocol, which includes
 automated exposure control, adjustments of mA and kV according to patient size.
 Iterative reconstructions are also sometimes employed. FINDINGS:No chest wall 
mass, chest wall hematoma, rib fracture, or rib lesion is demonstrated. There is
 a healed median sternotomy. Heart is normal in size and there is no pericardial
 effusion. Mild atherosclerotic plaque of the ascending thoracic aorta is demons
trated. Thoracic aorta is normal in caliber. 8 mm calcified nodule in the right 
lower lobe represents prior granulomatous infection. No pneumonia, pulmonary 
edema, pleural effusion, or pneumothorax is demonstrated. There is no 
mediastinal or hilar lymphadenopathy. Thyroid gland is unremarkable. Upper and 
mid esophagus are unremarkable. Patient is status post sleeve gastrectomy and 
there is a small part of the sleeve herniated in the low posterior mediastinum. 
Partial imaging of the upper abdomen is otherwise unremarkable. IMPRESSION: No 
chest wall mass, muscle tear, rib fracture, or rib lesion. No other CT 
explanation for the patient's chest wall pain. Clear lungs. Unremarkable heart 
transplant. Prior gastric sleeve with small hiatal hernia. Signed: Marlee Julio MDReport Verified Date/Time:  2017 09:10:45 Reading Location: [facility]
 Diagnostic Imaging Reading Room - [place] [id number] Electronically signed by: 
MARLEE JULIO M.D. on 2017 09:10 AMTACROLIMUS [id number] 
13:49:00





             Test Item    Value        Reference Range Interpretation Comments

 

             TACROLIMUS BLOOD (BEAKER) (test 5.3 ng/mL    10.0-20.0    L        

    



             code = 657)                                         



BASIC METABOLIC [id number] 10:44:00





             Test Item    Value        Reference Range Interpretation Comments

 

             SODIUM (BEAKER) 141 meq/L    136-145                   



             (test code = 381)                                        

 

             POTASSIUM (BEAKER) 4.5 meq/L    3.5-5.1                   



             (test code = 379)                                        

 

             CHLORIDE (BEAKER) 103 meq/L                        



             (test code = 382)                                        

 

             CO2 (BEAKER) (test 29 meq/L     22-29                     



             code = 355)                                         

 

             BLOOD UREA NITROGEN 17 mg/dL     7-21                      



             (BEAKER) (test code                                        



             = 354)                                              

 

             CREATININE (BEAKER) 1.09 mg/dL   0.57-1.25                 



             (test code = 358)                                        

 

             GLUCOSE RANDOM 97 mg/dL                         



             (BEAKER) (test code                                        



             = 652)                                              

 

             CALCIUM (BEAKER) 9.3 mg/dL    8.4-10.2                  



             (test code = 697)                                        

 

             EGFR (BEAKER) (test 72 mL/min/1.73                           ESTIMA

PHOEBE GFR IS



             code = 1092) sq m                                   NOT AS ACCURATE

 AS



                                                                 CREATININE



                                                                 CLEARANCE IN



                                                                 PREDICTING



                                                                 GLOMERULAR



                                                                 FILTRATION RATE

.



                                                                 ESTIMATED GFR I

S



                                                                 NOT APPLICABLE 

FOR



                                                                 DIALYSIS PATIEN

TS.



CBC W/PLT COUNT &amp; AUTO [id number] 09:54:00





             Test Item    Value        Reference Range Interpretation Comments

 

             WHITE BLOOD CELL COUNT (BEAKER) 4.0 K/ L     3.5-10.5              

    



             (test code = 775)                                        

 

             RED BLOOD CELL COUNT (BEAKER) 5.42 M/ L    4.63-6.08               

  



             (test code = 761)                                        

 

             HEMOGLOBIN (BEAKER) (test code = 15.4 GM/DL   13.7-17.5            

     



             410)                                                

 

             HEMATOCRIT (BEAKER) (test code = 47.6 %       40.1-51.0            

     



             411)                                                

 

             MEAN CORPUSCULAR VOLUME (BEAKER) 87.8 fL      79.0-92.2            

     



             (test code = 753)                                        

 

             MEAN CORPUSCULAR HEMOGLOBIN 28.4 pg      25.7-32.2                 



             (BEAKER) (test code = 751)                                        

 

             MEAN CORPUSCULAR HEMOGLOBIN CONC 32.4 GM/DL   32.3-36.5            

     



             (BEAKER) (test code = 752)                                        

 

             RED CELL DISTRIBUTION WIDTH 13.8 %       11.6-14.4                 



             (BEAKER) (test code = 412)                                        

 

             PLATELET COUNT (BEAKER) (test 152 K/CU MM  150-450                 

  



             code = 756)                                         

 

             MEAN PLATELET VOLUME (BEAKER) 9.9 fL       9.4-12.4                

  



             (test code = 754)                                        

 

             NUCLEATED RED BLOOD CELLS 0 /100 WBC   0-0                       



             (BEAKER) (test code = 413)                                        

 

             NEUTROPHILS RELATIVE PERCENT 66 %                                  

 



             (BEAKER) (test code = 429)                                        

 

             LYMPHOCYTES RELATIVE PERCENT 20 %                                  

 



             (BEAKER) (test code = 430)                                        

 

             MONOCYTES RELATIVE PERCENT 11 %                                   



             (BEAKER) (test code = 431)                                        

 

             EOSINOPHILS RELATIVE PERCENT 2 %                                   

 



             (BEAKER) (test code = 432)                                        

 

             BASOPHILS RELATIVE PERCENT 1 %                                    



             (BEAKER) (test code = 437)                                        

 

             NEUTROPHILS ABSOLUTE COUNT 2.65 K/ L    1.78-5.38                 



             (BEAKER) (test code = 670)                                        

 

             LYMPHOCYTES ABSOLUTE COUNT 0.82 K/ L    1.32-3.57    L            



             (BEAKER) (test code = 414)                                        

 

             MONOCYTES ABSOLUTE COUNT (BEAKER) 0.44 K/ L    0.30-0.82           

      



             (test code = 415)                                        

 

             EOSINOPHILS ABSOLUTE COUNT 0.08 K/ L    0.04-0.54                 



             (BEAKER) (test code = 416)                                        

 

             BASOPHILS ABSOLUTE COUNT (BEAKER) 0.04 K/ L    0.01-0.08           

      



             (test code = 417)                                        

 

             IMMATURE GRANULOCYTES-RELATIVE 0 %          0-1                    

   



             PERCENT (BEAKER) (test code =                                      

  



             2801)                                               



[id number] 14:30:00





             Test Item    Value        Reference Range Interpretation Comments

 

             AGAP (test code = AGAP) 15.3         10.0-20.0                 



[facility]DcspotjUNJSWHVCQBCY6632[facility]33-85 14[facility]30:00





             Test Item    Value        Reference Range Interpretation Comments

 

             eGFR (test code = eGFR) 91                                     



[facility] HealthIvpunpjNBYVOGKMUYDX9251-47-59 14:30:00





             Test Item    Value        Reference Range Interpretation Comments

 

             BUN (test code = BUN) 13           7-22                      



[facility] HealthAbsmvzsKNIDPKTIOHKF1319-47-14 14:30:00





             Test Item    Value        Reference Range Interpretation Comments

 

             Creatinine Lvl (test code = Creatinine 0.98         0.50-1.40      

           



             Lvl)                                                



[facility] HealthNnwwxowPKRECBMGPNZU8409-63-75 14:30:00





             Test Item    Value        Reference Range Interpretation Comments

 

             Sodium Lvl (test code = Sodium Lvl) 144          135-145           

        



[facility] HealthDfgfysbZYDOPWRJGDSA4434-37-68 14:30:00





             Test Item    Value        Reference Range Interpretation Comments

 

             Potassium Lvl (test code = Potassium 4.3          3.5-5.1          

         



             Lvl)                                                



[facility] HealthRospfkiOWZRUALSVEEU6372-58-46 14:30:00





             Test Item    Value        Reference Range Interpretation Comments

 

             Chloride Lvl (test code = Chloride Lvl) 105                  

            



[facility] HealthSxcyhkiCSFAGASJJALN1781-20-52 14:30:00





             Test Item    Value        Reference Range Interpretation Comments

 

             Calcium Lvl (test code = Calcium Lvl) 9.1          8.5-10.5        

          



[facility] HealthPsbvsvtSBHLZLENAFQG2143-45-46 14:30:00





             Test Item    Value        Reference Range Interpretation Comments

 

             CO2 (test code = CO2) 28           24-32                     



[facility] HealthNycbvnnVRAFLALXOKZG1453-44-31 14:30:00





             Test Item    Value        Reference Range Interpretation Comments

 

             Glucose Lvl (test code = Glucose Lvl) 77           70-99           

          



[facility]LbljpnaQUPWVREOPK9463-41-88 14:30:00





             Test Item    Value        Reference Range Interpretation Comments

 

             Lymphocytes # (test code = Lymphocytes 0.8          1.0-5.5        

           



             #)                                                  



[facility] CenterFhfsjwqOCLELFONPX7893-53-18 14:30:00





             Test Item    Value        Reference Range Interpretation Comments

 

             Segs-Bands # (test code = Segs-Bands #) 2.8          1.5-8.1       

            



[facility] CenterJcfsoryHGJOMVHWFD8562-92-87 14:30:00





             Test Item    Value        Reference Range Interpretation Comments

 

             Monocytes # (test code 0.4          See_Comment                [Aut

omated message] The



             = Monocytes #)                                        system which 

generated



                                                                 this result tra

nsmitted



                                                                 reference range

: <=0.8.



                                                                 The reference r

klaudia was



                                                                 not used to int

erpret



                                                                 this result as



                                                                 normal/abnormal

.



[facility] CenterLpyferhYOVTYEDUHV5720-90-19 14:30:00





             Test Item    Value        Reference Range Interpretation Comments

 

             Eosinophils # (test code 0.1          See_Comment                [A

utomated message] The



             = Eosinophils #)                                        system whic

h generated



                                                                 this result tra

nsmitted



                                                                 reference range

: <=0.5.



                                                                 The reference r

klaudia was



                                                                 not used to int

erpret



                                                                 this result as



                                                                 normal/abnormal

.



[facility] CenterUpfmmfcJOHATHEZMF6772-02-87 14:30:00





             Test Item    Value        Reference Range Interpretation Comments

 

             Eosinophils (test code = 2.4          See_Comment                [A

utomated message] The



             Eosinophils)                                        system which ge

nerated



                                                                 this result tra

nsmitted



                                                                 reference range

: <=4.0.



                                                                 The reference r

klaudia was



                                                                 not used to int

erpret



                                                                 this result as



                                                                 normal/abnormal

.



[facility] CenterUundrkpCAQSPTMGBQ1088-79-18 14:30:00





             Test Item    Value        Reference Range Interpretation Comments

 

             Basophils (test code = 0.7          See_Comment                [Aut

omated message] The



             Basophils)                                          system which ge

nerated



                                                                 this result tra

nsmitted



                                                                 reference range

: <=1.0.



                                                                 The reference r

klaudia was



                                                                 not used to int

erpret



                                                                 this result as



                                                                 normal/abnormal

.



[facility] CenterDdanyhkJOGWYONQWE3965-53-27 14:30:00





             Test Item    Value        Reference Range Interpretation Comments

 

             Monocytes (test code = Monocytes) 10.7         2.0-12.0            

      



[facility] CenterCygfgnrWSIPFNOMZW8064-61-73 14:30:00





             Test Item    Value        Reference Range Interpretation Comments

 

             Lymphocytes (test code = Lymphocytes) 19.7         20.0-40.0       

          



[facility] CenterEcucomhRRRYZQORCY5632-22-78 14:30:00





             Test Item    Value        Reference Range Interpretation Comments

 

             Segs (test code = Segs) 66.5         45.0-75.0                 



[facility] CenterNzhnaerQBXFJWRWKS2914-14-71 14:30:00





             Test Item    Value        Reference Range Interpretation Comments

 

             MPV (test code = MPV) 8.5          7.4-10.4                  



[facility] CenterHkmcuayJIVUDRHTUU9052-56-36 14:30:00





             Test Item    Value        Reference Range Interpretation Comments

 

             Platelet (test code = Platelet) 133          133-450               

    



[facility] CenterRwrfgahMTRVGBFKJV6444-87-73 14:30:00





             Test Item    Value        Reference Range Interpretation Comments

 

             RDW (test code = RDW) 13.9         11.5-14.5                 



[facility] CenterZuojtdkLJCJAYIXAX1607-70-72 14:30:00





             Test Item    Value        Reference Range Interpretation Comments

 

             MCHC (test code = MCHC) 33.4         32.0-36.0                 



[facility] CenterArcawadFXGGPRYHFQ0067-88-23 14:30:00





             Test Item    Value        Reference Range Interpretation Comments

 

             MCH (test code = MCH) 28.7 pg      27.0-31.0                 



[facility] CenterAhqjbhtOJGKDPZYHV5694-64-46 14:30:00





             Test Item    Value        Reference Range Interpretation Comments

 

             MCV (test code = MCV) 85.8         80.0-94.0                 



[facility] CenterRrzjrsgMMFPVGATHV7776-10-29 14:30:00





             Test Item    Value        Reference Range Interpretation Comments

 

             Hct (test code = Hct) 44.0         42.0-54.0                 



[facility] CenterTcrgfszWKPPOUIZTJ0446-58-74 14:30:00





             Test Item    Value        Reference Range Interpretation Comments

 

             Hgb (test code = Hgb) 14.7         14.0-18.0                 



[facility]RaiuiaqVDBVXEKZJU1916-68-50 14:30:00





             Test Item    Value        Reference Range Interpretation Comments

 

             RBC (test code = RBC) 5.13         4.70-6.10                 



[facility] HospitalUallwauFGEQLOLKKA9023-67-64 14:30:00





             Test Item    Value        Reference Range Interpretation Comments

 

             WBC (test code = WBC) 4.1          3.7-10.4                  



[facility] HospitalCROLIMUS [id number] 14:05:00





             Test Item    Value        Reference Range Interpretation Comments

 

             TACROLIMUS BLOOD (BEAKER) (test 6.5 ng/mL    10.0-20.0    L        

    



             code = 657)                                         



BASIC METABOLIC [id number] 10:13:00





             Test Item    Value        Reference Range Interpretation Comments

 

             SODIUM (BEAKER) 140 meq/L    136-145                   



             (test code = 381)                                        

 

             POTASSIUM (BEAKER) 4.1 meq/L    3.5-5.1                   



             (test code = 379)                                        

 

             CHLORIDE (BEAKER) 109 meq/L           H            



             (test code = 382)                                        

 

             CO2 (BEAKER) (test 22 meq/L     22-29                     



             code = 355)                                         

 

             BLOOD UREA NITROGEN 15 mg/dL     7-21                      



             (BEAKER) (test code                                        



             = 354)                                              

 

             CREATININE (BEAKER) 0.95 mg/dL   0.57-1.25                 



             (test code = 358)                                        

 

             GLUCOSE RANDOM 99 mg/dL                         



             (BEAKER) (test code                                        



             = 652)                                              

 

             CALCIUM (BEAKER) 8.8 mg/dL    8.4-10.2                  



             (test code = 697)                                        

 

             EGFR (BEAKER) (test 85 mL/min/1.73                           ESTIMA

PHOEBE GFR IS



             code = 1092) sq m                                   NOT AS ACCURATE

 AS



                                                                 CREATININE



                                                                 CLEARANCE IN



                                                                 PREDICTING



                                                                 GLOMERULAR



                                                                 FILTRATION RATE

.



                                                                 ESTIMATED GFR I

S



                                                                 NOT APPLICABLE 

FOR



                                                                 DIALYSIS PATIEN

TS.



CBC W/PLT COUNT &amp; AUTO [id number] 09:42:00





             Test Item    Value        Reference Range Interpretation Comments

 

             WHITE BLOOD CELL COUNT (BEAKER) 4.7 K/ L     4.0-10.0              

    



             (test code = 775)                                        

 

             RED BLOOD CELL COUNT (BEAKER) 5.18 M/ L    4.20-5.80               

  



             (test code = 761)                                        

 

             HEMOGLOBIN (BEAKER) (test code = 15.3 GM/DL   13.0-16.8            

     



             410)                                                

 

             HEMATOCRIT (BEAKER) (test code = 47.1 %       40.0-50.0            

     



             411)                                                

 

             MEAN CORPUSCULAR VOLUME (BEAKER) 90.8 fL      82.0-98.0            

     



             (test code = 753)                                        

 

             MEAN CORPUSCULAR HEMOGLOBIN 29.6 pg      27.0-33.0                 



             (BEAKER) (test code = 751)                                        

 

             MEAN CORPUSCULAR HEMOGLOBIN CONC 32.6 GM/DL   32.0-36.0            

     



             (BEAKER) (test code = 752)                                        

 

             RED CELL DISTRIBUTION WIDTH 15.1 %       10.3-14.2    H            



             (BEAKER) (test code = 412)                                        

 

             PLATELET COUNT (BEAKER) (test 129 K/CU MM  150-430      L          

  



             code = 756)                                         

 

             MEAN PLATELET VOLUME (BEAKER) 7.7 fL       6.5-10.5                

  



             (test code = 754)                                        

 

             NUCLEATED RED BLOOD CELLS 0 /100 WBC   0-0                       



             (BEAKER) (test code = 413)                                        

 

             NEUTROPHILS RELATIVE PERCENT 72 %                                  

 



             (BEAKER) (test code = 429)                                        

 

             LYMPHOCYTES RELATIVE PERCENT 18 %                                  

 



             (BEAKER) (test code = 430)                                        

 

             MONOCYTES RELATIVE PERCENT 8 %                                    



             (BEAKER) (test code = 431)                                        

 

             EOSINOPHILS RELATIVE PERCENT 1 %                                   

 



             (BEAKER) (test code = 432)                                        

 

             BASOPHILS RELATIVE PERCENT 1 %                                    



             (BEAKER) (test code = 437)                                        

 

             NEUTROPHILS ABSOLUTE COUNT 3.37 K/ L    1.80-8.00                 



             (BEAKER) (test code = 670)                                        

 

             LYMPHOCYTES ABSOLUTE COUNT 0.85 K/ L    1.48-4.50    L            



             (BEAKER) (test code = 414)                                        

 

             MONOCYTES ABSOLUTE COUNT (BEAKER) 0.40 K/ L    0.00-1.30           

      



             (test code = 415)                                        

 

             EOSINOPHILS ABSOLUTE COUNT 0.07 K/ L    0.00-0.50                 



             (BEAKER) (test code = 416)                                        

 

             BASOPHILS ABSOLUTE COUNT (BEAKER) 0.03 K/ L    0.00-0.20           

      



             (test code = 417)                                        



[id number] 10:38:00





             Test Item    Value        Reference Range Interpretation Comments

 

             MAGNESIUM (BEAKER) (test code = 2.2 mg/dL    1.6-2.6               

    



             627)                                                



TACROLIMUS [id number] 10:33:00





             Test Item    Value        Reference Range Interpretation Comments

 

             TACROLIMUS BLOOD (BEAKER) (test 7.1 ng/mL    10.0-20.0    L        

    



             code = 657)                                         



CBC W/PLT COUNT &amp; AUTO [id number] 08:47:00





             Test Item    Value        Reference Range Interpretation Comments

 

             WHITE BLOOD CELL COUNT (BEAKER) 4.1 K/ L     4.0-10.0              

    



             (test code = 775)                                        

 

             RED BLOOD CELL COUNT (BEAKER) 5.02 M/ L    4.20-5.80               

  



             (test code = 761)                                        

 

             HEMOGLOBIN (BEAKER) (test code = 14.8 GM/DL   13.0-16.8            

     



             410)                                                

 

             HEMATOCRIT (BEAKER) (test code = 43.6 %       40.0-50.0            

     



             411)                                                

 

             MEAN CORPUSCULAR VOLUME (BEAKER) 87.0 fL      82.0-98.0            

     



             (test code = 753)                                        

 

             MEAN CORPUSCULAR HEMOGLOBIN 29.5 pg      27.0-33.0                 



             (BEAKER) (test code = 751)                                        

 

             MEAN CORPUSCULAR HEMOGLOBIN CONC 33.9 GM/DL   32.0-36.0            

     



             (BEAKER) (test code = 752)                                        

 

             RED CELL DISTRIBUTION WIDTH 14.1 %       10.3-14.2                 



             (BEAKER) (test code = 412)                                        

 

             PLATELET COUNT (BEAKER) (test 160 K/CU MM  150-430                 

  



             code = 756)                                         

 

             MEAN PLATELET VOLUME (BEAKER) 7.2 fL       6.5-10.5                

  



             (test code = 754)                                        

 

             NUCLEATED RED BLOOD CELLS 0 /100 WBC   0-0                       



             (BEAKER) (test code = 413)                                        

 

             NEUTROPHILS RELATIVE PERCENT 64 %                                  

 



             (BEAKER) (test code = 429)                                        

 

             LYMPHOCYTES RELATIVE PERCENT 21 %                                  

 



             (BEAKER) (test code = 430)                                        

 

             MONOCYTES RELATIVE PERCENT 12 %                                   



             (BEAKER) (test code = 431)                                        

 

             EOSINOPHILS RELATIVE PERCENT 2 %                                   

 



             (BEAKER) (test code = 432)                                        

 

             BASOPHILS RELATIVE PERCENT 0 %                                    



             (BEAKER) (test code = 437)                                        

 

             NEUTROPHILS ABSOLUTE COUNT 2.61 K/ L    1.80-8.00                 



             (BEAKER) (test code = 670)                                        

 

             LYMPHOCYTES ABSOLUTE COUNT 0.87 K/ L    1.48-4.50    L            



             (BEAKER) (test code = 414)                                        

 

             MONOCYTES ABSOLUTE COUNT (BEAKER) 0.49 K/ L    0.00-1.30           

      



             (test code = 415)                                        

 

             EOSINOPHILS ABSOLUTE COUNT 0.09 K/ L    0.00-0.50                 



             (BEAKER) (test code = 416)                                        

 

             BASOPHILS ABSOLUTE COUNT (BEAKER) 0.01 K/ L    0.00-0.20           

      



             (test code = 417)                                        



0.00BASIC METABOLIC [id number] 08:47:00





             Test Item    Value        Reference Range Interpretation Comments

 

             SODIUM (BEAKER) 142 meq/L    136-145                   



             (test code = 381)                                        

 

             POTASSIUM (BEAKER) 3.8 meq/L    3.5-5.1                   



             (test code = 379)                                        

 

             CHLORIDE (BEAKER) 109 meq/L           H            



             (test code = 382)                                        

 

             CO2 (BEAKER) (test 21 meq/L     22-29        L            



             code = 355)                                         

 

             BLOOD UREA NITROGEN 22 mg/dL     7-21         H            



             (BEAKER) (test code                                        



             = 354)                                              

 

             CREATININE (BEAKER) 1.18 mg/dL   0.57-1.25                 



             (test code = 358)                                        

 

             GLUCOSE RANDOM 99 mg/dL                         



             (BEAKER) (test code                                        



             = 652)                                              

 

             CALCIUM (BEAKER) 9.1 mg/dL    8.4-10.2                  



             (test code = 697)                                        

 

             EGFR (BEAKER) (test 66 mL/min/1.73                           ESTIMA

PHOEBE GFR IS



             code = 1092) sq m                                   NOT AS ACCURATE

 AS



                                                                 CREATININE



                                                                 CLEARANCE IN



                                                                 PREDICTING



                                                                 GLOMERULAR



                                                                 FILTRATION RATE

.



                                                                 ESTIMATED GFR I

S



                                                                 NOT APPLICABLE 

FOR



                                                                 DIALYSIS PATIEN

TS.



URINALYSIS W/ [id number] 00:20:00





             Test Item    Value        Reference Range Interpretation Comments

 

             COLOR (BEAKER) (test code Yellow                                 



             = 470)                                              

 

             CLARITY (BEAKER) (test Slightly Hazy                           



             code = 469)                                         

 

             SPECIFIC GRAVITY UA 1.050        1.001-1.035  H            



             (BEAKER) (test code = 468)                                        

 

             PH UA (BEAKER) (test code 5.5          5.0-8.0                   



             = 467)                                              

 

             PROTEIN UA (BEAKER) (test 20 mg/dL     Negative     A            



             code = 464)                                         

 

             GLUCOSE UA (BEAKER) (test Negative     Negative                  



             code = 365)                                         

 

             KETONES UA (BEAKER) (test 40 mg/dL     Negative     A            



             code = 371)                                         

 

             BILIRUBIN UA (BEAKER) Negative     Negative                  



             (test code = 462)                                        

 

             BLOOD UA (BEAKER) (test Negative     Negative                  



             code = 461)                                         

 

             NITRITE UA (BEAKER) (test Negative     Negative                  



             code = 465)                                         

 

             LEUKOCYTE ESTERASE UA Negative     Negative                  



             (BEAKER) (test code = 466)                                        

 

             UROBILINOGEN UA (BEAKER) 0.2 mg/dL    0.2-1.0                   



             (test code = 463)                                        

 

             RBC UA (BEAKER) (test code 1 /HPF                                 



             = 519)                                              

 

             WBC UA (BEAKER) (test code 3 /HPF                                 



             = 520)                                              

 

             MUCUS (BEAKER) (test code Many                                   



             = 1574)                                             

 

             HYALINE CASTS (BEAKER) 6 /LPF                                 



             (test code = 514)                                        

 

             SOURCE(BEAKER) (test code Urine, Clean Catch                       

    



             = 2795)                                             



BASIC METABOLIC [id number] 21:55:00





             Test Item    Value        Reference Range Interpretation Comments

 

             SODIUM (BEAKER) 139 meq/L    136-145                   



             (test code = 381)                                        

 

             POTASSIUM (BEAKER) 5.1 meq/L    3.5-5.1                   



             (test code = 379)                                        

 

             CHLORIDE (BEAKER) 107 meq/L                        



             (test code = 382)                                        

 

             CO2 (BEAKER) (test 18 meq/L     22-29        L            



             code = 355)                                         

 

             BLOOD UREA NITROGEN 26 mg/dL     7-21         H            



             (BEAKER) (test code                                        



             = 354)                                              

 

             CREATININE (BEAKER) 1.26 mg/dL   0.57-1.25    H            



             (test code = 358)                                        

 

             GLUCOSE RANDOM 89 mg/dL                         



             (BEAKER) (test code                                        



             = 652)                                              

 

             CALCIUM (BEAKER) 9.1 mg/dL    8.4-10.2                  



             (test code = 697)                                        

 

             EGFR (BEAKER) (test 61 mL/min/1.73                           ESTIMA

PHOEBE GFR IS



             code = 1092) sq m                                   NOT AS ACCURATE

 AS



                                                                 CREATININE



                                                                 CLEARANCE IN



                                                                 PREDICTING



                                                                 GLOMERULAR



                                                                 FILTRATION RATE

.



                                                                 ESTIMATED GFR I

S



                                                                 NOT APPLICABLE 

FOR



                                                                 DIALYSIS PATIEN

TS.



HEPATIC FUNCTION [id number] 21:55:00





             Test Item    Value        Reference Range Interpretation Comments

 

             TOTAL PROTEIN (BEAKER) (test code = 7.1 gm/dL    6.0-8.3           

        



             770)                                                

 

             ALBUMIN (BEAKER) (test code = 1145) 4.2 g/dL     3.5-5.0           

        

 

             BILIRUBIN TOTAL (BEAKER) (test code 0.7 mg/dL    0.2-1.2           

        



             = 377)                                              

 

             BILIRUBIN DIRECT (BEAKER) (test 0.2 mg/dL    0.1-0.5               

    



             code = 706)                                         

 

             ALKALINE PHOSPHATASE (BEAKER) (test 86 U/L                   

        



             code = 346)                                         

 

             AST (SGOT) (BEAKER) (test code = 28 U/L       5-34                 

     



             353)                                                

 

             ALT (SGPT) (BEAKER) (test code = 20 U/L       6-55                 

     



             347)                                                



[id number] 21:52:00





             Test Item    Value        Reference Range Interpretation Comments

 

             LIPASE (BEAKER) (test code = 749) 40 U/L       8-78                

      



B-TYPE NATRIURETIC FACTOR (BNP)2017 21:46:00





             Test Item    Value        Reference Range Interpretation Comments

 

             B-TYPE NATRIURETIC PEPTIDE (BEAKER) 21 pg/mL     0-100             

        



             (test code = 700)                                        



CREATINE KINASE (CK), TOTAL AND [id number] 21:45:00





             Test Item    Value        Reference Range Interpretation Comments

 

             CREATINE KINASE TOTAL (BEAKER) 32 U/L                        

   



             (test code = 380)                                        

 

             CREATINE KINASE-MB (BEAKER) (test 0.6 ng/mL    0.0-6.6             

      



             code = 750)                                         

 

             CREATINE KINASE-MB INDEX (BEAKER) 1.9 %                            

      



             (test code = 395)                                        



Effective 2014: CK-MB Reference Range ChangeNew: 0.0-6.6    Previous: 
0.0-4.9CK-MB Reference Range:&lt;6.7      Normal6.7-10.0  Borderline&gt;10.0    
AbnormalTROPONIN -43-35 21:45:00





             Test Item    Value        Reference Range Interpretation Comments

 

             TROPONIN I (BEAKER) (test code = 397) < ng/mL      0.00-0.03       

          



Effective 2014: Reference Range ChangeNew: 0.00-0.03   Previous 0.00-
0.15Troponin I (TnI) levels must be interpreted in the context of the presenting
symptoms and the clinical findings. Elevated TnI levels indicate myocardial 
damage, but are not specific for ischemic heart disease. Elevated TnI levels are
seen in patients with other cardiac conditions (including myocarditis and 
congestive heartfailure), and slight TnI elevations occur in patients with other
conditions, including sepsis, renalfailure, acidosis, acute neurological 
disease, and persistent tachyarrhythmia.CBC W/PLT COUNT &amp; AUTO DIFFERENTIAL
2017 21:24:00





             Test Item    Value        Reference Range Interpretation Comments

 

             WHITE BLOOD CELL COUNT (BEAKER) 4.7 K/ L     4.0-10.0              

    



             (test code = 775)                                        

 

             RED BLOOD CELL COUNT (BEAKER) 5.75 M/ L    4.20-5.80               

  



             (test code = 761)                                        

 

             HEMOGLOBIN (BEAKER) (test code = 17.4 GM/DL   13.0-16.8    H       

     



             410)                                                

 

             HEMATOCRIT (BEAKER) (test code = 49.2 %       40.0-50.0            

     



             411)                                                

 

             MEAN CORPUSCULAR VOLUME (BEAKER) 85.6 fL      82.0-98.0            

     



             (test code = 753)                                        

 

             MEAN CORPUSCULAR HEMOGLOBIN 30.3 pg      27.0-33.0                 



             (BEAKER) (test code = 751)                                        

 

             MEAN CORPUSCULAR HEMOGLOBIN CONC 35.4 GM/DL   32.0-36.0            

     



             (BEAKER) (test code = 752)                                        

 

             RED CELL DISTRIBUTION WIDTH 12.9 %       10.3-14.2                 



             (BEAKER) (test code = 412)                                        

 

             PLATELET COUNT (BEAKER) (test code 98 K/CU MM   150-430      L     

       



             = 756)                                              

 

             MEAN PLATELET VOLUME (BEAKER) 8.7 fL       6.5-10.5                

  



             (test code = 754)                                        

 

             NUCLEATED RED BLOOD CELLS (BEAKER) 0 /100 WBC   0-0                

       



             (test code = 413)                                        

 

             NEUTROPHILS RELATIVE PERCENT 69 %                                  

 



             (BEAKER) (test code = 429)                                        

 

             LYMPHOCYTES RELATIVE PERCENT 22 %                                  

 



             (BEAKER) (test code = 430)                                        

 

             MONOCYTES RELATIVE PERCENT 8 %                                    



             (BEAKER) (test code = 431)                                        

 

             EOSINOPHILS RELATIVE PERCENT 1 %                                   

 



             (BEAKER) (test code = 432)                                        

 

             BASOPHILS RELATIVE PERCENT 0 %                                    



             (BEAKER) (test code = 437)                                        

 

             NEUTROPHILS ABSOLUTE COUNT 3.26 K/ L    1.80-8.00                 



             (BEAKER) (test code = 670)                                        

 

             LYMPHOCYTES ABSOLUTE COUNT 1.05 K/ L    1.48-4.50    L            



             (BEAKER) (test code = 414)                                        

 

             MONOCYTES ABSOLUTE COUNT (BEAKER) 0.38 K/ L    0.00-1.30           

      



             (test code = 415)                                        

 

             EOSINOPHILS ABSOLUTE COUNT 0.03 K/ L    0.00-0.50                 



             (BEAKER) (test code = 416)                                        

 

             BASOPHILS ABSOLUTE COUNT (BEAKER) 0.02 K/ L    0.00-0.20           

      



             (test code = 417)                                        



0.00CMV PCR, [id number] 17:55:00





             Test Item    Value        Reference Range Interpretation Comments

 

             CMV VIRAL LOAD - Negative or below the                           



             NEGATIVE (BEAKER) (test linear range of the                        

   



             code = 2558) assay (<375 copies/mL)                           



Cytomegalovirus (CMV) infection can cause significant disease in 
immunosuppressed patients. However,it is common for CMV to manifest as a limited
infection which is of no clinical significance in immunosuppressed patients or 
in healthy individuals.Viral load measurements are helpful to identify clinical 
CMV infection and to guide the pre-emptive management of antiviral therapy.  For
treatment of CMVinfection due to reactivation in transplant recipients, a 
threshold between 4,000 and 5,000 copies/mL is suggested.  For treatment of 
primary CMV infection, a lower threshold can be used.CMV infection may also be 
monitored using weekly serial measurements. Serial measurements of CMV DNA viral
load canbe evaluated by identifying a 10-fold change, as well as assessing the 
CMV DNA viral load and the clinical context for each patient.The plasma CMV DNA 
viral load was detected using quantitative polymerase chain reaction and 
fluorescent monitoring of a specific hybridized probe. Genetic variation and ot
her factors can affect the accuracy of nucleic acid testing. Therefore, the 
results should be interpreted in light of clinical data. A negative result may 
not exclude the presence of CMV disease.This test was developed and its 
performance characteristics determined by the [facility] Path
ology Department, Section of Molecular Pathology. It has not been cleared or 
approved by the U.S. Food and Drug Administration (FDA), since FDA approval is 
not required for clinical use of the test. Validation was done as required by 
The Clinical Laboratory Improvement Amendments of 1988.CREATINE KINASE (CK), 
TOTAL AND [id number] 18:40:00





             Test Item    Value        Reference Range Interpretation Comments

 

             CREATINE KINASE TOTAL (BEAKER) 36 U/L                        

   



             (test code = 380)                                        

 

             CREATINE KINASE-MB (BEAKER) (test 0.3 ng/mL    0.0-6.6             

      



             code = 750)                                         

 

             CREATINE KINASE-MB INDEX (BEAKER) 0.8 %                            

      



             (test code = 395)                                        



Effective 2014: CK-MB Reference Range ChangeNew: 0.0-6.6    Previous: 
0.0-4.9CK-MB Reference Range:&lt;6.7      Normal6.7-10.0  Borderline&gt;10.0    
AbnormalTROPONIN -53-67 18:40:00





             Test Item    Value        Reference Range Interpretation Comments

 

             TROPONIN I (BEAKER) (test code = 397) < ng/mL      0.00-0.03       

          



Effective 2014: Reference Range ChangeNew: 0.00-0.03   Previous 0.00-
0.15Troponin I (TnI) levels must be interpreted in the context of the presenting
symptoms and the clinical findings. Elevated TnI levels indicate myocardial 
damage, but are not specific for ischemic heart disease. Elevated TnI levels are
seen in patients with other cardiac conditions (including myocarditis and 
congestive heartfailure), and slight TnI elevations occur in patients with other
conditions, including sepsis, renalfailure, acidosis, acute neurological 
disease, and persistent tachyarrhythmia.B-TYPE NATRIURETIC FACTOR (BNP)
2017 18:40:00





             Test Item    Value        Reference Range Interpretation Comments

 

             B-TYPE NATRIURETIC PEPTIDE (BEAKER) 23 pg/mL     0-100             

        



             (test code = 700)                                        



[id number] 18:33:00





             Test Item    Value        Reference Range Interpretation Comments

 

             MAGNESIUM (BEAKER) (test code = 1.8 mg/dL    1.6-2.6               

    



             627)                                                



BASIC METABOLIC [id number] 18:33:00





             Test Item    Value        Reference Range Interpretation Comments

 

             SODIUM (BEAKER) 140 meq/L    136-145                   



             (test code = 381)                                        

 

             POTASSIUM (BEAKER) 4.1 meq/L    3.5-5.1                   



             (test code = 379)                                        

 

             CHLORIDE (BEAKER) 105 meq/L                        



             (test code = 382)                                        

 

             CO2 (BEAKER) (test 23 meq/L     22-29                     



             code = 355)                                         

 

             BLOOD UREA NITROGEN 18 mg/dL     7-21                      



             (BEAKER) (test code                                        



             = 354)                                              

 

             CREATININE (BEAKER) 1.34 mg/dL   0.57-1.25    H            



             (test code = 358)                                        

 

             GLUCOSE RANDOM 100 mg/dL                        



             (BEAKER) (test code                                        



             = 652)                                              

 

             CALCIUM (BEAKER) 9.2 mg/dL    8.4-10.2                  



             (test code = 697)                                        

 

             EGFR (BEAKER) (test 57 mL/min/1.73                           ESTIMA

PHOEBE GFR IS



             code = 1092) sq m                                   NOT AS ACCURATE

 AS



                                                                 CREATININE



                                                                 CLEARANCE IN



                                                                 PREDICTING



                                                                 GLOMERULAR



                                                                 FILTRATION RATE

.



                                                                 ESTIMATED GFR I

S



                                                                 NOT APPLICABLE 

FOR



                                                                 DIALYSIS PATIEN

TS.



CBC W/PLT COUNT &amp; AUTO [id number] 18:24:00





             Test Item    Value        Reference Range Interpretation Comments

 

             WHITE BLOOD CELL COUNT (BEAKER) 2.6 K/ L     4.0-10.0     L        

    



             (test code = 775)                                        

 

             RED BLOOD CELL COUNT (BEAKER) 5.66 M/ L    4.20-5.80               

  



             (test code = 761)                                        

 

             HEMOGLOBIN (BEAKER) (test code = 16.5 GM/DL   13.0-16.8            

     



             410)                                                

 

             HEMATOCRIT (BEAKER) (test code = 49.0 %       40.0-50.0            

     



             411)                                                

 

             MEAN CORPUSCULAR VOLUME (BEAKER) 86.6 fL      82.0-98.0            

     



             (test code = 753)                                        

 

             MEAN CORPUSCULAR HEMOGLOBIN 29.1 pg      27.0-33.0                 



             (BEAKER) (test code = 751)                                        

 

             MEAN CORPUSCULAR HEMOGLOBIN CONC 33.6 GM/DL   32.0-36.0            

     



             (BEAKER) (test code = 752)                                        

 

             RED CELL DISTRIBUTION WIDTH 13.0 %       10.3-14.2                 



             (BEAKER) (test code = 412)                                        

 

             PLATELET COUNT (BEAKER) (test code 87 K/CU MM   150-430      L     

       



             = 756)                                              

 

             MEAN PLATELET VOLUME (BEAKER) 8.3 fL       6.5-10.5                

  



             (test code = 754)                                        

 

             NUCLEATED RED BLOOD CELLS (BEAKER) 0 /100 WBC   0-0                

       



             (test code = 413)                                        

 

             NEUTROPHILS RELATIVE PERCENT 51 %                                  

 



             (BEAKER) (test code = 429)                                        

 

             LYMPHOCYTES RELATIVE PERCENT 30 %                                  

 



             (BEAKER) (test code = 430)                                        

 

             MONOCYTES RELATIVE PERCENT 18 %                                   



             (BEAKER) (test code = 431)                                        

 

             EOSINOPHILS RELATIVE PERCENT 1 %                                   

 



             (BEAKER) (test code = 432)                                        

 

             BASOPHILS RELATIVE PERCENT 0 %                                    



             (BEAKER) (test code = 437)                                        

 

             NEUTROPHILS ABSOLUTE COUNT 1.33 K/ L    1.80-8.00    L            



             (BEAKER) (test code = 670)                                        

 

             LYMPHOCYTES ABSOLUTE COUNT 0.78 K/ L    1.48-4.50    L            



             (BEAKER) (test code = 414)                                        

 

             MONOCYTES ABSOLUTE COUNT (BEAKER) 0.47 K/ L    0.00-1.30           

      



             (test code = 415)                                        

 

             EOSINOPHILS ABSOLUTE COUNT 0.02 K/ L    0.00-0.50                 



             (BEAKER) (test code = 416)                                        

 

             BASOPHILS ABSOLUTE COUNT (BEAKER) 0.01 K/ L    0.00-0.20           

      



             (test code = 417)                                        



0.00POCT-GLUCOSE [id number] 15:37:00





             Test Item    Value        Reference Range Interpretation Comments

 

             POC-GLUCOSE METER 97 mg/dL                         TESTED AT 

[facility] [address]



             (LALY) (test code =                                        [place] [address]



             1538)                                               



CHEM [id number] 10:27:00





             Test Item    Value        Reference Range Interpretation Comments

 

             Phosphorus (test code = Phosphorus) 3.6          2.5-4.5           

        



[id number]





             Test Item    Value        Reference Range Interpretation Comments

 

             Magnesium Lvl (test code = Magnesium 2.3          1.8-2.4          

         



             Lvl)                                                



[id number] 10:27:00





             Test Item    Value        Reference Range Interpretation Comments

 

             AGAP (test code = AGAP) 12.6         10.0-20.0                 



[id number] 10:27:00





             Test Item    Value        Reference Range Interpretation Comments

 

             eGFR (test code = eGFR) 55                                     



[facility] H[id number] 10:27:00





             Test Item    Value        Reference Range Interpretation Comments

 

             Calcium Lvl (test code = Calcium Lvl) 8.6          8.5-10.5        

          



[facility] [id number] 10:27:00





             Test Item    Value        Reference Range Interpretation Comments

 

             CO2 (test code = CO2) 23           24-32                     



[facility] H[id number] 10:27:00





             Test Item    Value        Reference Range Interpretation Comments

 

             Chloride Lvl (test code = Chloride Lvl) 102                  

            



[facility] [id number] 10:27:00





             Test Item    Value        Reference Range Interpretation Comments

 

             Potassium Lvl (test code = Potassium 4.6          3.5-5.1          

         



             Lvl)                                                



[facility] H[id number] 10:27:00





             Test Item    Value        Reference Range Interpretation Comments

 

             Sodium Lvl (test code = Sodium Lvl) 133          135-145           

        



[facility] H[id number] 10:27:00





             Test Item    Value        Reference Range Interpretation Comments

 

             Creatinine Lvl (test code = Creatinine 1.49         0.50-1.40      

           



             Lvl)                                                



[facility]lbqpgvWDTOKBOQZCBX2958-74-13 10:27:00





             Test Item    Value        Reference Range Interpretation Comments

 

             BUN (test code = BUN) 26           7-22                      



[facility] HealthXvpansqPLAZGEBPYJLX6092-01-60 10:27:00





             Test Item    Value        Reference Range Interpretation Comments

 

             Glucose Lvl (test code = Glucose Lvl) 151          70-99           

          



[facility] CenterYarrnzuALPMZCMTWJ9008-60-34 10:27:00





             Test Item    Value        Reference Range Interpretation Comments

 

             Lymphocytes # (test code = Lymphocytes 0.6          1.0-5.5        

           



             #)                                                  



[facility] CenterIqktxjuLMVMJULZZO2077-29-83 10:27:00





             Test Item    Value        Reference Range Interpretation Comments

 

             Monocytes # (test code 0.5          See_Comment                [Aut

omated message] The



             = Monocytes #)                                        system which 

generated



                                                                 this result tra

nsmitted



                                                                 reference range

: <=0.8.



                                                                 The reference r

klaudia was



                                                                 not used to int

erpret



                                                                 this result as



                                                                 normal/abnormal

.



[facility] CenterGkpveznLFWTFRADGT6741-75-49 10:27:00





             Test Item    Value        Reference Range Interpretation Comments

 

             Segs-Bands # (test code = Segs-Bands #) 8.3          1.5-8.1       

            



[facility] CenterAyvoofcUQBLZEXXAQ4947-50-26 10:27:00





             Test Item    Value        Reference Range Interpretation Comments

 

             Monocytes (test code = Monocytes) 5.1          2.0-12.0            

      



[facility] CenterWlzhwfpKXTQKJYUXM5760-83-63 10:27:00





             Test Item    Value        Reference Range Interpretation Comments

 

             Segs (test code = Segs) 88.1         45.0-75.0                 



[facility] CenterLzsqqfcGORXXTLNCC7092-95-59 10:27:00





             Test Item    Value        Reference Range Interpretation Comments

 

             Lymphocytes (test code = Lymphocytes) 6.6          20.0-40.0       

          



[facility] CenterPsztrmzKXLBXXIHRS8205-45-95 10:27:00





             Test Item    Value        Reference Range Interpretation Comments

 

             Basophils (test code = 0.2          See_Comment                [Aut

omated message] The



             Basophils)                                          system which ge

nerated



                                                                 this result tra

nsmitted



                                                                 reference range

: <=1.0.



                                                                 The reference r

klaudia was



                                                                 not used to int

erpret



                                                                 this result as



                                                                 normal/abnormal

.



[facility] CenterBwbpwogZLUUCPDDGC4624-94-35 10:27:00





             Test Item    Value        Reference Range Interpretation Comments

 

             MCH (test code = MCH) 28.5 pg      27.0-31.0                 



[facility] CenterRdvhksqWTQWEJTSWZ3036-88-28 10:27:00





             Test Item    Value        Reference Range Interpretation Comments

 

             Platelet (test code = Platelet) 158          133-450               

    



[facility] CenterLzkerkuKUJYYUQUTC6996-87-80 10:27:00





             Test Item    Value        Reference Range Interpretation Comments

 

             MCHC (test code = MCHC) 33.6         32.0-36.0                 



[facility] CenterBktytjnUXPVRQWKGD8487-03-74 10:27:00





             Test Item    Value        Reference Range Interpretation Comments

 

             RDW (test code = RDW) 14.1         11.5-14.5                 



[facility] CenterDmihqtsCNAGBHFECA9463-91-85 10:27:00





             Test Item    Value        Reference Range Interpretation Comments

 

             MCV (test code = MCV) 84.9         80.0-94.0                 



[facility] CenterSpdegkvNVUFENGHWQ4821-15-94 10:27:00





             Test Item    Value        Reference Range Interpretation Comments

 

             RBC (test code = RBC) 5.28         4.70-6.10                 



[facility] CenterUbaghtzJKVNYRBQKR8787-52-62 10:27:00





             Test Item    Value        Reference Range Interpretation Comments

 

             Hgb (test code = Hgb) 15.0         14.0-18.0                 



[facility] CenterTwtuhxmNEVKWCPACO0934-30-24 10:27:00





             Test Item    Value        Reference Range Interpretation Comments

 

             WBC (test code = WBC) 9.4          3.7-10.4                  



[facility] CenterXluyptgZYJOBIBJJG4372-06-28 10:27:00





             Test Item    Value        Reference Range Interpretation Comments

 

             Hct (test code = Hct) 44.8         42.0-54.0                 



[facility] CenterTxhecbyCYBHPRCLOI0551-28-02 10:27:00





             Test Item    Value        Reference Range Interpretation Comments

 

             MPV (test code = MPV) 8.2          7.4-10.4                  



[facility] CenterDLNHRUV1345-64-25 10:27:00





             Test Item    Value        Reference Range Interpretation Comments

 

             Ca Ion WB (test code = Ca Ion WB) 1.08         1.05-1.25           

      



[facility] HospitalKiipA[facility] CenterLOHMXEI0491-16-45 10:27:00





             Test Item    Value        Reference Range Interpretation Comments

 

             Ca Norm WB (test code = Ca Norm WB) 1.05         1.05-1.25         

        



[facility] [employer] [id number] 16:09:00





             Test Item    Value        Reference Range Interpretation Comments

 

             Antibody Scrn (test Negative (17                           



             code = Antibody Scrn) 10:09 AM)                              



[facility] [employer] [id number] 16:09:00





             Test Item    Value        Reference Range Interpretation Comments

 

             ABO/Rh (test code = ABO/Rh) O NEG                                  



[facility] [id number] 19:30:00





             Test Item    Value        Reference Range Interpretation Comments

 

             A/G Ratio (test code = A/G Ratio) 1.6          0.7-1.6             

      



[facility]2017-01-17 19:30:00





             Test Item    Value        Reference Range Interpretation Comments

 

             Globulin (test code = Globulin) 2.6          2.7-4.2               

    



[facility]2017-01-17 19:30:00





             Test Item    Value        Reference Range Interpretation Comments

 

             B/C Ratio (test code = B/C Ratio) 20           6-25                

      



[facility]2017-01-17 19:30:00





             Test Item    Value        Reference Range Interpretation Comments

 

             AGAP (test code = AGAP) 15.4         10.0-20.0                 



[facility]2017-01-17 19:30:00





             Test Item    Value        Reference Range Interpretation Comments

 

             eGFR (test code = eGFR) 67                                     



[facility]2017-01-17 19:30:00





             Test Item    Value        Reference Range Interpretation Comments

 

             CO2 (test code = CO2) 24           24-32                     



[facility]2017-01-17 19:30:00





             Test Item    Value        Reference Range Interpretation Comments

 

             Calcium Lvl (test code = Calcium Lvl) 8.8          8.5-10.5        

          



[facility]2017-01-17 19:30:00





             Test Item    Value        Reference Range Interpretation Comments

 

             Total Protein (test code = Total 6.8          6.4-8.4              

     



             Protein)                                            



[facility]2017-01-17 19:30:00





             Test Item    Value        Reference Range Interpretation Comments

 

             ALT (test code = ALT) 25           See_Comment                [Auto

mated message] The



                                                                 system which ge

nerated this



                                                                 result transmit

phoebe



                                                                 reference range

: <=65. The



                                                                 reference range

 was not



                                                                 used to interpr

et this



                                                                 result as maame

l/abnormal.



[facility]2017-01-17 19:30:00





             Test Item    Value        Reference Range Interpretation Comments

 

             Albumin Lvl (test code = Albumin Lvl) 4.2          3.5-5.0         

          



[facility]2017-01-17 19:30:00





             Test Item    Value        Reference Range Interpretation Comments

 

             AST (test code = AST) 12           See_Comment                [Auto

mated message] The



                                                                 system which ge

nerated this



                                                                 result transmit

phoebe



                                                                 reference range

: <=37. The



                                                                 reference range

 was not



                                                                 used to interpr

et this



                                                                 result as maame

l/abnormal.



[facility]2017-01-17 19:30:00





             Test Item    Value        Reference Range Interpretation Comments

 

             Alk Phos (test code = Alk Phos) 83                           

    



[facility]2017-01-17 19:30:00





             Test Item    Value        Reference Range Interpretation Comments

 

             Chloride Lvl (test code = Chloride Lvl) 106                  

            



[facility]2017-01-17 19:30:00





             Test Item    Value        Reference Range Interpretation Comments

 

             Bili Total (test code = Bili Total) 0.5          0.2-1.3           

        



[facility]2017-01-17 19:30:00





             Test Item    Value        Reference Range Interpretation Comments

 

             Potassium Lvl (test code = Potassium 4.4          3.5-5.1          

         



             Lvl)                                                



[facility]2017-01-17 19:30:00





             Test Item    Value        Reference Range Interpretation Comments

 

             Sodium Lvl (test code = Sodium Lvl) 141          135-145           

        



[facility]2017-01-17 19:30:00





             Test Item    Value        Reference Range Interpretation Comments

 

             Creatinine Lvl (test code = Creatinine 1.26         0.50-1.40      

           



             Lvl)                                                



[facility]2017-01-17 19:30:00





             Test Item    Value        Reference Range Interpretation Comments

 

             BUN (test code = BUN) 25           7-22                      



[facility]2017-01-17 19:30:00





             Test Item    Value        Reference Range Interpretation Comments

 

             Glucose Lvl (test code = Glucose Lvl) 91           70-99           

          



[facility] CenterDjodnozHLXDTDVJPF1250-33-94 19:30:00





             Test Item    Value        Reference Range Interpretation Comments

 

             RDW (test code = RDW) 14.2         11.5-14.5                 



[facility] CenterYfbcjicUANPJXQMCU5892-19-88 19:30:00





             Test Item    Value        Reference Range Interpretation Comments

 

             Hct (test code = Hct) 45.7         42.0-54.0                 



[facility] CenterScmjiozQPIOTWBUTL6290-73-19 19:30:00





             Test Item    Value        Reference Range Interpretation Comments

 

             Platelet (test code = Platelet) 149          133-450               

    



[facility] CenterUckvubuCEVFJHFDBY9362-65-38 19:30:00





             Test Item    Value        Reference Range Interpretation Comments

 

             MPV (test code = MPV) 8.4          7.4-10.4                  



[facility] CenterSbgfxbbPNSHPXMZXB9058-31-71 19:30:00





             Test Item    Value        Reference Range Interpretation Comments

 

             MCV (test code = MCV) 82.9         80.0-94.0                 



[place] [address]-01-17 19:30:00





             Test Item    Value        Reference Range Interpretation Comments

 

             MCHC (test code = MCHC) 34.2         32.0-36.0                 



[facility] CenterUwyiuoaPFPMNIJUYJ7513-17-89 19:30:00





             Test Item    Value        Reference Range Interpretation Comments

 

             MCH (test code = MCH) 28.3 pg      27.0-31.0                 



[facility] CenterGvsuosxJLURVWVXBS4917-29-08 19:30:00





             Test Item    Value        Reference Range Interpretation Comments

 

             WBC (test code = WBC) 6.1          3.7-10.4                  



[facility] CenterFghempeURDJOVOVXF7732-64-05 19:30:00





             Test Item    Value        Reference Range Interpretation Comments

 

             Hgb (test code = Hgb) 15.6         14.0-18.0                 



[facility] CenterEjouzjlXVHLSMXQER7553-50-21 19:30:00





             Test Item    Value        Reference Range Interpretation Comments

 

             RBC (test code = RBC) 5.52         4.70-6.10                 



[facility] CenterQrrpxrcNHPGRMUADG6729-40-53 19:30:00





             Test Item    Value        Reference Range Interpretation Comments

 

             Basophils (test code = 0.7          See_Comment                [Aut

omated message] The



             Basophils)                                          system which ge

nerated



                                                                 this result tra

nsmitted



                                                                 reference range

: <=1.0.



                                                                 The reference r

klaudia was



                                                                 not used to int

erpret



                                                                 this result as



                                                                 normal/abnormal

.



[facility] CenterIsezmriXSJTZFRSTS2464-41-40 19:30:00





             Test Item    Value        Reference Range Interpretation Comments

 

             Segs-Bands # (test code = Segs-Bands #) 4.4          1.5-8.1       

            



[facility] CenterVelijgdFNLOIJMTEK8699-39-07 19:30:00





             Test Item    Value        Reference Range Interpretation Comments

 

             Monocytes # (test code 0.6          See_Comment                [Aut

omated message] The



             = Monocytes #)                                        system which 

generated



                                                                 this result tra

nsmitted



                                                                 reference range

: <=0.8.



                                                                 The reference r

kluadia was



                                                                 not used to int

erpret



                                                                 this result as



                                                                 normal/abnormal

.



[facility] CenterVqjzcgvVCRTKUIOOU6958-76-39 19:30:00





             Test Item    Value        Reference Range Interpretation Comments

 

             Lymphocytes # (test code = Lymphocytes 1.1          1.0-5.5        

           



             #)                                                  



[facility] CenterGdjfopsTVRNTLKKHT3619-53-53 19:30:00





             Test Item    Value        Reference Range Interpretation Comments

 

             Eosinophils # (test code 0.1          See_Comment                [A

utomated message] The



             = Eosinophils #)                                        system whic

h generated



                                                                 this result tra

nsmitted



                                                                 reference range

: <=0.5.



                                                                 The reference r

klaudia was



                                                                 not used to int

erpret



                                                                 this result as



                                                                 normal/abnormal

.



[facility] CenterBwduuzdLBHBHPYPSA8290-56-49 19:30:00





             Test Item    Value        Reference Range Interpretation Comments

 

             Eosinophils (test code = 0.9          See_Comment                [A

utomated message] The



             Eosinophils)                                        system which ge

nerated



                                                                 this result tra

nsmitted



                                                                 reference range

: <=4.0.



                                                                 The reference r

klaudia was



                                                                 not used to int

erpret



                                                                 this result as



                                                                 normal/abnormal

.



[facility] CenterBiwoypyFDCIEDRJTF5756-86-38 19:30:00





             Test Item    Value        Reference Range Interpretation Comments

 

             Lymphocytes (test code = Lymphocytes) 17.6         20.0-40.0       

          



[facility]LnsbkdhQFLARVCJWJ7858-42-13 [facility]:30:00





             Test Item    Value        Reference Range Interpretation Comments

 

             Monocytes (test code = Monocytes) 9.3          2.0-12.0            

      



[facility] HospitalRspffxdXQSLFOERZO4715-52-09 19:30:00





             Test Item    Value        Reference Range Interpretation Comments

 

             Segs (test code = Segs) 71.5         45.0-75.0                 



[facility] PBFWBQCQM9476-13-36 19:30:00





             Test Item    Value        Reference Range Interpretation Comments

 

             Hgb A1C (test code = Hgb A1C) 5.3                                  

  



[facility]

## 2022-06-20 NOTE — EDPHYS
Physician Documentation                                                                           

 [facility]                                                                 

Name: Gomez Romero                                                                               

Age: 52 yrs                                                                                       

Sex: Male                                                                                         

: 1970                                                                                   

MRN: [id number]                                                                                   

Arrival Date: 2022                                                                          

Time: 11:28                                                                                       

Account#: [id number]                                                                            

Bed Treatment                                                                                     

Private MD:                                                                                       

ED Physician Reynaldo Garcia                                                                      

HPI:                                                                                              

                                                                                             

12:25 This 52 yrs old Male presents to ER via Ambulatory with complaints of Back Pain, Back   jmm 

      Injury.                                                                                     

12:25 The patient presents with pain that is acute. Onset: The symptoms/episode               jmm 

      began/occurred acutely, 1 day(s) ago. This is a 52 year old male with a history of CAD      

      that presents to the ED with complaints of right lower back pain. Patient states he         

      developed pain after twisting while in the seated position. Denies radiation of pain,       

      denies numbness, denies weakness. .                                                         

                                                                                                  

Historical:                                                                                       

- Allergies:                                                                                      

12:02 falsecarinate;                                                                          aa5 

- PMHx:                                                                                           

12:02 heart attack; lost weight and no longer diabetic or HTN;                                aa5 

- PSHx:                                                                                           

12:02 Heart transplant; Hernia sx;                                                            aa5 

                                                                                                  

- Immunization history:: Adult Immunizations unknown.                                             

- Social history:: Smoking status: Patient denies any tobacco usage or history of.                

                                                                                                  

                                                                                                  

ROS:                                                                                              

12:25 Constitutional: Negative for fever, chills, and weight loss, Cardiovascular: Negative   jmm 

      for chest pain, palpitations, and edema, Respiratory: Negative for shortness of breath,     

      cough, wheezing, and pleuritic chest pain.                                                  

12:25 Back: Positive for pain with movement.                                                      

12:25 All other systems are negative.                                                             

                                                                                                  

Exam:                                                                                             

12:25 Constitutional:  This is a well developed, well nourished patient who is awake, alert,  jmm 

      and in no acute distress. Head/Face:  atraumatic. Eyes:  EOMI, no conjunctival erythema     

      appreciated ENT:  Moist Mucus Membranes Neck:  Trachea midline, Supple Chest/axilla:        

      Normal chest wall appearance and motion.   Cardiovascular:  Regular rate and rhythm.        

      No edema appreciated Respiratory:  Normal respirations, no respiratory distress             

      appreciated Abdomen/GI:  Non distended, soft                                                

12:25 Skin:  General appearance color normal MS/ Extremity:  Moves all extremities, no            

      obvious deformities appreciated, no edema noted to the lower extremities  Neuro:  Awake     

      and alert Psych:  Behavior is normal, Mood is normal, Patient is cooperative and            

      pleasant                                                                                    

12:25 Back: muscle spasm, is appreciated in the right low back.                                   

                                                                                                  

Vital Signs:                                                                                      

12:03  / 91; Pulse 81; Resp 18 S; Temp 98.2(TE); Pulse Ox 99% ; Weight 99.79 kg (R);    aa5 

      Height 6 ft. 4 in. (193.04 cm) (R);                                                         

12:45  / 93; Pulse 84; Resp 18; Pulse Ox 99% on R/A; Pain 6/10;                         ld1 

13:26  / 91; Pulse 81; Resp 18; Pulse Ox 98% on R/A;                                    ld1 

14:40  / 86; Pulse 84; Resp 18; Pulse Ox 99% on R/A;                                    ld1 

15:40  / 82; Pulse 79; Resp 18; Pulse Ox 100% on R/A;                                   ld1 

12:03 Body Mass Index 26.78 (99.79 kg, 193.04 cm)                                             aa5 

                                                                                                  

MDM:                                                                                              

12:25 Patient medically screened.                                                             nena 

16:00 Data reviewed: vital signs, nurses notes. Counseling: I had a detailed discussion with  sania 

      the patient and/or guardian regarding: the historical points, exam findings, and any        

      diagnostic results supporting the discharge/admit diagnosis, radiology results, the         

      need for outpatient follow up, to return to the emergency department if symptoms worsen     

      or persist or if there are any questions or concerns that arise at home. ED course:         

      Xray is negative. I do not suspect cord compression or cauda equina. Patient advised to     

      follow up with pcp and otherwise given strict return precautions. patient understood        

      and agrees with the plan of care. .                                                         

                                                                                                  

                                                                                             

12:47 Order name: Lumbar Spine (3 Views) XRAY; Complete Time: 14:07                           [facility] 

                                                                                                  

Administered Medications:                                                                         

12:59 Drug: Valium (diazepam) 5 mg Route: PO;                                                 ld1 

12:59 Drug: HYDROcodone-acetaminophen 5 mg-325 mg 1 tabs Route: PO;                           ld1 

13:00 Drug: Decadron (dexamethasone) 10 mg Route: IM; Site: left deltoid;                     ld1 

13:00 Drug: Ketorolac 30 mg Route: IM; Site: right deltoid;                                   ld1 

                                                                                                  

                                                                                                  

Disposition Summary:                                                                              

22 16:04                                                                                    

Discharge Ordered                                                                                 

      Location: Home                                                                          [facility] 

      Condition: Stable                                                                       [facility] 

      Diagnosis                                                                                   

        - Muscle spasm of back                                                                jm 

      Followup:                                                                               [facility] 

        - With: Private Physician                                                                  

        - When: 2 - 3 days                                                                         

        - Reason: Recheck today's complaints, Continuance of care, Re-evaluation by your           

      physician                                                                                   

      Discharge Instructions:                                                                     

        - Discharge Summary Sheet                                                             jmm 

        - Muscle Cramps and Spasms                                                            jmm 

        - Low Back Sprain or Strain Rehab-[employer]                                           jm 

      Forms:                                                                                      

        - Medication Reconciliation Form                                                      jmm 

        - Thank You Letter                                                                    jmm 

        - Antibiotic Education                                                                jmm 

        - Prescription Opioid Use                                                             jm 

      Prescriptions:                                                                              

        - Diclofenac Sodium 75 mg Oral Tablet Sustained Release                                    

            - take 1 tablet by ORAL route 2 times per day; 30 tablet; Refills: 0, Product     jmm 

      Selection Permitted                                                                         

        - orphenadrine citrate 100 mg Oral Tablet Sustained Release                                

            - take 1 tablet by ORAL route 2 times per day As needed; 20 tablet; Refills: 0,   jmm 

      Product Selection Permitted                                                                 

Signatures:                                                                                       

Dispatcher [employer]                           Reynaldo Stover MD MD cha Mickail, Joel, PA PA jmm Calderon, Audri, RN                     RN   aa5                                                  

Dahlia Lopez RN                     RN   ld1                                                  

                                                                                                  

**************************************************************************************************

## 2022-06-20 NOTE — ER
Nurse's Notes                                                                                     

 [facility]                                                                 

Name: Gomez Romero                                                                               

Age: 52 yrs                                                                                       

Sex: Male                                                                                         

: 1970                                                                                   

MRN: [id number]                                                                                   

Arrival Date: 2022                                                                          

Time: 11:28                                                                                       

Account#: [id number]                                                                            

Bed Treatment                                                                                     

Private MD:                                                                                       

Diagnosis: Muscle spasm of back                                                                   

                                                                                                  

Presentation:                                                                                     

                                                                                             

12:03 Chief complaint: Patient states: sitting down turned and hurt back. Pt c/o pain to      aa5 

      right lower back radiating to right leg. Coronavirus screen: At this time, the client       

      does not indicate any symptoms associated with coronavirus-19. Ebola Screen: No             

      symptoms or risks identified at this time. Initial Sepsis Screen: Does the patient meet     

      any 2 criteria? No. Patient's initial sepsis screen is negative. Does the patient have      

      a suspected source of infection? No. Patient's initial sepsis screen is negative. Risk      

      Assessment: Do you want to hurt yourself or someone else? Patient reports no desire to      

      harm self or others. Onset of symptoms was 2022.                                       

12:03 Method Of Arrival: Ambulatory                                                           aa5 

12:03 Acuity: MILTON 4                                                                           aa5 

                                                                                                  

Historical:                                                                                       

- Allergies:                                                                                      

12:02 falsecarinate;                                                                          aa5 

- PMHx:                                                                                           

12:02 heart attack; lost weight and no longer diabetic or HTN;                                aa5 

- PSHx:                                                                                           

12:02 Heart transplant; Hernia sx;                                                            aa5 

                                                                                                  

- Immunization history:: Adult Immunizations unknown.                                             

- Social history:: Smoking status: Patient denies any tobacco usage or history of.                

                                                                                                  

                                                                                                  

Screenin:45 Abuse screen: Denies threats or abuse. Denies injuries from another. Nutritional        ld1 

      screening: No deficits noted. Tuberculosis screening: Fall Risk None identified.            

                                                                                                  

Assessment:                                                                                       

12:45 Reassessment: See triage assessment. General: Appears in no apparent distress.          ld1 

      comfortable, Behavior is calm, cooperative, appropriate for age. Pain: Complains of         

      pain in back Pain does not radiate. Pain currently is 6 out of 10 on a pain scale.          

      Neuro: Level of Consciousness is awake, alert, obeys commands, Oriented to person,          

      place, time, situation. Cardiovascular: Capillary refill < 3 seconds Patient's skin is      

      warm and dry. Respiratory: Airway is patent Respiratory effort is even, unlabored,          

      Respiratory pattern is regular, symmetrical.                                                

                                                                                                  

Vital Signs:                                                                                      

12:03  / 91; Pulse 81; Resp 18 S; Temp 98.2(TE); Pulse Ox 99% ; Weight 99.79 kg (R);    aa5 

      Height 6 ft. 4 in. (193.04 cm) (R);                                                         

12:45  / 93; Pulse 84; Resp 18; Pulse Ox 99% on R/A; Pain 6/10;                         ld1 

13:26  / 91; Pulse 81; Resp 18; Pulse Ox 98% on R/A;                                    ld1 

14:40  / 86; Pulse 84; Resp 18; Pulse Ox 99% on R/A;                                    ld1 

15:40  / 82; Pulse 79; Resp 18; Pulse Ox 100% on R/A;                                   ld1 

12:03 Body Mass Index 26.78 (99.79 kg, 193.04 cm)                                             aa5 

                                                                                                  

ED Course:                                                                                        

11:28 Patient arrived in ED.                                                                  am2 

12:01 Arm band placed on.                                                                     aa5 

12:04 Triage completed.                                                                       aa5 

12:06 Ace Serrano PA is PHCP.                                                              [facility] 

12:06 Reynaldo Garcia MD is Attending Physician.                                             [facility] 

12:16 Dahlia Lopez, EAN is Primary Nurse.                                                   ld1 

12:45 Patient has correct armband on for positive identification. Placed in gown. Bed in low  ld1 

      position. Call light in reach. Side rails up X2. Cardiac monitor on. Pulse ox on. NIBP      

      on. Door closed. Noise minimized. Warm blanket given.                                       

12:45 No provider procedures requiring assistance completed.                                  ld1 

13:49 Lumbar Spine (3 Views) XRAY In Process Unspecified.                                     EDMS

16:09 Patient did not have IV access during this emergency room visit.                        ld1 

                                                                                                  

Administered Medications:                                                                         

12:59 Drug: Valium (diazepam) 5 mg Route: PO;                                                 ld1 

12:59 Drug: HYDROcodone-acetaminophen 5 mg-325 mg 1 tabs Route: PO;                           ld1 

13:00 Drug: Decadron (dexamethasone) 10 mg Route: IM; Site: left deltoid;                     ld1 

13:00 Drug: Ketorolac 30 mg Route: IM; Site: right deltoid;                                   ld1 

                                                                                                  

                                                                                                  

Medication:                                                                                       

12:45 VIS not applicable for this client.                                                     ld1 

                                                                                                  

Outcome:                                                                                          

16:04 Discharge ordered by MD.                                                                hermann 

16:09 Discharged to home ambulatory.                                                          ld1 

16:09 Condition: good                                                                             

16:09 Discharge instructions given to patient, Instructed on discharge instructions, follow       

      up and referral plans. medication usage, Demonstrated understanding of instructions,        

      follow-up care, medications, Prescriptions given X 2.                                       

16:09 Patient left the ED.                                                                    ld1 

                                                                                                  

Signatures:                                                                                       

Dispatcher [employer]                           EDMS                                                 

Ace Serrano PA PA jmm Calderon, Audri, RN                     RN   aa5                                                  

Renita Rushing am2                                                  

Dahlia Lopez RN                     RN   ld1                                                  

                                                                                                  

**************************************************************************************************

## 2022-06-20 NOTE — RAD REPORT
EXAM DESCRIPTION:  RAD - Lumbar Spine 3 Views - 6/20/2022 1:47 pm

 

CLINICAL HISTORY:  low back pain

 

COMPARISON:  No comparisons

 

FINDINGS:  No acute fracture. Minimal thoracolumbar curvature No significant focal degenerative mendoza
es.

 

IMPRESSION:  No acute osseous abnormality involving the lumbar spine.

## 2022-10-30 ENCOUNTER — HOSPITAL ENCOUNTER (EMERGENCY)
Dept: HOSPITAL 97 - ER | Age: 52
Discharge: HOME | End: 2022-10-30
Payer: COMMERCIAL

## 2022-10-30 VITALS — OXYGEN SATURATION: 100 % | TEMPERATURE: 97.4 F | SYSTOLIC BLOOD PRESSURE: 130 MMHG | DIASTOLIC BLOOD PRESSURE: 93 MMHG

## 2022-10-30 DIAGNOSIS — Z94.1: ICD-10-CM

## 2022-10-30 DIAGNOSIS — M79.671: Primary | ICD-10-CM

## 2022-10-30 PROCEDURE — 99283 EMERGENCY DEPT VISIT LOW MDM: CPT

## 2022-10-30 NOTE — EDPHYS
Physician Documentation                                                                           

 [facility]                                                                 

Name: Gomez Romero                                                                               

Age: 52 yrs                                                                                       

Sex: Male                                                                                         

: 1970                                                                                   

MRN: [id number]                                                                                   

Arrival Date: 10/30/2022                                                                          

Time: 07:17                                                                                       

Account#: [id number]                                                                            

Bed 12                                                                                            

Private MD:                                                                                       

ED Physician Nadir Mccain                                                                       

HPI:                                                                                              

10/30                                                                                             

08:13 This 52 yrs old Male presents to ER via Ambulatory with complaints of Foot Pain.        kdr 

08:13 The patient presents with pain, that is acute. The complaints affect the left foot.     kdr 

      Context: The problem was sustained at home, resulted from an unknown cause, the patient     

      can fully bear weight, Patient states that when he awakes in the morning, he does not       

      have any pain but after a few hours of walking on it it begins to hurt. The pain            

      becomes nearly unbearable. Patient states that he has to not walk on it for some period     

      of time to allow to improved.. Onset: The symptoms/episode began/occurred suddenly, 2       

      week(s) ago. Modifying factors: The symptoms are alleviated by elevation of extremity,      

      Nonweightbearing. Associated signs and symptoms: The patient has no apparent associated     

      signs or symptoms. Severity of symptoms: At their worst the symptoms were                   

      incapacitating, Every day after ambulation for several hours. The patient has not           

      experienced similar symptoms in the past. The patient has not recently seen a physician.    

                                                                                                  

Historical:                                                                                       

- Allergies:                                                                                      

07:31 falsecarinate;                                                                          ss  

- Home Meds:                                                                                      

07:31 Prograf 1 mg Oral cap every 12 hours [Active]; micofenolato [Active];                   ss  

- PMHx:                                                                                           

07:31 heart attack; lost weight and no longer diabetic or HTN;                                ss  

- PSHx:                                                                                           

07:31 Heart transplant; Hernia sx;                                                            ss  

                                                                                                  

- Immunization history:: Client reports having NOT received the Covid vaccine.                    

- Social history:: Smoking status: Patient denies any tobacco usage or history of.                

                                                                                                  

                                                                                                  

ROS:                                                                                              

08:13 MS/extremity: Positive for pain, Negative for abrasion, contusion, decreased range of   kdr 

      motion, deformity, swelling, tenderness, tingling, warmth.                                  

08:13 Constitutional: Negative for fever, chills, and weight loss, Eyes: Negative for injury,     

      pain, redness, and discharge.                                                               

                                                                                                  

Exam:                                                                                             

08:13 Constitutional:  This is a well developed, well nourished patient who is awake, alert,  kdr 

      and in no acute distress. Head/Face:  Normocephalic, atraumatic.                            

08:13 Musculoskeletal/extremity: Extremities: all appear grossly normal, with no appreciated      

      pain with palpation, ROM: no acute changes, Circulation is intact in all extremities.       

      The patient is not a smoker nor does he do any other nicotine products. Sensation           

      intact. Compartment Syndrome exam of affected extremity: is normal. no pain, no             

      numbness, no tingling, no sensation deficit, Weight bearing: able to fully bear weight.     

                                                                                                  

Vital Signs:                                                                                      

07:28  / 93; Pulse 83; Resp 16; Temp 97.4(TE); Pulse Ox 100% on R/A; Weight 99.79 kg;   ss  

      Height 6 ft. 4 in. (193.04 cm); Pain 0/10;                                                  

07:28 Body Mass Index 26.78 (99.79 kg, 193.04 cm)                                             ss  

                                                                                                  

MDM:                                                                                              

09:49 Patient medically screened.                                                             kdr 

10:17 Data reviewed: vital signs, nurses notes, radiologic studies. Counseling: I had a       kdr 

      detailed discussion with the patient and/or guardian regarding: the historical points,      

      exam findings, and any diagnostic results supporting the discharge/admit diagnosis,         

      radiology results, the need for outpatient follow up.                                       

                                                                                                  

10/30                                                                                             

07:41 Order name: Foot Right 3 View XRAY; Complete Time: 08:59                                kdr 

                                                                                                  

Administered Medications:                                                                         

No medications were administered                                                                  

                                                                                                  

                                                                                                  

Disposition Summary:                                                                              

10/30/22 09:49                                                                                    

Discharge Ordered                                                                                 

      Location: Home                                                                          kdr 

      Problem: an ongoing problem                                                             kdr 

      Symptoms: are unchanged                                                                 kdr 

      Condition: Stable                                                                       kdr 

      Diagnosis                                                                                   

        - Pain in left foot                                                                   kdr 

      Followup:                                                                               kdr 

        - With: Private Physician                                                                  

        - When: 2 - 3 days                                                                         

        - Reason: If symptoms return, Further diagnostic work-up, Recheck today's complaints,      

      Continuance of care, Re-evaluation by your physician                                        

      Discharge Instructions:                                                                     

        - Discharge Summary Sheet                                                             kdr 

        - Musculoskeletal Pain                                                                kdr 

        - Foot Pain                                                                           kdr 

      Forms:                                                                                      

        - Medication Reconciliation Form                                                      kdr 

        - Thank You Letter                                                                    kdr 

        - Work release form                                                                   ss  

      Prescriptions:                                                                              

        - Ibuprofen 600 mg Oral Tablet                                                             

            - take 1 tablet by ORAL route every 6 hours As needed take with food; 30 tablet;  kdr 

      Refills: 0, Product Selection Permitted                                                     

        - Medrol (Khoi) 4 mg Oral Tablets, Dose Pack                                                

            - take 1 tablet by ORAL route as directed - follow package instructions; 1        kdr 

      packet; Refills: 0, Product Selection Permitted                                             

Signatures:                                                                                       

Dispatcher [employer]                           Nadir Stephenson MD MD   kdr                                                  

Brisa Proctor, RN                      RN   ss                                                   

                                                                                                  

**************************************************************************************************

## 2022-10-30 NOTE — ER
Nurse's Notes                                                                                     

 [facility] [place] [facility]\""                                                                 

Name: Gomez Romero                                                                               

Age: 52 yrs                                                                                       

Sex: Male                                                                                         

: 1970                                                                                   

MRN: [id number]                                                                                   

Arrival Date: 10/30/2022                                                                          

Time: 07:17                                                                                       

Account#: [id number]                                                                            

Bed 12                                                                                            

Private MD:                                                                                       

Diagnosis: Pain in left foot                                                                      

                                                                                                  

Presentation:                                                                                     

10/30                                                                                             

07:28 Chief complaint: Patient states: Pain to top of L foot that began 3 weeks ago. No known ss  

      injury. Pt denies pain at this time, states it comes and goes. Coronavirus screen:          

      Client denies travel out of the [place] in the last 14 days. Ebola Screen: Patient denies      

      exposure to infectious person. Patient denies travel to an Ebola-affected area in the       

      21 days before illness onset. Initial Sepsis Screen: Does the patient meet any 2            

      criteria? No. Patient's initial sepsis screen is negative. Does the patient have a          

      suspected source of infection? No. Patient's initial sepsis screen is negative. Risk        

      Assessment: Do you want to hurt yourself or someone else? Patient reports no desire to      

      harm self or others. Onset of symptoms was 2022.                                

07:28 Method Of Arrival: Ambulatory                                                           ss  

07:28 Acuity: [place] 4                                                                           ss  

                                                                                                  

Historical:                                                                                       

- Allergies:                                                                                      

07:31 falsecarinate;                                                                          ss  

- Home Meds:                                                                                      

07:31 Prograf 1 mg Oral cap every 12 hours [Active]; micofenolato [Active];                   ss  

- PMHx:                                                                                           

07:31 heart attack; lost weight and no longer diabetic or HTN;                                ss  

- PSHx:                                                                                           

07:31 Heart transplant; Hernia sx;                                                            ss  

                                                                                                  

- Immunization history:: Client reports having NOT received the Covid vaccine.                    

- Social history:: Smoking status: Patient denies any tobacco usage or history of.                

                                                                                                  

                                                                                                  

Screenin:32 Abuse screen: Denies threats or abuse. Denies injuries from another. Nutritional        ss  

      screening: No deficits noted. Tuberculosis screening: Never had TB. Fall Risk None          

      identified.                                                                                 

                                                                                                  

Assessment:                                                                                       

10:01 Reassessment: Patient appears in no apparent distress at this time. Patient and/or      ss  

      family updated on plan of care and expected duration. Pain level reassessed. Patient is     

      alert, oriented x 3, equal unlabored respirations, skin warm/dry/pink.                      

                                                                                                  

Vital Signs:                                                                                      

07:28  / 93; Pulse 83; Resp 16; Temp 97.4(TE); Pulse Ox 100% on R/A; Weight 99.79 kg;   ss  

      Height 6 ft. 4 in. (193.04 cm); Pain 0/10;                                                  

07:28 Body Mass Index 26.78 (99.79 kg, 193.04 cm)                                               

                                                                                                  

ED Course:                                                                                        

07:17 Patient arrived in ED.                                                                  as  

07:31 Nadir Mccain MD is Attending Physician.                                              kdr 

07:31 Triage completed.                                                                       ss  

07:31 Arm band placed on right wrist.                                                         ss  

07:32 Brisa Proctor, RN is Primary Nurse.                                                    ss  

07:32 Patient has correct armband on for positive identification. Bed in low position. Call   ss  

      light in reach.                                                                             

08:10 Foot Right 3 View XRAY In Process Unspecified.                                          EDMS

10:01 No provider procedures requiring assistance completed. Patient did not have IV access   ss  

      during this emergency room visit.                                                           

                                                                                                  

Administered Medications:                                                                         

No medications were administered                                                                  

                                                                                                  

                                                                                                  

Medication:                                                                                       

10:01 VIS not applicable for this client.                                                     ss  

                                                                                                  

Outcome:                                                                                          

09:49 Discharge ordered by MD.                                                                kdr 

10:01 Discharged to home ambulatory.                                                          ss  

10:01 Condition: good                                                                             

10:01 Discharge instructions given to patient, Instructed on discharge instructions, follow       

      up and referral plans. medication usage, Demonstrated understanding of instructions,        

      follow-up care, medications, Prescriptions given X 2.                                       

10:01 Patient left the ED.                                                                    ss  

                                                                                                  

Signatures:                                                                                       

Dispatcher [employer]                           EDMS                                                 

Nadir Mccain MD MD kdr Martinez, Amelia                             as                                                   

Brisa Proctor, RN                      RN   ss                                                   

                                                                                                  

**************************************************************************************************

## 2022-10-30 NOTE — XMS REPORT
Continuity of Care Document

                           Created on:2022



Patient:TYRA BRUNO

Sex:Male

:1970

External Reference #:[id number]





Demographics







                          Address                   [address]



                                                    [place] [address]

 

                          Home Phone                [phone]

 

                          Work Phone                [phone]

 

                          Mobile Phone              [phone]

 

                          Email Address             [email or web address]

 

                          Preferred Language        English

 

                          Marital Status            Unknown

 

                          Yarsanism Affiliation     Unknown

 

                          Race                      Unknown

 

                          Additional Race(s)        Unavailable



                                                    White

 

                          Ethnic Group              Unknown









Author







                          Organization              [facility]

t

 

                          Address                   [address]



                                                    [place] [address]

 

                          Phone                     [phone]









Support







                Name            Relationship    Address         Phone

 

                NOLAN BRUNO               Unavailable     [phone]

 

                CHRISTIE BRUNO               Unavailable     [phone]

 

                CHRISTIE BRUNO               Unavailable     Unavailable

 

                Unavailable     NG              Unavailable     Unavailable









Care Team Providers







                    Name                Role                Phone

 

                    SHIRLEY LOO Primary Care Physician Unavailable

 

                    SHIRLEY LOO Attending Clinician Unavailable

 

                    Shannon Rolilns RN Attending Clinician Unavailable

 

                    Shila Golden         Attending Clinician Unavailable

 

                    Sincere MCKOY, Margot    Attending Clinician Unavailable

 

                    Shirley Loo MD Attending Clinician [phone]

 

                    Herminia Kilgore  Attending Clinician Unavailable

 

                    Maria G OTT, Tresa Nails Attending Clinician [phone]

 

                    GUIDO NICHOLS M.D. Attending Clinician Unavailable

 

                    Guido Nichols  Attending Clinician [phone]

 

                    MAURO AWAD Attending Clinician Unavailable

 

                    NORBERTO SILVESTRE Attending Clinician Unavailable

 

                    CHRISTIE SPENCER RD Attending Clinician Unavailable

 

                    SHIRLEY LOO Admitting Clinician Unavailable

 

                    Guido Nichols  Admitting Clinician [phone]









Payers







           Payer Name Policy Type Policy Number Effective Date Expiration Date S

ourleonard

 

           BCBS OS               [id number] 2017            



           POS/PPO/EPO                       00:00:00              

 

           MEDICARE A B            [id number] 2017 



                                            00:00:00   00:00:00   







Problems







       Condition Condition Condition Status Onset  Resolution Last   Treating Co

mments 

Source



       Name   Details Category        Date   Date   Treatment Clinician        



                                                 Date                 

 

       RIGHT   RIGHT Diagnosis Active 2017               Mem

oria



       RECURRENT RECURRENT                         06:05:00               l



       INGUINAL INGUINAL               00:00:                             Hussein

n



       HERNIA AND HERNIA AND               00                                 



       INCI   INCI                                                    



              Active                                                  



              2017                                                  



               [facility]                                                  

 

       MORBID  MORBID Diagnosis Active 2017               Me

morinilam



       OBESITY OBESITY               0-14          07:33:00               l



              Active               00:00:                             [place]



              10/14/2016               00                                 



               [facility]                                                  

 

       Heart  Heart  Disease Active                              [facility]



       transplant transplant               3-30                               [facility]

[facility]



       ,      ,                    00:00:                             Medical



       orthotopic orthotopic               00                                 Ce

nter



       , status , status                                                  

 

       Heart  Heart  Disease Active 2015                      Overview: [facility]



       transplant transplant               2-04                        [facility]

 [facility]



       rejection rejection               00:00:                      g of this M

edical



                                   00                          note   Center



                                                               might be 



                                                               different 



                                                               from the 



                                                               original. 



                                                               - 2R   



                                                               rejection 



                                                               on low 



                                                               dose   



                                                               immunosup 



                                                               pression 



                                                               10/22/15 



                                                               treated 



                                                               with   



                                                               prednison 



                                                               e pulse- 



                                                               2R     



                                                               rejection 



                                                               on low 



                                                               dose   



                                                               immunosup 



                                                               pression 



                                                               10/28/15 



                                                               treated 



                                                               with IV 



                                                               solumedro 



                                                               l,     



                                                               increased 



                                                               MMF,   



                                                               increased 



                                                               Prograf- 



                                                               2R biopsy 



                                                               on     



                                                               12/2/15 



                                                               treated 



                                                               with ATG 



                                                               x 4    

 

       History of History of Disease Active                              C

[facility]



       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

[facility]



       virus  virus                00:00:                             Medical



       (CMV)  (CMV)                00                                 Center



       viremia viremia                                                  

 

       ICM s/p ICM s/p Disease Active                              [facility]



       orthotopic orthotopic               5-                               [facility]



       heart  heart                00:00:                             Medical



       transplant transplant               00                                 Ce

nter



       5/14/15 5/14/15                                                  

 

       Dyslipidem Dyslipidem Disease Active                              [facility]

[facility]



       ia     ia                   1-16                               [facility]



                                   00:00:                             Medical



                                   00                                 Center

 

       History of History of Problem Resolve                                    

[facility]



       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                                       

  Physici



       malabsorpt malabsorpt                                                  an

s



       ion    ion                                                     

 

       History of History of Problem Resolve                                    

[facility]



       malnutriti malnutriti HL7.CCDAR2 d                                       

  Physici



       on     on                                                      ans

 

       Vitamin D Vitamin D Problem Active                                    [facility]



       deficiency deficiency HL7.CCDAR2                                         

  Physici



                                                                      ans

 

       Hiatal  Hiatal Problem Active               2017               Geraldo

jensen



       hernia hernia                             01:10:50               l



       (disorder) (disorder)                                                  He

rmann



              Active                                                  



              Problem                                                  



              2017                                                  



              [facility]                                                  

 

       OBESITY,  OBESITY, Diagnosis Active               2017             

  [facility]



       UNSPECIFIE UNSPECIFIE                             07:33:00               

l



       D      D Active                                                  [place]



              [facility]                                                  

 

       Myocardial  Myocardia Problem Resolve               2017           

    [facility]



       infarction l             d                    01:10:50               l



       (disorder) infarction                                                  He

rmann



              (disorder)                                                  



              Resolved                                                  



              Problem                                                  



              2017                                                  



              [facility]                                                  

 

       Routine Routine Problem Active                                    [facility]



       lab draw lab draw HL7.CCDAR2                                           Ph

ysici



                                                                      ans

 

       Hypertensi Hypertensi Disease Active                                    C

[facility]



       on     on                                                      [facility]

 

       Ischemic Ischemic Disease Active                                    [facility]

t



       cardiomyop cardiomyop                                                  [facility]

[facility]



       athy with athy with                                                  Medi

lizabeth



       MI 2015 MI 2015                                                  Ce

nter

 

       Malnutriti Malnutriti Problem Active                                    U

T



       on     on     HL7.CCDAR2                                           Physic

i



                                                                      ans

 

       Morbid Morbid Problem Active                                    [facility]



       obesity obesity HL7.CCDAR2                                           Phys

ici



       due to due to                                                  ans



       excess excess                                                  



       calories calories                                                  

 

       Primary Primary Problem Active                                    [facility]



       dysthymia dysthymia HL7.CCDAR2                                           

Physici



                                                                      ans

 

       Adult BMI Adult BMI Problem Active                                    [facility]



       40.0-44.9 40.0-44.9 HL7.CCDAR2                                           

Physici



       kg/sq m kg/sq m                                                  ans

 

       Malabsorpt Malabsorpt Problem Active                                    U

T



       ion    ion    HL7.CCDAR2                                           Physic

i



                                                                      ans







Allergies, Adverse Reactions, Alerts







       Allergy Allergy Status Severity Reaction(s) Onset  Inactive Treating Comm

ents 

Source



       Name   Type                        Date   Date   Clinician        

 

       FOSCARNE Allergy Active High   N\T\V                        [facility]



       T                                                          [facility]



                                          00:00:                      Medical



                                          00                          Center

 

       Foscarne Drug   Active        Nausea And 2015 C

[facility]



       t      Allergy               Vomiting,                  Pt has no Rad

es



                                   Swelling 00:00:               reaction Medica

l



                                          00                   to     Center



                                                               current 



                                                               medicatio 



                                                               n      



                                                               administr 



                                                               ation  



                                                               regimen: 



                                                               premedica 



                                                               te with 



                                                               Benadryl, 



                                                               Zofran, 



                                                               Tylenol, 



                                                               then   



                                                               500cc NS 



                                                               bolus, 



                                                               then   



                                                               diluted 



                                                               foscarnet 



                                                               over 2 



                                                               hours, 



                                                               then   



                                                               another 



                                                               500cc NS 



                                                               bolus. 



                                                               Electroly 



                                                               te labs 



                                                               after  



                                                               every  



                                                               dose,  



                                                               electroly 



                                                               te     



                                                               replaceme 



                                                               nt     



                                                               protocol 



                                                               in     



                                                               place.8/1 



                                                               3/2015Pt 



                                                               has no 



                                                               reaction 



                                                               to     



                                                               current 



                                                               medicatio 



                                                               n      



                                                               administr 



                                                               ation  



                                                               regimen: 



                                                               premedica 



                                                               te with 



                                                               Benadryl, 



                                                               Zofran, 



                                                               Tylenol, 



                                                               then   



                                                               500cc NS 



                                                               bolus, 



                                                               then   



                                                               diluted 



                                                               foscarnet 



                                                               over 2 



                                                               hours, 



                                                               then   



                                                               another 



                                                               500cc NS 



                                                               bolus. 



                                                               Electroly 



                                                               te labs 



                                                               after  



                                                               every  



                                                               dose,  



                                                               electroly 



                                                               te     



                                                               replaceme 



                                                               nt     



                                                               protocol 



                                                               in place. 







Family History







           Family Member Diagnosis  Comments   Start Date Stop Date  Source

 

           Maternal grandfather Cancer                                      [facility]

 

           Maternal grandfather Heart disease                                  C

[facility]

 

           Natural mother Diabetes                                    [facility]



                                                                  Center







Social History







           Social Habit Start Date Stop Date  Quantity   Comments   Source

 

           Alcohol intake 2021 Current               LEXI Avila



                      00:00:00   00:00:00   non-drinker of            Medical Ce

nter



                                            alcohol (finding)            

 

           Tobacco use and 2015 Never used            [facility] Gee



           exposure   00:00:00   00:00:00                         [facility]

 

           Sex Assigned At 1970                       LEXI Gee



           Birth      00:00:00   00:00:00                         [facility]









                Smoking Status  Start Date      Stop Date       Source

 

                Social History                                  [facility]







Medications







       Ordered Filled Start  Stop   Current Ordering Indication Dosage Frequency

 Signature

                    Comments            Components          Source



     Medication Medication Date Date Medication? Clinician                (SIG) 

          



     Name Name                                                   

 

     amLODIPine       No             5mg  QD   Take 1           [facility]



     (NORVASC) 5      7-19 07-19                          tablet (5           [facility]

[facility]



     MG tablet      00:00: 23:59                          mg total)           Me

dical



               00   :00                           by mouth           Center



                                                  daily.           

 

     amLODIPine       No             5mg  QD   Take 1           [facility]



     (NORVASC) 5      6-14 07-19                          tablet (5           [facility]

kes



     MG tablet      00:00: 00:00                          mg total)           Me

dical



               00   :00                           by mouth           Center



                                                  daily.           

 

     pravastatin            Yes                      Take 1           [facility]



     (PRAVACHOL)      4-11                               tablet by           Rad

es



     40 MG      00:00:                               mouth           Medical



     tablet      00                                 daily           Center



                                                  (DIFF'T           



                                                  LOOK SAME           



                                                  MEDICATION           



                                                  )              

 

     mycophenola            Yes                      Take 1           [facility]



     te        4-11                               capsule by           [facility]



     (CELLCEPT)      00:00:                               mouth           Medica

l



     250 mg      00                                 twice           Center



     capsule                                         daily.           

 

     tacrolimus            Yes                      Take 2           [facility]

t



     (PROGRAF) 1      1-19                               capsules           [facility]

s



     MG capsule      00:00:                               by mouth           Med

ical



               00                                 twice           [facility]



                                                  daily.           

 

     tacrolimus            Yes                      Take 2           [facility]

[facility]



     (PROGRAF) 1      1-19                               capsules           [facility]

[facility]



     MG capsule      00:00:                               by mouth           [facility]

ical



               00                                 twice           [facility]



                                                  daily.           

 

     tacrolimus            Yes                      Take 2           [facility]

[facility]



     (PROGRAF) 1      1-19                               capsules           [facility]

[facility]



     MG capsule      00:00:                               by mouth           [facility]

ical



               00                                 twice           [facility]



                                                  daily.           

 

     mycophenola      2021 No             250mg Q.5D Take 1           [facility]



     te        2-16 12-16                          capsule           [facility]



     (CELLCEPT)      00:00: 23:59                          (250 mg           [facility]

ical



     250 mg      00   :00                           total) by           [facility]



     capsule                                         mouth 2           



                                                  (two)           



                                                  times           



                                                  daily.           

 

     mycophenola      2021- No             250mg Q.5D Take 1           [facility]



     te        2-16 12-16                          capsule           [facility]



     (CELLCEPT)      00:00: 23:59                          (250 mg           [facility]

ical



     250 mg      00   :00                           total) by           [facility]



     capsule                                         mouth 2           



                                                  (two)           



                                                  times           



                                                  daily.           

 

     mycophenola      2021- No             250mg Q.5D Take 1           [facility]



     te        2-16 04-11                          capsule           [facility]



     (CELLCEPT)      00:00: 00:00                          (250 mg           [facility]

ical



     250 mg      00   :00                           total) by           [facility]



     capsule                                         mouth 2           



                                                  (two)           



                                                  times           



                                                  daily.           

 

     tacrolimus      2021- No                       2mg am and           

[facility]



     (PROGRAF) 1      2-13 -19                          1mg at           [facility]



     [facility] capsule      00:00: 00:00                          night.           [facility]

[facility]



               00   :00                                          [facility]

 

     tacrolimus      2021- No                       2mg am and           

[facility]



     (PROGRAF) 1      2-13 -19                          1mg at           [facility]



     [facility] capsule      00:00: 00:00                          night.           [facility]

[facility]



               00   :00                                          [facility]

 

     tacrolimus      2021- No                       2mg am and           

[facility]



     (PROGRAF) 1      2-13 -19                          1mg at           [facility]



     [facility] capsule      00:00: 00:00                          night.           [facility]

[facility]



               00   :00                                          [facility]

 

     amLODIPine      2021- No             5mg  QD   Take 1           [facility]



     (NORVASC) 5      1-30 11-30                          tablet (5           [facility]

[facility]



     MG tablet      00:00: 23:59                          mg total)           [facility]

d[facility]



               00   :00                           by mouth           [facility]



                                                  daily.           

 

     amLODIPine      2021- No             5mg  QD   Take 1           [facility]



     (NORVASC) 5      1-30 11-30                          tablet (5           [facility]

[facility]



     MG tablet      00:00: 23:59                          mg total)           [facility]

dical



               00   :00                           by mouth           [facility]



                                                  daily.           

 

     amLODIPine      2021- No             5mg  QD   Take 1           [facility]



     (NORVASC) 5      1-30 06-14                          tablet (5           [facility]

[facility]



     MG tablet      00:00: 00:00                          mg total)           [facility]

dical



               00   :00                           by mouth           [facility]



                                                  daily.           

 

     pravastatin            Yes                      Take 1           [facility]



     (PRAVACHOL)      4-05                               tablet by           [facility]

[facility]



     40 MG      00:00:                               mouth           [facility]



     tablet      00                                 daily           [facility]

 

     pravastatin            Yes                      Take 1           [facility]



     (PRAVACHOL)      4-05                               tablet by           [facility]

[facility]



     40 MG      00:00:                               mouth           [facility]



     tablet      00                                 daily           [facility]

 

     pravastatin      2022- No                       Take 1           [facility]



     (PRAVACHOL)      4-05 04-11                          tablet by           [facility]

[facility]



     40 MG      00:00: 00:00                          mouth           [facility]



     tablet      00   :00                           daily           [facility]

 

     mycophenola      2021- No                       Take 1           [facility]



     te        4-05 12-16                          capsule           [facility]



     (CELLCEPT)      00:00: 00:00                          (250mg) by           

Medical



     250 mg      00   :00                           mouth           [facility]



     capsule                                         twice           



                                                  daily           

 

     mycophenola      2021- No                       Take 1           [facility]



     te        4-05 12-16                          capsule           [facility]



     (CELLCEPT)      00:00: 00:00                          (250mg) by           

Medical



     250 mg      00   :00                           mouth           [facility]



     capsule                                         twice           



                                                  daily           

 

     mycophenola      2021- No                       Take 1           [facility]



     te        4-05 12-16                          capsule           [facility]



     (CELLCEPT)      00:00: 00:00                          (250mg) by           

Medical



     250 mg      00   :00                           mouth           [facility]



     capsule                                         twice           



                                                  daily           

 

     HYDROcodone      2021- No        Tooth pain 1{tbl}      Take 1      

     [facility]



     -acetaminop      3-10 03-20                          tablet by           [facility] ([facility]      00:00: 23:59                          mouth           [facility]

al



     )      00   :00                           every 6           [facility]



      mg                                         (six)           



     per tablet                                         hours as           



                                                  needed for           



                                                  Pain for           



                                                  up to 10           



                                                  days. Max           



                                                  Daily           



                                                  Amount: 4           



                                                  tablets           

 

     HYDROcodone      2021- No        Tooth pain 1{tbl}      Take 1      

     [facility]



     -acetaminop      3-10 03-20                          tablet by           [facility] (NORCO      00:00: 23:59                          mouth           [facility]

al



     )      00   :00                           every 6           [facility]



      mg                                         (six)           



     per tablet                                         hours as           



                                                  needed for           



                                                  Pain for           



                                                  up to 10           



                                                  days. Max           



                                                  Daily           



                                                  Amount: 4           



                                                  tablets           

 

     tacrolimus      2021- No                       Take 2           [facility]



     (PROGRAF) 1      1-26 12-13                          capsules           [facility]

[facility]



     MG capsule      00:00: 00:00                          by mouth           Me

dical



               00   :00                           twice           [facility]



                                                  daily           



                                                  (DIFF'T           



                                                  LOOK SAME           



                                                  MEDICATION           



                                                  )              

 

     tacrolimus      2021- No                       Take 2           [facility]



     (PROGRAF) 1       12-13                          capsules           [facility]

[facility]



     MG capsule      00:00: 00:00                          by mouth           Me

dical



               00   :00                           twice           [facility]



                                                  daily           



                                                  (DIFF'T           



                                                  LOOK SAME           



                                                  MEDICATION           



                                                  )              

 

     tacrolimus      2021- No                       Take 2           [facility]



     (PROGRAF) 1       12-13                          capsules           [facility]

[facility]



     MG capsule      00:00: 00:00                          by mouth           [facility]

dical



               00   :00                           twice           [facility]



                                                  daily           



                                                  (DIFF'T           



                                                  LOOK SAME           



                                                  MEDICATION           



                                                  )              

 

     famotidine      2021- No             20mg Q.5D Take 1           [facility]



     (PEPCID) 20       07-07                          tablet (20           [facility]

[facility]



     MG tablet      00:00: 23:59                          mg total)           Me

dical



               00   :00                           by mouth 2           [facility]



                                                  (two)           



                                                  times           



                                                  daily.           

 

     famotidine      2021- No             20mg Q.5D Take 1           [facility]



     (PEPCID) 20       07-07                          tablet (20           [facility]

[facility]



     MG tablet      00:00: 23:59                          mg total)           Me

dical



               00   :00                           by mouth 2           [facility]



                                                  (two)           



                                                  times           



                                                  daily.           

 

     omeprazole      2021- No             20mg QD   Take 1           [facility]



     (PRILOSEC)      6- 06-23                          capsule           [facility]



     20 MG      00:00: 23:59                          (20 mg           [facility]



     capsule      00   :00                           total) by           Center



                                                  mouth           



                                                  daily.           

 

     omeprazole      2021- No             20mg QD   Take 1           [facility]



     (PRILOSEC)      6-23 06-23                          capsule           [facility]



     20 MG      00:00: 23:59                          (20 mg           [facility]



     capsule      00   :00                           total) by           [facility]



                                                  mouth           



                                                  daily.           

 

     pravastatin      2021- No             40mg QD   Take 1           [facility]



     (PRAVACHOL)       04-05                          tablet (40           [facility]

[facility]



     40 MG      00:00: 00:00                          mg total)           [facility]

[facility]



     tablet      00   :00                           by mouth           [facility]



                                                  daily.           

 

     pravastatin      2021- No             40mg QD   Take 1           [facility]



     (PRAVACHOL)       04-05                          tablet (40           [facility]

[facility]



     40 MG      00:00: 00:00                          mg total)           [facility]

[facility]



     tablet      00   :00                           by mouth           [facility]



                                                  daily.           

 

     mycophenola      2021- No             250mg Q.5D Take 1           [facility]



     te         03-11                          capsule           [facility]



     (CELLCEPT)      00:00: 00:00                          (250 mg           [facility]

ical



     250 mg      00   :00                           total) by           [facility]



     capsule                                         mouth 2           



                                                  (two)           



                                                  times           



                                                  daily.           

 

     mycophenola      0 - No             250mg Q.5D Take 1           [facility]



     te        -11                          capsule           [facility]



     (CELLCEPT)      00:00: 00:00                          (250 mg           Med

ical



     250 mg      00   :00                           total) by           Center



     capsule                                         mouth 2           



                                                  (two)           



                                                  times           



                                                  daily.           

 

     Vitamin D Vitamin D       Yes  BRANDY                TAKE 1          

 [facility]



     (Ergocalcif (Ergocalcif 4-03           [facility]                CAPSULE   

        [facility]



     danyel) [id number] danyel) 98939 00:00:                               WEEKLY.       

    ans



     UNIT Oral UNIT Oral 00                                                



     Capsule Capsule                                                   

 

     gabapentin      2017-0      No                       300 mg, 1           [facility]

[facility]



     300 MG Oral                                     cap,           l



     Capsule      18:37:                               Route: PO,           [facility]

[facility]



               00                                 Drug form:           



                                                  CAP, ONCE,           



                                                  Dosing           



                                                  Weight           



                                                  102.273,           



                                                  kg,            



                                                  Priority:           



                                                  STAT,           



                                                  Start           



                                                  date:           



                                                  17           



                                                  13:37:00           



                                                  CDT, Stop           



                                                  date:           



                                                  17           



                                                  13:37:00           



                                                  CDT            

 

     gabapentin      2017-0      No                       300 mg, 1           [facility]

[facility]



     300 MG Oral                                     cap,           l



     Capsule      18:37:                               Route: PO,           [facility]

[facility]



               00                                 Drug form:           



                                                  CAP, ONCE,           



                                                  Dosing           



                                                  Weight           



                                                  102.273,           



                                                  kg,            



                                                  Priority:           



                                                  STAT,           



                                                  Start           



                                                  date:           



                                                  17           



                                                  13:37:00           



                                                  CDT, Stop           



                                                  date:           



                                                  17           



                                                  13:37:00           



                                                  CDT            

 

     Tramadol      -0      No                       100 mg,           [facility]

[facility]



                                              Route: PO,           l



               18:36:                               Drug form:           [place]



               00                                 TAB, ONCE,           



                                                  Dosing           



                                                  Weight           



                                                  102.273,           



                                                  kg,            



                                                  Priority:           



                                                  STAT,           



                                                  Start           



                                                  date:           



                                                  17           



                                                  13:36:00           



                                                  CDT, Stop           



                                                  date:           



                                                  17           



                                                  13:36:00           



                                                  CDT            

 

     Tramadol      -0      No                       100 mg,           [facility]

[facility]



                                              Route: PO,           l



               18:36:                               Drug form:           [facility]



               00                                 TAB, ONCE,           



                                                  Dosing           



                                                  Weight           



                                                  102.273,           



                                                  kg,            



                                                  Priority:           



                                                  STAT,           



                                                  Start           



                                                  date:           



                                                  17           



                                                  13:36:00           



                                                  CDT, Stop           



                                                  date:           



                                                  17           



                                                  13:36:00           



                                                  CDT            

 

     ketOROLAC      2017-0      No                       IV, ONCE           [facility]

[facility]



     (ANES)                                                    l



               18:03:                                                                                              

 

     ondansetron      -0      No                       Route: IV,           

[facility]



     (ANES)                                     Drug form:           l



               18:03:                               INJ, ONCE,                                            Stop date:           



                                                  17           



                                                  13:03:00           



                                                  CDT            

 

     ketOROLAC      -0      No                       IV, ONCE           [facility]

[facility]



     (ANES)                                                    l



               18:03:                                              [place]                                                

 

     ondansetron      -0      No                       Route: IV,           

[facility]



     (ANES)                                     Drug form:           l



               18:03:                               INJ, ONCE,                                            Stop date:           



                                                  17           



                                                  13:03:00           



                                                  CDT            

 

     tramadol      2017-0      Yes                      50 mg = 1           [facility]

[facility]



     hydrochlori                                     tab, PO,           l



     de 50 MG      17:57:                               Q6H, PRN           [facility]

[facility]



     Oral Tablet      00                                 Pain, X 10           



                                                  day, # 40           



                                                  tab, 0           



                                                  Refill(s)           

 

     tramadol      2017-0      Yes                      50 mg = 1           [facility]

[facility]



     hydrochlori                                     tab, PO,           l



     de 50 MG      17:57:                               Q6H, PRN           [facility]

[facility]



     Oral Tablet      00                                 Pain, X 10           



                                                  day, # 40           



                                                  tab, 0           



                                                  Refill(s)           

 

     Ondansetron      2017-0      No                       4 mg,           [facility]

[facility]



                                              Route:           l



               17:00:                               IVP, Q6H,                                            Dosing           



                                                  Weight           



                                                  102.273,           



                                                  kg, Start           



                                                  date:           



                                                  17           



                                                  12:00:00           



                                                  CDT,           



                                                  Duration:           



                                                  30 day,           



                                                  Stop date:           



                                                  10/21/17           



                                                  6:00:00           



                                                  CDT            

 

     Ondansetron      2017-0      No                       4 mg,           [facility]

[facility]



                                              Route:           l



               17:00:                               IVP, Q6H,                                            Dosing           



                                                  Weight           



                                                  102.273,           



                                                  kg, Start           



                                                  date:           



                                                  17           



                                                  12:00:00           



                                                  CDT,           



                                                  Duration:           



                                                  30 day,           



                                                  Stop date:           



                                                  10/21/17           



                                                  6:00:00           



                                                  CDT            

 

     propofol      -0      No                       Route: IV,           [facility]

[facility]



     (ANES)                                     Drug form:           l



               15:08:                               INJ, ONCE,                                            Stop date:           



                                                  17           



                                                  10:08:00           



                                                  CDT            

 

     succinylcho      -0      No                       Route: IV,           

[facility]



     line (ANES)                                     Drug form:           l



               15:08:                               INJ, ONCE,                                            Stop date:           



                                                  17           



                                                  10:08:00           



                                                  CDT            

 

     fentaNYL      -0      No                       Route: IV,           [facility]

[facility]



     (ANES)                                     Drug form:           l



               15:08:                               INJ, ONCE,                                            Stop date:           



                                                  17           



                                                  10:08:00           



                                                  CDT            

 

     propofol      -0      No                       Route: IV,           [facility]

[facility]



     (ANES)                                     Drug form:           l



               15:08:                               INJ, ONCE,                                            Stop date:           



                                                  17           



                                                  10:08:00           



                                                  CDT            

 

     succinylcho            No                       Route: IV,           

[facility]



     line (ANES)                                     Drug form:           l



               15:08:                               INJ, ONCE,                                            Stop date:           



                                                  17           



                                                  10:08:00           



                                                  CDT            

 

     fentaNYL            No                       Route: IV,           [facility]

[facility]



     (ANES)                                     Drug form:           l



               15:08:                               INJ, ONCE,                                            Stop date:           



                                                  17           



                                                  10:08:00           



                                                  CDT            

 

     lidocaine            No                       Route: IV,           [facility]

[facility]



     (ANES)                                     Drug form:           l



               15:03:                               INJ, ONCE,                                            Stop date:           



                                                  17           



                                                  10:03:00           



                                                  CDT            

 

     lidocaine      -0      No                       Route: IV,           [facility]

[facility]



     (ANES)                                     Drug form:           l



               15:03:                               INJ, ONCE,                                            Stop date:           



                                                  17           



                                                  10:03:00           



                                                  CDT            

 

     Acetaminoph            No                       1,000 mg,           [facility]

[facility]



     en                                       Route: PO,           l



               15:00:                               Drug form:           [facility]



                                                LIQ,           



                                                  Q6Hnow,           



                                                  Dosing           



                                                  Weight           



                                                  102.273,           



                                                  kg, Start           



                                                  date:           



                                                  17           



                                                  10:00:00           



                                                  CDT,           



                                                  Duration:           



                                                  30 day,           



                                                  Stop date:           



                                                  10/21/17           



                                                  4:00:00           



                                                  CDT            

 

     Tramadol            No                       100 mg,           [facility]

a



                                              Route: PO,           l



               15:00:                               Drug form:           [facility]



                                                SUSP,           



                                                  Q6Hnow,           



                                                  Dosing           



                                                  Weight           



                                                  102.273,           



                                                  kg, Start           



                                                  date:           



                                                  17           



                                                  10:00:00           



                                                  CDT,           



                                                  Duration:           



                                                  30 day,           



                                                  Stop date:           



                                                  10/21/17           



                                                  4:00:00           



                                                  CDT            

 

     gabapentin            No                       300 mg,           Geraldo

jensen



                                              Route: PO,           l



               15:00:                               Drug form:           [facility]



               00                                 SUSP,           



                                                  Q8Hnow,           



                                                  Dosing           



                                                  Weight           



                                                  102.273,           



                                                  kg, Start           



                                                  date:           



                                                  17           



                                                  10:00:00           



                                                  CDT,           



                                                  Duration:           



                                                  30 day,           



                                                  Stop date:           



                                                  10/21/17           



                                                  2:00:00           



                                                  CDT            

 

     celecoxib      2017-0      No                       200 mg,           [facility]

[facility]



                                              Route: PO,           l



               15:00:                               [id number],                                            Dosing           



                                                  Weight           



                                                  102.273,           



                                                  kg, Start           



                                                  date:           



                                                  17           



                                                  10:00:00           



                                                  CDT,           



                                                  Duration:           



                                                  30 day,           



                                                  Stop date:           



                                                  10/20/17           



                                                  22:00:00           



                                                  CDT            

 

     Acetaminoph      -0      No                       1,000 mg,           [facility]

[facility]



     en                                       Route: PO,           l



               15:00:                               Drug form:           Jeff



                                                LIQ,           



                                                  Q6Hnow,           



                                                  Dosing           



                                                  Weight           



                                                  102.273,           



                                                  kg, Start           



                                                  date:           



                                                  17           



                                                  10:00:00           



                                                  CDT,           



                                                  Duration:           



                                                  30 day,           



                                                  Stop date:           



                                                  10/21/17           



                                                  4:00:00           



                                                  CDT            

 

     Tramadol      -0      No                       100 mg,           [facility]

a



                                              Route: PO,           l



               15:00:                               Drug form:           [facility]



                                                SUSP,           



                                                  Q6Hnow,           



                                                  Dosing           



                                                  Weight           



                                                  102.273,           



                                                  kg, Start           



                                                  date:           



                                                  17           



                                                  10:00:00           



                                                  CDT,           



                                                  Duration:           



                                                  30 day,           



                                                  Stop date:           



                                                  10/21/17           



                                                  4:00:00           



                                                  CDT            

 

     gabapentin      -0      No                       300 mg,           [facility]do

jensen



                                              Route: PO,           l



               15:00:                               Drug form:           [place]



                                                SUSP,           



                                                  Q8Hnow,           



                                                  Dosing           



                                                  Weight           



                                                  102.273,           



                                                  kg, Start           



                                                  date:           



                                                  17           



                                                  10:00:00           



                                                  CDT,           



                                                  Duration:           



                                                  30 day,           



                                                  Stop date:           



                                                  10/21/17           



                                                  2:00:00           



                                                  CDT            

 

     celecoxib      -0      No                       200 mg,           [facility]

[facility]



                                              Route: PO,           l



               15:00:                               [id number],                                            Dosing           



                                                  Weight           



                                                  102.273,           



                                                  kg, Start           



                                                  date:           



                                                  17           



                                                  10:00:00           



                                                  CDT,           



                                                  Duration:           



                                                  30 day,           



                                                  Stop date:           



                                                  10/20/17           



                                                  22:00:00           



                                                  CDT            

 

     rocuronium      -0      No                       Route: IV,           [facility]

[facility]



     (ANES)                                     Drug form:           l



               14:43:                               INJ, ONCE,                                            Stop date:           



                                                  17           



                                                  9:43:00           



                                                  CDT            

 

     rocuronium            No                       Route: IV,           [facility]

[facility]



     (ANES)                                     Drug form:           l



               14:43:                               INJ, ONCE,                                            Stop date:           



                                                  17           



                                                  9:43:00           



                                                  CDT            

 

     famotidine            No                       Route: IV,           [facility]

[facility]



     (ANES)                                     Drug form:           l



               14:33:                               INJ, ONCE,                                            Stop date:           



                                                  17           



                                                  9:33:00           



                                                  CDT            

 

     ceFAZolin            No                       Route: IV,           Me

[facility]



     (ANES)                                     Drug form:           l



               14:33:                               INJ, ONCE,                                            Stop date:           



                                                  17           



                                                  9:33:00           



                                                  CDT            

 

     famotidine            No                       Route: IV,           [facility]

[facility]



     (ANES)                                     Drug form:           l



               14:33:                               INJ, ONCE,                                            Stop date:           



                                                  17           



                                                  9:33:00           



                                                  CDT            

 

     ceFAZolin            No                       Route: IV,           Me

[facility]



     (ANES)                                     Drug form:           l



               14:33:                               INJ, ONCE,                                            Stop date:           



                                                  17           



                                                  9:33:00           



                                                  CDT            

 

     dexamethaso            No                       Route: IV,           

[facility]



     ne (ANES)                                     Drug form:           l



               14:18:                               INJ, ONCE,                                            Stop date:           



                                                  17           



                                                  9:18:00           



                                                  CDT            

 

     dexamethaso      0      No                       Route: IV,           

[facility]



     ne (ANES)                                     Drug form:           l



               14:18:                               INJ, ONCE,                                            Stop date:           



                                                  17           



                                                  9:18:00           



                                                  CDT            

 

     acetaminoph      0      No                       Route: IV,           

[facility]



     en (ANES)                                     Drug form:           l



     (ANES)      13:54:                               INJ, Start           Kaylee

                                 date:           



                                                  17           



                                                  8:54:00           



                                                  CDT, Stop           



                                                  date:           



                                                  17           



                                                  9:54:00           



                                                  CDT            

 

     acetaminoph      0      No                       Route: IV,           

[facility]



     en (ANES)                                     Drug form:           l



     (ANES)      13:54:                               INJ, Start           Kaylee

                                 date:           



                                                  17           



                                                  8:54:00           



                                                  CDT, Stop           



                                                  date:           



                                                  17           



                                                  9:54:00           



                                                  CDT            

 

     LR 1000 mL            No                       Route: IV,           [facility]

[facility]



     INJ (ANES)                                     Total           l



               13:23:                               Volume:           [facility]



               00                                 1,000,           



                                                  Start           



                                                  date:           



                                                  17           



                                                  8:23:00           



                                                  CDT, Stop           



                                                  date:           



                                                  17           



                                                  9:23:00           



                                                  CDT            

 

     LR 1000 mL            No                       Route: IV,           [facility]

[facility]



     INJ (ANES)                                     Total           l



               13:23:                               Volume:           Jeff



               00                                 1,000,           



                                                  Start           



                                                  date:           



                                                  17           



                                                  8:23:00           



                                                  CDT, Stop           



                                                  date:           



                                                  17           



                                                  9:23:00           



                                                  CDT            

 

     Flomax            No                       Notes:           [facility]



                                              (Same As:           l



               10:00:                               Flomax)                                            "Do Not           



                                                  Crush"           

 

     Ofirmev            No                       Notes:           [facility]



                                              Infuse           l



               10:00:                               over 15           [place]



               00                                 minutes Do           



                                                  not exceed           



                                                  4gm/day of           



                                                  acetaminop           



                                                  hen ***           



                                                  MEDICATION           



                                                  WASTE ***           



                                                  Product           



                                                  Size: 1000           



                                                  mg Product           



                                                  Wasted:           



                                                  ___ mg           

 

     ceFAZolin            No                       Notes:           [facility]

a



                                              Same as:           l



               10:00:                               Ancef           [place]                                                

 

     Flomax            No                       Notes:           [facility]



                                              (Same As:           l



               10:00:                               Flomax)           [facility]                                 "Do Not           



                                                  Crush"           

 

     Ofirmev            No                       Notes:           [facility]



                                              Infuse           l



               10:00:                               over 15           [place]



               00                                 minutes Do           



                                                  not exceed           



                                                  4gm/day of           



                                                  acetaminop           



                                                  hen ***           



                                                  MEDICATION           



                                                  WASTE ***           



                                                  Product           



                                                  Size: 1000           



                                                  mg Product           



                                                  Wasted:           



                                                  ___ mg           

 

     ceFAZolin            No                       Notes:           [facility]

a



                                              Same as:           l



               10:00:                               Ancef           [facility]                                                

 

     Tacrolimus            Yes                      2 mg, PO,           Me

[facility]



               19                               QPM            l



               14:03:                                              [facility]                                                

 

     Tacrolimus      -0      Yes                      2 mg, PO,           Me

moria



               19                               QPM            l



               14:03:                                                                                              

 

     Amitriptyli Amitriptyli       Yes  KULVINDER                TAKE 1   

        UT



     ne HCl - 25 ne HCl - 25 3-21           KAMILAH M.PILO                TABLET 3  

         Physici



     MG Oral MG Oral 00:00:                               TIMES           ans



     Tablet Tablet 00                                 DAILY AS           



                                                  NEEDED.           

 

     Sucralfate            No                       Notes: May           [facility]

[facility]



     100 MG/ML      -28                               interfere           l



     Oral      00:00:                               w/enteral           [place]



     Suspension      00                                 feeds -           



                                                  Take 1 hr           



                                                  before or           



                                                  2 hr after           



                                                  antacids,           



                                                  dairy pdt,           



                                                  meals           



                                                  &amp;           



                                                  minerals -           



                                                  On empty           



                                                  stomach.           



                                                  For            



                                                  patients           



                                                  unable to           



                                                  swallow           



                                                  tablet,           



                                                  dissolve           



                                                  in 10mL -           



                                                  30mL of           



                                                  water or           



                                                  juice and           



                                                  stir           



                                                  before           



                                                  giving.           



                                                  (Same As:           



                                                  Carafate)           

 

     Sucralfate            No                       Notes: May           [facility]

[facility]



     100 MG/ML      28                               interfere           l



     Oral      00:00:                               w/enteral           [place]



     Suspension      00                                 feeds -           



                                                  Take 1 hr           



                                                  before or           



                                                  2 hr after           



                                                  antacids,           



                                                  dairy pdt,           



                                                  meals           



                                                  &amp;           



                                                  minerals -           



                                                  On empty           



                                                  stomach.           



                                                  For            



                                                  patients           



                                                  unable to           



                                                  swallow           



                                                  tablet,           



                                                  dissolve           



                                                  in 10mL -           



                                                  30mL of           



                                                  water or           



                                                  juice and           



                                                  stir           



                                                  before           



                                                  giving.           



                                                  (Same As:           



                                                  Carafate)           

 

     Protonix            No                       Notes:           [facility]



               1-26                               Tablet           l



               15:00:                               should not           [place]



               00                                 be chewed           



                                                  or             



                                                  crushed.           



                                                  (Same as:           



                                                  Protonix)           

 

     Cartia XT            No                       Notes:           [facility]

a



               1-26                               (Same as:           l



               15:00:                               Cardizem           [place]



               00                                 CD) Before           



                                                  meals. DO           



                                                  NOT CRUSH.           

 

     Prednisone            No                       Notes:           [facility]

ia



               1-26                               (Same as:           l



               15:00:                               PredniSONE           [place]



               00                                 )  Take           



                                                  with food.           

 

     Protonix            No                       Notes:           [facility]



               1-26                               Tablet           l



               15:00:                               should not           [facility]



               00                                 be chewed           



                                                  or             



                                                  crushed.           



                                                  (Same as:           



                                                  Protonix)           

 

     Cartia XT            No                       Notes:           [facility]

a



               1-26                               (Same as:           l



               15:00:                               Cardizem           [facility]



               00                                 CD) Before           



                                                  meals. DO           



                                                  NOT CRUSH.           

 

     Prednisone            No                       Notes:           [facility]

ia



               1-26                               (Same as:           l



               15:00:                               PredniSONE           [place]



               00                                 ) Take           



                                                  with food.           

 

     Acetaminoph      -      Yes                      1,000 mg =           

[facility]



     en        -                               31.23 mL,           l



               13:15:                               PO,            [facility]



               00                                 Q6Hnow, 0           



                                                  Refill(s)           

 

     gabapentin            Yes                      300 mg = 6           [facility]

[facility]



     50 MG/ML      1-26                               mL, PO,           l



     Oral      13:15:                               Q8Hnow, #           [place]



     Solution      00                                 378 mL, 0           



                                                  Refill(s)           

 

     tramadol            Yes                      50 mg = 1           [facility]

[facility]



     hydrochlori                                     tab, PO,           l



     de 50 MG      13:15:                               Q6Hnow,           [facility]

n



     Oral Tablet      00                                 PRN Pain           



                                                  Score           



                                                  6-10, X 14           



                                                  day, # 56           



                                                  tab, 0           



                                                  Refill(s)           

 

     Acetaminoph            Yes                      1,000 mg =           

[facility]



     en                                       31.23 mL,           l



               13:15:                               PO,            [facility]



               00                                 Q6Hnow, 0           



                                                  Refill(s)           

 

     gabapentin            Yes                      300 mg = 6           [facility]

[facility]



     50 MG/ML                                     mL, PO,           l



     Oral      13:15:                               Q8Hnow, #           [place]



     Solution      00                                 378 mL, 0           



                                                  Refill(s)           

 

     tramadol            Yes                      50 mg = 1           [facility]

[facility]



     hydrochlori                                     tab, PO,           l



     de 50 MG      13:15:                               Q6Hnow,           [facility]

n



     Oral Tablet      00                                 PRN Pain           



                                                  Score           



                                                  6-10, X 14           



                                                  day, # 56           



                                                  tab, 0           



                                                  Refill(s)           

 

     Enoxaparin            No                       Notes:           [facility]

[facility]



                                              (Same as:           l



               12:00:                               Lovenox)                                                           

 

     Enoxaparin            No                       Notes:           [facility]

[facility]



                                              (Same as:           l



               12:00:                               Lovenox)                                                           

 

     Solu-CORTEF            No                       Notes:           [facility]

[facility]



                                              (Same as:           l



               04:00:                               Solu-RUPA           [facility]



               00                                 F)             

 

     Solu-CORTEF            No                       Notes:           [facility]

[facility]



                                              (Same as:           l



               04:00:                               Solu-RUPA           [facility]



               00                                 F)             

 

     Prograf            No                       Notes:           [facility]



                                              Avoid           l



               03:26:                               grapefruit           [facility]



               00                                 and            



                                                  grapefruit           



                                                  juice.           



                                                  (Same As:           



                                                  Prograf)           

 

     Prograf            No                       Notes:           [facility]



               -                               Avoid           l



               03:26:                               grapefruit           [facility]



               00                                 and            



                                                  grapefruit           



                                                  juice.           



                                                  (Same As:           



                                                  Prograf)           

 

     hydrocortis            No                       100 mg,           [facility]

[facility]



     one 100 mg                                     Route: IV,           l



     injection      03:00:                               Q6H,                                            Dosing           



                                                  Weight           



                                                  136.136,           



                                                  kg, Start           



                                                  date:           



                                                  17           



                                                  21:00:00           



                                                  CST,           



                                                  Duration:           



                                                  30 day,           



                                                  Stop date:           



                                                  17           



                                                  18:00:00           



                                                  CST            

 

     hydrocortis            No                       100 mg,           [facility]

[facility]



     one 100 mg                                     Route: IV,           l



     injection      03:00:                               Q6H,                                            Dosing           



                                                  Weight           



                                                  136.136,           



                                                  kg, Start           



                                                  date:           



                                                  17           



                                                  21:00:00           



                                                  CST,           



                                                  Duration:           



                                                  30 day,           



                                                  Stop date:           



                                                  17           



                                                  18:00:00           



                                                  CST            

 

     Ondansetron            No                       Notes:           [facility]

[facility]



                                              (Same as:           l



               00:00:                               Zofran)           [facility]



                                                ***            



                                                  MEDICATION           



                                                  WASTE ***           



                                                  Product           



                                                  Size: 4 mg           



                                                  Product           



                                                  Wasted:           



                                                  ___ mg           

 

     Ondansetron            No                       Notes:           [facility]

[facility]



                                              (Same as:           l



               00:00:                               Zofran)           [place]                                 ***            



                                                  MEDICATION           



                                                  WASTE ***           



                                                  Product           



                                                  Size: 4 mg           



                                                  Product           



                                                  Wasted:           



                                                  ___ mg           

 

     Acetaminoph            No                       1,000 mg,           [facility]

[facility]



     en                                       Route: IV,           l



               23:36:                               ONCE,           [facility]                                 Dosing           



                                                  Weight           



                                                  136.136,           



                                                  kg, Start           



                                                  date:           



                                                  17           



                                                  17:36:00           



                                                  CST, Stop           



                                                  date:           



                                                  17           



                                                  17:36:00           



                                                  CST            

 

     Acetaminoph            No                       1,000 mg,           [facility]

[facility]



     en                                       Route: IV,           l



               23:36:                               ONCE,           [facility]                                 Dosing           



                                                  Weight           



                                                  136.136,           



                                                  kg, Start           



                                                  date:           



                                                  17           



                                                  17:36:00           



                                                  CST, Stop           



                                                  date:           



                                                  17           



                                                  17:36:00           



                                                  CST            

 

     Docusate            No                       Notes:           [facility]



               1-25                               (Same as:           l



               23:00:                               Colace)           [place]



                                                               

 

     Cellcept            No                       Notes:           [facility]



               1-25                               SEPARATE           l



               23:00:                               ANTACIDS           [facility]



                                                from           



                                                  Cellcept           



                                                  by 2 hrs.           



                                                  (Same As:           



                                                  CellCept)           

 

     Hydralazine            No                       Notes:           [facility]

[facility]



     Hydrochlori      -                               (Same as:           l



     de 100 MG      23:00:                               Apresoline           [facility]

[facility]



     Oral Tablet                                       ) May           



                                                  interfere           



                                                  w/enteral           



                                                  feedings           



                                                  Take With           



                                                  Food           

 

     Docusate            No                       Notes:           [facility]



               1-25                               (Same as:           l



               23:00:                               Colace)           [facility]



                                                               

 

     Cellcept            No                       Notes:           [facility]



               1-25                               SEPARATE           l



               23:00:                               ANTACIDS           [facility]



                                                from           



                                                  Cellcept           



                                                  by 2 hrs.           



                                                  (Same As:           



                                                  CellCept)           

 

     Hydralazine            No                       Notes:           [facility]

jensen



     Hydrochlori      1-25                               (Same as:           l



     de 100 MG      23:00:                               Apresoline           He

rmann



     Oral Tablet      00                                 ) May           



                                                  interfere           



                                                  w/enteral           



                                                  feedings           



                                                  Take With           



                                                  Food           

 

     Al              No                       Notes:           [facility]



     hydroxide/M      1-25                               (aluminum           l



     g         21:00:                               hydroxide-           [facility]



     hydroxide/s      00                                 magnesium           



     imethicone                                         hyd-simeth           



     200 mg-200                                         icone           



     mg-20 mg/5                                         200-200-20           



     mL oral                                         mg/5ml 30           



     suspension                                         ml ud TINY)           

 

     Ondansetron            No                       Notes:           [facility]

jensen



               1-25                               (Same as:           l



               21:00:                               Zofran)           [facility]



                                                ***            



                                                  MEDICATION           



                                                  WASTE ***           



                                                  Product           



                                                  Size: 4 mg           



                                                  Product           



                                                  Wasted:           



                                                  ___ mg           

 

     Hydromorpho            No                       Notes:           [facility]

jensen



     ne        -25                               Same as           l



               21:00:                               Dilaudid           [facility]



                                                               

 

     Oxycodone            No                       Notes:           [facility]

a



     Hydrochlori      1-25                               (Same           l



     de 5 MG      21:00:                               as:'[place]           [facility]

[facility]



     Oral Tablet      00                                 done) To           



                                                  be drawn           



                                                  up in 3 mL           



                                                  syr            

 

     gabapentin            No                       300 mg,           [facility]

jensen



               1-25                               Route: PO,           l



               21:00:                               Q8Hnow,           [place]                                 Dosing           



                                                  Weight           



                                                  136.136,           



                                                  kg, Start           



                                                  date:           



                                                  17           



                                                  15:00:00           



                                                  CST,           



                                                  Duration:           



                                                  30 day,           



                                                  Stop date:           



                                                  17           



                                                  7:00:00           



                                                  CST            

 

     Tramadol            No                       Notes: Not           [facility]

[facility]



               1-25                               to exceed           l



               21:00:                               400mg/day.           [place]



                                                (Same As:           



                                                  Ultram)           

 

     Acetaminoph            No                       1,000 mg,           [facility]

[facility]



     en        25                               Route:           l



               21:00:                               IVPB,           [facility]



               00                                 Q6Hnow,           



                                                  Dosing           



                                                  Weight           



                                                  136.136,           



                                                  kg, Start           



                                                  date:           



                                                  17           



                                                  15:00:00           



                                                  CST,           



                                                  Duration:           



                                                  30 day,           



                                                  Stop date:           



                                                  17           



                                                  9:00:00           



                                                  CST            

 

     Al              No                       Notes:           [facility]



     hydroxide/M      1-25                               (aluminum           l



     g         21:00:                               hydroxide-           [facility]



     hydroxide/s      00                                 magnesium           



     imethicone                                         hyd-simeth           



     200 mg-200                                         icone           



     mg-20 mg/5                                         200-200-20           



     mL oral                                         mg/5ml 30           



     suspension                                         ml ud TINY)           

 

     Ondansetron            No                       Notes:           [facility]

jensen



                                              (Same as:           l



               21:00:                               Zofran)           [facility]



                                                ***            



                                                  MEDICATION           



                                                  WASTE ***           



                                                  Product           



                                                  Size: 4 mg           



                                                  Product           



                                                  Wasted:           



                                                  ___ mg           

 

     Hydromorpho            No                       Notes:           [facility]

jensen



     ne                                       Same as           l



               21:00:                               Dilaudid           [place]



                                                               

 

     Oxycodone            No                       Notes:           [facility]

a



     Hydrochlori                                     (Same           l



     de 5 MG      21:00:                               as:'[place]           [facility]

[facility]



     Oral Tablet      00                                 done) To           



                                                  be drawn           



                                                  up in 3 mL           



                                                  syr            

 

     gabapentin            No                       300 mg,           [facility]

jensen



                                              Route: PO,           l



               21:00:                               Q8Hnow,           [facility]



               00                                 Dosing           



                                                  Weight           



                                                  136.136,           



                                                  kg, Start           



                                                  date:           



                                                  17           



                                                  15:00:00           



                                                  CST,           



                                                  Duration:           



                                                  30 day,           



                                                  Stop date:           



                                                  17           



                                                  7:00:00           



                                                  CST            

 

     Tramadol            No                       Notes: Not           [facility]

[facility]



                                              to exceed           l



               21:00:                               400mg/day.           [facility]



                                                (Same As:           



                                                  Ultram)           

 

     Acetaminoph            No                       1,000 mg,           [facility]

[facility]



     en                                       Route:           l



               21:00:                               IVPB,           [place]



               00                                 Q6Hnow,           



                                                  Dosing           



                                                  Weight           



                                                  136.136,           



                                                  kg, Start           



                                                  date:           



                                                  17           



                                                  15:00:00           



                                                  CST,           



                                                  Duration:           



                                                  30 day,           



                                                  Stop date:           



                                                  17           



                                                  9:00:00           



                                                  CST            

 

     Al              No                       30 ml,           [facility]



     hydroxide/M                                     Route: PO,           l



     g         20:54:                               Drug Form:           [place]



     hydroxide/s      00                                 SUSP,           



     imethicone                                         Dosing           



     200 mg-200                                         Weight           



     mg-20 mg/5                                         136.136,           



     mL oral                                         kg, Q6H,           



     suspension                                         PRN            



                                                  Indigestio           



                                                  n, Start           



                                                  date:           



                                                  17           



                                                  14:54:00           



                                                  CST,           



                                                  Duration:           



                                                  30 day,           



                                                  Stop date:           



                                                  17           



                                                  14:53:00           



                                                  CST            

 

     Calcium            No                       1,000 mL,           [facility]

[facility]



     Chloride                                     Rate: 125           l



     0.0014      20:54:                               ml/hr,           [facility]



     MEQ/ML /      00                                 Infuse           



     Potassium                                         over: 8           



     Chloride                                         hr, Route:           



     0.004                                         IV, Dosing           



     MEQ/ML /                                         Weight           



     Sodium                                         136.136           



     Chloride                                         kg, Total           



     0.103                                         Volume:           



     MEQ/ML /                                         1,000,           



     Sodium                                         Start           



     Lactate                                         date:           



     0.028                                         17           



     MEQ/ML                                         14:54:00           



     Injectable                                         CST,           



     Solution                                         Duration:           



                                                  30 day,           



                                                  Stop date:           



                                                  17           



                                                  14:53:00           



                                                  CST            

 

     Al              No                       30 ml,           [facility]



     hydroxide/M      1-25                               Route: PO,           l



     g         20:54:                               Drug Form:           [facility]



     hydroxide/s      00                                 SUSP,           



     imethicone                                         Dosing           



     200 mg-200                                         Weight           



     mg-20 mg/5                                         136.136,           



     mL oral                                         kg, Q6H,           



     suspension                                         PRN            



                                                  Indigestio           



                                                  n, Start           



                                                  date:           



                                                  17           



                                                  14:54:00           



                                                  CST,           



                                                  Duration:           



                                                  30 day,           



                                                  Stop date:           



                                                  17           



                                                  14:53:00           



                                                  CST            

 

     Calcium            No                       1,000 mL,           [facility]

[facility]



     Chloride                                     Rate: 125           l



     0.0014      20:54:                               ml/hr,           [place]



     MEQ/ML /      00                                 Infuse           



     Potassium                                         over: 8           



     Chloride                                         hr, Route:           



     0.004                                         IV, Dosing           



     MEQ/ML /                                         Weight           



     Sodium                                         136.136           



     Chloride                                         kg, Total           



     0.103                                         Volume:           



     MEQ/ML /                                         1,000,           



     Sodium                                         Start           



     Lactate                                         date:           



     0.028                                         17           



     MEQ/ML                                         14:54:00           



     Injectable                                         CST,           



     Solution                                         Duration:           



                                                  30 day,           



                                                  Stop date:           



                                                  17           



                                                  14:53:00           



                                                  CST            

 

     Cefoxitin            No                       1 gm,           [facility]



                                              Route:           l



               20:10:                               IVPB,           [facility]



               00                                 ONCE,           



                                                  Dosing           



                                                  Weight           



                                                  136.136,           



                                                  kg, Start           



                                                  date:           



                                                  17           



                                                  14:10:00           



                                                  CST, Stop           



                                                  date:           



                                                  17           



                                                  14:10:00           



                                                  CST            

 

     Cefoxitin            No                       1 gm,           [facility]



                                              Route:           l



               20:10:                               IVPB,           [facility]



               00                                 ONCE,           



                                                  Dosing           



                                                  Weight           



                                                  136.136,           



                                                  kg, Start           



                                                  date:           



                                                  17           



                                                  14:10:00           



                                                  CST, Stop           



                                                  date:           



                                                  17           



                                                  14:10:00           



                                                  CST            

 

     gabapentin            No                       Notes:           [facility]

[facility]



               -                               (Same as:           l



               19:00:                               Neurontin)           [facility]



               00                                                

 

     Acetaminoph            No                       Notes: Max           

[facility]



     en        -25                               acetaminop           l



               19:00:                               hen =           [facility]



               00                                 4000mg/day           



                                                  (4             



                                                  gm/day).           



                                                  (Same as:           



                                                  Tylenol)           

 

     gabapentin            No                       Notes:           [facility]

[facility]



                                              (Same as:           l



               19:00:                               Neurontin)                                                           

 

     Acetaminoph            No                       Notes: Max           

[facility]



     en                                       acetaminop           l



               19:00:                               hen =           [place]



                                                4000mg/day           



                                                  (4             



                                                  gm/day).           



                                                  (Same as:           



                                                  Tylenol)           

 

     Promethazin            No                       Notes: Do           M

[facility]



     e                                        not give           l



               18:26:                               IV push.                                            (Same as:           



                                                  Phenergan)           

 

     Promethazin            No                       Notes: Do           M

[facility]



     e                                        not give           l



               18:26:                               IV push.                                            (Same as:           



                                                  Phenergan)           

 

     Promethazin            No                       12.5 mg,           Me

[facility]



     e                                        Route: PO,           l



               18:24:                               Q6H,                                            Dosing           



                                                  Weight           



                                                  136.136,           



                                                  kg, PRN           



                                                  Nausea &           



                                                  Vomiting,           



                                                  Start           



                                                  date:           



                                                  17           



                                                  12:24:00           



                                                  CST,           



                                                  Duration:           



                                                  30 day,           



                                                  Stop date:           



                                                  17           



                                                  12:23:00           



                                                  CST            

 

     Tramadol            No                       Notes: Not           [facility]

[facility]



                                              to exceed           l



               18:24:                               400mg/day.                                            (Same As:           



                                                  Ultram)           

 

     Promethazin            No                       12.5 mg,           Me

[facility]



     e                                        Route: PO,           l



               18:24:                               Q6H,                                            Dosing           



                                                  Weight           



                                                  136.136,           



                                                  kg, PRN           



                                                  Nausea &           



                                                  Vomiting,           



                                                  Start           



                                                  date:           



                                                  17           



                                                  12:24:00           



                                                  CST,           



                                                  Duration:           



                                                  30 day,           



                                                  Stop date:           



                                                  17           



                                                  12:23:00           



                                                  CST            

 

     Tramadol            No                       Notes: Not           [facility]

[facility]



                                              to exceed           l



               18:24:                               400mg/day.                                            (Same As:           



                                                  Ultram)           

 

     bupivacaine            No                       Notes:           [facility]eraldo

jensen



     liposome                                     (Same as:           l



               18:00:                               Exparel)                                            *** NOT           



                                                  FOR IV           



                                                  use****           



                                                  Postoperat           



                                                  [place]            



                                                  analgesia:           



                                                  Infiltrati           



                                                  on             



                                                  (local):           



                                                  Dose is           



                                                  based on           



                                                  surgical           



                                                  site and           



                                                  volume           



                                                  required           



                                                  to cover           



                                                  the area           



                                                  (in            



                                                  general,           



                                                  the            



                                                  maximum           



                                                  total dose           



                                                  is 266           



                                                  mg).           



                                                  Bunionecto           



                                                  my: 7 mL           



                                                  into the           



                                                  tissues           



                                                  surroundin           



                                                  g the           



                                                  osteotomy           



                                                  and 1 mL           



                                                  into the           



                                                  subcutaneo           



                                                  us tissue           



                                                  of the           



                                                  surgical           



                                                  site           



                                                  (total           



                                                  dose = 8           



                                                  mL [106           



                                                  mg])           



                                                  Hemorrhoid           



                                                  ectomy: 30           



                                                  mL (20 mL           



                                                  vial           



                                                  diluted           



                                                  with 10 mL           



                                                  NS)            



                                                  divided           



                                                  and            



                                                  administer           



                                                  ed as 6           



                                                  injections           



                                                  of 5 mL           



                                                  each           



                                                  (total           



                                                  dose = 30           



                                                  mL [266           



                                                  mg])           

 

     bupivacaine            No                       Notes:           Geraldo

jensen



     liposome      1-25                               (Same as:           l



               18:00:                               Exparel)           [place]                                 *** NOT           



                                                  FOR IV           



                                                  use****           



                                                  Postoperat           



                                                  [place]            



                                                  analgesia:           



                                                  Infiltrati           



                                                  on             



                                                  (local):           



                                                  Dose is           



                                                  based on           



                                                  surgical           



                                                  site and           



                                                  volume           



                                                  required           



                                                  to cover           



                                                  the area           



                                                  (in            



                                                  general,           



                                                  the            



                                                  maximum           



                                                  total dose           



                                                  is 266           



                                                  mg).           



                                                  Bunionecto           



                                                  my: 7 mL           



                                                  into the           



                                                  tissues           



                                                  surroundin           



                                                  g the           



                                                  osteotomy           



                                                  and 1 mL           



                                                  into the           



                                                  subcutaneo           



                                                  us tissue           



                                                  of the           



                                                  surgical           



                                                  site           



                                                  (total           



                                                  dose = 8           



                                                  mL [106           



                                                  mg])           



                                                  Hemorrhoid           



                                                  ectomy: 30           



                                                  mL (20 mL           



                                                  vial           



                                                  diluted           



                                                  with 10 mL           



                                                  NS)            



                                                  divided           



                                                  and            



                                                  administer           



                                                  ed as 6           



                                                  injections           



                                                  of 5 mL           



                                                  each           



                                                  (total           



                                                  dose = 30           



                                                  mL [266           



                                                  mg])           

 

     Lovenox            No                       Notes:           [facility]



               1-25                               (Same as:           l



               17:00:                               Lovenox)           [facility]



               00                                                

 

     Lovenox            No                       Notes:           [facility]



               1-25                               (Same as:           l



               17:00:                               Lovenox)           [facility]



               00                                                

 

     scopolamine            No                       Notes:           Geraldo

jensen



               1-25                               Change           l



               12:00:                               patch           [facility]



               00                                 every 72           



                                                  hours           



                                                  (Same as:           



                                                  Transderm-           



                                                  Scop)           

 

     heparin            No                       Notes:           [facility]



               1-25                               porcine           l



               12:00:                               heparin           [facility]



               00                                                

 

     Lovenox            No                       Notes:           [facility]



               1-25                               (Same as:           l



               12:00:                               Lovenox)           [place]



               00                                                

 

     scopolamine            No                       Notes:           [facility]eraldo

jensen



               1-25                               Change           l



               12:00:                               patch           [facility]



               00                                 every 72           



                                                  hours           



                                                  (Same as:           



                                                  Transderm-           



                                                  Scop)           

 

     heparin            No                       Notes:           [facility]



               1-25                               porcine           l



               12:00:                               heparin           [place]



               00                                                

 

     Lovenox            No                       Notes:           [facility]



               1-25                               (Same as:           l



               12:00:                               Lovenox)           [facility]



               00                                                

 

     Mefoxin            No                       Notes:           [facility]



               1-25                               (Same As:           l



               11:37:                               Mefoxin)           [facility]



               00                                 ***            



                                                  MEDICATION           



                                                  WASTE ***           



                                                  Product           



                                                  Size: 2000           



                                                  mg Product           



                                                  Wasted:           



                                                  ___ mg           

 

     Mefoxin            No                       Notes:           [facility]



               1-25                               (Same As:           l



               11:37:                               Mefoxin)           [facility]



               00                                 ***            



                                                  MEDICATION           



                                                  WASTE ***           



                                                  Product           



                                                  Size: 2000           



                                                  mg Product           



                                                  Wasted:           



                                                  ___ mg           

 

     Ofirmev            No                       Notes:           [facility]



               1-25                               Infuse           l



               11:36:                               over 15           [place]



               00                                 minutes Do           



                                                  not exceed           



                                                  4gm/day of           



                                                  acetaminop           



                                                  hen ***           



                                                  MEDICATION           



                                                  WASTE ***           



                                                  Product           



                                                  Size: 1000           



                                                  mg Product           



                                                  Wasted:           



                                                  ___ mg           

 

     Ofirmev            No                       Notes:           [facility]



               1-25                               Infuse           l



               11:36:                               over 15           [place]



               00                                 minutes Do           



                                                  not exceed           



                                                  4gm/day of           



                                                  acetaminop           



                                                  hen ***           



                                                  MEDICATION           



                                                  WASTE ***           



                                                  Product           



                                                  Size: 1000           



                                                  mg Product           



                                                  Wasted:           



                                                  ___ mg           

 

     Ofirmev            No                       Notes:           [facility]



               1-25                               Infuse           l



               11:35:                               over 15           [facility]



               00                                 minutes Do           



                                                  not exceed           



                                                  4gm/day of           



                                                  acetaminop           



                                                  hen ***           



                                                  MEDICATION           



                                                  WASTE ***           



                                                  Product           



                                                  Size: 1000           



                                                  mg Product           



                                                  Wasted:           



                                                  ___ mg           

 

     Ofirmev            No                       Notes:           [facility]



               1-25                               Infuse           l



               11:35:                               over 15           [place]



               00                                 minutes Do           



                                                  not exceed           



                                                  4gm/day of           



                                                  acetaminop           



                                                  hen ***           



                                                  MEDICATION           



                                                  WASTE ***           



                                                  Product           



                                                  Size: 1000           



                                                  mg Product           



                                                  Wasted:           



                                                  ___ mg           

 

     Pravastatin            Yes                      40 mg = 1           [facility]

[facility]



     Sodium 40      1-17                               tab, PO,           l



     MG Oral      17:47:                               Bedtime, #           [facility]

[facility]



     Tablet      00                                 30 tab, 0           



     [Pravachol]                                         Refill(s)           

 

     Hydralazine            Yes                      100 mg = 1           

[facility]



     Hydrochlori      1-17                               tab, PO,           l



     de 100 MG      17:47:                               TID, # 90           [facility]

[facility]



     Oral Tablet      00                                 tab, 3           



                                                  Refill(s)           

 

     Hydralazine            No                       0              [facility]

a



               1-17                               Refill(s)           l



               17:47:                                              [place]



               00                                                

 

     mycophenola            Yes                      1,000 mg =           

[facility]



     te mofetil      1-17                               2 tab, PO,           l



     500 MG Oral      17:47:                               BID, # 120           

[facility]



     Tablet      00                                 tab, 0           



     [Cellcept]                                         Refill(s)           

 

     pantoprazol            Yes                      40 mg = 1           [facility]

[facility]



     e 40 MG      -17                               tab, PO,           l



     Enteric      17:47:                               BID, # 30           Kaylee

nn



     Coated      00                                 tab, 0           



     Tablet                                         Refill(s)           



     [Protonix]                                                        

 

     24 HR            Yes                      240 mg = 1           [facility]

a



     Diltiazem      -17                               cap, PO,           l



     Hydrochlori      17:47:                               Daily, #           [facility]

rmann



     de 240 MG      00                                 30 cap, 0           



     Extended                                         Refill(s)           



     Release                                                        



     Capsule                                                        



     [Cartia]                                                        

 

     magnesium            Yes                      400 mg = 1           [facility]

[facility]



     oxide 400      -17                               tab, PO,           l



     mg oral      17:47:                               Daily, 0           [facility]

n



     tablet      00                                 Refill(s)           

 

     calcium            No                       CHEW, BID,           [facility]

jensen



     carbonate      17                               0              l



     1000 mg      17:47:                               Refill(s)           Kaylee

nn



     oral      00                                                



     tablet,                                                        



     chewable                                                        

 

     predniSONE            Yes                      10 mg = 1           Me

[facility]



     10 mg oral      -17                               tab, PO,           l



     tablet      17:47:                               Daily, #           [place]



               00                                 30 tab, 3           



                                                  Refill(s)           

 

     Prograf            Yes                      2.5 mg,           [facility]



               -17                               PO, Q12H,           l



               17:47:                               0              [place]



               00                                 Refill(s)           

 

     Sulfamethox            No                       1 tab, PO,           

[facility]



     azole 800      -17                               M-W-F, 0           l



     MG /      17:47:                               Refill(s)           [place]



     Trimethopri      00                                                



     m 160 MG                                                        



     Oral Tablet                                                        



     [Bactrim]                                                        

 

     Pravastatin            Yes                      40 mg = 1           [facility]

[facility]



     Sodium 40      -17                               tab, PO,           l



     MG Oral      17:47:                               Bedtime, #           [facility]

ina



     Tablet      00                                 30 tab, 0           



     [Pravachol]                                         Refill(s)           

 

     Hydralazine            Yes                      100 mg = 1           

[facility]



     Hydrochlori      -17                               tab, PO,           l



     de 100 MG      17:47:                               TID, # 90           Her

merino



     Oral Tablet      00                                 tab, 3           



                                                  Refill(s)           

 

     Hydralazine            No                       0              [facility]

a



               -17                               Refill(s)           l



               17:47:                                              [facility]



               00                                                

 

     mycophenola            Yes                      1,000 mg =           

[facility]



     te mofetil      -17                               2 tab, PO,           l



     500 MG Oral      17:47:                               BID, # 120           

[facility]



     Tablet      00                                 tab, 0           



     [Cellcept]                                         Refill(s)           

 

     pantoprazol            Yes                      40 mg = 1           M

[facility]



     e 40 MG      -17                               tab, PO,           l



     Enteric      17:47:                               BID, # 30           Kaylee

nn



     Coated      00                                 tab, 0           



     Tablet                                         Refill(s)           



     [Protonix]                                                        

 

     24 HR            Yes                      240 mg = 1           [facility]

a



     Diltiazem      -17                               cap, PO,           l



     Hydrochlori      17:47:                               Daily, #           He

rmann



     de 240 MG      00                                 30 cap, 0           



     Extended                                         Refill(s)           



     Release                                                        



     Capsule                                                        



     [Cartia]                                                        

 

     magnesium            Yes                      400 mg = 1           Me

moria



     oxide 400      -17                               tab, PO,           l



     mg oral      17:47:                               Daily, 0           Hussein

n



     tablet      00                                 Refill(s)           

 

     calcium            No                       CHEW, BID,           [facility]

jensen



     carbonate      -17                               0              l



     1000 mg      17:47:                               Refill(s)           Kaylee

nn



     oral      00                                                



     tablet,                                                        



     chewable                                                        

 

     predniSONE            Yes                      10 mg = 1           Me

moria



     10 mg oral      -17                               tab, PO,           l



     tablet      17:47:                               Daily, #           [facility]



               00                                 30 tab, 3           



                                                  Refill(s)           

 

     Prograf            Yes                      2.5 mg,           [facility]



               1-17                               PO, Q12H,           l



               17:47:                               0              [facility]



               00                                 Refill(s)           

 

     Sulfamethox            No                       1 tab, PO,           

[facility]



     azole 800      1-17                               M-W-F, 0           l



     MG /      17:47:                               Refill(s)           [place]



     Trimethopri      00                                                



     m 160 MG                                                        



     Oral Tablet                                                        



     [Bactrim]                                                        







Immunizations







           Ordered Immunization Filled Immunization Date       Status     Commen

ts   Source



           Name       Name                                        

 

           INFLUENZA TRIVALENT            2019 [facility]



           ADJUVANTED PF IM            00:00:00                         [facility]

 

           INFLUENZA TRIVALENT            2019 Completed             [facility]



           ADJUVANTED PF IM            00:00:00                         [facility]

 

           INFLUENZA TRIVALENT            2019 Completed             [facility]



           ADJUVANTED PF IM            00:00:00                         [facility]

 

           Influenza High Dose            2018 Completed             [facility]



           Preservative Free IM            00:00:00                         [facility]



           ([id number])                                               

 

           Influenza High Dose            2018 Completed             [facility]



           Preservative Free IM            00:00:00                         [facility]



           ([id number])                                               

 

           Influenza High Dose            2018 Completed             [facility]



           Preservative Free IM            00:00:00                         [facility]



           ([id number])                                               

 

           Influenza TIV (IM)            2017-10-09 Completed             [facility]



                                 00:00:00                         [facility]

 

           Influenza TIV (IM)            2017-10-09 Completed             [facility]



                                 00:00:00                         [facility]

 

           Influenza TIV (IM)            2017-10-09 Completed             [facility]



                                 00:00:00                         [facility]

 

           Influenza High Dose            2015 Completed             [facility]



           Preservative Free            00:00:00                         [facility]                                             

 

           Influenza High Dose            2015 Completed             [facility]



           Preservative Free            00:00:00                         [facility]                                             

 

           Influenza High Dose            2015 Completed             [facility]



           Preservative Free            00:00:00                         [facility]                                             

 

           Rho (D) Immune            2015-05-15 Completed             [facility]

[facility]



           Globulin              00:00:00                         [facility]

 

           Rho (D) Immune            2015-05-15 Completed             [facility]

[facility]



           Globulin              00:00:00                         [facility]

 

           Rho (D) Immune            2015-05-15 Completed             [facility]

[facility]



           Globulin              00:00:00                         [facility]







Vital Signs







             Vital Name   Observation Time Observation Value Comments     Source

 

             HEIGHT       2021 05:49:00 193 cm                    

 

             WEIGHT       2021 05:49:00 108.863 kg                

 

             WEIGHT       2021 11:10:00 107.775 kg                

 

             WEIGHT       2021 11:10:00 107.775 kg                

 

             WEIGHT       2021 11:10:00 107.775 kg                

 

             HEIGHT       2021 09:01:00 188 cm                    

 

             WEIGHT       2021 09:01:00 109.181 kg                

 

             HEIGHT       2021 09:01:00 188 cm                    

 

             WEIGHT       2021 09:01:00 109.181 kg                

 

             HEIGHT       2021 05:49:00 193 cm                    

 

             WEIGHT       2021 05:49:00 108.863 kg                

 

             HEIGHT       2021 17:35:30 193 cm                    

 

             WEIGHT       2021 17:35:30 99.791 kg                 

 

             HEIGHT       2021 17:35:30 193 cm                    

 

             WEIGHT       2021 17:35:30 99.791 kg                 

 

             WEIGHT       2020 00:00:00 99.791 kg                 

 

             HEIGHT       2020 00:00:00 193 cm                    

 

             WEIGHT       2020 00:00:00 99.791 kg                 

 

             HEIGHT       2020 00:00:00 193 cm                    

 

             Systolic blood 2021 11:10:00 149 mm[Hg]                [facility]

 

             Diastolic blood 2021 11:10:00 98 mm[Hg]                 [facility]

 

             Heart rate   2021 11:10:00 86 /min                   [facility]

 

             Body temperature 2021 11:10:00 37.11 Eunice                 [facility]

 

             Respiratory rate 2021 11:10:00 18 /min                   [facility]

 

             Body weight  2021 11:10:00 107.775 kg                [facility]

 

             BMI          2021 11:10:00 30.51 kg/m2               [facility]

 

             Oxygen saturation in 2021 11:10:00 97 /min                   

[facility]



             Arterial blood by                                        Medical Ce

nter



             Pulse oximetry                                        

 

             Body height  2021 09:01:00 188 cm                    [facility]

 

             Systolic (mm Hg) 2017 19:18:00                           [facility]

rial [place]

 

             Diastolic (mm Hg) 2017 19:18:00                           [facility]

[facility] [place]

 

             Respitory Rate 2017 19:18:00                           [facility]

[facility] [place]

 

             Respitory Rate 2017 19:00:00                           [facility]

[facility] [place]

 

             Systolic (mm Hg) 2017 19:00:00                           [facility]

rial [place]

 

             Diastolic (mm Hg) 2017 19:00:00                           [facility]

[facility]

 

             Respitory Rate 2017 18:45:00                           [facility]

al [facility]

 

             Systolic (mm Hg) 2017 18:45:00                           [facility]

rial [facility]

 

             Diastolic (mm Hg) 2017 18:45:00                           [facility]

[facility]

 

             Heart Rate   2017 11:54:00                           [facility]

 [place]

 

             Weight       2017 19:48:00                           [facility]

 [facility]

 

             BMI Calculated 2017 19:48:00                           [facility]

al [facility]

 

             Height       2017 19:48:00 193.04 cm                 [facility]

 

             Temperature Oral (F) 2017 14:35:00 98.0 F                    

[facility] [place]

 

             Systolic (mm Hg) 2017 14:35:00                           [facility]

rial [facility]

 

             Diastolic (mm Hg) 2017 14:35:00                           [facility]

[facility] [place]

 

             Respitory Rate 2017 14:35:00                           [facility]

al [facility]

 

             Heart Rate   2017 14:35:00                           [facility]

 [facility]

 

             Heart Rate   2017 10:35:00                           [facility]

 [place]

 

             Respitory Rate 2017 10:35:00                           [facility]

al [place]

 

             Temperature Oral (F) 2017 10:35:00 97.6 F                    

[facility]

 

             Systolic (mm Hg) 2017 10:35:00                           [facility]

rial [facility]

 

             Diastolic (mm Hg) 2017 10:35:00                           Mem

orial [facility]

 

             Respitory Rate 2017 05:24:00                           [facility]

al [facility]

 

             Heart Rate   2017 05:24:00                           [facility]

 [facility]

 

             Systolic (mm Hg) 2017 05:24:00                           [facility]

rial [place]

 

             Diastolic (mm Hg) 2017 05:24:00                           [facility]

[facility]

 

             Temperature Oral (F) 2017 05:24:00 98.3 F                    

[facility]

 

             Weight       2017 03:00:00                           [facility]

 [facility]

 

             Height       2017 03:00:00 193.04 cm                 [facility]

 [place]

 

             BMI Calculated 2017 03:00:00                           [facility]

al [facility]

 

             Weight       2017 16:16:00                           [facility]

 [facility]

 

             Height       2017 16:16:00 193.04 cm                 [facility]

 [place]

 

             BMI Calculated 2017 16:16:00                           [facility]

al [place]

 

             Weight       2017 21:05:00                           [facility]

 [facility]

 

             BMI Calculated 2017 21:05:00                           [facility]

al [place]

 

             Height       2017 21:05:00 193.04 cm                 [facility]

 [facility]







Procedures







                Procedure       Date / Time     Performing Clinician Source



                                Performed                       

 

                TACROLIMUS LEVEL 2021 10:39:00 Alexei Prince [facility]

 

                BASIC METABOLIC PANEL 2021 10:39:00 Shirley Loo [facility]

[facility]



                (7)                                             Center

 

                CBC W/PLT COUNT & AUTO [facility]:39[facility]



                DIFFERENTIAL                                    Center

 

                CBC W/PLT COUNT & AUTO 2021 10:39:00 [facility]



                DIFFERENTIAL                                    Center

 

                XR CHEST 2 VIEWS 2021 11:34:00 [facility]

 

                2D ECHO W/ DOPPLER 2021 10:44:42 [facility]



                (CW/PW/COLOR)                                   Center

 

                TACROLIMUS LEVEL 2021 09:15:00 [facility]

 

                R & L CATH / CORONARY 2021 07:07:00 [facility]

[facility]



                ANGIOS / BIOPSY                                 Center

 

                ECG 12-LEAD     2021 06:31:28 [facility]

 

                CBC (HEMOGRAM ONLY) 2021 06:04:00 [facility]

 

                PROTHROMBIN TIME/INR 2021 06:04:00 [facility] 

[facility]

 

                BASIC METABOLIC PANEL 2021 06:04:00 [facility]

[facility]



                (7)                                             Center

 

                CARDIAC CATH REPORT - 2021 00:00:00 Wojciech [facility]



                SCAN                            Scanning        Center

 

                SARS-COV2/INFLUENZA/RSV 2021 20:00:00 [facility] carlos [facility]



                RT-PCR                                          Center

 

                BLOOD CULTURE   2021 20:00:00 [facility] carlos [facility]

 

                B-TYPE NATRIURETIC 2021 19:59:00 Tresa Cummins [facility]



                FACTOR (BNP)                                    Center

 

                CBC W/PLT COUNT & AUTO 2021 19:59:00 Tresa Cummins

[facility]



                DIFFERENTIAL                                    [place]

 

                BASIC METABOLIC PANEL 2021 19:59:00 CumimnsTresa gonzalez [facility]

I [facility]



                (7)                                             Center

 

                MAGNESIUM       2021 19:59:00 [facility]

 

                TROPONIN I      2021 19:59:00 Cummins, [facility]

 

                LACTIC ACID, VENOUS 2021 19:59:00 [facility]

 

                CBC W/PLT COUNT & AUTO 2021 19:59:00 CumminsTresa gonzalez [facility]                                    Center

 

                ECG 12-LEAD     2021 19:50:21 Unknown, Hl7 [facility]

 

                ECG 12-LEAD     2021 19:50:21 Unknown, 7 [facility]

 

                XR CHEST 1 VIEW 2021 18:40:00 Cummins, [facility]



                PORTABLE / BEDSIDE                                 Center

 

                REPORT OF PROCEDURE - 2021 00:00:00 Provider, [facility]



                ENDOSCOPY SCAN                  Scanning        Center

 

                [H] Vitamin E Level 2018 00:00:00                 [facility]

 

                [L] Vitamin D,  2018 00:00:00                 [facility]

[facility]



                25-Hydroxy, Total -                                 



                [employer]                                        

 

                [QLH] CBC (INCLUDES 2018 00:00:00                 [facility]



                DIFF/PLT)                                       

 

                [QLH] CMP W/EGFR 2018 00:00:00                 [facility]

[facility]

 

                [QLH] FOLATE, SERUM 2018 00:00:00                 [facility]

 

                [QLH] HEMOGLOBIN A1c 2018 00:00:00                 [facility]

[facility]

 

                [QL[facility]] IRON, TOTAL 2018 00:00:00                 [facility]

[facility]

 

                [QL[facility]] LIPID PANEL 2018 00:00:00                 [facility]

[facility]

 

                [QLH] PTH, INTACT 2018 00:00:00                 [facility]

[facility]



                (WITHOUT CALCIUM)                                 

 

                [QLH] TSH, 3RD  2018 00:00:00                 [facility]

[facility]



                GENERATION W/REFLEX TO                                 



                FT4                                             

 

                [QLH] VITAMIN A 2018 00:00:00                 [facility]

[facility]



                (RETINOL)                                       

 

                [[facility]] VITAMIN B1, WHOLE 2018 00:00:00                 [facility]

[facility]



                BLOOD                                           

 

                [[facility]] VITAMIN B12 2018 00:00:00                 [facility]

[facility]

 

                Heart transplant                                 Miky Savage

n

 

                Hernia repair                                   [facility] [place]

 

                Laparoscopic sleeve                                 [facility] Her

merino



                gastrectomy                                     







Plan of Care







             Planned Activity Planned Date Details      Comments     Source

 

             Future Scheduled 2023   Lipid panel (procedure)              

[facility]



             Test         00:00:00     [code = 00438969]              Medical Ce

nter

 

             Future Scheduled 2023   Lipid panel (procedure)              

[facility]



             Test         00:00:00     [code = 74848155]              Medical Ce

nter

 

             Future Scheduled 2023   Lipid panel (procedure)              

[facility]



             Test         00:00:00     [code = [id number]]              Medical Ce

nter

 

             Future Scheduled 2022   INFLUENZA VACCINE (#1)              C

[facility]



             Test         00:00:00     [code = INFLUENZA              Medical Ce

nter



                                       VACCINE (#1)]              

 

             Future Scheduled 2022   DEPRESSION SCREENING              [facility]



             Test         00:00:00     (12+) [code =              Medical Center



                                       DEPRESSION SCREENING              



                                       (12+)]                    

 

             Future Scheduled 2022   DEPRESSION SCREENING              [facility]



             Test         00:00:00     (12+) [code =              Medical Center



                                       DEPRESSION SCREENING              



                                       (12+)]                    

 

             Future Scheduled 2022   DEPRESSION SCREENING              [facility]



             Test         00:00:00     (12+) [code =              Medical Center



                                       DEPRESSION SCREENING              



                                       (12+)]                    

 

             Future Scheduled 2021   INFLUENZA VACCINE (#1)              [facility]

[facility]



             Test         00:00:00     [code = INFLUENZA              Medical Ce

nter



                                       VACCINE (#1)]              

 

             Future Scheduled 2021   INFLUENZA VACCINE (#1)              C

[facility]



             Test         00:00:00     [code = INFLUENZA              Medical Ce

nter



                                       VACCINE (#1)]              

 

             Future Scheduled 2019   SHINGLES VACCINES (1 of              

[facility]



             Test         00:00:00     2) [code = SHINGLES              Medical 

Center



                                       VACCINES (1 of 2)]              

 

             Future Scheduled 2019   SHINGLES VACCINES (1 of              

[facility]



             Test         00:00:00     2) [code = SHINGLES              Medical 

Center



                                       VACCINES (1 of 2)]              

 

             Future Scheduled 2019   SHINGLES VACCINES (1 of              

[facility]



             Test         00:00:00     2) [code = SHINGLES              Medical 

Center



                                       VACCINES (1 of 2)]              

 

             Future Scheduled 2018   Hemoglobin A1c              [facility]

[facility]



             Test         00:00:00     measurement (procedure)              [facility]



                                       [code = 16461218]              

 

             Future Scheduled 2018   Hemoglobin A1c              [facility]

[facility]



             Test         00:00:00     measurement (procedure)              [facility]



                                       [code = 80730377]              

 

             Future Scheduled 2018   Hemoglobin A1c              [facility]

[facility]



             Test         00:00:00     measurement (procedure)              [facility]



                                       [code = 76531218]              

 

             Future Scheduled 1988   DTAP/TDAP/TD VACCINES              [facility]



             Test         00:00:00     (1 - Tdap) [code =              Medical C

enter



                                       DTAP/TDAP/TD VACCINES              



                                       (1 - Tdap)]               

 

             Future Scheduled 1988   DTAP/TDAP/TD VACCINES              [facility]



             Test         00:00:00     (1 - Tdap) [code =              Medical C

enter



                                       DTAP/TDAP/TD VACCINES              



                                       (1 - Tdap)]               

 

             Future Scheduled 1988   DTAP/TDAP/TD VACCINES              [facility]



             Test         00:00:00     (1 - Tdap) [code =              Medical C

enter



                                       DTAP/TDAP/TD VACCINES              



                                       (1 - Tdap)]               

 

             Future Scheduled 1981   COVID-19 VACCINE (1)              [facility]



             Test         00:00:00     [code = COVID-19              [facility]

ter



                                       VACCINE (1)]              

 

             Future Scheduled 1981   COVID-19 VACCINE (1)              [facility]



             Test         00:00:00     [code = COVID-19              [facility]

ter



                                       VACCINE (1)]              

 

             Future Scheduled 1979   DIABETIC EYE EXAM [code              

[facility]



             Test         00:00:00     = DIABETIC EYE EXAM]              Medical

 Center

 

             Future Scheduled 1979   Diabetic foot              [facility]

[facility]



             Test         00:00:00     examination               [facility]



                                       (regime/therapy) [code              



                                       = 638545896]              

 

             Future Scheduled 1979   Urine screening for              [facility]



             Test         00:00:00     protein (procedure)              [facility]



                                       [code = 949988467]              

 

             Future Scheduled 1979   DIABETIC EYE EXAM [code              

[facility]



             Test         00:00:00     = DIABETIC EYE EXAM]              Medical

 Center

 

             Future Scheduled 1979   Diabetic foot              [facility]

es



             Test         00:00:00     examination               [facility]



                                       (regime/therapy) [code              



                                       = 105036714]              

 

             Future Scheduled 1979   Urine screening for              [facility]



             Test         00:00:00     protein (procedure)              [facility] 

Center



                                       [code = 887285818]              

 

             Future Scheduled 1979   DIABETIC EYE EXAM [code              

[facility]



             Test         00:00:00     = DIABETIC EYE EXAM]              Medical

 Center

 

             Future Scheduled 1979   Diabetic foot              [facility]

es



             Test         00:00:00     examination               [facility]



                                       (regime/therapy) [code              



                                       = 476493981]              

 

             Future Scheduled 1979   Urine screening for              [facility]



             Test         00:00:00     protein (procedure)              [facility] 

Center



                                       [code = 995142363]              

 

             Future Scheduled 1975   PNEUMOCOCCAL VACCINE              [facility]



             Test         00:00:00     0-64 YRS (1 of 4 -              Medical C

enter



                                       PCV13) [code =              



                                       PNEUMOCOCCAL VACCINE              



                                       0-64 YRS (1 of 4 -              



                                       PCV13)]                   

 

             Future Scheduled 1975   PNEUMOCOCCAL VACCINE              [facility]



             Test         00:00:00     0-64 YRS (1 of 4 -              Medical C

enter



                                       PCV13) [code =              



                                       PNEUMOCOCCAL VACCINE              



                                       0-64 YRS (1 of 4 -              



                                       PCV13)]                   

 

             Future Scheduled 1975   PNEUMOCOCCAL VACCINE              [facility]



             Test         00:00:00     0-64 YRS (1 - PCV)              Medical C

enter



                                       [code = PNEUMOCOCCAL              



                                       VACCINE 0-64 YRS (1 -              



                                       PCV)]                     

 

             Future Scheduled 1969   COVID-19 VACCINE (#1)              [facility]



             Test         00:00:00     [code = COVID-19              [facility]

ter



                                       VACCINE (#1)]              

 

             Future Scheduled 1969   Screening for malignant              

[facility]



             Test         00:00:00     neoplasm of colon              Medical Ce

nter



                                       (procedure) [code =              



                                       834116258]                

 

             Future Scheduled 1969   Screening for malignant              

[facility]



             Test         00:00:00     neoplasm of colon              Medical Ce

nter



                                       (procedure) [code =              



                                       910194755]                

 

             Future Scheduled 1969   CT Colonography (combo)              

[facility]



             Test         00:00:00     [code = CT Colonography              [facility]



                                       (combo)]                  

 

             Future Scheduled 1969   Screening for malignant              

[facility]



             Test         00:00:00     neoplasm of colon              Medical Ce

nter



                                       (procedure) [code =              



                                       335073722]                

 

             Future Scheduled 1969   Screening for malignant              

[facility]



             Test         00:00:00     neoplasm of colon              Medical Ce

nter



                                       (procedure) [code =              



                                       566487772]                

 

             Future Scheduled 1969   Screening for malignant              

[facility]



             Test         00:00:00     neoplasm of colon              Medical Ce

nter



                                       (procedure) [code =              



                                       957179887]                

 

             Future Scheduled 1969   Screening for malignant              

[facility]



             Test         00:00:00     neoplasm of colon              Medical Ce

nter



                                       (procedure) [code =              



                                       698338507]                

 

             Future Scheduled 1969   Sigmoidoscopy [code =              [facility]



             Test         00:00:00     Sigmoidoscopy]              [facility]

r







Encounters







        Start   End     Encounter Admission Attending Care    Care    Encounter 

Source



        Date/Time Date/Time Type    Type    Clinicians Facility Department ID   

   

 

        2021-10-09         Outpatient         [facility]    Surgery [id number]

5 [facility]



        08:06:39                         [place]                           

 

        2022 Telephone         Ayo [facility]   [id number]

[id number] [facility]



        00:00:00 00:00:00                 [facility]

 

        2022 Telephone         Shila Golden [facility]   [id number]

[id number] [facility]



        00:00:00 00:00:00                                                 [facility]

 

        2022 Refill          Sincere  [facility]   [id number]

[address] [facility]



        00:00:00 00:00:00                 [facility]

 

        2022 Orders          Sincere  [facility]   [id number]

[address] [facility]



        00:00:00 00:00:00 Only            [facility]

 

        2022 Telephone         Sincere  [facility]   [id number]

07401 [facility] 



        00:00:00 00:00:00                 [facility]

 

        2022 Outpatient [facility]    [id number]

[id number] [facility]



        00:00:00 00:00:00                                                 

 

        2022 Outpatient [facility]    [id number]

050 [facility]



        00:00:00 00:00:00                                                 

 

        2022 [facility]      [facility]    [facility]    [id number]

4049 [facility]



        00:00:00 00:00:00                 [place]                           

 

        2022 Telephone         Shila Golden [facility]   [id number]

[id number] [facility]



        00:00:00 00:00:00                                                 [facility]

 

        2022-05-10 2022-05-10 Orders          Sincere,  [facility]   [id number]

[address] [facility]



        00:00:00 00:00:00 Only            [facility]

 

        2022 Refill          [place], [facility]   [id number]

[address] [facility]



        00:00:00 00:00:00                 [facility]

 

        2022-03-15 2022-03-15 Telephone         Sincere,  [facility]   [id number]

[address] [facility]



        00:00:00 00:00:00                 [facility]

 

        2022-02-10 2022-02-10 Orders          Sincere,  [facility]   [id number]

[address] [facility]



        00:00:00 00:00:00 Only            [facility]

 

        2022 Refill          [place], [facility]   [id number]

[address] [facility]



        00:00:00 00:00:00                 [facility]

 

        2022 Refill          [place], [facility]   [id number]

[address] [facility]



        00:00:00 00:00:00                 [facility]

 

        2021 Documentat         Sincere,  [facility]   [id number]

[id number] [facility]



        00:00:00 00:00:00 [facility]

 

        2021 Documentat         Sincere,  [facility]   [id number]

[id number] [facility]



        00:00:00 00:00:00 [facility]

 

        2021 Orders          Sincere,  [facility]   [id number]

[address] [facility]



        00:00:00 00:00:00 Only            [facility]

 

        2021 Orders          Sincere,  [facility]   [id number]

[address] [facility]



        00:00:00 00:00:00 Only            [facility]

 

        2021 Follow-Up         [place], [facility]   [id number]

[id number] [facility]



        11:00:00 11:15:00                 [facility]

 

        2021 Follow-Up EL      [place], [facility]   [id number]

[id number] [facility]



        10:12:59 10:27:59                 [facility]

 

        2021 Outpatient EL      FAWAD, SLE    [facility]    [id number]

[id number] [facility]



        10:12:59 10:12:59                 [place]                           

 

        2021 Orders          Sincere,  [facility]   [id number]

[address] [facility]



        00:00:00 00:00:00 Only            [facility]

 

        2021 Telephone         Sincere,  [facility]   [id number]

[address]32 [facility]



        00:00:00 00:00:00                 [facility]

 

        2021 Telephone         Sincere,  [facility]   [id number]

03193 [facility]



        00:00:00 00:00:00                 [facility]

 

        2021 Orders          Sincere,  [facility]   [id number]

[address] [facility]



        00:00:00 00:00:00 Only            [facility]

 

        2021 Telephone         Sincere,  [facility]   [id number]

44520 [facility]



        00:00:00 00:00:00                 [facility]

 

        2021 Telephone         Sincere,  [facility]   [id number]

48390 [facility]



        00:00:00 00:00:00                 [facility]

 

        2021 Telephone         Sincere,  [facility]   [id number]

96908 [facility]



        00:00:00 00:00:00                 [facility]

 

        2021 Telephone         Sincere,  [facility]   [id number]

69806 [facility]



        00:00:00 00:00:00                 [facility]

 

        2021 Telephone         Sincere,  [facility]   [id number]

21546 [facility]



        00:00:00 00:00:00                 [facility]

 

        2021 Telephone         Sincere,  [facility]   [id number]

54860 [facility]



        00:00:00 00:00:00                 [facility]

 

        2021-10-22 2021-10-22 Telephone         Sincere,  [facility]   [id number]

96496 [facility]



        00:00:00 00:00:00                 [facility]

 

        2021-10-21 2021-10-21 Shila Mayes [facility]   [id number]

[id number] [facility]



        00:00:00 00:00:00                                                 [facility]

 

        2021 Outpatient [facility]    SLE    [id number]

[id number] [facility]



        00:00:00 00:00:00                 [place]                           

 

        2021 Outpatient               S[facility]    SLE    [id number]

116 [facility]



        00:00:00 00:00:00                                                 

 

        2021 Outpatient               SLE    SLE    [id number]

681 [facility]



        00:00:00 00:00:00                                                 

 

        2021 Shila Mayes [facility]   [id number]

[id number] [facility]



        00:00:00 00:00:00                                                 [facility]

 

        2021 [facility]   [id number]

57846 [facility]



        11:15:00 23:59:00 Ruben Cortez Scott                         

[facility]

 

        2021 [facility]   [id number]

72292 [facility]



        10:00:00 11:14:00 Encounter         [facility]

 

        2021 Follow-Up         [facility]   [id number]

[id number] [facility]



        08:57:33 09:12:33                 [facility]

 

        2021 Outpatient [facility]    SLE    [id number]

352 [facility]



        00:00:00 00:00:00                                                 

 

        2021 Outpatient [facility], [facility]    [facility]    [id number]

1432 [facility]



        00:00:00 00:00:00                 [place]                           

 

        2021 Outpatient [facility] SLE    [facility]    [id number]

1677 [facility]



        00:00:00 00:00:00                 [place]                           

 

        2021 Abstract         Shila Golden [facility]   [id number]

[id number] [facility]



        00:00:00 00:00:00                                                 [facility]

 

        2021 Orders          Sincere  [facility]   [id number]

[address] [facility]



        00:00:00 00:00:00 Only            [facility]

 

        2021 [facility], [facility]   [id number]

73026 [facility]



        05:33:00 11:38:00 Encounter         [facility]

 

        2021 Surgery         [facility], [facility]   [id number]

5[address] [facility]



        07:03:00 09:24:00                 [facility]

 

        2021 Travel                  [facility]   [id number]

 [facility]



        00:00:00 00:00:00                                                 [facility]

 

        2021 Telephone         Sincere  [facility]   [id number]

92376 [facility]



        00:00:00 00:00:00                 [facility]

 

        2021 Documentat         Jame [facility]   [id number] 20

[id number] [facility]



        00:00:00 00:00:00 arlene             [facility]

 

        2021 Refill          [place], [facility]   [id number]

[address] [facility]



        00:00:00 00:00:00                 [facility]

 

        2021 Telephone         Sincere,  [facility]   [id number]

92319 [facility]



        00:00:00 00:00:00                 [facility]

 

        2021 Emergency ER      [facility], [facility]   [id number]

[id number] [facility]



        18:43:00 22:58:00                 [facility]

 

        2021 Emergency ER              [facility]    Emergency [id number]

4533 SLE



        17:22:00 17:22:00                                                 

 

        2021 Travel                  [facility]   [id number]

 [facility]



        00:00:00 00:00:00                                                 [facility]

 

        2021 Telephone         Sincere,  [facility]   [id number]

55300 [facility]



        00:00:00 00:00:00                 [facility]

 

        2021-03-10 2021-03-10 Refill          [place], [facility]   [id number]

[address] [facility]



        00:00:00 00:00:00                 [facility]

 

        2021-03-10 2021-03-10 Orders          Sincere,  [facility]   [id number]

[address] [facility]



        00:00:00 00:00:00 Only            [facility]

 

        2021 Orders          Sincere,  [facility]   [id number]

[address] [facility]



        00:00:00 00:00:00 Only            [facility]

 

        2020 Outpatient                 SLE    SLE    [id number]

0-2 [facility]



        00:00:00 00:00:00                                         [id number] 

 

        2020 Outpatient EL      [place], [facility]    SLE    [id number]

8851 [facility]



        00:00:00 00:00:00                 SHIRLEY2020 Outpatient                 [facility]    [facility]    [id number]

0-2 [facility]



        00:00:00 00:00:00                                         [id number] 

 

        2020 Outpatient JW LOO, [facility]    [facility]    [id number]

8840 [facility]



        00:00:00 00:00:00                 [place]                           

 

        2020 Outpatient EL              [facility]    [facility]    [id number]

838 [facility]



        00:00:00 00:00:00                                                 

 

        2020 Outpatient EL              [facility]    [facility]    [id number]

839 [facility]



        00:00:00 00:00:00                                                 

 

        2018 Outpatient EL              [facility]    [facility]    [id number]

471 [facility]



        00:00:00 00:00:00                                                 

 

        [facility]         PURNIMA NICHOLS     [place] [id number]

99 [facility]



        16:00:00 16:00:00 t; GUIDO NICHOLS,         Surgery         PAULY MONSON M.D.            Specialty         jolanta ALANIZ                                            

 

        [facility]         PURN[facility]ICHOLS     [facility]     [id number]

2 [facility]



        13:00:00 13:00:00 t; GUIDO NICHOLS P hysici KULVINDER, M.D. ans M.D.                                            

 

        2017-10-10 2017-10-10 [facility]     [facility]     [id number]

4 [facility]



        15:30:00 15:30:00 t; GUIDO NICHOLS P hysici KULVINDER, M.D. ans M.D.                                            

 

        2017-10-03 2017-10-03 Appointmen         PURNIMA NICHOLS     [facility]     [id number]

6 [facility]



        13:30:00 13:30:00 t; GUIDO NICHOLS P hysici KULVINDER, M.D. ans M.D.                                            

 

        2017 Day                     nullFlavo [facility] [id number]

775 [facility]



        11:05:00 04:59:00 Surgery                 [address]

 

        2017 Day                     nullFlavo [facility] [id number]

775 [facility]



        11:05:00 04:59:00 Surgery                 r       Jeff 02      l



                                                        [facility]

 

        2017 Outpatient         Kamilah  [facility]   [id number]

[id number] 



        06:05:00 23:59:00                 Kulvinder S                 2017 Appointmen         PURNIMA NICHOLS     [facility]     [id number]

0 [facility]



        09:00:00 09:00:00 t; GUIDO NICHOLS P hysici KULVINDER, M.D. ans M.D.                                            

 

        2017 Appointmen         PURNIMA NICHOLS     [facility]     [id number]

2 [facility]



        09:30:00 09:30:00 t; GUIDO NICHOLS P hysici KULVINDER, M.D. ans M.D.                                            

 

        2017 Appointmen         PURNIMA NICHOLS     [facility]     [id number]

8 [facility]



        09:00:00 09:00:00 t; GUIDO NICHOLS P hysici KULVINDER, M.D. ans M.D.                                            

 

        2017 Appointmen         PURNIMA NICHOLS     [facility]     [id number]

3 [facility]



        10:00:00 10:00:00 t; GUIDO NICHOLS P hysici KULVINDER, M.D. ans M.D.                                            

 

        2017 Inpatient                 [facility] [id number]

[id number] [facility]



        15:03:00 17:30:00                         r       [place] 00      [facility]

 

        2017 Inpatient                 [facility] 51049

65243 [facility]



        15:03:00 17:30:00                         r       [place] 00      [facility]

 

        2017 Outpatient         Kamilah  [facility]   [id number]

[id number] 



        09:03:00 11:30:00                 Kulvinder S                 2017 Appointmen         PURNIMA NICHOLS     [facility]     [id number]

7 [facility]



        09:00:00 09:00:00 t; GUIDO NICHOLS P hysici KULVINDER, M.D. ans M.D.                                            

 

        2016-10-26 2016-10-26 Appointmen         [facility]     [facility]     [id number]

[id number] [facility]



        15:00:00 15:00:00 t;              CHRISTIE SWEENEY,                         Moni STONE         RD                              ans



                        INCHRISTIE,                                         



                        RD                                              

 

        2016-10-14 2016-10-14 Jamaal NICHOLS  [facility]     [facility]     [id number]

0 [place]



        10:00:00 10:00:00 t; GUIDO NICHOLS P hysici KULVINDER, M.D. ans M.D.                                            







Results







           Test Description Test Time  Test Comments Results    Result Comments 

Source









                    Tacrolimus level    2021 12:43:19 









                      Test Item  Value      Reference Range Interpretation Comme

nts









             Tacrolimus Lvl (test code = 8.3 ng/mL    10.0-20.0    L            

Test performed on Abbott



             02193-7)                                             Immun

oassay system



                                                                 with Chemilumin

escent



                                                                 Microparticle I

mmunoassay



                                                                 (CMIA) technolo

herberth.

 

             FAINA (test code = FAINA)  ID - RM                           

 

             Lab Interpretation (test Abnormal                               



             code = 21753-0)                                        



[id number]





             Test Item    Value        Reference Range Interpretation Comments

 

             Tacrolimus Lvl (test 8.3 ng/mL    10.0-20.0    L            Test pe

rformed on



             code = 81743-2)                                        [employer] Archi

tect



                                                                 Immunoassay sys

tem



                                                                 with Chemilumin

escent



                                                                 Microparticle



                                                                 Immunoassay (CM

IA)



                                                                 technology.

 

             FAINA (test code =  ID -                           



             FAINA)                                              

 

             Lab Interpretation Abnormal                               



             (test code =                                        



             01588-4)                                            



[id number]19





             Test Item    Value        Reference Range Interpretation Comments

 

             Tacrolimus Lvl (test 8.3 ng/mL    10.0-20.0    L            Test pe

rformed on



             code = 36991-9)                                        [employer] Archi

tect



                                                                 Immunoassay sys

tem



                                                                 with Chemilumin

escent



                                                                 Microparticle



                                                                 Immunoassay (CM

IA)



                                                                 technology.

 

             FAINA (test code =  ID -                           



             FAINA)         RM                                     

 

             Lab Interpretation Abnormal                               



             (test code =                                        



             56832-9)                                            



[id number] 12[facility]43[facility]19





             Test Item    Value        Reference Range Interpretation Comments

 

             TACROLIMUS BLOOD 8.3 ng/mL    10.0-20.0    L            Test perfor

med on Abbott



             (BEAKER) (test code                                        Architec

t Immunoassay



             = 657)                                              system with



                                                                 Chemiluminescen

t



                                                                 Microparticle I

mmunoassay



                                                                 (CMIA) ankit

herberth.



 ID - [facility] [id number] 11:09:09





             Test Item    Value        Reference Range Interpretation Comments

 

             Sodium (test code = 137 meq/L    136-145                   



             2951-2)                                             

 

             Potassium (test 4.1 meq/L    3.5-5.1                   



             code = 2823-3)                                        

 

             Chloride (test code 103 meq/L                        



             = 2075-0)                                           

 

             CO2 (test code = 26 meq/L     2028)                                             

 

             BUN (test code = 16 mg/dL                           



             3094-0)                                             

 

             Creatinine (test 1.07 mg/dL   0.57-1.25                 



             code = 2160-0)                                        

 

             Glucose (test code 99 mg/dL                         



             = 2345-7)                                           

 

             Calcium (test code 9.3 mg/dL    8.4-10.2                  



             = 75658-4)                                          

 

             EGFR (test code = 73           mL/min/1.73 sq m              ESTIMA

PHOEBE GFR IS



             33914-3)                                            NOT AS ACCURATE

 AS



                                                                 CREATININE



                                                                 CLEARANCE IN



                                                                 PREDICTING



                                                                 GLOMERULAR



                                                                 FILTRATION RATE

.



                                                                 ESTIMATED GFR I

S



                                                                 NOT APPLICABLE 

FOR



                                                                 DIALYSIS PATIEN

TS.

 

             FAINA (test code =  ID - DB                           



             FAINA)                                                



[facility] Metabolic [id number] 11:09:09





             Test Item    Value        Reference Range Interpretation Comments

 

             Sodium (test code = 137 meq/L    136-145                   



             2951-2)                                             

 

             Potassium (test 4.1 meq/L    3.5-5.1                   



             code = 2823-3)                                        

 

             Chloride (test code 103 meq/L                        



             = 2075-0)                                           

 

             CO2 (test code = 26 meq/L     2028)                                             

 

             BUN (test code = 16 mg/dL                           



             3094-0)                                             

 

             Creatinine (test 1.07 mg/dL   0.57-1.25                 



             code = 2160-0)                                        

 

             Glucose (test code 99 mg/dL                         



             = 2345-7)                                           

 

             Calcium (test code 9.3 mg/dL    8.4-10.2                  



             = 35561-2)                                          

 

             EGFR (test code = 73           mL/min/1.73 sq m              ESTIMA

PHOEBE GFR IS



             33914-3)                                            NOT AS ACCURATE

 AS



                                                                 CREATININE



                                                                 CLEARANCE IN



                                                                 PREDICTING



                                                                 GLOMERULAR



                                                                 FILTRATION RATE

.



                                                                 ESTIMATED GFR I

S



                                                                 NOT APPLICABLE 

FOR



                                                                 DIALYSIS PATIEN

TS.

 

             FAINA (test code =  ID - DB                           



             FAINA)                                                



[facility] Metabolic [id number] 11:09:09





             Test Item    Value        Reference Range Interpretation Comments

 

             Sodium (test code = 137 meq/L    136-145                   



             2951-2)                                             

 

             Potassium (test 4.1 meq/L    3.5-5.1                   



             code = 2823-3)                                        

 

             Chloride (test code 103 meq/L                        



             = 2075-0)                                           

 

             CO2 (test code = 26 meq/L     22-29                     



             8-9)                                             

 

             BUN (test code = 16 mg/dL     7-21                      



             3094-0)                                             

 

             Creatinine (test 1.07 mg/dL   0.57-1.25                 



             code = 2160-0)                                        

 

             Glucose (test code 99 mg/dL                         



             = 2345-7)                                           

 

             Calcium (test code 9.3 mg/dL    8.4-10.2                  



             = 59736-8)                                          

 

             EGFR (test code = 73           mL/min/1.73 sq m              ESTIMA

PHOEBE GFR IS



             33914-3)                                            NOT AS ACCURATE

 AS



                                                                 CREATININE



                                                                 CLEARANCE IN



                                                                 PREDICTING



                                                                 GLOMERULAR



                                                                 FILTRATION RATE

.



                                                                 ESTIMATED GFR I

S



                                                                 NOT APPLICABLE 

FOR



                                                                 DIALYSIS PATIEN

TS.

 

             FAINA (test code =  ID - DB                           



             FAINA)                                                



[facility] METABOLIC [id number] 11:09:09





             Test Item    Value        Reference Range Interpretation Comments

 

             SODIUM (BEAKER) 137 meq/L    136-145                   



             (test code = 381)                                        

 

             POTASSIUM (BEAKER) 4.1 meq/L    3.5-5.1                   



             (test code = 379)                                        

 

             CHLORIDE (BEAKER) 103 meq/L                        



             (test code = 382)                                        

 

             CO2 (BEAKER) (test 26 meq/L     22-29                     



             code = 355)                                         

 

             BLOOD UREA NITROGEN 16 mg/dL     7-21                      



             (BEAKER) (test code                                        



             = 354)                                              

 

             CREATININE (BEAKER) 1.07 mg/dL   0.57-1.25                 



             (test code = 358)                                        

 

             GLUCOSE RANDOM 99 mg/dL                         



             (BEAKER) (test code                                        



             = 652)                                              

 

             CALCIUM (BEAKER) 9.3 mg/dL    8.4-10.2                  



             (test code = 697)                                        

 

             EGFR (BEAKER) (test 73 mL/min/1.73                           ESTIMA

PHOEBE GFR IS



             code = 1092) sq m                                   NOT AS ACCURATE

 AS



                                                                 CREATININE



                                                                 CLEARANCE IN



                                                                 PREDICTING



                                                                 GLOMERULAR



                                                                 FILTRATION RATE

.



                                                                 ESTIMATED GFR I

S



                                                                 NOT APPLICABLE 

FOR



                                                                 DIALYSIS PATIEN

TS.



 ID - [facility] with platelet count + automated [id number] 10:49:14





             Test Item    Value        Reference Range Interpretation Comments

 

             WBC (test code = 6690-2) 8.4          See_Comment                [A

utomated message]



                                                                 The system [email or web address]



                                                                 generated this 

result



                                                                 transmitted ref

erence



                                                                 range: 3.5 - 10

.5



                                                                 K/L. The refe

rence



                                                                 range was not u

sed to



                                                                 interpret this 

result



                                                                 as normal/abnor

mal.

 

             RBC (test code = 789-8) 5.35         See_Comment                [Au

tomated message]



                                                                 The system [email or web address]



                                                                 generated this 

result



                                                                 transmitted ref

erence



                                                                 range: 4.63 - 6

.08



                                                                 M/L. The refe

rence



                                                                 range was not u

sed to



                                                                 interpret this 

result



                                                                 as normal/abnor

mal.

 

             MCHC (test code = 786-4) 33.8         See_Comment                [A

utomated message]



                                                                 The system [email or web address]



                                                                 generated this 

result



                                                                 transmitted ref

erence



                                                                 range: 32.3 - 3

6.5



                                                                 GM/DL. The refe

rence



                                                                 range was not u

sed to



                                                                 interpret this 

result



                                                                 as normal/abnor

mal.

 

             Hematocrit (test code = 45.2 %       40.1-51.0                 



             4544-3)                                             

 

             MCV (test code = 787-2) 84.5 fL      79.0-92.2                 

 

             MCH (test code = 785-6) 28.6 pg      25.7-32.2                 

 

             RDW (test code = 788-0) 13.2 %       11.6-14.4                 

 

             Platelets (test code = 162          See_Comment                [Aut

omated message]



             777-3)                                              The system [email or web address]



                                                                 generated this 

result



                                                                 transmitted ref

erence



                                                                 range: 150 - 45

0 K/CU



                                                                 MM. The referen

ce



                                                                 range was not u

sed to



                                                                 interpret this 

result



                                                                 as normal/abnor

mal.

 

             MPV (test code = 9.5 fL       9.4-12.4                  



             23858-2)                                            

 

             nRBC (test code = 413) 0            See_Comment                [Aut

omated message]



                                                                 The system [email or web address]



                                                                 generated this 

result



                                                                 transmitted ref

erence



                                                                 range: 0 - 0 /1

00



                                                                 WBC. The refere

nce



                                                                 range was not u

sed to



                                                                 interpret this 

result



                                                                 as normal/abnor

mal.

 

             % Neutros (test code = 73 %                                   



             429)                                                

 

             % Lymphs (test code = 17 %                                   



             430)                                                

 

             % Monos (test code = 7 %                                    



             431)                                                

 

             % Eos (test code = 432) 3 %                                    

 

             % Baso (test code = 437) 1 %                                    

 

             # Neutros (test code = 6.15         See_Comment  H             [Aut

omated message]



             670)                                                The system [email or web address]



                                                                 generated this 

result



                                                                 transmitted ref

erence



                                                                 range: 1.78 - 5

.38



                                                                 K/L. The refe

rence



                                                                 range was not u

sed to



                                                                 interpret this 

result



                                                                 as normal/abnor

mal.

 

             # Lymphs (test code = 1.40         See_Comment                [Auto

mated message]



             414)                                                The system [email or web address]



                                                                 generated this 

result



                                                                 transmitted ref

erence



                                                                 range: 1.32 - 3

.57



                                                                 K/L. The refe

rence



                                                                 range was not u

sed to



                                                                 interpret this 

result



                                                                 as normal/abnor

mal.

 

             # Monos (test code = 0.60         See_Comment                [Autom

ated message]



             415)                                                The system [email or web address]



                                                                 generated this 

result



                                                                 transmitted ref

erence



                                                                 range: 0.30 - 0

.82



                                                                 K/L. The refe

rence



                                                                 range was not u

sed to



                                                                 interpret this 

result



                                                                 as normal/abnor

mal.

 

             # Eos (test code = 416) 0.21         See_Comment                [Au

tomated message]



                                                                 The system [email or web address]



                                                                 generated this 

result



                                                                 transmitted ref

erence



                                                                 range: 0.04 - 0

.54



                                                                 K/L. The refe

rence



                                                                 range was not u

sed to



                                                                 interpret this 

result



                                                                 as normal/abnor

mal.

 

             # Baso (test code = 417) 0.05         See_Comment                [A

utomated message]



                                                                 The system [email or web address]



                                                                 generated this 

result



                                                                 transmitted ref

erence



                                                                 range: 0.01 - 0

.08



                                                                 K/L. The refe

rence



                                                                 range was not u

sed to



                                                                 interpret this 

result



                                                                 as normal/abnor

mal.

 

             Immature     0 %          0-1                       



             Granulocytes-Relative                                        



             (test code = 2801)                                        

 

             Lab Interpretation (test Abnormal                               



             code = 29135-2)                                        



[facility] with platelet count + automated [id number] 
10:49:14





             Test Item    Value        Reference Range Interpretation Comments

 

             WBC (test code = 6690-2) 8.4          See_Comment                [A

utomated message]



                                                                 The system [email or web address]



                                                                 generated this 

result



                                                                 transmitted ref

erence



                                                                 range: 3.5 - 10

.5



                                                                 K/L. The refe

rence



                                                                 range was not u

sed to



                                                                 interpret this 

result



                                                                 as normal/abnor

mal.

 

             RBC (test code = 789-8) 5.35         See_Comment                [Au

tomated message]



                                                                 The system [email or web address]



                                                                 generated this 

result



                                                                 transmitted ref

erence



                                                                 range: 4.63 - 6

.08



                                                                 M/L. The refe

rence



                                                                 range was not u

sed to



                                                                 interpret this 

result



                                                                 as normal/abnor

mal.

 

             MCHC (test code = 786-4) 33.8         See_Comment                [A

utomated message]



                                                                 The system [email or web address]



                                                                 generated this 

result



                                                                 transmitted ref

erence



                                                                 range: 32.3 - 3

6.5



                                                                 GM/DL. The refe

rence



                                                                 range was not u

sed to



                                                                 interpret this 

result



                                                                 as normal/abnor

mal.

 

             Hematocrit (test code = 45.2 %       40.1-51.0                 



             4544-3)                                             

 

             MCV (test code = 787-2) 84.5 fL      79.0-92.2                 

 

             MCH (test code = 785-6) 28.6 pg      25.7-32.2                 

 

             RDW (test code = 788-0) 13.2 %       11.6-14.4                 

 

             Platelets (test code = 162          See_Comment                [Aut

omated message]



             777-3)                                              The system [email or web address]



                                                                 generated this 

result



                                                                 transmitted ref

erence



                                                                 range: 150 - 45

0 K/CU



                                                                 MM. The referen

ce



                                                                 range was not u

sed to



                                                                 interpret this 

result



                                                                 as normal/abnor

mal.

 

             MPV (test code = 9.5 fL       9.4-12.4                  



             55578-3)                                            

 

             nRBC (test code = 413) 0            See_Comment                [Aut

omated message]



                                                                 The system [email or web address]



                                                                 generated this 

result



                                                                 transmitted ref

erence



                                                                 range: 0 - 0 /1

00



                                                                 WBC. The refere

nce



                                                                 range was not u

sed to



                                                                 interpret this 

result



                                                                 as normal/abnor

mal.

 

             % Neutros (test code = 73 %                                   



             429)                                                

 

             % Lymphs (test code = 17 %                                   



             430)                                                

 

             % Monos (test code = 7 %                                    



             431)                                                

 

             % Eos (test code = 432) 3 %                                    

 

             % Baso (test code = 437) 1 %                                    

 

             # Neutros (test code = 6.15         See_Comment  H             [Aut

omated message]



             670)                                                The system [email or web address]



                                                                 generated this 

result



                                                                 transmitted ref

erence



                                                                 range: 1.78 - 5

.38



                                                                 K/L. The refe

rence



                                                                 range was not u

sed to



                                                                 interpret this 

result



                                                                 as normal/abnor

mal.

 

             # Lymphs (test code = 1.40         See_Comment                [Auto

mated message]



             414)                                                The system [email or web address]



                                                                 generated this 

result



                                                                 transmitted ref

erence



                                                                 range: 1.32 - 3

.57



                                                                 K/L. The refe

rence



                                                                 range was not u

sed to



                                                                 interpret this 

result



                                                                 as normal/abnor

mal.

 

             # Monos (test code = 0.60         See_Comment                [Autom

ated message]



             415)                                                The system [email or web address]



                                                                 generated this 

result



                                                                 transmitted ref

erence



                                                                 range: 0.30 - 0

.82



                                                                 K/L. The refe

rence



                                                                 range was not u

sed to



                                                                 interpret this 

result



                                                                 as normal/abnor

mal.

 

             # Eos (test code = 416) 0.21         See_Comment                [Au

tomated message]



                                                                 The system [email or web address]



                                                                 generated this 

result



                                                                 transmitted ref

erence



                                                                 range: 0.04 - 0

.54



                                                                 K/L. The refe

rence



                                                                 range was not u

sed to



                                                                 interpret this 

result



                                                                 as normal/abnor

mal.

 

             # Baso (test code = 417) 0.05         See_Comment                [A

utomated message]



                                                                 The system [email or web address]



                                                                 generated this 

result



                                                                 transmitted ref

erence



                                                                 range: 0.01 - 0

.08



                                                                 K/L. The refe

rence



                                                                 range was not u

sed to



                                                                 interpret this 

result



                                                                 as normal/abnor

mal.

 

             Immature     0 %          0-1                       



             Granulocytes-Relative                                        



             (test code = 2801)                                        

 

             Lab Interpretation (test Abnormal                               



             code = 22511-5)                                        



[facility] with platelet count + automated [id number] 
10:49:14





             Test Item    Value        Reference Range Interpretation Comments

 

             WBC (test code = 6690-2) 8.4          See_Comment                [A

utomated message]



                                                                 The system [email or web address]



                                                                 generated this 

result



                                                                 transmitted ref

erence



                                                                 range: 3.5 - 10

.5



                                                                 K/L. The refe

rence



                                                                 range was not u

sed to



                                                                 interpret this 

result



                                                                 as normal/abnor

mal.

 

             RBC (test code = 789-8) 5.35         See_Comment                [Au

tomated message]



                                                                 The system [email or web address]



                                                                 generated this 

result



                                                                 transmitted ref

erence



                                                                 range: 4.63 - 6

.08



                                                                 M/L. The refe

rence



                                                                 range was not u

sed to



                                                                 interpret this 

result



                                                                 as normal/abnor

mal.

 

             MCHC (test code = 786-4) 33.8         See_Comment                [A

utomated message]



                                                                 The system [email or web address]



                                                                 generated this 

result



                                                                 transmitted ref

erence



                                                                 range: 32.3 - 3

6.5



                                                                 GM/DL. The refe

rence



                                                                 range was not u

sed to



                                                                 interpret this 

result



                                                                 as normal/abnor

mal.

 

             Hematocrit (test code = 45.2 %       40.1-51.0                 



             4544-3)                                             

 

             MCV (test code = 787-2) 84.5 fL      79.0-92.2                 

 

             MCH (test code = 785-6) 28.6 pg      25.7-32.2                 

 

             RDW (test code = 788-0) 13.2 %       11.6-14.4                 

 

             Platelets (test code = 162          See_Comment                [Aut

omated message]



             777-3)                                              The system [email or web address]



                                                                 generated this 

result



                                                                 transmitted ref

erence



                                                                 range: 150 - 45

0 K/CU



                                                                 MM. The referen

ce



                                                                 range was not u

sed to



                                                                 interpret this 

result



                                                                 as normal/abnor

mal.

 

             MPV (test code = 9.5 fL       9.4-12.4                  



             97628-8)                                            

 

             nRBC (test code = 413) 0            See_Comment                [Aut

omated message]



                                                                 The system [email or web address]



                                                                 generated this 

result



                                                                 transmitted ref

erence



                                                                 range: 0 - 0 /1

00



                                                                 WBC. The refere

nce



                                                                 range was not u

sed to



                                                                 interpret this 

result



                                                                 as normal/abnor

mal.

 

             % Neutros (test code = 73 %                                   



             429)                                                

 

             % Lymphs (test code = 17 %                                   



             430)                                                

 

             % Monos (test code = 7 %                                    



             431)                                                

 

             % Eos (test code = 432) 3 %                                    

 

             % Baso (test code = 437) 1 %                                    

 

             # Neutros (test code = 6.15         See_Comment  H             [Aut

omated message]



             670)                                                The system [email or web address]



                                                                 generated this 

result



                                                                 transmitted ref

erence



                                                                 range: 1.78 - 5

.38



                                                                 K/L. The refe

rence



                                                                 range was not u

sed to



                                                                 interpret this 

result



                                                                 as normal/abnor

mal.

 

             # Lymphs (test code = 1.40         See_Comment                [Auto

mated message]



             414)                                                The system [email or web address]



                                                                 generated this 

result



                                                                 transmitted ref

erence



                                                                 range: 1.32 - 3

.57



                                                                 K/L. The refe

rence



                                                                 range was not u

sed to



                                                                 interpret this 

result



                                                                 as normal/abnor

mal.

 

             # Monos (test code = 0.60         See_Comment                [Autom

ated message]



             415)                                                The system [email or web address]



                                                                 generated this 

result



                                                                 transmitted ref

erence



                                                                 range: 0.30 - 0

.82



                                                                 K/L. The refe

rence



                                                                 range was not u

sed to



                                                                 interpret this 

result



                                                                 as normal/abnor

mal.

 

             # Eos (test code = 416) 0.21         See_Comment                [Au

tomated message]



                                                                 The system [email or web address]



                                                                 generated this 

result



                                                                 transmitted ref

erence



                                                                 range: 0.04 - 0

.54



                                                                 K/L. The refe

rence



                                                                 range was not u

sed to



                                                                 interpret this 

result



                                                                 as normal/abnor

mal.

 

             # Baso (test code = 417) 0.05         See_Comment                [A

utomated message]



                                                                 The system [email or web address]



                                                                 generated this 

result



                                                                 transmitted ref

erence



                                                                 range: 0.01 - 0

.08



                                                                 K/L. The refe

rence



                                                                 range was not u

sed to



                                                                 interpret this 

result



                                                                 as normal/abnor

mal.

 

             Immature     0 %          0-1                       



             Granulocytes-Relative                                        



             (test code = 2801)                                        

 

             Lab Interpretation (test Abnormal                               



             code = 26383-8)                                        



[facility] W/PLT COUNT &amp; AUTO [id number] 
10:49:14





             Test Item    Value        Reference Range Interpretation Comments

 

             WHITE BLOOD CELL COUNT (BEAKER) 8.4 K/ L     3.5-10.5              

    



             (test code = 775)                                        

 

             RED BLOOD CELL COUNT (BEAKER) 5.35 M/ L    4.63-6.08               

  



             (test code = 761)                                        

 

             HEMOGLOBIN (BEAKER) (test code = 15.3 GM/DL   13.7-17.5            

     



             410)                                                

 

             HEMATOCRIT (BEAKER) (test code = 45.2 %       40.1-51.0            

     



             411)                                                

 

             MEAN CORPUSCULAR VOLUME (BEAKER) 84.5 fL      79.0-92.2            

     



             (test code = 753)                                        

 

             MEAN CORPUSCULAR HEMOGLOBIN 28.6 pg      25.7-32.2                 



             (BEAKER) (test code = 751)                                        

 

             MEAN CORPUSCULAR HEMOGLOBIN CONC 33.8 GM/DL   32.3-36.5            

     



             (BEAKER) (test code = 752)                                        

 

             RED CELL DISTRIBUTION WIDTH 13.2 %       11.6-14.4                 



             (BEAKER) (test code = 412)                                        

 

             PLATELET COUNT (BEAKER) (test 162 K/CU MM  150-450                 

  



             code = 756)                                         

 

             MEAN PLATELET VOLUME (BEAKER) 9.5 fL       9.4-12.4                

  



             (test code = 754)                                        

 

             NUCLEATED RED BLOOD CELLS 0 /100 WBC   0-0                       



             (BEAKER) (test code = 413)                                        

 

             NEUTROPHILS RELATIVE PERCENT 73 %                                  

 



             (BEAKER) (test code = 429)                                        

 

             LYMPHOCYTES RELATIVE PERCENT 17 %                                  

 



             (BEAKER) (test code = 430)                                        

 

             MONOCYTES RELATIVE PERCENT 7 %                                    



             (BEAKER) (test code = 431)                                        

 

             EOSINOPHILS RELATIVE PERCENT 3 %                                   

 



             (BEAKER) (test code = 432)                                        

 

             BASOPHILS RELATIVE PERCENT 1 %                                    



             (BEAKER) (test code = 437)                                        

 

             NEUTROPHILS ABSOLUTE COUNT 6.15 K/ L    1.78-5.38    H            



             (BEAKER) (test code = 670)                                        

 

             LYMPHOCYTES ABSOLUTE COUNT 1.40 K/ L    1.32-3.57                 



             (BEAKER) (test code = 414)                                        

 

             MONOCYTES ABSOLUTE COUNT (BEAKER) 0.60 K/ L    0.30-0.82           

      



             (test code = 415)                                        

 

             EOSINOPHILS ABSOLUTE COUNT 0.21 K/ L    0.04-0.54                 



             (BEAKER) (test code = 416)                                        

 

             BASOPHILS ABSOLUTE COUNT (BEAKER) 0.05 K/ L    0.01-0.08           

      



             (test code = 417)                                        

 

             IMMATURE GRANULOCYTES-RELATIVE 0 %          0-1                    

   



             PERCENT (BEAKER) (test code =                                      

  



             2801)                                               



2D echo w/ doppler (cw/pw/color)2021 15:29:47Ejection FractionSLEH ECHO 
[facility]pw/color)2021 15:29:47Ejection FractionSLEH ECHO [facility] 2 [id number] 11:56:00NEW 
CARDIOLOGIST OBERTONReason for Exam:-&gt;heart transplant annual follow up
************************************************************[facility] : 1970 Sex: 
M************************************************************FINAL REPORT 
PATIENT ID: [id number] INDICATION: heart transplant annual follow up COMPARISON: 
None TECHNIQUE: Frontal and lateral views of the chest. FINDINGS: Lungs and 
pleura: Clear lungs. No effusion.Heartand mediastinum: Normal heart size. 
Unremarkable mediastinal contours.Osseous structures: No acute ab
normality.Additional findings: None. IMPRESSION: No acute intrathoracic 
abnormality. Signed: Shikha Jarvis MDReport Verified Date/Time: 2021 
11:56:35 Reading Location: [facility] Radiology Reading Room Electronically 
signed by: SHIKHA JARVIS MD on 2021 11:56 AMTACROLIMUS LEVEL
2021 12:37:00





             Test Item    Value        Reference Range Interpretation Comments

 

             TACROLIMUS BLOOD 10.6 ng/mL   10.0-20.0                 Test perfor

med on [employer]



             (BEAKER) (test code                                        [employer]

t Immunoassay



             = 657)                                              system with



                                                                 Chemiluminescen

t



                                                                 Microparticle



                                                                 Immunoassay (CM

IA)



                                                                 technology.



 ID - RMBASIC METABOLIC [id number] 06:33:00





             Test Item    Value        Reference Range Interpretation Comments

 

             SODIUM (BEAKER) 139 meq/L    136-145                   



             (test code = 381)                                        

 

             POTASSIUM (BEAKER) 4.0 meq/L    3.5-5.1                   



             (test code = 379)                                        

 

             CHLORIDE (BEAKER) 102 meq/L                        



             (test code = 382)                                        

 

             CO2 (BEAKER) (test 29 meq/L     22-29                     



             code = 355)                                         

 

             BLOOD UREA NITROGEN 16 mg/dL     7-21                      



             (BEAKER) (test code                                        



             = 354)                                              

 

             CREATININE (BEAKER) 1.04 mg/dL   0.57-1.25                 



             (test code = 358)                                        

 

             GLUCOSE RANDOM 97 mg/dL                         



             (BEAKER) (test code                                        



             = 652)                                              

 

             CALCIUM (BEAKER) 8.9 mg/dL    8.4-10.2                  



             (test code = 697)                                        

 

             EGFR (BEAKER) (test 75 mL/min/1.73                           ESTIMA

PHOEBE GFR IS



             code = 1092) sq m                                   NOT AS ACCURATE

 AS



                                                                 CREATININE



                                                                 CLEARANCE IN



                                                                 PREDICTING



                                                                 GLOMERULAR



                                                                 FILTRATION RATE

.



                                                                 ESTIMATED GFR I

S



                                                                 NOT APPLICABLE 

FOR



                                                                 DIALYSIS PATIEN

TS.



 ID - PIAYA LProthrombin time/[id number] 06:30:00





             Test Item    Value        Reference    Interpretation Comments



                                       Range                     

 

             Protime (test code = 12.9         See_Comment                [Autom

ated



             5902-2)                                             message] The



                                                                 system which



                                                                 generated this



                                                                 result



                                                                 transmitted



                                                                 reference range

:



                                                                 11.9 - 14.2



                                                                 seconds. The



                                                                 reference range



                                                                 was not used to



                                                                 interpret this



                                                                 result as



                                                                 normal/abnormal

.

 

             INR (test code = 1.00         See_Comment                [Automated



             5591-6)                                             message] The



                                                                 system which



                                                                 generated this



                                                                 result



                                                                 transmitted



                                                                 reference range

:



                                                                 <=5.90. The



                                                                 reference range



                                                                 was not used to



                                                                 interpret this



                                                                 result as



                                                                 normal/abnormal

.

 

             FAINA (test code = RECOMMENDED                            



             FAINA)         COUMADIN/WARFARIN                           



                          INR THERAPY                            



                          RANGESSTANDARD DOSE:                           



                          2.0 - 3.0 Includes:                           



                          PROPHYLAXIS for                           



                          venous thrombosis,                           



                          systemic                               



                          embolization;                           



                          TREATMENT for venous                           



                          thrombosis and/or                           



                          pulmonary                              



                          embolus.HIGH RISK:                           



                          Target INR is                           



                          2.5-3.5 for patients                           



                          with mechanical                           



                          heart valves.                           

 

             Lab Interpretation Normal                                 



             (test code =                                        



             41110-5)                                            



[id number]





             Test Item    Value        Reference    Interpretation Comments



                                       Range                     

 

             Protime (test code = 12.9         See_Comment                [Autom

ated



             5902-2)                                             message] The



                                                                 system which



                                                                 generated this



                                                                 result



                                                                 transmitted



                                                                 reference range

:



                                                                 11.9 - 14.2



                                                                 seconds. The



                                                                 reference range



                                                                 was not used to



                                                                 interpret this



                                                                 result as



                                                                 normal/abnormal

.

 

             INR (test code = 1.00         See_Comment                [Automated



             4021-6)                                             message] The



                                                                 system which



                                                                 generated this



                                                                 result



                                                                 transmitted



                                                                 reference range

:



                                                                 <=5.90. The



                                                                 reference range



                                                                 was not used to



                                                                 interpret this



                                                                 result as



                                                                 normal/abnormal

.

 

             FAINA (test code = RECOMMENDED                            



             FAINA)         COUMADIN/WARFARIN                           



                          INR THERAPY                            



                          RANGESSTANDARD DOSE:                           



                          2.0 - 3.0 Includes:                           



                          PROPHYLAXIS for                           



                          venous thrombosis,                           



                          systemic                               



                          embolization;                           



                          TREATMENT for venous                           



                          thrombosis and/or                           



                          pulmonary                              



                          embolus.HIGH RISK:                           



                          Target INR is                           



                          2.5-3.5 for patients                           



                          with mechanical                           



                          heart valves.                           

 

             Lab Interpretation Normal                                 



             (test code =                                        



             79228-5)                                            



[id number]





             Test Item    Value        Reference Range Interpretation Comments

 

             PROTIME (BEAKER) 12.9 seconds 11.9-14.2                 



             (test code = 759)                                        

 

             INR (BEAKER) (test 1.00         See_Comment                [Automat

ed message]



             code = 370)                                         The system [email or web address]



                                                                 generated this 

result



                                                                 transmitted ref

erence



                                                                 range: <=5.90. 

The



                                                                 reference range

 was



                                                                 not used to int

erpret



                                                                 this result as



                                                                 normal/abnormal

.



RECOMMENDED COUMADIN/WARFARIN INR THERAPY RANGESSTANDARD DOSE: 2.0 - 3.0 
Includes: PROPHYLAXIS for venous thrombosis, systemic embolization; TREATMENT 
for venous thrombosis and/or pulmonary embolus.HIGH RISK: Target INR is 2.5-3.5 
for patients with mechanical heart valves.CBC (Hemogram only)2021 06:20:00





             Test Item    Value        Reference Range Interpretation Comments

 

             WBC (test code = 6690-2) 4.8          See_Comment                [A

utomated message]



                                                                 The system [email or web address]



                                                                 generated this 

result



                                                                 transmitted ref

erence



                                                                 range: 3.5 - 10

.5



                                                                 K/L. The refe

rence



                                                                 range was not u

sed to



                                                                 interpret this 

result



                                                                 as normal/abnor

mal.

 

             RBC (test code = 789-8) 5.14         See_Comment                [Au

tomated message]



                                                                 The system [email or web address]



                                                                 generated this 

result



                                                                 transmitted ref

erence



                                                                 range: 4.63 - 6

.08



                                                                 M/L. The refe

rence



                                                                 range was not u

sed to



                                                                 interpret this 

result



                                                                 as normal/abnor

mal.

 

             MCHC (test code = 786-4) 33.9         See_Comment                [A

utomated message]



                                                                 The system [email or web address]



                                                                 generated this 

result



                                                                 transmitted ref

erence



                                                                 range: 32.3 - 3

6.5



                                                                 GM/DL. The refe

rence



                                                                 range was not u

sed to



                                                                 interpret this 

result



                                                                 as normal/abnor

mal.

 

             Hematocrit (test code = 43.3 %       40.1-51.0                 



             4544-3)                                             

 

             MCV (test code = 787-2) 84.2 fL      79.0-92.2                 

 

             MCH (test code = 785-6) 28.6 pg      25.7-32.2                 

 

             RDW (test code = 788-0) 13.5 %       11.6-14.4                 

 

             Platelets (test code = 149          See_Comment  L             [Aut

omated message]



             777-3)                                              The system [email or web address]



                                                                 generated this 

result



                                                                 transmitted ref

erence



                                                                 range: 150 - 45

0 K/CU



                                                                 MM. The referen

ce



                                                                 range was not u

sed to



                                                                 interpret this 

result



                                                                 as normal/abnor

mal.

 

             MPV (test code = 9.4 fL       9.4-12.4                  



             76272-1)                                            

 

             nRBC (test code = 413) 0            See_Comment                [Aut

omated message]



                                                                 The system [email or web address]



                                                                 generated this 

result



                                                                 transmitted ref

erence



                                                                 range: 0 - 0 /1

00



                                                                 WBC. The refere

nce



                                                                 range was not u

sed to



                                                                 interpret this 

result



                                                                 as normal/abnor

mal.

 

             Lab Interpretation (test Abnormal                               



             code = 50136-4)                                        



[facility] (Hemogram only)2021 06:20:00





             Test Item    Value        Reference Range Interpretation Comments

 

             WBC (test code = 6690-2) 4.8          See_Comment                [A

utomated message]



                                                                 The system [email or web address]



                                                                 generated this 

result



                                                                 transmitted ref

erence



                                                                 range: 3.5 - 10

.5



                                                                 K/L. The refe

rence



                                                                 range was not u

sed to



                                                                 interpret this 

result



                                                                 as normal/abnor

mal.

 

             RBC (test code = 789-8) 5.14         See_Comment                [Au

tomated message]



                                                                 The system [email or web address]



                                                                 generated this 

result



                                                                 transmitted ref

erence



                                                                 range: 4.63 - 6

.08



                                                                 M/L. The refe

rence



                                                                 range was not u

sed to



                                                                 interpret this 

result



                                                                 as normal/abnor

mal.

 

             MCHC (test code = 786-4) 33.9         See_Comment                [A

utomated message]



                                                                 The system [email or web address]



                                                                 generated this 

result



                                                                 transmitted ref

erence



                                                                 range: 32.3 - 3

6.5



                                                                 GM/DL. The refe

rence



                                                                 range was not u

sed to



                                                                 interpret this 

result



                                                                 as normal/abnor

mal.

 

             Hematocrit (test code = 43.3 %       40.1-51.0                 



             4544-3)                                             

 

             MCV (test code = 787-2) 84.2 fL      79.0-92.2                 

 

             MCH (test code = 785-6) 28.6 pg      25.7-32.2                 

 

             RDW (test code = 788-0) 13.5 %       11.6-14.4                 

 

             Platelets (test code = 149          See_Comment  L             [Aut

omated message]



             777-3)                                              The system [email or web address]



                                                                 generated this 

result



                                                                 transmitted ref

erence



                                                                 range: 150 - 45

0 K/CU



                                                                 MM. The referen

ce



                                                                 range was not u

sed to



                                                                 interpret this 

result



                                                                 as normal/abnor

mal.

 

             MPV (test code = 9.4 fL       9.4-12.4                  



             78887-7)                                            

 

             nRBC (test code = 413) 0            See_Comment                [Aut

omated message]



                                                                 The system [email or web address]



                                                                 generated this 

result



                                                                 transmitted ref

erence



                                                                 range: 0 - 0 /1

00



                                                                 WBC. The refere

nce



                                                                 range was not u

sed to



                                                                 interpret this 

result



                                                                 as normal/abnor

mal.

 

             Lab Interpretation (test Abnormal                               



             code = 41196-9)                                        



[facility] (HEMOGRAM ONLY)2021 06:20:00





             Test Item    Value        Reference Range Interpretation Comments

 

             WHITE BLOOD CELL COUNT (BEAKER) 4.8 K/ L     3.5-10.5              

    



             (test code = 775)                                        

 

             RED BLOOD CELL COUNT (BEAKER) 5.14 M/ L    4.63-6.08               

  



             (test code = 761)                                        

 

             HEMOGLOBIN (BEAKER) (test code = 14.7 GM/DL   13.7-17.5            

     



             410)                                                

 

             HEMATOCRIT (BEAKER) (test code = 43.3 %       40.1-51.0            

     



             411)                                                

 

             MEAN CORPUSCULAR VOLUME (BEAKER) 84.2 fL      79.0-92.2            

     



             (test code = 753)                                        

 

             MEAN CORPUSCULAR HEMOGLOBIN 28.6 pg      25.7-32.2                 



             (BEAKER) (test code = 751)                                        

 

             MEAN CORPUSCULAR HEMOGLOBIN CONC 33.9 GM/DL   32.3-36.5            

     



             (BEAKER) (test code = 752)                                        

 

             RED CELL DISTRIBUTION WIDTH 13.5 %       11.6-14.4                 



             (BEAKER) (test code = 412)                                        

 

             PLATELET COUNT (BEAKER) (test 149 K/CU MM  150-450      L          

  



             code = 756)                                         

 

             MEAN PLATELET VOLUME (BEAKER) 9.4 fL       9.4-12.4                

  



             (test code = 754)                                        

 

             NUCLEATED RED BLOOD CELLS 0 /100 WBC   0-0                       



             (BEAKER) (test code = 413)                                        



Blood Culture - Routine (Left Venipuncture)2021 22:00:00





             Test Item    Value        Reference Range Interpretation Comments

 

             Result (test code = No growth in 5 days                           



             6463-4)                                             



[facility]
22:00:00





             Test Item    Value        Reference Range Interpretation Comments

 

             Result (test code = No growth in 5 days                           



             6463-4)                                             



[id number] 22[facility]00:00





             Test Item    Value        Reference Range Interpretation Comments

 

             CULTURE (BEAKER) (test No growth in 5 days                         

  



             code = 1095)                                        



BLOOD [id number] 22:00:00





             Test Item    Value        Reference Range Interpretation Comments

 

             CULTURE (BEAKER) (test No growth in 5 days                         

  



             code = 1095)                                        



SARS-CoV2/Influenza/RSV RT-PCR (Symptomatic ONLY)2021 21:08:00





             Test Item    Value        Reference Range Interpretation Comments

 

             SARS-COV2/RT-PCR (test Positive     Negative     AA           



             code = 14697-0)                                        

 

             Influenza A RT-PCR Negative     Negative                  



             (test code = 19335-9)                                        

 

             Influenza B RT-PCR Negative     Negative                  



             (test code = 17062-5)                                        

 

             RSV by RT-PCR (test Negative     Negative                  Performa

nce of the



             code = 24707-2)                                        Xpert Xpress



                                                                 SARS-CoV-2/Flu/

RSV test



                                                                 has only been



                                                                 established in



                                                                 nasopharyngeal 

swab



                                                                 specimens. Use 

of the



                                                                 Xpert Xpress



                                                                 SARS-CoV-2/Flu/

RSV test



                                                                 with other spec

imen



                                                                 types has not b

een



                                                                 assessed and



                                                                 performance



                                                                 characteristics

 are



                                                                 unknown. As wit

h any



                                                                 molecular test,



                                                                 mutations withi

n the



                                                                 targeted geneti

c



                                                                 regions identif

ied by



                                                                 the Xpert Xpres

s



                                                                 SARS-CoV-2/Flu/

RSV test



                                                                 could affect pr

carlton



                                                                 and/or probe bi

nding



                                                                 resulting in fa

ilure to



                                                                 detect the pres

ence of



                                                                 virus or the vi

sabra



                                                                 being detected 

less



                                                                 predictably.Neg

ative



                                                                 results do not 

preclude



                                                                 SARS-CoV-2, Inf

luenza



                                                                 A/B, or RSV inf

ection



                                                                 and should not 

be used



                                                                 as the sole bas

is for



                                                                 treatment or ot

her



                                                                 patient managem

ent



                                                                 decisions. Resu

lts from



                                                                 the Xpert Xpres

s



                                                                 SARS-CoV-2/Flu/

RSV test



                                                                 should be corre

lated



                                                                 with the clinic

al



                                                                 history,



                                                                 epidemiological

 data,



                                                                 and other data



                                                                 available to th

e



                                                                 clinician evalu

ating



                                                                 the patient. In

valid



                                                                 test results ma

y occur



                                                                 from improper s

pecimen



                                                                 collection; jasmeet

lure to



                                                                 follow the oralia

mmended



                                                                 sample collecti

on,



                                                                 handling, and s

torage



                                                                 procedures; gretchen

hnical



                                                                 error. False ne

gative



                                                                 results may occ

ur if



                                                                 virus is presen

t at



                                                                 levels below th

e



                                                                 analytical limi

t of



                                                                 detection (LOD:

 131



                                                                 copies/mL). Vir

al



                                                                 nucleic acid ma

y



                                                                 persist in vivo

,



                                                                 independent of 

virus



                                                                 viability. Dete

ction of



                                                                 analyte target(

s) does



                                                                 not imply that 

the



                                                                 corresponding v

irus(es)



                                                                 are infectious 

or are



                                                                 the causative a

gents



                                                                 for clinical sy

mptoms.



                                                                 Recent patient 

exposure



                                                                 to FluMist or

 other



                                                                 live attenuated



                                                                 influenza vacci

sylvester may



                                                                 cause inaccurat

e



                                                                 positive result

s.This



                                                                 test has been



                                                                 authorized by [employer] under



                                                                 an EUA for use 

by



                                                                 authorized



                                                                 laboratories. T

his test



                                                                 is only authori

zed for



                                                                 the duration of

 the



                                                                 declaration nereida

t



                                                                 circumstances e

xist



                                                                 justifying the



                                                                 authorization o

f



                                                                 emergency use o

f in



                                                                 vitro diagnosti

c tests



                                                                 for detection a

nd/or



                                                                 diagnosis of CO

VID-19



                                                                 under Section 5

64(b)(1)



                                                                 of the Federal 

Food,



                                                                 Drug and Cosmet

ic Act,



                                                                 21 U.[place]C. 



                                                                 360bbb-3(b)(1),

 unless



                                                                 the authorizati

on is



                                                                 terminated or r

evoked



                                                                 sooner. Fact Sh

eet for



                                                                 Healthcare Prov

iders:



                                                                 https://www.[employer]



                                                                 /Documents/Xper

t%20Xpre



                                                                 ss%24WHIH-DnS-8

-Flu-RSV



                                                                 /3024508%20Rev

.%20B%20



                                                                 HCP%20Fact%20Sh

eet.pdf



                                                                 Fact Sheet for



                                                                 Healthcare Elsa

ents:



                                                                 https://www.[employer]



                                                                 /Documents/Xper

t%20Xpre



                                                                 ss%88QRDF-VuZ-7

-Flu-RSV



                                                                 /3024507%20Rev

.%20B%20



                                                                 Patient%20Fact%

20Sheet.



                                                                 pdf

 

             Lab Interpretation Abnormal                               



             (test code = 16947-7)                                        



[facility]
20[facility] 21:08:00





             Test Item    Value        Reference Range Interpretation Comments

 

             SARS-COV2/RT-PCR (test Positive     Negative     AA           



             code = 59564-9)                                        

 

             Influenza A RT-PCR Negative     Negative                  



             (test code = 59440-6)                                        

 

             Influenza B RT-PCR Negative     Negative                  



             (test code = 25949-1)                                        

 

             RSV by RT-PCR (test Negative     Negative                  Performa

nce of the



             code = 25172-2)                                        [facility]pert Xpress



                                                                 SARS-CoV-2/Flu/

RSV test



                                                                 has only been



                                                                 established in



                                                                 nasopharyngeal 

swab



                                                                 specimens. Use 

of the



                                                                 Xpert Xpress



                                                                 SARS-CoV-2/Flu/

RSV test



                                                                 with other spec

imen



                                                                 types has not b

een



                                                                 assessed and



                                                                 performance



                                                                 characteristics

 are



                                                                 unknown. As wit

h any



                                                                 molecular test,



                                                                 mutations withi

n the



                                                                 targeted geneti

c



                                                                 regions identif

ied by



                                                                 the Xpert Xpres

s



                                                                 SARS-CoV-2/Flu/

RSV test



                                                                 could affect pr

carlton



                                                                 and/or probe bi

nding



                                                                 resulting in fa

ilure to



                                                                 detect the pres

ence of



                                                                 virus or the vi

sabra



                                                                 being detected 

less



                                                                 predictably.Neg

ative



                                                                 results do not 

preclude



                                                                 SARS-CoV-2, Inf

luenza



                                                                 A/B, or RSV inf

ection



                                                                 and should not 

be used



                                                                 as the sole bas

is for



                                                                 treatment or ot

her



                                                                 patient managem

ent



                                                                 decisions. Resu

lts from



                                                                 the Xpert Xpres

s



                                                                 SARS-CoV-2/Flu/

RSV test



                                                                 should be corre

lated



                                                                 with the clinic

al



                                                                 history,



                                                                 epidemiological

 data,



                                                                 and other data



                                                                 available to th

e



                                                                 clinician evalu

ating



                                                                 the patient. In

valid



                                                                 test results ma

y occur



                                                                 from improper s

pecimen



                                                                 collection; jasmeet

lure to



                                                                 follow the oralia

mmended



                                                                 sample collecti

on,



                                                                 handling, and s

torage



                                                                 procedures; gretchen

hnical



                                                                 error. False ne

gative



                                                                 results may occ

ur if



                                                                 virus is presen

t at



                                                                 levels below th

e



                                                                 analytical limi

t of



                                                                 detection (LOD:

 131



                                                                 copies/mL). Vir

al



                                                                 nucleic acid ma

y



                                                                 persist in vivo

,



                                                                 independent of 

virus



                                                                 viability. Dete

ction of



                                                                 analyte target(

s) does



                                                                 not imply that 

the



                                                                 corresponding v

irus(es)



                                                                 are infectious 

or are



                                                                 the causative a

gents



                                                                 for clinical sy

mptoms.



                                                                 Recent patient 

exposure



                                                                 to FluMist or

 other



                                                                 live attenuated



                                                                 influenza vacci

sylvester may



                                                                 cause inaccurat

e



                                                                 positive result

s.This



                                                                 test has been



                                                                 authorized by [employer] under



                                                                 an EUA for use 

by



                                                                 authorized



                                                                 laboratories. T

his test



                                                                 is only authori

zed for



                                                                 the duration of

 the



                                                                 declaration nereida

t



                                                                 circumstances e

xist



                                                                 justifying the



                                                                 authorization o

f



                                                                 emergency use o

f in



                                                                 vitro diagnosti

c tests



                                                                 for detection a

nd/or



                                                                 diagnosis of CO

VID-19



                                                                 under Section 5

64(b)(1)



                                                                 of the Federal 

Food,



                                                                 Drug and Cosmet

ic Act,



                                                                 21 U.S.C. 



                                                                 360bbb-3(b)(1),

 unless



                                                                 the authorizati

on is



                                                                 terminated or r

evoked



                                                                 sooner. Fact Sh

eet for



                                                                 Healthcare Prov

iders:



                                                                 [email or web address]://[email or web address]



                                                                 /Documents/Xper

t%20Xpre



                                                                 ss%77FWSB-LyD-9

-Flu-RSV



                                                                 /255-9054%20Rev

.%20B%20



                                                                 HCP%20Fact%20Sh

eet.pdf



                                                                 Fact Sheet for



                                                                 Healthcare Elsa

ents:



                                                                 [email or web address]://[email or web address].[email or web address]



                                                                 /Documents/Xper

t%20Xpre



                                                                 ss%18AJKF-MpW-5

-Flu-RSV



                                                                 /560-0164%20Rev

.%20B%20



                                                                 Patient%20Fact%

20Sheet.



                                                                 pdf

 

             Lab Interpretation Abnormal                               



             (test code = 00671-3)                                        



[id number] 21:08:00





             Test Item    Value        Reference Range Interpretation Comments

 

             SARS-COV2/RT-PCR Positive     Negative     AA           



             (test code =                                        



             8640848)                                            

 

             INFLUENZA A RT-PCR Negative     Negative                  



             (test code =                                        



             5860540)                                            

 

             INFLUENZA B RT-PCR Negative     Negative                  



             (test code =                                        



             5513744)                                            

 

             RSV RT-PCR (test Negative     Negative                  Performance

 of the Xpert



             code = 3177879)                                        Xpress SARS-

CoV-2/Flu/RSV



                                                                 test has only b

een



                                                                 established in



                                                                 nasopharyngeal 

swab



                                                                 specimens. Use 

of the



                                                                 Xpert Xpress



                                                                 SARS-CoV-2/Flu/

RSV test



                                                                 with other spec

imen types



                                                                 has not been as

sessed and



                                                                 performance



                                                                 characteristics

 are



                                                                 unknown. As wit

h any



                                                                 molecular test,

 mutations



                                                                 within the targ

eted



                                                                 genetic regions



                                                                 identified by t

 Xpert



                                                                 Xpress SARS-CoV

-2/Flu/RSV



                                                                 test could affe

ct primer



                                                                 and/or probe bi

nding



                                                                 resulting in fa

ilure to



                                                                 detect the pres

ence of



                                                                 virus or the vi

sabra being



                                                                 detected less



                                                                 predictably.Neg

ative



                                                                 results do not 

preclude



                                                                 SARS-CoV-2, Inf

luenza



                                                                 A/B, or RSV inf

ection and



                                                                 should not be u

sed as the



                                                                 sole basis for 

treatment



                                                                 or other patien

t



                                                                 management deci

sions.



                                                                 Results from 

e Xpert



                                                                 Xpress SARS-CoV

-2/Flu/RSV



                                                                 test should be 

correlated



                                                                 with the clinic

al



                                                                 history, epidem

iological



                                                                 data, and other

 data



                                                                 available to 

e



                                                                 clinician evalu

ating the



                                                                 patient. Invali

d test



                                                                 results may occ

ur from



                                                                 improper specim

en



                                                                 collection; jasmeet

lure to



                                                                 follow the oralia

mmended



                                                                 sample collecti

on,



                                                                 handling, and s

torage



                                                                 procedures; gretchen

hnical



                                                                 error. False ne

gative



                                                                 results may occ

ur if



                                                                 virus is presen

t at



                                                                 levels below 

e



                                                                 analytical limi

t of



                                                                 detection (LOD:

 131



                                                                 copies/mL). Vir

al nucleic



                                                                 acid may persis

t in vivo,



                                                                 independent of 

virus



                                                                 viability. Dete

ction of



                                                                 analyte target(

s) does



                                                                 not imply that 

the



                                                                 corresponding v

irus(es)



                                                                 are infectious 

or are the



                                                                 causative agent

s for



                                                                 clinical sympto

ms. Recent



                                                                 patient exposur

e to



                                                                 [facility] or othe

r live



                                                                 attenuated infl

uenza



                                                                 vaccines may ca

use



                                                                 inaccurate posi

tive



                                                                 results.This te

st has



                                                                 been authorized

 by FDA



                                                                 under an EUA fo

r use by



                                                                 authorized labo

ratories.



                                                                 This test is on

ly



                                                                 authorized for 

the



                                                                 duration of the



                                                                 declaration nereida

t



                                                                 circumstances e

xist



                                                                 justifying the



                                                                 authorization o

f



                                                                 emergency use o

f in vitro



                                                                 diagnostic test

s for



                                                                 detection and/o

r



                                                                 diagnosis of CO

VID-19



                                                                 under Section 5

64(b)(1)



                                                                 of the Federal 

Food, Drug



                                                                 and Cosmetic Ac

t, 21



                                                                 [place] 360bbb-3

(b)(1),



                                                                 unless the auth

orization



                                                                 is terminated o

r revoked



                                                                 sooner.Fact She

et for



                                                                 Healthcare Prov

iders:



                                                                 [email or web address]://[email or web address]



                                                                 ocuments/Xpert%

20Xpress%2



                                                                 4TBCT-SjN-4-Flu

-RSV/302-4



                                                                 508%20Rev.%20B%

20HCP%20Fa



                                                                 ct%20Sheet.pdfF

act Sheet



                                                                 for Healthcare 

Patients:



                                                                 [email or web address]://[email or web address]



                                                                 ocuments/Xpert%

20Xpress%2



                                                                 2PHRQ-KoX-2-Flu

-RSV/302-4



                                                                 507%20Rev.%20B%

20Patient%



                                                                 20Fact%20Sheet.

pdf



Troponin -88-57 20:50:00





             Test Item    Value        Reference Range Interpretation Comments

 

             Troponin I (test code = <0.01        0.00-0.03                 



             59853-8)                                            

 

             FAINA (test code = FAINA) Troponin I (TnI)                           



                          levels must be                           



                          interpreted in the                           



                          context of the                           



                          presenting symptoms                           



                          and the clinical                           



                          findings. Elevated                           



                          TnI levels indicate                           



                          myocardial damage,                           



                          but are not specific                           



                          for ischemic heart                           



                          disease. Elevated TnI                           



                          levels are seen in                           



                          patients with other                           



                          cardiac conditions                           



                          (including                             



                          myocarditis and                           



                          congestive heart                           



                          failure), and slight                           



                          TnI elevations occur                           



                          in patients with                           



                          other conditions,                           



                          including sepsis,                           



                          renal failure,                           



                          acidosis, acute                           



                          neurological disease,                           



                          and persistent                           



                          tachyarrhythmia.Opera                           



                          tor ID - DB                            

 

             Lab Interpretation (test Normal                                 



             code = 39629-5)                                        



[facility]





             Test Item    Value        Reference Range Interpretation Comments

 

             Troponin I (test code = <0.01        0.00-0.03                 



             45816-0)                                            

 

             FAINA (test code = FAINA) Troponin I (TnI)                           



                          levels must be                           



                          interpreted in the                           



                          context of the                           



                          presenting symptoms                           



                          and the clinical                           



                          findings. Elevated                           



                          TnI levels indicate                           



                          myocardial damage,                           



                          but are not specific                           



                          for ischemic heart                           



                          disease. Elevated TnI                           



                          levels are seen in                           



                          patients with other                           



                          cardiac conditions                           



                          (including                             



                          myocarditis and                           



                          congestive heart                           



                          failure), and slight                           



                          TnI elevations occur                           



                          in patients with                           



                          other conditions,                           



                          including sepsis,                           



                          renal failure,                           



                          acidosis, acute                           



                          neurological disease,                           



                          and persistent                           



                          tachyarrhythmia.Opera                           



                          tor ID - DB                            

 

             Lab Interpretation (test Normal                                 



             code = 22256-1)                                        



[facility]





             Test Item    Value        Reference Range Interpretation Comments

 

             TROPONIN I (BEAKER) (test code = 397) < ng/mL      0.00-0.03       

          



Troponin I (TnI) levels must be interpreted in the context of the presenting 
symptoms and the clinical findings. Elevated TnI levels indicate myocardial 
damage, but are not specific for ischemic heart disease. Elevated TnI levels are
seen in patients with other cardiac conditions (including myocarditis and 
congestive heart failure), and slight TnI elevations occur in patients with 
other conditions, including sepsis, renal failure, acidosis, acute neurological 
disease, and persistent tachyarrhythmia. ID - DBB-type Natriuretic 
Factor (BNP)2021 20:48:00





             Test Item    Value        Reference Range Interpretation Comments

 

             BNP (test code = 46045-5) 48 pg/mL     0-100                     

 

             FAINA (test code = FAINA)  ID - DB                           

 

             Lab Interpretation (test Normal                                 



             code = 78520-5)                                        



[facility] 20:48:00





             Test Item    Value        Reference Range Interpretation Comments

 

             BNP (test code = 32883-5) 48 pg/mL     0-100                     

 

             FAINA (test code = FAINA)  ID - DB                           

 

             Lab Interpretation (test Normal                                 



             code = 34240-4)                                        



[facility]21 20:48:00





             Test Item    Value        Reference Range Interpretation Comments

 

             B-TYPE NATRIURETIC PEPTIDE (BEAKER) 48 pg/mL     0-100             

        



             (test code = 700)                                        



 ID - [id number] 20:44:00





             Test Item    Value        Reference Range Interpretation Comments

 

             Magnesium (test code = 1.7 mg/dL    1.6-2.6                   Speci

men



             31856-1)                                            slightly



                                                                 hemolyzed

 

             FAINA (test code = FAINA)  ID - DB                           

 

             Lab Interpretation Normal                                 



             (test code = 93887-8)                                        



[facility]





             Test Item    Value        Reference Range Interpretation Comments

 

             Magnesium (test code = 1.7 mg/dL    1.6-2.6                   Speci

men



             35069-9)                                            slightly



                                                                 hemolyzed

 

             FAINA (test code = FAINA)  ID - DB                           

 

             Lab Interpretation Normal                                 



             (test code = 33041-3)                                        



[facility]





             Test Item    Value        Reference Range Interpretation Comments

 

             MAGNESIUM (BEAKER) 1.7 mg/dL    1.6-2.6                   Specimen 

slightly



             (test code = 627)                                        hemolyzed



 ID - DBBASIC METABOLIC [id number] 20:44:00





             Test Item    Value        Reference Range Interpretation Comments

 

             SODIUM (BEAKER) 139 meq/L    136-145                   



             (test code = 381)                                        

 

             POTASSIUM (BEAKER) 4.0 meq/L    3.5-5.1                   Specimen 

slightly



             (test code = 379)                                        hemolyzed

 

             CHLORIDE (BEAKER) 101 meq/L                        



             (test code = 382)                                        

 

             CO2 (BEAKER) (test 24 meq/L     22-29                     



             code = 355)                                         

 

             BLOOD UREA NITROGEN 17 mg/dL     7-21                      



             (BEAKER) (test code                                        



             = 354)                                              

 

             CREATININE (BEAKER) 1.13 mg/dL   0.57-1.25                 Specimen

 slightly



             (test code = 358)                                        hemolyzed

 

             GLUCOSE RANDOM 107 mg/dL           H            



             (BEAKER) (test code                                        



             = 652)                                              

 

             CALCIUM (BEAKER) 9.1 mg/dL    8.4-10.2                  



             (test code = 697)                                        

 

             EGFR (BEAKER) (test 68 mL/min/1.73                           ESTIMA

PHOEBE GFR IS



             code = 1092) sq m                                   NOT AS ACCURATE

 AS



                                                                 CREATININE



                                                                 CLEARANCE IN



                                                                 PREDICTING



                                                                 GLOMERULAR



                                                                 FILTRATION RATE

.



                                                                 ESTIMATED GFR I

S



                                                                 NOT APPLICABLE 

FOR



                                                                 DIALYSIS PATIEN

TS.



 ID - DBLactic acid, [id number] 20:38:00





             Test Item    Value        Reference Range Interpretation Comments

 

             Lactate, Venous (test 2.17 mmol/L  0.50-2.20                 Specim

en



             code = 2872)                                        slightly



                                                                 hemolyzed

 

             FAINA (test code = FAINA)  ID - DB                           

 

             Lab Interpretation Normal                                 



             (test code = 86032-4)                                        



[facility], [id number] 20:38:00





             Test Item    Value        Reference Range Interpretation Comments

 

             Lactate, Venous (test 2.17 mmol/L  0.50-2.20                 Specim

en



             code = 2872)                                        slightly



                                                                 hemolyzed

 

             FAINA (test code = FAINA)  ID - DB                           

 

             Lab Interpretation Normal                                 



             (test code = 11975-6)                                        



[facility] CenterLACT[facility] ACID, [id number] 20:38:00





             Test Item    Value        Reference Range Interpretation Comments

 

             LACTATE BLOOD VENOUS 2.17 mmol/L  0.50-2.20                 Specime

n slightly



             (2) (BEAKER) (test                                        hemolyzed



             code = 2872)                                        



 ID - DBCBC W/PLT COUNT &amp; AUTO [id number] 20:21:00





             Test Item    Value        Reference Range Interpretation Comments

 

             WHITE BLOOD CELL COUNT (BEAKER) 4.1 K/ L     3.5-10.5              

    



             (test code = 775)                                        

 

             RED BLOOD CELL COUNT (BEAKER) 5.73 M/ L    4.63-6.08               

  



             (test code = 761)                                        

 

             HEMOGLOBIN (BEAKER) (test code = 16.0 GM/DL   13.7-17.5            

     



             410)                                                

 

             HEMATOCRIT (BEAKER) (test code = 49.1 %       40.1-51.0            

     



             411)                                                

 

             MEAN CORPUSCULAR VOLUME (BEAKER) 85.7 fL      79.0-92.2            

     



             (test code = 753)                                        

 

             MEAN CORPUSCULAR HEMOGLOBIN 27.9 pg      25.7-32.2                 



             (BEAKER) (test code = 751)                                        

 

             MEAN CORPUSCULAR HEMOGLOBIN CONC 32.6 GM/DL   32.3-36.5            

     



             (BEAKER) (test code = 752)                                        

 

             RED CELL DISTRIBUTION WIDTH 13.2 %       11.6-14.4                 



             (BEAKER) (test code = 412)                                        

 

             PLATELET COUNT (BEAKER) (test code 98 K/CU MM   150-450      L     

       



             = 756)                                              

 

             MEAN PLATELET VOLUME (BEAKER) 9.9 fL       9.4-12.4                

  



             (test code = 754)                                        

 

             NUCLEATED RED BLOOD CELLS (BEAKER) 0 /100 WBC   0-0                

       



             (test code = 413)                                        

 

             NEUTROPHILS RELATIVE PERCENT 48 %                                  

 



             (BEAKER) (test code = 429)                                        

 

             LYMPHOCYTES RELATIVE PERCENT 30 %                                  

 



             (BEAKER) (test code = 430)                                        

 

             MONOCYTES RELATIVE PERCENT 20 %                                   



             (BEAKER) (test code = 431)                                        

 

             EOSINOPHILS RELATIVE PERCENT 0 %                                   

 



             (BEAKER) (test code = 432)                                        

 

             BASOPHILS RELATIVE PERCENT 1 %                                    



             (BEAKER) (test code = 437)                                        

 

             NEUTROPHILS ABSOLUTE COUNT 1.97 K/ L    1.78-5.38                 



             (BEAKER) (test code = 670)                                        

 

             LYMPHOCYTES ABSOLUTE COUNT 1.25 K/ L    1.32-3.57    L            



             (BEAKER) (test code = 414)                                        

 

             MONOCYTES ABSOLUTE COUNT (BEAKER) 0.84 K/ L    0.30-0.82    H      

      



             (test code = 415)                                        

 

             EOSINOPHILS ABSOLUTE COUNT 0.01 K/ L    0.04-0.54    L            



             (BEAKER) (test code = 416)                                        

 

             BASOPHILS ABSOLUTE COUNT (BEAKER) 0.02 K/ L    0.01-0.08           

      



             (test code = 417)                                        

 

             IMMATURE GRANULOCYTES-RELATIVE 1 %          0-1                    

   



             PERCENT (BEAKER) (test code =                                      

  



             2801)                                               



RAD, CHEST, 1 VIEW, NON [id number] 19:56:00NEW CARDIOLOGIST OBERTONReason 
for exam:-&gt;FEVERReason for exam:-&gt;NASAL CONGESTIONShould this be performed
at the bedside?-&gt;Yes
************************************************************[facility] : 1970  Sex: 
M************************************************************FINAL REPORT 
PATIENT ID: [id number] AP view of the chest dated 3/11/2021 CLINICAL INFORMATION: 
FEVERNASAL CONGESTION Comment: Heart is normal in size. Pulmonary vasculature is
unremarkable. Lungs are clear. No pulmonary infiltrate or pleural effusion is 
present. Impression: No active cardiopulmonary disease. Signed: Kiran James Verified Date/Time: 2021 19:56:24 Reading Location: [address]
Consult Reading Room Electronically signed by: KIRAN JAMES M.D. on 2021 
07:56 PMTACROLIMUS [id number] 12:44:00





             Test Item    Value        Reference Range Interpretation Comments

 

             TACROLIMUS BLOOD (BEAKER) (test 7.6 ng/mL    10.0-20.0    L        

    



             code = 657)                                         



 ID - AAHAMIDLIPID [id number] 10:29:00





             Test Item    Value        Reference Range Interpretation Comments

 

             TRIGLYCERIDES (BEAKER) (test code = 81 mg/dL                       

        



             540)                                                

 

             CHOLESTEROL (BEAKER) (test code = 155 mg/dL                        

      



             631)                                                

 

             HDL CHOLESTEROL (BEAKER) (test code 50 mg/dL                       

        



             = 976)                                              

 

             LDL CHOLESTEROL CALCULATED (BEAKER) 89 mg/dL                       

        



             (test code = 633)                                        



Triglyceride Reference Range: Low Risk &lt;150 Borderline 150-199 High Risk 200-
499 Very High Risk &gt;=500Cholesterol Reference Range: Low Risk &lt;200 
Borderline 200-239 High Risk &gt;240HDL Cholesterol Reference Range: Low Risk 
&gt;=60 High Risk &lt;40LDL Cholesterol Reference Range: Optimal &lt;100 Near 
Optimal 100-129 Borderline 130-159 High 160-189 Very High  &gt;=190  ID 
- AMANDA CBASIC METABOLIC [id number] 10:29:00





             Test Item    Value        Reference Range Interpretation Comments

 

             SODIUM (BEAKER) 140 meq/L    136-145                   



             (test code = 381)                                        

 

             POTASSIUM (BEAKER) 4.6 meq/L    3.5-5.1                   



             (test code = 379)                                        

 

             CHLORIDE (BEAKER) 104 meq/L                        



             (test code = 382)                                        

 

             CO2 (BEAKER) (test 29 meq/L     22-29                     



             code = 355)                                         

 

             BLOOD UREA NITROGEN 19 mg/dL     7-21                      



             (BEAKER) (test code                                        



             = 354)                                              

 

             CREATININE (BEAKER) 1.13 mg/dL   0.57-1.25                 



             (test code = 358)                                        

 

             GLUCOSE RANDOM 105 mg/dL                        



             (BEAKER) (test code                                        



             = 652)                                              

 

             CALCIUM (BEAKER) 9.7 mg/dL    8.4-10.2                  



             (test code = 697)                                        

 

             EGFR (BEAKER) (test 68 mL/min/1.73                           ESTIMA

PHOEBE GFR IS



             code = 1092) sq m                                   NOT AS ACCURATE

 AS



                                                                 CREATININE



                                                                 CLEARANCE IN



                                                                 PREDICTING



                                                                 GLOMERULAR



                                                                 FILTRATION RATE

.



                                                                 ESTIMATED GFR I

S



                                                                 NOT APPLICABLE 

FOR



                                                                 DIALYSIS PATIEN

TS.



 ID - AMANDA CHEPATIC FUNCTION [id number] 10:29:00





             Test Item    Value        Reference Range Interpretation Comments

 

             TOTAL PROTEIN (BEAKER) (test code = 7.5 gm/dL    6.0-8.3           

        



             770)                                                

 

             ALBUMIN (BEAKER) (test code = 1145) 4.7 g/dL     3.5-5.0           

        

 

             BILIRUBIN TOTAL (BEAKER) (test code 0.7 mg/dL    0.2-1.2           

        



             = 377)                                              

 

             BILIRUBIN DIRECT (BEAKER) (test 0.3 mg/dL    0.1-0.5               

    



             code = 706)                                         

 

             ALKALINE PHOSPHATASE (BEAKER) (test 78 U/L                   

        



             code = 346)                                         

 

             AST (SGOT) (BEAKER) (test code = 19 U/L       5-34                 

     



             353)                                                

 

             ALT (SGPT) (BEAKER) (test code = 14 U/L       6-55                 

     



             347)                                                



 ID - AMANDA CRAD, CHEST, 2 [id number] 10:21:00NEW CARDIOLOGIST 
OBERTONReason for Exam:-&gt;heart transplant annual follow upFINAL REPORT 
PATIENT ID: [id number] INDICATION: heart transplant annual follow up COMPARISON: 
None TECHNIQUE: Frontal and lateral views of the chest. FINDINGS: Lungs and 
pleura: Clear lungs. No effusion.Heart and mediastinum: Normal heart size. 
Unremarkable mediastinal contours.Osseous structures: No acute 
abnormality.Additional findings: None. IMPRESSION: No acute intrathoracic 
abnormality. Signed: Shikha Jarvis MDReport Verified Date/Time: 2020 
10:21:38 Reading Location: [facility] Radiology Reading Room Electronically 
signed by: SHIKHA JARVIS MD on 2020 10:21 [id number] 
10:10:00





             Test Item    Value        Reference Range Interpretation Comments

 

             PROSTATE SPECIFIC ANTIGEN (BEAKER) 0.6 ng/mL    0.0-4.0            

       



             (test code = 844)                                        



 ID - AMANDA CCBC W/PLT COUNT &amp; AUTO [id number] 09:25:00





             Test Item    Value        Reference Range Interpretation Comments

 

             WHITE BLOOD CELL COUNT (BEAKER) 4.5 K/ L     3.5-10.5              

    



             (test code = 775)                                        

 

             RED BLOOD CELL COUNT (BEAKER) 5.64 M/ L    4.63-6.08               

  



             (test code = 761)                                        

 

             HEMOGLOBIN (BEAKER) (test code = 16.9 GM/DL   13.7-17.5            

     



             410)                                                

 

             HEMATOCRIT (BEAKER) (test code = 48.8 %       40.1-51.0            

     



             411)                                                

 

             MEAN CORPUSCULAR VOLUME (BEAKER) 86.5 fL      79.0-92.2            

     



             (test code = 753)                                        

 

             MEAN CORPUSCULAR HEMOGLOBIN 30.0 pg      25.7-32.2                 



             (BEAKER) (test code = 751)                                        

 

             MEAN CORPUSCULAR HEMOGLOBIN CONC 34.6 GM/DL   32.3-36.5            

     



             (BEAKER) (test code = 752)                                        

 

             RED CELL DISTRIBUTION WIDTH 13.3 %       11.6-14.4                 



             (BEAKER) (test code = 412)                                        

 

             PLATELET COUNT (BEAKER) (test 152 K/CU MM  150-450                 

  



             code = 756)                                         

 

             MEAN PLATELET VOLUME (BEAKER) 9.5 fL       9.4-12.4                

  



             (test code = 754)                                        

 

             NUCLEATED RED BLOOD CELLS 0 /100 WBC   0-0                       



             (BEAKER) (test code = 413)                                        

 

             NEUTROPHILS RELATIVE PERCENT 50 %                                  

 



             (BEAKER) (test code = 429)                                        

 

             LYMPHOCYTES RELATIVE PERCENT 32 %                                  

 



             (BEAKER) (test code = 430)                                        

 

             MONOCYTES RELATIVE PERCENT 11 %                                   



             (BEAKER) (test code = 431)                                        

 

             EOSINOPHILS RELATIVE PERCENT 6 %                                   

 



             (BEAKER) (test code = 432)                                        

 

             BASOPHILS RELATIVE PERCENT 1 %                                    



             (BEAKER) (test code = 437)                                        

 

             NEUTROPHILS ABSOLUTE COUNT 2.25 K/ L    1.78-5.38                 



             (BEAKER) (test code = 670)                                        

 

             LYMPHOCYTES ABSOLUTE COUNT 1.45 K/ L    1.32-3.57                 



             (BEAKER) (test code = 414)                                        

 

             MONOCYTES ABSOLUTE COUNT (BEAKER) 0.50 K/ L    0.30-0.82           

      



             (test code = 415)                                        

 

             EOSINOPHILS ABSOLUTE COUNT 0.25 K/ L    0.04-0.54                 



             (BEAKER) (test code = 416)                                        

 

             BASOPHILS ABSOLUTE COUNT (BEAKER) 0.03 K/ L    0.01-0.08           

      



             (test code = 417)                                        

 

             IMMATURE GRANULOCYTES-RELATIVE 0 %          0-1                    

   



             PERCENT (BEAKER) (test code =                                      

  



             2801)                                               



TACROLIMUS [id number] 11:25:00





             Test Item    Value        Reference Range Interpretation Comments

 

             TACROLIMUS BLOOD (BEAKER) (test 6.9 ng/mL    10.0-20.0    L        

    



             code = 657)                                         



BASIC METABOLIC [id number] 08:24:00





             Test Item    Value        Reference Range Interpretation Comments

 

             SODIUM (BEAKER) 137 meq/L    136-145                   



             (test code = 381)                                        

 

             POTASSIUM (BEAKER) 4.3 meq/L    3.5-5.1                   



             (test code = 379)                                        

 

             CHLORIDE (BEAKER) 103 meq/L                        



             (test code = 382)                                        

 

             CO2 (BEAKER) (test 28 meq/L     22-29                     



             code = 355)                                         

 

             BLOOD UREA NITROGEN 18 mg/dL     7-21                      



             (BEAKER) (test code                                        



             = 354)                                              

 

             CREATININE (BEAKER) 1.12 mg/dL   0.57-1.25                 



             (test code = 358)                                        

 

             GLUCOSE RANDOM 98 mg/dL                         



             (BEAKER) (test code                                        



             = 652)                                              

 

             CALCIUM (BEAKER) 9.1 mg/dL    8.4-10.2                  



             (test code = 697)                                        

 

             EGFR (BEAKER) (test 69 mL/min/1.73                           ESTIMA

PHOEBE GFR IS



             code = 1092) sq m                                   NOT AS ACCURATE

 AS



                                                                 CREATININE



                                                                 CLEARANCE IN



                                                                 PREDICTING



                                                                 GLOMERULAR



                                                                 FILTRATION RATE

.



                                                                 ESTIMATED GFR I

S



                                                                 NOT APPLICABLE 

FOR



                                                                 DIALYSIS PATIEN

TS.



CBC W/PLT COUNT &amp; AUTO [id number] 08:01:00





             Test Item    Value        Reference Range Interpretation Comments

 

             WHITE BLOOD CELL COUNT (BEAKER) 5.4 K/ L     3.5-10.5              

    



             (test code = 775)                                        

 

             RED BLOOD CELL COUNT (BEAKER) 5.35 M/ L    4.63-6.08               

  



             (test code = 761)                                        

 

             HEMOGLOBIN (BEAKER) (test code = 15.6 GM/DL   13.7-17.5            

     



             410)                                                

 

             HEMATOCRIT (BEAKER) (test code = 46.9 %       40.1-51.0            

     



             411)                                                

 

             MEAN CORPUSCULAR VOLUME (BEAKER) 87.7 fL      79.0-92.2            

     



             (test code = 753)                                        

 

             MEAN CORPUSCULAR HEMOGLOBIN 29.2 pg      25.7-32.2                 



             (BEAKER) (test code = 751)                                        

 

             MEAN CORPUSCULAR HEMOGLOBIN CONC 33.3 GM/DL   32.3-36.5            

     



             (BEAKER) (test code = 752)                                        

 

             RED CELL DISTRIBUTION WIDTH 13.6 %       11.6-14.4                 



             (BEAKER) (test code = 412)                                        

 

             PLATELET COUNT (BEAKER) (test 132 K/CU MM  150-450      L          

  



             code = 756)                                         

 

             MEAN PLATELET VOLUME (BEAKER) 9.3 fL       9.4-12.4     L          

  



             (test code = 754)                                        

 

             NUCLEATED RED BLOOD CELLS 0 /100 WBC   0-0                       



             (BEAKER) (test code = 413)                                        

 

             NEUTROPHILS RELATIVE PERCENT 70 %                                  

 



             (BEAKER) (test code = 429)                                        

 

             LYMPHOCYTES RELATIVE PERCENT 16 %                                  

 



             (BEAKER) (test code = 430)                                        

 

             MONOCYTES RELATIVE PERCENT 8 %                                    



             (BEAKER) (test code = 431)                                        

 

             EOSINOPHILS RELATIVE PERCENT 5 %                                   

 



             (BEAKER) (test code = 432)                                        

 

             BASOPHILS RELATIVE PERCENT 1 %                                    



             (BEAKER) (test code = 437)                                        

 

             NEUTROPHILS ABSOLUTE COUNT 3.77 K/ L    1.78-5.38                 



             (BEAKER) (test code = 670)                                        

 

             LYMPHOCYTES ABSOLUTE COUNT 0.83 K/ L    1.32-3.57    L            



             (BEAKER) (test code = 414)                                        

 

             MONOCYTES ABSOLUTE COUNT (BEAKER) 0.45 K/ L    0.30-0.82           

      



             (test code = 415)                                        

 

             EOSINOPHILS ABSOLUTE COUNT 0.25 K/ L    0.04-0.54                 



             (BEAKER) (test code = 416)                                        

 

             BASOPHILS ABSOLUTE COUNT (BEAKER) 0.04 K/ L    0.01-0.08           

      



             (test code = 417)                                        

 

             IMMATURE GRANULOCYTES-RELATIVE 0 %          0-1                    

   



             PERCENT (BEAKER) (test code =                                      

  



             2801)                                               



CT, BRAIN, WITHOUT [id number] 17:41:00NEW CARDIOLOGIST OBERTONFINAL 
REPORT PATIENT ID: [id number] CT, BRAIN, WITHOUT CONTRAST INDICATION: head injury 
TECHNIQUE: Noncontrast axial imaging was obtained from the vertex to the skull 
base. Axial images were reconstructed using a bone algorithm. DOSE REDUCTION: 
Dose modulation, iterative reconstruction, and/or weight-based adjustment of the
mA/kV was utilized to reduce the radiation dose to as low as reasonably achievab
le. COMPARISON: None. FINDINGS: Cerebral parenchyma: Mild cerebral volume loss 
is present. No recentinfarct or parenchymal hemorrhage is present.Midline 
structures: Normally positioned.Cerebellum and brainstem: Commensurate volume 
loss.Ventricles: Normal volume.Extra-axial spaces: Unremarkable. Calvarium and 
skull base: Intact.Paranasal sinuses and mastoid air cells: Visible chambers are
clear.Orbital contents: Included portions unremarkable. Additional findings: 
None. IMPRESSION: Chronic involutional changes without acute or traumatic 
intracranial abnormality. Signed: JR Aguila Robert MDReport Verified 
Date/Time: 2019 17:41:54 Reading Location: [facility] Radiology Reading
Room Electronically signed by: MOHIT AGUILA on 2019 05:41 PM
TACROLIMUS [id number] 10:33:00





             Test Item    Value        Reference Range Interpretation Comments

 

             TACROLIMUS BLOOD (BEAKER) (test 6.2 ng/mL    10.0-20.0    L        

    



             code = 657)                                         



[id number] 09:15:00





             Test Item    Value        Reference Range Interpretation Comments

 

             PROSTATE SPECIFIC ANTIGEN (BEAKER) 0.5 ng/mL    0.0-4.0            

       



             (test code = 844)                                        



LIPID [id number] 09:01:00





             Test Item    Value        Reference Range Interpretation Comments

 

             TRIGLYCERIDES (BEAKER) (test code = 63 mg/dL                       

        



             540)                                                

 

             CHOLESTEROL (BEAKER) (test code = 126 mg/dL                        

      



             631)                                                

 

             HDL CHOLESTEROL (BEAKER) (test code 42 mg/dL                       

        



             = 976)                                              

 

             LDL CHOLESTEROL CALCULATED (BEAKER) 71 mg/dL                       

        



             (test code = 633)                                        



Triglyceride Reference Range: Low Risk &lt;150 Borderline 150-199 High Risk 200-
499 Very High Risk &gt;=500Cholesterol Reference Range: Low Risk &lt;200 
Borderline 200-239 High Risk &gt;240HDL Cholesterol Reference Range: Low Risk 
&gt;=60 High Risk &lt;40LDL Cholesterol Reference Range: Optimal &lt;100 Near 
Optimal 100-129 Borderline 130-159 High 160-189 Very High &gt;=190BASIC 
METABOLIC [id number] 09:01:00





             Test Item    Value        Reference Range Interpretation Comments

 

             SODIUM (BEAKER) 143 meq/L    136-145                   



             (test code = 381)                                        

 

             POTASSIUM (BEAKER) 4.3 meq/L    3.5-5.1                   



             (test code = 379)                                        

 

             CHLORIDE (BEAKER) 105 meq/L                        



             (test code = 382)                                        

 

             CO2 (BEAKER) (test 30 meq/L     22-29        H            



             code = 355)                                         

 

             BLOOD UREA NITROGEN 16 mg/dL     7-21                      



             (BEAKER) (test code                                        



             = 354)                                              

 

             CREATININE (BEAKER) 0.96 mg/dL   0.57-1.25                 



             (test code = 358)                                        

 

             GLUCOSE RANDOM 108 mg/dL           H            



             (BEAKER) (test code                                        



             = 652)                                              

 

             CALCIUM (BEAKER) 9.7 mg/dL    8.4-10.2                  



             (test code = 697)                                        

 

             EGFR (BEAKER) (test 83 mL/min/1.73                           ESTIMA

PHOEBE GFR IS



             code = 1092) sq m                                   NOT AS ACCURATE

 AS



                                                                 CREATININE



                                                                 CLEARANCE IN



                                                                 PREDICTING



                                                                 GLOMERULAR



                                                                 FILTRATION RATE

.



                                                                 ESTIMATED GFR I

S



                                                                 NOT APPLICABLE 

FOR



                                                                 DIALYSIS PATIEN

TS.



HEPATIC FUNCTION [id number] 09:01:00





             Test Item    Value        Reference Range Interpretation Comments

 

             TOTAL PROTEIN (BEAKER) (test code = 7.0 gm/dL    6.0-8.3           

        



             770)                                                

 

             ALBUMIN (BEAKER) (test code = 1145) 4.5 g/dL     3.5-5.0           

        

 

             BILIRUBIN TOTAL (BEAKER) (test code 0.5 mg/dL    0.2-1.2           

        



             = 377)                                              

 

             BILIRUBIN DIRECT (BEAKER) (test 0.3 mg/dL    0.1-0.5               

    



             code = 706)                                         

 

             ALKALINE PHOSPHATASE (BEAKER) (test 81 U/L                   

        



             code = 346)                                         

 

             AST (SGOT) (BEAKER) (test code = 18 U/L       5-34                 

     



             353)                                                

 

             ALT (SGPT) (BEAKER) (test code = 14 U/L       6-55                 

     



             347)                                                



RAD, CHEST, 2 [id number] 08:48:00NEW CARDIOLOGIST OBERTONReason for Exam:-
&gt;heart transplant annual follow upFINAL REPORT PATIENT ID: [id number] 
INDICATION: heart transplant annual follow up COMPARISON: May 23, 2018 
TECHNIQUE: Chest radiograph, two views, PA and lateral. FINDINGS / 
IMPRESSION:Lung volumes are normal and lungs are clear. Intact median sternotomy
wires and left axillary/subclavian surgical clipsagain demonstrated from prior 
heart transplant. Cardiac and mediastinal contours normal. No pleural effusion 
or pneumothorax. Osseous structures unremarkable. Signed: Marlee Julio 
MDReport VerifiedDate/Time: 2019 08:48:33 Reading Location: [facility] 
Radiology Reading Room Electronicallysigned by: MARLEE JULIO M.D. on
2019 08:48 AMCBC W/PLT COUNT &amp; AUTO [id number] 08:28:00





             Test Item    Value        Reference Range Interpretation Comments

 

             WHITE BLOOD CELL COUNT (BEAKER) 5.2 K/ L     3.5-10.5              

    



             (test code = 775)                                        

 

             RED BLOOD CELL COUNT (BEAKER) 5.18 M/ L    4.63-6.08               

  



             (test code = 761)                                        

 

             HEMOGLOBIN (BEAKER) (test code = 15.1 GM/DL   13.7-17.5            

     



             410)                                                

 

             HEMATOCRIT (BEAKER) (test code = 45.4 %       40.1-51.0            

     



             411)                                                

 

             MEAN CORPUSCULAR VOLUME (BEAKER) 87.6 fL      79.0-92.2            

     



             (test code = 753)                                        

 

             MEAN CORPUSCULAR HEMOGLOBIN 29.2 pg      25.7-32.2                 



             (BEAKER) (test code = 751)                                        

 

             MEAN CORPUSCULAR HEMOGLOBIN CONC 33.3 GM/DL   32.3-36.5            

     



             (BEAKER) (test code = 752)                                        

 

             RED CELL DISTRIBUTION WIDTH 13.3 %       11.6-14.4                 



             (BEAKER) (test code = 412)                                        

 

             PLATELET COUNT (BEAKER) (test 135 K/CU MM  150-450      L          

  



             code = 756)                                         

 

             MEAN PLATELET VOLUME (BEAKER) 9.6 fL       9.4-12.4                

  



             (test code = 754)                                        

 

             NUCLEATED RED BLOOD CELLS 0 /100 WBC   0-0                       



             (BEAKER) (test code = 413)                                        

 

             NEUTROPHILS RELATIVE PERCENT 55 %                                  

 



             (BEAKER) (test code = 429)                                        

 

             LYMPHOCYTES RELATIVE PERCENT 29 %                                  

 



             (BEAKER) (test code = 430)                                        

 

             MONOCYTES RELATIVE PERCENT 10 %                                   



             (BEAKER) (test code = 431)                                        

 

             EOSINOPHILS RELATIVE PERCENT 5 %                                   

 



             (BEAKER) (test code = 432)                                        

 

             BASOPHILS RELATIVE PERCENT 1 %                                    



             (BEAKER) (test code = 437)                                        

 

             NEUTROPHILS ABSOLUTE COUNT 2.88 K/ L    1.78-5.38                 



             (BEAKER) (test code = 670)                                        

 

             LYMPHOCYTES ABSOLUTE COUNT 1.52 K/ L    1.32-3.57                 



             (BEAKER) (test code = 414)                                        

 

             MONOCYTES ABSOLUTE COUNT (BEAKER) 0.51 K/ L    0.30-0.82           

      



             (test code = 415)                                        

 

             EOSINOPHILS ABSOLUTE COUNT 0.24 K/ L    0.04-0.54                 



             (BEAKER) (test code = 416)                                        

 

             BASOPHILS ABSOLUTE COUNT (BEAKER) 0.05 K/ L    0.01-0.08           

      



             (test code = 417)                                        

 

             IMMATURE GRANULOCYTES-RELATIVE 1 %          0-1                    

   



             PERCENT (BEAKER) (test code =                                      

  



             2801)                                               



TACROLIMUS [id number] 13:15:00





             Test Item    Value        Reference Range Interpretation Comments

 

             TACROLIMUS BLOOD (BEAKER) (test 6.9 ng/mL    10.0-20.0    L        

    



             code = 657)                                         



BASIC METABOLIC [id number] 10:45:00





             Test Item    Value        Reference Range Interpretation Comments

 

             SODIUM (BEAKER) 139 meq/L    136-145                   



             (test code = 381)                                        

 

             POTASSIUM (BEAKER) 4.8 meq/L    3.5-5.1                   



             (test code = 379)                                        

 

             CHLORIDE (BEAKER) 102 meq/L                        



             (test code = 382)                                        

 

             CO2 (BEAKER) (test 30 meq/L     22-29        H            



             code = 355)                                         

 

             BLOOD UREA NITROGEN 14 mg/dL     7-21                      



             (BEAKER) (test code                                        



             = 354)                                              

 

             CREATININE (BEAKER) 0.95 mg/dL   0.57-1.25                 



             (test code = 358)                                        

 

             GLUCOSE RANDOM 88 mg/dL                         



             (BEAKER) (test code                                        



             = 652)                                              

 

             CALCIUM (BEAKER) 10.1 mg/dL   8.4-10.2                  



             (test code = 697)                                        

 

             EGFR (BEAKER) (test 84 mL/min/1.73                           ESTIMA

PHOEBE GFR IS



             code = 1092) sq m                                   NOT AS ACCURATE

 AS



                                                                 CREATININE



                                                                 CLEARANCE IN



                                                                 PREDICTING



                                                                 GLOMERULAR



                                                                 FILTRATION RATE

.



                                                                 ESTIMATED GFR I

S



                                                                 NOT APPLICABLE 

FOR



                                                                 DIALYSIS PATIEN

TS.



CBC W/PLT COUNT &amp; AUTO [id number] 10:31:00





             Test Item    Value        Reference Range Interpretation Comments

 

             WHITE BLOOD CELL COUNT (BEAKER) 5.0 K/ L     3.5-10.5              

    



             (test code = 775)                                        

 

             RED BLOOD CELL COUNT (BEAKER) 5.55 M/ L    4.63-6.08               

  



             (test code = 761)                                        

 

             HEMOGLOBIN (BEAKER) (test code = 15.8 GM/DL   13.7-17.5            

     



             410)                                                

 

             HEMATOCRIT (BEAKER) (test code = 47.9 %       40.1-51.0            

     



             411)                                                

 

             MEAN CORPUSCULAR VOLUME (BEAKER) 86.3 fL      79.0-92.2            

     



             (test code = 753)                                        

 

             MEAN CORPUSCULAR HEMOGLOBIN 28.5 pg      25.7-32.2                 



             (BEAKER) (test code = 751)                                        

 

             MEAN CORPUSCULAR HEMOGLOBIN CONC 33.0 GM/DL   32.3-36.5            

     



             (BEAKER) (test code = 752)                                        

 

             RED CELL DISTRIBUTION WIDTH 13.2 %       11.6-14.4                 



             (BEAKER) (test code = 412)                                        

 

             PLATELET COUNT (BEAKER) (test 131 K/CU MM  150-450      L          

  



             code = 756)                                         

 

             MEAN PLATELET VOLUME (BEAKER) 9.5 fL       9.4-12.4                

  



             (test code = 754)                                        

 

             NUCLEATED RED BLOOD CELLS 0 /100 WBC   0-0                       



             (BEAKER) (test code = 413)                                        

 

             NEUTROPHILS RELATIVE PERCENT 56 %                                  

 



             (BEAKER) (test code = 429)                                        

 

             LYMPHOCYTES RELATIVE PERCENT 26 %                                  

 



             (BEAKER) (test code = 430)                                        

 

             MONOCYTES RELATIVE PERCENT 12 %                                   



             (BEAKER) (test code = 431)                                        

 

             EOSINOPHILS RELATIVE PERCENT 4 %                                   

 



             (BEAKER) (test code = 432)                                        

 

             BASOPHILS RELATIVE PERCENT 1 %                                    



             (BEAKER) (test code = 437)                                        

 

             NEUTROPHILS ABSOLUTE COUNT 2.80 K/ L    1.78-5.38                 



             (BEAKER) (test code = 670)                                        

 

             LYMPHOCYTES ABSOLUTE COUNT 1.32 K/ L    1.32-3.57                 



             (BEAKER) (test code = 414)                                        

 

             MONOCYTES ABSOLUTE COUNT (BEAKER) 0.59 K/ L    0.30-0.82           

      



             (test code = 415)                                        

 

             EOSINOPHILS ABSOLUTE COUNT 0.21 K/ L    0.04-0.54                 



             (BEAKER) (test code = 416)                                        

 

             BASOPHILS ABSOLUTE COUNT (BEAKER) 0.04 K/ L    0.01-0.08           

      



             (test code = 417)                                        

 

             IMMATURE GRANULOCYTES-RELATIVE 1 %          0-1                    

   



             PERCENT (BEAKER) (test code =                                      

  



             2801)                                               



BASIC METABOLIC [id number] 09:28:00





             Test Item    Value        Reference Range Interpretation Comments

 

             SODIUM (BEAKER) 140 meq/L    136-145                   



             (test code = 381)                                        

 

             POTASSIUM (BEAKER) 4.7 meq/L    3.5-5.1                   



             (test code = 379)                                        

 

             CHLORIDE (BEAKER) 103 meq/L                        



             (test code = 382)                                        

 

             CO2 (BEAKER) (test 30 meq/L     22-29        H            



             code = 355)                                         

 

             BLOOD UREA NITROGEN 20 mg/dL     7-21                      



             (BEAKER) (test code                                        



             = 354)                                              

 

             CREATININE (BEAKER) 1.08 mg/dL   0.57-1.25                 



             (test code = 358)                                        

 

             GLUCOSE RANDOM 110 mg/dL           H            



             (BEAKER) (test code                                        



             = 652)                                              

 

             CALCIUM (BEAKER) 10.0 mg/dL   8.4-10.2                  



             (test code = 697)                                        

 

             EGFR (BEAKER) (test 73 mL/min/1.73                           ESTIMA

PHOEBE GFR IS



             code = 1092) sq m                                   NOT AS ACCURATE

 AS



                                                                 CREATININE



                                                                 CLEARANCE IN



                                                                 PREDICTING



                                                                 GLOMERULAR



                                                                 FILTRATION RATE

.



                                                                 ESTIMATED GFR I

S



                                                                 NOT APPLICABLE 

FOR



                                                                 DIALYSIS PATIEN

TS.



CBC W/PLT COUNT &amp; AUTO [id number] 08:58:00





             Test Item    Value        Reference Range Interpretation Comments

 

             WHITE BLOOD CELL COUNT (BEAKER) 4.9 K/ L     3.5-10.5              

    



             (test code = 775)                                        

 

             RED BLOOD CELL COUNT (BEAKER) 5.37 M/ L    4.63-6.08               

  



             (test code = 761)                                        

 

             HEMOGLOBIN (BEAKER) (test code = 15.5 GM/DL   13.7-17.5            

     



             410)                                                

 

             HEMATOCRIT (BEAKER) (test code = 46.8 %       40.1-51.0            

     



             411)                                                

 

             MEAN CORPUSCULAR VOLUME (BEAKER) 87.2 fL      79.0-92.2            

     



             (test code = 753)                                        

 

             MEAN CORPUSCULAR HEMOGLOBIN 28.9 pg      25.7-32.2                 



             (BEAKER) (test code = 751)                                        

 

             MEAN CORPUSCULAR HEMOGLOBIN CONC 33.1 GM/DL   32.3-36.5            

     



             (BEAKER) (test code = 752)                                        

 

             RED CELL DISTRIBUTION WIDTH 13.3 %       11.6-14.4                 



             (BEAKER) (test code = 412)                                        

 

             PLATELET COUNT (BEAKER) (test 130 K/CU MM  150-450      L          

  



             code = 756)                                         

 

             MEAN PLATELET VOLUME (BEAKER) 9.6 fL       9.4-12.4                

  



             (test code = 754)                                        

 

             NUCLEATED RED BLOOD CELLS 0 /100 WBC   0-0                       



             (BEAKER) (test code = 413)                                        

 

             NEUTROPHILS RELATIVE PERCENT 65 %                                  

 



             (BEAKER) (test code = 429)                                        

 

             LYMPHOCYTES RELATIVE PERCENT 20 %                                  

 



             (BEAKER) (test code = 430)                                        

 

             MONOCYTES RELATIVE PERCENT 10 %                                   



             (BEAKER) (test code = 431)                                        

 

             EOSINOPHILS RELATIVE PERCENT 4 %                                   

 



             (BEAKER) (test code = 432)                                        

 

             BASOPHILS RELATIVE PERCENT 1 %                                    



             (BEAKER) (test code = 437)                                        

 

             NEUTROPHILS ABSOLUTE COUNT 3.16 K/ L    1.78-5.38                 



             (BEAKER) (test code = 670)                                        

 

             LYMPHOCYTES ABSOLUTE COUNT 0.99 K/ L    1.32-3.57    L            



             (BEAKER) (test code = 414)                                        

 

             MONOCYTES ABSOLUTE COUNT (BEAKER) 0.49 K/ L    0.30-0.82           

      



             (test code = 415)                                        

 

             EOSINOPHILS ABSOLUTE COUNT 0.17 K/ L    0.04-0.54                 



             (BEAKER) (test code = 416)                                        

 

             BASOPHILS ABSOLUTE COUNT (BEAKER) 0.04 K/ L    0.01-0.08           

      



             (test code = 417)                                        

 

             IMMATURE GRANULOCYTES-RELATIVE 1 %          0-1                    

   



             PERCENT (BEAKER) (test code =                                      

  



             2801)                                               



TACROLIMUS [id number] 13:26:00





             Test Item    Value        Reference Range Interpretation Comments

 

             TACROLIMUS BLOOD (BEAKER) (test 4.1 ng/mL    10.0-20.0    L        

    



             code = 657)                                         



RAD, CHEST, 2 [id number] 14:13:00NEW CARDIOLOGIST OBERTONReason for Exam:-
&gt;heart transplant annual follow upFINAL REPORT PATIENT ID: [id number] Chest two
views INDICATION: Heart transplant annual follow-up. COMPARISON: 2017 
IMPRESSION: There is no focal consolidation, vascular congestion, pleural 
effusion,or pneumothorax. Heart size is within normal limits. Median sternotomy 
changes, left axillary surgical clips, and gastric sleeve with small hiatal 
hernia are again noted. No significant change since 2017. Signed: Ac Nickerson MDReport Verified Date/Time: 2018 14:13:48 Reading Location: 
[address] Consult Reading Room Electronically signed by: AC NICKERSON M.D. on 2018 02:13 PMTACROLIMUS [id number] 13:55:00





             Test Item    Value        Reference Range Interpretation Comments

 

             TACROLIMUS BLOOD (BEAKER) (test 4.7 ng/mL    10.0-20.0    L        

    



             code = 657)                                         



COMPREHENSIVE METABOLIC [id number] 12:11:00





             Test Item    Value        Reference Range Interpretation Comments

 

             TOTAL PROTEIN 6.5 gm/dL    6.0-8.3                   



             (BEAKER) (test code =                                        



             770)                                                

 

             ALBUMIN (BEAKER) 4.2 g/dL     3.5-5.0                   



             (test code = 1145)                                        

 

             ALKALINE PHOSPHATASE 98 U/L                           



             (BEAKER) (test code =                                        



             346)                                                

 

             BILIRUBIN TOTAL 0.4 mg/dL    0.2-1.2                   



             (BEAKER) (test code =                                        



             377)                                                

 

             SODIUM (BEAKER) (test 141 meq/L    136-145                   



             code = 381)                                         

 

             POTASSIUM (BEAKER) 4.3 meq/L    3.5-5.1                   



             (test code = 379)                                        

 

             CHLORIDE (BEAKER) 103 meq/L                        



             (test code = 382)                                        

 

             CO2 (BEAKER) (test 29 meq/L     22-29                     



             code = 355)                                         

 

             BLOOD UREA NITROGEN 17 mg/dL     7-21                      



             (BEAKER) (test code =                                        



             354)                                                

 

             CREATININE (BEAKER) 1.00 mg/dL   0.57-1.25                 



             (test code = 358)                                        

 

             GLUCOSE RANDOM 101 mg/dL                        



             (BEAKER) (test code =                                        



             652)                                                

 

             CALCIUM (BEAKER) 9.5 mg/dL    8.4-10.2                  



             (test code = 697)                                        

 

             AST (SGOT) (BEAKER) 15 U/L       5-34                      



             (test code = 353)                                        

 

             ALT (SGPT) (BEAKER) 12 U/L       6-55                      



             (test code = 347)                                        

 

             EGFR (BEAKER) (test 79 mL/min/1.73                           ESTIMA

PHOEBE GFR IS



             code = 1092) sq m                                   NOT AS ACCURATE

 AS



                                                                 CREATININE



                                                                 CLEARANCE IN



                                                                 PREDICTING



                                                                 GLOMERULAR



                                                                 FILTRATION RATE

.



                                                                 ESTIMATED GFR I

S



                                                                 NOT APPLICABLE 

FOR



                                                                 DIALYSIS PATIEN

TS.



LIPID [id number] 11:47:00





             Test Item    Value        Reference Range Interpretation Comments

 

             TRIGLYCERIDES (BEAKER) (test code = 87 mg/dL                       

        



             540)                                                

 

             CHOLESTEROL (BEAKER) (test code = 127 mg/dL                        

      



             631)                                                

 

             HDL CHOLESTEROL (BEAKER) (test code 37 mg/dL                       

        



             = 976)                                              

 

             LDL CHOLESTEROL CALCULATED (BEAKER) 73 mg/dL                       

        



             (test code = 633)                                        



Triglyceride Reference Range: Low Risk &lt;150 Borderline 150-199 High Risk 200-
499 Very High Risk &gt;=500Cholesterol Reference Range: Low Risk &lt;200 
Borderline 200-239 High Risk &gt;240HDL Cholesterol Reference Range: Low Risk 
&gt;=60 High Risk &lt;40LDL Cholesterol Reference Range: Optimal &lt;100 Near 
Optimal 100-129 Borderline 130-159 High 160-189 Very High &gt;=190PROTHROMBIN 
[id number] 11:30:00





             Test Item    Value        Reference Range Interpretation Comments

 

             PROTIME (BEAKER) (test code = 14.7 seconds 11.7-14.7               

  



             759)                                                

 

             INR (BEAKER) (test code = 370) 1.2          <=5.9                  

   



RECOMMENDED COUMADIN/WARFARIN INR THERAPY RANGESSTANDARD DOSE: 2.0 - 3.0 
Includes: PROPHYLAXIS for venous thrombosis, systemic embolization; TREATMENT 
for venous thrombosis and/or pulmonary embolus.HIGH RISK: Target INR is 2.5-3.5 
for patients with mechanical heart valves.CBC W/PLT COUNT &amp; AUTO 
[id number] 11:23:00





             Test Item    Value        Reference Range Interpretation Comments

 

             WHITE BLOOD CELL COUNT (BEAKER) 4.8 K/ L     3.5-10.5              

    



             (test code = 775)                                        

 

             RED BLOOD CELL COUNT (BEAKER) 5.26 M/ L    4.63-6.08               

  



             (test code = 761)                                        

 

             HEMOGLOBIN (BEAKER) (test code = 15.1 GM/DL   13.7-17.5            

     



             410)                                                

 

             HEMATOCRIT (BEAKER) (test code = 45.8 %       40.1-51.0            

     



             411)                                                

 

             MEAN CORPUSCULAR VOLUME (BEAKER) 87.1 fL      79.0-92.2            

     



             (test code = 753)                                        

 

             MEAN CORPUSCULAR HEMOGLOBIN 28.7 pg      25.7-32.2                 



             (BEAKER) (test code = 751)                                        

 

             MEAN CORPUSCULAR HEMOGLOBIN CONC 33.0 GM/DL   32.3-36.5            

     



             (BEAKER) (test code = 752)                                        

 

             RED CELL DISTRIBUTION WIDTH 13.4 %       11.6-14.4                 



             (BEAKER) (test code = 412)                                        

 

             PLATELET COUNT (BEAKER) (test 161 K/CU MM  150-450                 

  



             code = 756)                                         

 

             MEAN PLATELET VOLUME (BEAKER) 9.0 fL       9.4-12.4     L          

  



             (test code = 754)                                        

 

             NUCLEATED RED BLOOD CELLS 0 /100 WBC   0-0                       



             (BEAKER) (test code = 413)                                        

 

             NEUTROPHILS RELATIVE PERCENT 67 %                                  

 



             (BEAKER) (test code = 429)                                        

 

             LYMPHOCYTES RELATIVE PERCENT 19 %                                  

 



             (BEAKER) (test code = 430)                                        

 

             MONOCYTES RELATIVE PERCENT 10 %                                   



             (BEAKER) (test code = 431)                                        

 

             EOSINOPHILS RELATIVE PERCENT 3 %                                   

 



             (BEAKER) (test code = 432)                                        

 

             BASOPHILS RELATIVE PERCENT 1 %                                    



             (BEAKER) (test code = 437)                                        

 

             NEUTROPHILS ABSOLUTE COUNT 3.21 K/ L    1.78-5.38                 



             (BEAKER) (test code = 670)                                        

 

             LYMPHOCYTES ABSOLUTE COUNT 0.93 K/ L    1.32-3.57    L            



             (BEAKER) (test code = 414)                                        

 

             MONOCYTES ABSOLUTE COUNT (BEAKER) 0.48 K/ L    0.30-0.82           

      



             (test code = 415)                                        

 

             EOSINOPHILS ABSOLUTE COUNT 0.14 K/ L    0.04-0.54                 



             (BEAKER) (test code = 416)                                        

 

             BASOPHILS ABSOLUTE COUNT (BEAKER) 0.04 K/ L    0.01-0.08           

      



             (test code = 417)                                        

 

             IMMATURE GRANULOCYTES-RELATIVE 1 %          0-1                    

   



             PERCENT (BEAKER) (test code =                                      

  



             2801)                                               



[[facility]] CBC (INCLUDES DIFF/PLT)2018 17:10:01





             Test Item    Value        Reference Range Interpretation Comments

 

             WBC (test code = 6690-2) 4.7 {K/CMM}  3.7-10.4                  

 

             RBC (test code = 789-8) 5.33 {M/CMM} 4.70-6.10                 

 

             Hgb (test code = 718-7) 15.3 g/dl    14.0-18.0                 

 

             Hct (test code = 85959-3) 46.4 %       42.0-54.0                 

 

             MCV (test code = 787-2) 87.1 fL      80.0-94.0                 

 

             MCH (test code = 785-6) 28.7 pg      27.0-31.0                 

 

             MCHC (test code = 786-4) 33.0 g/dl    32.0-36.0                 

 

             RDW (test code = 788-0) 13.9 %       11.5-14.5                 

 

             Platelet (test code = 76243-9) 154 {K/CMM}  133-450                

   

 

             Mean Platelet Volume (test code 8.8 fL       7.4-10.4              

    



             = 78576-0)                                          



[facility]] [id number] 17:10:01





             Test Item    Value        Reference Range Interpretation Comments

 

             Segmented Neutrophils (test code 59.8 %       45.0-75.0            

     



             = 16731-3)                                          

 

             Monocytes (test code = 26485-3) 11.4 %       2.0-12.0              

    

 

             Lymphocytes (test code = 36383-2) 23.9 %       20.0-40.0           

      

 

             Eosinophils; Above High Threshold 4.1 %        0.0-4.0             

      



             (test code = 83151-9)                                        

 

             Basophils (test code = 706-2) 0.8 %        0.0-1.0                 

  

 

             Segs-Bands # (test code = 2.8 {K/CMM}  1.5-8.1                   



             72019-0)                                            

 

             Lymphocytes # (test code = 1.1 {K/CMM}  1.0-5.5                   



             51518-0)                                            

 

             Monocytes # (test code = 66817-2) 0.5 {K/CMM}  0.0-0.8             

      

 

             Eosinophils # (test code = 0.2 {K/CMM}  0.0-0.5                   



             79013-3)                                            



[facility]] PTH, INTACT (WITHOUT CALCIUM)2018 17:10:01





             Test Item    Value        Reference Range Interpretation Comments

 

             Parathyroid Hormone Intact; Above 99.2 pg/ml   11.1-79.5           

      



             High Threshold (test code =                                        



             2731-8)                                             



[facility]] CMP [id number] 17:10:01





             Test Item    Value        Reference Range Interpretation Comments

 

             Sodium Level 140 {mEq/l}  135-145                   



             (test code =                                        



             2951-2)                                             

 

             Potassium Level 4.5 {mEq/l}  3.5-5.1                   



             (test code =                                        



             2823-3)                                             

 

             Chloride Level 105 {mEq/l}                      



             (test code =                                        



             5-0)                                             

 

             Carbon Dioxide 28 {mEq/l}   24-32                     



             (test code =                                        



             -9)                                             

 

             AGAP (test code = 11.5 {mEq/l} 10.0-20.0                 



             18035-3)                                            

 

             Glucose Lvl (test 95 mg/dl     70-99                     Adult refe

rence range



             code = 2345-7)                                        values reflec

t the



                                                                 clinical guidel

inesof the



                                                                 American Diabet

es



                                                                 Association.

 

             Creatinine Lvl 1.20 mg/dl   0.50-1.40                 



             (test code =                                        



             2160-0)                                             

 

             Blood Urea   22 mg/dl     7-22                      



             Nitrogen (test                                        



             code = 3094-0)                                        

 

             BUN/Creatinine 18           6-25                      



             Ratio (test code                                        



             = 3097-3)                                           

 

             Total Protein 7.2 g/dl     6.4-8.4                   



             (test code =                                        



             2885-2)                                             

 

             Albumin Lvl (test 4.4 g/dl     3.5-5.0                   



             code = 1751-7)                                        

 

             Globulin (test 2.8 g/dl     2.7-4.2                   



             code = 09162-8)                                        

 

             A/G Ratio (test 1.6          0.7-1.6                   



             code = 1759-0)                                        

 

             Calcium Level 9.1 mg/dl    8.5-10.5                  



             Total (test code                                        



             = 80134-8)                                          

 

             ALT (test code = 21 u/l       0-65                      



             1743-4)                                             

 

             AST (test code = 18 u/l       0-37                      



             69196-1)                                            

 

             Bili Total (test 0.4 mg/dl    0.2-1.3                   



             code = 1975-2)                                        

 

             Alk Phos (test 112 u/l                          



             code = 1783-0)                                        

 

             eGFR (test code = 71                                     The eGFR i

s calculated



             05410-9)     {ML/MIN/1.7}                           using the CKD-E

PI



                                                                 formula. In mos

t young,



                                                                 healthyindividu

als the



                                                                 eGFR will be >9

0



                                                                 mL/min/1.73m2. 

The eGFR



                                                                 declines with a

ge. AneGFR



                                                                 of 60-89 may be

 normal in



                                                                 some population

s,



                                                                 particularly th

e elderly,



                                                                 forwhom the CKD

-EPI



                                                                 formula has not

 been



                                                                 extensively marielena

idated.



                                                                 Use of the eGFR

 isnot



                                                                 recommended in 

the



                                                                 following



                                                                 populations:Ind

ividuals



                                                                 with unstable c

reatinine



                                                                 concentrations,

 including



                                                                 pregnantpatient

s and



                                                                 those with seri

ous



                                                                 co-morbid



                                                                 conditions.Elsa

ents with



                                                                 extremes in mus

rick mass



                                                                 or diet.The albina

a above



                                                                 are obtained fr

om the



                                                                 National Kidney

 Disease



                                                                 Education Progr

am(NKDEP)



                                                                 which sergio wilburn



                                                                 recommends that

 when the



                                                                 eGFR is used in



                                                                 patientswith ex

tremes of



                                                                 body mass index

 for



                                                                 purposes of [place]

g dosing,



                                                                 the eGFR should

be



                                                                 multiplied by t

he



                                                                 estimated BMI.



[facility]] FOLATE, [id number] 17:10:01





             Test Item    Value        Reference Range Interpretation Comments

 

             Folate Level (test code = 2284-8) 9.3 ng/ml    >=3.0               

      



[facility]] IRON, [id number] 17:10:01





             Test Item    Value        Reference Range Interpretation Comments

 

             Iron (test code = 2498-4) 85 ug/dL                         



[facility]] LIPID [id number] 17:10:01





             Test Item    Value        Reference Range Interpretation Comments

 

             Chol (test code = 2093-3) 124 mg/dl    <=199                     

 

             Trig (test code = 2571-8) 77 mg/dl     <=149                     

 

             HDL Cholesterol; Below Low 42 mg/dl     >=61                      



             Threshold (test code = 5-9)                                     

   

 

             CHD Risk; Below Low Threshold (test 2.95         4.00-7.30         

        



             code = 48200-7)                                        

 

             LDL (test code = 62122-4) 67 mg/dl     <=99                      

 

             VLDL (test code = VLDL) 15                                     



[facility]] VITAMIN [id number] 17:10:01





             Test Item    Value        Reference Range Interpretation Comments

 

             Vitamin B12 Level; Below Low 235 pg/ml    254-1320                 

 



             Threshold (test code = 2132-9)                                     

   



[facility]] TSH, 3RD GENERATION W/REFLEX TO [id number] 17:10:01





             Test Item    Value        Reference Range Interpretation Comments

 

             TSH (test code = 53262-8) 1.090 {uIU/ml} 0.360-3.740               



[facility]] HEMOGLOBIN [id number] 17:10:01





             Test Item    Value        Reference Range Interpretation Comments

 

             Hemoglobin A1c (test code = 4548-4) 5.2 %        <=5.6             

        



[facility][H] Vitamin E [id number] 17:10:01





             Test Item    Value        Reference Range Interpretation Comments

 

             Vitamin E Lvl (test Cancel Reason: Modified                        

   



             code = Vitamin E Lvl) Order                                  



[facility]] VITAMIN B1, WHOLE [id number] 17:10:01





             Test Item    Value        Reference Range Interpretation Comments

 

             Vitamin B1   147.6        66.5-200.0                This test was d

eveloped and its



             Level (test  nmol/L                                 performance



             code =                                              characteristics

determined by



             Vitamin B1                                          [facility]. It has

 not been



             Level)                                              cleared orappro

mateo by the Food



                                                                 and Drug



                                                                 Administration.

Performed At: 



                                                                 [facility]

[address] [place], [place]



                                                                 [id number]

k Teofilo CODY MD



                                                                 Ph:[phone]



[facility][H] Vit  17:10:01





             Test Item    Value        Reference Range Interpretation Comments

 

             Vitamin A Level (test 52 ug/dL     24-85                     **Effe

ctive ,



             code = Vitamin A                                        2018 Vitami

n A, Serum



             Level)                                              will be** mendoza

ing to



                                                                 the LC/MS-MS



                                                                 methodology. Th

e



                                                                 reference inter

marielena will



                                                                 be changing to:

 < 6



                                                                 years Not Estab

. 6 - 11



                                                                 years 22.8 - 54

.4 12 -



                                                                 18 years 28.9 -

 75.7



                                                                 20 - 39 years 3

1.2 -



                                                                 89.1 40 - 59 ye

ars 33.1



                                                                 - 100.0 >59 yea

rs 36.4



                                                                 - 108.0Performe

d At: 



                                                                 [facility]



                                                                 [place], [place] [id number]

abelardo CODY MD



                                                                 Ph:[phone]



[facility][H] [facility] [id number] 17:09:01





             Test Item    Value        Reference Range Interpretation Comments

 

             [facility] (test COMMENT                                Test Orde

red: 786588



             code = Misc [facility])                                        25-Hydr

oxyvitamin D LCMS



                                                                 D2+D325-Hydroxy

, Vitamin



                                                                 D 25 [L ] ng/mL



                                                                 ESReference Ran

ge:All



                                                                 Ages: Target le

vels 30 -



                                                                 10025-Hydroxy, 

Vitamin



                                                                 D-2 <1.0 ng/mL



                                                                 ES25-Hydroxy, V

itamin D-3



                                                                 24 ng/mL ESPerf

ormed At:



                                                                  [facility] [address]



                                                                 [id number] CODY MD



                                                                 Ph:[phone]

rformed



                                                                 At: [facility]



                                                                 [id number]

[address], [place]



                                                                 [id number]

paddyfabian BURKS MD Ph:[phone]

[phone]



[facility][H] Vitamin E [id number] 17:09:01





             Test Item    Value        Reference Range Interpretation Comments

 

             Alpha-Tocopherol 8.5 mg/L     5.3-17.5                  **Effective

 2018



             (test code =                                        444108 Vitamin 

E, Serum



             Alpha-Tocopherol)                                        will bemad

e**



                                                                 non-orderable. 

[facility]



                                                                 will offer 0701

40 Vitamin



                                                                 Eperformed by L

C/MS-MS



                                                                 methodology [facility]

ch will



                                                                 includealpha to

copherol



                                                                 and gamma tocop

herol. This



                                                                 will affectany 

existing



                                                                 profile. For fu

rther



                                                                 information, co

ramiroctyodinh



                                                                 local [facility]



                                                                 Representative.

Performed



                                                                 At:  [facility]



                                                                 [id number] CODY MD Ph:[phone]

4



[facility], WITHOUT [id number] 09:10:00NEW CARDIOLOGIST 
OBERTONFINAL REPORT PATIENT ID: [id number] INDICATION: 47-year-old male with chest
 wall pain and history of heart transplant. COMPARISON:Chest radiograph 2017 TECHNIQUE: Chest CT exam WITHOUT intravenous contrast. The exam was 
performed according to our department dose-optimization protocol, which includes
 automated exposure control, adjustments of mA and kV according to patient size.
 Iterative reconstructions are also sometimes employed. FINDINGS:No chest wall 
mass, chest wall hematoma, rib fracture, or rib lesion is demonstrated. There is
 a healed median sternotomy. Heart is normal in size and there is no pericardial
 effusion. Mild atherosclerotic plaque of the ascending thoracic aorta is 
demonstrated. Thoracic aorta is normal in caliber. 8 mm calcified nodule in the 
right lower lobe represents prior granulomatous infection. No pneumonia, 
pulmonary edema, pleural effusion, or pneumothorax is demonstrated. There is no 
mediastinal or hilar lymphadenopathy. Thyroid gland is unremarkable. Upper and 
mid esophagus are unremarkable. Patient is status post sleeve gastrectomy and 
there is a small partof the sleeve herniated in the low posterior mediastinum. 
Partial imaging of the upper abdomen is otherwise unremarkable. IMPRESSION: No 
chest wall mass, muscle tear, rib fracture, or rib lesion. No other CT 
explanation for the patient's chest wall pain. Clear lungs. Unremarkable heart 
transplant. Prior gastric sleeve with small hiatal hernia. Signed: Marlee Julio MDReport Verified Date/Time: 2017 09:10:45 Reading Location: [facility] 
Diagnostic Imaging Reading Room - [place] [id number] Electronically signed by: 
MARLEE JULIO M.D. on 2017 09:10 AMTACROLIMUS [id number] 
13:49:00





             Test Item    Value        Reference Range Interpretation Comments

 

             TACROLIMUS BLOOD (BEAKER) (test 5.3 ng/mL    10.0-20.0    L        

    



             code = 657)                                         



BASIC METABOLIC [id number] 10:44:00





             Test Item    Value        Reference Range Interpretation Comments

 

             SODIUM (BEAKER) 141 meq/L    136-145                   



             (test code = 381)                                        

 

             POTASSIUM (BEAKER) 4.5 meq/L    3.5-5.1                   



             (test code = 379)                                        

 

             CHLORIDE (BEAKER) 103 meq/L                        



             (test code = 382)                                        

 

             CO2 (BEAKER) (test 29 meq/L     22-29                     



             code = 355)                                         

 

             BLOOD UREA NITROGEN 17 mg/dL     7-21                      



             (BEAKER) (test code                                        



             = 354)                                              

 

             CREATININE (BEAKER) 1.09 mg/dL   0.57-1.25                 



             (test code = 358)                                        

 

             GLUCOSE RANDOM 97 mg/dL                         



             (BEAKER) (test code                                        



             = 652)                                              

 

             CALCIUM (BEAKER) 9.3 mg/dL    8.4-10.2                  



             (test code = 697)                                        

 

             EGFR (BEAKER) (test 72 mL/min/1.73                           ESTIMA

PHOEBE GFR IS



             code = 1092) sq m                                   NOT AS ACCURATE

 AS



                                                                 CREATININE



                                                                 CLEARANCE IN



                                                                 PREDICTING



                                                                 GLOMERULAR



                                                                 FILTRATION RATE

.



                                                                 ESTIMATED GFR I

S



                                                                 NOT APPLICABLE 

FOR



                                                                 DIALYSIS PATIEN

TS.



CBC W/PLT COUNT &amp; AUTO [id number] 09:54:00





             Test Item    Value        Reference Range Interpretation Comments

 

             WHITE BLOOD CELL COUNT (BEAKER) 4.0 K/ L     3.5-10.5              

    



             (test code = 775)                                        

 

             RED BLOOD CELL COUNT (BEAKER) 5.42 M/ L    4.63-6.08               

  



             (test code = 761)                                        

 

             HEMOGLOBIN (BEAKER) (test code = 15.4 GM/DL   13.7-17.5            

     



             410)                                                

 

             HEMATOCRIT (BEAKER) (test code = 47.6 %       40.1-51.0            

     



             411)                                                

 

             MEAN CORPUSCULAR VOLUME (BEAKER) 87.8 fL      79.0-92.2            

     



             (test code = 753)                                        

 

             MEAN CORPUSCULAR HEMOGLOBIN 28.4 pg      25.7-32.2                 



             (BEAKER) (test code = 751)                                        

 

             MEAN CORPUSCULAR HEMOGLOBIN CONC 32.4 GM/DL   32.3-36.5            

     



             (BEAKER) (test code = 752)                                        

 

             RED CELL DISTRIBUTION WIDTH 13.8 %       11.6-14.4                 



             (BEAKER) (test code = 412)                                        

 

             PLATELET COUNT (BEAKER) (test 152 K/CU MM  150-450                 

  



             code = 756)                                         

 

             MEAN PLATELET VOLUME (BEAKER) 9.9 fL       9.4-12.4                

  



             (test code = 754)                                        

 

             NUCLEATED RED BLOOD CELLS 0 /100 WBC   0-0                       



             (BEAKER) (test code = 413)                                        

 

             NEUTROPHILS RELATIVE PERCENT 66 %                                  

 



             (BEAKER) (test code = 429)                                        

 

             LYMPHOCYTES RELATIVE PERCENT 20 %                                  

 



             (BEAKER) (test code = 430)                                        

 

             MONOCYTES RELATIVE PERCENT 11 %                                   



             (BEAKER) (test code = 431)                                        

 

             EOSINOPHILS RELATIVE PERCENT 2 %                                   

 



             (BEAKER) (test code = 432)                                        

 

             BASOPHILS RELATIVE PERCENT 1 %                                    



             (BEAKER) (test code = 437)                                        

 

             NEUTROPHILS ABSOLUTE COUNT 2.65 K/ L    1.78-5.38                 



             (BEAKER) (test code = 670)                                        

 

             LYMPHOCYTES ABSOLUTE COUNT 0.82 K/ L    1.32-3.57    L            



             (BEAKER) (test code = 414)                                        

 

             MONOCYTES ABSOLUTE COUNT (BEAKER) 0.44 K/ L    0.30-0.82           

      



             (test code = 415)                                        

 

             EOSINOPHILS ABSOLUTE COUNT 0.08 K/ L    0.04-0.54                 



             (BEAKER) (test code = 416)                                        

 

             BASOPHILS ABSOLUTE COUNT (BEAKER) 0.04 K/ L    0.01-0.08           

      



             (test code = 417)                                        

 

             IMMATURE GRANULOCYTES-RELATIVE 0 %          0-1                    

   



             PERCENT (BEAKER) (test code =                                      

  



             2801)                                               



[id number] 14:30:00





             Test Item    Value        Reference Range Interpretation Comments

 

             Sodium Lvl (test code = Sodium Lvl) 144          135-145           

        



[facility]VcpmfbbYMVZDOISGIBL2311-89-74 14:30:00





             Test Item    Value        Reference Range Interpretation Comments

 

             Potassium Lvl (test code = Potassium 4.3          3.5-5.1          

         



             Lvl)                                                



[facility]DwiwachFVOKJDYSKXAI3372[facility]85-45 14:30:00





             Test Item    Value        Reference Range Interpretation Comments

 

             Chloride Lvl (test code = Chloride Lvl) 105                  

            



[facility] WorthRocpkdmZZPTFQTXAHYM3056-73-34 14:30:00





             Test Item    Value        Reference Range Interpretation Comments

 

             Calcium Lvl (test code = Calcium Lvl) 9.1          8.5-10.5        

          



[facility] WorthZjdpikvZEKWOXSNPHDC9397-46-06 14:30:00





             Test Item    Value        Reference Range Interpretation Comments

 

             CO2 (test code = CO2) 28           24-32                     



[facility] WorthRbnmwthNMQHBUZCNWMS3563-94-18 14:30:00





             Test Item    Value        Reference Range Interpretation Comments

 

             Glucose Lvl (test code = Glucose Lvl) 77           70-99           

          



[facility]ARN[facility]K8130[facility]





             Test Item    Value        Reference Range Interpretation Comments

 

             Lymphocytes # (test code = Lymphocytes 0.8          1.0-5.5        

           



             #)                                                  



[facility]landsAkvoldyEDXFTAZFFJ6311-86-05 14:30:00





             Test Item    Value        Reference Range Interpretation Comments

 

             Segs-Bands # (test code = Segs-Bands #) 2.8          1.5-8.1       

            



[facility] HospitalQrnfqgbBBATGPXSGS4669-37-65 14:30:00





             Test Item    Value        Reference Range Interpretation Comments

 

             Monocytes # (test code 0.4          See_Comment                [Aut

omated message] The



             = Monocytes #)                                        system which 

generated



                                                                 this result tra

nsmitted



                                                                 reference range

: <=0.8.



                                                                 The reference r

klaudia was



                                                                 not used to int

erpret



                                                                 this result as



                                                                 normal/abnormal

.



[facility] AliceKwqcihzCIBBVXRYNA3138[facility]40-54 14:30:00





             Test Item    Value        Reference Range Interpretation Comments

 

             Eosinophils # (test code 0.1          See_Comment                [A

utomated message] The



             = Eosinophils #)                                        system whic

h generated



                                                                 this result tra

nsmitted



                                                                 reference range

: <=0.5.



                                                                 The reference r

klaudia was



                                                                 not used to int

erpret



                                                                 this result as



                                                                 normal/abnormal

.



[facility] AliceThthrqcPGCUPXNDQW5818-60-15 14:30:00





             Test Item    Value        Reference Range Interpretation Comments

 

             Eosinophils (test code = 2.4          See_Comment                [A

utomated message] The



             Eosinophils)                                        system which ge

nerated



                                                                 this result tra

nsmitted



                                                                 reference range

: <=4.0.



                                                                 The reference r

klaudia was



                                                                 not used to int

erpret



                                                                 this result as



                                                                 normal/abnormal

.



[facility] AliceFdvjilrQQKMOOHNYA0822-64-04 14:30:00





             Test Item    Value        Reference Range Interpretation Comments

 

             Basophils (test code = 0.7          See_Comment                [Aut

omated message] The



             Basophils)                                          system which ge

nerated



                                                                 this result tra

nsmitted



                                                                 reference range

: <=1.0.



                                                                 The reference r

klaudia was



                                                                 not used to int

erpret



                                                                 this result as



                                                                 normal/abnormal

.



[facility] AliceRalsdhiHDABRBWPDX4084-73-65 14:30:00





             Test Item    Value        Reference Range Interpretation Comments

 

             Monocytes (test code = Monocytes) 10.7         2.0-12.0            

      



[facility] AliceFslrwfuMHVQENHXGY3797-73-77 14:30:00





             Test Item    Value        Reference Range Interpretation Comments

 

             Lymphocytes (test code = Lymphocytes) 19.7         20.0-40.0       

          



[facility] AliceFcqrjcdEJIVBTVDUG7344-27-54 14:30:00





             Test Item    Value        Reference Range Interpretation Comments

 

             Segs (test code = Segs) 66.5         45.0-75.0                 



[facility] AliceBnryapsYJELUHZJJJ9441-55-73 14:30:00





             Test Item    Value        Reference Range Interpretation Comments

 

             MPV (test code = MPV) 8.5          7.4-10.4                  



[facility] AliceYmrbnmkGXFCZDIZCT5264-13-66 14:30:00





             Test Item    Value        Reference Range Interpretation Comments

 

             Platelet (test code = Platelet) 133          133-450               

    



[facility] AliceUwyaezuZPZXVFWATW2244-18-63 14:30:00





             Test Item    Value        Reference Range Interpretation Comments

 

             RDW (test code = RDW) 13.9         11.5-14.5                 



[facility] AliceVqxlgcoWVHXSDQAKJ4255-18-78 14:30:00





             Test Item    Value        Reference Range Interpretation Comments

 

             MCHC (test code = MCHC) 33.4         32.0-36.0                 



[facility] AliceDtfunjkUWXBCFQQUB2922-30-05 14:30:00





             Test Item    Value        Reference Range Interpretation Comments

 

             MCH (test code = MCH) 28.7 pg      27.0-31.0                 



[facility] AliceYqcbohjBFOXCWQAPD5513-58-06 14:30:00





             Test Item    Value        Reference Range Interpretation Comments

 

             MCV (test code = MCV) 85.8         80.0-94.0                 



[facility] AliceNuknfahWQBXDHEVUF7889-96-30 14:30:00





             Test Item    Value        Reference Range Interpretation Comments

 

             Hct (test code = Hct) 44.0         42.0-54.0                 



[facility] AliceXixgdrcFUCLEBLTSQ1125-92-62 14:30:00





             Test Item    Value        Reference Range Interpretation Comments

 

             Hgb (test code = Hgb) 14.7         14.0-18.0                 



[facility] AliceZpxbvhwVTJHBKKTOC0051-35-96 14:30:00





             Test Item    Value        Reference Range Interpretation Comments

 

             RBC (test code = RBC) 5.13         4.70-6.10                 



[facility] AliceIbnhqvtHKOGRMDRVL3642-58-38 14:30:00





             Test Item    Value        Reference Range Interpretation Comments

 

             WBC (test code = WBC) 4.1          3.7-10.4                  



[facility] AliceGkspcvbWAYDBPVUJK7400-89-81 14:30:00





             Test Item    Value        Reference Range Interpretation Comments

 

             Eosinophils # (test code 0.1          See_Comment                [A

utomated message] The



             = Eosinophils #)                                        system whic

h generated



                                                                 this result tra

nsmitted



                                                                 reference range

: <=0.5.



                                                                 The reference r

klaudia was



                                                                 not used to int

erpret



                                                                 this result as



                                                                 normal/abnormal

.



[facility] AliceUkbksedGEQZAZPUDT6187-50-46 14:30:00





             Test Item    Value        Reference Range Interpretation Comments

 

             Eosinophils (test code = 2.4          See_Comment                [A

utomated message] The



             Eosinophils)                                        system which ge

nerated



                                                                 this result tra

nsmitted



                                                                 reference range

: <=4.0.



                                                                 The reference r

klaudia was



                                                                 not used to int

erpret



                                                                 this result as



                                                                 normal/abnormal

.



[facility] AliceCryprwsVBOCUYBKSX5954-11-42 14:30:00





             Test Item    Value        Reference Range Interpretation Comments

 

             Basophils (test code = 0.7          See_Comment                [Aut

omated message] The



             Basophils)                                          system which ge

nerated



                                                                 this result tra

nsmitted



                                                                 reference range

: <=1.0.



                                                                 The reference r

klaudia was



                                                                 not used to int

erpret



                                                                 this result as



                                                                 normal/abnormal

.



[facility] AliceOtvptkpRRPHNRFAFX4143-80-81 14:30:00





             Test Item    Value        Reference Range Interpretation Comments

 

             Monocytes (test code = Monocytes) 10.7         2.0-12.0            

      



[facility] AliceKbcwzdpAFHYIUSVUU5376-22-89 14:30:00





             Test Item    Value        Reference Range Interpretation Comments

 

             Lymphocytes (test code = Lymphocytes) 19.7         20.0-40.0       

          



[facility] AliceEmwlslmBEARYLWRXY3501-85-80 14:30:00





             Test Item    Value        Reference Range Interpretation Comments

 

             Segs (test code = Segs) 66.5         45.0-75.0                 



[facility] AliceZvijbisBLCTQEHHMO9040-85-15 14:30:00





             Test Item    Value        Reference Range Interpretation Comments

 

             MPV (test code = MPV) 8.5          7.4-10.4                  



[facility] AliceYoxckliGQZADCZELM0569-31-12 14:30:00





             Test Item    Value        Reference Range Interpretation Comments

 

             Platelet (test code = Platelet) 133          133-450               

    



[facility] AliceZzjgqrsTAPJNAYDQL7089-12-50 14:30:00





             Test Item    Value        Reference Range Interpretation Comments

 

             RDW (test code = RDW) 13.9         11.5-14.5                 



[facility] AliceMflymznMKZFCLWTLE7059-13-07 14:30:00





             Test Item    Value        Reference Range Interpretation Comments

 

             MCHC (test code = MCHC) 33.4         32.0-36.0                 



[facility] AliceSttpiodMPEOCASLRS8362-74-06 14:30:00





             Test Item    Value        Reference Range Interpretation Comments

 

             MCH (test code = MCH) 28.7 pg      27.0-31.0                 



[facility] AlicePexidhlPQMTRPIGRV3122-86-78 14:30:00





             Test Item    Value        Reference Range Interpretation Comments

 

             MCV (test code = MCV) 85.8         80.0-94.0                 



[facility] AliceGimogtgDPOFNVTSPZ9136-15-55 14:30:00





             Test Item    Value        Reference Range Interpretation Comments

 

             Hct (test code = Hct) 44.0         42.0-54.0                 



[facility] AliceObvyluqSOAUPMLLRA1932-46-21 14:30:00





             Test Item    Value        Reference Range Interpretation Comments

 

             Hgb (test code = Hgb) 14.7         14.0-18.0                 



[facility] AliceJnoazoxLLJKNHEYEA2977-73-51 14:30:00





             Test Item    Value        Reference Range Interpretation Comments

 

             RBC (test code = RBC) 5.13         4.70-6.10                 



[facility] AliceYrgnljaHDGVDFSLQH1917-02-74 14:30:00





             Test Item    Value        Reference Range Interpretation Comments

 

             WBC (test code = WBC) 4.1          3.7-10.4                  



[facility] HospitalAgorgtbJKYCKTZQFRCB3146-92-75 14:30:00





             Test Item    Value        Reference Range Interpretation Comments

 

             AGAP (test code = AGAP) 15.3         10.0-20.0                 



[facility] HospitalCgqnmsjDSUBUYEQYIEP0113-92-75 14:30:00





             Test Item    Value        Reference Range Interpretation Comments

 

             eGFR (test code = eGFR) 91                                     



[facility] HospitalHbrabjoUAQLSTRIOUCG6309-71-12 14:30:00





             Test Item    Value        Reference Range Interpretation Comments

 

             BUN (test code = BUN) 13           7-22                      



[facility] HospitalQgwbfepVUWZTWZXKPDG9334-83-32 14:30:00





             Test Item    Value        Reference Range Interpretation Comments

 

             Creatinine Lvl (test code = Creatinine 0.98         0.50-1.40      

           



             Lvl)                                                



[facility] HospitalCaqsgysQSSFNKERRIRW2151-98-05 14:30:00





             Test Item    Value        Reference Range Interpretation Comments

 

             Sodium Lvl (test code = Sodium Lvl) 144          135-145           

        



[facility] HospitalQsgxbumDFXMKGVTUYAK5287-18-07 14:30:00





             Test Item    Value        Reference Range Interpretation Comments

 

             Potassium Lvl (test code = Potassium 4.3          3.5-5.1          

         



             Lvl)                                                



[facility] HospitalAxhtuadFVHSHJDFXBFE4051-95-65 14:30:00





             Test Item    Value        Reference Range Interpretation Comments

 

             Chloride Lvl (test code = Chloride Lvl) 105                  

            



[facility] HospitalLidzjzoVFXNRHWKOAZH5260-43-57 14:30:00





             Test Item    Value        Reference Range Interpretation Comments

 

             Calcium Lvl (test code = Calcium Lvl) 9.1          8.5-10.5        

          



[facility] HospitalSitxzjqQDWCIWWQGUNW4876-73-10 14:30:00





             Test Item    Value        Reference Range Interpretation Comments

 

             CO2 (test code = CO2) 28           24-32                     



[facility] HospitalRnzbwkkNNDJMSATZPXP1931-45-00 14:30:00





             Test Item    Value        Reference Range Interpretation Comments

 

             Glucose Lvl (test code = Glucose Lvl) 77           70-99           

          



[facility] AliceMaampevHUDKIFKKQR4093-61-60 14:30:00





             Test Item    Value        Reference Range Interpretation Comments

 

             Lymphocytes # (test code = Lymphocytes 0.8          1.0-5.5        

           



             #)                                                  



[facility] AliceHicthfvSCJCVPGBQO1824-28-36 14:30:00





             Test Item    Value        Reference Range Interpretation Comments

 

             Segs-Bands # (test code = Segs-Bands #) 2.8          1.5-8.1       

            



[facility] AliceYtlcnpmOUJUSSQRCJ7676-33-92 14:30:00





             Test Item    Value        Reference Range Interpretation Comments

 

             Monocytes # (test code 0.4          See_Comment                [Aut

omated message] The



             = Monocytes #)                                        system which 

generated



                                                                 this result tra

nsmitted



                                                                 reference range

: <=0.8.



                                                                 The reference r

klaudia was



                                                                 not used to int

erpret



                                                                 this result as



                                                                 normal/abnormal

.



[facility] HospitalBppefcaKCGNSYUTUOVS3452-89-39 14:30:00





             Test Item    Value        Reference Range Interpretation Comments

 

             AGAP (test code = AGAP) 15.3         10.0-20.0                 



[facility] HospitalEyltbpzQWEYHKHSKVSZ8523-90-79 14:30:00





             Test Item    Value        Reference Range Interpretation Comments

 

             eGFR (test code = eGFR) 91                                     



[facility] HospitalSifefmuBLPSUJHTBZFT2741-61-11 14:30:00





             Test Item    Value        Reference Range Interpretation Comments

 

             BUN (test code = BUN) 13           7-22                      



[id number] 14:30:00





             Test Item    Value        Reference Range Interpretation Comments

 

             Creatinine Lvl (test code = Creatinine 0.98         0.50-1.40      

           



             Lvl)                                                



[id number]05:00





             Test Item    Value        Reference Range Interpretation Comments

 

             TACROLIMUS BLOOD (BEAKER) (test 6.5 ng/mL    10.0-20.0    L        

    



             code = 657)                                         



BASIC METABOLIC [id number] 10:13:00





             Test Item    Value        Reference Range Interpretation Comments

 

             SODIUM (BEAKER) 140 meq/L    136-145                   



             (test code = 381)                                        

 

             POTASSIUM (BEAKER) 4.1 meq/L    3.5-5.1                   



             (test code = 379)                                        

 

             CHLORIDE (BEAKER) 109 meq/L           H            



             (test code = 382)                                        

 

             CO2 (BEAKER) (test 22 meq/L     22-29                     



             code = 355)                                         

 

             BLOOD UREA NITROGEN 15 mg/dL     7-21                      



             (BEAKER) (test code                                        



             = 354)                                              

 

             CREATININE (BEAKER) 0.95 mg/dL   0.57-1.25                 



             (test code = 358)                                        

 

             GLUCOSE RANDOM 99 mg/dL                         



             (BEAKER) (test code                                        



             = 652)                                              

 

             CALCIUM (BEAKER) 8.8 mg/dL    8.4-10.2                  



             (test code = 697)                                        

 

             EGFR (BEAKER) (test 85 mL/min/1.73                           ESTIMA

PHOEBE GFR IS



             code = 1092) sq m                                   NOT AS ACCURATE

 AS



                                                                 CREATININE



                                                                 CLEARANCE IN



                                                                 PREDICTING



                                                                 GLOMERULAR



                                                                 FILTRATION RATE

.



                                                                 ESTIMATED GFR I

S



                                                                 NOT APPLICABLE 

FOR



                                                                 DIALYSIS PATIEN

TS.



CBC W/PLT COUNT &amp; AUTO [id number] 09:42:00





             Test Item    Value        Reference Range Interpretation Comments

 

             WHITE BLOOD CELL COUNT (BEAKER) 4.7 K/ L     4.0-10.0              

    



             (test code = 775)                                        

 

             RED BLOOD CELL COUNT (BEAKER) 5.18 M/ L    4.20-5.80               

  



             (test code = 761)                                        

 

             HEMOGLOBIN (BEAKER) (test code = 15.3 GM/DL   13.0-16.8            

     



             410)                                                

 

             HEMATOCRIT (BEAKER) (test code = 47.1 %       40.0-50.0            

     



             411)                                                

 

             MEAN CORPUSCULAR VOLUME (BEAKER) 90.8 fL      82.0-98.0            

     



             (test code = 753)                                        

 

             MEAN CORPUSCULAR HEMOGLOBIN 29.6 pg      27.0-33.0                 



             (BEAKER) (test code = 751)                                        

 

             MEAN CORPUSCULAR HEMOGLOBIN CONC 32.6 GM/DL   32.0-36.0            

     



             (BEAKER) (test code = 752)                                        

 

             RED CELL DISTRIBUTION WIDTH 15.1 %       10.3-14.2    H            



             (BEAKER) (test code = 412)                                        

 

             PLATELET COUNT (BEAKER) (test 129 K/CU MM  150-430      L          

  



             code = 756)                                         

 

             MEAN PLATELET VOLUME (BEAKER) 7.7 fL       6.5-10.5                

  



             (test code = 754)                                        

 

             NUCLEATED RED BLOOD CELLS 0 /100 WBC   0-0                       



             (BEAKER) (test code = 413)                                        

 

             NEUTROPHILS RELATIVE PERCENT 72 %                                  

 



             (BEAKER) (test code = 429)                                        

 

             LYMPHOCYTES RELATIVE PERCENT 18 %                                  

 



             (BEAKER) (test code = 430)                                        

 

             MONOCYTES RELATIVE PERCENT 8 %                                    



             (BEAKER) (test code = 431)                                        

 

             EOSINOPHILS RELATIVE PERCENT 1 %                                   

 



             (BEAKER) (test code = 432)                                        

 

             BASOPHILS RELATIVE PERCENT 1 %                                    



             (BEAKER) (test code = 437)                                        

 

             NEUTROPHILS ABSOLUTE COUNT 3.37 K/ L    1.80-8.00                 



             (BEAKER) (test code = 670)                                        

 

             LYMPHOCYTES ABSOLUTE COUNT 0.85 K/ L    1.48-4.50    L            



             (BEAKER) (test code = 414)                                        

 

             MONOCYTES ABSOLUTE COUNT (BEAKER) 0.40 K/ L    0.00-1.30           

      



             (test code = 415)                                        

 

             EOSINOPHILS ABSOLUTE COUNT 0.07 K/ L    0.00-0.50                 



             (BEAKER) (test code = 416)                                        

 

             BASOPHILS ABSOLUTE COUNT (BEAKER) 0.03 K/ L    0.00-0.20           

      



             (test code = 417)                                        



[id number] 10:38:00





             Test Item    Value        Reference Range Interpretation Comments

 

             MAGNESIUM (BEAKER) (test code = 2.2 mg/dL    1.6-2.6               

    



             627)                                                



TACROLIMUS [id number] 10:33:00





             Test Item    Value        Reference Range Interpretation Comments

 

             TACROLIMUS BLOOD (BEAKER) (test 7.1 ng/mL    10.0-20.0    L        

    



             code = 657)                                         



CBC W/PLT COUNT &amp; AUTO [id number] 08:47:00





             Test Item    Value        Reference Range Interpretation Comments

 

             WHITE BLOOD CELL COUNT (BEAKER) 4.1 K/ L     4.0-10.0              

    



             (test code = 775)                                        

 

             RED BLOOD CELL COUNT (BEAKER) 5.02 M/ L    4.20-5.80               

  



             (test code = 761)                                        

 

             HEMOGLOBIN (BEAKER) (test code = 14.8 GM/DL   13.0-16.8            

     



             410)                                                

 

             HEMATOCRIT (BEAKER) (test code = 43.6 %       40.0-50.0            

     



             411)                                                

 

             MEAN CORPUSCULAR VOLUME (BEAKER) 87.0 fL      82.0-98.0            

     



             (test code = 753)                                        

 

             MEAN CORPUSCULAR HEMOGLOBIN 29.5 pg      27.0-33.0                 



             (BEAKER) (test code = 751)                                        

 

             MEAN CORPUSCULAR HEMOGLOBIN CONC 33.9 GM/DL   32.0-36.0            

     



             (BEAKER) (test code = 752)                                        

 

             RED CELL DISTRIBUTION WIDTH 14.1 %       10.3-14.2                 



             (BEAKER) (test code = 412)                                        

 

             PLATELET COUNT (BEAKER) (test 160 K/CU MM  150-430                 

  



             code = 756)                                         

 

             MEAN PLATELET VOLUME (BEAKER) 7.2 fL       6.5-10.5                

  



             (test code = 754)                                        

 

             NUCLEATED RED BLOOD CELLS 0 /100 WBC   0-0                       



             (BEAKER) (test code = 413)                                        

 

             NEUTROPHILS RELATIVE PERCENT 64 %                                  

 



             (BEAKER) (test code = 429)                                        

 

             LYMPHOCYTES RELATIVE PERCENT 21 %                                  

 



             (BEAKER) (test code = 430)                                        

 

             MONOCYTES RELATIVE PERCENT 12 %                                   



             (BEAKER) (test code = 431)                                        

 

             EOSINOPHILS RELATIVE PERCENT 2 %                                   

 



             (BEAKER) (test code = 432)                                        

 

             BASOPHILS RELATIVE PERCENT 0 %                                    



             (BEAKER) (test code = 437)                                        

 

             NEUTROPHILS ABSOLUTE COUNT 2.61 K/ L    1.80-8.00                 



             (BEAKER) (test code = 670)                                        

 

             LYMPHOCYTES ABSOLUTE COUNT 0.87 K/ L    1.48-4.50    L            



             (BEAKER) (test code = 414)                                        

 

             MONOCYTES ABSOLUTE COUNT (BEAKER) 0.49 K/ L    0.00-1.30           

      



             (test code = 415)                                        

 

             EOSINOPHILS ABSOLUTE COUNT 0.09 K/ L    0.00-0.50                 



             (BEAKER) (test code = 416)                                        

 

             BASOPHILS ABSOLUTE COUNT (BEAKER) 0.01 K/ L    0.00-0.20           

      



             (test code = 417)                                        



0.00BASIC METABOLIC [id number] 08:47:00





             Test Item    Value        Reference Range Interpretation Comments

 

             SODIUM (BEAKER) 142 meq/L    136-145                   



             (test code = 381)                                        

 

             POTASSIUM (BEAKER) 3.8 meq/L    3.5-5.1                   



             (test code = 379)                                        

 

             CHLORIDE (BEAKER) 109 meq/L           H            



             (test code = 382)                                        

 

             CO2 (BEAKER) (test 21 meq/L     22-29        L            



             code = 355)                                         

 

             BLOOD UREA NITROGEN 22 mg/dL     7-21         H            



             (BEAKER) (test code                                        



             = 354)                                              

 

             CREATININE (BEAKER) 1.18 mg/dL   0.57-1.25                 



             (test code = 358)                                        

 

             GLUCOSE RANDOM 99 mg/dL                         



             (BEAKER) (test code                                        



             = 652)                                              

 

             CALCIUM (BEAKER) 9.1 mg/dL    8.4-10.2                  



             (test code = 697)                                        

 

             EGFR (BEAKER) (test 66 mL/min/1.73                           ESTIMA

PHOEBE GFR IS



             code = 1092) sq m                                   NOT AS ACCURATE

 AS



                                                                 CREATININE



                                                                 CLEARANCE IN



                                                                 PREDICTING



                                                                 GLOMERULAR



                                                                 FILTRATION RATE

.



                                                                 ESTIMATED GFR I

S



                                                                 NOT APPLICABLE 

FOR



                                                                 DIALYSIS PATIEN

TS.



URINALYSIS W/ [id number] 00:20:00





             Test Item    Value        Reference Range Interpretation Comments

 

             COLOR (BEAKER) (test code Yellow                                 



             = 470)                                              

 

             CLARITY (BEAKER) (test Slightly Hazy                           



             code = 469)                                         

 

             SPECIFIC GRAVITY UA 1.050        1.001-1.035  H            



             (BEAKER) (test code = 468)                                        

 

             PH UA (BEAKER) (test code 5.5          5.0-8.0                   



             = 467)                                              

 

             PROTEIN UA (BEAKER) (test 20 mg/dL     Negative     A            



             code = 464)                                         

 

             GLUCOSE UA (BEAKER) (test Negative     Negative                  



             code = 365)                                         

 

             KETONES UA (BEAKER) (test 40 mg/dL     Negative     A            



             code = 371)                                         

 

             BILIRUBIN UA (BEAKER) Negative     Negative                  



             (test code = 462)                                        

 

             BLOOD UA (BEAKER) (test Negative     Negative                  



             code = 461)                                         

 

             NITRITE UA (BEAKER) (test Negative     Negative                  



             code = 465)                                         

 

             LEUKOCYTE ESTERASE UA Negative     Negative                  



             (BEAKER) (test code = 466)                                        

 

             UROBILINOGEN UA (BEAKER) 0.2 mg/dL    0.2-1.0                   



             (test code = 463)                                        

 

             RBC UA (BEAKER) (test code 1 /HPF                                 



             = 519)                                              

 

             WBC UA (BEAKER) (test code 3 /HPF                                 



             = 520)                                              

 

             MUCUS (BEAKER) (test code Many                                   



             = 1574)                                             

 

             HYALINE CASTS (BEAKER) 6 /LPF                                 



             (test code = 514)                                        

 

             SOURCE(BEAKER) (test code Urine, Clean Catch                       

    



             = 2795)                                             



BASIC METABOLIC [id number] 21:55:00





             Test Item    Value        Reference Range Interpretation Comments

 

             SODIUM (BEAKER) 139 meq/L    136-145                   



             (test code = 381)                                        

 

             POTASSIUM (BEAKER) 5.1 meq/L    3.5-5.1                   



             (test code = 379)                                        

 

             CHLORIDE (BEAKER) 107 meq/L                        



             (test code = 382)                                        

 

             CO2 (BEAKER) (test 18 meq/L     22-29        L            



             code = 355)                                         

 

             BLOOD UREA NITROGEN 26 mg/dL     7-21         H            



             (BEAKER) (test code                                        



             = 354)                                              

 

             CREATININE (BEAKER) 1.26 mg/dL   0.57-1.25    H            



             (test code = 358)                                        

 

             GLUCOSE RANDOM 89 mg/dL                         



             (BEAKER) (test code                                        



             = 652)                                              

 

             CALCIUM (BEAKER) 9.1 mg/dL    8.4-10.2                  



             (test code = 697)                                        

 

             EGFR (BEAKER) (test 61 mL/min/1.73                           ESTIMA

PHOEBE GFR IS



             code = 1092) sq m                                   NOT AS ACCURATE

 AS



                                                                 CREATININE



                                                                 CLEARANCE IN



                                                                 PREDICTING



                                                                 GLOMERULAR



                                                                 FILTRATION RATE

.



                                                                 ESTIMATED GFR I

S



                                                                 NOT APPLICABLE 

FOR



                                                                 DIALYSIS PATIEN

TS.



HEPATIC FUNCTION [id number] 21:55:00





             Test Item    Value        Reference Range Interpretation Comments

 

             TOTAL PROTEIN (BEAKER) (test code = 7.1 gm/dL    6.0-8.3           

        



             770)                                                

 

             ALBUMIN (BEAKER) (test code = 1145) 4.2 g/dL     3.5-5.0           

        

 

             BILIRUBIN TOTAL (BEAKER) (test code 0.7 mg/dL    0.2-1.2           

        



             = 377)                                              

 

             BILIRUBIN DIRECT (BEAKER) (test 0.2 mg/dL    0.1-0.5               

    



             code = 706)                                         

 

             ALKALINE PHOSPHATASE (BEAKER) (test 86 U/L                   

        



             code = 346)                                         

 

             AST (SGOT) (BEAKER) (test code = 28 U/L       5-34                 

     



             353)                                                

 

             ALT (SGPT) (BEAKER) (test code = 20 U/L       6-55                 

     



             347)                                                



[id number] 21:52:00





             Test Item    Value        Reference Range Interpretation Comments

 

             LIPASE (BEAKER) (test code = 749) 40 U/L       8-78                

      



B-TYPE NATRIURETIC FACTOR (BNP)2017 21:46:00





             Test Item    Value        Reference Range Interpretation Comments

 

             B-TYPE NATRIURETIC PEPTIDE (BEAKER) 21 pg/mL     0-100             

        



             (test code = 700)                                        



CREATINE KINASE (CK), TOTAL AND [id number] 21:45:00





             Test Item    Value        Reference Range Interpretation Comments

 

             CREATINE KINASE TOTAL (BEAKER) 32 U/L                        

   



             (test code = 380)                                        

 

             CREATINE KINASE-MB (BEAKER) (test 0.6 ng/mL    0.0-6.6             

      



             code = 750)                                         

 

             CREATINE KINASE-MB INDEX (BEAKER) 1.9 %                            

      



             (test code = 395)                                        



Effective 2014: CK-MB Reference Range ChangeNew: 0.0-6.6 Previous: 
0.0-4.9CK-MB Reference Range:&lt;6.7 Normal6.7-10.0 Borderline&gt;10.0 Abnormal
TROPONIN -25-39 21:45:00





             Test Item    Value        Reference Range Interpretation Comments

 

             TROPONIN I (BEAKER) (test code = 397) < ng/mL      0.00-0.03       

          



Effective 2014: Reference Range ChangeNew: 0.00-0.03 Previous 0.00-
0.15Troponin I (TnI) levelsmust be interpreted in the context of the presenting 
symptoms and the clinical findings. Elevated TnI levels indicate myocardial 
damage, but are not specific for ischemic heart disease. Elevated TnI levels are
seen in patients with other cardiac conditions (including myocarditis and 
congestive heart failure), and slight TnI elevations occur in patients with 
other conditions, including sepsis, renal failure, acidosis, acute neurological 
disease, and persistent tachyarrhythmia.CBC W/PLT COUNT &amp; AUTO DIFFERENTIAL
2017 21:24:00





             Test Item    Value        Reference Range Interpretation Comments

 

             WHITE BLOOD CELL COUNT (BEAKER) 4.7 K/ L     4.0-10.0              

    



             (test code = 775)                                        

 

             RED BLOOD CELL COUNT (BEAKER) 5.75 M/ L    4.20-5.80               

  



             (test code = 761)                                        

 

             HEMOGLOBIN (BEAKER) (test code = 17.4 GM/DL   13.0-16.8    H       

     



             410)                                                

 

             HEMATOCRIT (BEAKER) (test code = 49.2 %       40.0-50.0            

     



             411)                                                

 

             MEAN CORPUSCULAR VOLUME (BEAKER) 85.6 fL      82.0-98.0            

     



             (test code = 753)                                        

 

             MEAN CORPUSCULAR HEMOGLOBIN 30.3 pg      27.0-33.0                 



             (BEAKER) (test code = 751)                                        

 

             MEAN CORPUSCULAR HEMOGLOBIN CONC 35.4 GM/DL   32.0-36.0            

     



             (BEAKER) (test code = 752)                                        

 

             RED CELL DISTRIBUTION WIDTH 12.9 %       10.3-14.2                 



             (BEAKER) (test code = 412)                                        

 

             PLATELET COUNT (BEAKER) (test code 98 K/CU MM   150-430      L     

       



             = 756)                                              

 

             MEAN PLATELET VOLUME (BEAKER) 8.7 fL       6.5-10.5                

  



             (test code = 754)                                        

 

             NUCLEATED RED BLOOD CELLS (BEAKER) 0 /100 WBC   0-0                

       



             (test code = 413)                                        

 

             NEUTROPHILS RELATIVE PERCENT 69 %                                  

 



             (BEAKER) (test code = 429)                                        

 

             LYMPHOCYTES RELATIVE PERCENT 22 %                                  

 



             (BEAKER) (test code = 430)                                        

 

             MONOCYTES RELATIVE PERCENT 8 %                                    



             (BEAKER) (test code = 431)                                        

 

             EOSINOPHILS RELATIVE PERCENT 1 %                                   

 



             (BEAKER) (test code = 432)                                        

 

             BASOPHILS RELATIVE PERCENT 0 %                                    



             (BEAKER) (test code = 437)                                        

 

             NEUTROPHILS ABSOLUTE COUNT 3.26 K/ L    1.80-8.00                 



             (BEAKER) (test code = 670)                                        

 

             LYMPHOCYTES ABSOLUTE COUNT 1.05 K/ L    1.48-4.50    L            



             (BEAKER) (test code = 414)                                        

 

             MONOCYTES ABSOLUTE COUNT (BEAKER) 0.38 K/ L    0.00-1.30           

      



             (test code = 415)                                        

 

             EOSINOPHILS ABSOLUTE COUNT 0.03 K/ L    0.00-0.50                 



             (BEAKER) (test code = 416)                                        

 

             BASOPHILS ABSOLUTE COUNT (BEAKER) 0.02 K/ L    0.00-0.20           

      



             (test code = 417)                                        



0.00CMV PCR, [id number] 17:55:00





             Test Item    Value        Reference Range Interpretation Comments

 

             CMV VIRAL LOAD - Negative or below the                           



             NEGATIVE (BEAKER) (test linear range of the                        

   



             code = 2558) assay (<375 copies/mL)                           



Cytomegalovirus (CMV) infection can cause significant disease in 
immunosuppressed patients. However,it is common for CMV to manifest as a limited
infection which is of no clinical significance in immunosuppressed patients or 
in healthy individuals.Viral load measurements are helpful to identify clinical 
CMV infection and to guide the pre-emptive management of antiviral therapy. For 
treatment of CMV infection due to reactivation in transplant recipients, a 
threshold between 4,000 and 5,000 copies/mLis suggested. For treatment of 
primary CMV infection, a lower threshold can be used.CMV infection may also be 
monitored using weekly serial measurements. Serial measurements of CMV DNA viral
load can be evaluated by identifying a 10-fold change, as well as assessing the 
CMV DNA viral load and the clinical context for each patient.The plasma CMV DNA 
viral load was detected using quantitative polymerase chain reaction and 
fluorescent monitoring of a specific hybridized probe. Genetic variation and 
other factors can affect the accuracy of nucleic acid testing. Therefore, the 
results should be interpreted in light of clinical data. A negative result may 
not exclude the presence of CMV disease.This test was developed and its 
performance characteristics determined by the [facility] Pathol
ogy Department, Section of Molecular Pathology. It has not been cleared or 
approved by the U.S. Foodand Drug Administration (FDA), since FDA approval is 
not required for clinical use of the test. Validation was done as required by 
The Clinical Laboratory Improvement Amendments of 1988.CREATINE KINASE (CK), 
TOTAL AND [id number] 18:40:00





             Test Item    Value        Reference Range Interpretation Comments

 

             CREATINE KINASE TOTAL (BEAKER) 36 U/L                        

   



             (test code = 380)                                        

 

             CREATINE KINASE-MB (BEAKER) (test 0.3 ng/mL    0.0-6.6             

      



             code = 750)                                         

 

             CREATINE KINASE-MB INDEX (BEAKER) 0.8 %                            

      



             (test code = 395)                                        



Effective 2014: CK-MB Reference Range ChangeNew: 0.0-6.6 Previous: 
0.0-4.9CK-MB Reference Range:&lt;6.7 Normal6.7-10.0 Borderline&gt;10.0 Abnormal
TROPONIN -10-05 18:40:00





             Test Item    Value        Reference Range Interpretation Comments

 

             TROPONIN I (BEAKER) (test code = 397) < ng/mL      0.00-0.03       

          



Effective 2014: Reference Range ChangeNew: 0.00-0.03 Previous 0.00-
0.15Troponin I (TnI) levelsmust be interpreted in the context of the presenting 
symptoms and the clinical findings. Elevated TnI levels indicate myocardial 
damage, but are not specific for ischemic heart disease. Elevated TnI levels are
seen in patients with other cardiac conditions (including myocarditis and 
congestive heart failure), and slight TnI elevations occur in patients with 
other conditions, including sepsis, renal failure, acidosis, acute neurological 
disease, and persistent tachyarrhythmia.B-TYPE NATRIURETIC FACTOR (BNP)
2017 18:40:00





             Test Item    Value        Reference Range Interpretation Comments

 

             B-TYPE NATRIURETIC PEPTIDE (BEAKER) 23 pg/mL     0-100             

        



             (test code = 700)                                        



[id number] 18:33:00





             Test Item    Value        Reference Range Interpretation Comments

 

             MAGNESIUM (BEAKER) (test code = 1.8 mg/dL    1.6-2.6               

    



             627)                                                



BASIC METABOLIC [id number] 18:33:00





             Test Item    Value        Reference Range Interpretation Comments

 

             SODIUM (BEAKER) 140 meq/L    136-145                   



             (test code = 381)                                        

 

             POTASSIUM (BEAKER) 4.1 meq/L    3.5-5.1                   



             (test code = 379)                                        

 

             CHLORIDE (BEAKER) 105 meq/L                        



             (test code = 382)                                        

 

             CO2 (BEAKER) (test 23 meq/L     22-29                     



             code = 355)                                         

 

             BLOOD UREA NITROGEN 18 mg/dL     7-21                      



             (BEAKER) (test code                                        



             = 354)                                              

 

             CREATININE (BEAKER) 1.34 mg/dL   0.57-1.25    H            



             (test code = 358)                                        

 

             GLUCOSE RANDOM 100 mg/dL                        



             (BEAKER) (test code                                        



             = 652)                                              

 

             CALCIUM (BEAKER) 9.2 mg/dL    8.4-10.2                  



             (test code = 697)                                        

 

             EGFR (BEAKER) (test 57 mL/min/1.73                           ESTIMA

PHOEBE GFR IS



             code = 1092) sq m                                   NOT AS ACCURATE

 AS



                                                                 CREATININE



                                                                 CLEARANCE IN



                                                                 PREDICTING



                                                                 GLOMERULAR



                                                                 FILTRATION RATE

.



                                                                 ESTIMATED GFR I

S



                                                                 NOT APPLICABLE 

FOR



                                                                 DIALYSIS PATIEN

TS.



CBC W/PLT COUNT &amp; AUTO [id number] 18:24:00





             Test Item    Value        Reference Range Interpretation Comments

 

             WHITE BLOOD CELL COUNT (BEAKER) 2.6 K/ L     4.0-10.0     L        

    



             (test code = 775)                                        

 

             RED BLOOD CELL COUNT (BEAKER) 5.66 M/ L    4.20-5.80               

  



             (test code = 761)                                        

 

             HEMOGLOBIN (BEAKER) (test code = 16.5 GM/DL   13.0-16.8            

     



             410)                                                

 

             HEMATOCRIT (BEAKER) (test code = 49.0 %       40.0-50.0            

     



             411)                                                

 

             MEAN CORPUSCULAR VOLUME (BEAKER) 86.6 fL      82.0-98.0            

     



             (test code = 753)                                        

 

             MEAN CORPUSCULAR HEMOGLOBIN 29.1 pg      27.0-33.0                 



             (BEAKER) (test code = 751)                                        

 

             MEAN CORPUSCULAR HEMOGLOBIN CONC 33.6 GM/DL   32.0-36.0            

     



             (BEAKER) (test code = 752)                                        

 

             RED CELL DISTRIBUTION WIDTH 13.0 %       10.3-14.2                 



             (BEAKER) (test code = 412)                                        

 

             PLATELET COUNT (BEAKER) (test code 87 K/CU MM   150-430      L     

       



             = 756)                                              

 

             MEAN PLATELET VOLUME (BEAKER) 8.3 fL       6.5-10.5                

  



             (test code = 754)                                        

 

             NUCLEATED RED BLOOD CELLS (BEAKER) 0 /100 WBC   0-0                

       



             (test code = 413)                                        

 

             NEUTROPHILS RELATIVE PERCENT 51 %                                  

 



             (BEAKER) (test code = 429)                                        

 

             LYMPHOCYTES RELATIVE PERCENT 30 %                                  

 



             (BEAKER) (test code = 430)                                        

 

             MONOCYTES RELATIVE PERCENT 18 %                                   



             (BEAKER) (test code = 431)                                        

 

             EOSINOPHILS RELATIVE PERCENT 1 %                                   

 



             (BEAKER) (test code = 432)                                        

 

             BASOPHILS RELATIVE PERCENT 0 %                                    



             (BEAKER) (test code = 437)                                        

 

             NEUTROPHILS ABSOLUTE COUNT 1.33 K/ L    1.80-8.00    L            



             (BEAKER) (test code = 670)                                        

 

             LYMPHOCYTES ABSOLUTE COUNT 0.78 K/ L    1.48-4.50    L            



             (BEAKER) (test code = 414)                                        

 

             MONOCYTES ABSOLUTE COUNT (BEAKER) 0.47 K/ L    0.00-1.30           

      



             (test code = 415)                                        

 

             EOSINOPHILS ABSOLUTE COUNT 0.02 K/ L    0.00-0.50                 



             (BEAKER) (test code = 416)                                        

 

             BASOPHILS ABSOLUTE COUNT (BEAKER) 0.01 K/ L    0.00-0.20           

      



             (test code = 417)                                        



0.00POCT-GLUCOSE [id number] 15:37:00





             Test Item    Value        Reference Range Interpretation Comments

 

             POC-GLUCOSE METER 97 mg/dL                         TESTED AT 

[facility] [address]



             ([facility]) (test code =                                        [place] [address]



             1538)                                               



CHEM [id number] 10:27:00





             Test Item    Value        Reference Range Interpretation Comments

 

             Phosphorus (test code = Phosphorus) 3.6          2.5-4.5           

        



[id number]





             Test Item    Value        Reference Range Interpretation Comments

 

             Magnesium Lvl (test code = Magnesium 2.3          1.8-2.4          

         



             Lvl)                                                



[facility]50[facility]:27:00





             Test Item    Value        Reference Range Interpretation Comments

 

             AGAP (test code = AGAP) 12.6         10.0-20.0                 



[facility]1662[facility]-41 10:27:00





             Test Item    Value        Reference Range Interpretation Comments

 

             eGFR (test code = eGFR) 55                                     



[facility] HospitalKtkpaueKBNIJXBCQDOY8720-93-29 10:27:00





             Test Item    Value        Reference Range Interpretation Comments

 

             Calcium Lvl (test code = Calcium Lvl) 8.6          8.5-10.5        

          



[facility] HospitalAaihqbdXQUVACQTHMLN6380-32-66 10:27:00





             Test Item    Value        Reference Range Interpretation Comments

 

             CO2 (test code = CO2) 23           24-32                     



[facility] HospitalGzvbgulZHFOJEJMPGBD7611-27-31 10:27:00





             Test Item    Value        Reference Range Interpretation Comments

 

             Chloride Lvl (test code = Chloride Lvl) 102                  

            



[facility] HospitalUmvtosxCJGNFPOKDSYJ2446-11-12 10:27:00





             Test Item    Value        Reference Range Interpretation Comments

 

             Potassium Lvl (test code = Potassium 4.6          3.5-5.1          

         



             Lvl)                                                



[facility] HospitalLoj[id number] 10:27:00





             Test Item    Value        Reference Range Interpretation Comments

 

             Sodium Lvl (test code = Sodium Lvl) 133          135-145           

        



[facility] HospitalHrtmxiyPRIKYSLHCXHN2928-66-61 10:27:00





             Test Item    Value        Reference Range Interpretation Comments

 

             Creatinine Lvl (test code = Creatinine 1.49         0.50-1.40      

           



             Lvl)                                                



[facility] HospitalDnwckafEVQDSKLLBKHW7562-12-83 10:27:00





             Test Item    Value        Reference Range Interpretation Comments

 

             BUN (test code = BUN) 26           7-22                      



[facility] HospitalYvmmyxvGLAJOJWJHGBX3461-30-99 10:27:00





             Test Item    Value        Reference Range Interpretation Comments

 

             Glucose Lvl (test code = Glucose Lvl) 151          70-99           

          



[facility] AliceGkytewaJKSYBYITNZ5346[facility]51-11 [facility]:27:00





             Test Item    Value        Reference Range Interpretation Comments

 

             Lymphocytes # (test code = Lymphocytes 0.6          1.0-5.5        

           



             #)                                                  



[facility] AliceNxmcgffHGRWXKYMUZ0972-17-48 10:27:00





             Test Item    Value        Reference Range Interpretation Comments

 

             Monocytes # (test code 0.5          See_Comment                [Aut

omated message] The



             = Monocytes #)                                        system which 

generated



                                                                 this result tra

nsmitted



                                                                 reference range

: <=0.8.



                                                                 The reference r

klaudia was



                                                                 not used to int

erpret



                                                                 this result as



                                                                 normal/abnormal

.



[facility] AliceCjlvdmuFPMGMKCXIZ1707-67-81 10:27:00





             Test Item    Value        Reference Range Interpretation Comments

 

             Segs-Bands # (test code = Segs-Bands #) 8.3          1.5-8.1       

            



[facility] AlicePyaezjhBHHUEBHKHC0267-39-42 10:27:00





             Test Item    Value        Reference Range Interpretation Comments

 

             Monocytes (test code = Monocytes) 5.1          2.0-12.0            

      



[facility] AliceLvrueqgBEPFQCCWOU8121-12-91 10:27:00





             Test Item    Value        Reference Range Interpretation Comments

 

             Segs (test code = Segs) 88.1         45.0-75.0                 



[facility] AliceJxqlmybDUVDBCYNCL6982-99-87 10:27:00





             Test Item    Value        Reference Range Interpretation Comments

 

             Lymphocytes (test code = Lymphocytes) 6.6          20.0-40.0       

          



[facility] AliceHgbsizlCPXRSMNTPB6015-29-21 10:27:00





             Test Item    Value        Reference Range Interpretation Comments

 

             Basophils (test code = 0.2          See_Comment                [Aut

omated message] The



             Basophils)                                          system which ge

nerated



                                                                 this result tra

nsmitted



                                                                 reference range

: <=1.0.



                                                                 The reference r

klaudia was



                                                                 not used to int

erpret



                                                                 this result as



                                                                 normal/abnormal

.



[facility] AliceQixzirjCGZSNKUWDH0032-41-49 10:27:00





             Test Item    Value        Reference Range Interpretation Comments

 

             MCH (test code = MCH) 28.5 pg      27.0-31.0                 



[facility] AliceKyvvlzzYRQYUPIZDF8227-47-49 10:27:00





             Test Item    Value        Reference Range Interpretation Comments

 

             Platelet (test code = Platelet) 158          133-450               

    



[facility] AliceBybygppKOXZRCMONB1149-14-51 10:27:00





             Test Item    Value        Reference Range Interpretation Comments

 

             MCHC (test code = MCHC) 33.6         32.0-36.0                 



[facility] AliceYbbdwzzVJPTJGPACS4711-75-63 10:27:00





             Test Item    Value        Reference Range Interpretation Comments

 

             RDW (test code = RDW) 14.1         11.5-14.5                 



[facility] AliceNgnelntYYQLGTXREA5424-91-45 10:27:00





             Test Item    Value        Reference Range Interpretation Comments

 

             MCV (test code = MCV) 84.9         80.0-94.0                 



[facility] AliceYyredcfUTZNXQTBGR2966-14-55 10:27:00





             Test Item    Value        Reference Range Interpretation Comments

 

             RBC (test code = RBC) 5.28         4.70-6.10                 



[facility] AliceQctzggxRDGQGSNMQU2746-50-07 10:27:00





             Test Item    Value        Reference Range Interpretation Comments

 

             Hgb (test code = Hgb) 15.0         14.0-18.0                 



[facility] AliceVcbmaicMUUHDFMXHQ4761-25-95 10:27:00





             Test Item    Value        Reference Range Interpretation Comments

 

             WBC (test code = WBC) 9.4          3.7-10.4                  



[facility] AlicePpufbwrDQQHKLSWRS3174-52-86 10:27:00





             Test Item    Value        Reference Range Interpretation Comments

 

             Hct (test code = Hct) 44.8         42.0-54.0                 



[facility] AliceKpjivekJWRLEBRKVF1562-39-84 10:27:00





             Test Item    Value        Reference Range Interpretation Comments

 

             MPV (test code = MPV) 8.2          7.4-10.4                  



[facility]2017-01-26 10:27:00





             Test Item    Value        Reference Range Interpretation Comments

 

             Ca Ion WB (test code = Ca Ion WB) 1.08         1.05-1.25           

      



[facility]2017-01-26 10:27:00





             Test Item    Value        Reference Range Interpretation Comments

 

             Ca Norm WB (test code = Ca Norm WB) 1.05         1.05-1.25         

        



[facility] [id number] 10:27:00





             Test Item    Value        Reference Range Interpretation Comments

 

             Phosphorus (test code = Phosphorus) 3.6          2.5-4.5           

        



[facility]CHE[facility] CSWTZ5586-14-24 10:27:00





             Test Item    Value        Reference Range Interpretation Comments

 

             Magnesium Lvl (test code = Magnesium 2.3          1.8-2.4          

         



             Lvl)                                                



[facility] HospitalPbibnphDTSHMOOITYOX9678-83-32 10:27:00





             Test Item    Value        Reference Range Interpretation Comments

 

             AGAP (test code = AGAP) 12.6         10.0-20.0                 



[facility] HospitalPqqbnqiRDURSDEAJBVS2570-00-73 10:27:00





             Test Item    Value        Reference Range Interpretation Comments

 

             eGFR (test code = eGFR) 55                                     



[facility] HospitalSnkdapoJWDZRUGABXBA3799-28-92 10:27:00





             Test Item    Value        Reference Range Interpretation Comments

 

             Calcium Lvl (test code = Calcium Lvl) 8.6          8.5-10.5        

          



[facility] HospitalTgquveqGRPZURRVBJPS0472-29-06 10:27:00





             Test Item    Value        Reference Range Interpretation Comments

 

             CO2 (test code = CO2) 23           24-32                     



[facility] HospitalCokjfkwQEQUXNIZPGRN5881-73-22 10:27:00





             Test Item    Value        Reference Range Interpretation Comments

 

             Chloride Lvl (test code = Chloride Lvl) 102                  

            



[facility] HospitalXyivrheCKFHLYSOEAJE1540-94-82 10:27:00





             Test Item    Value        Reference Range Interpretation Comments

 

             Potassium Lvl (test code = Potassium 4.6          3.5-5.1          

         



             Lvl)                                                



[facility] HospitalAevpnwsQWBFJVQKVEOZ7927-94-12 10:27:00





             Test Item    Value        Reference Range Interpretation Comments

 

             Sodium Lvl (test code = Sodium Lvl) 133          135-145           

        



[facility] HospitalZswgsiuDGUZARZATBPB9176-87-65 10:27:00





             Test Item    Value        Reference Range Interpretation Comments

 

             Creatinine Lvl (test code = Creatinine 1.49         0.50-1.40      

           



             Lvl)                                                



[facility] HospitalIulddrcZXBUOXODZDFA5768-36-11 10:27:00





             Test Item    Value        Reference Range Interpretation Comments

 

             BUN (test code = BUN) 26           7-22                      



[facility] HospitalHhqwvspMLJHIPNVPWPU2335-57-22 10:27:00





             Test Item    Value        Reference Range Interpretation Comments

 

             Glucose Lvl (test code = Glucose Lvl) 151          70-99           

          



[facility] AliceMchfqusKLVTMNIIRD3623-92-86 10:27:00





             Test Item    Value        Reference Range Interpretation Comments

 

             Lymphocytes # (test code = Lymphocytes 0.6          1.0-5.5        

           



             #)                                                  



[facility] AliceHtrtcsiVEFNJWFKSI3128-50-84 10:27:00





             Test Item    Value        Reference Range Interpretation Comments

 

             Monocytes # (test code 0.5          See_Comment                [Aut

omated message] The



             = Monocytes #)                                        system which 

generated



                                                                 this result tra

nsmitted



                                                                 reference range

: <=0.8.



                                                                 The reference r

klaudia was



                                                                 not used to int

erpret



                                                                 this result as



                                                                 normal/abnormal

.



[id number] [facility]00





             Test Item    Value        Reference Range Interpretation Comments

 

             Segs-Bands # (test code = Segs-Bands #) 8.3          1.5-8.1       

            



[facility] AliceOvimzrkFDVHLOWVQY8520-35-51 10:27:00





             Test Item    Value        Reference Range Interpretation Comments

 

             Monocytes (test code = Monocytes) 5.1          2.0-12.0            

      



[facility] AliceWsmdoepNGKBHDQNBM4765-25-65 10:27:00





             Test Item    Value        Reference Range Interpretation Comments

 

             Segs (test code = Segs) 88.1         45.0-75.0                 



[facility] AliceKlxxzkuHSKPGQIWTA9998-98-77 10:27:00





             Test Item    Value        Reference Range Interpretation Comments

 

             Lymphocytes (test code = Lymphocytes) 6.6          20.0-40.0       

          



[facility] AliceRzxnnaaMGIPBBHOLL0551-10-61 10:27:00





             Test Item    Value        Reference Range Interpretation Comments

 

             Basophils (test code = 0.2          See_Comment                [Aut

omated message] The



             Basophils)                                          system which ge

nerated



                                                                 this result tra

nsmitted



                                                                 reference range

: <=1.0.



                                                                 The reference r

klaudia was



                                                                 not used to int

erpret



                                                                 this result as



                                                                 normal/abnormal

.



[facility] AlicePnmstbgKMWCSXTLKX7392-64-22 10:27:00





             Test Item    Value        Reference Range Interpretation Comments

 

             MCH (test code = MCH) 28.5 pg      27.0-31.0                 



[facility] AliceGpmamnmWHHHXFDTWG6777-90-17 10:27:00





             Test Item    Value        Reference Range Interpretation Comments

 

             Platelet (test code = Platelet) 158          133-450               

    



[facility] AliceOsyxhfmHHPQNZWVYE8796-50-55 10:27:00





             Test Item    Value        Reference Range Interpretation Comments

 

             MCHC (test code = MCHC) 33.6         32.0-36.0                 



[facility] AlicePdtslvqJWMEZQWVSW0144-08-90 10:27:00





             Test Item    Value        Reference Range Interpretation Comments

 

             RDW (test code = RDW) 14.1         11.5-14.5                 



[facility] AliceAolxmvzHDKWKIWEMA4664-33-90 10:27:00





             Test Item    Value        Reference Range Interpretation Comments

 

             MCV (test code = MCV) 84.9         80.0-94.0                 



[facility] AliceDwrhvkuAECWYQLYXG2148-63-09 10:27:00





             Test Item    Value        Reference Range Interpretation Comments

 

             RBC (test code = RBC) 5.28         4.70-6.10                 



[facility] AliceIbmuronOSHPWJPGEW7319-49-20 10:27:00





             Test Item    Value        Reference Range Interpretation Comments

 

             Hgb (test code = Hgb) 15.0         14.0-18.0                 



[facility] AliceHkipwhcRGEQYFMRSB6130-27-46 10:27:00





             Test Item    Value        Reference Range Interpretation Comments

 

             WBC (test code = WBC) 9.4          3.7-10.4                  



[facility] AliceYbbvzolNTSTCFORMY9267-65-51 10:27:00





             Test Item    Value        Reference Range Interpretation Comments

 

             Hct (test code = Hct) 44.8         42.0-54.0                 



[facility] AliceJkowwccNZTSBHQMDO1965-38-11 10:27:00





             Test Item    Value        Reference Range Interpretation Comments

 

             MPV (test code = MPV) 8.2          7.4-10.4                  



[facility]2017-01-26 10:27:00





             Test Item    Value        Reference Range Interpretation Comments

 

             Ca Ion WB (test code = Ca Ion WB) 1.08         1.05-1.25           

      



[facility]2017-01-26 10:27:00





             Test Item    Value        Reference Range Interpretation Comments

 

             Ca Norm WB (test code = Ca Norm WB) 1.05         1.05-1.25         

        



[facility] MUBI [id number] 16:09:00





             Test Item    Value        Reference Range Interpretation Comments

 

             Antibody Scrn (test Negative (17                           



             code = Antibody Scrn) 10:09 AM)                              



[facility] MUBI [id number] 16:09:00





             Test Item    Value        Reference Range Interpretation Comments

 

             ABO/Rh (test code = ABO/Rh) O NEG                                  



[facility] MUBI [id number] 16:09:00





             Test Item    Value        Reference Range Interpretation Comments

 

             Antibody Scrn (test Negative (17                           



             code = Antibody Scrn) 10:09 AM)                              



[facility] [id number] 16:09:00





             Test Item    Value        Reference Range Interpretation Comments

 

             ABO/Rh (test code = ABO/Rh) O NEG                                  



[facility] [employer] [id number] 19:30:00





             Test Item    Value        Reference Range Interpretation Comments

 

             A/G Ratio (test code = A/G Ratio) 1.6          0.7-1.6             

      



[facility] [employer] [id number] 19:30:00





             Test Item    Value        Reference Range Interpretation Comments

 

             Globulin (test code = Globulin) 2.6          2.7-4.2               

    



[facility] [employer] [id number] 19:30:00





             Test Item    Value        Reference Range Interpretation Comments

 

             B/C Ratio (test code = B/C Ratio) 20           6-25                

      



[facility] [employer] [id number] 19:30:00





             Test Item    Value        Reference Range Interpretation Comments

 

             AGAP (test code = AGAP) 15.4         10.0-20.0                 



[facility] [employer] [id number] 19:30:00





             Test Item    Value        Reference Range Interpretation Comments

 

             eGFR (test code = eGFR) 67                                     



[facility] [employer] [id number] 19:30:00





             Test Item    Value        Reference Range Interpretation Comments

 

             CO2 (test code = CO2) 24           24-32                     



[facility] [employer] [id number] 19:30:00





             Test Item    Value        Reference Range Interpretation Comments

 

             Calcium Lvl (test code = Calcium Lvl) 8.8          8.5-10.5        

          



[facility] [employer] [id number] 19:30:00





             Test Item    Value        Reference Range Interpretation Comments

 

             Total Protein (test code = Total 6.8          6.4-8.4              

     



             Protein)                                            



[facility] [employer] [id number] 19:30:00





             Test Item    Value        Reference Range Interpretation Comments

 

             ALT (test code = ALT) 25           See_Comment                [Auto

mated message] The



                                                                 system which ge

nerated this



                                                                 result transmit

phoebe



                                                                 reference range

: <=65. The



                                                                 reference range

 was not



                                                                 used to interpr

et this



                                                                 result as maame

l/abnormal.



[facility] [employer] [id number] 19:30:00





             Test Item    Value        Reference Range Interpretation Comments

 

             Albumin Lvl (test code = Albumin Lvl) 4.2          3.5-5.0         

          



[facility]2017-01-17 19:30:00





             Test Item    Value        Reference Range Interpretation Comments

 

             AST (test code = AST) 12           See_Comment                [Auto

mated message] The



                                                                 system which ge

nerated this



                                                                 result transmit

phoebe



                                                                 reference range

: <=37. The



                                                                 reference range

 was not



                                                                 used to interpr

et this



                                                                 result as maame

l/abnormal.



[facility]2017-01-17 19:30:00





             Test Item    Value        Reference Range Interpretation Comments

 

             Alk Phos (test code = Alk Phos) 83                           

    



[facility]2017-01-17 19:30:00





             Test Item    Value        Reference Range Interpretation Comments

 

             Chloride Lvl (test code = Chloride Lvl) 106                  

            



[facility]2017-01-17 19:30:00





             Test Item    Value        Reference Range Interpretation Comments

 

             Bili Total (test code = Bili Total) 0.5          0.2-1.3           

        



[facility]2017-01-17 19:30:00





             Test Item    Value        Reference Range Interpretation Comments

 

             Potassium Lvl (test code = Potassium 4.4          3.5-5.1          

         



             Lvl)                                                



[facility]2017-01-17 19:30:00





             Test Item    Value        Reference Range Interpretation Comments

 

             Sodium Lvl (test code = Sodium Lvl) 141          135-145           

        



[facility]2017-01-17 19:30:00





             Test Item    Value        Reference Range Interpretation Comments

 

             Creatinine Lvl (test code = Creatinine 1.26         0.50-1.40      

           



             Lvl)                                                



[facility]2017-01-17 19:30:00





             Test Item    Value        Reference Range Interpretation Comments

 

             BUN (test code = BUN) 25           7-22                      



[facility]2017-01-17 19:30:00





             Test Item    Value        Reference Range Interpretation Comments

 

             Glucose Lvl (test code = Glucose Lvl) 91           70-99           

          



[facility] [id number] 19:30:00





             Test Item    Value        Reference Range Interpretation Comments

 

             RDW (test code = RDW) 14.2         11.5-14.5                 



[facility] AliceSoelnqfCRRESSRHKZ1423-29-18 19:30:00





             Test Item    Value        Reference Range Interpretation Comments

 

             Hct (test code = Hct) 45.7         42.0-54.0                 



[facility] AliceCxoewopTDTVJPMDOZ9423-92-42 19:30:00





             Test Item    Value        Reference Range Interpretation Comments

 

             Platelet (test code = Platelet) 149          133-450               

    



[facility] [id number] 19:30:00





             Test Item    Value        Reference Range Interpretation Comments

 

             MPV (test code = MPV) 8.4          7.4-10.4                  



[facility] [id number] 19:30:00





             Test Item    Value        Reference Range Interpretation Comments

 

             MCV (test code = MCV) 82.9         80.0-94.0                 



[facility] AliceWpddrngQKUYFZUGXO4504-73-47 19:30:00





             Test Item    Value        Reference Range Interpretation Comments

 

             MCHC (test code = MCHC) 34.2         32.0-36.0                 



[facility] AliceRydfdcbYIECTBJNKZ5802-65-29 19:30:00





             Test Item    Value        Reference Range Interpretation Comments

 

             MCH (test code = MCH) 28.3 pg      27.0-31.0                 



[facility] AliceHqxslsrZYYAGDKHBI9197-48-50 19:30:00





             Test Item    Value        Reference Range Interpretation Comments

 

             WBC (test code = WBC) 6.1          3.7-10.4                  



[facility] AliceEmogfjcBFOTYTUVSA0107-44-29 19:30:00





             Test Item    Value        Reference Range Interpretation Comments

 

             Hgb (test code = Hgb) 15.6         14.0-18.0                 



[facility] Ali[id number] 19:30:00





             Test Item    Value        Reference Range Interpretation Comments

 

             RBC (test code = RBC) 5.52         4.70-6.10                 



[facility] AliceZtfcfrbHYIBPOXQII9075-91-00 19:30:00





             Test Item    Value        Reference Range Interpretation Comments

 

             Basophils (test code = 0.7          See_Comment                [Aut

omated message] The



             Basophils)                                          system which ge

nerated



                                                                 this result tra

nsmitted



                                                                 reference range

: <=1.0.



                                                                 The reference r

klaudia was



                                                                 not used to int

erpret



                                                                 this result as



                                                                 normal/abnormal

.



[facility] [id number] 19:30:00





             Test Item    Value        Reference Range Interpretation Comments

 

             Segs-Bands # (test code = Segs-Bands #) 4.4          1.5-8.1       

            



[facility] Ali[id number] 19:30:00





             Test Item    Value        Reference Range Interpretation Comments

 

             Monocytes # (test code 0.6          See_Comment                [Aut

omated message] The



             = Monocytes #)                                        system which 

generated



                                                                 this result tra

nsmitted



                                                                 reference range

: <=0.8.



                                                                 The reference r

klaudia was



                                                                 not used to int

erpret



                                                                 this result as



                                                                 normal/abnormal

.



[facility]





             Test Item    Value        Reference Range Interpretation Comments

 

             Lymphocytes # (test code = Lymphocytes 1.1          1.0-5.5        

           



             #)                                                  



[facility] HospitalMfmbhuyKPOZDNBMYH3069-14-11 19:30:00





             Test Item    Value        Reference Range Interpretation Comments

 

             Eosinophils # (test code 0.1          See_Comment                [A

utomated message] The



             = Eosinophils #)                                        system whic

h generated



                                                                 this result tra

nsmitted



                                                                 reference range

: <=0.5.



                                                                 The reference r

klaudia was



                                                                 not used to int

erpret



                                                                 this result as



                                                                 normal/abnormal

.



[facility] AliceFbythdjJLQFUBGUHL6712-49-33 19:30:00





             Test Item    Value        Reference Range Interpretation Comments

 

             Eosinophils (test code = 0.9          See_Comment                [A

utomated message] The



             Eosinophils)                                        system which ge

nerated



                                                                 this result tra

nsmitted



                                                                 reference range

: <=4.0.



                                                                 The reference r

klaudia was



                                                                 not used to int

erpret



                                                                 this result as



                                                                 normal/abnormal

.



[facility] AliceRsetpsvKISNIVYRHO8039-71-40 19:30:00





             Test Item    Value        Reference Range Interpretation Comments

 

             Lymphocytes (test code = Lymphocytes) 17.6         20.0-40.0       

          



[facility] H[id number] 19:30:00





             Test Item    Value        Reference Range Interpretation Comments

 

             Monocytes (test code = Monocytes) 9.3          2.0-12.0            

      



[facility] HospitalXfxxkwgKWDMBHFTEH3576-08-10 19:30:00





             Test Item    Value        Reference Range Interpretation Comments

 

             Segs (test code = Segs) 71.5         45.0-75.0                 



[facility] [id number] 19:30:00





             Test Item    Value        Reference Range Interpretation Comments

 

             Hgb A1C (test code = Hgb A1C) 5.3                                  

  



[facility] [id number] 19:30:00





             Test Item    Value        Reference Range Interpretation Comments

 

             A/G Ratio (test code = A/G Ratio) 1.6          0.7-1.6             

      



[facility] [id number] 19:30:00





             Test Item    Value        Reference Range Interpretation Comments

 

             Globulin (test code = Globulin) 2.6          2.7-4.2               

    



[facility] [id number] 19:30:00





             Test Item    Value        Reference Range Interpretation Comments

 

             B/C Ratio (test code = B/C Ratio) 20           6-25                

      



[facility]2017-01-17 19:30:00





             Test Item    Value        Reference Range Interpretation Comments

 

             AGAP (test code = AGAP) 15.4         10.0-20.0                 



[facility]2017-01-17 19:30:00





             Test Item    Value        Reference Range Interpretation Comments

 

             eGFR (test code = eGFR) 67                                     



[facility]2017-01-17 19:30:00





             Test Item    Value        Reference Range Interpretation Comments

 

             CO2 (test code = CO2) 24           24-32                     



[facility]2017-01-17 19:30:00





             Test Item    Value        Reference Range Interpretation Comments

 

             Calcium Lvl (test code = Calcium Lvl) 8.8          8.5-10.5        

          



[facility]2017-01-17 19:30:00





             Test Item    Value        Reference Range Interpretation Comments

 

             Total Protein (test code = Total 6.8          6.4-8.4              

     



             Protein)                                            



[facility]2017-01-17 19:30:00





             Test Item    Value        Reference Range Interpretation Comments

 

             ALT (test code = ALT) 25           See_Comment                [Auto

mated message] The



                                                                 system which ge

nerated this



                                                                 result transmit

phoebe



                                                                 reference range

: <=65. The



                                                                 reference range

 was not



                                                                 used to interpr

et this



                                                                 result as maame

l/abnormal.



[facility]2017-01-17 19:30:00





             Test Item    Value        Reference Range Interpretation Comments

 

             Albumin Lvl (test code = Albumin Lvl) 4.2          3.5-5.0         

          



[facility]2017-01-17 19:30:00





             Test Item    Value        Reference Range Interpretation Comments

 

             AST (test code = AST) 12           See_Comment                [Auto

mated message] The



                                                                 system which ge

nerated this



                                                                 result transmit

phoebe



                                                                 reference range

: <=37. The



                                                                 reference range

 was not



                                                                 used to interpr

et this



                                                                 result as maame

l/abnormal.



[facility]2017-01-17 19:30:00





             Test Item    Value        Reference Range Interpretation Comments

 

             Alk Phos (test code = Alk Phos) 83                           

    



[facility]2017-01-17 19:30:00





             Test Item    Value        Reference Range Interpretation Comments

 

             Chloride Lvl (test code = Chloride Lvl) 106                  

            



[facility]2017-01-17 19:30:00





             Test Item    Value        Reference Range Interpretation Comments

 

             Bili Total (test code = Bili Total) 0.5          0.2-1.3           

        



[facility]2017-01-17 19:30:00





             Test Item    Value        Reference Range Interpretation Comments

 

             Potassium Lvl (test code = Potassium 4.4          3.5-5.1          

         



             Lvl)                                                



[facility]2017-01-17 19:30:00





             Test Item    Value        Reference Range Interpretation Comments

 

             Sodium Lvl (test code = Sodium Lvl) 141          135-145           

        



[facility]2017-01-17 19:30:00





             Test Item    Value        Reference Range Interpretation Comments

 

             Creatinine Lvl (test code = Creatinine 1.26         0.50-1.40      

           



             Lvl)                                                



[facility]2017-01-17 19:30:00





             Test Item    Value        Reference Range Interpretation Comments

 

             BUN (test code = BUN) 25           7-22                      



[facility]2017-01-17 19:30:00





             Test Item    Value        Reference Range Interpretation Comments

 

             Glucose Lvl (test code = Glucose Lvl) 91           70-99           

          



[id number]





             Test Item    Value        Reference Range Interpretation Comments

 

             RDW (test code = RDW) 14.2         11.5-14.5                 



[place] [id number] 19:30:00





             Test Item    Value        Reference Range Interpretation Comments

 

             Hct (test code = Hct) 45.7         42.0-54.0                 



[id number] 19:30:00





             Test Item    Value        Reference Range Interpretation Comments

 

             Platelet (test code = Platelet) 149          133-450               

    



[facility]gfgevrIUYERIQXFV8468-72-27 19:30:00





             Test Item    Value        Reference Range Interpretation Comments

 

             MPV (test code = MPV) 8.4          7.4-10.4                  



[facility]prrztjUBOUNNGRAN9509-13-67 19:30:00





             Test Item    Value        Reference Range Interpretation Comments

 

             MCV (test code = MCV) 82.9         80.0-94.0                 



[facility]bjrmkwCHSMXHNILD1153-67-85 19:30:00





             Test Item    Value        Reference Range Interpretation Comments

 

             MCHC (test code = MCHC) 34.2         32.0-36.0                 



[facility] Alice[facility]nglaypYKHOPIOUQZ7411-57-30 19:30:00





             Test Item    Value        Reference Range Interpretation Comments

 

             MCH (test code = MCH) 28.3 pg      27.0-31.0                 



[facility] AliceStuqiogIRQZBSKUMU0167-30-49 19:30:00





             Test Item    Value        Reference Range Interpretation Comments

 

             WBC (test code = WBC) 6.1          3.7-10.4                  



[facility] AliceZprdhceCWNFAKSGBT2459-69-07 19:30:00





             Test Item    Value        Reference Range Interpretation Comments

 

             Hgb (test code = Hgb) 15.6         14.0-18.0                 



[facility] AliceRdrpnhhODWLUFXJVQ2478-50-82 19:30:00





             Test Item    Value        Reference Range Interpretation Comments

 

             RBC (test code = RBC) 5.52         4.70-6.10                 



[facility] AliceAiaulkqAZBYTXRUQA9901-40-34 19:30:00





             Test Item    Value        Reference Range Interpretation Comments

 

             Basophils (test code = 0.7          See_Comment                [Aut

omated message] The



             Basophils)                                          system which ge

nerated



                                                                 this result tra

nsmitted



                                                                 reference range

: <=1.0.



                                                                 The reference r

klaudia was



                                                                 not used to int

erpret



                                                                 this result as



                                                                 normal/abnormal

.



[facility] AliceLjtuwmgYCOPOMNPHO2037-77-63 19:30:00





             Test Item    Value        Reference Range Interpretation Comments

 

             Segs-Bands # (test code = Segs-Bands #) 4.4          1.5-8.1       

            



[facility] AliceLkuiadgHEAULHKWTA6145-22-31 19:30:00





             Test Item    Value        Reference Range Interpretation Comments

 

             Monocytes # (test code 0.6          See_Comment                [Aut

omated message] The



             = Monocytes #)                                        system which 

generated



                                                                 this result tra

nsmitted



                                                                 reference range

: <=0.8.



                                                                 The reference r

klaudia was



                                                                 not used to int

erpret



                                                                 this result as



                                                                 normal/abnormal

.



[facility] Alice[facility]qdqsccYYXOYFWGTB0177-18-06 19:30:00





             Test Item    Value        Reference Range Interpretation Comments

 

             Lymphocytes # (test code = Lymphocytes 1.1          1.0-5.5        

           



             #)                                                  



[facility] AliceUjsuqxaWJBETCMLZE5256-57-10 19:30:00





             Test Item    Value        Reference Range Interpretation Comments

 

             Eosinophils # (test code 0.1          See_Comment                [A

utomated message] The



             = Eosinophils #)                                        system whic

h generated



                                                                 this result tra

nsmitted



                                                                 reference range

: <=0.5.



                                                                 The reference r

klaudia was



                                                                 not used to int

erpret



                                                                 this result as



                                                                 normal/abnormal

.



[facility] AliceGmzosvpDZQFNMULKC1645-17-18 19:30:00





             Test Item    Value        Reference Range Interpretation Comments

 

             Eosinophils (test code = 0.9          See_Comment                [A

utomated message] The



             Eosinophils)                                        system which ge

nerated



                                                                 this result tra

nsmitted



                                                                 reference range

: <=4.0.



                                                                 The reference r

klaudia was



                                                                 not used to int

erpret



                                                                 this result as



                                                                 normal/abnormal

.



[facility]





             Test Item    Value        Reference Range Interpretation Comments

 

             Lymphocytes (test code = Lymphocytes) 17.6         20.0-40.0       

          



[facility] HospitalFdxpucvTANCCBEKRS2333-48-86 19:30:00





             Test Item    Value        Reference Range Interpretation Comments

 

             Monocytes (test code = Monocytes) 9.3          2.0-12.0            

      



[facility] HospitalTbgahwmFINLHWSOWU3192-71-70 19:30:00





             Test Item    Value        Reference Range Interpretation Comments

 

             Segs (test code = Segs) 71.5         45.0-75.0                 



[facility] UVGMUHMDF9123-85-40 19:30:00





             Test Item    Value        Reference Range Interpretation Comments

 

             Hgb A1C (test code = Hgb A1C) 5.3                                  

  



[facility]

## 2023-01-26 ENCOUNTER — HOSPITAL ENCOUNTER (EMERGENCY)
Dept: HOSPITAL 97 - ER | Age: 53
Discharge: HOME | End: 2023-01-26
Payer: COMMERCIAL

## 2023-01-26 VITALS — SYSTOLIC BLOOD PRESSURE: 139 MMHG | DIASTOLIC BLOOD PRESSURE: 102 MMHG | OXYGEN SATURATION: 100 % | TEMPERATURE: 97.8 F

## 2023-01-26 DIAGNOSIS — S80.12XA: Primary | ICD-10-CM

## 2023-01-26 DIAGNOSIS — Z94.1: ICD-10-CM

## 2023-01-26 DIAGNOSIS — Z88.8: ICD-10-CM

## 2023-01-26 PROCEDURE — 99281 EMR DPT VST MAYX REQ PHY/QHP: CPT

## 2023-01-26 NOTE — ER
Nurse's Notes                                                                                     

 [facility]                                                                 

Name: Gomez Romero                                                                               

Age: 52 yrs                                                                                       

Sex: Male                                                                                         

: 1970                                                                                   

MRN: [id number]                                                                                   

Arrival Date: 2023                                                                          

Time: 19:28                                                                                       

Account#: [id number]                                                                            

Bed IW1                                                                                           

Private MD:                                                                                       

Diagnosis: Contusion of left lower leg                                                            

                                                                                                  

Presentation:                                                                                     

                                                                                             

19:50 Chief complaint: Patient states: "My left leg is bruised and hurts.". Coronavirus       vc1 

      screen: Vaccine status: Patient reports being unvaccinated. Client denies travel out of     

      the [place] in the last 14 days. At this time, the client does not indicate any symptoms       

      associated with coronavirus-19. Ebola Screen: Patient negative for fever greater than       

      or equal to 101.5 degrees Fahrenheit, and additional compatible Ebola Virus Disease         

      symptoms Patient denies exposure to infectious person. Patient denies travel to an          

      Ebola-affected area in the 21 days before illness onset. No symptoms or risks               

      identified at this time. Initial Sepsis Screen: Does the patient meet any 2 criteria?       

      No. Patient's initial sepsis screen is negative. Does the patient have a suspected          

      source of infection? No. Patient's initial sepsis screen is negative. Risk Assessment:      

      Do you want to hurt yourself or someone else? Patient reports no desire to harm self or     

      others. Onset of symptoms is unknown.                                                       

19:50 Method Of Arrival: Ambulatory                                                           vc1 

19:50 Acuity: MILTON 4                                                                           vc1 

                                                                                                  

Triage Assessment:                                                                                

19:53 General: Appears in no apparent distress. comfortable, Behavior is calm, cooperative,   vc1 

      appropriate for age. Pain: Complains of pain in left leg Pain currently is 4 out of 10      

      on a pain scale. Aggravated by increased activity, repositioning, touch. EENT: No           

      deficits noted. No signs and/or symptoms were reported regarding the EENT system.           

      Neuro: No deficits noted. Cardiovascular: No deficits noted. Respiratory: No deficits       

      noted. GI: No deficits noted. : No deficits noted. Derm: Bruising that is dark            

      purple. Musculoskeletal: Range of motion: intact in all extremities. Injury                 

      Description: Puncture.                                                                      

                                                                                                  

Historical:                                                                                       

- Allergies:                                                                                      

19:52 falsecarinate;                                                                          vc1 

- Home Meds:                                                                                      

19:52 micofenolato [Active]; Prograf 1 mg Oral cap every 12 hours [Active];                   vc1 

- PMHx:                                                                                           

19:52 heart attack; lost weight and no longer diabetic or HTN;                                vc1 

- PSHx:                                                                                           

19:52 Heart transplant; Hernia sx;                                                            vc1 

                                                                                                  

- Social history:: Smoking status: Patient denies any tobacco usage or history of.                

                                                                                                  

                                                                                                  

Screenin:54 [facility] ED Fall Risk Assessment (Adult) History of falling in the last 3 months,       vc1 

      including since admission No falls in past 3 months (0 pts) Confusion or Disorientation     

      No (0 pts) Intoxicated or Sedated No (0 pts) Impaired Gait No (0 pts) Mobility Assist       

      Device Used No (0 pt) Altered Elimination No (0 pt) Score/Fall Risk Level 0 - 2 = Low       

      Risk Oriented to surroundings, Maintained a safe environment, Educated pt \T\ family on     

      fall prevention, incl call for assistance when getting out of bed. Abuse screen: Denies     

      threats or abuse. Nutritional screening: No deficits noted. Tuberculosis screening: No      

      symptoms or risk factors identified.                                                        

                                                                                                  

Vital Signs:                                                                                      

19:50  / 102; Pulse 72; Resp 18; Temp 97.8; Pulse Ox 100% ; Weight 106.59 kg; Height 6  vc1 

      ft. 4 in. (193.04 cm); Pain 4/10;                                                           

19:50 Body Mass Index 28.60 (106.59 kg, 193.04 cm)                                            vc1 

                                                                                                  

ED Course:                                                                                        

19:28 Patient arrived in ED.                                                                  ja2 

19:33 Ramin Puga MD is Attending Physician.                                              bs3 

19:52 Triage completed.                                                                       vc1 

19:52 Arm band placed on right wrist.                                                         vc1 

19:54 No provider procedures requiring assistance completed. Patient did not have IV access   vc1 

      during this emergency room visit.                                                           

20:00 Christiane Johnson RN is Primary Nurse.                                                  vc1 

                                                                                                  

Administered Medications:                                                                         

No medications were administered                                                                  

                                                                                                  

                                                                                                  

Medication:                                                                                       

19:54 VIS not applicable for this client.                                                     vc1 

                                                                                                  

Outcome:                                                                                          

19:54 Discharge ordered by MD.                                                                bs3 

20:00 Discharged to home ambulatory.                                                          vc1 

20:00 Condition: good                                                                             

20:00 Discharge instructions given to patient, Instructed on discharge instructions, follow       

      up and referral plans. Demonstrated understanding of instructions, follow-up care,          

      Instructed to contact cardiologist and ask if he can receive a tetanus shot.                

20:01 Patient left the ED.                                                                    vc1 

                                                                                                  

Signatures:                                                                                       

Misti Cruz2                                                  

Christiane Johnson RN RN   vc1                                                  

Ramin Puga MD MD   bs3                                                  

                                                                                                  

**************************************************************************************************

## 2023-01-26 NOTE — XMS REPORT
Continuity of Care Document

                           Created on:2023



Patient:TYRA BRUNO

Sex:Male

:1970

External Reference #:[id number]





Demographics







                          Address                   [address]



                                                    [place] [address]

 

                          Home Phone                [phone]

 

                          Work Phone                [phone]

 

                          Mobile Phone              [phone]

 

                          Email Address             [email or web address]

 

                          Preferred Language        English

 

                          Marital Status            Unknown

 

                          Hinduism Affiliation     Unknown

 

                          Race                      Unknown

 

                          Additional Race(s)        Unavailable



                                                    White

 

                          Ethnic Group              Unknown









Author







                          Organization              [facility]

t

 

                          Address                   [address]



                                                    [place] [address]

 

                          Phone                     [phone]









Support







                Name            Relationship    Address         Phone

 

                NOLAN BRUNO               Unavailable     [phone]

 

                CHRISTIE BRUNO               Unavailable     [phone]

 

                CHRISTIE BRUNO               Unavailable     Unavailable

 

                Unavailable     NG              Unavailable     Unavailable









Care Team Providers







                    Name                Role                Phone

 

                    SHIRLEY LOO Primary Care Physician Unavailable

 

                    SHIRLEY LOO Attending Clinician Unavailable

 

                    Krystina MCKOY, Joi       Attending Clinician Unavailable

 

                    Shirley Loo MD Attending Clinician [phone]

 

                    Shannon Rollins RN Attending Clinician Unavailable

 

                    Shila Golden         Attending Clinician Unavailable

 

                    Margot Post RN    Attending Clinician Unavailable

 

                    Herminia Kilgore  Attending Clinician Unavailable

 

                    Maria G OTT, Tresa Nails Attending Clinician [phone]

 

                    GUIDO NICHOLS M.D. Attending Clinician Unavailable

 

                    Guido Nichols  Attending Clinician [phone]

 

                    MAURO AWAD Attending Clinician Unavailable

 

                    NORBERTO SILVESTRE Attending Clinician Unavailable

 

                    CHRISTIE SPENCER RD Attending Clinician Unavailable

 

                    SHIRLEY LOO Admitting Clinician Unavailable

 

                    Guido Nichols  Admitting Clinician [phone]









Payers







           Payer Name Policy Type Policy Number Effective Date Expiration Date S

ourleonard

 

           BCBS OS               [id number] 2017            



           POS/PPO/EPO                       00:00:00              

 

           MEDICARE A B            [id number] 2017 



                                            00:00:00   00:00:00   







Problems







       Condition Condition Condition Status Onset  Resolution Last   Treating Co

mments 

Source



       Name   Details Category        Date   Date   Treatment Clinician        



                                                 Date                 

 

       RIGHT   RIGHT Diagnosis Active 2017               Mem

oria



       RECURRENT RECURRENT               18          06:05:00               l



       INGUINAL INGUINAL               00:00:                             Hussein

n



       HERNIA AND HERNIA AND               00                                 



       INCI   INCI                                                    



              Active                                                  



              2017                                                  



               [facility]                                                  

 

       MORBID  MORBID Diagnosis Active 2017               Me

morinilam



       OBESITY OBESITY               0-14          07:33:00               l



              Active               00:00:                             Jfef



              10/14/2016               00                                 



              [facility]                                                  

 

       Heart  Heart  Disease Active                              [facility]



       transplant transplant               3-30                               [facility]



       ,      ,                    00:00:                             Medical



       orthotopic orthotopic               00                                 Ce

nter



       , status , status                                                  

 

       Heart  Heart  Disease Active 2015                      Overview: [facility]



       transplant transplant               2-                        [facility]



       rejection rejection               00:00:                      g of this M

edical



                                   00                          note   Center



                                                               might be 



                                                               different 



                                                               from the 



                                                               original. 



                                                               - 2R   



                                                               rejection 



                                                               on low 



                                                               dose   



                                                               immunosup 



                                                               pression 



                                                               10/22/15 



                                                               treated 



                                                               with   



                                                               prednison 



                                                               e pulse- 



                                                               2R     



                                                               rejection 



                                                               on low 



                                                               dose   



                                                               immunosup 



                                                               pression 



                                                               10/28/15 



                                                               treated 



                                                               with IV 



                                                               solumedro 



                                                               l,     



                                                               increased 



                                                               MMF,   



                                                               increased 



                                                               Prograf- 



                                                               2R biopsy 



                                                               on     



                                                               12/2/15 



                                                               treated 



                                                               with ATG 



                                                               x 4    

 

       History of History of Disease Active                              C

[facility]



       Cytomegalo Cytomegalo               -                               [facility]



       virus  virus                00:00:                             Medical



       (CMV)  (CMV)                00                                 Center



       viremia viremia                                                  

 

       ICM s/p ICM s/p Disease Active                              [facility]



       orthotopic orthotopic               5-                               [facility]



       heart  heart                00:00:                             Medical



       transplant transplant               00                                 Ce

nter



       5/14/15 5/14/15                                                  

 

       Dyslipidem Dyslipidem Disease Active                              C

[facility]



       ia     ia                   1-16                               [facility]



                                   00:00:                             Medical



                                   00                                 Center

 

       Hypertensi Hypertensi Disease Active                                    C

[facility]



       on     on                                                      [facility]

 

       Ischemic Ischemic Disease Active                                    [facility]

t



       cardiomyop cardiomyop                                                  [facility]



       athy with athy with                                                  Medi

lizabeth



       MI 2015 MI 2015                                                  Ce

nter

 

       Hiatal  Hiatal Problem Active               2017               Geraldo

jensen



       hernia hernia                             01:10:50               l



       (disorder) (disorder)                                                  He

rmann



              Active                                                  



              Problem                                                  



              2017                                                  



               [facility]                                                  

 

       OBESITY,  OBESITY, Diagnosis Active               2017             

  [facility]



       UNSPECIFIE UNSPECIFIE                             07:33:00               

l



       D      D Active                                                  Jeff



              [facility]                                                  

 

       Myocardial  Myocardia Problem Resolve               2017           

    [facility]



       infarction l             d                    01:10:50               l



       (disorder) infarction                                                  He

rmann



              (disorder)                                                  



              Resolved                                                  



              Problem                                                  



              2017                                                  



              [facility]                                                  

 

       Routine Routine Problem Active                                    [facility]



       lab draw lab draw HL7.CCDAR2                                           Ph

ysici



                                                                      ans

 

       Malnutriti Malnutriti Problem Active                                    U

T



       on     on     HL7.CCDAR2                                           Physic

i



                                                                      ans

 

       Morbid Morbid Problem Active                                    [facility]



       obesity obesity HL7.CCDAR2                                           Phys

ici



       due to due to                                                  ans



       excess excess                                                  



       calories calories                                                  

 

       Primary Primary Problem Active                                    [facility]



       dysthymia dysthymia HL7.CCDAR2                                           

Physici



                                                                      ans

 

       Adult BMI Adult BMI Problem Active                                    [facility]



       40.0-44.9 40.0-44.9 HL7.CCDAR2                                           

Physici



       kg/sq m kg/sq m                                                  ans

 

       Malabsorpt Malabsorpt Problem Active                                    U

T



       ion    ion    HL7.CCDAR2                                           Physic

i



                                                                      ans

 

       History of History of Problem Resolve                                    

[facility]



       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                                       

  Physici



       malabsorpt malabsorpt                                                  an

s



       ion    ion                                                     

 

       History of History of Problem Resolve                                    

[facility]



       malnutriti malnutriti HL7.CCDAR2 d                                       

  Physici



       on     on                                                      ans

 

       Vitamin D Vitamin D Problem Active                                    [facility]



       deficiency deficiency HL7.CCDAR2                                         

  Physici



                                                                      ans







Allergies, Adverse Reactions, Alerts







       Allergy Allergy Status Severity Reaction(s) Onset  Inactive Treating Comm

ents 

Source



       Name   Type                        Date   Date   Clinician        

 

       Foscarne Drug   Active        Nausea And 2015 C

[facility]



       t      Allergy               Vomiting,                  Pt has no Rad

es



                                   Swelling 00:00:               reaction Medica

l



                                          00                   to     Center



                                                               current 



                                                               medicatio 



                                                               n      



                                                               administr 



                                                               ation  



                                                               regimen: 



                                                               premedica 



                                                               te with 



                                                               Benadryl, 



                                                               Zofran, 



                                                               Tylenol, 



                                                               then   



                                                               500cc NS 



                                                               bolus, 



                                                               then   



                                                               diluted 



                                                               foscarnet 



                                                               over 2 



                                                               hours, 



                                                               then   



                                                               another 



                                                               500cc NS 



                                                               bolus. 



                                                               Electroly 



                                                               te labs 



                                                               after  



                                                               every  



                                                               dose,  



                                                               electroly 



                                                               te     



                                                               replaceme 



                                                               nt     



                                                               protocol 



                                                               in     



                                                               place.8/1 



                                                               3/2015Pt 



                                                               has no 



                                                               reaction 



                                                               to     



                                                               current 



                                                               medicatio 



                                                               n      



                                                               administr 



                                                               ation  



                                                               regimen: 



                                                               premedica 



                                                               te with 



                                                               Benadryl, 



                                                               Zofran, 



                                                               Tylenol, 



                                                               then   



                                                               500cc NS 



                                                               bolus, 



                                                               then   



                                                               diluted 



                                                               foscarnet 



                                                               over 2 



                                                               hours, 



                                                               then   



                                                               another 



                                                               500cc NS 



                                                               bolus. 



                                                               Electroly 



                                                               te labs 



                                                               after  



                                                               every  



                                                               dose,  



                                                               electroly 



                                                               te     



                                                               replaceme 



                                                               nt     



                                                               protocol 



                                                               in place. 

 

       FOSCARNE Allergy Active High   N\T\V                        [facility]



       T                                                          [facility]



                                          [facility]                      [facility]



                                          00                          Center







Family History







           Family Member Diagnosis  Comments   Start Date Stop Date  Source

 

           Maternal grandfather Cancer                                      [facility]

 

           Maternal grandfather Heart disease                                  [facility]

[facility]

 

           Natural mother Diabetes                                    [facility]

[facility]



                                                                  Center







Social History







           Social Habit Start Date Stop Date  Quantity   Comments   Source

 

           Alcohol intake 2021 Current               [facility] Avila



                      00:00:00   00:00:00   non-drinker of            Medical Ce

nter



                                            alcohol (finding)            

 

           Tobacco use and 2015 Never used            [facility] Gee



           exposure   00:00:00   00:00:00                         [facility]

 

           Sex Assigned At 1970                       [facility] Gee



           B[facility]      00[facility]00[facility]00   [facility]:00                         [facility]









                Smoking Status  Start Date      Stop Date       Source

 

                Social History                                  [facility]







Medications







       Ordered Filled Start  Stop   Current Ordering Indication Dosage Frequency

 Signature

                    Comments            Components          Source



     Medication Medication Date Date Medication? Clinician                (SIG) 

          



     Name Name                                                   

 

     tacrolimus      2024- Yes       Status post           Take 2        

   [facility]



     (PROGRAF) 1                 heart           capsules           Marianela

kes



     MG capsule      00:00: 23:59           transplanta           (2 mg         

  Medical



               00   :00            tion (HCC)           total) by           Cent

er



                                                  mouth           



                                                  every           



                                                  morning           



                                                  AND 1           



                                                  capsule (1           



                                                  mg total)           



                                                  every           



                                                  evening.           

 

     pantoprazol      2024- Yes            40mg QD   Take 1           [facility]



     e                                   tablet (40           [facility]



     (PROTONIX)      00:00: 23:59                          mg total)           M

edical



     40 MG      00   :00                           by mouth           Center



     tablet                                         daily.           

 

     pantoprazol      2022- No             40mg QD   Take 1           [facility]



     e                                   tablet (40           [facility]



     (PROTONIX)      00:00: 23:59                          mg total)           M

edical



     40 MG      00   :00                           by mouth           Center



     tablet                                         daily for           



                                                  30 days.           

 

     ibuprofen      2022- No             600mg      Take 1           [facility]



     (ADVIL,MOTR      0-31 11-10                          tablet           [facility]



     IN) 600 MG      00:00: 23:59                          (600 mg           Med

ical



     tablet      00   :00                           total) by           Center



                                                  mouth           



                                                  every 6           



                                                  (six)           



                                                  hours as           



                                                  needed for           



                                                  Pain for           



                                                  up to 10           



                                                  days.           

 

     amLODIPine      2023- No             5mg  QD   Take 1           [facility]



     (NORVASC) 5      -                          tablet (5           [facility]



     MG tablet      00:00: 23:59                          mg total)           [facility]

[facility]



               00   :00                           by mouth           [facility]



                                                  daily.           

 

     amLODIPine      2023- No             5mg  QD   Take 1           [facility]



     (NORVASC) 5      7-19 07-19                          tablet (5           [facility]

[facility]



     MG tablet      00:00: 23:59                          mg total)           [facility]

[facility]



               00   :00                           by mouth           [facility]



                                                  daily.           

 

     amLODIPine      2022- No             5mg  QD   Take 1           [facility]



     (NORVASC) 5      6-14 07-19                          tablet (5           [facility]



     MG tablet      00:00: 00:00                          mg total)           [facility]

[facility]



               00   :00                           by mouth           [facility]



                                                  daily.           

 

     amLODIPine      2022- No             5mg  QD   Take 1           [facility]



     (NORVASC) 5      6-14 07-19                          tablet (5           [facility]



     MG tablet      00:00: 00:00                          mg total)           [facility]

[facility]



               00   :00                           by mouth           [facility]



                                                  daily.           

 

     pravastatin            Yes                      Take 1           [facility]



     (PRAVACHOL)      4-11                               tablet by           [facility]

[facility]



     40 MG      00:00:                               mouth           [facility]



     tablet      00                                 daily           Center



                                                  (DIFF'T           



                                                  LOOK SAME           



                                                  MEDICATION           



                                                  )              

 

     mycophenola            Yes                      Take 1           [facility]



     te        4-11                               capsule by           [facility]



     (CELLCEPT)      00:00:                               mouth           [facility]

[facility]



     250 mg      00                                 twice           [facility]



     capsule                                         daily.           

 

     pravastatin            Yes                      Take 1           [facility]



     (PRAVACHOL)      4-11                               tablet by           [facility]

[facility]



     40 MG      00:00:                               mouth           [facility]



     tablet      00                                 daily           [facility]



                                                  (DIFF'T           



                                                  LOOK SAME           



                                                  MEDICATION           



                                                  )              

 

     mycophenola            Yes                      Take 1           [facility]



     te        4-11                               capsule by           [facility]



     (CELLCEPT)      00:00:                               mouth           [facility]

[facility]



     250 mg      00                                 twice           [facility]



     capsule                                         daily.           

 

     tacrolimus            Yes                      Take 2           [facility]



     (PROGRAF) 1      1-19                               capsules           [facility]

[facility]



     MG capsule      00:00:                               by mouth           Med

ical



               00                                 twice           [facility]



                                                  daily.           

 

     tacrolimus            Yes                      Take 2           [facility]



     (PROGRAF) 1      1-19                               capsules           [facility]

[facility]



     MG capsule      00:00:                               by mouth           Med

ical



               00                                 twice           Center



                                                  daily.           

 

     tacrolimus            Yes                      Take 2           [facility]



     (PROGRAF) 1      1-19                               capsules           [facility]

[facility]



     MG capsule      00:00:                               by mouth           Med

ical



               00                                 twice           [facility]



                                                  daily.           

 

     tacrolimus      2023- No                       Take 2           [facility]



     (PROGRAF) 1      1-19 01-19                          capsules           [facility]

[facility]



     MG capsule      00:00: 00:00                          by mouth           [facility]

dical



               00   :00                           twice           [facility]



                                                  daily.           

 

     pantoprazol      2022- No             40mg QD   Take 1           [facility]



     e          12-02                          tablet (40           [facility]



     (PROTONIX)      00:00: 00:00                          mg total)           [facility]

edical



     40 MG      00   :00                           by mouth           Center



     tablet                                         daily.           

 

     mycophenola      2021- No             250mg Q.5D Take 1           [facility]



     te        2-16 12-16                          capsule           [facility]



     (CELLCEPT)      00:00: 23:59                          (250 mg           Med

ical



     250 mg      00   :00                           total) by           Center



     capsule                                         mouth 2           



                                                  (two)           



                                                  times           



                                                  daily.           

 

     mycophenola      2021- No             250mg Q.5D Take 1           [facility]



     te        2-16 12-16                          capsule           [facility]



     (CELLCEPT)      00:00: 23:59                          (250 mg           Med

ical



     250 mg      00   :00                           total) by           Center



     capsule                                         mouth 2           



                                                  (two)           



                                                  times           



                                                  daily.           

 

     mycophenola      2021- No             250mg Q.5D Take 1           [facility]



     te        2-16 04-11                          capsule           [facility]



     (CELLCEPT)      00:00: 00:00                          (250 mg           Med

ical



     250 mg      00   :00                           total) by           Center



     capsule                                         mouth 2           



                                                  (two)           



                                                  times           



                                                  daily.           

 

     mycophenola      2021- No             250mg Q.5D Take 1           [facility]



     te        2-16 04-11                          capsule           [facility]



     (CELLCEPT)      00:00: 00:00                          (250 mg           Med

ical



     250 mg      00   :00                           total) by           Center



     capsule                                         mouth 2           



                                                  (two)           



                                                  times           



                                                  daily.           

 

     tacrolimus      2021- No                       2mg am and           

[facility]



     (PROGRAF) 1      -                          1mg at           [facility]



     [facility] capsule      00:00: 00:00                          night.           [facility]

[facility]



               00   :00                                          [facility]

 

     tacrolimus      2021- No                       2mg am and           

[facility]



     (PROGRAF) 1      -                          1mg at           [facility]



     [facility] capsule      00:00: 00:00                          night.           [facility]

[facility]



               00   :00                                          [facility]

 

     tacrolimus      2021- No                       2mg am and           

[facility]



     (PROGRAF) 1      -                          1mg at           [facility]



     [facility] capsule      00:00: 00:00                          night.           [facility]

[facility]



               00   :00                                          [facility]

 

     amLODIPine      2021- No             5mg  QD   Take 1           [facility]



     (NORVASC) 5      -30                          tablet (5           [facility]



     MG tablet      00:00: 23:59                          mg total)           [facility]

[facility]



               00   :00                           by mouth           [facility]



                                                  daily.           

 

     amLODIPine      2021- No             5mg  QD   Take 1           [facility]



     (NORVASC) 5      -30                          tablet (5           [facility]



     MG tablet      00:00: 23:59                          mg total)           [facility]

[facility]



               00   :00                           by mouth           [facility]



                                                  daily.           

 

     amLODIPine      2021- No             5mg  QD   Take 1           [facility]



     (NORVASC) 5      -14                          tablet (5           [facility]



     MG tablet      00:00: 00:00                          mg total)           [facility]

[facility]



               00   :00                           by mouth           [facility]



                                                  daily.           

 

     amLODIPine      2021- No             5mg  QD   Take 1           [facility]



     (NORVASC) 5      -14                          tablet (5           [facility]



     MG tablet      00:00: 00:00                          mg total)           [facility]

[facility]



               00   :00                           by mouth           [facility]



                                                  daily.           

 

     pravastatin            Yes                      Take 1           [facility]



     (PRAVACHOL)      4-05                               tablet by           [facility]

[facility]



     40 MG      00:00:                               mouth           [facility]



     tablet      00                                 daily           [facility]

 

     pravastatin            Yes                      Take 1           [facility]



     (PRAVACHOL)      4-05                               tablet by           [facility]

[facility]



     40 MG      00:00:                               mouth           [facility]



     tablet      00                                 daily           Center

 

     pravastatin      2022- No                       Take 1           [facility]



     (PRAVACHOL)      4-05 04-11                          tablet by           [facility]



     40 MG      00:00: 00:00                          mouth           [facility]



     tablet      00   :00                           daily           [facility]

 

     pravastatin      2022- No                       Take 1           [facility]



     (PRAVACHOL)      4-05 04-11                          tablet by           [facility]



     40 MG      00:00: 00:00                          mouth           [facility]



     tablet      00   :00                           daily           Center

 

     mycophenola      2021- No                       Take 1           [facility]



     te        4-05 12-16                          capsule           [facility]



     (CELLCEPT)      00:00: 00:00                          (250mg) by           

Medical



     250 mg      00   :00                           mouth           Center



     capsule                                         twice           



                                                  daily           

 

     mycophenola      2021- No                       Take 1           [facility]



     te        4-05 12-16                          capsule           [facility]



     (CELLCEPT)      00:00: 00:00                          (250mg) by           

Medical



     250 mg      00   :00                           mouth           Center



     capsule                                         twice           



                                                  daily           

 

     mycophenola      2021- No                       Take 1           [facility]



     te        4-05 12-16                          capsule           [facility]



     (CELLCEPT)      00:00: 00:00                          (250mg) by           

Medical



     250 mg      00   :00                           mouth           Center



     capsule                                         twice           



                                                  daily           

 

     HYDROcodone      2021- No        Tooth pain 1{tbl}      Take 1      

     [facility]



     -acetaminop      3-10 03-20                          tablet by           [facility] ([facility]      00:00: 23:59                          mouth           [facility]

al



     )      00   :00                           every 6           Center



      mg                                         (six)           



     per tablet                                         hours as           



                                                  needed for           



                                                  Pain for           



                                                  up to 10           



                                                  days. Max           



                                                  Daily           



                                                  Amount: 4           



                                                  tablets           

 

     HYDROcodone      2021- No        Tooth pain 1{tbl}      Take 1      

     [facility]



     -acetaminop      3-10 03-20                          tablet by           [facility] ([facility]      00:00: 23:59                          mouth           [facility]

al



     )      00   :00                           every 6           Center



      mg                                         (six)           



     per tablet                                         hours as           



                                                  needed for           



                                                  Pain for           



                                                  up to 10           



                                                  days. Max           



                                                  Daily           



                                                  Amount: 4           



                                                  tablets           

 

     tacrolimus      2021- No                       Take 2           [facility]



     (PROGRAF) 1       12-13                          capsules           [facility]

[facility]



     MG capsule      00:00: 00:00                          by mouth           Me

dical



               00   :00                           twice           Center



                                                  daily           



                                                  (DIFF'T           



                                                  LOOK SAME           



                                                  MEDICATION           



                                                  )              

 

     tacrolimus      2021- No                       Take 2           [facility]



     (PROGRAF) 1       12-13                          capsules           [facility]

[facility]



     MG capsule      00:00: 00:00                          by mouth           [facility]

dical



               00   :00                           twice           Center



                                                  daily           



                                                  (DIFF'T           



                                                  LOOK SAME           



                                                  MEDICATION           



                                                  )              

 

     tacrolimus      2021- No                       Take 2           [facility]



     (PROGRAF) 1       12-13                          capsules           [facility]

[facility]



     MG capsule      00:00: 00:00                          by mouth           [facility]

dical



               00   :00                           twice           Center



                                                  daily           



                                                  (DIFF'T           



                                                  LOOK SAME           



                                                  MEDICATION           



                                                  )              

 

     famotidine      2021- No             20mg Q.5D Take 1           [facility]



     (PEPCID) 20       07-07                          tablet (20           [facility]

[facility]



     MG tablet      00:00: 23:59                          mg total)           [facility]

dical



               00   :00                           by mouth 2           Center



                                                  (two)           



                                                  times           



                                                  daily.           

 

     famotidine      2021- No             20mg Q.5D Take 1           [facility]



     (PEPCID) 20      7- 07-07                          tablet (20           [facility]

[facility]



     MG tablet      00:00: 23:59                          mg total)           [facility]

[facility]



               00   :00                           by mouth 2           Center



                                                  (two)           



                                                  times           



                                                  daily.           

 

     omeprazole      2020-0 2021- No             20mg QD   Take 1           [facility]



     (PRILOSEC)      6--23                          capsule           [facility]



     20 MG      00:00: 23:59                          (20 mg           [facility]



     capsule      00   :00                           total) by           Center



                                                  mouth           



                                                  daily.           

 

     omeprazole      2021- No             20mg QD   Take 1           [facility]



     (PRILOSEC)      6-23 06-23                          capsule           [facility]



     20 MG      00:00: 23:59                          (20 mg           [facility]



     capsule      00   :00                           total) by           Center



                                                  mouth           



                                                  daily.           

 

     pravastatin       No             40mg QD   Take 1           [facility]



     (PRAVACHOL)       04-05                          tablet (40           [facility]

[facility]



     40 MG      00:00: 00:00                          mg total)           [facility]

[facility]



     tablet      00   :00                           by mouth           Center



                                                  daily.           

 

     pravastatin      2021- No             40mg QD   Take 1           [facility]



     (PRAVACHOL)       04-05                          tablet (40           [facility]

[facility]



     40 MG      00:00: 00:00                          mg total)           [facility]

[facility]



     tablet      00   :00                           by mouth           [facility]



                                                  daily.           

 

     mycophenola       No             250mg Q.5D Take 1           [facility]



     te        -11                          capsule           [facility]



     (CELLCEPT)      00:00: 00:00                          (250 mg           Med

ical



     250 mg      00   :00                           total) by           Center



     capsule                                         mouth 2           



                                                  (two)           



                                                  times           



                                                  daily.           

 

     mycophenola      2021- No             250mg Q.5D Take 1           [facility]



     te        -11                          capsule           [facility]



     (CELLCEPT)      00:00: 00:00                          (250 mg           Med

ical



     250 mg      00   :00                           total) by           Center



     capsule                                         mouth 2           



                                                  (two)           



                                                  times           



                                                  daily.           

 

     Vitamin D Vitamin D       Yes  BRANDY                TAKE 1          

 [facility]



     (Ergocalcif (Ergocalcif 4-03           [facility]                CAPSULE   

        [facility]



     danyel) [id number] danyel) [id number] 00:00:                               WEEKLY.       

    ans



     UNIT Oral UNIT Oral 00                                                



     Capsule Capsule                                                   

 

     gabapentin            No                       300 mg, 1           [facility]

[facility]



     300 MG Oral                                     cap,           l



     Capsule      18:37:                               Route: PO,           [facility]

[facility]



               00                                 Drug form:           



                                                  CAP, ONCE,           



                                                  Dosing           



                                                  Weight           



                                                  102.273,           



                                                  kg,            



                                                  Priority:           



                                                  STAT,           



                                                  Start           



                                                  date:           



                                                  17           



                                                  13:37:00           



                                                  CDT, Stop           



                                                  date:           



                                                  17           



                                                  13:37:00           



                                                  CDT            

 

     gabapentin      -0      No                       300 mg, 1           [facility]

[facility]



     300 MG Oral                                     cap,           l



     Capsule      18:37:                               Route: PO,           [facility]

[facility]



               00                                 Drug form:           



                                                  CAP, ONCE,           



                                                  Dosing           



                                                  Weight           



                                                  102.273,           



                                                  kg,            



                                                  Priority:           



                                                  STAT,           



                                                  Start           



                                                  date:           



                                                  17           



                                                  13:37:00           



                                                  CDT, Stop           



                                                  date:           



                                                  17           



                                                  13:37:00           



                                                  CDT            

 

     gabapentin      2017-0      No                       300 mg, 1           Me

[facility]



     300 MG Oral                                     cap,           l



     Capsule      18:37:                               Route: PO,           [facility]

ina



                                                Drug form:           



                                                  CAP, ONCE,           



                                                  Dosing           



                                                  Weight           



                                                  102.273,           



                                                  kg,            



                                                  Priority:           



                                                  STAT,           



                                                  Start           



                                                  date:           



                                                  17           



                                                  13:37:00           



                                                  CDT, Stop           



                                                  date:           



                                                  17           



                                                  13:37:00           



                                                  CDT            

 

     Tramadol      -0      No                       100 mg,           [facility]

a



                                              Route: PO,           l



               18:36:                               Drug form:           [place]



               00                                 TAB, ONCE,           



                                                  Dosing           



                                                  Weight           



                                                  102.273,           



                                                  kg,            



                                                  Priority:           



                                                  STAT,           



                                                  Start           



                                                  date:           



                                                  17           



                                                  13:36:00           



                                                  CDT, Stop           



                                                  date:           



                                                  17           



                                                  13:36:00           



                                                  CDT            

 

     Tramadol      -0      No                       100 mg,           [facility]

a



                                              Route: PO,           l



               18:36:                               Drug form:           [place]



               00                                 TAB, ONCE,           



                                                  Dosing           



                                                  Weight           



                                                  102.273,           



                                                  kg,            



                                                  Priority:           



                                                  STAT,           



                                                  Start           



                                                  date:           



                                                  17           



                                                  13:36:00           



                                                  CDT, Stop           



                                                  date:           



                                                  17           



                                                  13:36:00           



                                                  CDT            

 

     Tramadol      -0      No                       100 mg,           [facility]

a



                                              Route: PO,           l



               18:36:                               Drug form:           [facility]



               00                                 TAB, ONCE,           



                                                  Dosing           



                                                  Weight           



                                                  102.273,           



                                                  kg,            



                                                  Priority:           



                                                  STAT,           



                                                  Start           



                                                  date:           



                                                  17           



                                                  13:36:00           



                                                  CDT, Stop           



                                                  date:           



                                                  17           



                                                  13:36:00           



                                                  CDT            

 

     ketOROLAC      -0      No                       IV, ONCE           [facility]

j[facility]



     (ANES)                                                    l



               18:03:                                              [facility]



                                                               

 

     ondansetron      -0      No                       Route: IV,           

[facility]



     (ANES)                                     Drug form:           l



               18:03:                               INJ, ONCE,           [place]



               00                                 Stop date:           



                                                  17           



                                                  13:03:00           



                                                  CDT            

 

     ketOROLAC      -0      No                       IV, ONCE           [facility]

jensen



     (ANES)                                                    l



               18:03:                                              [place]



               00                                                

 

     ondansetron      2017-0      No                       Route: IV,           

[facility]



     (ANES)                                     Drug form:           l



               18:03:                               INJ, ONCE,                                            Stop date:           



                                                  17           



                                                  13:03:00           



                                                  CDT            

 

     ketOROLAC      2017-0      No                       IV, ONCE           [facility]

jensen



     (ANES)                                                    l



               18:03:                                              [facility]                                                

 

     ondansetron      2017-0      No                       Route: IV,           

[facility]



     (ANES)                                     Drug form:           l



               18:03:                               INJ, ONCE,                                            Stop date:           



                                                  17           



                                                  13:03:00           



                                                  CDT            

 

     tramadol      2017-0      Yes                      50 mg = 1           [facility]

jensen



     hydrochlori      -21                               tab, PO,           l



     de 50 MG      17:57:                               Q6H, PRN           [facility]

nn



     Oral Tablet      00                                 Pain, X 10           



                                                  day, # 40           



                                                  tab, 0           



                                                  Refill(s)           

 

     tramadol      2017-0      Yes                      50 mg = 1           [facility]

jensen



     hydrochlori      9-21                               tab, PO,           l



     de 50 MG      17:57:                               Q6H, PRN           [facility]

nn



     Oral Tablet      00                                 Pain, X 10           



                                                  day, # 40           



                                                  tab, 0           



                                                  Refill(s)           

 

     tramadol      2017-0      Yes                      50 mg = 1           [facility]

jensen



     hydrochlori      -21                               tab, PO,           l



     de 50 MG      17:57:                               Q6H, PRN           [facility]

nn



     Oral Tablet      00                                 Pain, X 10           



                                                  day, # 40           



                                                  tab, 0           



                                                  Refill(s)           

 

     Ondansetron      2017-0      No                       4 mg,           [facility]

[facility]



                                              Route:           l



               17:00:                               IVP, Q6H,                                            Dosing           



                                                  Weight           



                                                  102.273,           



                                                  kg, Start           



                                                  date:           



                                                  17           



                                                  12:00:00           



                                                  CDT,           



                                                  Duration:           



                                                  30 day,           



                                                  Stop date:           



                                                  10/21/17           



                                                  6:00:00           



                                                  CDT            

 

     Ondansetron      2017-0      No                       4 mg,           [facility]

[facility]



                                              Route:           l



               17:00:                               IVP, Q6H,                                            Dosing           



                                                  Weight           



                                                  102.273,           



                                                  kg, Start           



                                                  date:           



                                                  17           



                                                  12:00:00           



                                                  CDT,           



                                                  Duration:           



                                                  30 day,           



                                                  Stop date:           



                                                  10/21/17           



                                                  6:00:00           



                                                  CDT            

 

     Ondansetron      2017-0      No                       4 mg,           [facility]

[facility]



                                              Route:           l



               17:00:                               IVP, Q6H,                                            Dosing           



                                                  Weight           



                                                  102.273,           



                                                  kg, Start           



                                                  date:           



                                                  17           



                                                  12:00:00           



                                                  CDT,           



                                                  Duration:           



                                                  30 day,           



                                                  Stop date:           



                                                  10/21/17           



                                                  6:00:00           



                                                  CDT            

 

     propofol      2017-0      No                       Route: IV,           [facility]

[facility]



     (ANES)                                     Drug form:           l



               15:08:                               INJ, ONCE,           [place]                                 Stop date:           



                                                  17           



                                                  10:08:00           



                                                  CDT            

 

     succinylcho      -0      No                       Route: IV,           

[facility]



     line (ANES)                                     Drug form:           l



               15:08:                               INJ, ONCE,                                            Stop date:           



                                                  17           



                                                  10:08:00           



                                                  CDT            

 

     fentaNYL      2017-0      No                       Route: IV,           [facility]

[facility]



     (ANES)                                     Drug form:           l



               15:08:                               INJ, ONCE,                                            Stop date:           



                                                  17           



                                                  10:08:00           



                                                  CDT            

 

     propofol      2017-0      No                       Route: IV,           [facility]

[facility]



     (ANES)                                     Drug form:           l



               15:08:                               INJ, ONCE,                                            Stop date:           



                                                  17           



                                                  10:08:00           



                                                  CDT            

 

     succinylcho      2017-0      No                       Route: IV,           

[facility]



     line (ANES)                                     Drug form:           l



               15:08:                               INJ, ONCE,                                            Stop date:           



                                                  17           



                                                  10:08:00           



                                                  CDT            

 

     fentaNYL      2017-0      No                       Route: IV,           [facility]

[facility]



     (ANES)                                     Drug form:           l



               15:08:                               INJ, ONCE,                                            Stop date:           



                                                  17           



                                                  10:08:00           



                                                  CDT            

 

     propofol      2017-0      No                       Route: IV,           [facility]

[facility]



     (ANES)                                     Drug form:           l



               15:08:                               INJ, ONCE,                                            Stop date:           



                                                  17           



                                                  10:08:00           



                                                  CDT            

 

     succinylcho      -0      No                       Route: IV,           

[facility]



     line (ANES)                                     Drug form:           l



               15:08:                               INJ, ONCE,                                            Stop date:           



                                                  17           



                                                  10:08:00           



                                                  CDT            

 

     fentaNYL      2017-0      No                       Route: IV,           [facility]

[facility]



     (ANES)                                     Drug form:           l



               15:08:                               INJ, ONCE,                                            Stop date:           



                                                  17           



                                                  10:08:00           



                                                  CDT            

 

     lidocaine      2017-0      No                       Route: IV,           [facility]

[facility]



     (ANES)                                     Drug form:           l



               15:03:                               INJ, ONCE,                                            Stop date:           



                                                  17           



                                                  10:03:00           



                                                  CDT            

 

     lidocaine      -0      No                       Route: IV,           [facility]

[facility]



     (ANES)                                     Drug form:           l



               15:03:                               INJ, ONCE,                                            Stop date:           



                                                  17           



                                                  10:03:00           



                                                  CDT            

 

     lidocaine      -0      No                       Route: IV,           [facility]

[facility]



     (ANES)                                     Drug form:           l



               15:03:                               INJ, ONCE,                                            Stop date:           



                                                  17           



                                                  10:03:00           



                                                  CDT            

 

     Acetaminoph      -0      No                       1,000 mg,           [facility]

[facility]



     en                                       Route: PO,           l



               15:00:                               Drug form:           [facility]



                                                LIQ,           



                                                  Q6Hnow,           



                                                  Dosing           



                                                  Weight           



                                                  102.273,           



                                                  kg, Start           



                                                  date:           



                                                  17           



                                                  10:00:00           



                                                  CDT,           



                                                  Duration:           



                                                  30 day,           



                                                  Stop date:           



                                                  10/21/17           



                                                  4:00:00           



                                                  CDT            

 

     Tramadol      -0      No                       100 mg,           [facility]

a



                                              Route: PO,           l



               15:00:                               Drug form:           [place]                                 SUSP,           



                                                  Q6Hnow,           



                                                  Dosing           



                                                  Weight           



                                                  102.273,           



                                                  kg, Start           



                                                  date:           



                                                  17           



                                                  10:00:00           



                                                  CDT,           



                                                  Duration:           



                                                  30 day,           



                                                  Stop date:           



                                                  10/21/17           



                                                  4:00:00           



                                                  CDT            

 

     gabapentin      -0      No                       300 mg,           [facility]

[facility]



                                              Route: PO,           l



               15:00:                               Drug form:           [facility]



               00                                 SUSP,           



                                                  Q8Hnow,           



                                                  Dosing           



                                                  Weight           



                                                  102.273,           



                                                  kg, Start           



                                                  date:           



                                                  17           



                                                  10:00:00           



                                                  CDT,           



                                                  Duration:           



                                                  30 day,           



                                                  Stop date:           



                                                  10/21/17           



                                                  2:00:00           



                                                  CDT            

 

     celecoxib      -0      No                       200 mg,           [facility]

[facility]



                                              Route: PO,           l



               15:00:                               [id number],                                            Dosing           



                                                  Weight           



                                                  102.273,           



                                                  kg, Start           



                                                  date:           



                                                  17           



                                                  10:00:00           



                                                  CDT,           



                                                  Duration:           



                                                  30 day,           



                                                  Stop date:           



                                                  10/20/17           



                                                  22:00:00           



                                                  CDT            

 

     Acetaminoph      -0      No                       1,000 mg,           M

[facility]



     en                                       Route: PO,           l



               15:00:                               Drug form:           [facility]



                                                LIQ,           



                                                  Q6Hnow,           



                                                  Dosing           



                                                  Weight           



                                                  102.273,           



                                                  kg, Start           



                                                  date:           



                                                  17           



                                                  10:00:00           



                                                  CDT,           



                                                  Duration:           



                                                  30 day,           



                                                  Stop date:           



                                                  10/21/17           



                                                  4:00:00           



                                                  CDT            

 

     Tramadol      2017-0      No                       100 mg,           [facility]

a



                                              Route: PO,           l



               15:00:                               Drug form:           [place]



               00                                 SUSP,           



                                                  Q6Hnow,           



                                                  Dosing           



                                                  Weight           



                                                  102.273,           



                                                  kg, Start           



                                                  date:           



                                                  17           



                                                  10:00:00           



                                                  CDT,           



                                                  Duration:           



                                                  30 day,           



                                                  Stop date:           



                                                  10/21/17           



                                                  4:00:00           



                                                  CDT            

 

     gabapentin      -0      No                       300 mg,           Geraldo

jensen



                                              Route: PO,           l



               15:00:                               Drug form:           [facility]



               00                                 SUSP,           



                                                  Q8Hnow,           



                                                  Dosing           



                                                  Weight           



                                                  102.273,           



                                                  kg, Start           



                                                  date:           



                                                  17           



                                                  10:00:00           



                                                  CDT,           



                                                  Duration:           



                                                  30 day,           



                                                  Stop date:           



                                                  10/21/17           



                                                  2:00:00           



                                                  CDT            

 

     celecoxib      -0      No                       200 mg,           [facility]

[facility]



                                              Route: PO,           l



               15:00:                               [id number],           [facility]



               00                                 Dosing           



                                                  Weight           



                                                  102.273,           



                                                  kg, Start           



                                                  date:           



                                                  17           



                                                  10:00:00           



                                                  CDT,           



                                                  Duration:           



                                                  30 day,           



                                                  Stop date:           



                                                  10/20/17           



                                                  22:00:00           



                                                  CDT            

 

     Acetaminoph      2017-0      No                       1,000 mg,           [facility]

[facility]



     en                                       Route: PO,           l



               15:00:                               Drug form:           [facility]



               00                                 LIQ,           



                                                  Q6Hnow,           



                                                  Dosing           



                                                  Weight           



                                                  102.273,           



                                                  kg, Start           



                                                  date:           



                                                  17           



                                                  10:00:00           



                                                  CDT,           



                                                  Duration:           



                                                  30 day,           



                                                  Stop date:           



                                                  10/21/17           



                                                  4:00:00           



                                                  CDT            

 

     Tramadol      2017-0      No                       100 mg,           [facility]

a



                                              Route: PO,           l



               15:00:                               Drug form:           [place]



               00                                 SUSP,           



                                                  Q6Hnow,           



                                                  Dosing           



                                                  Weight           



                                                  102.273,           



                                                  kg, Start           



                                                  date:           



                                                  17           



                                                  10:00:00           



                                                  CDT,           



                                                  Duration:           



                                                  30 day,           



                                                  Stop date:           



                                                  10/21/17           



                                                  4:00:00           



                                                  CDT            

 

     gabapentin      -0      No                       300 mg,           [facility]do

jensen



                                              Route: PO,           l



               15:00:                               Drug form:           [place]



               00                                 SUSP,           



                                                  Q8Hnow,           



                                                  Dosing           



                                                  Weight           



                                                  102.273,           



                                                  kg, Start           



                                                  date:           



                                                  17           



                                                  10:00:00           



                                                  CDT,           



                                                  Duration:           



                                                  30 day,           



                                                  Stop date:           



                                                  10/21/17           



                                                  2:00:00           



                                                  CDT            

 

     celecoxib      -0      No                       200 mg,           [facility]

[facility]



                                              Route: PO,           l



               15:00:                               [id number],                                            Dosing           



                                                  Weight           



                                                  102.273,           



                                                  kg, Start           



                                                  date:           



                                                  17           



                                                  10:00:00           



                                                  CDT,           



                                                  Duration:           



                                                  30 day,           



                                                  Stop date:           



                                                  10/20/17           



                                                  22:00:00           



                                                  CDT            

 

     rocuronium      2017-0      No                       Route: IV,           [facility]

[facility]



     (ANES)                                     Drug form:           l



               14:43:                               INJ, ONCE,                                            Stop date:           



                                                  17           



                                                  9:43:00           



                                                  CDT            

 

     rocuronium      2017-0      No                       Route: IV,           M

[facility]



     (ANES)                                     Drug form:           l



               14:43:                               INJ, ONCE,                                            Stop date:           



                                                  17           



                                                  9:43:00           



                                                  CDT            

 

     rocuronium      2017-0      No                       Route: IV,           M

[facility]



     (ANES)                                     Drug form:           l



               14:43:                               INJ, ONCE,                                            Stop date:           



                                                  17           



                                                  9:43:00           



                                                  CDT            

 

     famotidine      -0      No                       Route: IV,           M

[facility]



     (ANES)                                     Drug form:           l



               14:33:                               INJ, ONCE,                                            Stop date:           



                                                  17           



                                                  9:33:00           



                                                  CDT            

 

     ceFAZolin      -0      No                       Route: IV,           [facility]

[facility]



     (ANES)                                     Drug form:           l



               14:33:                               INJ, ONCE,                                            Stop date:           



                                                  17           



                                                  9:33:00           



                                                  CDT            

 

     famotidine      -0      No                       Route: IV,           M

[facility]



     (ANES)                                     Drug form:           l



               14:33:                               INJ, ONCE,                                            Stop date:           



                                                  17           



                                                  9:33:00           



                                                  CDT            

 

     ceFAZolin      2017-0      No                       Route: IV,           [facility]

[facility]



     (ANES)                                     Drug form:           l



               14:33:                               INJ, ONCE,                                            Stop date:           



                                                  17           



                                                  9:33:00           



                                                  CDT            

 

     famotidine      2017-0      No                       Route: IV,           M

[facility]



     (ANES)                                     Drug form:           l



               14:33:                               INJ, ONCE,                                            Stop date:           



                                                  17           



                                                  9:33:00           



                                                  CDT            

 

     ceFAZolin            No                       Route: IV,           Me

[facility]



     (ANES)                                     Drug form:           l



               14:33:                               INJ, ONCE,                                            Stop date:           



                                                  17           



                                                  9:33:00           



                                                  CDT            

 

     dexamethaso            No                       Route: IV,           

[facility]



     ne (ANES)                                     Drug form:           l



               14:18:                               INJ, ONCE,                                            Stop date:           



                                                  17           



                                                  9:18:00           



                                                  CDT            

 

     dexamethaso            No                       Route: IV,           

[facility]



     ne (ANES)                                     Drug form:           l



               14:18:                               INJ, ONCE,                                            Stop date:           



                                                  17           



                                                  9:18:00           



                                                  CDT            

 

     dexamethaso            No                       Route: IV,           

[facility]



     ne (ANES)                                     Drug form:           l



               14:18:                               INJ, ONCE,                                            Stop date:           



                                                  17           



                                                  9:18:00           



                                                  CDT            

 

     acetaminoph            No                       Route: IV,           

[facility]



     en (ANES)                                     Drug form:           l



     (ANES)      13:54:                               INJ, Start           Kaylee                                 date:           



                                                  17           



                                                  8:54:00           



                                                  CDT, Stop           



                                                  date:           



                                                  17           



                                                  9:54:00           



                                                  CDT            

 

     acetaminoph            No                       Route: IV,           

[facility]



     en (ANES)                                     Drug form:           l



     (ANES)      13:54:                               INJ, Start           Kaylee

                                 date:           



                                                  17           



                                                  8:54:00           



                                                  CDT, Stop           



                                                  date:           



                                                  17           



                                                  9:54:00           



                                                  CDT            

 

     acetaminoph            No                       Route: IV,           

[facility]



     en (ANES)                                     Drug form:           l



     (ANES)      13:54:                               INJ, Start           Kaylee                                 date:           



                                                  17           



                                                  8:54:00           



                                                  CDT, Stop           



                                                  date:           



                                                  17           



                                                  9:54:00           



                                                  CDT            

 

     LR 1000 mL            No                       Route: IV,           M

[facility]



     INJ (ANES)                                     Total           l



               13:23:                               Volume:           [facility]



               00                                 1,000,           



                                                  Start           



                                                  date:           



                                                  17           



                                                  8:23:00           



                                                  CDT, Stop           



                                                  date:           



                                                  17           



                                                  9:23:00           



                                                  CDT            

 

     LR 1000 mL            No                       Route: IV,           M

[facility]



     INJ (ANES)                                     Total           l



               13:23:                               Volume:           [facility]



               00                                 1,000,           



                                                  Start           



                                                  date:           



                                                  17           



                                                  8:23:00           



                                                  CDT, Stop           



                                                  date:           



                                                  17           



                                                  9:23:00           



                                                  CDT            

 

     LR 1000 mL            No                       Route: IV,           M

[facility]



     INJ (ANES)                                     Total           l



               13:23:                               Volume:           [facility]



               00                                 1,000,           



                                                  Start           



                                                  date:           



                                                  17           



                                                  8:23:00           



                                                  CDT, Stop           



                                                  date:           



                                                  17           



                                                  9:23:00           



                                                  CDT            

 

     Flomax            No                       Notes:           [facility]



                                              (Same As:           l



               10:00:                               Flomax)           [facility]



                                                "Do Not           



                                                  Crush"           

 

     Ofirmev            No                       Notes:           [facility]



                                              Infuse           l



               10:00:                               over 15           [place]



               00                                 minutes Do           



                                                  not exceed           



                                                  4gm/day of           



                                                  acetaminop           



                                                  hen ***           



                                                  MEDICATION           



                                                  WASTE ***           



                                                  Product           



                                                  Size: 1000           



                                                  mg Product           



                                                  Wasted:           



                                                  ___ mg           

 

     ceFAZolin            No                       Notes:           [facility]

a



                                              Same as:           l



               10:00:                               Ancef           [facility]



               00                                                

 

     Flomax            No                       Notes:           [facility]



                                              (Same As:           l



               10:00:                               Flomax)           [place]



                                                "Do Not           



                                                  Crush"           

 

     Ofirmev            No                       Notes:           [facility]



                                              Infuse           l



               10:00:                               over 15           [facility]



               00                                 minutes Do           



                                                  not exceed           



                                                  4gm/day of           



                                                  acetaminop           



                                                  hen ***           



                                                  MEDICATION           



                                                  WASTE ***           



                                                  Product           



                                                  Size: 1000           



                                                  mg Product           



                                                  Wasted:           



                                                  ___ mg           

 

     ceFAZolin            No                       Notes:           [facility]

a



                                              Same as:           l



               10:00:                               Ancef           [facility]



               00                                                

 

     Flomax            No                       Notes:           [facility]



                                              (Same As:           l



               10:00:                               Flomax)           [place]



               00                                 "Do Not           



                                                  Crush"           

 

     Ofirmev            No                       Notes:           [facility]



                                              Infuse           l



               10:00:                               over 15           [facility]



               00                                 minutes Do           



                                                  not exceed           



                                                  4gm/day of           



                                                  acetaminop           



                                                  hen ***           



                                                  MEDICATION           



                                                  WASTE ***           



                                                  Product           



                                                  Size: 1000           



                                                  mg Product           



                                                  Wasted:           



                                                  ___ mg           

 

     ceFAZolin            No                       Notes:           [facility]

a



                                              Same as:           l



               10:00:                               Ancef           [facility]



               00                                                

 

     Tacrolimus            Yes                      2 mg, PO,           Me

[facility]



               9-19                               QPM            l



               14:03:                                                                                              

 

     Tacrolimus      -0      Yes                      2 mg, PO,           Me

[facility]



               9-19                               QPM            l



               14:03:                                                                                              

 

     Tacrolimus      -0      Yes                      2 mg, PO,           Me

[facility]



               9-19                               QPM            l



               14:03:                                                                                              

 

     Amitriptyli Amitriptyli       Yes  KULVINDER                TAKE 1   

        UT



     ne HCl - 25 ne HCl - 25 3-21           KAMILAH ALANIZ                TABLET 3  

         Physici



     MG Oral MG Oral 00:00:                               TIMES           ans



     Tablet Tablet 00                                 DAILY AS           



                                                  NEEDED.           

 

     Sucralfate            No                       Notes: May           [facility]

[facility]



     100 MG/ML      1-28                               interfere           l



     Oral      00:00:                               w/enteral           [place]



     Suspension      00                                 feeds -           



                                                  Take 1 hr           



                                                  before or           



                                                  2 hr after           



                                                  antacids,           



                                                  dairy pdt,           



                                                  meals           



                                                  &amp;           



                                                  minerals -           



                                                  On empty           



                                                  stomach.           



                                                  For            



                                                  patients           



                                                  unable to           



                                                  swallow           



                                                  tablet,           



                                                  dissolve           



                                                  in 10mL -           



                                                  30mL of           



                                                  water or           



                                                  juice and           



                                                  stir           



                                                  before           



                                                  giving.           



                                                  (Same As:           



                                                  Carafate)           

 

     Sucralfate            No                       Notes: May           M

[facility]



     100 MG/ML      1-28                               interfere           l



     Oral      00:00:                               w/enteral           [facility]



     Suspension      00                                 feeds -           



                                                  Take 1 hr           



                                                  before or           



                                                  2 hr after           



                                                  antacids,           



                                                  dairy pdt,           



                                                  meals           



                                                  &amp;           



                                                  minerals -           



                                                  On empty           



                                                  stomach.           



                                                  For            



                                                  patients           



                                                  unable to           



                                                  swallow           



                                                  tablet,           



                                                  dissolve           



                                                  in 10mL -           



                                                  30mL of           



                                                  water or           



                                                  juice and           



                                                  stir           



                                                  before           



                                                  giving.           



                                                  (Same As:           



                                                  Carafate)           

 

     Sucralfate            No                       Notes: May           M

[facility]



     100 MG/ML      1-28                               interfere           l



     Oral      00:00:                               w/enteral           [facility]



     Suspension      00                                 feeds -           



                                                  Take 1 hr           



                                                  before or           



                                                  2 hr after           



                                                  antacids,           



                                                  dairy pdt,           



                                                  meals           



                                                  &amp;           



                                                  minerals -           



                                                  On empty           



                                                  stomach.           



                                                  For            



                                                  patients           



                                                  unable to           



                                                  swallow           



                                                  tablet,           



                                                  dissolve           



                                                  in 10mL -           



                                                  30mL of           



                                                  water or           



                                                  juice and           



                                                  stir           



                                                  before           



                                                  giving.           



                                                  (Same As:           



                                                  Carafate)           

 

     Protonix            No                       Notes:           [facility]



               1-26                               Tablet           l



               15:00:                               should not           [place]                                 be chewed           



                                                  or             



                                                  crushed.           



                                                  (Same as:           



                                                  Protonix)           

 

     Cartia XT            No                       Notes:           [facility]

a



               1-26                               (Same as:           l



               15:00:                               Cardizem           [facility]                                 CD) Before           



                                                  meals. DO           



                                                  NOT CRUSH.           

 

     Prednisone      2017-0      No                       Notes:           [facility]

ia



               -26                               (Same as:           l



               15:00:                               PredniSONE           [place]



               00                                 ) Take           



                                                  with food.           

 

     Protonix            No                       Notes:           [facility]



               1-26                               Tablet           l



               15:00:                               should not           [place]



               00                                 be chewed           



                                                  or             



                                                  crushed.           



                                                  (Same as:           



                                                  Protonix)           

 

     Cartia XT            No                       Notes:           [facility]

a



               -26                               (Same as:           l



               15:00:                               Cardizem           [facility]



               00                                 CD) Before           



                                                  meals. DO           



                                                  NOT CRUSH.           

 

     Prednisone            No                       Notes:           [facility]

[facility]



               -26                               (Same as:           l



               15:00:                               PredniSONE           [place]



               00                                 ) Take           



                                                  with food.           

 

     Protonix            No                       Notes:           [facility]



               1-26                               Tablet           l



               15:00:                               should not           [place]



               00                                 be chewed           



                                                  or             



                                                  crushed.           



                                                  (Same as:           



                                                  Protonix)           

 

     Cartia XT            No                       Notes:           [facility]

a



               1-26                               (Same as:           l



               15:00:                               Cardizem           [place]



               00                                 CD) Before           



                                                  meals. DO           



                                                  NOT CRUSH.           

 

     Prednisone            No                       Notes:           [facility]

[facility]



               -                               (Same as:           l



               15:00:                               PredniSONE           [facility]



               00                                 ) Take           



                                                  with food.           

 

     Acetaminoph            Yes                      1,000 mg =           

[facility]



     en                                       31.23 mL,           l



               13:15:                               PO,            [facility]



               00                                 Q6Hnow, 0           



                                                  Refill(s)           

 

     gabapentin            Yes                      300 mg = 6           [facility]

[facility]



     50 MG/ML      -26                               mL, PO,           l



     Oral      13:15:                               Q8Hnow, #           [place]



     Solution      00                                 378 mL, 0           



                                                  Refill(s)           

 

     tramadol            Yes                      50 mg = 1           [facility]

[facility]



     hydrochlori                                     tab, PO,           l



     de 50 MG      13:15:                               Q6Hnow,           [facility]

n



     Oral Tablet      00                                 PRN Pain           



                                                  Score           



                                                  6-10, X 14           



                                                  day, # 56           



                                                  tab, 0           



                                                  Refill(s)           

 

     Acetaminoph            Yes                      1,000 mg =           

[facility]



     en                                       31.23 mL,           l



               13:15:                               PO,            [place]



               00                                 Q6Hnow, 0           



                                                  Refill(s)           

 

     gabapentin            Yes                      300 mg = 6           [facility]

[facility]



     50 MG/ML      -26                               mL, PO,           l



     Oral      13:15:                               Q8Hnow, #           [place]



     Solution      00                                 378 mL, 0           



                                                  Refill(s)           

 

     tramadol            Yes                      50 mg = 1           [facility]

j[facility]



     hydrochlori                                     tab, PO,           l



     de 50 MG      13:15:                               Q6Hnow,           [facility]

n



     Oral Tablet      00                                 PRN Pain           



                                                  Score           



                                                  6-10, X 14           



                                                  day, # 56           



                                                  tab, 0           



                                                  Refill(s)           

 

     Acetaminoph            Yes                      1,000 mg =           

[facility]



     en                                       31.23 mL,           l



               13:15:                               PO,            [place]



               00                                 Q6Hnow, 0           



                                                  Refill(s)           

 

     gabapentin            Yes                      300 mg = 6           [facility]

[facility]



     50 MG/ML      -26                               mL, PO,           l



     Oral      13:15:                               Q8Hnow, #           [place]



     Solution      00                                 378 mL, 0           



                                                  Refill(s)           

 

     tramadol            Yes                      50 mg = 1           [facility]

[facility]



     hydrochlori                                     tab, PO,           l



     de 50 MG      13:15:                               Q6Hnow,           [facility]

n



     Oral Tablet      00                                 PRN Pain           



                                                  Score           



                                                  6-10, X 14           



                                                  day, # 56           



                                                  tab, 0           



                                                  Refill(s)           

 

     Enoxaparin            No                       Notes:           [facility]

[facility]



               -                               (Same as:           l



               12:00:                               Lovenox)           [facility]



                                                               

 

     Enoxaparin            No                       Notes:           [facility]

[facility]



               -                               (Same as:           l



               12:00:                               Lovenox)           [facility]



                                                               

 

     Enoxaparin            No                       Notes:           [facility]

[facility]



               -                               (Same as:           l



               12:00:                               Lovenox)           [facility]                                                

 

     Solu-CORTEF            No                       Notes:           [facility]

[facility]



               -                               (Same as:           l



               04:00:                               Solu-RUPA           [facility]



               00                                 F)             

 

     Solu-CORTEF            No                       Notes:           [facility]

[facility]



               -                               (Same as:           l



               04:00:                               Solu-RUPA           [facility]



               00                                 F)             

 

     Solu-CORTEF            No                       Notes:           [facility]

[facility]



               1-26                               (Same as:           l



               04:00:                               Solu-RUPA           [facility]



               00                                 F)             

 

     Prograf            No                       Notes:           [facility]



               -                               Avoid           l



               03:26:                               grapefruit           [place]



               00                                 and            



                                                  grapefruit           



                                                  juice.           



                                                  (Same As:           



                                                  Prograf)           

 

     Prograf            No                       Notes:           [facility]



               1-                               Avoid           l



               03:26:                               grapefruit           [place]



               00                                 and            



                                                  grapefruit           



                                                  juice.           



                                                  (Same As:           



                                                  Prograf)           

 

     Prograf      2017-0      No                       Notes:           [facility]



               -                               Avoid           l



               03:26:                               grapefruit                                            and            



                                                  grapefruit           



                                                  juice.           



                                                  (Same As:           



                                                  Prograf)           

 

     hydrocortis      -0      No                       100 mg,           [facility]

[facility]



     one 100 mg                                     Route: IV,           l



     injection      03:00:                               Q6H,                                            Dosing           



                                                  Weight           



                                                  136.136,           



                                                  kg, Start           



                                                  date:           



                                                  17           



                                                  21:00:00           



                                                  CST,           



                                                  Duration:           



                                                  30 day,           



                                                  Stop date:           



                                                  17           



                                                  18:00:00           



                                                  CST            

 

     hydrocortis      2017-0      No                       100 mg,           [facility]

[facility]



     one 100 mg                                     Route: IV,           l



     injection      03:00:                               Q6H,                                            Dosing           



                                                  Weight           



                                                  136.136,           



                                                  kg, Start           



                                                  date:           



                                                  17           



                                                  21:00:00           



                                                  CST,           



                                                  Duration:           



                                                  30 day,           



                                                  Stop date:           



                                                  17           



                                                  18:00:00           



                                                  CST            

 

     hydrocortis      -0      No                       100 mg,           [facility]

[facility]



     one 100 mg                                     Route: IV,           l



     injection      03:00:                               Q6H,                                            Dosing           



                                                  Weight           



                                                  136.136,           



                                                  kg, Start           



                                                  date:           



                                                  17           



                                                  21:00:00           



                                                  CST,           



                                                  Duration:           



                                                  30 day,           



                                                  Stop date:           



                                                  17           



                                                  18:00:00           



                                                  CST            

 

     Ondansetron      -0      No                       Notes:           [facility]

jensen



               -                               (Same as:           l



               00:00:                               Zofran)           [facility]



                                                ***            



                                                  MEDICATION           



                                                  WASTE ***           



                                                  Product           



                                                  Size: 4 mg           



                                                  Product           



                                                  Wasted:           



                                                  ___ mg           

 

     Ondansetron      -0      No                       Notes:           [facility]

jensen



               -                               (Same as:           l



               00:00:                               Zofran)           [facility]



                                                ***            



                                                  MEDICATION           



                                                  WASTE ***           



                                                  Product           



                                                  Size: 4 mg           



                                                  Product           



                                                  Wasted:           



                                                  ___ mg           

 

     Ondansetron      -0      No                       Notes:           [facility]eraldo

jensen



               -26                               (Same as:           l



               00:00:                               Zofran)           [facility]



                                                ***            



                                                  MEDICATION           



                                                  WASTE ***           



                                                  Product           



                                                  Size: 4 mg           



                                                  Product           



                                                  Wasted:           



                                                  ___ mg           

 

     Acetaminoph      -0      No                       1,000 mg,           [facility]

[facility]



     en                                       Route: IV,           l



               23:36:                               ONCE,                                            Dosing           



                                                  Weight           



                                                  136.136,           



                                                  kg, Start           



                                                  date:           



                                                  17           



                                                  17:36:00           



                                                  CST, Stop           



                                                  date:           



                                                  17           



                                                  17:36:00           



                                                  CST            

 

     Acetaminoph      2017-0      No                       1,000 mg,           [facility]

[facility]



     en                                       Route: IV,           l



               23:36:                               ONCE,           [facility]



                                                Dosing           



                                                  Weight           



                                                  136.136,           



                                                  kg, Start           



                                                  date:           



                                                  17           



                                                  17:36:00           



                                                  CST, Stop           



                                                  date:           



                                                  17           



                                                  17:36:00           



                                                  CST            

 

     Acetaminoph            No                       1,000 mg,           [facility]

[facility]



     en                                       Route: IV,           l



               23:36:                               ONCE,           [place]



                                                Dosing           



                                                  Weight           



                                                  136.136,           



                                                  kg, Start           



                                                  date:           



                                                  17           



                                                  17:36:00           



                                                  CST, Stop           



                                                  date:           



                                                  17           



                                                  17:36:00           



                                                  CST            

 

     Docusate            No                       Notes:           [facility]



               1-25                               (Same as:           l



               23:00:                               Colace)           [place]



                                                               

 

     Cellcept            No                       Notes:           [facility]



               1-25                               SEPARATE           l



               23:00:                               ANTACIDS           [place]



               00                                 from           



                                                  Cellcept           



                                                  by 2 hrs.           



                                                  (Same As:           



                                                  CellCept)           

 

     Hydralazine            No                       Notes:           [facility]

jensen



     Hydrochlori      1-25                               (Same as:           l



     de 100 MG      23:00:                               Apresoline           [facility]

[facility]



     Oral Tablet      00                                 ) May           



                                                  interfere           



                                                  w/enteral           



                                                  feedings           



                                                  Take With           



                                                  Food           

 

     Docusate            No                       Notes:           [facility]



               1-25                               (Same as:           l



               23:00:                               Colace)           [place]



               00                                                

 

     Cellcept            No                       Notes:           [facility]



               1-25                               SEPARATE           l



               23:00:                               ANTACIDS           [facility]



               00                                 from           



                                                  Cellcept           



                                                  by 2 hrs.           



                                                  (Same As:           



                                                  CellCept)           

 

     Hydralazine            No                       Notes:           [facility]

jensen



     Hydrochlori      1-25                               (Same as:           l



     de 100 MG      23:00:                               Apresoline           [facility]

[facility]



     Oral Tablet      00                                 ) May           



                                                  interfere           



                                                  w/enteral           



                                                  feedings           



                                                  Take With           



                                                  Food           

 

     Docusate            No                       Notes:           [facility]



               1-25                               (Same as:           l



               23:00:                               Colace)           [facility]



               00                                                

 

     Cellcept            No                       Notes:           [facility]



               1-25                               SEPARATE           l



               23:00:                               ANTACIDS           [place]



               00                                 from           



                                                  Cellcept           



                                                  by 2 hrs.           



                                                  (Same As:           



                                                  CellCept)           

 

     Hydralazine            No                       Notes:           [facility]

jensen



     Hydrochlori      1-25                               (Same as:           l



     de 100 MG      23:00:                               Apresoline           [facility]

[facility]



     Oral Tablet      00                                 ) May           



                                                  interfere           



                                                  w/enteral           



                                                  feedings           



                                                  Take With           



                                                  Food           

 

     Oxycodone            No                       Notes:           [facility]

a



     Hydrochlori      1-25                               (Same           l



     de 5 MG      21:00:                               as:'[place]           [facility]

[facility]



     Oral Tablet      00                                 done) To           



                                                  be drawn           



                                                  up in 3 mL           



                                                  syr            

 

     gabapentin            No                       300 mg,           [facility]

jensen



               1-25                               Route: PO,           l



               21:00:                               Q8Hnow,           [facility]                                 Dosing           



                                                  Weight           



                                                  136.136,           



                                                  kg, Start           



                                                  date:           



                                                  17           



                                                  15:00:00           



                                                  CST,           



                                                  Duration:           



                                                  30 day,           



                                                  Stop date:           



                                                  17           



                                                  7:00:00           



                                                  CST            

 

     Tramadol            No                       Notes: Not           [facility]

[facility]



               -25                               to exceed           l



               21:00:                               400mg/day.           [facility]                                 (Same As:           



                                                  Ultram)           

 

     Acetaminoph            No                       1,000 mg,           [facility]

[facility]



     en                                       Route:           l



               21:00:                               IVPB,                                            Q6Hnow,           



                                                  Dosing           



                                                  Weight           



                                                  136.136,           



                                                  kg, Start           



                                                  date:           



                                                  17           



                                                  15:00:00           



                                                  CST,           



                                                  Duration:           



                                                  30 day,           



                                                  Stop date:           



                                                  17           



                                                  9:00:00           



                                                  CST            

 

     Al              No                       Notes:           [facility]



     hydroxide/M      -25                               (aluminum           l



     g         21:00:                               hydroxide-           [place]



     hydroxide/s      00                                 magnesium           



     imethicone                                         hyd-simeth           



     200 mg-200                                         icone           



     mg-20 mg/5                                         200-200-20           



     mL oral                                         mg/5ml 30           



     suspension                                         ml ud TINY)           

 

     Ondansetron            No                       Notes:           [facility]

jensen



               1-25                               (Same as:           l



               21:00:                               Zofran)                                            ***            



                                                  MEDICATION           



                                                  WASTE ***           



                                                  Product           



                                                  Size: 4 mg           



                                                  Product           



                                                  Wasted:           



                                                  ___ mg           

 

     Hydromorpho            No                       Notes:           [facility]

jensen



     ne        -25                               Same as           l



               21:00:                               Dilaudid                                                           

 

     Oxycodone            No                       Notes:           [facility]

a



     Hydrochlori      1-25                               (Same           l



     de 5 MG      21:00:                               as:'[place]           [facility]

[facility]



     Oral Tablet      00                                 done) To           



                                                  be drawn           



                                                  up in 3 mL           



                                                  syr            

 

     gabapentin            No                       300 mg,           [facility]

j[facility]



               1-25                               Route: PO,           l



               21:00:                               Q8Hnow,                                            Dosing           



                                                  Weight           



                                                  136.136,           



                                                  kg, Start           



                                                  date:           



                                                  17           



                                                  15:00:00           



                                                  CST,           



                                                  Duration:           



                                                  30 day,           



                                                  Stop date:           



                                                  17           



                                                  7:00:00           



                                                  CST            

 

     Tramadol            No                       Notes: Not           [facility]

[facility]



               1-25                               to exceed           l



               21:00:                               400mg/day.           [place]



                                                (Same As:           



                                                  Ultram)           

 

     Acetaminoph            No                       1,000 mg,           [facility]

[facility]



     en                                       Route:           l



               21:00:                               IVPB,           [facility]



               00                                 Q6Hnow,           



                                                  Dosing           



                                                  Weight           



                                                  136.136,           



                                                  kg, Start           



                                                  date:           



                                                  17           



                                                  15:00:00           



                                                  CST,           



                                                  Duration:           



                                                  30 day,           



                                                  Stop date:           



                                                  17           



                                                  9:00:00           



                                                  CST            

 

     Al              No                       Notes:           [facility]



     hydroxide/M                                     (aluminum           l



     g         21:00:                               hydroxide-           [place]



     hydroxide/s      00                                 magnesium           



     imethicone                                         hyd-simeth           



     200 mg-200                                         icone           



     mg-20 mg/5                                         200-200-20           



     mL oral                                         mg/5ml 30           



     suspension                                         ml ud TINY)           

 

     Ondansetron            No                       Notes:           [facility]

jensen



               1-25                               (Same as:           l



               21:00:                               Zofran)                                            ***            



                                                  MEDICATION           



                                                  WASTE ***           



                                                  Product           



                                                  Size: 4 mg           



                                                  Product           



                                                  Wasted:           



                                                  ___ mg           

 

     Hydromorpho            No                       Notes:           [facility]eraldo

jensen



     ne        -25                               Same as           l



               21:00:                               Dilaudid                                                           

 

     Oxycodone            No                       Notes:           [facility]

a



     Hydrochlori      -25                               (Same           l



     de 5 MG      21:00:                               as:'[place]           [facility]

[facility]



     Oral Tablet      00                                 done) To           



                                                  be drawn           



                                                  up in 3 mL           



                                                  syr            

 

     gabapentin            No                       300 mg,           [facility]

jensen



               1-25                               Route: PO,           l



               21:00:                               Q8Hnow,           [place]                                 Dosing           



                                                  Weight           



                                                  136.136,           



                                                  kg, Start           



                                                  date:           



                                                  17           



                                                  15:00:00           



                                                  CST,           



                                                  Duration:           



                                                  30 day,           



                                                  Stop date:           



                                                  17           



                                                  7:00:00           



                                                  CST            

 

     Tramadol            No                       Notes: Not           [facility]

[facility]



               1-25                               to exceed           l



               21:00:                               400mg/day.           [place]



                                                (Same As:           



                                                  Ultram)           

 

     Acetaminoph            No                       1,000 mg,           [facility]

[facility]



     en                                       Route:           l



               21:00:                               IVPB,           [place]



               00                                 Q6Hnow,           



                                                  Dosing           



                                                  Weight           



                                                  136.136,           



                                                  kg, Start           



                                                  date:           



                                                  17           



                                                  15:00:00           



                                                  CST,           



                                                  Duration:           



                                                  30 day,           



                                                  Stop date:           



                                                  17           



                                                  9:00:00           



                                                  CST            

 

     Al              No                       Notes:           [facility]



     hydroxide/M      -25                               (aluminum           l



     g         21:00:                               hydroxide-           [place]



     hydroxide/s      00                                 magnesium           



     imethicone                                         hyd-simeth           



     200 mg-200                                         icone           



     mg-20 mg/5                                         200-200-20           



     mL oral                                         mg/5ml 30           



     suspension                                         ml ud TINY)           

 

     Ondansetron            No                       Notes:           [facility]

jensen



                                              (Same as:           l



               21:00:                               Zofran)           [place]                                 ***            



                                                  MEDICATION           



                                                  WASTE ***           



                                                  Product           



                                                  Size: 4 mg           



                                                  Product           



                                                  Wasted:           



                                                  ___ mg           

 

     Hydromorpho            No                       Notes:           [facility]

jensen



     ne                                       Same as           l



               21:00:                               Dilaudid           [facility]



               00                                                

 

     Al              No                       30 ml,           [facility]



     hydroxide/M                                     Route: PO,           l



     g         20:54:                               Drug Form:           [facility]



     hydroxide/s      00                                 SUSP,           



     imethicone                                         Dosing           



     200 mg-200                                         Weight           



     mg-20 mg/5                                         136.136,           



     mL oral                                         kg, Q6H,           



     suspension                                         PRN            



                                                  Indigestio           



                                                  n, Start           



                                                  date:           



                                                  17           



                                                  14:54:00           



                                                  CST,           



                                                  Duration:           



                                                  30 day,           



                                                  Stop date:           



                                                  17           



                                                  14:53:00           



                                                  CST            

 

     Calcium            No                       1,000 mL,           [facility]

[facility]



     Chloride                                     Rate: 125           l



     0.0014      20:54:                               ml/hr,           [place]



     MEQ/ML /      00                                 Infuse           



     Potassium                                         over: 8           



     Chloride                                         hr, Route:           



     0.004                                         IV, Dosing           



     MEQ/ML /                                         Weight           



     Sodium                                         136.136           



     Chloride                                         kg, Total           



     0.103                                         Volume:           



     MEQ/ML /                                         1,000,           



     Sodium                                         Start           



     Lactate                                         date:           



     0.028                                         17           



     MEQ/ML                                         14:54:00           



     Injectable                                         CST,           



     Solution                                         Duration:           



                                                  30 day,           



                                                  Stop date:           



                                                  17           



                                                  14:53:00           



                                                  CST            

 

     Al              No                       30 ml,           [facility]



     hydroxide/M      25                               Route: PO,           l



     g         20:54:                               Drug Form:           [place]



     hydroxide/s      00                                 SUSP,           



     imethicone                                         Dosing           



     200 mg-200                                         Weight           



     mg-20 mg/5                                         136.136,           



     mL oral                                         kg, Q6H,           



     suspension                                         PRN            



                                                  Indigestio           



                                                  n, Start           



                                                  date:           



                                                  17           



                                                  14:54:00           



                                                  CST,           



                                                  Duration:           



                                                  30 day,           



                                                  Stop date:           



                                                  17           



                                                  14:53:00           



                                                  CST            

 

     Calcium      2017-0      No                       1,000 mL,           [facility]

[facility]



     Chloride      1-25                               Rate: 125           l



     0.0014      20:54:                               ml/hr,           [place]



     MEQ/ML /      00                                 Infuse           



     Potassium                                         over: 8           



     Chloride                                         hr, Route:           



     0.004                                         IV, Dosing           



     MEQ/ML /                                         Weight           



     Sodium                                         136.136           



     Chloride                                         kg, Total           



     0.103                                         Volume:           



     MEQ/ML /                                         1,000,           



     Sodium                                         Start           



     Lactate                                         date:           



     0.028                                         17           



     MEQ/ML                                         14:54:00           



     Injectable                                         CST,           



     Solution                                         Duration:           



                                                  30 day,           



                                                  Stop date:           



                                                  17           



                                                  14:53:00           



                                                  CST            

 

     Al        2017-0      No                       30 ml,           [facility]



     hydroxide/M      1-25                               Route: PO,           l



     g         20:54:                               Drug Form:           [place]



     hydroxide/s      00                                 SUSP,           



     imethicone                                         Dosing           



     200 mg-200                                         Weight           



     mg-20 mg/5                                         136.136,           



     mL oral                                         kg, Q6H,           



     suspension                                         PRN            



                                                  Indigestio           



                                                  n, Start           



                                                  date:           



                                                  17           



                                                  14:54:00           



                                                  CST,           



                                                  Duration:           



                                                  30 day,           



                                                  Stop date:           



                                                  17           



                                                  14:53:00           



                                                  CST            

 

     Calcium      2017-0      No                       1,000 mL,           [facility]

[facility]



     Chloride      1-25                               Rate: 125           l



     0.0014      20:54:                               ml/hr,           [place]



     MEQ/ML /      00                                 Infuse           



     Potassium                                         over: 8           



     Chloride                                         hr, Route:           



     0.004                                         IV, Dosing           



     MEQ/ML /                                         Weight           



     Sodium                                         136.136           



     Chloride                                         kg, Total           



     0.103                                         Volume:           



     MEQ/ML /                                         1,000,           



     Sodium                                         Start           



     Lactate                                         date:           



     0.028                                         17           



     MEQ/ML                                         14:54:00           



     Injectable                                         CST,           



     Solution                                         Duration:           



                                                  30 day,           



                                                  Stop date:           



                                                  17           



                                                  14:53:00           



                                                  CST            

 

     Cefoxitin      2017-0      No                       1 gm,           [facility]



                                              Route:           l



               20:10:                               IVPB,           [place]



               00                                 ONCE,           



                                                  Dosing           



                                                  Weight           



                                                  136.136,           



                                                  kg, Start           



                                                  date:           



                                                  17           



                                                  14:10:00           



                                                  CST, Stop           



                                                  date:           



                                                  17           



                                                  14:10:00           



                                                  CST            

 

     Cefoxitin      2017-0      No                       1 gm,           [facility]



                                              Route:           l



               20:10:                               IVPB,           [place]



               00                                 ONCE,           



                                                  Dosing           



                                                  Weight           



                                                  136.136,           



                                                  kg, Start           



                                                  date:           



                                                  17           



                                                  14:10:00           



                                                  CST, Stop           



                                                  date:           



                                                  17           



                                                  14:10:00           



                                                  CST            

 

     Cefoxitin      2017-0      No                       1 gm,           [facility]



                                              Route:           l



               20:10:                               IVPB,                                            ONCE,           



                                                  Dosing           



                                                  Weight           



                                                  136.136,           



                                                  kg, Start           



                                                  date:           



                                                  17           



                                                  14:10:00           



                                                  CST, Stop           



                                                  date:           



                                                  17           



                                                  14:10:00           



                                                  CST            

 

     gabapentin            No                       Notes:           [facility]

[facility]



               1-25                               (Same as:           l



               19:00:                               Neurontin)           [place]                                                

 

     Acetaminoph            No                       Notes: Max           

[facility]



     en        1-25                               acetaminop           l



               19:00:                               hen =           [place]



               00                                 4000mg/day           



                                                  (4             



                                                  gm/day).           



                                                  (Same as:           



                                                  Tylenol)           

 

     gabapentin            No                       Notes:           [facility]

[facility]



               1-25                               (Same as:           l



               19:00:                               Neurontin)           [place]                                                

 

     Acetaminoph            No                       Notes: Max           

[facility]



     en        1-25                               acetaminop           l



               19:00:                               hen =           [place]



               00                                 4000mg/day           



                                                  (4             



                                                  gm/day).           



                                                  (Same as:           



                                                  Tylenol)           

 

     gabapentin            No                       Notes:           [facility]

[facility]



               1-25                               (Same as:           l



               19:00:                               Neurontin)           [place]                                                

 

     Acetaminoph            No                       Notes: Max           

[facility]



     en        1-25                               acetaminop           l



               19:00:                               hen =           [place]



               00                                 4000mg/day           



                                                  (4             



                                                  gm/day).           



                                                  (Same as:           



                                                  Tylenol)           

 

     Promethazin            No                       Notes: Do           [facility]

[facility]



     e         1-                               not give           l



               18:26:                               IV push.                                            (Same as:           



                                                  Phenergan)           

 

     Promethazin            No                       Notes: Do           [facility]

[facility]



     e         1-25                               not give           l



               18:26:                               IV push.                                            (Same as:           



                                                  Phenergan)           

 

     Promethazin            No                       Notes: Do           [facility]

[facility]



     e         1-25                               not give           l



               18:26:                               IV push.                                            (Same as:           



                                                  Phenergan)           

 

     Promethazin            No                       12.5 mg,           [facility]

[facility]



     e                                        Route: PO,           l



               18:24:                               Q6H,                                            Dosing           



                                                  Weight           



                                                  136.136,           



                                                  kg, PRN           



                                                  Nausea &           



                                                  Vomiting,           



                                                  Start           



                                                  date:           



                                                  17           



                                                  12:24:00           



                                                  CST,           



                                                  Duration:           



                                                  30 day,           



                                                  Stop date:           



                                                  17           



                                                  12:23:00           



                                                  CST            

 

     Tramadol      2017-0      No                       Notes: Not           [facility]

[facility]



               1-25                               to exceed           l



               18:24:                               400mg/day.                                            (Same As:           



                                                  Ultram)           

 

     Promethazin      -      No                       12.5 mg,           [facility]

[facility]



     e         1-25                               Route: PO,           l



               18:24:                               Q6H,                                            Dosing           



                                                  Weight           



                                                  136.136,           



                                                  kg, PRN           



                                                  Nausea &           



                                                  Vomiting,           



                                                  Start           



                                                  date:           



                                                  17           



                                                  12:24:00           



                                                  CST,           



                                                  Duration:           



                                                  30 day,           



                                                  Stop date:           



                                                  17           



                                                  12:23:00           



                                                  CST            

 

     Tramadol            No                       Notes: Not           [facility]

[facility]



               1-25                               to exceed           l



               18:24:                               400mg/day.                                            (Same As:           



                                                  Ultram)           

 

     Promethazin            No                       12.5 mg,           Me

[facility]



     e         1-25                               Route: PO,           l



               18:24:                               Q6H,                                            Dosing           



                                                  Weight           



                                                  136.136,           



                                                  kg, PRN           



                                                  Nausea &           



                                                  Vomiting,           



                                                  Start           



                                                  date:           



                                                  17           



                                                  12:24:00           



                                                  CST,           



                                                  Duration:           



                                                  30 day,           



                                                  Stop date:           



                                                  17           



                                                  12:23:00           



                                                  CST            

 

     Tramadol            No                       Notes: Not           [facility]

[facility]



               1-25                               to exceed           l



               18:24:                               400mg/day.                                            (Same As:           



                                                  Ultram)           

 

     bupivacaine            No                       Notes:           [facility]

jensen



     liposome      1-25                               (Same as:           l



               18:00:                               Exparel)                                            *** NOT           



                                                  FOR IV           



                                                  use****           



                                                  Postoperat           



                                                  [place]            



                                                  analgesia:           



                                                  Infiltrati           



                                                  on             



                                                  (local):           



                                                  Dose is           



                                                  based on           



                                                  surgical           



                                                  site and           



                                                  volume           



                                                  required           



                                                  to cover           



                                                  the area           



                                                  (in            



                                                  general,           



                                                  the            



                                                  maximum           



                                                  total dose           



                                                  is 266           



                                                  mg).           



                                                  Bunionecto           



                                                  my: 7 mL           



                                                  into the           



                                                  tissues           



                                                  surroundin           



                                                  g the           



                                                  osteotomy           



                                                  and 1 mL           



                                                  into the           



                                                  subcutaneo           



                                                  us tissue           



                                                  of the           



                                                  surgical           



                                                  site           



                                                  (total           



                                                  dose = 8           



                                                  mL [106           



                                                  mg])           



                                                  Hemorrhoid           



                                                  ectomy: 30           



                                                  mL (20 mL           



                                                  vial           



                                                  diluted           



                                                  with 10 mL           



                                                  NS)            



                                                  divided           



                                                  and            



                                                  administer           



                                                  ed as 6           



                                                  injections           



                                                  of 5 mL           



                                                  each           



                                                  (total           



                                                  dose = 30           



                                                  mL [266           



                                                  mg])           

 

     bupivacaine            No                       Notes:           [facility]eraldo

jensen



     liposome      1-25                               (Same as:           l



               18:00:                               Exparel)                                            *** NOT           



                                                  FOR IV           



                                                  use****           



                                                  Postoperat           



                                                  [place]            



                                                  analgesia:           



                                                  Infiltrati           



                                                  on             



                                                  (local):           



                                                  Dose is           



                                                  based on           



                                                  surgical           



                                                  site and           



                                                  volume           



                                                  required           



                                                  to cover           



                                                  the area           



                                                  (in            



                                                  general,           



                                                  the            



                                                  maximum           



                                                  total dose           



                                                  is 266           



                                                  mg).           



                                                  Bunionecto           



                                                  my: 7 mL           



                                                  into the           



                                                  tissues           



                                                  surroundin           



                                                  g the           



                                                  osteotomy           



                                                  and 1 mL           



                                                  into the           



                                                  subcutaneo           



                                                  us tissue           



                                                  of the           



                                                  surgical           



                                                  site           



                                                  (total           



                                                  dose = 8           



                                                  mL [106           



                                                  mg])           



                                                  Hemorrhoid           



                                                  ectomy: 30           



                                                  mL (20 mL           



                                                  vial           



                                                  diluted           



                                                  with 10 mL           



                                                  NS)            



                                                  divided           



                                                  and            



                                                  administer           



                                                  ed as 6           



                                                  injections           



                                                  of 5 mL           



                                                  each           



                                                  (total           



                                                  dose = 30           



                                                  mL [266           



                                                  mg])           

 

     bupivacaine            No                       Notes:           Geraldo

jensen



     liposome      1-25                               (Same as:           l



               18:00:                               Exparel)           [facility]



                                                *** NOT           



                                                  FOR IV           



                                                  use****           



                                                  Postoperat           



                                                  [place]            



                                                  analgesia:           



                                                  Infiltrati           



                                                  on             



                                                  (local):           



                                                  Dose is           



                                                  based on           



                                                  surgical           



                                                  site and           



                                                  volume           



                                                  required           



                                                  to cover           



                                                  the area           



                                                  (in            



                                                  general,           



                                                  the            



                                                  maximum           



                                                  total dose           



                                                  is 266           



                                                  mg).           



                                                  Bunionecto           



                                                  my: 7 mL           



                                                  into the           



                                                  tissues           



                                                  surroundin           



                                                  g the           



                                                  osteotomy           



                                                  and 1 mL           



                                                  into the           



                                                  subcutaneo           



                                                  us tissue           



                                                  of the           



                                                  surgical           



                                                  site           



                                                  (total           



                                                  dose = 8           



                                                  mL [106           



                                                  mg])           



                                                  Hemorrhoid           



                                                  ectomy: 30           



                                                  mL (20 mL           



                                                  vial           



                                                  diluted           



                                                  with 10 mL           



                                                  NS)            



                                                  divided           



                                                  and            



                                                  administer           



                                                  ed as 6           



                                                  injections           



                                                  of 5 mL           



                                                  each           



                                                  (total           



                                                  dose = 30           



                                                  mL [266           



                                                  mg])           

 

     Lovenox            No                       Notes:           [facility]



               1-25                               (Same as:           l



               17:00:                               Lovenox)           [facility]



                                                               

 

     Lovenox            No                       Notes:           [facility]



               1-25                               (Same as:           l



               17:00:                               Lovenox)           [place]



                                                               

 

     Lovenox            No                       Notes:           [facility]



               1-25                               (Same as:           l



               17:00:                               Lovenox)           [facility]



                                                               

 

     scopolamine            No                       Notes:           [facility]

jensen



               1-25                               Change           l



               12:00:                               patch           [place]



               00                                 every 72           



                                                  hours           



                                                  (Same as:           



                                                  Transderm-           



                                                  [place]op)           

 

     heparin            No                       Notes:           [facility]



               1-25                               porcine           l



               12:00:                               heparin           [place]



               00                                                

 

     Lovenox            No                       Notes:           [facility]



               1-25                               (Same as:           l



               12:00:                               Lovenox)           [place]



               00                                                

 

     scopolamine            No                       Notes:           [facility]do

jensen



               1-25                               Change           l



               12:00:                               patch           [place]



               00                                 every 72           



                                                  hours           



                                                  (Same as:           



                                                  Transderm-           



                                                  Scop)           

 

     heparin            No                       Notes:           [facility]



               1-25                               porcine           l



               12:00:                               heparin           [facility]



               00                                                

 

     Lovenox            No                       Notes:           [facility]



               1-25                               (Same as:           l



               12:00:                               Lovenox)           [facility]



               00                                                

 

     scopolamine            No                       Notes:           [facility]eraldo

jensen



               1-25                               Change           l



               12:00:                               patch           [place]



               00                                 every 72           



                                                  hours           



                                                  (Same as:           



                                                  Transderm-           



                                                  Scop)           

 

     heparin            No                       Notes:           [facility]



               1-25                               porcine           l



               12:00:                               heparin           [facility]



               00                                                

 

     Lovenox            No                       Notes:           [facility]



               1-25                               (Same as:           l



               12:00:                               Lovenox)           [place]



               00                                                

 

     Mefoxin            No                       Notes:           [facility]



               1-25                               (Same As:           l



               11:37:                               Mefoxin)           [place]



               00                                 ***            



                                                  MEDICATION           



                                                  WASTE ***           



                                                  Product           



                                                  Size: 2000           



                                                  mg Product           



                                                  Wasted:           



                                                  ___ mg           

 

     Mefoxin            No                       Notes:           [facility]



               1-25                               (Same As:           l



               11:37:                               Mefoxin)           [facility]



               00                                 ***            



                                                  MEDICATION           



                                                  WASTE ***           



                                                  Product           



                                                  Size: 2000           



                                                  mg Product           



                                                  Wasted:           



                                                  ___ mg           

 

     Mefoxin            No                       Notes:           [facility]



               1-                               (Same As:           l



               11:37:                               Mefoxin)           [place]



               00                                 ***            



                                                  MEDICATION           



                                                  WASTE ***           



                                                  Product           



                                                  Size: 2000           



                                                  mg Product           



                                                  Wasted:           



                                                  ___ mg           

 

     Ofirmev            No                       Notes:           [facility]



               1-25                               Infuse           l



               11:36:                               over 15           [place]



               00                                 minutes Do           



                                                  not exceed           



                                                  4gm/day of           



                                                  acetaminop           



                                                  hen ***           



                                                  MEDICATION           



                                                  WASTE ***           



                                                  Product           



                                                  Size: 1000           



                                                  mg Product           



                                                  Wasted:           



                                                  ___ mg           

 

     Ofirmev            No                       Notes:           [facility]



               1-25                               Infuse           l



               11:36:                               over 15           [facility]



               00                                 minutes Do           



                                                  not exceed           



                                                  4gm/day of           



                                                  acetaminop           



                                                  hen ***           



                                                  MEDICATION           



                                                  WASTE ***           



                                                  Product           



                                                  Size: 1000           



                                                  mg Product           



                                                  Wasted:           



                                                  ___ mg           

 

     Ofirmev      -0      No                       Notes:           [facility]



               1-25                               Infuse           l



               11:36:                               over 15           [facility]



               00                                 minutes Do           



                                                  not exceed           



                                                  4gm/day of           



                                                  acetaminop           



                                                  hen ***           



                                                  MEDICATION           



                                                  WASTE ***           



                                                  Product           



                                                  Size: 1000           



                                                  mg Product           



                                                  Wasted:           



                                                  ___ mg           

 

     Ofirmev      -0      No                       Notes:           [facility]



               1-25                               Infuse           l



               11:35:                               over 15           [facility]



               00                                 minutes Do           



                                                  not exceed           



                                                  4gm/day of           



                                                  acetaminop           



                                                  hen ***           



                                                  MEDICATION           



                                                  WASTE ***           



                                                  Product           



                                                  Size:           



                                                  1000 mg           



                                                  Product           



                                                  Wasted:           



                                                  ___ mg           

 

     Ofirmev      2017-0      No                       Notes:           [facility]



               1-25                               Infuse           l



               11:35:                               over 15           [facility]



               00                                 minutes Do           



                                                  not exceed           



                                                  4gm/day of           



                                                  acetaminop           



                                                  hen ***           



                                                  MEDICATION           



                                                  WASTE ***           



                                                  Product           



                                                  Size: 1000           



                                                  mg Product           



                                                  Wasted:           



                                                  ___ mg           

 

     Ofirmev            No                       Notes:           [facility]



               1-25                               Infuse           l



               11:35:                               over 15           [place]



               00                                 minutes Do           



                                                  not exceed           



                                                  4gm/day of           



                                                  acetaminop           



                                                  hen ***           



                                                  MEDICATION           



                                                  WASTE ***           



                                                  Product           



                                                  Size: 1000           



                                                  mg Product           



                                                  Wasted:           



                                                  ___ mg           

 

     Pravastatin            Yes                      40 mg = 1           [facility]

[facility]



     Sodium 40      1-17                               tab, PO,           l



     MG Oral      17:47:                               Bedtime, #           [facility]

ina



     Tablet      00                                 30 tab, 0           



     [Pravachol]                                         Refill(s)           

 

     Hydralazine            Yes                      100 mg = 1           

[facility]



     Hydrochlori      -17                               tab, PO,           l



     de 100 MG      17:47:                               TID, # 90           Her

merino



     Oral Tablet      00                                 tab, 3           



                                                  Refill(s)           

 

     Hydralazine            No                       0              [facility]

a



               1-17                               Refill(s)           l



               17:47:                                              [facility]



               00                                                

 

     mycophenola            Yes                      1,000 mg =           

[facility]



     te mofetil      1-17                               2 tab, PO,           l



     500 MG Oral      17:47:                               BID, # 120           

[place]



     Tablet      00                                 tab, 0           



     [Cellcept]                                         Refill(s)           

 

     pantoprazol            Yes                      40 mg = 1           [facility]

[facility]



     e 40 MG      1-17                               tab, PO,           l



     Enteric      17:47:                               BID, # 30           [facility]

nn



     Coated      00                                 tab, 0           



     Tablet                                         Refill(s)           



     [Protonix]                                                        

 

     24 HR            Yes                      240 mg = 1           [facility]

a



     Diltiazem      1-17                               cap, PO,           l



     Hydrochlori      17:47:                               Daily, #           He

rmann



     de 240 MG      00                                 30 cap, 0           



     Extended                                         Refill(s)           



     Release                                                        



     Capsule                                                        



     [Cartia]                                                        

 

     magnesium            Yes                      400 mg = 1           Me

[facility]



     oxide 400      1-17                               tab, PO,           l



     mg oral      17:47:                               Daily, 0           [facility]

n



     tablet      00                                 Refill(s)           

 

     calcium            No                       CHEW, BID,           [facility]do

jensen



     carbonate      17                               0              l



     1000 mg      17:47:                               Refill(s)           Kaylee

nn



     oral      00                                                



     tablet,                                                        



     chewable                                                        

 

     predniSONE            Yes                      10 mg = 1           Me

[facility]



     10 mg oral      1-17                               tab, PO,           l



     tablet      17:47:                               Daily, #           [place]



               00                                 30 tab, 3           



                                                  Refill(s)           

 

     Prograf            Yes                      2.5 mg,           [facility]



               1-17                               PO, Q12H,           l



               17:47:                               0              [place]



               00                                 Refill(s)           

 

     Sulfamethox            No                       1 tab, PO,           

[facility]



     azole 800      1-17                               M-W-F, 0           l



     MG /      17:47:                               Refill(s)           [place]



     Trimethopri      00                                                



     m 160 MG                                                        



     Oral Tablet                                                        



     [Bactrim]                                                        

 

     Pravastatin            Yes                      40 mg = 1           [facility]

[facility]



     Sodium 40      -17                               tab, PO,           l



     MG Oral      17:47:                               Bedtime, #           [facility]

ina



     Tablet      00                                 30 tab, 0           



     [Pravachol]                                         Refill(s)           

 

     Hydralazine            Yes                      100 mg = 1           

[facility]



     Hydrochlori      -17                               tab, PO,           l



     de 100 MG      17:47:                               TID, # 90           Her

merino



     Oral Tablet      00                                 tab, 3           



                                                  Refill(s)           

 

     Hydralazine            No                       0              [facility]

a



               -17                               Refill(s)           l



               17:47:                                              [place]



               00                                                

 

     mycophenola            Yes                      1,000 mg =           

[facility]



     te mofetil      -17                               2 tab, PO,           l



     500 MG Oral      17:47:                               BID, # 120           

Jeff



     Tablet      00                                 tab, 0           



     [Cellcept]                                         Refill(s)           

 

     pantoprazol            Yes                      40 mg = 1           [facility]

[facility]



     e 40 MG      -17                               tab, PO,           l



     Enteric      17:47:                               BID, # 30           Kaylee

nn



     Coated      00                                 tab, 0           



     Tablet                                         Refill(s)           



     [Protonix]                                                        

 

     24 HR            Yes                      240 mg = 1           [facility]

a



     Diltiazem      1-17                               cap, PO,           l



     Hydrochlori      17:47:                               Daily, #           He

rmann



     de 240 MG      00                                 30 cap, 0           



     Extended                                         Refill(s)           



     Release                                                        



     Capsule                                                        



     [Cartia]                                                        

 

     magnesium            Yes                      400 mg = 1           Me

[facility]ia



     oxide 400      1-17                               tab, PO,           l



     mg oral      17:47:                               Daily, 0           [facility]sein

n



     tablet      00                                 Refill(s)           

 

     calcium            No                       CHEW, BID,           Geraldo

jensen



     carbonate      17                               0              l



     1000 mg      17:47:                               Refill(s)           Kaylee

nn



     oral      00                                                



     tablet,                                                        



     chewable                                                        

 

     predniSONE            Yes                      10 mg = 1           Me

moria



     10 mg oral      1-17                               tab, PO,           l



     tablet      17:47:                               Daily, #           [place]



               00                                 30 tab, 3           



                                                  Refill(s)           

 

     Prograf            Yes                      2.5 mg,           [facility]



               1-17                               PO, Q12H,           l



               17:47:                               0              [facility]



               00                                 Refill(s)           

 

     Sulfamethox            No                       1 tab, PO,           

[facility]



     azole 800      1-17                               M-W-F, 0           l



     MG /      17:47:                               Refill(s)           [place]



     Trimethopri      00                                                



     m 160 MG                                                        



     Oral Tablet                                                        



     [Bactrim]                                                        

 

     Pravastatin            Yes                      40 mg = 1           M

[facility]



     Sodium 40      -17                               tab, PO,           l



     MG Oral      17:47:                               Bedtime, #           [facility]

ina



     Tablet      00                                 30 tab, 0           



     [Pravachol]                                         Refill(s)           

 

     Hydralazine            Yes                      100 mg = 1           

[facility]



     Hydrochlori      -17                               tab, PO,           l



     de 100 MG      17:47:                               TID, # 90           Her

merino



     Oral Tablet      00                                 tab, 3           



                                                  Refill(s)           

 

     Hydralazine            No                       0              [facility]

a



               -17                               Refill(s)           l



               17:47:                                              [place]



               00                                                

 

     mycophenola            Yes                      1,000 mg =           

[facility]



     te mofetil      -17                               2 tab, PO,           l



     500 MG Oral      17:47:                               BID, # 120           

[facility]



     Tablet      00                                 tab, 0           



     [Cellcept]                                         Refill(s)           

 

     pantoprazol            Yes                      40 mg = 1           M

[facility]



     e 40 MG      -17                               tab, PO,           l



     Enteric      17:47:                               BID, # 30           [facility]lee

nn



     Coated      00                                 tab, 0           



     Tablet                                         Refill(s)           



     [Protonix]                                                        

 

     24 HR            Yes                      240 mg = 1           [facility]

a



     Diltiazem      1-17                               cap, PO,           l



     Hydrochlori      17:47:                               Daily, #           He

rmann



     de 240 MG      00                                 30 cap, 0           



     Extended                                         Refill(s)           



     Release                                                        



     Capsule                                                        



     [Cartia]                                                        

 

     magnesium            Yes                      400 mg = 1           Me

[facility]ia



     oxide 400      1-17                               tab, PO,           l



     mg oral      17:47:                               Daily, 0           [facility]

n



     tablet      00                                 Refill(s)           

 

     calcium            No                       CHEW, BID,           Geraldo

jensen



     carbonate      17                               0              l



     1000 mg      17:47:                               Refill(s)           [facility]lee

nn



     oral      00                                                



     tablet,                                                        



     chewable                                                        

 

     predniSONE            Yes                      10 mg = 1           Me

moria



     10 mg oral      1-17                               tab, PO,           l



     tablet      17:47:                               Daily, #           [facility]



               00                                 30 tab, 3           



                                                  Refill(s)           

 

     Prograf      2017-      Yes                      2.5 mg,           [facility]



               1-17                               PO, Q12H,           l



               17:47:                               0              [facility]



               00                                 Refill(s)           

 

     Sulfamethox      -      No                       1 tab, PO,           

[facility]



     azole 800      1-17                               M-W-F, 0           l



     MG /      17:47:                               Refill(s)           [place]



     Trimethopri      00                                                



     m 160 MG                                                        



     Oral Tablet                                                        



     [Bactrim]                                                        







Immunizations







           Ordered Immunization Filled Immunization Date       Status     Commen

ts   Source



           Name       Name                                        

 

           INFLUENZA TRIVALENT            2019 Completed             [facility]



           ADJUVANTED PF IM            00:00:00                         [facility]

 

           INFLUENZA TRIVALENT            2019 Completed             [facility]



           ADJUVANTED PF IM            00:00:00                         [facility]

 

           INFLUENZA TRIVALENT            2019 Completed             [facility]



           ADJUVANTED PF IM            00:00:00                         [facility]

 

           INFLUENZA TRIVALENT            2019 Completed             [facility]



           ADJUVANTED PF IM            00:00:00                         [facility]

 

           Influenza High Dose            2018 Completed             [facility]



           Preservative Free IM            00:00:00                         [facility]



           ([id number])                                               

 

           Influenza High Dose            2018 Completed             [facility]



           Preservative Free IM            00:00:00                         [facility]



           ([id number])                                               

 

           Influenza High Dose            2018 Completed             [facility]



           Preservative Free IM            00:00:00                         [facility]



           ([id number])                                               

 

           Influenza High Dose            2018 Completed             [facility]



           Preservative Free IM            00:00:00                         [facility]



           ([id number])                                               

 

           Influenza TIV (IM)            2017-10-09 Completed             [facility]



                                 00:00:00                         [facility]

 

           Influenza TIV (IM)            2017-10-09 Completed             [facility]



                                 00:00:00                         [facility]

 

           Influenza TIV (IM)            2017-10-09 Completed             [facility]



                                 00:00:00                         [facility]

 

           Influenza TIV (IM)            2017-10-09 Completed             [facility]



                                 00:00:00                         [facility]

 

           Influenza High Dose            2015 Completed             [facility]



           Preservative Free            00:00:00                         [facility]                                             

 

           Influenza High Dose            2015 Completed             [facility]



           Preservative Free            00:00:00                         [facility]                                             

 

           Influenza High Dose            2015 Completed             [facility]



           Preservative Free            00:00:00                         [facility]                                             

 

           Influenza High Dose            2015 Completed             [facility]



           Preservative Free            00:00:00                         [facility]                                             

 

           Rho (D) Immune            2015-05-15 Completed             [facility]

es



           Globulin              00:00:00                         [facility]

 

           Rho (D) Immune            2015-05-15 Completed             [facility]

[facility]



           Globulin              00:00:00                         [facility]

 

           Rho (D) Immune            2015-05-15 Completed             [facility]

[facility]



           Globulin              00:00:00                         [facility]

 

           Rho (D) Immune            2015-05-15 Completed             [facility]

[facility]



           Globulin              00:00:00                         [facility]







Vital Signs







             Vital Name   Observation Time Observation Value Comments     Source

 

             HEIGHT       2021 05:49:00 193 cm                    

 

             WEIGHT       2021 05:49:00 108.863 kg                

 

             WEIGHT       2021 11:10:00 107.775 kg                

 

             WEIGHT       2021 11:10:00 107.775 kg                

 

             HEIGHT       2021 09:01:00 188 cm                    

 

             WEIGHT       2021 09:01:00 109.181 kg                

 

             HEIGHT       2021 09:01:00 188 cm                    

 

             WEIGHT       2021 09:01:00 109.181 kg                

 

             HEIGHT       2021 05:49:00 193 cm                    

 

             WEIGHT       2021 05:49:00 108.863 kg                

 

             HEIGHT       2021 17:35:30 193 cm                    

 

             WEIGHT       2021 17:35:30 99.791 kg                 

 

             HEIGHT       2021 17:35:30 193 cm                    

 

             WEIGHT       2021 17:35:30 99.791 kg                 

 

             WEIGHT       2020 00:00:00 99.791 kg                 

 

             HEIGHT       2020 00:00:00 193 cm                    

 

             WEIGHT       2020 00:00:00 99.791 kg                 

 

             HEIGHT       2020 00:00:00 193 cm                    

 

             Systolic blood 2021 11:10:00 149 mm[Hg]                [facility]

 

             Diastolic blood 2021 11:10:00 98 mm[Hg]                 [facility]

 

             Heart rate   2021 11:10:00 86 /min                   [facility]

 

             Body temperature 2021 11:10:00 37.11 Eunice                 [facility]

 

             Respiratory rate 2021 11:10:00 18 /min                   [facility]

 

             Body weight  2021 11:10:00 107.775 kg                [facility]

 

             BMI          2021 11:10:00 30.51 kg/m2               [facility]

 

             Oxygen saturation in 2021 11:10:00 97 /min                   

[facility]



             Arterial blood by                                        Medical Ce

nter



             Pulse oximetry                                        

 

             Body height  2021 09:01:00 188 cm                    [facility]

 

             Systolic (mm Hg) 2017 19:18:00                           [facility]

[facility]

 

             Diastolic (mm Hg) 2017 19:18:00                           [facility] [place]

 

             Respitory Rate 2017 19:18:00                           [facility]

[facility]

 

             Respitory Rate 2017 19:00:00                           [facility]

[facility] [place]

 

             Systolic (mm Hg) 2017 19:00:00                           [facility]

[facility]

 

             Diastolic (mm Hg) 2017 19:00:00                           [facility]

[facility]

 

             Respitory Rate 2017 18:45:00                           [facility]

[facility] [place]

 

             Systolic (mm Hg) 2017 18:45:00                           [facility]

[facility] [place]

 

             Diastolic (mm Hg) 2017 18:45:00                           [facility] [place]

 

             Heart Rate   2017 11:54:00                           [facility]

 [place]

 

             Weight       2017 19:48:00                           [facility]

 [place]

 

             BMI Calculated 2017 19:48:00                           [facility]

[facility]

 

             Height       2017 19:48:00 193.04 cm                 [facility]

 [place]

 

             Temperature Oral (F) 2017 14:35:00 98.0 F                    

[facility]

 

             Systolic (mm Hg) 2017 14:35:00                           [facility]

[facility]

 

             Diastolic (mm Hg) 2017 14:35:00                           [facility] [place]

 

             Respitory Rate 2017 14:35:00                           [facility]

[facility] [place]

 

             Heart Rate   2017 14:35:00                           [facility]

 

             Heart Rate   2017 10:35:00                           [facility]

 

             Respitory Rate 2017 10:35:00                           [facility]

[facility]

 

             Temperature Oral (F) 2017 10:35:00 97.6 F                    

[facility]

 

             Systolic (mm Hg) 2017 10:35:00                           [facility]

[facility] [place]

 

             Diastolic (mm Hg) 2017 10:35:00                           [facility] [place]

 

             Respitory Rate 2017 05:24:00                           [facility]

[facility]

 

             Heart Rate   2017 05:24:00                           [facility]

 

             Systolic (mm Hg) 2017 05:24:00                           [facility]

[facility]

 

             Diastolic (mm Hg) 2017 05:24:00                           [facility]

[facility]

 

             Temperature Oral (F) 2017 05:24:00 98.3 F                    

[facility] [place]

 

             Weight       2017 03:00:00                           [facility]

 [facility]

 

             Height       2017 03:00:00 193.04 cm                 [facility]

 [place]

 

             BMI Calculated 2017 03:00:00                           [facility]

[facility] [place]

 

             Weight       2017 16:16:00                           [facility]

 [place]

 

             Height       2017 16:16:00 193.04 cm                 [facility]

 [place]

 

             BMI Calculated 2017 16:16:00                           [facility]

[facility] [place]

 

             Weight       2017 21:05:00                           [facility]

 [facility]

 

             BMI Calculated 2017 21:05:00                           [facility]

[facility]

 

             Height       2017 21:05:00 193.04 cm                 [facility]

 [facility]







Procedures







                Procedure       Date / Time     Performing Clinician Source



                                Performed                       

 

                TACROLIMUS LEVEL 2021 10:39:00 Alexei Prince [facility]

 

                BASIC METABOLIC PANEL 2021 10:39:00 Shirley Loo

[facility]

 

                CBC W/PLT COUNT & AUTO 2021 10:39:00 Shirley Loo 

[facility]

 

                CBC W/PLT COUNT & AUTO 2021 10:39:00 [place]



                DIFFERENTIAL                                    Center

 

                XR CHEST 2 VIEWS [facility]34[facility]

 

                2D ECHO W/ DOPPLER 2021 10:44:42 [facility]



                (CW/PW/COLOR)                                   [place]

 

                TACROLIMUS LEVEL 2021 09:15:00 [facility]

 

                R & L CATH / CORONARY 2021 [facility]07[facility]



                ANGIOS / BIOPSY                                 Center

 

                ECG 12-LEAD     2021 06:31:28 [facility]

 

                CBC (HEMOGRAM ONLY) 2021 06:04:00 [facility]

 

                PROTHROMBIN TIME/INR 2021 06:04:00 [facility]

 

                BASIC METABOLIC PANEL 2021 06:04:00 [facility]



                ()                                             [place]

 

                CARDIAC CATH REPORT - 2021 00:00:00 [facility]



                SCAN                            Scanning        Center

 

                SARS-COV2/INFLUENZA/RSV 2021 20:00:00 [facility]



                RT-PCR                                          Center

 

                BLOOD CULTURE   2021 20:00:00 [facility]

 

                B-TYPE NATRIURETIC 2021 19:59:00 [facility]



                FACTOR (BNP)                                    Center

 

                CBC W/PLT COUNT & AUTO 2021 19:59:00 Tresa Cummins

[facility]



                DIFFERENTIAL                                    Center

 

                BASIC METABOLIC PANEL 2021 19:59:00 [facility]



                (7)                                             Center

 

                MAGNESIUM       2021 19:59:00 [facility]

 

                TROPONIN I      2021 19:59:00 [facility]

 

                LACTIC ACID, VENOUS 2021 19:59:00 Tresa Cummins [facility]

 

                CBC W/PLT COUNT & AUTO 2021 19:59:00 Tresa Cummins

[facility]                                    Center

 

                ECG 12-LEAD     2021 19:50:21 Unknown, [facility]

 

                ECG 12-LEAD     2021 19:50:21 Unknown, [facility]

 

                XR CHEST 1 VIEW 2021 18:40:00 Tresa Cummins [facility]



                PORTABLE / BEDSIDE                                 Center

 

                REPORT OF PROCEDURE - 2021 00:00:00 Provider, [facility]



                ENDOSCOPY SCAN                  Scanning        Center

 

                [QLH] CMP W/EGFR 2018 [facility]:00:00                 [facility]

 

                [QL[facility]] FOLATE, SERUM 2018 [facility]00:00                 [facility]

 

                [QLH] HEMOGLOBIN A1c 2018 00:00:00                 [facility]

 

                [QL[facility]] IRON, TOTAL 2018 00:00:00                 [facility]

 

                [QLH] LIPID PANEL 2018 00:00:00                 [facility]

[facility]

 

                [QLH] PTH, INTACT 2018 00:00:00                 [facility]

[facility]



                (WITHOUT CALCIUM)                                 

 

                [QLH] TSH, 3RD  2018 00:00:00                 [facility]

[facility]



                GENERATION W/REFLEX TO                                 



                FT4                                             

 

                [QLH] VITAMIN A 2018 00:00:00                 [facility]

[facility]



                (RETINOL)                                       

 

                [QLH] VITAMIN B1, WHOLE 2018 00:00:00                 [facility]



                BLOOD                                           

 

                [QLH] VITAMIN B12 2018 00:00:00                 [facility]

[facility]

 

                [H] Vitamin E Level 2018 00:00:00                 [facility]

 

                [L] Vitamin D,  2018 00:00:00                 [facility]

[facility]



                25-Hydroxy, Total -                                 



                [employer]                                        

 

                [QLH] CBC (INCLUDES 2018 00:00:00                 [facility]



                DIFF/PLT)                                       

 

                Heart transplant                                 [facility]

 

                Hernia repair                                   [facility]

 

                Laparoscopic sleeve                                 [facility]



                gastrectomy                                     







Plan of Care







             Planned Activity Planned Date Details      Comments     Source

 

             Future Scheduled 2023   Lipid panel (procedure)              

[facility]



             Test         00:00:00     [code = 86987511]              Medical Ce

nter

 

             Future Scheduled 2023   Lipid panel (procedure)              

[facility]



             Test         00:00:00     [code = [id number]]              Medical Ce

nter

 

             Future Scheduled 2023   Lipid panel (procedure)              

[facility]



             Test         00:00:00     [code = [id number]]              Medical Ce

nter

 

             Future Scheduled 2023   Lipid panel (procedure)              

[facility]



             Test         00:00:00     [code = [id number]]              Medical Ce

nter

 

             Future Scheduled 2023   DEPRESSION SCREENING              [facility]



             Test         00:00:00     (12+) [code =              Medical Center



                                       DEPRESSION SCREENING              



                                       (12+)]                    

 

             Future Scheduled 2022   Tobacco Cessation              [facility]



             Test         00:00:00     Counseling and              Medical Cente

r



                                       Screening (12+) [code =              



                                       Tobacco Cessation              



                                       Counseling and              



                                       Screening (12+)]              

 

             Future Scheduled 2022   INFLUENZA VACCINE (#1)              [facility]

[facility]



             Test         00:00:00     [code = INFLUENZA              Medical Ce

nter



                                       VACCINE (#1)]              

 

             Future Scheduled 2022   INFLUENZA VACCINE (#1)              C

[facility]



             Test         00:00:00     [code = INFLUENZA              Medical Ce

nter



                                       VACCINE (#1)]              

 

             Future Scheduled 2022   DEPRESSION SCREENING              [facility]



             Test         00:00:00     (12+) [code =              Medical Center



                                       DEPRESSION SCREENING              



                                       (12+)]                    

 

             Future Scheduled 2022   DEPRESSION SCREENING              [facility]



             Test         00:00:00     (12+) [code =              Medical Center



                                       DEPRESSION SCREENING              



                                       (12+)]                    

 

             Future Scheduled 2022   DEPRESSION SCREENING              [facility]



             Test         00:00:00     (12+) [code =              Medical Center



                                       DEPRESSION SCREENING              



                                       (12+)]                    

 

             Future Scheduled 2021   INFLUENZA VACCINE (#1)              [facility]

[facility]



             Test         00:00:00     [code = INFLUENZA              Medical Ce

nter



                                       VACCINE (#1)]              

 

             Future Scheduled 2021   INFLUENZA VACCINE (#1)              C

[facility]



             Test         00:00:00     [code = INFLUENZA              Medical Ce

nter



                                       VACCINE (#1)]              

 

             Future Scheduled 2019   SHINGLES VACCINES (1 of              

[facility]



             Test         00:00:00     2) [code = SHINGLES              Medical 

Center



                                       VACCINES (1 of 2)]              

 

             Future Scheduled 2019   SHINGLES VACCINES (1 of              

[facility]



             Test         00:00:00     2) [code = SHINGLES              Medical 

Center



                                       VACCINES (1 of 2)]              

 

             Future Scheduled 2019   SHINGLES VACCINES (1 of              

[facility]



             Test         00:00:00     2) [code = SHINGLES              Medical 

Center



                                       VACCINES (1 of 2)]              

 

             Future Scheduled 2019   SHINGLES VACCINES (1 of              

[facility]



             Test         00:00:00     2) [code = SHINGLES              Medical 

Center



                                       VACCINES (1 of 2)]              

 

             Future Scheduled 2018   Hemoglobin A1c              [facility]



             Test         00:00:00     measurement (procedure)              [facility]



                                       [code = [id number]]              

 

             Future Scheduled 2018   Hemoglobin A1c              [facility]



             Test         00:00:00     measurement (procedure)              [facility]

[facility]



                                       [code = [id number]]              

 

             Future Scheduled 2018   Hemoglobin A1c              [facility]



             Test         00:00:00     measurement (procedure)              [facility]

[facility]



                                       [code = 49902938]              

 

             Future Scheduled 2018   Hemoglobin A1c              [facility]

[facility]



             Test         00:00:00     measurement (procedure)              [facility]

[facility]



                                       [code = 45934000]              

 

             Future Scheduled 1988   DTAP/TDAP/TD VACCINES              [facility]



             Test         00:00:00     (1 - Tdap) [code =              Medical C

enter



                                       DTAP/TDAP/TD VACCINES              



                                       (1 - Tdap)]               

 

             Future Scheduled 1988   DTAP/TDAP/TD VACCINES              [facility]



             Test         00:00:00     (1 - Tdap) [code =              Medical C

enter



                                       DTAP/TDAP/TD VACCINES              



                                       (1 - Tdap)]               

 

             Future Scheduled 1988   DTAP/TDAP/TD VACCINES              [facility]



             Test         00:00:00     (1 - Tdap) [code =              Medical C

enter



                                       DTAP/TDAP/TD VACCINES              



                                       (1 - Tdap)]               

 

             Future Scheduled 1988   DTAP/TDAP/TD VACCINES              [facility]



             Test         00:00:00     (1 - Tdap) [code =              Medical C

enter



                                       DTAP/TDAP/TD VACCINES              



                                       (1 - Tdap)]               

 

             Future Scheduled 1981   COVID-19 VACCINE (1)              [facility]



             Test         00:00:00     [code = COVID-19              Medical [facility]

ter



                                       VACCINE (1)]              

 

             Future Scheduled 1981   COVID-19 VACCINE (1)              [facility]



             Test         00:00:00     [code = COVID-19              [facility]

ter



                                       VACCINE (1)]              

 

             Future Scheduled 1979   DIABETIC EYE EXAM [code              

[facility]



             Test         00:00:00     = DIABETIC EYE EXAM]              Medical

 Center

 

             Future Scheduled 1979   Urine screening for              [facility]



             Test         00:00:00     protein (procedure)              Medical 

Center



                                       [code = [id number]]              

 

             Future Scheduled 1979   DIABETIC EYE EXAM [code              

[facility]



             Test         00:00:00     = DIABETIC EYE EXAM]              Medical

 Center

 

             Future Scheduled 1979   Diabetic foot              [facility]

[facility]



             Test         00:00:00     examination               Medical Center



                                       (regime/therapy) [code              



                                       = [id number]]              

 

             Future Scheduled 1979   Urine screening for              [facility]



             Test         00:00:00     protein (procedure)              Medical 

Center



                                       [code = 358033863]              

 

             Future Scheduled 1979   DIABETIC EYE EXAM [code              

[facility]



             Test         00:00:00     = DIABETIC EYE EXAM]              Medical

 Center

 

             Future Scheduled 1979   Diabetic foot              [facility]

[facility]



             Test         00:00:00     examination               Medical Center



                                       (regime/therapy) [code              



                                       = 982150947]              

 

             Future Scheduled 1979   Urine screening for              [facility]



             Test         00:00:00     protein (procedure)              Medical 

Center



                                       [code = 555746869]              

 

             Future Scheduled 1979   DIABETIC EYE EXAM [code              

[facility]



             Test         00:00:00     = DIABETIC EYE EXAM]              Medical

 Center

 

             Future Scheduled 1979   Diabetic foot              [facility]

[facility]



             Test         00:00:00     examination               Medical Center



                                       (regime/therapy) [code              



                                       = 218464784]              

 

             Future Scheduled 1979   Urine screening for              [facility]



             Test         00:00:00     protein (procedure)              Medical 

Center



                                       [code = 007344075]              

 

             Future Scheduled 1975   PNEUMOCOCCAL VACCINE              [facility]



             Test         00:00:00     0-64 YRS (1 - PCV)              Medical C

enter



                                       [code = PNEUMOCOCCAL              



                                       VACCINE 0-64 YRS (1 -              



                                       PCV)]                     

 

             Future Scheduled 1975   PNEUMOCOCCAL VACCINE              [facility]



             Test         00:00:00     0-64 YRS (1 of 4 -              Medical C

enter



                                       PCV13) [code =              



                                       PNEUMOCOCCAL VACCINE              



                                       0-64 YRS (1 of 4 -              



                                       PCV13)]                   

 

             Future Scheduled 1975   PNEUMOCOCCAL VACCINE              [facility]



             Test         00:00:00     0-64 YRS (1 of 4 -              Medical C

enter



                                       PCV13) [code =              



                                       PNEUMOCOCCAL VACCINE              



                                       0-64 YRS (1 of 4 -              



                                       PCV13)]                   

 

             Future Scheduled 1975   PNEUMOCOCCAL VACCINE              [facility]



             Test         00:00:00     0-64 YRS (1 - PCV)              Medical C

enter



                                       [code = PNEUMOCOCCAL              



                                       VACCINE 0-64 YRS (1 -              



                                       PCV)]                     

 

             Future Scheduled 1969   COVID-19 VACCINE (#1)              [facility]



             Test         00:00:00     [code = COVID-19              Medical [facility]

ter



                                       VACCINE (#1)]              

 

             Future Scheduled 1969   COVID-19 VACCINE (#1)              [facility]



             Test         00:00:00     [code = COVID-19              [facility]

ter



                                       VACCINE (#1)]              

 

             Future Scheduled 1969   CT Colonography (combo)              

[facility]



             Test         00:00:00     [code = CT Colonography              [facility]



                                       (combo)]                  

 

             Future Scheduled 1969   Screening for malignant              

[facility]



             Test         00:00:00     neoplasm of colon              Medical Ce

nter



                                       (procedure) [code =              



                                       590191489]                

 

             Future Scheduled 1969   Screening for malignant              

[facility]



             Test         00:00:00     neoplasm of colon              Medical Ce

nter



                                       (procedure) [code =              



                                       407545786]                

 

             Future Scheduled 1969   Screening for malignant              

[facility]



             Test         00:00:00     neoplasm of colon              Medical Ce

nter



                                       (procedure) [code =              



                                       204324594]                

 

             Future Scheduled 1969   Screening for malignant              

[facility]



             Test         00:00:00     neoplasm of colon              Medical Ce

nter



                                       (procedure) [code =              



                                       347618574]                

 

             Future Scheduled 1969   Sigmoidoscopy [code =              [facility]



             Test         00:00:00     Sigmoidoscopy]              [facility] Cent[facility]

r

 

             Future Scheduled 1969   Screening for malignant              

[facility]



             Test         00:00:00     neoplasm of colon              Medical Ce

nter



                                       (procedure) [code =              



                                       772178909]                

 

             Future Scheduled 1969   Screening for malignant              

[facility]



             Test         00:00:00     neoplasm of colon              Medical Ce

nter



                                       (procedure) [code =              



                                       873488321]                

 

             Future Scheduled 1969   CT Colonography (combo)              

[facility]



             Test         00:00:00     [code = CT Colonography              [facility]



                                       (combo)]                  

 

             Future Scheduled 1969   Screening for malignant              

[facility]



             Test         00:00:00     neoplasm of colon              Medical Ce

nter



                                       (procedure) [code =              



                                       178715545]                

 

             Future Scheduled 1969   Screening for malignant              

[facility]



             Test         00:00:00     neoplasm of colon              Medical Ce

nter



                                       (procedure) [code =              



                                       979962468]                

 

             Future Scheduled 1969   Screening for malignant              

[facility]



             Test         00:00:00     neoplasm of colon              Medical Ce

nter



                                       (procedure) [code =              



                                       827630958]                

 

             Future Scheduled 1969   Screening for malignant              

[facility]



             Test         00:00:00     neoplasm of colon              Medical Ce

nter



                                       (procedure) [code =              



                                       983911071]                

 

             Future Scheduled 1969   Sigmoidoscopy [code =              [facility]



             Test         00:00:00     Sigmoidoscopy]              Medical Cente

r







Encounters







        Start   End     Encounter Admission Attending Care    Care    Encounter 

Source



        Date/Time Date/Time Type    Type    Clinicians Facility Department ID   

   

 

        2021-10-09         Outpatient         OBERTO, [facility]    Surgery [id number]

[id number] [facility]



        08:06:39                         [place]                           

 

        2023 Joi Hannah   [id number] 

121737 [facility]



        00:00:00 00:00:00 [facility]

 

        2022 Telephone         Joi Flaherty   [id number] 20

[id number] [facility]



        00:00:00 00:00:00                                                 [facility]

 

        2022 Joi Hannah   [id number] 

150721 [facility]



        00:00:00 00:00:00 [facility]

 

        2022 Joi Mckenna   [id number] 20

[id number] [facility]



        00:00:00 00:00:00                                                 [facility]

 

        2022-10-31 2022-10-31 Joi Hannah   [id number] 

591093 [facility]



        00:00:00 00:00:00 Only                                            [facility]

 

        2022-10-31 2022-10-31 Refill          [place], [facility]   [id number]

[address] [facility]



        00:00:00 00:00:00                 [facility]

 

        2022 Telephone         Ayo [facility]   [id number]

910766 [facility]



        00:00:00 00:00:00                 [facility]

 

        2022 Telephone         Aoy [facility]   [id number] 9

461667 [facility]



        00:00:00 00:00:00                 [facility]

 

        2022 Telephone         Shila Golden [facility]   [id number] 2

[id number] [facility]



        00:00:00 00:00:00                                                 [facility]

 

        2022 Telephone         Shila Golden [facility]   [id number] 2

[id number] [facility]



        00:00:00 00:00:00                                                 [facility]

 

        2022 Refill          Sincere,  [facility]   [id number]

[address] [facility]



        00:00:00 00:00:00                 [facility]

 

        2022 Refill          Sincere,  [facility]   [id number]

[address] [facility]



        00:00:00 00:00:00                 [facility]

 

        2022 Orders          Sincere,  [facility]   [id number]

[address] [facility]



        00:00:00 00:00:00 Only            [facility]

 

        2022 Orders          Sincere,  [facility]   [id number]

[address] [facility]



        00:00:00 00:00:00 Only            [facility]

 

        2022 Telephone         Sincere,  [facility]   [id number]

[address]67 [facility]



        00:00:00 00:00:00                 [facility]

 

        2022 Telephone         Sincere,  [facility]   [id number]

28667 [facility]



        00:00:00 00:00:00                 [facility]

 

        2022 Outpatient EL              SLE    SLE    [id number]

051 [facility]



        00:00:00 00:00:00                                                 

 

        2022 Outpatient EL              SLE    [facility]    [id number]

050 [facility]



        00:00:00 00:00:00                                                 

 

        2022 Outpatient EL      OBERTON, SLE    SLE    [id number]

4049 [facility]



        00:00:00 00:00:00                 [place]                           

 

        2022 Telephone         [facility]   [id number] 2

[id number] [facility]



        00:00:00 00:00:00                                                 [facility]

 

        2022 Telephone         Martine Goldenne [facility]   [id number] 2

[id number] [facility]



        00:00:00 00:00:00                                                 [facility]

 

        2022-05-10 2022-05-10 Orders          Sincere,  [facility]   [id number]

[address] [facility]



        00:00:00 00:00:00 Only            [facility]

 

        2022-05-10 2022-05-10 Orders          Sincere,  [facility]   [id number]

[address] [facility]



        00:00:00 00:00:00 Only            [facility]

 

        2022 Refill          [place], [facility]   [id number]

[address] [facility]



        00:00:00 00:00:00                 [facility]

 

        2022 Refill          [place], [facility]   [id number]

[address] [facility]



        00:00:00 00:00:00                 [facility]

 

        2022-03-15 2022-03-15 Telephone         Sincere,  [facility]   [id number]

37799 [facility]



        00:00:00 00:00:00                 [facility]

 

        2022-03-15 2022-03-15 Telephone         Sincere,  [facility]   [id number]

[address]49 [facility]



        00:00:00 00:00:00                 [facility]

 

        2022-02-10 2022-02-10 Orders          Sincere,  [facility]   [id number]

[address] [facility]



        00:00:00 00:00:00 Only            [facility]

 

        2022-02-10 2022-02-10 Orders          Sicnere,  [facility]   [id number]

[address] [facility]



        00:00:00 00:00:00 Only            [facility]

 

        2022 Refill          Obmichelle, [facility]   [id number]

[address] [facility]



        00:00:00 00:00:00                 [facility]

 

        2021 Documentat         Sincere,  [facility]   [id number]

702931 [facility]



        00:00:00 00:00:00 ion             [facility]

 

        2021 Orders          Sincere,  [facility]   [id number]

[address] [facility]



        00:00:00 00:00:00 Only            [facility]

 

        2021 Follow-Up JW Castellanoszahra, [facility]   [id number]

184074 [facility]



        11:00:00 11:15:00                 [facility]

 

        2021 Outpatient EL      FAWAD, [facility]    SLE    [id number]

[id number] SLE



        10:12:59 10:12:59                 [place]                           

 

        2021 Orders          Sincere,  [facility]   [id number]

[address] [facility]



        00:00:00 00:00:00 Only            [facility]

 

        2021 Telephone         Sincere,  [facility]   [id number]

78054 [facility]



        00:00:00 00:00:00                 [facility]

 

        2021 Telephone         Sincere,  [facility]   [id number]

01209 [facility]



        00:00:00 00:00:00                 [facility]

 

        2021 Telephone         Sincere,  [facility]   [id number]

71598 [facility]



        00:00:00 00:00:00                 [facility]

 

        2021 Telephone         Sincere,  [facility]   [id number]

95005 [facility]



        00:00:00 00:00:00                 [facility]

 

        2021-10-22 2021-10-22 Telephone         Sincere,  [facility]   [id number]

36409 [facility]



        00:00:00 00:00:00                 [facility]

 

        2021-10-21 2021-10-21 Shila May[address] [facility]   [id number] 20

[id number] [facility]



        00:00:00 00:00:00                                                 [facility]

 

        2021 Outpatient [facility]    SLE    [id number]

5755 [facility]



        00:00:00 00:00:00                 [place]                           

 

        2021 Outpatient               SLE    SLE    [id number]

116 [facility]



        00:00:00 00:00:00                                                 

 

        2021 Outpatient               SLE    SLE    [id number]

681 [facility]



        00:00:00 00:00:00                                                 

 

        2021 Shila May[address] [facility]   [id number] 20

[id number] [facility]



        00:00:00 00:00:00                                                 [facility]

 

        2021 [facility]   [id number]

70216 [facility]



        11:15:00 23:59:00 Encounter         [facility]

 

        2021 [facility]   [id number]

16934 [facility]



        10:00:00 11:14:00 Encounter         [facility]

 

        2021 Follow-Up         Fawad [facility]   [id number] 

207275 [facility]



        08:57:33 09:12:33                 [facility]

 

        2021 Outpatient [facility]    SLE    [id number]

352 [facility]



        00:00:00 00:00:00                                                 

 

        2021 Outpatient EL      OBMICHELLE, SLE    SLE    [id number]

1432 [facility]



        00:00:00 00:00:00                 [place]                           

 

        2021 Outpatient EL      OBMICHELLE, [facility]    [facility]    [id number]

1677 [facility]



        00:00:00 00:00:00                 [place]                           

 

        2021 Abstract         [place] Shila [facility]   [id number]

[id number] [facility]



        00:00:00 00:00:00                                                 [facility]

 

        2021 Orders          Sincere,  [facility]   [id number]

[address] [facility]



        00:00:00 00:00:00 Only            [facility]

 

        2021 [facility], [facility]   [id number]

69526 [facility]



        05:33:00 11:38:00 Encounter         [facility]

 

        2021 Surgery         [facility], [facility]   [id number]

5[address] [facility]



        07:03:00 09:24:00                 [facility]

 

        2021 Travel                  [facility]   [id number]

 [facility]



        00:00:00 00:00:00                                                 [facility]

 

        2021 Telephone         Sincere,  [facility]   [id number]

79119 [facility]



        00:00:00 00:00:00                 [facility]

 

        2021 Documentat         Torrescorineneal, [facility]   [id number] 20

[id number] [facility]



        00:00:00 00:00:00 ion             [facility]

 

        2021 [facility]          [facility]   [id number]

3676 [facility]



        00:00:00 00:00:00                 [facility]

 

        2021 Telephone         Sincere,  [facility]   [id number]

10493 [facility]



        00:00:00 00:00:00                 [facility]

 

        2021 Emergency ER      Maria G, [facility]   [id number]

24287 [facility]



        18:43:00 22:58:00                 Tresa Nails                         [facility]

 

        2021 Emergency ER              [facility]    Emergency [id number]

4533 [facility]



        17:22:00 17:22:00                                                 

 

        2021 Travel                  [facility]   [id number]

 [facility]



        00:00:00 00:00:00                                                 [facility]

 

        2021 Telephone         Sincere,  [facility]   [id number]

57896 [facility]



        00:00:00 00:00:00                 [facility]

 

        2021-03-10 2021-03-10 Refill          Fawad, [facility]   [id number]

 [facility]



        00:00:00 00:00:00                 [facility]

 

        2021-03-10 2021-03-10 Orders          Sincere,  [facility]   [id number]

[address] [facility]



        00:00:00 00:00:00 Only            [facility]

 

        2021 Orders          Sincere,  [facility]   [id number]

[address] [facility]



        00:00:00 00:00:00 Only            [facility]

 

        2020 Outpatient                 [facility]    [facility]    [id number]

0-2 [facility]



        00:00:00 00:00:00                                         [id number] 

 

        2020 Outpatient EL      OBERTON, [facility]    [facility]    [id number]

8851 [facility]



        00:00:00 00:00:00                 [place]                           

 

        2020 Outpatient                 [facility]    [facility]    [id number]

0-2 [facility]



        00:00:00 00:00:00                                         [id number] 

 

        2020 Outpatient EL      OBERTON, [facility]    [facility]    [id number]

8840 [facility]



        00:00:00 00:00:00                 [place]                           

 

        2020 Outpatient EL              [facility]    [facility]H    [id number]

838 [facility]



        00:00:00 00:00:00                                                 

 

        2020 Outpatient EL              SLE    [facility]H    [id number]

839 [facility]



        00:00:00 00:00:00                                                 

 

        2018 Outpatient               [facility]    [facility]    [id number]

471 [facility]



        00:00:00 00:00:00                                                 

 

        2018 Appointmen         PURNIMA NICHOLS     [place] [id number]

99 [facility]



        16:00:00 16:00:00 t; GUIDO NICHOLS,         Surgery         PAULY MONSON M.D.            Specialty         jolanta ALANIZ                                            

 

        2017 Appointmen         PURNIMA NICHOLS     [id number]

2 [facility]



        13:00:00 13:00:00 t; GUIDO NICHOLS P hysici KULVINDER, M.D. ans M.D.                                            

 

        2017-10-10 2017-10-10 Appointmen         PURNIMA NICHOLS     [id number]

4 [facility]



        15:30:00 15:30:00 t; GUIDO NICHOLS P hysici KULVINDER, M.D. ans M.D.                                            

 

        2017[facility]03 [facility]     [id number]

6 [facility]



        13:30:00 13:30:00 t; GUIDO NICHOLS P hysici KULVINDER, M.D. ans M.D.                                            

 

        2017 Day                     [facility] [id number]

775 [facility]



        11:05:00 04:59:00 Surgery                 r       [place] [address] [place] [address]

 

        2017 Day                     [facility] [id number]

775 [facility]



        11:05:00 04:59:00 Surgery                 r       [place] 02      [facility]

 

        2017 Outpatient         Kamilah  [facility]   [facility]   [id number]

[id number] 



        06:05:00 23:59:00                 Guido S                 02      

 

        [facility]S     [facility]     [id number]

0 [facility]



        09:00:00 09:00:00 t; KAMILAH,         PAULY MONSON M.D. ans M.D.                                            

 

        [facility]         [facility]URNIM[facility] NICHOLS     [facility]     [id number]

2 [facility]



        09:30:00 09:30:00 t; GUIDO NICHOLS P hysici KULVINDER, M.D. ans M.D.                                            

 

        [facility]         PURNIMA NICHOLS     [facility]     [id number]

8 [facility]



        09:00:00 09:00:00 t; GUIDO NICHOLS P hysici KULVINDER, M.D. ans M.D.                                            

 

        [facility]         PURNIMA NICHOLS     [facility]     [id number]

3 [facility]



        10:00:00 10:00:00 t; GUIDO NICHOLS P hysici KULVINDER, M.D. ans M.D.                                            

 

        2017 Inpatient                 [facility] [id number]

[id number] [facility]



        15:03:00 17:30:00                         [address]

 

        2017 Inpatient                 [facility] [id number]

[id number] [facility]



        15:03:00 17:30:00                         [address]

 

        2017 Outpatient         Kamilah  [facility]   [id number]

[id number] 



        09:03:00 11:30:00                 Guido S                 00      

 

        [facility]         PURNIMA NICHOLS     [facility]     [id number]

7 [facility]



        09:00:00 09:00:00 t; GUIDO NICHOLS P hysici KULVINDER, M.D. ans M.D.                                            

 

        2016-10-26 2016-10-26 [facility]         FABI [facility]     [facility]     [id number]

42757 [facility]



        15:00:00 15:00:00 t;              CHRISTIE SOUTH Phys ici [address]                              ans



                        [address]                                              

 

        2016[facility]10-14 [facility]         PUNRIMA NICHOLS     [facility]     [id number]

0 [facility]



        10:00:00 10:00:00 t; GUIDO NICHOLS P hysici KULVINDER, M.D. ans M.D.                                            







Results







           Test Description Test Time  Test Comments Results    Result Comments 

Source









                    Tacrolimus level    2021 12:43:19 









                      Test Item  Value      Reference Range Interpretation Comme

nts









             Tacrolimus Lvl (test code = 8.3 ng/mL    10.0-20.0    L            

Test performed on [employer]



             98024-8)                                             Immun

oassay system



                                                                 with Chemilumin

escent



                                                                 Microparticle I

mmunoassay



                                                                 (CMIA) technolo

gy.

 

             FAINA (test code = FAINA)  ID - RM                           

 

             Lab Interpretation (test Abnormal                               



             code = 66060-2)                                        



[id number]43[facility]19





             Test Item    Value        Reference Range Interpretation Comments

 

             Tacrolimus Lvl (test 8.3 ng/mL    10.0-20.0    L            Test pe

rformed on



             code = 01117-5)                                        [employer] Archi

tect



                                                                 Immunoassay sys

tem



                                                                 with Chemilumin

escent



                                                                 Microparticle



                                                                 Immunoassay (CM

IA)



                                                                 technology.

 

             FAINA (test code =  ID -                           



             FAINA)         RM                                     

 

             Lab Interpretation Abnormal                               



             (test code =                                        



             32988-5)                                            



[facility] [id number] 12:43:19





             Test Item    Value        Reference Range Interpretation Comments

 

             Tacrolimus Lvl (test 8.3 ng/mL    10.0-20.0    L            Test pe

rformed on



             code = 09002-7)                                        [employer]

tect



                                                                 Immunoassay sys

tem



                                                                 with Chemilumin

escent



                                                                 Microparticle



                                                                 Immunoassay (CM

IA)



                                                                 technology.

 

             FAINA (test code =  ID -                           



             FAINA)         RM                                     

 

             Lab Interpretation Abnormal                               



             (test code =                                        



             88333-1)                                            



[id number]





             Test Item    Value        Reference Range Interpretation Comments

 

             TACROLIMUS BLOOD 8.3 ng/mL    10.0-20.0    L            Test perfor

med on [employer]



             (BEAKER) (test code                                        [employer]

t Immunoassay



             = 657)                                              system with



                                                                 Chemiluminescen

t



                                                                 Microparticle I

mmunoassay



                                                                 (CMIA) technolo

gy.



 ID - Basic Metabolic [id number] 11:09:09





             Test Item    Value        Reference Range Interpretation Comments

 

             Sodium (test code = 137 meq/L    136-145                   



             2951-2)                                             

 

             Potassium (test 4.1 meq/L    3.5-5.1                   



             code = 2823-3)                                        

 

             Chloride (test code 103 meq/L                        



             = 2075-0)                                           

 

             CO2 (test code = 26 meq/L     22-29                     



             -9)                                             

 

             BUN (test code = 16 mg/dL                           



             3094-0)                                             

 

             Creatinine (test 1.07 mg/dL   0.57-1.25                 



             code = 2160-0)                                        

 

             Glucose (test code 99 mg/dL                         



             = 2345-7)                                           

 

             Calcium (test code 9.3 mg/dL    8.4-10.2                  



             = 31724-0)                                          

 

             EGFR (test code = 73           mL/min/1.73 sq m              ESTIMA

PHOEBE GFR IS



             33914-3)                                            NOT AS ACCURATE

 AS



                                                                 CREATININE



                                                                 CLEARANCE IN



                                                                 PREDICTING



                                                                 GLOMERULAR



                                                                 FILTRATION RATE

.



                                                                 ESTIMATED GFR I

S



                                                                 NOT APPLICABLE 

FOR



                                                                 DIALYSIS PATIEN

TSDanisha

 

             FAINA (test code =  ID - DB                           



             FAINA)                                                



[facility] Metabolic [id number] 11:09:09





             Test Item    Value        Reference Range Interpretation Comments

 

             Sodium (test code = 137 meq/L    136-145                   



             2951-2)                                             

 

             Potassium (test 4.1 meq/L    3.5-5.1                   



             code = 2823-3)                                        

 

             Chloride (test code 103 meq/L                        



             = 2075-0)                                           

 

             CO2 (test code = 26 meq/L     2028)                                             

 

             BUN (test code = 16 mg/dL                           



             3094-0)                                             

 

             Creatinine (test 1.07 mg/dL   0.57-1.25                 



             code = 2160-0)                                        

 

             Glucose (test code 99 mg/dL                         



             = 2345-7)                                           

 

             Calcium (test code 9.3 mg/dL    8.4-10.2                  



             = 04824-6)                                          

 

             EGFR (test code = 73           mL/min/1.73 sq m              ESTIMA

PHOEBE GFR IS



             33914-3)                                            NOT AS ACCURATE

 AS



                                                                 CREATININE



                                                                 CLEARANCE IN



                                                                 PREDICTING



                                                                 GLOMERULAR



                                                                 FILTRATION RATE

.



                                                                 ESTIMATED GFR I

S



                                                                 NOT APPLICABLE 

FOR



                                                                 DIALYSIS PATIEN

TS.

 

             FAINA (test code =  ID - DB                           



             FAINA)                                                



[facility] Metabolic [id number] 11:09:09





             Test Item    Value        Reference Range Interpretation Comments

 

             Sodium (test code = 137 meq/L    136-145                   



             2951-2)                                             

 

             Potassium (test 4.1 meq/L    3.5-5.1                   



             code = 2823-3)                                        

 

             Chloride (test code 103 meq/L                        



             = 2075-0)                                           

 

             CO2 (test code = 26 meq/L     9)                                             

 

             BUN (test code = 16 mg/dL     7-21                      



             3094-0)                                             

 

             Creatinine (test 1.07 mg/dL   0.57-1.25                 



             code = 2160-0)                                        

 

             Glucose (test code 99 mg/dL                         



             = 2345-7)                                           

 

             Calcium (test code 9.3 mg/dL    8.4-10.2                  



             = 30876-0)                                          

 

             EGFR (test code = 73           mL/min/1.73 sq m              ESTIMA

PHOEBE GFR IS



             33914-3)                                            NOT AS ACCURATE

 AS



                                                                 CREATININE



                                                                 CLEARANCE IN



                                                                 PREDICTING



                                                                 GLOMERULAR



                                                                 FILTRATION RATE

.



                                                                 ESTIMATED GFR I

S



                                                                 NOT APPLICABLE 

FOR



                                                                 DIALYSIS PATIEN

TS.

 

             FAINA (test code =  ID - DB                           



             FAINA)                                                



[facility] [id number] 11:09:09





             Test Item    Value        Reference Range Interpretation Comments

 

             SODIUM (BEAKER) 137 meq/L    136-145                   



             (test code = 381)                                        

 

             POTASSIUM (BEAKER) 4.1 meq/L    3.5-5.1                   



             (test code = 379)                                        

 

             CHLORIDE (BEAKER) 103 meq/L                        



             (test code = 382)                                        

 

             CO2 (BEAKER) (test 26 meq/L     22-29                     



             code = 355)                                         

 

             BLOOD UREA NITROGEN 16 mg/dL     7-21                      



             (BEAKER) (test code                                        



             = 354)                                              

 

             CREATININE (BEAKER) 1.07 mg/dL   0.57-1.25                 



             (test code = 358)                                        

 

             GLUCOSE RANDOM 99 mg/dL                         



             (BEAKER) (test code                                        



             = 652)                                              

 

             CALCIUM (BEAKER) 9.3 mg/dL    8.4-10.2                  



             (test code = 697)                                        

 

             EGFR (BEAKER) (test 73 mL/min/1.73                           ESTIMA

PHOEBE GFR IS



             code = 1092) sq m                                   NOT AS ACCURATE

 AS



                                                                 CREATININE



                                                                 CLEARANCE IN



                                                                 PREDICTING



                                                                 GLOMERULAR



                                                                 FILTRATION RATE

.



                                                                 ESTIMATED GFR I

S



                                                                 NOT APPLICABLE 

FOR



                                                                 DIALYSIS PATIEN

TS.



 ID - DBCBC with platelet count + automated [id number] 10:49:14





             Test Item    Value        Reference Range Interpretation Comments

 

             WBC (test code = 6690-2) 8.4          See_Comment                [A

utomated message]



                                                                 The system [employer]



                                                                 generated this 

result



                                                                 transmitted ref

erence



                                                                 range: 3.5 - 10

.5



                                                                 K/L. The refe

rence



                                                                 range was not u

sed to



                                                                 interpret this 

result



                                                                 as normal/abnor

mal.

 

             RBC (test code = 789-8) 5.35         See_Comment                [Au

tomated message]



                                                                 The system [employer]



                                                                 generated this 

result



                                                                 transmitted ref

erence



                                                                 range: 4.63 - 6

.08



                                                                 M/L. The refe

rence



                                                                 range was not u

sed to



                                                                 interpret this 

result



                                                                 as normal/abnor

mal.

 

             MCHC (test code = 786-4) 33.8         See_Comment                [A

utomated message]



                                                                 The system [employer]



                                                                 generated this 

result



                                                                 transmitted ref

erence



                                                                 range: 32.3 - 3

6.5



                                                                 GM/DL. The refe

rence



                                                                 range was not u

sed to



                                                                 interpret this 

result



                                                                 as normal/abnor

mal.

 

             Hematocrit (test code = 45.2 %       40.1-51.0                 



             4544-3)                                             

 

             MCV (test code = 787-2) 84.5 fL      79.0-92.2                 

 

             MCH (test code = 785-6) 28.6 pg      25.7-32.2                 

 

             RDW (test code = 788-0) 13.2 %       11.6-14.4                 

 

             Platelets (test code = 162          See_Comment                [Aut

omated message]



             777-3)                                              The system [employer]



                                                                 generated this 

result



                                                                 transmitted ref

erence



                                                                 range: 150 - 45

0 K/CU



                                                                 MM. The referen

ce



                                                                 range was not u

sed to



                                                                 interpret this 

result



                                                                 as normal/abnor

mal.

 

             MPV (test code = 9.5 fL       9.4-12.4                  



             72027-0)                                            

 

             nRBC (test code = 413) 0            See_Comment                [Aut

omated message]



                                                                 The system [employer]



                                                                 generated this 

result



                                                                 transmitted ref

erence



                                                                 range: 0 - 0 /1

00



                                                                 WBC. The refere

nce



                                                                 range was not u

sed to



                                                                 interpret this 

result



                                                                 as normal/abnor

mal.

 

             % Neutros (test code = 73 %                                   



             429)                                                

 

             % Lymphs (test code = 17 %                                   



             430)                                                

 

             % Monos (test code = 7 %                                    



             431)                                                

 

             % Eos (test code = 432) 3 %                                    

 

             % Baso (test code = 437) 1 %                                    

 

             # Neutros (test code = 6.15         See_Comment  H             [Aut

omated message]



             670)                                                The system [employer]



                                                                 generated this 

result



                                                                 transmitted ref

erence



                                                                 range: 1.78 - 5

.38



                                                                 K/L. The refe

rence



                                                                 range was not u

sed to



                                                                 interpret this 

result



                                                                 as normal/abnor

mal.

 

             # Lymphs (test code = 1.40         See_Comment                [Auto

mated message]



             414)                                                The system [employer]



                                                                 generated this 

result



                                                                 transmitted ref

erence



                                                                 range: 1.32 - 3

.57



                                                                 K/L. The refe

rence



                                                                 range was not u

sed to



                                                                 interpret this 

result



                                                                 as normal/abnor

mal.

 

             # Monos (test code = 0.60         See_Comment                [Autom

ated message]



             415)                                                The system [employer]



                                                                 generated this 

result



                                                                 transmitted ref

erence



                                                                 range: 0.30 - 0

.82



                                                                 K/L. The refe

rence



                                                                 range was not u

sed to



                                                                 interpret this 

result



                                                                 as normal/abnor

mal.

 

             # Eos (test code = 416) 0.21         See_Comment                [Au

tomated message]



                                                                 The system [employer]



                                                                 generated this 

result



                                                                 transmitted ref

erence



                                                                 range: 0.04 - 0

.54



                                                                 K/L. The refe

rence



                                                                 range was not u

sed to



                                                                 interpret this 

result



                                                                 as normal/abnor

mal.

 

             # Baso (test code = 417) 0.05         See_Comment                [A

utomated message]



                                                                 The system [employer]



                                                                 generated this 

result



                                                                 transmitted ref

erence



                                                                 range: 0.01 - 0

.08



                                                                 K/L. The refe

rence



                                                                 range was not u

sed to



                                                                 interpret this 

result



                                                                 as normal/abnor

mal.

 

             Immature     0 %          0-1                       



             Granulocytes-Relative                                        



             (test code = 2801)                                        

 

             Lab Interpretation (test Abnormal                               



             code = 50935-8)                                        



[facility] with platelet count + automated [id number] 
10:49:14





             Test Item    Value        Reference Range Interpretation Comments

 

             WBC (test code = 6690-2) 8.4          See_Comment                [A

utomated message]



                                                                 The system [employer]



                                                                 generated this 

result



                                                                 transmitted ref

erence



                                                                 range: 3.5 - 10

.5



                                                                 K/L. The refe

rence



                                                                 range was not u

sed to



                                                                 interpret this 

result



                                                                 as normal/abnor

mal.

 

             RBC (test code = 789-8) 5.35         See_Comment                [Au

tomated message]



                                                                 The system [employer]



                                                                 generated this 

result



                                                                 transmitted ref

erence



                                                                 range: 4.63 - 6

.08



                                                                 M/L. The refe

rence



                                                                 range was not u

sed to



                                                                 interpret this 

result



                                                                 as normal/abnor

mal.

 

             MCHC (test code = 786-4) 33.8         See_Comment                [A

utomated message]



                                                                 The system [employer]



                                                                 generated this 

result



                                                                 transmitted ref

erence



                                                                 range: 32.3 - 3

6.5



                                                                 GM/DL. The refe

rence



                                                                 range was not u

sed to



                                                                 interpret this 

result



                                                                 as normal/abnor

mal.

 

             Hematocrit (test code = 45.2 %       40.1-51.0                 



             4544-3)                                             

 

             MCV (test code = 787-2) 84.5 fL      79.0-92.2                 

 

             MCH (test code = 785-6) 28.6 pg      25.7-32.2                 

 

             RDW (test code = 788-0) 13.2 %       11.6-14.4                 

 

             Platelets (test code = 162          See_Comment                [Aut

omated message]



             777-3)                                              The system [employer]



                                                                 generated this 

result



                                                                 transmitted ref

erence



                                                                 range: 150 - 45

0 K/CU



                                                                 MM. The referen

ce



                                                                 range was not u

sed to



                                                                 interpret this 

result



                                                                 as normal/abnor

mal.

 

             MPV (test code = 9.5 fL       9.4-12.4                  



             39450-1)                                            

 

             nRBC (test code = 413) 0            See_Comment                [Aut

omated message]



                                                                 The system [employer]



                                                                 generated this 

result



                                                                 transmitted ref

erence



                                                                 range: 0 - 0 /1

00



                                                                 WBC. The refere

nce



                                                                 range was not u

sed to



                                                                 interpret this 

result



                                                                 as normal/abnor

mal.

 

             % Neutros (test code = 73 %                                   



             429)                                                

 

             % Lymphs (test code = 17 %                                   



             430)                                                

 

             % Monos (test code = 7 %                                    



             431)                                                

 

             % Eos (test code = 432) 3 %                                    

 

             % Baso (test code = 437) 1 %                                    

 

             # Neutros (test code = 6.15         See_Comment  H             [Aut

omated message]



             670)                                                The system [employer]



                                                                 generated this 

result



                                                                 transmitted ref

erence



                                                                 range: 1.78 - 5

.38



                                                                 K/L. The refe

rence



                                                                 range was not u

sed to



                                                                 interpret this 

result



                                                                 as normal/abnor

mal.

 

             # Lymphs (test code = 1.40         See_Comment                [Auto

mated message]



             414)                                                The system [employer]



                                                                 generated this 

result



                                                                 transmitted ref

erence



                                                                 range: 1.32 - 3

.57



                                                                 K/L. The refe

rence



                                                                 range was not u

sed to



                                                                 interpret this 

result



                                                                 as normal/abnor

mal.

 

             # Monos (test code = 0.60         See_Comment                [Autom

ated message]



             415)                                                The system [employer]



                                                                 generated this 

result



                                                                 transmitted ref

erence



                                                                 range: 0.30 - 0

.82



                                                                 K/L. The refe

rence



                                                                 range was not u

sed to



                                                                 interpret this 

result



                                                                 as normal/abnor

mal.

 

             # Eos (test code = 416) 0.21         See_Comment                [Au

tomated message]



                                                                 The system [employer]



                                                                 generated this 

result



                                                                 transmitted ref

erence



                                                                 range: 0.04 - 0

.54



                                                                 K/L. The refe

rence



                                                                 range was not u

sed to



                                                                 interpret this 

result



                                                                 as normal/abnor

mal.

 

             # Baso (test code = 417) 0.05         See_Comment                [A

utomated message]



                                                                 The system [employer]



                                                                 generated this 

result



                                                                 transmitted ref

erence



                                                                 range: 0.01 - 0

.08



                                                                 K/L. The refe

rence



                                                                 range was not u

sed to



                                                                 interpret this 

result



                                                                 as normal/abnor

mal.

 

             Immature     0 %          0-1                       



             Granulocytes-Relative                                        



             (test code = 2801)                                        

 

             Lab Interpretation (test Abnormal                               



             code = 99503-0)                                        



[facility] with platelet count + automated [id number] 
10:49:14





             Test Item    Value        Reference Range Interpretation Comments

 

             WBC (test code = 6690-2) 8.4          See_Comment                [A

utomated message]



                                                                 The system [employer]



                                                                 generated this 

result



                                                                 transmitted ref

erence



                                                                 range: 3.5 - 10

.5



                                                                 K/L. The refe

rence



                                                                 range was not u

sed to



                                                                 interpret this 

result



                                                                 as normal/abnor

mal.

 

             RBC (test code = 789-8) 5.35         See_Comment                [Au

tomated message]



                                                                 The system [employer]



                                                                 generated this 

result



                                                                 transmitted ref

erence



                                                                 range: 4.63 - 6

.08



                                                                 M/L. The refe

rence



                                                                 range was not u

sed to



                                                                 interpret this 

result



                                                                 as normal/abnor

mal.

 

             MCHC (test code = 786-4) 33.8         See_Comment                [A

utomated message]



                                                                 The system [employer]



                                                                 generated this 

result



                                                                 transmitted ref

erence



                                                                 range: 32.3 - 3

6.5



                                                                 GM/DL. The refe

rence



                                                                 range was not u

sed to



                                                                 interpret this 

result



                                                                 as normal/abnor

mal.

 

             Hematocrit (test code = 45.2 %       40.1-51.0                 



             4544-3)                                             

 

             MCV (test code = 787-2) 84.5 fL      79.0-92.2                 

 

             MCH (test code = 785-6) 28.6 pg      25.7-32.2                 

 

             RDW (test code = 788-0) 13.2 %       11.6-14.4                 

 

             Platelets (test code = 162          See_Comment                [Aut

omated message]



             777-3)                                              The system [employer]



                                                                 generated this 

result



                                                                 transmitted ref

erence



                                                                 range: 150 - 45

0 K/CU



                                                                 MM. The referen

ce



                                                                 range was not u

sed to



                                                                 interpret this 

result



                                                                 as normal/abnor

mal.

 

             MPV (test code = 9.5 fL       9.4-12.4                  



             34972-2)                                            

 

             nRBC (test code = 413) 0            See_Comment                [Aut

omated message]



                                                                 The system [employer]



                                                                 generated this 

result



                                                                 transmitted ref

erence



                                                                 range: 0 - 0 /1

00



                                                                 WBC. The refere

nce



                                                                 range was not u

sed to



                                                                 interpret this 

result



                                                                 as normal/abnor

mal.

 

             % Neutros (test code = 73 %                                   



             429)                                                

 

             % Lymphs (test code = 17 %                                   



             430)                                                

 

             % Monos (test code = 7 %                                    



             431)                                                

 

             % Eos (test code = 432) 3 %                                    

 

             % Baso (test code = 437) 1 %                                    

 

             # Neutros (test code = 6.15         See_Comment  H             [Aut

omated message]



             670)                                                The system [employer]



                                                                 generated this 

result



                                                                 transmitted ref

erence



                                                                 range: 1.78 - 5

.38



                                                                 K/L. The refe

rence



                                                                 range was not u

sed to



                                                                 interpret this 

result



                                                                 as normal/abnor

mal.

 

             # Lymphs (test code = 1.40         See_Comment                [Auto

mated message]



             414)                                                The system [employer]



                                                                 generated this 

result



                                                                 transmitted ref

erence



                                                                 range: 1.32 - 3

.57



                                                                 K/L. The refe

rence



                                                                 range was not u

sed to



                                                                 interpret this 

result



                                                                 as normal/abnor

mal.

 

             # Monos (test code = 0.60         See_Comment                [Autom

ated message]



             415)                                                The system [employer]



                                                                 generated this 

result



                                                                 transmitted ref

erence



                                                                 range: 0.30 - 0

.82



                                                                 K/L. The refe

rence



                                                                 range was not u

sed to



                                                                 interpret this 

result



                                                                 as normal/abnor

mal.

 

             # Eos (test code = 416) 0.21         See_Comment                [Au

tomated message]



                                                                 The system [employer]



                                                                 generated this 

result



                                                                 transmitted ref

erence



                                                                 range: 0.04 - 0

.54



                                                                 K/L. The refe

rence



                                                                 range was not u

sed to



                                                                 interpret this 

result



                                                                 as normal/abnor

mal.

 

             # Baso (test code = 417) 0.05         See_Comment                [A

utomated message]



                                                                 The system [employer]



                                                                 generated this 

result



                                                                 transmitted ref

erence



                                                                 range: 0.01 - 0

.08



                                                                 K/L. The refe

rence



                                                                 range was not u

sed to



                                                                 interpret this 

result



                                                                 as normal/abnor

mal.

 

             Immature     0 %          0-1                       



             Granulocytes-Relative                                        



             (test code = 2801)                                        

 

             Lab Interpretation (test Abnormal                               



             code = 78168-4)                                        



[facility] W/PLT COUNT &amp; AUTO [id number] 
10:49:14





             Test Item    Value        Reference Range Interpretation Comments

 

             WHITE BLOOD CELL COUNT (BEAKER) 8.4 K/ L     3.5-10.5              

    



             (test code = 775)                                        

 

             RED BLOOD CELL COUNT (BEAKER) 5.35 M/ L    4.63-6.08               

  



             (test code = 761)                                        

 

             HEMOGLOBIN (BEAKER) (test code = 15.3 GM/DL   13.7-17.5            

     



             410)                                                

 

             HEMATOCRIT (BEAKER) (test code = 45.2 %       40.1-51.0            

     



             411)                                                

 

             MEAN CORPUSCULAR VOLUME (BEAKER) 84.5 fL      79.0-92.2            

     



             (test code = 753)                                        

 

             MEAN CORPUSCULAR HEMOGLOBIN 28.6 pg      25.7-32.2                 



             (BEAKER) (test code = 751)                                        

 

             MEAN CORPUSCULAR HEMOGLOBIN CONC 33.8 GM/DL   32.3-36.5            

     



             (BEAKER) (test code = 752)                                        

 

             RED CELL DISTRIBUTION WIDTH 13.2 %       11.6-14.4                 



             (BEAKER) (test code = 412)                                        

 

             PLATELET COUNT (BEAKER) (test 162 K/CU MM  150-450                 

  



             code = 756)                                         

 

             MEAN PLATELET VOLUME (BEAKER) 9.5 fL       9.4-12.4                

  



             (test code = 754)                                        

 

             NUCLEATED RED BLOOD CELLS 0 /100 WBC   0-0                       



             (BEAKER) (test code = 413)                                        

 

             NEUTROPHILS RELATIVE PERCENT 73 %                                  

 



             (BEAKER) (test code = 429)                                        

 

             LYMPHOCYTES RELATIVE PERCENT 17 %                                  

 



             (BEAKER) (test code = 430)                                        

 

             MONOCYTES RELATIVE PERCENT 7 %                                    



             (BEAKER) (test code = 431)                                        

 

             EOSINOPHILS RELATIVE PERCENT 3 %                                   

 



             (BEAKER) (test code = 432)                                        

 

             BASOPHILS RELATIVE PERCENT 1 %                                    



             (BEAKER) (test code = 437)                                        

 

             NEUTROPHILS ABSOLUTE COUNT 6.15 K/ L    1.78-5.38    H            



             (BEAKER) (test code = 670)                                        

 

             LYMPHOCYTES ABSOLUTE COUNT 1.40 K/ L    1.32-3.57                 



             (BEAKER) (test code = 414)                                        

 

             MONOCYTES ABSOLUTE COUNT (BEAKER) 0.60 K/ L    0.30-0.82           

      



             (test code = 415)                                        

 

             EOSINOPHILS ABSOLUTE COUNT 0.21 K/ L    0.04-0.54                 



             (BEAKER) (test code = 416)                                        

 

             BASOPHILS ABSOLUTE COUNT (BEAKER) 0.05 K/ L    0.01-0.08           

      



             (test code = 417)                                        

 

             IMMATURE GRANULOCYTES-RELATIVE 0 %          0-1                    

   



             PERCENT (BEAKER) (test code =                                      

  



             2801)                                               



2D echo w/ doppler (cw/pw/color)2021 15:29:47Ejection FractionSLEH ECHO 
[facility] 
[facility]pw/color)2021 15:29:47[facility]SLEH [facility] 2 [id number] 11:56:00NEW 
CARDIOLOGIST OBERTONReason for Exam:-&gt;heart transplant annual follow up
************************************************************[facility] : 1970 Sex: 
M************************************************************FINAL REPORT 
PATIENT ID: [id number] INDICATION: heart transplant annual follow up COMPARISON: 
None TECHNIQUE: Frontal and lateral views of the chest. FINDINGS: Lungs and 
pleura: Clear lungs. No effusion.Heartand mediastinum: Normal heart size. 
Unremarkable mediastinal contours.Osseous structures: No acute ab
normality.Additional findings: None. IMPRESSION: No acute intrathoracic 
abnormality. Signed: Shikha Jarvis MDReport Verified Date/Time: 2021 
11:56:35 Reading Location: [facility] Radiology Reading Room Electronically 
signed by: SHIKHA JARVIS MD on 2021 11:56 AMTACROLIMUS LEVEL
2021 12:37:00





             Test Item    Value        Reference Range Interpretation Comments

 

             TACROLIMUS BLOOD 10.6 ng/mL   10.0-20.0                 Test perfor

med on [employer]



             (BEAKER) (test code                                        [employer]

t Immunoassay



             = 657)                                              system with



                                                                 Chemiluminescen

t



                                                                 Microparticle



                                                                 Immunoassay (CM

IA)



                                                                 technology.



 ID - RMBASIC METABOLIC [id number] 06:33:00





             Test Item    Value        Reference Range Interpretation Comments

 

             SODIUM (BEAKER) 139 meq/L    136-145                   



             (test code = 381)                                        

 

             POTASSIUM (BEAKER) 4.0 meq/L    3.5-5.1                   



             (test code = 379)                                        

 

             CHLORIDE (BEAKER) 102 meq/L                        



             (test code = 382)                                        

 

             CO2 (BEAKER) (test 29 meq/L     22-29                     



             code = 355)                                         

 

             BLOOD UREA NITROGEN 16 mg/dL     7-21                      



             (BEAKER) (test code                                        



             = 354)                                              

 

             CREATININE (BEAKER) 1.04 mg/dL   0.57-1.25                 



             (test code = 358)                                        

 

             GLUCOSE RANDOM 97 mg/dL                         



             (BEAKER) (test code                                        



             = 652)                                              

 

             CALCIUM (BEAKER) 8.9 mg/dL    8.4-10.2                  



             (test code = 697)                                        

 

             EGFR (BEAKER) (test 75 mL/min/1.73                           ESTIMA

PHOEBE GFR IS



             code = 1092) sq m                                   NOT AS ACCURATE

 AS



                                                                 CREATININE



                                                                 CLEARANCE IN



                                                                 PREDICTING



                                                                 GLOMERULAR



                                                                 FILTRATION RATE

.



                                                                 ESTIMATED GFR I

S



                                                                 NOT APPLICABLE 

FOR



                                                                 DIALYSIS PATIEN

TS.



 ID - PIAYA LProthrombin time/[id number] 06:30:00





             Test Item    Value        Reference    Interpretation Comments



                                       Range                     

 

             Protime (test code = 12.9         See_Comment                [Autom

ated



             0862-2)                                             message] The



                                                                 system which



                                                                 generated this



                                                                 result



                                                                 transmitted



                                                                 reference range

:



                                                                 11.9 - 14.2



                                                                 seconds. The



                                                                 reference range



                                                                 was not used to



                                                                 interpret this



                                                                 result as



                                                                 normal/abnormal

.

 

             INR (test code = 1.00         See_Comment                [Automated



             1001-6)                                             message] The



                                                                 system which



                                                                 generated this



                                                                 result



                                                                 transmitted



                                                                 reference range

:



                                                                 <=5.90. The



                                                                 reference range



                                                                 was not used to



                                                                 interpret this



                                                                 result as



                                                                 normal/abnormal

.

 

             FAINA (test code = RECOMMENDED                            



             FAINA)         COUMADIN/WARFARIN                           



                          INR THERAPY                            



                          RANGESSTANDARD DOSE:                           



                          2.0 - 3.0 Includes:                           



                          PROPHYLAXIS for                           



                          venous thrombosis,                           



                          systemic                               



                          embolization;                           



                          TREATMENT for venous                           



                          thrombosis and/or                           



                          pulmonary                              



                          embolus.HIGH RISK:                           



                          Target INR is                           



                          2.5-3.5 for patients                           



                          with mechanical                           



                          heart valves.                           

 

             Lab Interpretation Normal                                 



             (test code =                                        



             62871-2)                                            



[id number]





             Test Item    Value        Reference    Interpretation Comments



                                       Range                     

 

             Protime (test code = 12.9         See_Comment                [Autom

ated



             5902-2)                                             message] The



                                                                 system which



                                                                 generated this



                                                                 result



                                                                 transmitted



                                                                 reference range

:



                                                                 11.9 - 14.2



                                                                 seconds. The



                                                                 reference range



                                                                 was not used to



                                                                 interpret this



                                                                 result as



                                                                 normal/abnormal

.

 

             INR (test code = 1.00         See_Comment                [Automated



             6301-6)                                             message] The



                                                                 system which



                                                                 generated this



                                                                 result



                                                                 transmitted



                                                                 reference range

:



                                                                 <=5.90. The



                                                                 reference range



                                                                 was not used to



                                                                 interpret this



                                                                 result as



                                                                 normal/abnormal

.

 

             FAINA (test code = RECOMMENDED                            



             FAINA)         COUMADIN/WARFARIN                           



                          INR THERAPY                            



                          RANGESSTANDARD DOSE:                           



                          2.0 - 3.0 Includes:                           



                          PROPHYLAXIS for                           



                          venous thrombosis,                           



                          systemic                               



                          embolization;                           



                          TREATMENT for venous                           



                          thrombosis and/or                           



                          pulmonary                              



                          embolus.HIGH RISK:                           



                          Target INR is                           



                          2.5-3.5 for patients                           



                          with mechanical                           



                          heart valves.                           

 

             Lab Interpretation Normal                                 



             (test code =                                        



             23865-4)                                            



[id number]





             Test Item    Value        Reference Range Interpretation Comments

 

             PROTIME (BEAKER) 12.9 seconds 11.9-14.2                 



             (test code = 759)                                        

 

             INR (BEAKER) (test 1.00         See_Comment                [Automat

ed message]



             code = 370)                                         The system [employer]



                                                                 generated this 

result



                                                                 transmitted ref

erence



                                                                 range: <=5.90. 

The



                                                                 reference range

 was



                                                                 not used to int

erpret



                                                                 this result as



                                                                 normal/abnormal

.



RECOMMENDED COUMADIN/WARFARIN INR THERAPY RANGESSTANDARD DOSE: 2.0 - 3.0 
Includes: PROPHYLAXIS for venous thrombosis, systemic embolization; TREATMENT 
for venous thrombosis and/or pulmonary embolus.HIGH RISK: Target INR is 2.5-3.5 
for patients with mechanical heart valves.CBC (Hemogram only)2021 06:20:00





             Test Item    Value        Reference Range Interpretation Comments

 

             WBC (test code = 6690-2) 4.8          See_Comment                [A

utomated message]



                                                                 The system [employer]



                                                                 generated this 

result



                                                                 transmitted ref

erence



                                                                 range: 3.5 - 10

.5



                                                                 K/L. The refe

rence



                                                                 range was not u

sed to



                                                                 interpret this 

result



                                                                 as normal/abnor

mal.

 

             RBC (test code = 789-8) 5.14         See_Comment                [Au

tomated message]



                                                                 The system [employer]



                                                                 generated this 

result



                                                                 transmitted ref

erence



                                                                 range: 4.63 - 6

.08



                                                                 M/L. The refe

rence



                                                                 range was not u

sed to



                                                                 interpret this 

result



                                                                 as normal/abnor

mal.

 

             MCHC (test code = 786-4) 33.9         See_Comment                [A

utomated message]



                                                                 The system [employer]



                                                                 generated this 

result



                                                                 transmitted ref

erence



                                                                 range: 32.3 - 3

6.5



                                                                 GM/DL. The refe

rence



                                                                 range was not u

sed to



                                                                 interpret this 

result



                                                                 as normal/abnor

mal.

 

             Hematocrit (test code = 43.3 %       40.1-51.0                 



             4544-3)                                             

 

             MCV (test code = 787-2) 84.2 fL      79.0-92.2                 

 

             MCH (test code = 785-6) 28.6 pg      25.7-32.2                 

 

             RDW (test code = 788-0) 13.5 %       11.6-14.4                 

 

             Platelets (test code = 149          See_Comment  L             [Aut

omated message]



             777-3)                                              The system [employer]



                                                                 generated this 

result



                                                                 transmitted ref

erence



                                                                 range: 150 - 45

0 K/CU



                                                                 MM. The referen

ce



                                                                 range was not u

sed to



                                                                 interpret this 

result



                                                                 as normal/abnor

mal.

 

             MPV (test code = 9.4 fL       9.4-12.4                  



             38071-5)                                            

 

             nRBC (test code = 413) 0            See_Comment                [Aut

omated message]



                                                                 The system [employer]



                                                                 generated this 

result



                                                                 transmitted ref

erence



                                                                 range: 0 - 0 /1

00



                                                                 WBC. The refere

nce



                                                                 range was not u

sed to



                                                                 interpret this 

result



                                                                 as normal/abnor

mal.

 

             Lab Interpretation (test Abnormal                               



             code = 21561-4)                                        



[facility] (Hemogram only)2021 06:20:00





             Test Item    Value        Reference Range Interpretation Comments

 

             WBC (test code = 6690-2) 4.8          See_Comment                [A

utomated message]



                                                                 The system [employer]



                                                                 generated this 

result



                                                                 transmitted ref

erence



                                                                 range: 3.5 - 10

.5



                                                                 K/L. The refe

rence



                                                                 range was not u

sed to



                                                                 interpret this 

result



                                                                 as normal/abnor

mal.

 

             RBC (test code = 789-8) 5.14         See_Comment                [Au

tomated message]



                                                                 The system [employer]



                                                                 generated this 

result



                                                                 transmitted ref

erence



                                                                 range: 4.63 - 6

.08



                                                                 M/L. The refe

rence



                                                                 range was not u

sed to



                                                                 interpret this 

result



                                                                 as normal/abnor

mal.

 

             MCHC (test code = 786-4) 33.9         See_Comment                [A

utomated message]



                                                                 The system [employer]



                                                                 generated this 

result



                                                                 transmitted ref

erence



                                                                 range: 32.3 - 3

6.5



                                                                 GM/DL. The refe

rence



                                                                 range was not u

sed to



                                                                 interpret this 

result



                                                                 as normal/abnor

mal.

 

             Hematocrit (test code = 43.3 %       40.1-51.0                 



             4544-3)                                             

 

             MCV (test code = 787-2) 84.2 fL      79.0-92.2                 

 

             MCH (test code = 785-6) 28.6 pg      25.7-32.2                 

 

             RDW (test code = 788-0) 13.5 %       11.6-14.4                 

 

             Platelets (test code = 149          See_Comment  L             [Aut

omated message]



             777-3)                                              The system [employer]



                                                                 generated this 

result



                                                                 transmitted ref

erence



                                                                 range: 150 - 45

0 K/CU



                                                                 MM. The referen

ce



                                                                 range was not u

sed to



                                                                 interpret this 

result



                                                                 as normal/abnor

mal.

 

             MPV (test code = 9.4 fL       9.4-12.4                  



             26707-4)                                            

 

             nRBC (test code = 413) 0            See_Comment                [Aut

omated message]



                                                                 The system [employer]



                                                                 generated this 

result



                                                                 transmitted ref

erence



                                                                 range: 0 - 0 /1

00



                                                                 WBC. The refere

nce



                                                                 range was not u

sed to



                                                                 interpret this 

result



                                                                 as normal/abnor

mal.

 

             Lab Interpretation (test Abnormal                               



             code = 63425-0)                                        



[facility] (HEMOGRAM ONLY)2021 06:20:00





             Test Item    Value        Reference Range Interpretation Comments

 

             WHITE BLOOD CELL COUNT (BEAKER) 4.8 K/ L     3.5-10.5              

    



             (test code = 775)                                        

 

             RED BLOOD CELL COUNT (BEAKER) 5.14 M/ L    4.63-6.08               

  



             (test code = 761)                                        

 

             HEMOGLOBIN (BEAKER) (test code = 14.7 GM/DL   13.7-17.5            

     



             410)                                                

 

             HEMATOCRIT (BEAKER) (test code = 43.3 %       40.1-51.0            

     



             411)                                                

 

             MEAN CORPUSCULAR VOLUME (BEAKER) 84.2 fL      79.0-92.2            

     



             (test code = 753)                                        

 

             MEAN CORPUSCULAR HEMOGLOBIN 28.6 pg      25.7-32.2                 



             (BEAKER) (test code = 751)                                        

 

             MEAN CORPUSCULAR HEMOGLOBIN CONC 33.9 GM/DL   32.3-36.5            

     



             (BEAKER) (test code = 752)                                        

 

             RED CELL DISTRIBUTION WIDTH 13.5 %       11.6-14.4                 



             (BEAKER) (test code = 412)                                        

 

             PLATELET COUNT (BEAKER) (test 149 K/CU MM  150-450      L          

  



             code = 756)                                         

 

             MEAN PLATELET VOLUME (BEAKER) 9.4 fL       9.4-12.4                

  



             (test code = 754)                                        

 

             NUCLEATED RED BLOOD CELLS 0 /100 WBC   0-0                       



             (BEAKER) (test code = 413)                                        



Blood Culture - Routine (Left Venipuncture)2021 22:00:00





             Test Item    Value        Reference Range Interpretation Comments

 

             Result (test code = No growth in 5 days                           



             6463-4)                                             



[facility]:00





             Test Item    Value        Reference Range Interpretation Comments

 

             Result (test code = No growth in 5 days                           



             6463-4)                                             



[id number]





             Test Item    Value        Reference Range Interpretation Comments

 

             CULTURE (BEAKER) (test No growth in 5 days                         

  



             code = 1095)                                        



BLOOD [id number] 22:00:00





             Test Item    Value        Reference Range Interpretation Comments

 

             CULTURE (BEAKER) (test No growth in 5 days                         

  



             code = 1095)                                        



SARS-CoV2/Influenza/RSV RT-PCR (Symptomatic ONLY)2021 21:08:00





             Test Item    Value        Reference Range Interpretation Comments

 

             SARS-COV2/RT-PCR (test Positive     Negative     AA           



             code = 07642-3)                                        

 

             Influenza A RT-PCR Negative     Negative                  



             (test code = 59789-3)                                        

 

             Influenza B RT-PCR Negative     Negative                  



             (test code = 78089-4)                                        

 

             RSV by RT-PCR (test Negative     Negative                  Performa

nce of the



             code = 26270-9)                                        [facility]



                                                                 SARS-CoV-2/Flu/

RSV test



                                                                 has only been



                                                                 established in



                                                                 nasopharyngeal 

swab



                                                                 specimens. Use 

of the



                                                                 [facility]



                                                                 SARS-CoV-2/Flu/

RSV test



                                                                 with other spec

imen



                                                                 types has not b

een



                                                                 assessed and



                                                                 performance



                                                                 characteristics

 are



                                                                 unknown. As wit

h any



                                                                 molecular test,



                                                                 mutations withi

n the



                                                                 targeted geneti

c



                                                                 regions identif

ied by



                                                                 the [facility]

s



                                                                 SARS-CoV-2/Flu/

RSV test



                                                                 could affect pr

[place]



                                                                 and/or probe bi

nding



                                                                 resulting in fa

ilure to



                                                                 detect the pres

ence of



                                                                 virus or the vi

sabra



                                                                 being detected 

less



                                                                 predictably.Neg

ative



                                                                 results do not 

preclude



                                                                 SARS-CoV-2, Inf

luenza



                                                                 A/B, or RSV inf

ection



                                                                 and should not 

be used



                                                                 as the sole bas

is for



                                                                 treatment or ot

her



                                                                 patient managem

ent



                                                                 decisions. Resu

lts from



                                                                 the Xpert Xpres

s



                                                                 SARS-CoV-2/Flu/

RSV test



                                                                 should be corre

lated



                                                                 with the clinic

al



                                                                 history,



                                                                 epidemiological

 data,



                                                                 and other data



                                                                 available to th

e



                                                                 clinician evalu

ating



                                                                 the patient. In

valid



                                                                 test results ma

y occur



                                                                 from improper s

pecimen



                                                                 collection; jasmeet

lure to



                                                                 follow the oralia

mmended



                                                                 sample collecti

on,



                                                                 handling, and s

torage



                                                                 procedures; gretchen

hnical



                                                                 error. False ne

gative



                                                                 results may occ

ur if



                                                                 virus is presen

t at



                                                                 levels below th

e



                                                                 analytical limi

t of



                                                                 detection (LOD:

 131



                                                                 copies/mL). Vir

al



                                                                 nucleic acid ma

y



                                                                 persist in vivo

,



                                                                 independent of 

virus



                                                                 viability. Dete

ction of



                                                                 analyte target(

s) does



                                                                 not imply that 

the



                                                                 corresponding v

irus(es)



                                                                 are infectious 

or are



                                                                 the causative a

gents



                                                                 for clinical sy

mptoms.



                                                                 Recent patient 

exposure



                                                                 to FluMist or

 other



                                                                 live attenuated



                                                                 influenza vacci

sylvester may



                                                                 cause inaccurat

e



                                                                 positive result

s.This



                                                                 test has been



                                                                 authorized by [employer] under



                                                                 an EU[employer] for use 

by



                                                                 authorized



                                                                 laboratories. T

his test



                                                                 is only authori

zed for



                                                                 the duration of

 the



                                                                 declaration nereida

t



                                                                 circumstances e

xist



                                                                 justifying the



                                                                 authorization o

f



                                                                 emergency use o

f in



                                                                 vitro diagnosti

c tests



                                                                 for detection a

nd/or



                                                                 diagnosis of CO

VID-19



                                                                 under Section 5

64(b)(1)



                                                                 of the Federal 

Food,



                                                                 Drug and Cosmet

ic Act,



                                                                 21 [place]C. 



                                                                 360bbb-3(b)(1),

 unless



                                                                 the authorizati

on is



                                                                 terminated or r

evoked



                                                                 sooner. Fact Sh

eet for



                                                                 Healthcare Prov

iders:



                                                                 https://www.[employer]



                                                                 /Documents/Xper

t%20Xpre



                                                                 ss%44TKHR-EzR-4

-Flu-RSV



                                                                 /302-4508%20Rev

.%20B%20



                                                                 HCP%20Fact%20Sh

eet.pdf



                                                                 Fact Sheet for



                                                                 Healthcare Elsa

ents:



                                                                 https://www.[employer]



                                                                 /Documents/Xper

t%20Xpre



                                                                 ss%10QYET-CbW-7

-Flu-RSV



                                                                 /302-4507%20Rev

.%20B%20



                                                                 Patient%20Fact%

20Sheet.



                                                                 pdf

 

             Lab Interpretation Abnormal                               



             (test code = 66051-8)                                        



[facility]
20[facility] 21:08:00





             Test Item    Value        Reference Range Interpretation Comments

 

             SARS-COV2/RT-PCR (test Positive     Negative     AA           



             code = 12034-5)                                        

 

             Influenza A RT-PCR Negative     Negative                  



             (test code = 72723-1)                                        

 

             Influenza B RT-PCR Negative     Negative                  



             (test code = 00516-8)                                        

 

             RSV by RT-PCR (test Negative     Negative                  Performa

nce of the



             code = 51490-0)                                        Xpert Xpress



                                                                 SARS-CoV-2/Flu/

RSV test



                                                                 has only been



                                                                 established in



                                                                 nasopharyngeal 

swab



                                                                 specimens. Use 

of the



                                                                 Xpert Xpress



                                                                 SARS-CoV-2/Flu/

RSV test



                                                                 with other spec

imen



                                                                 types has not b

een



                                                                 assessed and



                                                                 performance



                                                                 characteristics

 are



                                                                 unknown. As wit

h any



                                                                 molecular test,



                                                                 mutations withi

n the



                                                                 targeted geneti

c



                                                                 regions identif

ied by



                                                                 the Xpert Xpres

s



                                                                 SARS-CoV-2/Flu/

RSV test



                                                                 could affect pr

carlton



                                                                 and/or probe bi

nding



                                                                 resulting in fa

ilure to



                                                                 detect the pres

ence of



                                                                 virus or the vi

sabra



                                                                 being detected 

less



                                                                 predictably.Neg

ative



                                                                 results do not 

preclude



                                                                 SARS-CoV-2, Inf

luenza



                                                                 A/B, or RSV inf

ection



                                                                 and should not 

be used



                                                                 as the sole bas

is for



                                                                 treatment or ot

her



                                                                 patient managem

ent



                                                                 decisions. Resu

lts from



                                                                 the Xpert Xpres

s



                                                                 SARS-CoV-2/Flu/

RSV test



                                                                 should be corre

lated



                                                                 with the clinic

al



                                                                 history,



                                                                 epidemiological

 data,



                                                                 and other data



                                                                 available to th

e



                                                                 clinician evalu

ating



                                                                 the patient. In

valid



                                                                 test results ma

y occur



                                                                 from improper s

pecimen



                                                                 collection; jasmeet

lure to



                                                                 follow the oralia

mmended



                                                                 sample collecti

on,



                                                                 handling, and s

torage



                                                                 procedures; gretchen

hnical



                                                                 error. False ne

gative



                                                                 results may occ

ur if



                                                                 virus is presen

t at



                                                                 levels below th

e



                                                                 analytical limi

t of



                                                                 detection (LOD:

 131



                                                                 copies/mL). Vir

al



                                                                 nucleic acid ma

y



                                                                 persist in vivo

,



                                                                 independent of 

virus



                                                                 viability. Dete

ction of



                                                                 analyte target(

s) does



                                                                 not imply that 

the



                                                                 corresponding v

irus(es)



                                                                 are infectious 

or are



                                                                 the causative a

gents



                                                                 for clinical sy

mptoms.



                                                                 Recent patient 

exposure



                                                                 to FluMist or

 other



                                                                 live attenuated



                                                                 influenza vacci

sylvester may



                                                                 cause inaccurat

e



                                                                 positive result

s.This



                                                                 test has been



                                                                 authorized by [employer] under



                                                                 an EUA for use 

by



                                                                 authorized



                                                                 laboratories. T

his test



                                                                 is only authori

zed for



                                                                 the duration of

 the



                                                                 declaration nereida

t



                                                                 circumstances e

xist



                                                                 justifying the



                                                                 authorization o

f



                                                                 emergency use o

f in



                                                                 vitro diagnosti

c tests



                                                                 for detection a

nd/or



                                                                 diagnosis of CO

VID-19



                                                                 under Section 5

64(b)(1)



                                                                 of the Federal 

Food,



                                                                 Drug and Cosmet

ic Act,



                                                                 21 U.S.C. 



                                                                 360bbb-3(b)(1),

 unless



                                                                 the authorizati

on is



                                                                 terminated or r

evoked



                                                                 sooner. Fact Sh

eet for



                                                                 Healthcare Prov

iders:



                                                                 https://www.[employer]



                                                                 /Documents/Xper

t%20Xpre



                                                                 ss%52IFWJ-HgP-6

-Flu-RSV



                                                                 /3024508%20Rev

.%20B%20



                                                                 HCP%20Fact%20Sh

eet.pdf



                                                                 Fact Sheet for



                                                                 Healthcare Elsa

ents:



                                                                 https://www.[employer]



                                                                 /Documents/Xper

t%20Xpre



                                                                 ss%98HDIV-KfF-8

-Flu-RSV



                                                                 /302-9460%20Rev

.%20B%20



                                                                 Patient%20Fact%

20Sheet.



                                                                 pdf

 

             Lab Interpretation Abnormal                               



             (test code = 48943-0)                                        



[id number] 21:08:00





             Test Item    Value        Reference Range Interpretation Comments

 

             SARS-COV2/RT-PCR Positive     Negative     AA           



             (test code =                                        



             7878269)                                            

 

             INFLUENZA A RT-PCR Negative     Negative                  



             (test code =                                        



             2000008)                                            

 

             INFLUENZA B RT-PCR Negative     Negative                  



             (test code =                                        



             1587143)                                            

 

             RSV RT-PCR (test Negative     Negative                  Performance

 of the Xpert



             code = 9581093)                                        Xpress SARS-

CoV-2/Flu/RSV



                                                                 test has only b

een



                                                                 established in



                                                                 nasopharyngeal 

swab



                                                                 specimens. Use 

of the



                                                                 Xpert Xpress



                                                                 SARS-CoV-2/Flu/

RSV test



                                                                 with other spec

imen types



                                                                 has not been as

sessed and



                                                                 performance



                                                                 characteristics

 are



                                                                 unknown. As wit

h any



                                                                 molecular test,

 mutations



                                                                 within the targ

eted



                                                                 genetic regions



                                                                 identified by t

he Xpert



                                                                 Xpress SARS-CoV

-2/Flu/RSV



                                                                 test could affe

ct primer



                                                                 and/or probe bi

nding



                                                                 resulting in fa

ilure to



                                                                 detect the pres

ence of



                                                                 virus or the vi

sabra being



                                                                 detected less



                                                                 predictably.Neg

ative



                                                                 results do not 

preclude



                                                                 SARS-CoV-2, Inf

luenza



                                                                 A/B, or RSV inf

ection and



                                                                 should not be u

sed as the



                                                                 sole basis for 

treatment



                                                                 or other patien

t



                                                                 management deci

sions.



                                                                 Results from 

e Xpert



                                                                 Xpress SARS-CoV

-2/Flu/RSV



                                                                 test should be 

correlated



                                                                 with the clinic

al



                                                                 history, epidem

iological



                                                                 data, and other

 data



                                                                 available to 

e



                                                                 clinician evalu

ating the



                                                                 patient. Invali

d test



                                                                 results may occ

ur from



                                                                 improper specim

en



                                                                 collection; jasmeet

lure to



                                                                 follow the oralia

mmended



                                                                 sample collecti

on,



                                                                 handling, and s

torage



                                                                 procedures; gretchen

hnical



                                                                 error. False ne

gative



                                                                 results may occ

ur if



                                                                 virus is presen

t at



                                                                 levels below 

e



                                                                 analytical limi

t of



                                                                 detection (LOD:

 131



                                                                 copies/mL). Vir

al nucleic



                                                                 acid may persis

t in vivo,



                                                                 independent of 

virus



                                                                 viability. Dete

ction of



                                                                 analyte target(

s) does



                                                                 not imply that 

the



                                                                 corresponding v

irus(es)



                                                                 are infectious 

or are the



                                                                 causative agent

s for



                                                                 clinical sympto

ms. Recent



                                                                 patient exposur

e to



                                                                 [facility] or othe

r live



                                                                 attenuated infl

uenza



                                                                 vaccines may ca

use



                                                                 inaccurate posi

tive



                                                                 results.This te

st has



                                                                 been authorized

 by FDA



                                                                 under an EUA fo

r use by



                                                                 authorized labo

ratories.



                                                                 This test is on

ly



                                                                 authorized for 

the



                                                                 duration of the



                                                                 declaration nereida

t



                                                                 circumstances e

xist



                                                                 justifying the



                                                                 authorization o

f



                                                                 emergency use o

f in vitro



                                                                 diagnostic test

s for



                                                                 detection and/o

r



                                                                 diagnosis of CO

VID-19



                                                                 under Section 5

64(b)(1)



                                                                 of the Federal 

Food, Drug



                                                                 and Cosmetic Ac

t, 21



                                                                 [place] 360bbb-3

(b)(1),



                                                                 unless the auth

orization



                                                                 is terminated o

r revoked



                                                                 sooner.Fact She

et for



                                                                 Healthcare Prov

iders:



                                                                 https://[email or web address]/D



                                                                 ocuments/Xpert%

20Xpress%2



                                                                 9AVWO-RdA-1-Flu

-RSV/302-4



                                                                 508%20Rev.%20B%

20HCP%20Fa



                                                                 ct%20Sheet.pdfF

act Sheet



                                                                 for Healthcare 

Patients:



                                                                 https://[email or web address]/D



                                                                 ocuments/Xpert%

20Xpress%2



                                                                 7EAFM-HeW-4-Flu

-RSV/302-4



                                                                 507%20Rev.%20B%

20Patient%



                                                                 20Fact%20Sheet.

pdf



Troponin  20:50:00





             Test Item    Value        Reference Range Interpretation Comments

 

             Troponin I (test code = <0.01        0.00-0.03                 



             98849-8)                                            

 

             FAINA (test code = FAINA) Troponin I (TnI)                           



                          levels must be                           



                          interpreted in the                           



                          context of the                           



                          presenting symptoms                           



                          and the clinical                           



                          findings. Elevated                           



                          TnI levels indicate                           



                          myocardial damage,                           



                          but are not specific                           



                          for ischemic heart                           



                          disease. Elevated TnI                           



                          levels are seen in                           



                          patients with other                           



                          cardiac conditions                           



                          (including                             



                          myocarditis and                           



                          congestive heart                           



                          failure), and slight                           



                          TnI elevations occur                           



                          in patients with                           



                          other conditions,                           



                          including sepsis,                           



                          renal failure,                           



                          acidosis, acute                           



                          neurological disease,                           



                          and persistent                           



                          tachyarrhythmia.Opera                           



                          tor ID - DB                            

 

             Lab Interpretation (test Normal                                 



             code = 02245-5)                                        



[facility] -35-28 20:50:00





             Test Item    Value        Reference Range Interpretation Comments

 

             Troponin I (test code = <0.01        0.00-0.03                 



             76696-4)                                            

 

             FAINA (test code = FAINA) Troponin I (TnI)                           



                          levels must be                           



                          interpreted in the                           



                          context of the                           



                          presenting symptoms                           



                          and the clinical                           



                          findings. Elevated                           



                          TnI levels indicate                           



                          myocardial damage,                           



                          but are not specific                           



                          for ischemic heart                           



                          disease. Elevated TnI                           



                          levels are seen in                           



                          patients with other                           



                          cardiac conditions                           



                          (including                             



                          myocarditis and                           



                          congestive heart                           



                          failure), and slight                           



                          TnI elevations occur                           



                          in patients with                           



                          other conditions,                           



                          including sepsis,                           



                          renal failure,                           



                          acidosis, acute                           



                          neurological disease,                           



                          and persistent                           



                          tachyarrhythmia.[facility]                           



                          tor ID - DB                            

 

             Lab Interpretation (test Normal                                 



             code = 77458-7)                                        



[facility] -91-54 20:50:00





             Test Item    Value        Reference Range Interpretation Comments

 

             TROPONIN I (BEAKER) (test code = 397) < ng/mL      0.00-0.03       

          



Troponin I (TnI) levels must be interpreted in the context of the presenting 
symptoms and the clinical findings. Elevated TnI levels indicate myocardial 
damage, but are not specific for ischemic heart disease. Elevated TnI levels are
seen in patients with other cardiac conditions (including myocarditis and 
congestive heart failure), and slight TnI elevations occur in patients with 
other conditions, including sepsis, renal failure, acidosis, acute neurological 
disease, and persistent tachyarrhythmia. ID - DBB-type Natriuretic 
Factor (BNP)2021 20:48:00





             Test Item    Value        Reference Range Interpretation Comments

 

             BNP (test code = 06905-7) 48 pg/mL     0-100                     

 

             FAINA (test code = FAINA)  ID - DB                           

 

             Lab Interpretation (test Normal                                 



             code = 23786-4)                                        



[facility]





             Test Item    Value        Reference Range Interpretation Comments

 

             BNP (test code = 56956-8) 48 pg/mL     0-100                     

 

             FAINA (test code = FAINA)  ID - DB                           

 

             Lab Interpretation (test Normal                                 



             code = 16018-8)                                        



[facility] 20:48:00





             Test Item    Value        Reference Range Interpretation Comments

 

             B-TYPE NATRIURETIC PEPTIDE (BEAKER) 48 pg/mL     0-100             

        



             (test code = 700)                                        



 ID - [id number] 20:44:00





             Test Item    Value        Reference Range Interpretation Comments

 

             Magnesium (test code = 1.7 mg/dL    1.6-2.6                   Speci

men



             12084-7)                                            slightly



                                                                 hemolyzed

 

             FAINA (test code = FAINA)  ID - DB                           

 

             Lab Interpretation Normal                                 



             (test code = 81630-0)                                        



[facility]





             Test Item    Value        Reference Range Interpretation Comments

 

             Magnesium (test code = 1.7 mg/dL    1.6-2.6                   Speci

men



             44327-8)                                            slightly



                                                                 hemolyzed

 

             FAINA (test code = FAINA)  ID - DB                           

 

             Lab Interpretation Normal                                 



             (test code = 90593-9)                                        



[facility]2021-03-11 20:44:00





             Test Item    Value        Reference Range Interpretation Comments

 

             MAGNESIUM (BEAKER) 1.7 mg/dL    1.6-2.6                   Specimen 

slightly



             (test code = 627)                                        hemolyzed



 ID - DBBASIC METABOLIC [id number] 20:44:00





             Test Item    Value        Reference Range Interpretation Comments

 

             SODIUM (BEAKER) 139 meq/L    136-145                   



             (test code = 381)                                        

 

             POTASSIUM (BEAKER) 4.0 meq/L    3.5-5.1                   Specimen 

slightly



             (test code = 379)                                        hemolyzed

 

             CHLORIDE (BEAKER) 101 meq/L                        



             (test code = 382)                                        

 

             CO2 (BEAKER) (test 24 meq/L     22-29                     



             code = 355)                                         

 

             BLOOD UREA NITROGEN 17 mg/dL     7-21                      



             (BEAKER) (test code                                        



             = 354)                                              

 

             CREATININE (BEAKER) 1.13 mg/dL   0.57-1.25                 Specimen

 slightly



             (test code = 358)                                        hemolyzed

 

             GLUCOSE RANDOM 107 mg/dL           H            



             (BEAKER) (test code                                        



             = 652)                                              

 

             CALCIUM (BEAKER) 9.1 mg/dL    8.4-10.2                  



             (test code = 697)                                        

 

             EGFR (BEAKER) (test 68 mL/min/1.73                           ESTIMA

PHOEBE GFR IS



             code = 1092) sq m                                   NOT AS ACCURATE

 AS



                                                                 CREATININE



                                                                 CLEARANCE IN



                                                                 PREDICTING



                                                                 GLOMERULAR



                                                                 FILTRATION RATE

.



                                                                 ESTIMATED GFR I

S



                                                                 NOT APPLICABLE 

FOR



                                                                 DIALYSIS PATIEN

TS.



 ID - DBLactic acid, [id number] 20:38:00





             Test Item    Value        Reference Range Interpretation Comments

 

             Lactate, Venous (test 2.17 mmol/L  0.50-2.20                 Specim

en



             code = 2872)                                        slightly



                                                                 hemolyzed

 

             FAINA (test code = FAINA)  ID - DB                           

 

             Lab Interpretation Normal                                 



             (test code = 82525-1)                                        



[facility] [id number]38:00





             Test Item    Value        Reference Range Interpretation Comments

 

             Lactate, Venous (test 2.17 mmol/L  0.50-2.20                 Specim

en



             code = 2872)                                        slightly



                                                                 hemolyzed

 

             FAINA (test code = FAINA)  ID - DB                           

 

             Lab Interpretation Normal                                 



             (test code = 99129-1)                                        



[facility] [id number] 20:38:00





             Test Item    Value        Reference Range Interpretation Comments

 

             LACTATE BLOOD VENOUS 2.17 mmol/L  0.50-2.20                 Specime

n slightly



             (2) (BEAKER) (test                                        hemolyzed



             code = 2872)                                        



 ID - DBCBC W/PLT COUNT &amp; AUTO [id number] 20:21:00





             Test Item    Value        Reference Range Interpretation Comments

 

             WHITE BLOOD CELL COUNT (BEAKER) 4.1 K/ L     3.5-10.5              

    



             (test code = 775)                                        

 

             RED BLOOD CELL COUNT (BEAKER) 5.73 M/ L    4.63-6.08               

  



             (test code = 761)                                        

 

             HEMOGLOBIN (BEAKER) (test code = 16.0 GM/DL   13.7-17.5            

     



             410)                                                

 

             HEMATOCRIT (BEAKER) (test code = 49.1 %       40.1-51.0            

     



             411)                                                

 

             MEAN CORPUSCULAR VOLUME (BEAKER) 85.7 fL      79.0-92.2            

     



             (test code = 753)                                        

 

             MEAN CORPUSCULAR HEMOGLOBIN 27.9 pg      25.7-32.2                 



             (BEAKER) (test code = 751)                                        

 

             MEAN CORPUSCULAR HEMOGLOBIN CONC 32.6 GM/DL   32.3-36.5            

     



             (BEAKER) (test code = 752)                                        

 

             RED CELL DISTRIBUTION WIDTH 13.2 %       11.6-14.4                 



             (BEAKER) (test code = 412)                                        

 

             PLATELET COUNT (BEAKER) (test code 98 K/CU MM   150-450      L     

       



             = 756)                                              

 

             MEAN PLATELET VOLUME (BEAKER) 9.9 fL       9.4-12.4                

  



             (test code = 754)                                        

 

             NUCLEATED RED BLOOD CELLS (BEAKER) 0 /100 WBC   0-0                

       



             (test code = 413)                                        

 

             NEUTROPHILS RELATIVE PERCENT 48 %                                  

 



             (BEAKER) (test code = 429)                                        

 

             LYMPHOCYTES RELATIVE PERCENT 30 %                                  

 



             (BEAKER) (test code = 430)                                        

 

             MONOCYTES RELATIVE PERCENT 20 %                                   



             (BEAKER) (test code = 431)                                        

 

             EOSINOPHILS RELATIVE PERCENT 0 %                                   

 



             (BEAKER) (test code = 432)                                        

 

             BASOPHILS RELATIVE PERCENT 1 %                                    



             (BEAKER) (test code = 437)                                        

 

             NEUTROPHILS ABSOLUTE COUNT 1.97 K/ L    1.78-5.38                 



             (BEAKER) (test code = 670)                                        

 

             LYMPHOCYTES ABSOLUTE COUNT 1.25 K/ L    1.32-3.57    L            



             (BEAKER) (test code = 414)                                        

 

             MONOCYTES ABSOLUTE COUNT (BEAKER) 0.84 K/ L    0.30-0.82    H      

      



             (test code = 415)                                        

 

             EOSINOPHILS ABSOLUTE COUNT 0.01 K/ L    0.04-0.54    L            



             (BEAKER) (test code = 416)                                        

 

             BASOPHILS ABSOLUTE COUNT (BEAKER) 0.02 K/ L    0.01-0.08           

      



             (test code = 417)                                        

 

             IMMATURE GRANULOCYTES-RELATIVE 1 %          0-1                    

   



             PERCENT (BEAKER) (test code =                                      

  



             2801)                                               



RAD, CHEST, 1 VIEW, NON [id number] 19:56:00NEW CARDIOLOGIST OBERTONReason 
for exam:-&gt;FEVERReason for exam:-&gt;NASAL CONGESTIONShould this be performed
at the bedside?-&gt;Yes
************************************************************[facility] : 1970 Sex: 
M************************************************************FINAL REPORT 
PATIENT ID: [id number] AP view of the chest dated 3/11/2021 CLINICAL INFORMATION: 
FEVERNASAL CONGESTION Comment: Heart is normal in size. Pulmonary vasculature is
unremarkable. Lungs are clear. No pulmonary infiltrate or pleural effusion is 
present. Impression: No active cardiopulmonary disease.Signed: Kiran James 
MDReport Verified Date/Time: 2021 19:56:24 Reading Location: [facility] C0[facility] 
Consult Reading Room Electronically signed by: KIRAN JAMES M.D. on 2021 
07:56 PMTACROLIMUS [id number] 12:44:00





             Test Item    Value        Reference Range Interpretation Comments

 

             TACROLIMUS BLOOD (BEAKER) (test 7.6 ng/mL    10.0-20.0    L        

    



             code = 657)                                         



 ID - AAHAMIDLIPID [id number] 10:29:00





             Test Item    Value        Reference Range Interpretation Comments

 

             TRIGLYCERIDES (BEAKER) (test code = 81 mg/dL                       

        



             540)                                                

 

             CHOLESTEROL (BEAKER) (test code = 155 mg/dL                        

      



             631)                                                

 

             HDL CHOLESTEROL (BEAKER) (test code 50 mg/dL                       

        



             = 976)                                              

 

             LDL CHOLESTEROL CALCULATED (BEAKER) 89 mg/dL                       

        



             (test code = 633)                                        



Triglyceride Reference Range: Low Risk &lt;150 Borderline 150-199 High Risk 200-
499 Very High Risk &gt;=500Cholesterol Reference Range: Low Risk &lt;200 
Borderline 200-239 High Risk &gt;240HDL Cholesterol Reference Range: Low Risk 
&gt;=60 High Risk &lt;40LDL Cholesterol Reference Range: Optimal &lt;100 Near 
Optimal 100-129 Borderline 130-159 High 160-189 Very High &gt;=190  ID -
[facility] METABOLIC [id number] 10:29:00





             Test Item    Value        Reference Range Interpretation Comments

 

             SODIUM (BEAKER) 140 meq/L    136-145                   



             (test code = 381)                                        

 

             POTASSIUM (BEAKER) 4.6 meq/L    3.5-5.1                   



             (test code = 379)                                        

 

             CHLORIDE (BEAKER) 104 meq/L                        



             (test code = 382)                                        

 

             CO2 (BEAKER) (test 29 meq/L     22-29                     



             code = 355)                                         

 

             BLOOD UREA NITROGEN 19 mg/dL     7-21                      



             (BEAKER) (test code                                        



             = 354)                                              

 

             CREATININE (BEAKER) 1.13 mg/dL   0.57-1.25                 



             (test code = 358)                                        

 

             GLUCOSE RANDOM 105 mg/dL                        



             (BEAKER) (test code                                        



             = 652)                                              

 

             CALCIUM (BEAKER) 9.7 mg/dL    8.4-10.2                  



             (test code = 697)                                        

 

             EGFR (BEAKER) (test 68 mL/min/1.73                           ESTIMA

PHOEBE GFR IS



             code = 1092) sq m                                   NOT AS ACCURATE

 AS



                                                                 CREATININE



                                                                 CLEARANCE IN



                                                                 PREDICTING



                                                                 GLOMERULAR



                                                                 FILTRATION RATE

.



                                                                 ESTIMATED GFR I

S



                                                                 NOT APPLICABLE 

FOR



                                                                 DIALYSIS PATIEN

TS.



 ID - AMANDA [facility] FUNCTION [id number] 10:29:00





             Test Item    Value        Reference Range Interpretation Comments

 

             TOTAL PROTEIN (BEAKER) (test code = 7.5 gm/dL    6.0-8.3           

        



             770)                                                

 

             ALBUMIN (BEAKER) (test code = 1145) 4.7 g/dL     3.5-5.0           

        

 

             BILIRUBIN TOTAL (BEAKER) (test code 0.7 mg/dL    0.2-1.2           

        



             = 377)                                              

 

             BILIRUBIN DIRECT (BEAKER) (test 0.3 mg/dL    0.1-0.5               

    



             code = 706)                                         

 

             ALKALINE PHOSPHATASE (BEAKER) (test 78 U/L                   

        



             code = 346)                                         

 

             AST (SGOT) (BEAKER) (test code = 19 U/L       5-34                 

     



             353)                                                

 

             ALT (SGPT) (BEAKER) (test code = 14 U/L       6-55                 

     



             347)                                                



 ID - AMANDA CRAD, CHEST, 2 [id number] 10:21:00NEW CARDIOLOGIST 
OBERTONReason for Exam:-&gt;heart transplant annual follow upFINAL REPORT 
PATIENT ID: [id number] INDICATION: heart transplant annual follow up COMPARISON: 
None TECHNIQUE: Frontal and lateral views of the chest. FINDINGS: Lungs and 
pleura: Clear lungs. No effusion.Heart and mediastinum: Normal heart size. 
Unremarkable mediastinal contours.Osseous structures: No acute 
abnormality.Additional findings: None. IMPRESSION: No acute intrathoracic 
abnormality. Signed: Shikha Jarvis MDReport Verified Date/Time: 2020 
10:21:38 Reading Location: [facility] Radiology Reading Room Electronically 
signed by: SHIKHA JARVIS MD on 2020 10:21 [id number] 
10:10:00





             Test Item    Value        Reference Range Interpretation Comments

 

             PROSTATE SPECIFIC ANTIGEN (BEAKER) 0.6 ng/mL    0.0-4.0            

       



             (test code = 844)                                        



 ID - AMANDA CCBC W/PLT COUNT &amp; AUTO [id number] 09:25:00





             Test Item    Value        Reference Range Interpretation Comments

 

             WHITE BLOOD CELL COUNT (BEAKER) 4.5 K/ L     3.5-10.5              

    



             (test code = 775)                                        

 

             RED BLOOD CELL COUNT (BEAKER) 5.64 M/ L    4.63-6.08               

  



             (test code = 761)                                        

 

             HEMOGLOBIN (BEAKER) (test code = 16.9 GM/DL   13.7-17.5            

     



             410)                                                

 

             HEMATOCRIT (BEAKER) (test code = 48.8 %       40.1-51.0            

     



             411)                                                

 

             MEAN CORPUSCULAR VOLUME (BEAKER) 86.5 fL      79.0-92.2            

     



             (test code = 753)                                        

 

             MEAN CORPUSCULAR HEMOGLOBIN 30.0 pg      25.7-32.2                 



             (BEAKER) (test code = 751)                                        

 

             MEAN CORPUSCULAR HEMOGLOBIN CONC 34.6 GM/DL   32.3-36.5            

     



             (BEAKER) (test code = 752)                                        

 

             RED CELL DISTRIBUTION WIDTH 13.3 %       11.6-14.4                 



             (BEAKER) (test code = 412)                                        

 

             PLATELET COUNT (BEAKER) (test 152 K/CU MM  150-450                 

  



             code = 756)                                         

 

             MEAN PLATELET VOLUME (BEAKER) 9.5 fL       9.4-12.4                

  



             (test code = 754)                                        

 

             NUCLEATED RED BLOOD CELLS 0 /100 WBC   0-0                       



             (BEAKER) (test code = 413)                                        

 

             NEUTROPHILS RELATIVE PERCENT 50 %                                  

 



             (BEAKER) (test code = 429)                                        

 

             LYMPHOCYTES RELATIVE PERCENT 32 %                                  

 



             (BEAKER) (test code = 430)                                        

 

             MONOCYTES RELATIVE PERCENT 11 %                                   



             (BEAKER) (test code = 431)                                        

 

             EOSINOPHILS RELATIVE PERCENT 6 %                                   

 



             (BEAKER) (test code = 432)                                        

 

             BASOPHILS RELATIVE PERCENT 1 %                                    



             (BEAKER) (test code = 437)                                        

 

             NEUTROPHILS ABSOLUTE COUNT 2.25 K/ L    1.78-5.38                 



             (BEAKER) (test code = 670)                                        

 

             LYMPHOCYTES ABSOLUTE COUNT 1.45 K/ L    1.32-3.57                 



             (BEAKER) (test code = 414)                                        

 

             MONOCYTES ABSOLUTE COUNT (BEAKER) 0.50 K/ L    0.30-0.82           

      



             (test code = 415)                                        

 

             EOSINOPHILS ABSOLUTE COUNT 0.25 K/ L    0.04-0.54                 



             (BEAKER) (test code = 416)                                        

 

             BASOPHILS ABSOLUTE COUNT (BEAKER) 0.03 K/ L    0.01-0.08           

      



             (test code = 417)                                        

 

             IMMATURE GRANULOCYTES-RELATIVE 0 %          0-1                    

   



             PERCENT (BEAKER) (test code =                                      

  



             2801)                                               



TACROLIMUS [id number] 11:25:00





             Test Item    Value        Reference Range Interpretation Comments

 

             TACROLIMUS BLOOD (BEAKER) (test 6.9 ng/mL    10.0-20.0    L        

    



             code = 657)                                         



BASIC METABOLIC [id number] 08:24:00





             Test Item    Value        Reference Range Interpretation Comments

 

             SODIUM (BEAKER) 137 meq/L    136-145                   



             (test code = 381)                                        

 

             POTASSIUM (BEAKER) 4.3 meq/L    3.5-5.1                   



             (test code = 379)                                        

 

             CHLORIDE (BEAKER) 103 meq/L                        



             (test code = 382)                                        

 

             CO2 (BEAKER) (test 28 meq/L     22-29                     



             code = 355)                                         

 

             BLOOD UREA NITROGEN 18 mg/dL     7-21                      



             (BEAKER) (test code                                        



             = 354)                                              

 

             CREATININE (BEAKER) 1.12 mg/dL   0.57-1.25                 



             (test code = 358)                                        

 

             GLUCOSE RANDOM 98 mg/dL                         



             (BEAKER) (test code                                        



             = 652)                                              

 

             CALCIUM (BEAKER) 9.1 mg/dL    8.4-10.2                  



             (test code = 697)                                        

 

             EGFR (BEAKER) (test 69 mL/min/1.73                           ESTIMA

PHOEBE GFR IS



             code = 1092) sq m                                   NOT AS ACCURATE

 AS



                                                                 CREATININE



                                                                 CLEARANCE IN



                                                                 PREDICTING



                                                                 GLOMERULAR



                                                                 FILTRATION RATE

.



                                                                 ESTIMATED GFR I

S



                                                                 NOT APPLICABLE 

FOR



                                                                 DIALYSIS PATIEN

TS.



CBC W/PLT COUNT &amp; AUTO [id number] 08:01:00





             Test Item    Value        Reference Range Interpretation Comments

 

             WHITE BLOOD CELL COUNT (BEAKER) 5.4 K/ L     3.5-10.5              

    



             (test code = 775)                                        

 

             RED BLOOD CELL COUNT (BEAKER) 5.35 M/ L    4.63-6.08               

  



             (test code = 761)                                        

 

             HEMOGLOBIN (BEAKER) (test code = 15.6 GM/DL   13.7-17.5            

     



             410)                                                

 

             HEMATOCRIT (BEAKER) (test code = 46.9 %       40.1-51.0            

     



             411)                                                

 

             MEAN CORPUSCULAR VOLUME (BEAKER) 87.7 fL      79.0-92.2            

     



             (test code = 753)                                        

 

             MEAN CORPUSCULAR HEMOGLOBIN 29.2 pg      25.7-32.2                 



             (BEAKER) (test code = 751)                                        

 

             MEAN CORPUSCULAR HEMOGLOBIN CONC 33.3 GM/DL   32.3-36.5            

     



             (BEAKER) (test code = 752)                                        

 

             RED CELL DISTRIBUTION WIDTH 13.6 %       11.6-14.4                 



             (BEAKER) (test code = 412)                                        

 

             PLATELET COUNT (BEAKER) (test 132 K/CU MM  150-450      L          

  



             code = 756)                                         

 

             MEAN PLATELET VOLUME (BEAKER) 9.3 fL       9.4-12.4     L          

  



             (test code = 754)                                        

 

             NUCLEATED RED BLOOD CELLS 0 /100 WBC   0-0                       



             (BEAKER) (test code = 413)                                        

 

             NEUTROPHILS RELATIVE PERCENT 70 %                                  

 



             (BEAKER) (test code = 429)                                        

 

             LYMPHOCYTES RELATIVE PERCENT 16 %                                  

 



             (BEAKER) (test code = 430)                                        

 

             MONOCYTES RELATIVE PERCENT 8 %                                    



             (BEAKER) (test code = 431)                                        

 

             EOSINOPHILS RELATIVE PERCENT 5 %                                   

 



             (BEAKER) (test code = 432)                                        

 

             BASOPHILS RELATIVE PERCENT 1 %                                    



             (BEAKER) (test code = 437)                                        

 

             NEUTROPHILS ABSOLUTE COUNT 3.77 K/ L    1.78-5.38                 



             (BEAKER) (test code = 670)                                        

 

             LYMPHOCYTES ABSOLUTE COUNT 0.83 K/ L    1.32-3.57    L            



             (BEAKER) (test code = 414)                                        

 

             MONOCYTES ABSOLUTE COUNT (BEAKER) 0.45 K/ L    0.30-0.82           

      



             (test code = 415)                                        

 

             EOSINOPHILS ABSOLUTE COUNT 0.25 K/ L    0.04-0.54                 



             (BEAKER) (test code = 416)                                        

 

             BASOPHILS ABSOLUTE COUNT (BEAKER) 0.04 K/ L    0.01-0.08           

      



             (test code = 417)                                        

 

             IMMATURE GRANULOCYTES-RELATIVE 0 %          0-1                    

   



             PERCENT (BEAKER) (test code =                                      

  



             2801)                                               



CT, BRAIN, WITHOUT [id number] 17:41:00NEW CARDIOLOGIST OBERTONFINAL 
REPORT PATIENT ID: [id number] CT, BRAIN, WITHOUT CONTRAST INDICATION: head injury 
TECHNIQUE: Noncontrast axial imaging was obtained from the vertex to the skull 
base. Axial images were reconstructed using a bone algorithm. DOSE REDUCTION: 
Dose modulation, iterative reconstruction, and/or weight-based adjustment of the
mA/kV was utilized to reduce the radiation dose to as low as reasonably achievab
le. COMPARISON: None. FINDINGS: Cerebral parenchyma: Mild cerebral volume loss 
is present. No recentinfarct or parenchymal hemorrhage is present.Midline 
structures: Normally positioned.Cerebellum and brainstem: Commensurate volume 
loss.Ventricles: Normal volume.Extra-axial spaces: Unremarkable. Calvarium and 
skull base: Intact.Paranasal sinuses and mastoid air cells: Visible chambers are
clear.Orbital contents: Included portions unremarkable. Additional findings: 
None. IMPRESSION: Chronic involutional changes without acute or traumatic 
intracranial abnormality. Signed: JR Aguila Robert MDReport Verified 
Date/Time: 2019 17:41:54 Reading Location: [facility] Radiology Reading
Room Electronically signed by: MOHIT AGUILA on 2019 05:41 PM
TACROLIMUS [id number] 10:33:00





             Test Item    Value        Reference Range Interpretation Comments

 

             TACROLIMUS BLOOD (BEAKER) (test 6.2 ng/mL    10.0-20.0    L        

    



             code = 657)                                         



[id number] 09:15:00





             Test Item    Value        Reference Range Interpretation Comments

 

             PROSTATE SPECIFIC ANTIGEN (BEAKER) 0.5 ng/mL    0.0-4.0            

       



             (test code = 844)                                        



LIPID [id number] 09:01:00





             Test Item    Value        Reference Range Interpretation Comments

 

             TRIGLYCERIDES (BEAKER) (test code = 63 mg/dL                       

        



             540)                                                

 

             CHOLESTEROL (BEAKER) (test code = 126 mg/dL                        

      



             631)                                                

 

             HDL CHOLESTEROL (BEAKER) (test code 42 mg/dL                       

        



             = 976)                                              

 

             LDL CHOLESTEROL CALCULATED (BEAKER) 71 mg/dL                       

        



             (test code = 633)                                        



Triglyceride Reference Range: Low Risk &lt;150 Borderline 150-199 High Risk 200-
499 Very High Risk  &gt;=500Cholesterol Reference Range: Low Risk &lt;200 
Borderline 200-239 High Risk &gt;240HDL Cholesterol Reference Range: Low Risk 
&gt;=60 High Risk  &lt;40LDL Cholesterol Reference Range: Optimal &lt;100 Near 
Optimal 100-129 Borderline 130-159 High 160-189 Very High &gt;=190BASIC 
METABOLIC [id number] 09:01:00





             Test Item    Value        Reference Range Interpretation Comments

 

             SODIUM (BEAKER) 143 meq/L    136-145                   



             (test code = 381)                                        

 

             POTASSIUM (BEAKER) 4.3 meq/L    3.5-5.1                   



             (test code = 379)                                        

 

             CHLORIDE (BEAKER) 105 meq/L                        



             (test code = 382)                                        

 

             CO2 (BEAKER) (test 30 meq/L     22-29        H            



             code = 355)                                         

 

             BLOOD UREA NITROGEN 16 mg/dL     7-21                      



             (BEAKER) (test code                                        



             = 354)                                              

 

             CREATININE (BEAKER) 0.96 mg/dL   0.57-1.25                 



             (test code = 358)                                        

 

             GLUCOSE RANDOM 108 mg/dL           H            



             (BEAKER) (test code                                        



             = 652)                                              

 

             CALCIUM (BEAKER) 9.7 mg/dL    8.4-10.2                  



             (test code = 697)                                        

 

             EGFR (BEAKER) (test 83 mL/min/1.73                           ESTIMA

PHOEBE GFR IS



             code = 1092) sq m                                   NOT AS ACCURATE

 AS



                                                                 CREATININE



                                                                 CLEARANCE IN



                                                                 PREDICTING



                                                                 GLOMERULAR



                                                                 FILTRATION RATE

.



                                                                 ESTIMATED GFR I

S



                                                                 NOT APPLICABLE 

FOR



                                                                 DIALYSIS PATIEN

TS.



HEPATIC FUNCTION [id number] 09:01:00





             Test Item    Value        Reference Range Interpretation Comments

 

             TOTAL PROTEIN (BEAKER) (test code = 7.0 gm/dL    6.0-8.3           

        



             770)                                                

 

             ALBUMIN (BEAKER) (test code = 1145) 4.5 g/dL     3.5-5.0           

        

 

             BILIRUBIN TOTAL (BEAKER) (test code 0.5 mg/dL    0.2-1.2           

        



             = 377)                                              

 

             BILIRUBIN DIRECT (BEAKER) (test 0.3 mg/dL    0.1-0.5               

    



             code = 706)                                         

 

             ALKALINE PHOSPHATASE (BEAKER) (test 81 U/L                   

        



             code = 346)                                         

 

             AST (SGOT) (BEAKER) (test code = 18 U/L       5-34                 

     



             353)                                                

 

             ALT (SGPT) (BEAKER) (test code = 14 U/L       6-55                 

     



             347)                                                



RAD, CHEST, 2 [id number] 08:48:00NEW CARDIOLOGIST OBERTONReason for Exam:-
&gt;heart transplant annual follow upFINAL REPORT PATIENT ID: [id number] 
INDICATION: heart transplant annual follow up COMPARISON: May 23, 2018 
TECHNIQUE: Chest radiograph, two views, PA and lateral. FINDINGS / 
IMPRESSION:Lung volumes are normal and lungs are clear. Intact median sternotomy
wires and left axillary/subclavian surgical clipsagain demonstrated from prior 
heart transplant. Cardiac and mediastinal contours normal. No pleural effusion 
or pneumothorax. Osseous structures unremarkable. Signed: Marlee Julio 
MDReport VerifiedDate/Time: 2019 08:48:33 Reading Location: [facility] 
Radiology Reading Room Electronicallysigned by: MARLEE JULIO M.D. on
2019 08:48 AMCBC W/PLT COUNT &amp; AUTO [id number] 08:28:00





             Test Item    Value        Reference Range Interpretation Comments

 

             WHITE BLOOD CELL COUNT (BEAKER) 5.2 K/ L     3.5-10.5              

    



             (test code = 775)                                        

 

             RED BLOOD CELL COUNT (BEAKER) 5.18 M/ L    4.63-6.08               

  



             (test code = 761)                                        

 

             HEMOGLOBIN (BEAKER) (test code = 15.1 GM/DL   13.7-17.5            

     



             410)                                                

 

             HEMATOCRIT (BEAKER) (test code = 45.4 %       40.1-51.0            

     



             411)                                                

 

             MEAN CORPUSCULAR VOLUME (BEAKER) 87.6 fL      79.0-92.2            

     



             (test code = 753)                                        

 

             MEAN CORPUSCULAR HEMOGLOBIN 29.2 pg      25.7-32.2                 



             (BEAKER) (test code = 751)                                        

 

             MEAN CORPUSCULAR HEMOGLOBIN CONC 33.3 GM/DL   32.3-36.5            

     



             (BEAKER) (test code = 752)                                        

 

             RED CELL DISTRIBUTION WIDTH 13.3 %       11.6-14.4                 



             (BEAKER) (test code = 412)                                        

 

             PLATELET COUNT (BEAKER) (test 135 K/CU MM  150-450      L          

  



             code = 756)                                         

 

             MEAN PLATELET VOLUME (BEAKER) 9.6 fL       9.4-12.4                

  



             (test code = 754)                                        

 

             NUCLEATED RED BLOOD CELLS 0 /100 WBC   0-0                       



             (BEAKER) (test code = 413)                                        

 

             NEUTROPHILS RELATIVE PERCENT 55 %                                  

 



             (BEAKER) (test code = 429)                                        

 

             LYMPHOCYTES RELATIVE PERCENT 29 %                                  

 



             (BEAKER) (test code = 430)                                        

 

             MONOCYTES RELATIVE PERCENT 10 %                                   



             (BEAKER) (test code = 431)                                        

 

             EOSINOPHILS RELATIVE PERCENT 5 %                                   

 



             (BEAKER) (test code = 432)                                        

 

             BASOPHILS RELATIVE PERCENT 1 %                                    



             (BEAKER) (test code = 437)                                        

 

             NEUTROPHILS ABSOLUTE COUNT 2.88 K/ L    1.78-5.38                 



             (BEAKER) (test code = 670)                                        

 

             LYMPHOCYTES ABSOLUTE COUNT 1.52 K/ L    1.32-3.57                 



             (BEAKER) (test code = 414)                                        

 

             MONOCYTES ABSOLUTE COUNT (BEAKER) 0.51 K/ L    0.30-0.82           

      



             (test code = 415)                                        

 

             EOSINOPHILS ABSOLUTE COUNT 0.24 K/ L    0.04-0.54                 



             (BEAKER) (test code = 416)                                        

 

             BASOPHILS ABSOLUTE COUNT (BEAKER) 0.05 K/ L    0.01-0.08           

      



             (test code = 417)                                        

 

             IMMATURE GRANULOCYTES-RELATIVE 1 %          0-1                    

   



             PERCENT (BEAKER) (test code =                                      

  



             2808)                                               



TACROLIMUS [id number] 13:15:00





             Test Item    Value        Reference Range Interpretation Comments

 

             TACROLIMUS BLOOD (BEAKER) (test 6.9 ng/mL    10.0-20.0    L        

    



             code = 657)                                         



BASIC METABOLIC [id number] 10:45:00





             Test Item    Value        Reference Range Interpretation Comments

 

             SODIUM (BEAKER) 139 meq/L    136-145                   



             (test code = 381)                                        

 

             POTASSIUM (BEAKER) 4.8 meq/L    3.5-5.1                   



             (test code = 379)                                        

 

             CHLORIDE (BEAKER) 102 meq/L                        



             (test code = 382)                                        

 

             CO2 (BEAKER) (test 30 meq/L     22-29        H            



             code = 355)                                         

 

             BLOOD UREA NITROGEN 14 mg/dL     7-21                      



             (BEAKER) (test code                                        



             = 354)                                              

 

             CREATININE (BEAKER) 0.95 mg/dL   0.57-1.25                 



             (test code = 358)                                        

 

             GLUCOSE RANDOM 88 mg/dL                         



             (BEAKER) (test code                                        



             = 652)                                              

 

             CALCIUM (BEAKER) 10.1 mg/dL   8.4-10.2                  



             (test code = 697)                                        

 

             EGFR (BEAKER) (test 84 mL/min/1.73                           ESTIMA

PHOEBE GFR IS



             code = 1092) sq m                                   NOT AS ACCURATE

 AS



                                                                 CREATININE



                                                                 CLEARANCE IN



                                                                 PREDICTING



                                                                 GLOMERULAR



                                                                 FILTRATION RATE

.



                                                                 ESTIMATED GFR I

S



                                                                 NOT APPLICABLE 

FOR



                                                                 DIALYSIS PATIEN

TS.



CBC W/PLT COUNT &amp; AUTO [id number] 10:31:00





             Test Item    Value        Reference Range Interpretation Comments

 

             WHITE BLOOD CELL COUNT (BEAKER) 5.0 K/ L     3.5-10.5              

    



             (test code = 775)                                        

 

             RED BLOOD CELL COUNT (BEAKER) 5.55 M/ L    4.63-6.08               

  



             (test code = 761)                                        

 

             HEMOGLOBIN (BEAKER) (test code = 15.8 GM/DL   13.7-17.5            

     



             410)                                                

 

             HEMATOCRIT (BEAKER) (test code = 47.9 %       40.1-51.0            

     



             411)                                                

 

             MEAN CORPUSCULAR VOLUME (BEAKER) 86.3 fL      79.0-92.2            

     



             (test code = 753)                                        

 

             MEAN CORPUSCULAR HEMOGLOBIN 28.5 pg      25.7-32.2                 



             (BEAKER) (test code = 751)                                        

 

             MEAN CORPUSCULAR HEMOGLOBIN CONC 33.0 GM/DL   32.3-36.5            

     



             (BEAKER) (test code = 752)                                        

 

             RED CELL DISTRIBUTION WIDTH 13.2 %       11.6-14.4                 



             (BEAKER) (test code = 412)                                        

 

             PLATELET COUNT (BEAKER) (test 131 K/CU MM  150-450      L          

  



             code = 756)                                         

 

             MEAN PLATELET VOLUME (BEAKER) 9.5 fL       9.4-12.4                

  



             (test code = 754)                                        

 

             NUCLEATED RED BLOOD CELLS 0 /100 WBC   0-0                       



             (BEAKER) (test code = 413)                                        

 

             NEUTROPHILS RELATIVE PERCENT 56 %                                  

 



             (BEAKER) (test code = 429)                                        

 

             LYMPHOCYTES RELATIVE PERCENT 26 %                                  

 



             (BEAKER) (test code = 430)                                        

 

             MONOCYTES RELATIVE PERCENT 12 %                                   



             (BEAKER) (test code = 431)                                        

 

             EOSINOPHILS RELATIVE PERCENT 4 %                                   

 



             (BEAKER) (test code = 432)                                        

 

             BASOPHILS RELATIVE PERCENT 1 %                                    



             (BEAKER) (test code = 437)                                        

 

             NEUTROPHILS ABSOLUTE COUNT 2.80 K/ L    1.78-5.38                 



             (BEAKER) (test code = 670)                                        

 

             LYMPHOCYTES ABSOLUTE COUNT 1.32 K/ L    1.32-3.57                 



             (BEAKER) (test code = 414)                                        

 

             MONOCYTES ABSOLUTE COUNT (BEAKER) 0.59 K/ L    0.30-0.82           

      



             (test code = 415)                                        

 

             EOSINOPHILS ABSOLUTE COUNT 0.21 K/ L    0.04-0.54                 



             (BEAKER) (test code = 416)                                        

 

             BASOPHILS ABSOLUTE COUNT (BEAKER) 0.04 K/ L    0.01-0.08           

      



             (test code = 417)                                        

 

             IMMATURE GRANULOCYTES-RELATIVE 1 %          0-1                    

   



             PERCENT (BEAKER) (test code =                                      

  



             2801)                                               



BASIC METABOLIC [id number] 09:28:00





             Test Item    Value        Reference Range Interpretation Comments

 

             SODIUM (BEAKER) 140 meq/L    136-145                   



             (test code = 381)                                        

 

             POTASSIUM (BEAKER) 4.7 meq/L    3.5-5.1                   



             (test code = 379)                                        

 

             CHLORIDE (BEAKER) 103 meq/L                        



             (test code = 382)                                        

 

             CO2 (BEAKER) (test 30 meq/L     22-29        H            



             code = 355)                                         

 

             BLOOD UREA NITROGEN 20 mg/dL     7-21                      



             (BEAKER) (test code                                        



             = 354)                                              

 

             CREATININE (BEAKER) 1.08 mg/dL   0.57-1.25                 



             (test code = 358)                                        

 

             GLUCOSE RANDOM 110 mg/dL           H            



             (BEAKER) (test code                                        



             = 652)                                              

 

             CALCIUM (BEAKER) 10.0 mg/dL   8.4-10.2                  



             (test code = 697)                                        

 

             EGFR (BEAKER) (test 73 mL/min/1.73                           ESTIMA

PHOEBE GFR IS



             code = 1092) sq m                                   NOT AS ACCURATE

 AS



                                                                 CREATININE



                                                                 CLEARANCE IN



                                                                 PREDICTING



                                                                 GLOMERULAR



                                                                 FILTRATION RATE

.



                                                                 ESTIMATED GFR I

S



                                                                 NOT APPLICABLE 

FOR



                                                                 DIALYSIS PATIEN

TS.



CBC W/PLT COUNT &amp; AUTO [id number] 08:58:00





             Test Item    Value        Reference Range Interpretation Comments

 

             WHITE BLOOD CELL COUNT (BEAKER) 4.9 K/ L     3.5-10.5              

    



             (test code = 775)                                        

 

             RED BLOOD CELL COUNT (BEAKER) 5.37 M/ L    4.63-6.08               

  



             (test code = 761)                                        

 

             HEMOGLOBIN (BEAKER) (test code = 15.5 GM/DL   13.7-17.5            

     



             410)                                                

 

             HEMATOCRIT (BEAKER) (test code = 46.8 %       40.1-51.0            

     



             411)                                                

 

             MEAN CORPUSCULAR VOLUME (BEAKER) 87.2 fL      79.0-92.2            

     



             (test code = 753)                                        

 

             MEAN CORPUSCULAR HEMOGLOBIN 28.9 pg      25.7-32.2                 



             (BEAKER) (test code = 751)                                        

 

             MEAN CORPUSCULAR HEMOGLOBIN CONC 33.1 GM/DL   32.3-36.5            

     



             (BEAKER) (test code = 752)                                        

 

             RED CELL DISTRIBUTION WIDTH 13.3 %       11.6-14.4                 



             (BEAKER) (test code = 412)                                        

 

             PLATELET COUNT (BEAKER) (test 130 K/CU MM  150-450      L          

  



             code = 756)                                         

 

             MEAN PLATELET VOLUME (BEAKER) 9.6 fL       9.4-12.4                

  



             (test code = 754)                                        

 

             NUCLEATED RED BLOOD CELLS 0 /100 WBC   0-0                       



             (BEAKER) (test code = 413)                                        

 

             NEUTROPHILS RELATIVE PERCENT 65 %                                  

 



             (BEAKER) (test code = 429)                                        

 

             LYMPHOCYTES RELATIVE PERCENT 20 %                                  

 



             (BEAKER) (test code = 430)                                        

 

             MONOCYTES RELATIVE PERCENT 10 %                                   



             (BEAKER) (test code = 431)                                        

 

             EOSINOPHILS RELATIVE PERCENT 4 %                                   

 



             (BEAKER) (test code = 432)                                        

 

             BASOPHILS RELATIVE PERCENT 1 %                                    



             (BEAKER) (test code = 437)                                        

 

             NEUTROPHILS ABSOLUTE COUNT 3.16 K/ L    1.78-5.38                 



             (BEAKER) (test code = 670)                                        

 

             LYMPHOCYTES ABSOLUTE COUNT 0.99 K/ L    1.32-3.57    L            



             (BEAKER) (test code = 414)                                        

 

             MONOCYTES ABSOLUTE COUNT (BEAKER) 0.49 K/ L    0.30-0.82           

      



             (test code = 415)                                        

 

             EOSINOPHILS ABSOLUTE COUNT 0.17 K/ L    0.04-0.54                 



             (BEAKER) (test code = 416)                                        

 

             BASOPHILS ABSOLUTE COUNT (BEAKER) 0.04 K/ L    0.01-0.08           

      



             (test code = 417)                                        

 

             IMMATURE GRANULOCYTES-RELATIVE 1 %          0-1                    

   



             PERCENT (BEAKER) (test code =                                      

  



             2801)                                               



TACROLIMUS [id number] 13:26:00





             Test Item    Value        Reference Range Interpretation Comments

 

             TACROLIMUS BLOOD (BEAKER) (test 4.1 ng/mL    10.0-20.0    L        

    



             code = 657)                                         



RAD, CHEST, 2 [id number] 14:13:00NEW CARDIOLOGIST OBERTONReason for Exam:-
&gt;heart transplant annual follow upFINAL REPORT PATIENT ID: [id number] Chest two
views INDICATION: Heart transplant annual follow-up. COMPARISON: 2017 
IMPRESSION: There is no focal consolidation, vascular congestion, pleural 
effusion,or pneumothorax. Heart size is within normal limits. Median sternotomy 
changes, left axillary surgical clips, and gastric sleeve with small hiatal 
hernia are again noted. No significant change since 2017. Signed: Ac Nickerson MDReport Verified Date/Time: 2018 14:13:48 Reading Location: 
[facility] [id number] Consult Reading Room Electronically signed by: AC NICKERSON M.D. on 2018 02:13 PMTACROLIMUS [id number] 13:55:00





             Test Item    Value        Reference Range Interpretation Comments

 

             TACROLIMUS BLOOD (BEAKER) (test 4.7 ng/mL    10.0-20.0    L        

    



             code = 657)                                         



COMPREHENSIVE METABOLIC [id number] 12:11:00





             Test Item    Value        Reference Range Interpretation Comments

 

             TOTAL PROTEIN 6.5 gm/dL    6.0-8.3                   



             (BEAKER) (test code =                                        



             770)                                                

 

             ALBUMIN (BEAKER) 4.2 g/dL     3.5-5.0                   



             (test code = 1145)                                        

 

             ALKALINE PHOSPHATASE 98 U/L                           



             (BEAKER) (test code =                                        



             346)                                                

 

             BILIRUBIN TOTAL 0.4 mg/dL    0.2-1.2                   



             (BEAKER) (test code =                                        



             377)                                                

 

             SODIUM (BEAKER) (test 141 meq/L    136-145                   



             code = 381)                                         

 

             POTASSIUM (BEAKER) 4.3 meq/L    3.5-5.1                   



             (test code = 379)                                        

 

             CHLORIDE (BEAKER) 103 meq/L                        



             (test code = 382)                                        

 

             CO2 (BEAKER) (test 29 meq/L     22-29                     



             code = 355)                                         

 

             BLOOD UREA NITROGEN 17 mg/dL     7-21                      



             (BEAKER) (test code =                                        



             354)                                                

 

             CREATININE (BEAKER) 1.00 mg/dL   0.57-1.25                 



             (test code = 358)                                        

 

             GLUCOSE RANDOM 101 mg/dL                        



             (BEAKER) (test code =                                        



             652)                                                

 

             CALCIUM (BEAKER) 9.5 mg/dL    8.4-10.2                  



             (test code = 697)                                        

 

             AST (SGOT) (BEAKER) 15 U/L       5-34                      



             (test code = 353)                                        

 

             ALT (SGPT) (BEAKER) 12 U/L       6-55                      



             (test code = 347)                                        

 

             EGFR (BEAKER) (test 79 mL/min/1.73                           ESTIMA

PHOEBE GFR IS



             code = 1092) sq m                                   NOT AS ACCURATE

 AS



                                                                 CREATININE



                                                                 CLEARANCE IN



                                                                 PREDICTING



                                                                 GLOMERULAR



                                                                 FILTRATION RATE

.



                                                                 ESTIMATED GFR I

S



                                                                 NOT APPLICABLE 

FOR



                                                                 DIALYSIS PATIEN

TS.



LIPID [id number] 11:47:00





             Test Item    Value        Reference Range Interpretation Comments

 

             TRIGLYCERIDES (BEAKER) (test code = 87 mg/dL                       

        



             540)                                                

 

             CHOLESTEROL (BEAKER) (test code = 127 mg/dL                        

      



             631)                                                

 

             HDL CHOLESTEROL (BEAKER) (test code 37 mg/dL                       

        



             = 976)                                              

 

             LDL CHOLESTEROL CALCULATED (BEAKER) 73 mg/dL                       

        



             (test code = 633)                                        



Triglyceride Reference Range: Low Risk &lt;150 Borderline 150-199 High Risk 200-
499 Very High Risk &gt;=500Cholesterol Reference Range: Low Risk &lt;200 
Borderline 200-239 High Risk &gt;240HDL Cholesterol Reference Range: Low Risk 
&gt;=60 High Risk &lt;40LDL Cholesterol Reference Range: Optimal &lt;100 Near 
Optimal 100-129 Borderline 130-159 High 160-189 Very High &gt;=190PROTHROMBIN 
[id number] 11:30:00





             Test Item    Value        Reference Range Interpretation Comments

 

             PROTIME (BEAKER) (test code = 14.7 seconds 11.7-14.7               

  



             759)                                                

 

             INR (BEAKER) (test code = 370) 1.2          <=5.9                  

   



RECOMMENDED COUMADIN/WARFARIN INR THERAPY RANGESSTANDARD DOSE: 2.0 - 3.0 
Includes: PROPHYLAXIS for venous thrombosis, systemic embolization; TREATMENT 
for venous thrombosis and/or pulmonary embolus.HIGH RISK: Target INR is 2.5-3.5 
for patients with mechanical heart valves.CBC W/PLT COUNT &amp; AUTO 
[id number] 11:23:00





             Test Item    Value        Reference Range Interpretation Comments

 

             WHITE BLOOD CELL COUNT (BEAKER) 4.8 K/ L     3.5-10.5              

    



             (test code = 775)                                        

 

             RED BLOOD CELL COUNT (BEAKER) 5.26 M/ L    4.63-6.08               

  



             (test code = 761)                                        

 

             HEMOGLOBIN (BEAKER) (test code = 15.1 GM/DL   13.7-17.5            

     



             410)                                                

 

             HEMATOCRIT (BEAKER) (test code = 45.8 %       40.1-51.0            

     



             411)                                                

 

             MEAN CORPUSCULAR VOLUME (BEAKER) 87.1 fL      79.0-92.2            

     



             (test code = 753)                                        

 

             MEAN CORPUSCULAR HEMOGLOBIN 28.7 pg      25.7-32.2                 



             (BEAKER) (test code = 751)                                        

 

             MEAN CORPUSCULAR HEMOGLOBIN CONC 33.0 GM/DL   32.3-36.5            

     



             (BEAKER) (test code = 752)                                        

 

             RED CELL DISTRIBUTION WIDTH 13.4 %       11.6-14.4                 



             (BEAKER) (test code = 412)                                        

 

             PLATELET COUNT (BEAKER) (test 161 K/CU MM  150-450                 

  



             code = 756)                                         

 

             MEAN PLATELET VOLUME (BEAKER) 9.0 fL       9.4-12.4     L          

  



             (test code = 754)                                        

 

             NUCLEATED RED BLOOD CELLS 0 /100 WBC   0-0                       



             (BEAKER) (test code = 413)                                        

 

             NEUTROPHILS RELATIVE PERCENT 67 %                                  

 



             (BEAKER) (test code = 429)                                        

 

             LYMPHOCYTES RELATIVE PERCENT 19 %                                  

 



             (BEAKER) (test code = 430)                                        

 

             MONOCYTES RELATIVE PERCENT 10 %                                   



             (BEAKER) (test code = 431)                                        

 

             EOSINOPHILS RELATIVE PERCENT 3 %                                   

 



             (BEAKER) (test code = 432)                                        

 

             BASOPHILS RELATIVE PERCENT 1 %                                    



             (BEAKER) (test code = 437)                                        

 

             NEUTROPHILS ABSOLUTE COUNT 3.21 K/ L    1.78-5.38                 



             (BEAKER) (test code = 670)                                        

 

             LYMPHOCYTES ABSOLUTE COUNT 0.93 K/ L    1.32-3.57    L            



             (BEAKER) (test code = 414)                                        

 

             MONOCYTES ABSOLUTE COUNT (BEAKER) 0.48 K/ L    0.30-0.82           

      



             (test code = 415)                                        

 

             EOSINOPHILS ABSOLUTE COUNT 0.14 K/ L    0.04-0.54                 



             (BEAKER) (test code = 416)                                        

 

             BASOPHILS ABSOLUTE COUNT (BEAKER) 0.04 K/ L    0.01-0.08           

      



             (test code = 417)                                        

 

             IMMATURE GRANULOCYTES-RELATIVE 1 %          0-1                    

   



             PERCENT (BEAKER) (test code =                                      

  



             2801)                                               



[Critical access hospital] CBC (INCLUDES DIFF/PLT)2018 17:10:01





             Test Item    Value        Reference Range Interpretation Comments

 

             WBC (test code = 6690-2) 4.7 {K/CMM}  3.7-10.4                  

 

             RBC (test code = 789-8) 5.33 {M/CMM} 4.70-6.10                 

 

             Hgb (test code = 718-7) 15.3 g/dl    14.0-18.0                 

 

             Hct (test code = 26777-6) 46.4 %       42.0-54.0                 

 

             MCV (test code = 787-2) 87.1 fL      80.0-94.0                 

 

             MCH (test code = 785-6) 28.7 pg      27.0-31.0                 

 

             MCHC (test code = 786-4) 33.0 g/dl    32.0-36.0                 

 

             RDW (test code = 788-0) 13.9 %       11.5-14.5                 

 

             Platelet (test code = 38659-3) 154 {K/CMM}  133-450                

   

 

             Mean Platelet Volume (test code 8.8 fL       7.4-10.4              

    



             = 05079-7)                                          



[facility][QL] [id number] 17:10:01





             Test Item    Value        Reference Range Interpretation Comments

 

             Segmented Neutrophils (test code 59.8 %       45.0-75.0            

     



             = 50304-1)                                          

 

             Monocytes (test code = 26485-3) 11.4 %       2.0-12.0              

    

 

             Lymphocytes (test code = 58990-8) 23.9 %       20.0-40.0           

      

 

             Eosinophils; Above High Threshold 4.1 %        0.0-4.0             

      



             (test code = 11507-6)                                        

 

             Basophils (test code = 706-2) 0.8 %        0.0-1.0                 

  

 

             Segs-Bands # (test code = 2.8 {K/CMM}  1.5-8.1                   



             60316-1)                                            

 

             Lymphocytes # (test code = 1.1 {K/CMM}  1.0-5.5                   



             60581-6)                                            

 

             Monocytes # (test code = 06896-8) 0.5 {K/CMM}  0.0-0.8             

      

 

             Eosinophils # (test code = 0.2 {K/CMM}  0.0-0.5                   



             32334-5)                                            



[facility][QL] PTH, INTACT (WITHOUT CALCIUM)2018 17:10:01





             Test Item    Value        Reference Range Interpretation Comments

 

             Parathyroid Hormone Intact; Above 99.2 pg/ml   11.1-79.5           

      



             High Threshold (test code =                                        



             2731-8)                                             



[facility]] CMP [id number] 17:10:01





             Test Item    Value        Reference Range Interpretation Comments

 

             Sodium Level 140 {mEq/l}  135-145                   



             (test code =                                        



             2951-2)                                             

 

             Potassium Level 4.5 {mEq/l}  3.5-5.1                   



             (test code =                                        



             2823-3)                                             

 

             Chloride Level 105 {mEq/l}                      



             (test code =                                        



             2075-0)                                             

 

             Carbon Dioxide 28 {mEq/l}   24-32                     



             (test code =                                        



             -9)                                             

 

             AGAP (test code = 11.5 {mEq/l} 10.0-20.0                 



             18531-5)                                            

 

             Glucose Lvl (test 95 mg/dl     70-99                     Adult refe

rence range



             code = 2345-7)                                        values reflec

t the



                                                                 clinical guidel

inesof the



                                                                 American Diabet

es



                                                                 Association.

 

             Creatinine Lvl 1.20 mg/dl   0.50-1.40                 



             (test code =                                        



             2160-0)                                             

 

             Blood Urea   22 mg/dl     7-22                      



             Nitrogen (test                                        



             code = 3094-0)                                        

 

             BUN/Creatinine 18           6-25                      



             Ratio (test code                                        



             = 3097-3)                                           

 

             Total Protein 7.2 g/dl     6.4-8.4                   



             (test code =                                        



             2885-2)                                             

 

             Albumin Lvl (test 4.4 g/dl     3.5-5.0                   



             code = 1751-7)                                        

 

             Globulin (test 2.8 g/dl     2.7-4.2                   



             code = 82304-3)                                        

 

             A/G Ratio (test 1.6          0.7-1.6                   



             code = 1759-0)                                        

 

             Calcium Level 9.1 mg/dl    8.5-10.5                  



             Total (test code                                        



             = 12299-2)                                          

 

             ALT (test code = 21 u/l       0-65                      



             3-4)                                             

 

             AST (test code = 18 u/l       0-37                      



             86922-8)                                            

 

             Bili Total (test 0.4 mg/dl    0.2-1.3                   



             code = -2)                                        

 

             Alk Phos (test 112 u/l                          



             code = 1783-0)                                        

 

             eGFR (test code = 71                                     The eGFR i

s calculated



             30407-3)     {ML/MIN/1.7}                           using the CKD-E

PI



                                                                 formula. In mos

t young,



                                                                 healthyindividu

als the



                                                                 eGFR will be >9

0



                                                                 mL/min/1.73m2. 

The eGFR



                                                                 declines with a

ge. AneGFR



                                                                 of 60-89 may be

 normal in



                                                                 some population

s,



                                                                 particularly th

e elderly,



                                                                 forwhom the CKD

-EPI



                                                                 formula has not

 been



                                                                 extensively marielena

idated.



                                                                 Use of the eGFR

 isnot



                                                                 recommended in 

the



                                                                 following



                                                                 populations:Ind

ividuals



                                                                 with unstable c

reatinine



                                                                 concentrations,

 including



                                                                 pregnantpatient

s and



                                                                 those with seri

ous



                                                                 co-morbid



                                                                 conditions.Elsa

ents with



                                                                 extremes in mus

rick mass



                                                                 or diet.The albina

a above



                                                                 are obtained fr

om the



                                                                 National Kidney

 Disease



                                                                 Education Progr

am(NKDEP)



                                                                 which sergio wilburn



                                                                 recommends that

 when the



                                                                 eGFR is used in



                                                                 patientswith ex

tremes of



                                                                 body mass index

 for



                                                                 purposes of [place]

g dosing,



                                                                 the eGFR should

be



                                                                 multiplied by t

he



                                                                 estimated BMI.



[facility][Critical access hospital] FOLATE, [id number] 17:10:01





             Test Item    Value        Reference Range Interpretation Comments

 

             Folate Level (test code = 2284-8) 9.3 ng/ml    >=3.0               

      



[facility][Critical access hospital] IRON, [id number] 17:10:01





             Test Item    Value        Reference Range Interpretation Comments

 

             Iron (test code = 2498-4) 85 ug/dL                         



[facility] hospital] LIPID [id number] 17:10:01





             Test Item    Value        Reference Range Interpretation Comments

 

             Chol (test code = 2093-3) 124 mg/dl    <=199                     

 

             Trig (test code = 2571-8) 77 mg/dl     <=149                     

 

             HDL Cholesterol; Below Low 42 mg/dl     >=61                      



             Threshold (test code = 2085-9)                                     

   

 

             CHD Risk; Below Low Threshold (test 2.95         4.00-7.30         

        



             code = 43460-7)                                        

 

             LDL (test code = 56994-0) 67 mg/dl     <=99                      

 

             VLDL (test code = VLDL) 15                                     



[facility] access hospital] VITAMIN [id number] 17:10:01





             Test Item    Value        Reference Range Interpretation Comments

 

             Vitamin B12 Level; Below Low 235 pg/ml    254-1320                 

 



             Threshold (test code = 2132-9)                                     

   



[facility][Critical access hospital] TSH, 3RD GENERATION W/REFLEX TO [id number] 17:10:01





             Test Item    Value        Reference Range Interpretation Comments

 

             TSH (test code = 54600-9) 1.090 {uIU/ml} 0.360-3.740               



[facility][QL] HEMOGLOBIN [id number] 17:10:01





             Test Item    Value        Reference Range Interpretation Comments

 

             Hemoglobin A1c (test code = 4548-4) 5.2 %        <=5.6             

        



[facility][H] Vitamin E [id number] 17:10:01





             Test Item    Value        Reference Range Interpretation Comments

 

             Vitamin E Lvl (test Cancel Reason: Modified                        

   



             code = Vitamin E Lvl) Order                                  



[facility][QL] VITAMIN B1, WHOLE [id number] 17:10:01





             Test Item    Value        Reference Range Interpretation Comments

 

             Vitamin B1   147.6        66.5-200.0                This test was d

eveloped and its



             Level (test  nmol/L                                 performance



             code =                                              characteristics

determined by



             Vitamin B1                                          [facility]. It has

 not been



             Level)                                              cleared orappro

mateo by the Food



                                                                 and Drug



                                                                 Administration.

Performed At: 



                                                                 [facility]

[address]



                                                                 [id number]

shantal CODY MD



                                                                 Ph:[phone]



[facility][H] Vit  17:10:01





             Test Item    Value        Reference Range Interpretation Comments

 

             Vitamin A Level (test 52 ug/dL     24-85                     **Effe

ctive ,



             code = Vitamin A                                        2018 Vitami

n A, Serum



             Level)                                              will be** mendoza

ing to



                                                                 the LC/MS-MS



                                                                 methodology. Th

e



                                                                 reference inter

marielena will



                                                                 be changing to:

 < 6



                                                                 years Not Estab

. 6 - 11



                                                                 years 22.8 - 54

.4 12 -



                                                                 18 years 28.9 -

 75.7 20



                                                                 - 38 years 31.2

 - 89.1



                                                                 40 - 59 years 3

3.1 -



                                                                 100.0 >59 years

 36.4 -



                                                                 108.0Performed 

At: [email or web address]



                                                                 [place], [place] [id number]

abelardo CODY MD



                                                                 Ph:[phone]



[facility]H] [facility] [id number] 17:09:01





             Test Item    Value        Reference Range Interpretation Comments

 

             [facility] (test COMMENT                                Test Orde

red: 575984



             code = Misc [facility])                                        25-Hydr

oxyvitamin D LCMS



                                                                 D2+D325-Hydroxy

, Vitamin



                                                                 D 25 [L ] ng/mL



                                                                 ESReference Ran

ge:All



                                                                 Ages: Target le

vels 30 -



                                                                 10025-Hydroxy, 

Vitamin



                                                                 D-2 <1.0 ng/mL



                                                                 ES25-Hydroxy, V

itamin D-3



                                                                 24 ng/mL ESPerf

ormed At:



                                                                  [facility] [address]



                                                                 [id number]

shantal CODY MD



                                                                 Ph:[phone]

rformed



                                                                 At: [facility]



                                                                 [id number]

[address], [place]



                                                                 [id number]

paddy Tyler BURKS MD Ph:[phone]

[phone]



[facility][H] Vitamin E [id number] 17:09:01





             Test Item    Value        Reference Range Interpretation Comments

 

             Alpha-Tocopherol 8.5 mg/L     5.3-17.5                  **Effective

 2018



             (test code =                                        923689 Vitamin 

E, Serum



             Alpha-Tocopherol)                                        will bemad

e**



                                                                 non-orderable. 

[facility]



                                                                 will offer 0701

40 Vitamin



                                                                 Eperformed by L

C/MS-MS



                                                                 methodology [facility]

ch will



                                                                 includealpha to

copherol



                                                                 and gamma tocop

herol. This



                                                                 will affectany 

existing



                                                                 profile. For fu

rther



                                                                 information, co

andrew



                                                                 local [facility]



                                                                 Representative.

Performed



                                                                 At:  [facility]



                                                                 [id number]

shantal CODY MD Ph:[phone]

[phone]



[facility], WITHOUT [id number] 09:10:00NEW CARDIOLOGIST 
OBERTONFINAL REPORT PATIENT ID: [id number] INDICATION: 47-year-old male with chest
 wall pain and history of heart transplant. COMPARISON:Chest radiograph 2017 TECHNIQUE: Chest CT exam WITHOUT intravenous contrast. The exam was 
performed according to our department dose-optimization protocol, which includes
 automated exposure control, adjustments of mA and kV according to patient size.
 Iterative reconstructions are also sometimes employed. FINDINGS:No chest wall 
mass, chest wall hematoma, rib fracture, or rib lesion is demonstrated. There is
 a healed median sternotomy. Heart is normal in size and there is no pericardial
 effusion. Mild atherosclerotic plaque of the ascending thoracic aorta is 
demonstrated. Thoracic aorta is normal in caliber. 8 mm calcified nodule in the 
right lower lobe represents prior granulomatous infection. No pneumonia, 
pulmonary edema, pleural effusion, or pneumothorax is demonstrated. There is no 
mediastinal or hilar lymphadenopathy. Thyroid gland is unremarkable. Upper and 
mid esophagus are unremarkable. Patient is status post sleeve gastrectomy and 
there is a small partof the sleeve herniated in the low posterior mediastinum. 
Partial imaging of the upper abdomen is otherwise unremarkable. IMPRESSION: No 
chest wall mass, muscle tear, rib fracture, or rib lesion. No other CT 
explanation for the patient's chest wall pain. Clear lungs. Unremarkable heart 
transplant. Prior gastric sleeve with small hiatal hernia. Signed: Marlee Julio MDReport Verified Date/Time: 2017 09:10:45 Reading Location: [facility] 
Diagnostic Imaging Reading Room - [place] [id number] Electronically signed by: 
MARLEE JULIO M.D. on 2017 09:10 AMTACROLIMUS [id number] 
13:49:00





             Test Item    Value        Reference Range Interpretation Comments

 

             TACROLIMUS BLOOD (BEAKER) (test 5.3 ng/mL    10.0-20.0    L        

    



             code = 657)                                         



BASIC METABOLIC [id number] 10:44:00





             Test Item    Value        Reference Range Interpretation Comments

 

             SODIUM (BEAKER) 141 meq/L    136-145                   



             (test code = 381)                                        

 

             POTASSIUM (BEAKER) 4.5 meq/L    3.5-5.1                   



             (test code = 379)                                        

 

             CHLORIDE (BEAKER) 103 meq/L                        



             (test code = 382)                                        

 

             CO2 (BEAKER) (test 29 meq/L     22-29                     



             code = 355)                                         

 

             BLOOD UREA NITROGEN 17 mg/dL     7-21                      



             (BEAKER) (test code                                        



             = 354)                                              

 

             CREATININE (BEAKER) 1.09 mg/dL   0.57-1.25                 



             (test code = 358)                                        

 

             GLUCOSE RANDOM 97 mg/dL                         



             (BEAKER) (test code                                        



             = 652)                                              

 

             CALCIUM (BEAKER) 9.3 mg/dL    8.4-10.2                  



             (test code = 697)                                        

 

             EGFR (BEAKER) (test 72 mL/min/1.73                           ESTIMA

PHOEBE GFR IS



             code = 1092) sq m                                   NOT AS ACCURATE

 AS



                                                                 CREATININE



                                                                 CLEARANCE IN



                                                                 PREDICTING



                                                                 GLOMERULAR



                                                                 FILTRATION RATE

.



                                                                 ESTIMATED GFR I

S



                                                                 NOT APPLICABLE 

FOR



                                                                 DIALYSIS PATIEN

TS.



CBC W/PLT COUNT &amp; AUTO [id number] 09:54:00





             Test Item    Value        Reference Range Interpretation Comments

 

             WHITE BLOOD CELL COUNT (BEAKER) 4.0 K/ L     3.5-10.5              

    



             (test code = 775)                                        

 

             RED BLOOD CELL COUNT (BEAKER) 5.42 M/ L    4.63-6.08               

  



             (test code = 761)                                        

 

             HEMOGLOBIN (BEAKER) (test code = 15.4 GM/DL   13.7-17.5            

     



             410)                                                

 

             HEMATOCRIT (BEAKER) (test code = 47.6 %       40.1-51.0            

     



             411)                                                

 

             MEAN CORPUSCULAR VOLUME (BEAKER) 87.8 fL      79.0-92.2            

     



             (test code = 753)                                        

 

             MEAN CORPUSCULAR HEMOGLOBIN 28.4 pg      25.7-32.2                 



             (BEAKER) (test code = 751)                                        

 

             MEAN CORPUSCULAR HEMOGLOBIN CONC 32.4 GM/DL   32.3-36.5            

     



             (BEAKER) (test code = 752)                                        

 

             RED CELL DISTRIBUTION WIDTH 13.8 %       11.6-14.4                 



             (BEAKER) (test code = 412)                                        

 

             PLATELET COUNT (BEAKER) (test 152 K/CU MM  150-450                 

  



             code = 756)                                         

 

             MEAN PLATELET VOLUME (BEAKER) 9.9 fL       9.4-12.4                

  



             (test code = 754)                                        

 

             NUCLEATED RED BLOOD CELLS 0 /100 WBC   0-0                       



             (BEAKER) (test code = 413)                                        

 

             NEUTROPHILS RELATIVE PERCENT 66 %                                  

 



             (BEAKER) (test code = 429)                                        

 

             LYMPHOCYTES RELATIVE PERCENT 20 %                                  

 



             (BEAKER) (test code = 430)                                        

 

             MONOCYTES RELATIVE PERCENT 11 %                                   



             (BEAKER) (test code = 431)                                        

 

             EOSINOPHILS RELATIVE PERCENT 2 %                                   

 



             (BEAKER) (test code = 432)                                        

 

             BASOPHILS RELATIVE PERCENT 1 %                                    



             (BEAKER) (test code = 437)                                        

 

             NEUTROPHILS ABSOLUTE COUNT 2.65 K/ L    1.78-5.38                 



             (BEAKER) (test code = 670)                                        

 

             LYMPHOCYTES ABSOLUTE COUNT 0.82 K/ L    1.32-3.57    L            



             (BEAKER) (test code = 414)                                        

 

             MONOCYTES ABSOLUTE COUNT (BEAKER) 0.44 K/ L    0.30-0.82           

      



             (test code = 415)                                        

 

             EOSINOPHILS ABSOLUTE COUNT 0.08 K/ L    0.04-0.54                 



             (BEAKER) (test code = 416)                                        

 

             BASOPHILS ABSOLUTE COUNT (BEAKER) 0.04 K/ L    0.01-0.08           

      



             (test code = 417)                                        

 

             IMMATURE GRANULOCYTES-RELATIVE 0 %          0-1                    

   



             PERCENT (BEAKER) (test code =                                      

  



             5386)                                               



[id number] 14:30:00





             Test Item    Value        Reference Range Interpretation Comments

 

             AGAP (test code = AGAP) 15.3         10.0-20.0                 



[facility] HospitalQaowwbiANKKEPTIYYKA7741-96-82 14:30:00





             Test Item    Value        Reference Range Interpretation Comments

 

             eGFR (test code = eGFR) 91                                     



[facility] HospitalZcyedlaZSLRPQLPULMS1855-28-33 14:30:00





             Test Item    Value        Reference Range Interpretation Comments

 

             BUN (test code = BUN) 13           7-22                      



[facility] HospitalRgpfwegXRQNMMMIUETV9946-18-00 14:30:00





             Test Item    Value        Reference Range Interpretation Comments

 

             Creatinine Lvl (test code = Creatinine 0.98         0.50-1.40      

           



             Lvl)                                                



[facility] HospitalKonxpszFCRRKIGYQQLR8418-70-62 14:30:00





             Test Item    Value        Reference Range Interpretation Comments

 

             Sodium Lvl (test code = Sodium Lvl) 144          135-145           

        



[facility] HospitalKuvkotxMNAIQVDSGIDI8004-82-53 14:30:00





             Test Item    Value        Reference Range Interpretation Comments

 

             Potassium Lvl (test code = Potassium 4.3          3.5-5.1          

         



             Lvl)                                                



[facility] HospitalKuhhdosGROMYKVJJXHK3953-13-24 14:30:00





             Test Item    Value        Reference Range Interpretation Comments

 

             Chloride Lvl (test code = Chloride Lvl) 105                  

            



[facility] HospitalJzmiznmUKYEKFWERBXT8609-08-46 14:30:00





             Test Item    Value        Reference Range Interpretation Comments

 

             Calcium Lvl (test code = Calcium Lvl) 9.1          8.5-10.5        

          



[facility] HospitalAlgssirCTJTGTFFVLFS2708-94-88 14:30:00





             Test Item    Value        Reference Range Interpretation Comments

 

             CO2 (test code = CO2) 28           24-32                     



[facility] HospitalEapkcraRBSPJYCEZZUN1455-91-02 14:30:00





             Test Item    Value        Reference Range Interpretation Comments

 

             Glucose Lvl (test code = Glucose Lvl) 77           70-99           

          



[facility]IchvqbmGAEGTRHISF8989-87-65 14:30:00





             Test Item    Value        Reference Range Interpretation Comments

 

             Lymphocytes # (test code = Lymphocytes 0.8          1.0-5.5        

           



             #)                                                  



[facility]CrsqwvmONMLYQULNT2125-69-26 14:30:00





             Test Item    Value        Reference Range Interpretation Comments

 

             Segs-Bands # (test code = Segs-Bands #) 2.8          1.5-8.1       

            



[facility]fieldDanmdnmTKWWUAYRUX3246-83-18 14:30:00





             Test Item    Value        Reference Range Interpretation Comments

 

             Monocytes # (test code 0.4          See_Comment                [Aut

omated message] The



             = Monocytes #)                                        system which 

generated



                                                                 this result tra

nsmitted



                                                                 reference range

: <=0.8.



                                                                 The reference r

klaudia was



                                                                 not used to int

erpret



                                                                 this result as



                                                                 normal/abnormal

.



[facility]fieldTvtjdrrQHXOSVIHVY6109-11-31 14:30:00





             Test Item    Value        Reference Range Interpretation Comments

 

             Eosinophils # (test code 0.1          See_Comment                [A

utomated message] The



             = Eosinophils #)                                        system whic

h generated



                                                                 this result tra

nsmitted



                                                                 reference range

: <=0.5.



                                                                 The reference r

klaudia was



                                                                 not used to int

erpret



                                                                 this result as



                                                                 normal/abnormal

.



[facility]fieldAlyyiukQEZAIPZWDK5420-68-84 14:30:00





             Test Item    Value        Reference Range Interpretation Comments

 

             Eosinophils (test code = 2.4          See_Comment                [A

utomated message] The



             Eosinophils)                                        system which ge

nerated



                                                                 this result tra

nsmitted



                                                                 reference range

: <=4.0.



                                                                 The reference r

klaudia was



                                                                 not used to int

erpret



                                                                 this result as



                                                                 normal/abnormal

.



[facility]fieldPjzyfetYZLOGQNRCA1912-58-99 14:30:00





             Test Item    Value        Reference Range Interpretation Comments

 

             Basophils (test code = 0.7          See_Comment                [Aut

omated message] The



             Basophils)                                          system which ge

nerated



                                                                 this result tra

nsmitted



                                                                 reference range

: <=1.0.



                                                                 The reference r

klaudia was



                                                                 not used to int

erpret



                                                                 this result as



                                                                 normal/abnormal

.



[facility]fieldUcojjchWKEUCTGKDL7990-95-63 14:30:00





             Test Item    Value        Reference Range Interpretation Comments

 

             Monocytes (test code = Monocytes) 10.7         2.0-12.0            

      



[facility]fieldLmapoamJBMGFZGBIN3886-76-05 14:30:00





             Test Item    Value        Reference Range Interpretation Comments

 

             Lymphocytes (test code = Lymphocytes) 19.7         20.0-40.0       

          



[facility]fieldXnnnecoOFAZTLCYCB7973-32-25 14:30:00





             Test Item    Value        Reference Range Interpretation Comments

 

             Segs (test code = Segs) 66.5         45.0-75.0                 



[facility]fieldKqecwcjNLCHMENKML4558-97-35 14:30:00





             Test Item    Value        Reference Range Interpretation Comments

 

             MPV (test code = MPV) 8.5          7.4-10.4                  



[facility]fieldCerrwnqMWKHZGGEET0070-32-39 14:30:00





             Test Item    Value        Reference Range Interpretation Comments

 

             Platelet (test code = Platelet) 133          133-450               

    



[facility]fieldBhvlxgwXIXTDMEAVZ5926-91-62 14:30:00





             Test Item    Value        Reference Range Interpretation Comments

 

             RDW (test code = RDW) 13.9         11.5-14.5                 



[facility]fieldMupdtgjULUBOHQBZU5972-73-96 14:30:00





             Test Item    Value        Reference Range Interpretation Comments

 

             MCHC (test code = MCHC) 33.4         32.0-36.0                 



[facility]fieldKuehknmFYGIUAHVEQ0398-15-92 14:30:00





             Test Item    Value        Reference Range Interpretation Comments

 

             MCH (test code = MCH) 28.7 pg      27.0-31.0                 



[facility]fieldGmrjgeeOZJBBBJYDB0812-14-05 14:30:00





             Test Item    Value        Reference Range Interpretation Comments

 

             MCV (test code = MCV) 85.8         80.0-94.0                 



[facility]fieldIauzcyhQFYFIHMTMV8415-22-10 14:30:00





             Test Item    Value        Reference Range Interpretation Comments

 

             Hct (test code = Hct) 44.0         42.0-54.0                 



[facility]fieldEjccnshTDXNUGWBBF8338-40-06 14:30:00





             Test Item    Value        Reference Range Interpretation Comments

 

             Hgb (test code = Hgb) 14.7         14.0-18.0                 



[facility]fieldStvegzaXJUXTXQYBZ6879-24-80 14:30:00





             Test Item    Value        Reference Range Interpretation Comments

 

             RBC (test code = RBC) 5.13         4.70-6.10                 



[facility]fieldKoafwnkDHIZFJDBAO9264-66-07 14:30:00





             Test Item    Value        Reference Range Interpretation Comments

 

             WBC (test code = WBC) 4.1          3.7-10.4                  



[facility] HospitalXpmunrlQUEMJWIEYBXG6125-30-21 14:30:00





             Test Item    Value        Reference Range Interpretation Comments

 

             AGAP (test code = AGAP) 15.3         10.0-20.0                 



[facility] HospitalVuhbzewATWTDRLEWTXZ4713-58-10 14:30:00





             Test Item    Value        Reference Range Interpretation Comments

 

             eGFR (test code = eGFR) 91                                     



[facility] HospitalSwmlimhZECYOWCFPGJD9995-36-22 14:30:00





             Test Item    Value        Reference Range Interpretation Comments

 

             BUN (test code = BUN) 13           7-22                      



[facility] HospitalItsoawbWWQQKIQALVIG3019-25-62 14:30:00





             Test Item    Value        Reference Range Interpretation Comments

 

             Creatinine Lvl (test code = Creatinine 0.98         0.50-1.40      

           



             Lvl)                                                



[facility] HospitalDjjnqbuWUYGMBTBQEZP7468-69-78 14:30:00





             Test Item    Value        Reference Range Interpretation Comments

 

             Sodium Lvl (test code = Sodium Lvl) 144          135-145           

        



[facility] HospitalGpqebxpOTLJUSWQZQHI0325-12-58 14:30:00





             Test Item    Value        Reference Range Interpretation Comments

 

             Potassium Lvl (test code = Potassium 4.3          3.5-5.1          

         



             Lvl)                                                



[facility] HospitalTxhntdlLZAIMOEUVZPJ2182-24-84 14:30:00





             Test Item    Value        Reference Range Interpretation Comments

 

             Chloride Lvl (test code = Chloride Lvl) 105                  

            



[facility] HospitalHjefjfvPELHQHCVDTFQ2770-64-31 14:30:00





             Test Item    Value        Reference Range Interpretation Comments

 

             Calcium Lvl (test code = Calcium Lvl) 9.1          8.5-10.5        

          



[facility] HospitalXbdvylhYBJLXRNVSWFD8970-62-97 14:30:00





             Test Item    Value        Reference Range Interpretation Comments

 

             CO2 (test code = CO2) 28           24-32                     



[facility] HospitalMeelqrxQZEIWBECRDRR3801-22-74 14:30:00





             Test Item    Value        Reference Range Interpretation Comments

 

             Glucose Lvl (test code = Glucose Lvl) 77           70-99           

          



[facility]fieldXzaosqrNQMLNYXDVM7495-54-77 14:30:00





             Test Item    Value        Reference Range Interpretation Comments

 

             Lymphocytes # (test code = Lymphocytes 0.8          1.0-5.5        

           



             #)                                                  



[facility]fieldMybubanWQQCNWVOBN7118-39-45 14:30:00





             Test Item    Value        Reference Range Interpretation Comments

 

             Segs-Bands # (test code = Segs-Bands #) 2.8          1.5-8.1       

            



[facility]fieldEhhagumNMZEZBLVCC1967-76-64 14:30:00





             Test Item    Value        Reference Range Interpretation Comments

 

             Monocytes # (test code 0.4          See_Comment                [Aut

omated message] The



             = Monocytes #)                                        system which 

generated



                                                                 this result tra

nsmitted



                                                                 reference range

: <=0.8.



                                                                 The reference r

klaudia was



                                                                 not used to int

erpret



                                                                 this result as



                                                                 normal/abnormal

.



[facility]fieldForgsboITYKRGZDRA2238-50-82 14:30:00





             Test Item    Value        Reference Range Interpretation Comments

 

             Eosinophils # (test code 0.1          See_Comment                [A

utomated message] The



             = Eosinophils #)                                        system whic

h generated



                                                                 this result tra

nsmitted



                                                                 reference range

: <=0.5.



                                                                 The reference r

klaudia was



                                                                 not used to int

erpret



                                                                 this result as



                                                                 normal/abnormal

.



[facility]fieldDjmvgdyQKMJBJEFGI9484-60-79 14:30:00





             Test Item    Value        Reference Range Interpretation Comments

 

             Eosinophils (test code = 2.4          See_Comment                [A

utomated message] The



             Eosinophils)                                        system which ge

nerated



                                                                 this result tra

nsmitted



                                                                 reference range

: <=4.0.



                                                                 The reference r

klaudia was



                                                                 not used to int

erpret



                                                                 this result as



                                                                 normal/abnormal

.



[facility]fieldDldudaqYTNPMGLSAO2613-91-99 14:30:00





             Test Item    Value        Reference Range Interpretation Comments

 

             Basophils (test code = 0.7          See_Comment                [Aut

omated message] The



             Basophils)                                          system which ge

nerated



                                                                 this result tra

nsmitted



                                                                 reference range

: <=1.0.



                                                                 The reference r

klaudia was



                                                                 not used to int

erpret



                                                                 this result as



                                                                 normal/abnormal

.



[facility]fieldIwgpehwBJEJVLQEJB0819-20-15 14:30:00





             Test Item    Value        Reference Range Interpretation Comments

 

             Monocytes (test code = Monocytes) 10.7         2.0-12.0            

      



[facility]fieldLzoutijUOLBPSCENT3746-06-07 14:30:00





             Test Item    Value        Reference Range Interpretation Comments

 

             Lymphocytes (test code = Lymphocytes) 19.7         20.0-40.0       

          



[facility]fieldRhbecikZORPXGELTN2314-02-88 14:30:00





             Test Item    Value        Reference Range Interpretation Comments

 

             Segs (test code = Segs) 66.5         45.0-75.0                 



[facility]fieldHbsduveBTBFKQLWEP7782-69-70 14:30:00





             Test Item    Value        Reference Range Interpretation Comments

 

             MPV (test code = MPV) 8.5          7.4-10.4                  



[facility]fieldPuunhzsWKBTUZSGNS8287-32-58 14:30:00





             Test Item    Value        Reference Range Interpretation Comments

 

             Platelet (test code = Platelet) 133          133-450               

    



[facility]fieldPevvujrEACGRGHOAP9877-77-04 14:30:00





             Test Item    Value        Reference Range Interpretation Comments

 

             RDW (test code = RDW) 13.9         11.5-14.5                 



[facility]fieldVranhjgEKHUXOBXRE6582-93-96 14:30:00





             Test Item    Value        Reference Range Interpretation Comments

 

             MCHC (test code = MCHC) 33.4         32.0-36.0                 



[facility]fieldEymbbztEOIECAOGYN3624-85-15 14:30:00





             Test Item    Value        Reference Range Interpretation Comments

 

             MCH (test code = MCH) 28.7 pg      27.0-31.0                 



[facility]fieldMjkhpsjBYEZMPDANV4810-44-00 14:30:00





             Test Item    Value        Reference Range Interpretation Comments

 

             MCV (test code = MCV) 85.8         80.0-94.0                 



[facility]fieldMjsslnpYKPBLZJYYO1828-08-33 14:30:00





             Test Item    Value        Reference Range Interpretation Comments

 

             Hct (test code = Hct) 44.0         42.0-54.0                 



[facility]fieldXuuprgpXBITZQSRFV0719-63-65 14:30:00





             Test Item    Value        Reference Range Interpretation Comments

 

             Hgb (test code = Hgb) 14.7         14.0-18.0                 



[facility]fieldTfrbmdsFDYAWGELET2485-88-61 14:30:00





             Test Item    Value        Reference Range Interpretation Comments

 

             RBC (test code = RBC) 5.13         4.70-6.10                 



[facility]fieldIftmbvqOCHIMCEEBP5393-42-20 14:30:00





             Test Item    Value        Reference Range Interpretation Comments

 

             WBC (test code = WBC) 4.1          3.7-10.4                  



[facility]fieldUpuoxkbOEVLBBNGAJ1119-18-28 14:30:00





             Test Item    Value        Reference Range Interpretation Comments

 

             Eosinophils # (test code 0.1          See_Comment                [A

utomated message] The



             = Eosinophils #)                                        system wh

h generated



                                                                 this result tra

nsmitted



                                                                 reference range

: <=0.5.



                                                                 The reference r

klaudia was



                                                                 not used to int

erpret



                                                                 this result as



                                                                 normal/abnormal

.



[facility]fieldSnygbgwLPAOBOAOUG8621-56-19 14:30:00





             Test Item    Value        Reference Range Interpretation Comments

 

             Eosinophils (test code = 2.4          See_Comment                [A

utomated message] The



             Eosinophils)                                        system which ge

nerated



                                                                 this result tra

nsmitted



                                                                 reference range

: <=4.0.



                                                                 The reference r

klaudia was



                                                                 not used to int

erpret



                                                                 this result as



                                                                 normal/abnormal

.



[facility]fieldLqhprzhOYZPVFRGAJ1164-34-84 14:30:00





             Test Item    Value        Reference Range Interpretation Comments

 

             Basophils (test code = 0.7          See_Comment                [Aut

omated message] The



             Basophils)                                          system which ge

nerated



                                                                 this result tra

nsmitted



                                                                 reference range

: <=1.0.



                                                                 The reference r

klaudia was



                                                                 not used to int

erpret



                                                                 this result as



                                                                 normal/abnormal

.



[facility]hlsnsdQGCJIWEYCE0903-74-28 14:30:00





             Test Item    Value        Reference Range Interpretation Comments

 

             Monocytes (test code = Monocytes) 10.7         2.0-12.0            

      



[facility]fieldPqnnxtmJMNZTMNLOE3912-88-15 14:30:00





             Test Item    Value        Reference Range Interpretation Comments

 

             Lymphocytes (test code = Lymphocytes) 19.7         20.0-40.0       

          



[facility]fieldDixoaowROCTUKRYDG1677-02-33 14:30:00





             Test Item    Value        Reference Range Interpretation Comments

 

             Segs (test code = Segs) 66.5         45.0-75.0                 



[facility]fieldEdiszihTMZODOBHQO8199-85-79 14:30:00





             Test Item    Value        Reference Range Interpretation Comments

 

             MPV (test code = MPV) 8.5          7.4-10.4                  



[facility]fieldCmsyiktFQYJEGRSUQ9839-10-94 14:30:00





             Test Item    Value        Reference Range Interpretation Comments

 

             Platelet (test code = Platelet) 133          133-450               

    



[facility]fieldEzuawyoJWPPOOXKED3681-91-23 14:30:00





             Test Item    Value        Reference Range Interpretation Comments

 

             RDW (test code = RDW) 13.9         11.5-14.5                 



[facility]fieldLexyadvDVDRHHUDCR7519-71-07 14:30:00





             Test Item    Value        Reference Range Interpretation Comments

 

             MCHC (test code = MCHC) 33.4         32.0-36.0                 



[facility]fieldLkgfwonTUCUBOSNWB0395-75-27 14:30:00





             Test Item    Value        Reference Range Interpretation Comments

 

             MCH (test code = MCH) 28.7 pg      27.0-31.0                 



[facility]fieldYmdudioEZWLVVPHLR3361-44-43 14:30:00





             Test Item    Value        Reference Range Interpretation Comments

 

             MCV (test code = MCV) 85.8         80.0-94.0                 



[facility]fieldQkvtrbzVVCOKESYPR3673-09-17 14:30:00





             Test Item    Value        Reference Range Interpretation Comments

 

             Hct (test code = Hct) 44.0         42.0-54.0                 



[facility]fieldTwjufnjLIYGLAOWXG0932-93-82 14:30:00





             Test Item    Value        Reference Range Interpretation Comments

 

             Hgb (test code = Hgb) 14.7         14.0-18.0                 



[facility]fieldEqsvorwMNYWPJRIRW0894-12-79 14:30:00





             Test Item    Value        Reference Range Interpretation Comments

 

             RBC (test code = RBC) 5.13         4.70-6.10                 



[facility]fieldLktrfveIDNKRMTYER6898-15-15 14:30:00





             Test Item    Value        Reference Range Interpretation Comments

 

             WBC (test code = WBC) 4.1          3.7-10.4                  



[facility] HospitalSgafeiuQDTAFEEJEOYK6662-48-93 14:30:00





             Test Item    Value        Reference Range Interpretation Comments

 

             AGAP (test code = AGAP) 15.3         10.0-20.0                 



[facility] HospitalMbktqasAKQMNTRQLBHC0155-10-71 14:30:00





             Test Item    Value        Reference Range Interpretation Comments

 

             eGFR (test code = eGFR) 91                                     



[facility] HospitalBwtnubsMDVYKGQSRKSD5834-10-42 14:30:00





             Test Item    Value        Reference Range Interpretation Comments

 

             BUN (test code = BUN) 13           7-22                      



[facility] HospitalQxblhtuEURLICJBCDYQ1192-17-89 14:30:00





             Test Item    Value        Reference Range Interpretation Comments

 

             Creatinine Lvl (test code = Creatinine 0.98         0.50-1.40      

           



             Lvl)                                                



[facility] HospitalJbvatlxQOTJKERAPZAK4304-26-13 14:30:00





             Test Item    Value        Reference Range Interpretation Comments

 

             Sodium Lvl (test code = Sodium Lvl) 144          135-145           

        



[facility] HospitalAtwcaipICGNYGLVFSOH0311-71-19 14:30:00





             Test Item    Value        Reference Range Interpretation Comments

 

             Potassium Lvl (test code = Potassium 4.3          3.5-5.1          

         



             Lvl)                                                



[facility] HospitalBlfqxwkYMMSZENEFPEK2861-44-10 14:30:00





             Test Item    Value        Reference Range Interpretation Comments

 

             Chloride Lvl (test code = Chloride Lvl) 105                  

            



[facility] HospitalRoshmjlPYZJRGDHZSSB4093-28-22 14:30:00





             Test Item    Value        Reference Range Interpretation Comments

 

             Calcium Lvl (test code = Calcium Lvl) 9.1          8.5-10.5        

          



[facility] HospitalJtmbygtKMNLSJIEOFMU2469-04-99 14:30:00





             Test Item    Value        Reference Range Interpretation Comments

 

             CO2 (test code = CO2) 28           24-32                     



[facility] HospitalTuyskhrSYXWMMBMSLVC8394-70-34 14:30:00





             Test Item    Value        Reference Range Interpretation Comments

 

             Glucose Lvl (test code = Glucose Lvl) 77           70-99           

          



[facility]fieldRfgbwqiYQZQSBLFCP8355-11-86 14:30:00





             Test Item    Value        Reference Range Interpretation Comments

 

             Lymphocytes # (test code = Lymphocytes 0.8          1.0-5.5        

           



             #)                                                  



[facility]fieldJpwjifbWRNPKMDKEP1592-36-72 14:30:00





             Test Item    Value        Reference Range Interpretation Comments

 

             Segs-Bands # (test code = Segs-Bands #) 2.8          1.5-8.1       

            



[facility]fieldBhdiqbeEALLOUJSFH6746-87-60 14:30:00





             Test Item    Value        Reference Range Interpretation Comments

 

             Monocytes # (test code 0.4          See_Comment                [Aut

omated message] The



             = Monocytes #)                                        system which 

generated



                                                                 this result tra

nsmitted



                                                                 reference range

: <=0.8.



                                                                 The reference r

klaudia was



                                                                 not used to int

erpret



                                                                 this result as



                                                                 normal/abnormal

.



[facility]CROL[facility] [id number] 14:05:00





             Test Item    Value        Reference Range Interpretation Comments

 

             TACROLIMUS BLOOD (BEAKER) (test 6.5 ng/mL    10.0-20.0    L        

    



             code = 657)                                         



BASIC METABOLIC [id number] 10:13:00





             Test Item    Value        Reference Range Interpretation Comments

 

             SODIUM (BEAKER) 140 meq/L    136-145                   



             (test code = 381)                                        

 

             POTASSIUM (BEAKER) 4.1 meq/L    3.5-5.1                   



             (test code = 379)                                        

 

             CHLORIDE (BEAKER) 109 meq/L           H            



             (test code = 382)                                        

 

             CO2 (BEAKER) (test 22 meq/L     22-29                     



             code = 355)                                         

 

             BLOOD UREA NITROGEN 15 mg/dL     7-21                      



             (BEAKER) (test code                                        



             = 354)                                              

 

             CREATININE (BEAKER) 0.95 mg/dL   0.57-1.25                 



             (test code = 358)                                        

 

             GLUCOSE RANDOM 99 mg/dL                         



             (BEAKER) (test code                                        



             = 652)                                              

 

             CALCIUM (BEAKER) 8.8 mg/dL    8.4-10.2                  



             (test code = 697)                                        

 

             EGFR (BEAKER) (test 85 mL/min/1.73                           ESTIMA

PHOEBE GFR IS



             code = 1092) sq m                                   NOT AS ACCURATE

 AS



                                                                 CREATININE



                                                                 CLEARANCE IN



                                                                 PREDICTING



                                                                 GLOMERULAR



                                                                 FILTRATION RATE

.



                                                                 ESTIMATED GFR I

S



                                                                 NOT APPLICABLE 

FOR



                                                                 DIALYSIS PATIEN

TS.



CBC W/PLT COUNT &amp; AUTO [id number] 09:42:00





             Test Item    Value        Reference Range Interpretation Comments

 

             WHITE BLOOD CELL COUNT (BEAKER) 4.7 K/ L     4.0-10.0              

    



             (test code = 775)                                        

 

             RED BLOOD CELL COUNT (BEAKER) 5.18 M/ L    4.20-5.80               

  



             (test code = 761)                                        

 

             HEMOGLOBIN (BEAKER) (test code = 15.3 GM/DL   13.0-16.8            

     



             410)                                                

 

             HEMATOCRIT (BEAKER) (test code = 47.1 %       40.0-50.0            

     



             411)                                                

 

             MEAN CORPUSCULAR VOLUME (BEAKER) 90.8 fL      82.0-98.0            

     



             (test code = 753)                                        

 

             MEAN CORPUSCULAR HEMOGLOBIN 29.6 pg      27.0-33.0                 



             (BEAKER) (test code = 751)                                        

 

             MEAN CORPUSCULAR HEMOGLOBIN CONC 32.6 GM/DL   32.0-36.0            

     



             (BEAKER) (test code = 752)                                        

 

             RED CELL DISTRIBUTION WIDTH 15.1 %       10.3-14.2    H            



             (BEAKER) (test code = 412)                                        

 

             PLATELET COUNT (BEAKER) (test 129 K/CU MM  150-430      L          

  



             code = 756)                                         

 

             MEAN PLATELET VOLUME (BEAKER) 7.7 fL       6.5-10.5                

  



             (test code = 754)                                        

 

             NUCLEATED RED BLOOD CELLS 0 /100 WBC   0-0                       



             (BEAKER) (test code = 413)                                        

 

             NEUTROPHILS RELATIVE PERCENT 72 %                                  

 



             (BEAKER) (test code = 429)                                        

 

             LYMPHOCYTES RELATIVE PERCENT 18 %                                  

 



             (BEAKER) (test code = 430)                                        

 

             MONOCYTES RELATIVE PERCENT 8 %                                    



             (BEAKER) (test code = 431)                                        

 

             EOSINOPHILS RELATIVE PERCENT 1 %                                   

 



             (BEAKER) (test code = 432)                                        

 

             BASOPHILS RELATIVE PERCENT 1 %                                    



             (BEAKER) (test code = 437)                                        

 

             NEUTROPHILS ABSOLUTE COUNT 3.37 K/ L    1.80-8.00                 



             (BEAKER) (test code = 670)                                        

 

             LYMPHOCYTES ABSOLUTE COUNT 0.85 K/ L    1.48-4.50    L            



             (BEAKER) (test code = 414)                                        

 

             MONOCYTES ABSOLUTE COUNT (BEAKER) 0.40 K/ L    0.00-1.30           

      



             (test code = 415)                                        

 

             EOSINOPHILS ABSOLUTE COUNT 0.07 K/ L    0.00-0.50                 



             (BEAKER) (test code = 416)                                        

 

             BASOPHILS ABSOLUTE COUNT (BEAKER) 0.03 K/ L    0.00-0.20           

      



             (test code = 417)                                        



[id number] 10:38:00





             Test Item    Value        Reference Range Interpretation Comments

 

             MAGNESIUM (BEAKER) (test code = 2.2 mg/dL    1.6-2.6               

    



             627)                                                



TACROLIMUS [id number] 10:33:00





             Test Item    Value        Reference Range Interpretation Comments

 

             TACROLIMUS BLOOD (BEAKER) (test 7.1 ng/mL    10.0-20.0    L        

    



             code = 657)                                         



CBC W/PLT COUNT &amp; AUTO [id number] 08:47:00





             Test Item    Value        Reference Range Interpretation Comments

 

             WHITE BLOOD CELL COUNT (BEAKER) 4.1 K/ L     4.0-10.0              

    



             (test code = 775)                                        

 

             RED BLOOD CELL COUNT (BEAKER) 5.02 M/ L    4.20-5.80               

  



             (test code = 761)                                        

 

             HEMOGLOBIN (BEAKER) (test code = 14.8 GM/DL   13.0-16.8            

     



             410)                                                

 

             HEMATOCRIT (BEAKER) (test code = 43.6 %       40.0-50.0            

     



             411)                                                

 

             MEAN CORPUSCULAR VOLUME (BEAKER) 87.0 fL      82.0-98.0            

     



             (test code = 753)                                        

 

             MEAN CORPUSCULAR HEMOGLOBIN 29.5 pg      27.0-33.0                 



             (BEAKER) (test code = 751)                                        

 

             MEAN CORPUSCULAR HEMOGLOBIN CONC 33.9 GM/DL   32.0-36.0            

     



             (BEAKER) (test code = 752)                                        

 

             RED CELL DISTRIBUTION WIDTH 14.1 %       10.3-14.2                 



             (BEAKER) (test code = 412)                                        

 

             PLATELET COUNT (BEAKER) (test 160 K/CU MM  150-430                 

  



             code = 756)                                         

 

             MEAN PLATELET VOLUME (BEAKER) 7.2 fL       6.5-10.5                

  



             (test code = 754)                                        

 

             NUCLEATED RED BLOOD CELLS 0 /100 WBC   0-0                       



             (BEAKER) (test code = 413)                                        

 

             NEUTROPHILS RELATIVE PERCENT 64 %                                  

 



             (BEAKER) (test code = 429)                                        

 

             LYMPHOCYTES RELATIVE PERCENT 21 %                                  

 



             (BEAKER) (test code = 430)                                        

 

             MONOCYTES RELATIVE PERCENT 12 %                                   



             (BEAKER) (test code = 431)                                        

 

             EOSINOPHILS RELATIVE PERCENT 2 %                                   

 



             (BEAKER) (test code = 432)                                        

 

             BASOPHILS RELATIVE PERCENT 0 %                                    



             (BEAKER) (test code = 437)                                        

 

             NEUTROPHILS ABSOLUTE COUNT 2.61 K/ L    1.80-8.00                 



             (BEAKER) (test code = 670)                                        

 

             LYMPHOCYTES ABSOLUTE COUNT 0.87 K/ L    1.48-4.50    L            



             (BEAKER) (test code = 414)                                        

 

             MONOCYTES ABSOLUTE COUNT (BEAKER) 0.49 K/ L    0.00-1.30           

      



             (test code = 415)                                        

 

             EOSINOPHILS ABSOLUTE COUNT 0.09 K/ L    0.00-0.50                 



             (BEAKER) (test code = 416)                                        

 

             BASOPHILS ABSOLUTE COUNT (BEAKER) 0.01 K/ L    0.00-0.20           

      



             (test code = 417)                                        



0.00BASI METABOLIC [id number] 08:47:00





             Test Item    Value        Reference Range Interpretation Comments

 

             SODIUM (BEAKER) 142 meq/L    136-145                   



             (test code = 381)                                        

 

             POTASSIUM (BEAKER) 3.8 meq/L    3.5-5.1                   



             (test code = 379)                                        

 

             CHLORIDE (BEAKER) 109 meq/L           H            



             (test code = 382)                                        

 

             CO2 (BEAKER) (test 21 meq/L     22-29        L            



             code = 355)                                         

 

             BLOOD UREA NITROGEN 22 mg/dL     7-21         H            



             (BEAKER) (test code                                        



             = 354)                                              

 

             CREATININE (BEAKER) 1.18 mg/dL   0.57-1.25                 



             (test code = 358)                                        

 

             GLUCOSE RANDOM 99 mg/dL                         



             (BEAKER) (test code                                        



             = 652)                                              

 

             CALCIUM (BEAKER) 9.1 mg/dL    8.4-10.2                  



             (test code = 697)                                        

 

             EGFR (BEAKER) (test 66 mL/min/1.73                           ESTIMA

PHOEBE GFR IS



             code = 1092) sq m                                   NOT AS ACCURATE

 AS



                                                                 CREATININE



                                                                 CLEARANCE IN



                                                                 PREDICTING



                                                                 GLOMERULAR



                                                                 FILTRATION RATE

.



                                                                 ESTIMATED GFR I

S



                                                                 NOT APPLICABLE 

FOR



                                                                 DIALYSIS PATIEN

TS.



URINALYSIS W/ [id number] 00:20:00





             Test Item    Value        Reference Range Interpretation Comments

 

             COLOR (BEAKER) (test code Yellow                                 



             = 470)                                              

 

             CLARITY (BEAKER) (test Slightly Hazy                           



             code = 469)                                         

 

             SPECIFIC GRAVITY UA 1.050        1.001-1.035  H            



             (BEAKER) (test code = 468)                                        

 

             PH UA (BEAKER) (test code 5.5          5.0-8.0                   



             = 467)                                              

 

             PROTEIN UA (BEAKER) (test 20 mg/dL     Negative     A            



             code = 464)                                         

 

             GLUCOSE UA (BEAKER) (test Negative     Negative                  



             code = 365)                                         

 

             KETONES UA (BEAKER) (test 40 mg/dL     Negative     A            



             code = 371)                                         

 

             BILIRUBIN UA (BEAKER) Negative     Negative                  



             (test code = 462)                                        

 

             BLOOD UA (BEAKER) (test Negative     Negative                  



             code = 461)                                         

 

             NITRITE UA (BEAKER) (test Negative     Negative                  



             code = 465)                                         

 

             LEUKOCYTE ESTERASE UA Negative     Negative                  



             (BEAKER) (test code = 466)                                        

 

             UROBILINOGEN UA (BEAKER) 0.2 mg/dL    0.2-1.0                   



             (test code = 463)                                        

 

             RBC UA (BEAKER) (test code 1 /HPF                                 



             = 519)                                              

 

             WBC UA (BEAKER) (test code 3 /HPF                                 



             = 520)                                              

 

             MUCUS (BEAKER) (test code Many                                   



             = 1574)                                             

 

             HYALINE CASTS (BEAKER) 6 /LPF                                 



             (test code = 514)                                        

 

             SOURCE(BEAKER) (test code Urine, Clean Catch                       

    



             = 6635)                                             



[facility] MET[facility]OLIC [id number] 21:55:00





             Test Item    Value        Reference Range Interpretation Comments

 

             SODIUM (BEAKER) 139 meq/L    136-145                   



             (test code = 381)                                        

 

             POTASSIUM (BEAKER) 5.1 meq/L    3.5-5.1                   



             (test code = 379)                                        

 

             CHLORIDE (BEAKER) 107 meq/L                        



             (test code = 382)                                        

 

             CO2 (BEAKER) (test 18 meq/L     22-29        L            



             code = 355)                                         

 

             BLOOD UREA NITROGEN 26 mg/dL     7-21         H            



             (BEAKER) (test code                                        



             = 354)                                              

 

             CREATININE (BEAKER) 1.26 mg/dL   0.57-1.25    H            



             (test code = 358)                                        

 

             GLUCOSE RANDOM 89 mg/dL                         



             (BEAKER) (test code                                        



             = 652)                                              

 

             CALCIUM (BEAKER) 9.1 mg/dL    8.4-10.2                  



             (test code = 697)                                        

 

             EGFR (BEAKER) (test 61 mL/min/1.73                           ESTIMA

PHOEBE GFR IS



             code = 1092) sq m                                   NOT AS ACCURATE

 AS



                                                                 CREATININE



                                                                 CLEARANCE IN



                                                                 PREDICTING



                                                                 GLOMERULAR



                                                                 FILTRATION RATE

.



                                                                 ESTIMATED GFR I

S



                                                                 NOT APPLICABLE 

FOR



                                                                 DIALYSIS PATIEN

TS.



HEPATIC FUNCTION [id number] 21:55:00





             Test Item    Value        Reference Range Interpretation Comments

 

             TOTAL PROTEIN (BEAKER) (test code = 7.1 gm/dL    6.0-8.3           

        



             770)                                                

 

             ALBUMIN (BEAKER) (test code = 1145) 4.2 g/dL     3.5-5.0           

        

 

             BILIRUBIN TOTAL (BEAKER) (test code 0.7 mg/dL    0.2-1.2           

        



             = 377)                                              

 

             BILIRUBIN DIRECT (BEAKER) (test 0.2 mg/dL    0.1-0.5               

    



             code = 706)                                         

 

             ALKALINE PHOSPHATASE (BEAKER) (test 86 U/L                   

        



             code = 346)                                         

 

             AST (SGOT) (BEAKER) (test code = 28 U/L       5-34                 

     



             353)                                                

 

             ALT (SGPT) (BEAKER) (test code = 20 U/L       6-55                 

     



             347)                                                



[id number] 21:52:00





             Test Item    Value        Reference Range Interpretation Comments

 

             LIPASE (BEAKER) (test code = 749) 40 U/L       8-78                

      



B-TYPE NATRIURETIC FACTOR (BNP)2017 21:46:00





             Test Item    Value        Reference Range Interpretation Comments

 

             B-TYPE NATRIURETIC PEPTIDE (BEAKER) 21 pg/mL     0-100             

        



             (test code = 700)                                        



CREATINE KINASE (CK), TOTAL AND [id number] 21:45:00





             Test Item    Value        Reference Range Interpretation Comments

 

             CREATINE KINASE TOTAL (BEAKER) 32 U/L                        

   



             (test code = 380)                                        

 

             CREATINE KINASE-MB (BEAKER) (test 0.6 ng/mL    0.0-6.6             

      



             code = 750)                                         

 

             CREATINE KINASE-MB INDEX (BEAKER) 1.9 %                            

      



             (test code = 395)                                        



Effective 2014: CK-MB Reference Range ChangeNew: 0.0-6.6 Previous: 
0.0-4.9CK-MB Reference Range:&lt;6.7 Normal6.7-10.0 Borderline&gt;10.0 Abnormal
TROPONIN -86-70 21:45:00





             Test Item    Value        Reference Range Interpretation Comments

 

             TROPONIN I (BEAKER) (test code = 397) < ng/mL      0.00-0.03       

          



Effective 2014: Reference Range ChangeNew: 0.00-0.03 Previous 0.00-
0.15Troponin I (TnI) levelsmust be interpreted in the context of the presenting 
symptoms and the clinical findings. Elevated TnI levels indicate myocardial 
damage, but are not specific for ischemic heart disease. Elevated TnI levels are
seen in patients with other cardiac conditions (including myocarditis and 
congestive heart failure), and slight TnI elevations occur in patients with 
other conditions, including sepsis, renal failure, acidosis, acute neurological 
disease, and persistent tachyarrhythmia.CBC W/PLT COUNT &amp; AUTO DIFFERENTIAL
2017 21:24:00





             Test Item    Value        Reference Range Interpretation Comments

 

             WHITE BLOOD CELL COUNT (BEAKER) 4.7 K/ L     4.0-10.0              

    



             (test code = 775)                                        

 

             RED BLOOD CELL COUNT (BEAKER) 5.75 M/ L    4.20-5.80               

  



             (test code = 761)                                        

 

             HEMOGLOBIN (BEAKER) (test code = 17.4 GM/DL   13.0-16.8    H       

     



             410)                                                

 

             HEMATOCRIT (BEAKER) (test code = 49.2 %       40.0-50.0            

     



             411)                                                

 

             MEAN CORPUSCULAR VOLUME (BEAKER) 85.6 fL      82.0-98.0            

     



             (test code = 753)                                        

 

             MEAN CORPUSCULAR HEMOGLOBIN 30.3 pg      27.0-33.0                 



             (BEAKER) (test code = 751)                                        

 

             MEAN CORPUSCULAR HEMOGLOBIN CONC 35.4 GM/DL   32.0-36.0            

     



             (BEAKER) (test code = 752)                                        

 

             RED CELL DISTRIBUTION WIDTH 12.9 %       10.3-14.2                 



             (BEAKER) (test code = 412)                                        

 

             PLATELET COUNT (BEAKER) (test code 98 K/CU MM   150-430      L     

       



             = 756)                                              

 

             MEAN PLATELET VOLUME (BEAKER) 8.7 fL       6.5-10.5                

  



             (test code = 754)                                        

 

             NUCLEATED RED BLOOD CELLS (BEAKER) 0 /100 WBC   0-0                

       



             (test code = 413)                                        

 

             NEUTROPHILS RELATIVE PERCENT 69 %                                  

 



             (BEAKER) (test code = 429)                                        

 

             LYMPHOCYTES RELATIVE PERCENT 22 %                                  

 



             (BEAKER) (test code = 430)                                        

 

             MONOCYTES RELATIVE PERCENT 8 %                                    



             (BEAKER) (test code = 431)                                        

 

             EOSINOPHILS RELATIVE PERCENT 1 %                                   

 



             (BEAKER) (test code = 432)                                        

 

             BASOPHILS RELATIVE PERCENT 0 %                                    



             (BEAKER) (test code = 437)                                        

 

             NEUTROPHILS ABSOLUTE COUNT 3.26 K/ L    1.80-8.00                 



             (BEAKER) (test code = 670)                                        

 

             LYMPHOCYTES ABSOLUTE COUNT 1.05 K/ L    1.48-4.50    L            



             (BEAKER) (test code = 414)                                        

 

             MONOCYTES ABSOLUTE COUNT (BEAKER) 0.38 K/ L    0.00-1.30           

      



             (test code = 415)                                        

 

             EOSINOPHILS ABSOLUTE COUNT 0.03 K/ L    0.00-0.50                 



             (BEAKER) (test code = 416)                                        

 

             BASOPHILS ABSOLUTE COUNT (BEAKER) 0.02 K/ L    0.00-0.20           

      



             (test code = 417)                                        



0.00CMV PCR, [id number] 17:55:00





             Test Item    Value        Reference Range Interpretation Comments

 

             CMV VIRAL LOAD - Negative or below the                           



             NEGATIVE (BEAKER) (test linear range of the                        

   



             code = 2558) assay (<375 copies/mL)                           



Cytomegalovirus (CMV) infection can cause significant disease in 
immunosuppressed patients. However,it is common for CMV to manifest as a limited
infection which is of no clinical significance in immunosuppressed patients or 
in healthy individuals.Viral load measurements are helpful to identify clinical 
CMV infection and to guide the pre-emptive management of antiviral therapy. For 
treatment of CMV infection due to reactivation in transplant recipients, a 
threshold between 4,000 and 5,000 copies/mLis suggested. For treatment of 
primary CMV infection, a lower threshold can be used.CMV infection may also be 
monitored using weekly serial measurements. Serial measurements of CMV DNA viral
load can be evaluated by identifying a 10-fold change, as well as assessing the 
CMV DNA viral load and the clinical context for each patient.The plasma CMV DNA 
viral load was detected using quantitative polymerase chain reaction and 
fluorescent monitoring of a specific hybridized probe. Genetic variation and 
other factors can affect the accuracy of nucleic acid testing. Therefore, the 
results should be interpreted in light of clinical data. A negative result may 
not exclude the presence of CMV disease.This test was developed and its 
performance characteristics determined by the [facility], Section of Molecular Pathology. It has not been cleared or 
approved by the U.S. Foodand Drug Administration (FDA), since FDA approval is 
not required for clinical use of the test. Validation was done as required by 
The Clinical Laboratory Improvement Amendments of 1988.CREATINE KINASE (CK), 
TOTAL AND [id number] 18:40:00





             Test Item    Value        Reference Range Interpretation Comments

 

             CREATINE KINASE TOTAL (JOSE ALEJANDROAKER) 36 U/L                        

   



             (test code = 380)                                        

 

             CREATINE KINASE-MB (BEAKER) (test 0.3 ng/mL    0.0-6.6             

      



             code = 750)                                         

 

             CREATINE KINASE-MB INDEX (SpiracurAKER) 0.8 %                            

      



             (test code = 395)                                        



Effective 2014: CK-MB Reference Range ChangeNew: 0.0-6.6  Previous: 
0.0-4.9CK-MB Reference Range:&lt;6.7 Normal6.7-10.0 Borderline&gt;10.0 Abnormal
TROPONIN -35-46 18:40:00





             Test Item    Value        Reference Range Interpretation Comments

 

             TROPONIN I (BEAKER) (test code = 397) < ng/mL      0.00-0.03       

          



Effective 2014: Reference Range ChangeNew: 0.00-0.03 Previous 0.00-
0.15Troponin I (TnI) levelsmust be interpreted in the context of the presenting 
symptoms and the clinical findings. Elevated TnI levels indicate myocardial 
damage, but are not specific for ischemic heart disease. Elevated TnI levels are
seen in patients with other cardiac conditions (including myocarditis and 
congestive heart failure), and slight TnI elevations occur in patients with 
other conditions, including sepsis, renal failure, acidosis, acute neurological 
disease, and persistent tachyarrhythmia.B-TYPE NATRIURETIC FACTOR (BNP)
2017 18:40:00





             Test Item    Value        Reference Range Interpretation Comments

 

             B-TYPE NATRIURETIC PEPTIDE (BEAKER) 23 pg/mL     0-100             

        



             (test code = 700)                                        



[id number] 18:33:00





             Test Item    Value        Reference Range Interpretation Comments

 

             MAGNESIUM (BEAKER) (test code = 1.8 mg/dL    1.6-2.6               

    



             627)                                                



BASIC METABOLIC [id number] 18:33:00





             Test Item    Value        Reference Range Interpretation Comments

 

             SODIUM (BEAKER) 140 meq/L    136-145                   



             (test code = 381)                                        

 

             POTASSIUM (BEAKER) 4.1 meq/L    3.5-5.1                   



             (test code = 379)                                        

 

             CHLORIDE (BEAKER) 105 meq/L                        



             (test code = 382)                                        

 

             CO2 (BEAKER) (test 23 meq/L     22-29                     



             code = 355)                                         

 

             BLOOD UREA NITROGEN 18 mg/dL     7-21                      



             (BEAKER) (test code                                        



             = 354)                                              

 

             CREATININE (BEAKER) 1.34 mg/dL   0.57-1.25    H            



             (test code = 358)                                        

 

             GLUCOSE RANDOM 100 mg/dL                        



             (BEAKER) (test code                                        



             = 652)                                              

 

             CALCIUM (BEAKER) 9.2 mg/dL    8.4-10.2                  



             (test code = 697)                                        

 

             EGFR (BEAKER) (test 57 mL/min/1.73                           ESTIMA

PHOEBE GFR IS



             code = 1092) sq m                                   NOT AS ACCURATE

 AS



                                                                 CREATININE



                                                                 CLEARANCE IN



                                                                 PREDICTING



                                                                 GLOMERULAR



                                                                 FILTRATION RATE

.



                                                                 ESTIMATED GFR I

S



                                                                 NOT APPLICABLE 

FOR



                                                                 DIALYSIS PATIEN

TS.



CBC W/PLT COUNT &amp; AUTO [id number] 18:24:00





             Test Item    Value        Reference Range Interpretation Comments

 

             WHITE BLOOD CELL COUNT (BEAKER) 2.6 K/ L     4.0-10.0     L        

    



             (test code = 775)                                        

 

             RED BLOOD CELL COUNT (BEAKER) 5.66 M/ L    4.20-5.80               

  



             (test code = 761)                                        

 

             HEMOGLOBIN (BEAKER) (test code = 16.5 GM/DL   13.0-16.8            

     



             410)                                                

 

             HEMATOCRIT (BEAKER) (test code = 49.0 %       40.0-50.0            

     



             411)                                                

 

             MEAN CORPUSCULAR VOLUME (BEAKER) 86.6 fL      82.0-98.0            

     



             (test code = 753)                                        

 

             MEAN CORPUSCULAR HEMOGLOBIN 29.1 pg      27.0-33.0                 



             (BEAKER) (test code = 751)                                        

 

             MEAN CORPUSCULAR HEMOGLOBIN CONC 33.6 GM/DL   32.0-36.0            

     



             (BEAKER) (test code = 752)                                        

 

             RED CELL DISTRIBUTION WIDTH 13.0 %       10.3-14.2                 



             (BEAKER) (test code = 412)                                        

 

             PLATELET COUNT (BEAKER) (test code 87 K/CU MM   150-430      L     

       



             = 756)                                              

 

             MEAN PLATELET VOLUME (BEAKER) 8.3 fL       6.5-10.5                

  



             (test code = 754)                                        

 

             NUCLEATED RED BLOOD CELLS (BEAKER) 0 /100 WBC   0-0                

       



             (test code = 413)                                        

 

             NEUTROPHILS RELATIVE PERCENT 51 %                                  

 



             (BEAKER) (test code = 429)                                        

 

             LYMPHOCYTES RELATIVE PERCENT 30 %                                  

 



             (BEAKER) (test code = 430)                                        

 

             MONOCYTES RELATIVE PERCENT 18 %                                   



             (BEAKER) (test code = 431)                                        

 

             EOSINOPHILS RELATIVE PERCENT 1 %                                   

 



             (BEAKER) (test code = 432)                                        

 

             BASOPHILS RELATIVE PERCENT 0 %                                    



             (BEAKER) (test code = 437)                                        

 

             NEUTROPHILS ABSOLUTE COUNT 1.33 K/ L    1.80-8.00    L            



             (BEAKER) (test code = 670)                                        

 

             LYMPHOCYTES ABSOLUTE COUNT 0.78 K/ L    1.48-4.50    L            



             (BEAKER) (test code = 414)                                        

 

             MONOCYTES ABSOLUTE COUNT (BEAKER) 0.47 K/ L    0.00-1.30           

      



             (test code = 415)                                        

 

             EOSINOPHILS ABSOLUTE COUNT 0.02 K/ L    0.00-0.50                 



             (BEAKER) (test code = 416)                                        

 

             BASOPHILS ABSOLUTE COUNT (BEAKER) 0.01 K/ L    0.00-0.20           

      



             (test code = 417)                                        



0.00POCT-GLUCOSE [id number] 15:37:00





             Test Item    Value        Reference Range Interpretation Comments

 

             POC-GLUCOSE METER 97 mg/dL                         TESTED AT 

[facility] [address]



             (BEAKER) (test code =                                        [place] [address]



             1538)                                               



CHEM [id number] 10:27:00





             Test Item    Value        Reference Range Interpretation Comments

 

             Phosphorus (test code = Phosphorus) 3.6          2.5-4.5           

        



[id number]





             Test Item    Value        Reference Range Interpretation Comments

 

             Magnesium Lvl (test code = Magnesium 2.3          1.8-2.4          

         



             Lvl)                                                



[id number]





             Test Item    Value        Reference Range Interpretation Comments

 

             AGAP (test code = AGAP) 12.6         10.0-20.0                 



[facility] HospitalCmwhximEEDBTYNMEIYK0812-08-02 10:27:00





             Test Item    Value        Reference Range Interpretation Comments

 

             eGFR (test code = eGFR) 55                                     



[facility] Hos[id number] 10:27:00





             Test Item    Value        Reference Range Interpretation Comments

 

             Calcium Lvl (test code = Calcium Lvl) 8.6          8.5-10.5        

          



[facility] Hos[id number] 10:27:00





             Test Item    Value        Reference Range Interpretation Comments

 

             CO2 (test code = CO2) 23           24-32                     



[facility] Hos[id number] 10:27:00





             Test Item    Value        Reference Range Interpretation Comments

 

             Chloride Lvl (test code = Chloride Lvl) 102                  

            



[facility] Hospi[id number] 10:27:00





             Test Item    Value        Reference Range Interpretation Comments

 

             Potassium Lvl (test code = Potassium 4.6          3.5-5.1          

         



             Lvl)                                                



[facility] Hos[id number] 10:27:00





             Test Item    Value        Reference Range Interpretation Comments

 

             Sodium Lvl (test code = Sodium Lvl) 133          135-145           

        



[facility] Hos[id number] 10:27:00





             Test Item    Value        Reference Range Interpretation Comments

 

             Creatinine Lvl (test code = Creatinine 1.49         0.50-1.40      

           



             Lvl)                                                



[facility] Hos[id number] 10:27:00





             Test Item    Value        Reference Range Interpretation Comments

 

             BUN (test code = BUN) 26           7-22                      



[facility] Hospital[id number] 10:27:00





             Test Item    Value        Reference Range Interpretation Comments

 

             Glucose Lvl (test code = Glucose Lvl) 151          70-99           

          



[facility]PzgkgngCHAAENQUSY6503-36-54 10:27:00





             Test Item    Value        Reference Range Interpretation Comments

 

             Lymphocytes # (test code = Lymphocytes 0.6          1.0-5.5        

           



             #)                                                  



[facility]fieldOzxnbnbHMMWUWIVJE4615-52-98 10:27:00





             Test Item    Value        Reference Range Interpretation Comments

 

             Monocytes # (test code 0.5          See_Comment                [Aut

omated message] The



             = Monocytes #)                                        system which 

generated



                                                                 this result tra

nsmitted



                                                                 reference range

: <=0.8.



                                                                 The reference r

klaudia was



                                                                 not used to int

erpret



                                                                 this result as



                                                                 normal/abnormal

.



[facility]fieldZpulcrjAKQKMNTYBO9425-97-97 10:27:00





             Test Item    Value        Reference Range Interpretation Comments

 

             Segs-Bands # (test code = Segs-Bands #) 8.3          1.5-8.1       

            



[facility]fieldUmcilqtPWVIIISEUG9862-43-64 10:27:00





             Test Item    Value        Reference Range Interpretation Comments

 

             Monocytes (test code = Monocytes) 5.1          2.0-12.0            

      



[facility]fieldLenpmwcOKSCNBQVMG6781-59-55 10:27:00





             Test Item    Value        Reference Range Interpretation Comments

 

             Segs (test code = Segs) 88.1         45.0-75.0                 



[facility]fieldZylbmrdTBSFREDOWO0265-71-24 10:27:00





             Test Item    Value        Reference Range Interpretation Comments

 

             Lymphocytes (test code = Lymphocytes) 6.6          20.0-40.0       

          



[facility]fieldYrfvwsjXDKIFXRCQB5235-34-96 10:27:00





             Test Item    Value        Reference Range Interpretation Comments

 

             Basophils (test code = 0.2          See_Comment                [Aut

omated message] The



             Basophils)                                          system which ge

nerated



                                                                 this result tra

nsmitted



                                                                 reference range

: <=1.0.



                                                                 The reference r

klaudia was



                                                                 not used to int

erpret



                                                                 this result as



                                                                 normal/abnormal

.



[facility] Mansfie[id number] 10:27:00





             Test Item    Value        Reference Range Interpretation Comments

 

             MCH (test code = MCH) 28.5 pg      27.0-31.0                 



[facility]SitrnolZWRUFYJJMZ3979-26-66 10:27:00





             Test Item    Value        Reference Range Interpretation Comments

 

             Platelet (test code = Platelet) 158          133-450               

    



[facility]fieldCgesgkwWKMARRNILY6446-38-15 10:27:00





             Test Item    Value        Reference Range Interpretation Comments

 

             MCHC (test code = MCHC) 33.6         32.0-36.0                 



[facility]fieldWprxilaKBRIEMMHCJ4029-68-61 10:27:00





             Test Item    Value        Reference Range Interpretation Comments

 

             RDW (test code = RDW) 14.1         11.5-14.5                 



[facility]fieldFkfhflgTJIQIXDHRQ8216-84-77 10:27:00





             Test Item    Value        Reference Range Interpretation Comments

 

             MCV (test code = MCV) 84.9         80.0-94.0                 



[facility]fieldBktbuwkVMIGSJPJIW7058-14-31 10:27:00





             Test Item    Value        Reference Range Interpretation Comments

 

             RBC (test code = RBC) 5.28         4.70-6.10                 



[facility]fieldOswbahpESOSHULMEL7314-89-99 10:27:00





             Test Item    Value        Reference Range Interpretation Comments

 

             Hgb (test code = Hgb) 15.0         14.0-18.0                 



[facility]fieldKmxjenaWFIXQWIIRA0659-09-47 10:27:00





             Test Item    Value        Reference Range Interpretation Comments

 

             WBC (test code = WBC) 9.4          3.7-10.4                  



[facility]fieldMbymudnAQWIYGYXHY0829-97-22 10:27:00





             Test Item    Value        Reference Range Interpretation Comments

 

             Hct (test code = Hct) 44.8         42.0-54.0                 



[facility]fieldMchkitpCVOKSJDXMZ1474-96-28 10:27:00





             Test Item    Value        Reference Range Interpretation Comments

 

             MPV (test code = MPV) 8.2          7.4-10.4                  



[facility]ATHY[facility] GLHMRCO0503-74-68 10:27:00





             Test Item    Value        Reference Range Interpretation Comments

 

             Ca Ion WB (test code = Ca Ion WB) 1.08         1.05-1.25           

      



[facility] [id number] 10:27:00





             Test Item    Value        Reference Range Interpretation Comments

 

             Ca Norm WB (test code = Ca Norm WB) 1.05         1.05-1.25         

        



[id number] 10:27[facility]





             Test Item    Value        Reference Range Interpretation Comments

 

             Phosphorus (test code = Phosphorus) 3.6          2.5-4.5           

        



[facility]CHE[facility] [id number] 10:27:00





             Test Item    Value        Reference Range Interpretation Comments

 

             Magnesium Lvl (test code = Magnesium 2.3          1.8-2.4          

         



             Lvl)                                                



[facility] HospitalPwmvqltEMHLJQIIERHK4334-33-80 10:27:00





             Test Item    Value        Reference Range Interpretation Comments

 

             AGAP (test code = AGAP) 12.6         10.0-20.0                 



[facility] HospitalRwwttjrKGILDRAKECEI6117-74-13 10:27:00





             Test Item    Value        Reference Range Interpretation Comments

 

             eGFR (test code = eGFR) 55                                     



[facility] HospitalGrgnlemRWVKGFQBQYQP6214-48-05 10:27:00





             Test Item    Value        Reference Range Interpretation Comments

 

             Calcium Lvl (test code = Calcium Lvl) 8.6          8.5-10.5        

          



[facility] HospitalKpyhjvpPUIXGFEYJNMD9071-50-05 10:27:00





             Test Item    Value        Reference Range Interpretation Comments

 

             CO2 (test code = CO2) 23           24-32                     



[facility] HospitalFpnmxuyZBTWGZTQKHOC3550-01-04 10:27:00





             Test Item    Value        Reference Range Interpretation Comments

 

             Chloride Lvl (test code = Chloride Lvl) 102                  

            



[facility] HospitalBnsydauFQQGKEUDUSLN6553-70-20 10:27:00





             Test Item    Value        Reference Range Interpretation Comments

 

             Potassium Lvl (test code = Potassium 4.6          3.5-5.1          

         



             Lvl)                                                



[facility] HospitalVsprenoKTYPPQOGDERC8446-14-71 10:27:00





             Test Item    Value        Reference Range Interpretation Comments

 

             Sodium Lvl (test code = Sodium Lvl) 133          135-145           

        



[facility] HospitalDaiagwzKYVQHEMOYVTU2158-64-79 10:27:00





             Test Item    Value        Reference Range Interpretation Comments

 

             Creatinine Lvl (test code = Creatinine 1.49         0.50-1.40      

           



             Lvl)                                                



[facility] HospitalLsaxvwlGWWVIUFLRCYX7887-75-19 10:27:00





             Test Item    Value        Reference Range Interpretation Comments

 

             BUN (test code = BUN) 26           7-22                      



[facility] HospitalWogqkuxPJTIJWQVCIQB7343-44-94 10:27:00





             Test Item    Value        Reference Range Interpretation Comments

 

             Glucose Lvl (test code = Glucose Lvl) 151          70-99           

          



[facility]fieldKmcahyhTXUICBFZPO2197-66-07 10:27:00





             Test Item    Value        Reference Range Interpretation Comments

 

             Lymphocytes # (test code = Lymphocytes 0.6          1.0-5.5        

           



             #)                                                  



[facility]fieldYaclnbeIALEYCVKJD1515-55-35 10:27:00





             Test Item    Value        Reference Range Interpretation Comments

 

             Monocytes # (test code 0.5          See_Comment                [Aut

omated message] The



             = Monocytes #)                                        system which 

generated



                                                                 this result tra

nsmitted



                                                                 reference range

: <=0.8.



                                                                 The reference r

klaudia was



                                                                 not used to int

erpret



                                                                 this result as



                                                                 normal/abnormal

.



[facility]fieldJyfbdodDJDVWJASWE1752-66-08 10:27:00





             Test Item    Value        Reference Range Interpretation Comments

 

             Segs-Bands # (test code = Segs-Bands #) 8.3          1.5-8.1       

            



[facility]fieldOtsfmcrHNISCNJOYV5756-28-83 10:27:00





             Test Item    Value        Reference Range Interpretation Comments

 

             Monocytes (test code = Monocytes) 5.1          2.0-12.0            

      



[facility]fieldAjuplgqCJNVUIHVPX4314-58-68 10:27:00





             Test Item    Value        Reference Range Interpretation Comments

 

             Segs (test code = Segs) 88.1         45.0-75.0                 



[facility]fieldEkscifvZJZKXTPMWA3185-21-08 10:27:00





             Test Item    Value        Reference Range Interpretation Comments

 

             Lymphocytes (test code = Lymphocytes) 6.6          20.0-40.0       

          



[facility]fieldZzkggkaIVKRENENZU5105-84-61 10:27:00





             Test Item    Value        Reference Range Interpretation Comments

 

             Basophils (test code = 0.2          See_Comment                [Aut

omated message] The



             Basophils)                                          system which ge

nerated



                                                                 this result tra

nsmitted



                                                                 reference range

: <=1.0.



                                                                 The reference r

klaudia was



                                                                 not used to int

erpret



                                                                 this result as



                                                                 normal/abnormal

.



[facility]fieldAehnksmBLXIYZBQOK4398-87-53 10:27:00





             Test Item    Value        Reference Range Interpretation Comments

 

             MCH (test code = MCH) 28.5 pg      27.0-31.0                 



[facility]fieldHpeffxqTTDYVMRGJX9952-78-73 10:27:00





             Test Item    Value        Reference Range Interpretation Comments

 

             Platelet (test code = Platelet) 158          133-450               

    



[facility]fieldHyrwxazDKNTKZSWCB8208-06-47 10:27:00





             Test Item    Value        Reference Range Interpretation Comments

 

             MCHC (test code = MCHC) 33.6         32.0-36.0                 



[facility]fieldFdjhifqRNTLAPDMDV0364-94-37 10:27:00





             Test Item    Value        Reference Range Interpretation Comments

 

             RDW (test code = RDW) 14.1         11.5-14.5                 



[facility]fieldZobaucyCFOLBIEOPI1625-92-42 10:27:00





             Test Item    Value        Reference Range Interpretation Comments

 

             MCV (test code = MCV) 84.9         80.0-94.0                 



[facility]fieldJdinphaHVAMVDFTVT3005-51-76 10:27:00





             Test Item    Value        Reference Range Interpretation Comments

 

             RBC (test code = RBC) 5.28         4.70-6.10                 



[facility]fieldYsehlsrSACWUWZJYG4478-80-46 10:27:00





             Test Item    Value        Reference Range Interpretation Comments

 

             Hgb (test code = Hgb) 15.0         14.0-18.0                 



[facility]fieldKumjnbmCKRUJYLJIX5747-83-09 10:27:00





             Test Item    Value        Reference Range Interpretation Comments

 

             WBC (test code = WBC) 9.4          3.7-10.4                  



[facility]fieldJmurejoHQQRQGOBSU2294-01-62 10:27:00





             Test Item    Value        Reference Range Interpretation Comments

 

             Hct (test code = Hct) 44.8         42.0-54.0                 



[facility]fieldLflpmjbXQABBBMVIJ1777-59-17 10:27:00





             Test Item    Value        Reference Range Interpretation Comments

 

             MPV (test code = MPV) 8.2          7.4-10.4                  



[facility] [id number]





             Test Item    Value        Reference Range Interpretation Comments

 

             Ca Ion WB (test code = Ca Ion WB) 1.08         1.05-1.25           

      



[facility] NCDACCE1106-29-29 10:27:00





             Test Item    Value        Reference Range Interpretation Comments

 

             Ca Norm WB (test code = Ca Norm WB) 1.05         1.05-1.25         

        



[facility]CHE[facility] [id number] 10:27:00





             Test Item    Value        Reference Range Interpretation Comments

 

             Phosphorus (test code = Phosphorus) 3.6          2.5-4.5           

        



[facility]CHE[facility] QRIAP5760-05-69 10:27:00





             Test Item    Value        Reference Range Interpretation Comments

 

             Magnesium Lvl (test code = Magnesium 2.3          1.8-2.4          

         



             Lvl)                                                



[facility] HospitalUzzmfryVZPOMAQFQMEX0419-11-07 10:27:00





             Test Item    Value        Reference Range Interpretation Comments

 

             AGAP (test code = AGAP) 12.6         10.0-20.0                 



[facility] HospitalSinfcfnZOXTROKBSDGW1452-75-63 10:27:00





             Test Item    Value        Reference Range Interpretation Comments

 

             eGFR (test code = eGFR) 55                                     



[facility] HospitalAswfyifKJEATHVKHPGA7781-78-22 10:27:00





             Test Item    Value        Reference Range Interpretation Comments

 

             Calcium Lvl (test code = Calcium Lvl) 8.6          8.5-10.5        

          



[facility] HospitalNegovsdSBCFQQNOCGQU3191-46-73 10:27:00





             Test Item    Value        Reference Range Interpretation Comments

 

             CO2 (test code = CO2) 23           24-32                     



[facility] HospitalCvjaszvYPHOVEFBRCCY7625-36-32 10:27:00





             Test Item    Value        Reference Range Interpretation Comments

 

             Chloride Lvl (test code = Chloride Lvl) 102                  

            



[facility] HospitalMnokpooGJXTHNBFPYLJ9787-58-07 10:27:00





             Test Item    Value        Reference Range Interpretation Comments

 

             Potassium Lvl (test code = Potassium 4.6          3.5-5.1          

         



             Lvl)                                                



[facility] HospitalQnetbbzXAQQAMNUVPNU6454-13-23 10:27:00





             Test Item    Value        Reference Range Interpretation Comments

 

             Sodium Lvl (test code = Sodium Lvl) 133          135-145           

        



[facility] HospitalSnjzafaVYHENGHKHWVJ0396-84-81 10:27:00





             Test Item    Value        Reference Range Interpretation Comments

 

             Creatinine Lvl (test code = Creatinine 1.49         0.50-1.40      

           



             Lvl)                                                



[facility] HospitalMwcnfztTCEOOOLCTKDG9385-13-40 10:27:00





             Test Item    Value        Reference Range Interpretation Comments

 

             BUN (test code = BUN) 26           7-22                      



[facility] HospitalMkikbuhPUYFQLUMVIWR2350-13-66 10:27:00





             Test Item    Value        Reference Range Interpretation Comments

 

             Glucose Lvl (test code = Glucose Lvl) 151          70-99           

          



[facility]fieldTprlrvvHFMIJPZMVH9342-26-01 10:27:00





             Test Item    Value        Reference Range Interpretation Comments

 

             Lymphocytes # (test code = Lymphocytes 0.6          1.0-5.5        

           



             #)                                                  



[facility]fieldGhkzfhgMDCWGZQKAS9132-45-42 10:27:00





             Test Item    Value        Reference Range Interpretation Comments

 

             Monocytes # (test code 0.5          See_Comment                [Aut

omated message] The



             = Monocytes #)                                        system which 

generated



                                                                 this result tra

nsmitted



                                                                 reference range

: <=0.8.



                                                                 The reference r

klaudia was



                                                                 not used to int

erpret



                                                                 this result as



                                                                 normal/abnormal

.



[facility]fieldOsbgwviFCPAHQAXBU6982-39-19 10:27:00





             Test Item    Value        Reference Range Interpretation Comments

 

             Segs-Bands # (test code = Segs-Bands #) 8.3          1.5-8.1       

            



[facility]fieldKdgtcimAQGKNGGQLO4075-16-88 10:27:00





             Test Item    Value        Reference Range Interpretation Comments

 

             Monocytes (test code = Monocytes) 5.1          2.0-12.0            

      



[facility]fieldBgrljfwBUUDLPTFCI0447-51-72 10:27:00





             Test Item    Value        Reference Range Interpretation Comments

 

             Segs (test code = Segs) 88.1         45.0-75.0                 



[facility]fieldCktokqpOXKOTHIVBC7572-51-39 10:27:00





             Test Item    Value        Reference Range Interpretation Comments

 

             Lymphocytes (test code = Lymphocytes) 6.6          20.0-40.0       

          



[facility]fieldQslkbthXLTYYCRYHT2077-21-60 10:27:00





             Test Item    Value        Reference Range Interpretation Comments

 

             Basophils (test code = 0.2          See_Comment                [Aut

omated message] The



             Basophils)                                          system which ge

nerated



                                                                 this result tra

nsmitted



                                                                 reference range

: <=1.0.



                                                                 The reference r

klaudia was



                                                                 not used to int

erpret



                                                                 this result as



                                                                 normal/abnormal

.



[facility]fieldToaicnkROBFBMAHVJ7711-64-73 10:27:00





             Test Item    Value        Reference Range Interpretation Comments

 

             MCH (test code = MCH) 28.5 pg      27.0-31.0                 



[facility]fieldGqikuxeZXDSPDMMMJ0363-39-41 10:27:00





             Test Item    Value        Reference Range Interpretation Comments

 

             Platelet (test code = Platelet) 158          133-450               

    



[facility]fieldRbdzzriOICDLOXWOF5232-68-00 10:27:00





             Test Item    Value        Reference Range Interpretation Comments

 

             MCHC (test code = MCHC) 33.6         32.0-36.0                 



[facility]fieldLyteriuZIKZDWGPJI8939-65-14 10:27:00





             Test Item    Value        Reference Range Interpretation Comments

 

             RDW (test code = RDW) 14.1         11.5-14.5                 



[facility]fieldVwipiimWAYWONZLHW1788-11-18 10:27:00





             Test Item    Value        Reference Range Interpretation Comments

 

             MCV (test code = MCV) 84.9         80.0-94.0                 



[facility]fieldDsvwqnmEEKXKLNUOV2682-23-22 10:27:00





             Test Item    Value        Reference Range Interpretation Comments

 

             RBC (test code = RBC) 5.28         4.70-6.10                 



[facility]fieldOejecztBGNWJDCMXH5369-85-80 10:27:00





             Test Item    Value        Reference Range Interpretation Comments

 

             Hgb (test code = Hgb) 15.0         14.0-18.0                 



[facility]fieldFxrxzzaXJHVEIPSQU7972-36-07 10:27:00





             Test Item    Value        Reference Range Interpretation Comments

 

             WBC (test code = WBC) 9.4          3.7-10.4                  



[facility]fieldFmeunfdEDIRGTIQYS4586-42-39 10:27:00





             Test Item    Value        Reference Range Interpretation Comments

 

             Hct (test code = Hct) 44.8         42.0-54.0                 



[facility]fieldKvfzvboALGNMUFSQN4492-74-06 10:27:00





             Test Item    Value        Reference Range Interpretation Comments

 

             MPV (test code = MPV) 8.2          7.4-10.4                  



[facility] ZHTJQWI9471-65-91 10:27:00





             Test Item    Value        Reference Range Interpretation Comments

 

             Ca Ion WB (test code = Ca Ion WB) 1.08         1.05-1.25           

      



[facility]2017-01-26 10:27:00





             Test Item    Value        Reference Range Interpretation Comments

 

             Ca Norm WB (test code = Ca Norm WB) 1.05         1.05-1.25         

        



[facility] YQBSCRH6535-52-04 16:09:00





             Test Item    Value        Reference Range Interpretation Comments

 

             Antibody Scrn (test Negative (17                           



             code = Antibody Scrn) 10:09 AM)                              



[employer] ZBDTFZL9422-37-44 16:09:00





             Test Item    Value        Reference Range Interpretation Comments

 

             ABO/Rh (test code = ABO/Rh) O NEG                                  



[facility] ACJQGTB9255-88-69 16:09:00





             Test Item    Value        Reference Range Interpretation Comments

 

             Antibody Scrn (test Negative (17                           



             code = Antibody Scrn) 10:09 AM)                              



[facility] [id number] 16:09:00





             Test Item    Value        Reference Range Interpretation Comments

 

             ABO/Rh (test code = ABO/Rh) O NEG                                  



[facility] QLGVQZD4989-39-36 16:09:00





             Test Item    Value        Reference Range Interpretation Comments

 

             Antibody Scrn (test Negative (17                           



             code = Antibody Scrn) 10:09 AM)                              



[facility] ACWGVJZ7215-68-28 16:09:00





             Test Item    Value        Reference Range Interpretation Comments

 

             ABO/Rh (test code = ABO/Rh) O NEG                                  



[employer] [facility] [id number] 19:30:00





             Test Item    Value        Reference Range Interpretation Comments

 

             A/G Ratio (test code = A/G Ratio) 1.6          0.7-1.6             

      



[facility]2017-01-17 19:30:00





             Test Item    Value        Reference Range Interpretation Comments

 

             Globulin (test code = Globulin) 2.6          2.7-4.2               

    



[facility]2017-01-17 19:30:00





             Test Item    Value        Reference Range Interpretation Comments

 

             B/C Ratio (test code = B/C Ratio) 20           6-25                

      



[employer] [facility] [id number] 19:30:00





             Test Item    Value        Reference Range Interpretation Comments

 

             AGAP (test code = AGAP) 15.4         10.0-20.0                 



[facility]2017-01-17 19:30:00





             Test Item    Value        Reference Range Interpretation Comments

 

             eGFR (test code = eGFR) 67                                     



[facility] [employer] [id number] 19:30:00





             Test Item    Value        Reference Range Interpretation Comments

 

             CO2 (test code = CO2) 24           24-32                     



[facility]2017-01-17 19:30:00





             Test Item    Value        Reference Range Interpretation Comments

 

             Calcium Lvl (test code = Calcium Lvl) 8.8          8.5-10.5        

          



[place]4567-01-17 19:30:00





             Test Item    Value        Reference Range Interpretation Comments

 

             Total Protein (test code = Total 6.8          6.4-8.4              

     



             Protein)                                            



[facility]2017-01-17 19:30:00





             Test Item    Value        Reference Range Interpretation Comments

 

             ALT (test code = ALT) 25           See_Comment                [Auto

mated message] The



                                                                 system which ge

nerated this



                                                                 result transmit

phoebe



                                                                 reference range

: <=65. The



                                                                 reference range

 was not



                                                                 used to interpr

et this



                                                                 result as maame

l/abnormal.



[facility]2017-01-17 19:30:00





             Test Item    Value        Reference Range Interpretation Comments

 

             Albumin Lvl (test code = Albumin Lvl) 4.2          3.5-5.0         

          



[facility]2017-01-17 19:30:00





             Test Item    Value        Reference Range Interpretation Comments

 

             AST (test code = AST) 12           See_Comment                [Auto

mated message] The



                                                                 system which ge

nerated this



                                                                 result transmit

phoebe



                                                                 reference range

: <=37. The



                                                                 reference range

 was not



                                                                 used to interpr

et this



                                                                 result as maame

l/abnormal.



[facility]2017-01-17 19:30:00





             Test Item    Value        Reference Range Interpretation Comments

 

             Alk Phos (test code = Alk Phos) 83                           

    



[facility]2017-01-17 19:30:00





             Test Item    Value        Reference Range Interpretation Comments

 

             Chloride Lvl (test code = Chloride Lvl) 106                  

            



[facility]2017-01-17 19:30:00





             Test Item    Value        Reference Range Interpretation Comments

 

             Bili Total (test code = Bili Total) 0.5          0.2-1.3           

        



[facility]2017-01-17 19:30:00





             Test Item    Value        Reference Range Interpretation Comments

 

             Potassium Lvl (test code = Potassium 4.4          3.5-5.1          

         



             Lvl)                                                



[facility]2017-01-17 19:30:00





             Test Item    Value        Reference Range Interpretation Comments

 

             Sodium Lvl (test code = Sodium Lvl) 141          135-145           

        



[facility]2017-01-17 19:30:00





             Test Item    Value        Reference Range Interpretation Comments

 

             Creatinine Lvl (test code = Creatinine 1.26         0.50-1.40      

           



             Lvl)                                                



[facility]2017-01-17 19:30:00





             Test Item    Value        Reference Range Interpretation Comments

 

             BUN (test code = BUN) 25           7-22                      



[facility]2017-01-17 19:30:00





             Test Item    Value        Reference Range Interpretation Comments

 

             Glucose Lvl (test code = Glucose Lvl) 91           70-99           

          



[facility]UED7150[facility]07 [facility]:30:00





             Test Item    Value        Reference Range Interpretation Comments

 

             RDW (test code = RDW) 14.2         11.5-14.5                 



[facility]QqusidkCTBNXCCIFJ9636-79-41 19:30:00





             Test Item    Value        Reference Range Interpretation Comments

 

             Hct (test code = Hct) 45.7         42.0-54.0                 



[facility]fieldTiifycyNSBHWHMOGV1896-00-99 19:30:00





             Test Item    Value        Reference Range Interpretation Comments

 

             Platelet (test code = Platelet) 149          133-450               

    



[facility]HefauauZYQYFKCVRO2141-49-50 19:30:00





             Test Item    Value        Reference Range Interpretation Comments

 

             MPV (test code = MPV) 8.4          7.4-10.4                  



[facility]fieldMpswrsgMKKSPITQGO9624-84-81 19:30:00





             Test Item    Value        Reference Range Interpretation Comments

 

             MCV (test code = MCV) 82.9         80.0-94.0                 



[facility]fieldUpyxsczYIXBXMZRZD8877-50-93 19:30:00





             Test Item    Value        Reference Range Interpretation Comments

 

             MCHC (test code = MCHC) 34.2         32.0-36.0                 



[facility]fieldKazqhfzOUXEVCRXKL7972-38-07 19:30:00





             Test Item    Value        Reference Range Interpretation Comments

 

             MCH (test code = MCH) 28.3 pg      27.0-31.0                 



[facility]fieldNqzfxiyPDROIVLYNG8608-84-03 19:30:00





             Test Item    Value        Reference Range Interpretation Comments

 

             WBC (test code = WBC) 6.1          3.7-10.4                  



[facility]fieldSdzcutuJXPFRVNJSF5908-35-87 19:30:00





             Test Item    Value        Reference Range Interpretation Comments

 

             Hgb (test code = Hgb) 15.6         14.0-18.0                 



[facility]fieldXbzeufyNUCHPJPHXV5985-87-81 19:30:00





             Test Item    Value        Reference Range Interpretation Comments

 

             RBC (test code = RBC) 5.52         4.70-6.10                 



[facility]fieldPoqehqyPIVXDCPCFL6963-69-82 19:30:00





             Test Item    Value        Reference Range Interpretation Comments

 

             Basophils (test code = 0.7          See_Comment                [Aut

omated message] The



             Basophils)                                          system which ge

nerated



                                                                 this result tra

nsmitted



                                                                 reference range

: <=1.0.



                                                                 The reference r

klaudia was



                                                                 not used to int

erpret



                                                                 this result as



                                                                 normal/abnormal

.



[facility]fieldJhxyapgIFYRSUQCTB3811-72-02 19:30:00





             Test Item    Value        Reference Range Interpretation Comments

 

             Segs-Bands # (test code = Segs-Bands #) 4.4          1.5-8.1       

            



[facility]fieldVfixygpBGCKQELHTA8330-44-90 19:30:00





             Test Item    Value        Reference Range Interpretation Comments

 

             Monocytes # (test code 0.6          See_Comment                [Aut

omated message] The



             = Monocytes #)                                        system which 

generated



                                                                 this result tra

nsmitted



                                                                 reference range

: <=0.8.



                                                                 The reference r

klaudia was



                                                                 not used to int

erpret



                                                                 this result as



                                                                 normal/abnormal

.



[facility]fieldBompliqGSDPPEKEVV2200-31-59 19:30:00





             Test Item    Value        Reference Range Interpretation Comments

 

             Lymphocytes # (test code = Lymphocytes 1.1          1.0-5.5        

           



             #)                                                  



[facility]fie[id number] 19:30:00





             Test Item    Value        Reference Range Interpretation Comments

 

             Eosinophils # (test code 0.1          See_Comment                [A

utomated message] The



             = Eosinophils #)                                        system whic

h generated



                                                                 this result tra

nsmitted



                                                                 reference range

: <=0.5.



                                                                 The reference r

klaudia was



                                                                 not used to int

erpret



                                                                 this result as



                                                                 normal/abnormal

.



[facility]fieldYleyzfjZLYZWTEDID8849-79-88 19:30:00





             Test Item    Value        Reference Range Interpretation Comments

 

             Eosinophils (test code = 0.9          See_Comment                [A

utomated message] The



             Eosinophils)                                        system which ge

nerated



                                                                 this result tra

nsmitted



                                                                 reference range

: <=4.0.



                                                                 The reference r

klaudia was



                                                                 not used to int

erpret



                                                                 this result as



                                                                 normal/abnormal

.



[facility]fieldTcrpzzvMZPMXQJCTP5767-90-09 19:30:00





             Test Item    Value        Reference Range Interpretation Comments

 

             Lymphocytes (test code = Lymphocytes) 17.6         20.0-40.0       

          



[facility]fieldLlepzaxKDZDHVHXCN8267-66-28 19:30:00





             Test Item    Value        Reference Range Interpretation Comments

 

             Monocytes (test code = Monocytes) 9.3          2.0-12.0            

      



[facility]fieldMwapoiuXZPOZRQEEU8888-69-19 19:30:00





             Test Item    Value        Reference Range Interpretation Comments

 

             Segs (test code = Segs) 71.5         45.0-75.0                 



[facility] ENCLUYVDY5083-26-08 19:30:00





             Test Item    Value        Reference Range Interpretation Comments

 

             Hgb A1C (test code = Hgb A1C) 5.3                                  

  



[facility]2017-01-17 19:30:00





             Test Item    Value        Reference Range Interpretation Comments

 

             A/G Ratio (test code = A/G Ratio) 1.6          0.7-1.6             

      



[facility]2017-01-17 19:30:00





             Test Item    Value        Reference Range Interpretation Comments

 

             Globulin (test code = Globulin) 2.6          2.7-4.2               

    



[facility]2017-01-17 19:30:00





             Test Item    Value        Reference Range Interpretation Comments

 

             B/C Ratio (test code = B/C Ratio) 20           6-25                

      



[facility]2017-01-17 19:30:00





             Test Item    Value        Reference Range Interpretation Comments

 

             AGAP (test code = AGAP) 15.4         10.0-20.0                 



[facility]2017-01-17 19:30:00





             Test Item    Value        Reference Range Interpretation Comments

 

             eGFR (test code = eGFR) 67                                     



[facility]2017-01-17 19:30:00





             Test Item    Value        Reference Range Interpretation Comments

 

             CO2 (test code = CO2) 24           24-32                     



[facility]2017-01-17 19:30:00





             Test Item    Value        Reference Range Interpretation Comments

 

             Calcium Lvl (test code = Calcium Lvl) 8.8          8.5-10.5        

          



[facility]2017-01-17 19:30:00





             Test Item    Value        Reference Range Interpretation Comments

 

             Total Protein (test code = Total 6.8          6.4-8.4              

     



             Protein)                                            



[facility]2017-01-17 19:30:00





             Test Item    Value        Reference Range Interpretation Comments

 

             ALT (test code = ALT) 25           See_Comment                [Auto

mated message] The



                                                                 system which ge

nerated this



                                                                 result transmit

phoebe



                                                                 reference range

: <=65. The



                                                                 reference range

 was not



                                                                 used to interpr

et this



                                                                 result as maame

l/abnormal.



[facility]2017-01-17 19:30:00





             Test Item    Value        Reference Range Interpretation Comments

 

             Albumin Lvl (test code = Albumin Lvl) 4.2          3.5-5.0         

          



[facility]2017-01-17 19:30:00





             Test Item    Value        Reference Range Interpretation Comments

 

             AST (test code = AST) 12           See_Comment                [Auto

mated message] The



                                                                 system which ge

nerated this



                                                                 result transmit

phoebe



                                                                 reference range

: <=37. The



                                                                 reference range

 was not



                                                                 used to interpr

et this



                                                                 result as maame

l/abnormal.



[facility]2017-01-17 19:30:00





             Test Item    Value        Reference Range Interpretation Comments

 

             Alk Phos (test code = Alk Phos) 83                           

    



[facility]2017-01-17 19:30:00





             Test Item    Value        Reference Range Interpretation Comments

 

             Chloride Lvl (test code = Chloride Lvl) 106                  

            



[facility]2017-01-17 19:30:00





             Test Item    Value        Reference Range Interpretation Comments

 

             Bili Total (test code = Bili Total) 0.5          0.2-1.3           

        



[facility]2017-01-17 19:30:00





             Test Item    Value        Reference Range Interpretation Comments

 

             Potassium Lvl (test code = Potassium 4.4          3.5-5.1          

         



             Lvl)                                                



[facility]2017-01-17 19:30:00





             Test Item    Value        Reference Range Interpretation Comments

 

             Sodium Lvl (test code = Sodium Lvl) 141          135-145           

        



[facility]2017-01-17 19:30:00





             Test Item    Value        Reference Range Interpretation Comments

 

             Creatinine Lvl (test code = Creatinine 1.26         0.50-1.40      

           



             Lvl)                                                



[facility]2017-01-17 19:30:00





             Test Item    Value        Reference Range Interpretation Comments

 

             BUN (test code = BUN) 25           7-22                      



[facility]2017-01-17 19:30:00





             Test Item    Value        Reference Range Interpretation Comments

 

             Glucose Lvl (test code = Glucose Lvl) 91           70-99           

          



[facility][id number] 19:30:00





             Test Item    Value        Reference Range Interpretation Comments

 

             RDW (test code = RDW) 14.2         11.5-14.5                 



[facility]fieldGjupusnEFWGPWAGKL1299-57-22 19:30:00





             Test Item    Value        Reference Range Interpretation Comments

 

             Hct (test code = Hct) 45.7         42.0-54.0                 



[facility]fieldVnxdbslPWVDODVSTT1350-88-37 19:30:00





             Test Item    Value        Reference Range Interpretation Comments

 

             Platelet (test code = Platelet) 149          133-450               

    



[facility]fieldDdqnzshINBIDUOGYW4070-89-59 19:30:00





             Test Item    Value        Reference Range Interpretation Comments

 

             MPV (test code = MPV) 8.4          7.4-10.4                  



[facility]fieldQgzcnedAXEUXMLVWH4229-56-75 19:30:00





             Test Item    Value        Reference Range Interpretation Comments

 

             MCV (test code = MCV) 82.9         80.0-94.0                 



[facility]fieldInghpzgVOUVGCQJYJ1545-00-59 19:30:00





             Test Item    Value        Reference Range Interpretation Comments

 

             MCHC (test code = MCHC) 34.2         32.0-36.0                 



[facility]fieldHqrqzlpIBYZWMOINH0205-67-79 19:30:00





             Test Item    Value        Reference Range Interpretation Comments

 

             MCH (test code = MCH) 28.3 pg      27.0-31.0                 



[facility]fieldWckieiyQXLIVWFDXV3288-74-57 19:30:00





             Test Item    Value        Reference Range Interpretation Comments

 

             WBC (test code = WBC) 6.1          3.7-10.4                  



[facility]fieldSdvroanPJVHDROIXK7215-53-41 19:30:00





             Test Item    Value        Reference Range Interpretation Comments

 

             Hgb (test code = Hgb) 15.6         14.0-18.0                 



[facility]fieldSgkkkjhABKIXCIWEV8364-91-42 19:30:00





             Test Item    Value        Reference Range Interpretation Comments

 

             RBC (test code = RBC) 5.52         4.70-6.10                 



[facility]fieldRbqqdvlFISIVFJEDK9693-51-30 19:30:00





             Test Item    Value        Reference Range Interpretation Comments

 

             Basophils (test code = 0.7          See_Comment                [Aut

omated message] The



             Basophils)                                          system which ge

nerated



                                                                 this result tra

nsmitted



                                                                 reference range

: <=1.0.



                                                                 The reference r

klaudia was



                                                                 not used to int

erpret



                                                                 this result as



                                                                 normal/abnormal

.



[facility]fieldLmkkbfqJTHWWMITMV5871-69-97 19:30:00





             Test Item    Value        Reference Range Interpretation Comments

 

             Segs-Bands # (test code = Segs-Bands #) 4.4          1.5-8.1       

            



[facility]fieldVbfuwjdJGNQZVXOZG3436-86-46 19:30:00





             Test Item    Value        Reference Range Interpretation Comments

 

             Monocytes # (test code 0.6          See_Comment                [Aut

omated message] The



             = Monocytes #)                                        system which 

generated



                                                                 this result tra

nsmitted



                                                                 reference range

: <=0.8.



                                                                 The reference r

klaudia was



                                                                 not used to int

erpret



                                                                 this result as



                                                                 normal/abnormal

.



[facility]fieldOkxuwtvBOFWJSHHVT0039-46-51 19:30:00





             Test Item    Value        Reference Range Interpretation Comments

 

             Lymphocytes # (test code = Lymphocytes 1.1          1.0-5.5        

           



             #)                                                  



[facility]dn[facility]tzJWVOPRUESA4496[facility]65-69 [facility]





             Test Item    Value        Reference Range Interpretation Comments

 

             Eosinophils # (test code 0.1          See_Comment                [A

utomated message] The



             = Eosinophils #)                                        system whic

h generated



                                                                 this result tra

nsmitted



                                                                 reference range

: <=0.5.



                                                                 The reference r

klaudia was



                                                                 not used to int

erpret



                                                                 this result as



                                                                 normal/abnormal

.



[facility]GTCUY[facility]XS8388[facility]





             Test Item    Value        Reference Range Interpretation Comments

 

             Eosinophils (test code = 0.9          See_Comment                [A

utomated message] The



             Eosinophils)                                        system which ge

nerated



                                                                 this result tra

nsmitted



                                                                 reference range

: <=4.0.



                                                                 The reference r

klaudia was



                                                                 not used to int

erpret



                                                                 this result as



                                                                 normal/abnormal

.



[facility]fieldHzyepgoGOBSDRBQLW1716-02-25 19:30:00





             Test Item    Value        Reference Range Interpretation Comments

 

             Lymphocytes (test code = Lymphocytes) 17.6         20.0-40.0       

          



[facility]OwtsfxfPBGNZBISFI3805-70-46 19:30:00





             Test Item    Value        Reference Range Interpretation Comments

 

             Monocytes (test code = Monocytes) 9.3          2.0-12.0            

      



[facility] CenterUiqsgflRLWOJBHFXC2165-16-13 19:30:00





             Test Item    Value        Reference Range Interpretation Comments

 

             Segs (test code = Segs) 71.5         45.0-75.0                 



[id number]:00





             Test Item    Value        Reference Range Interpretation Comments

 

             Hgb A1C (test code = Hgb A1C) 5.3                                  

  



[facility] [place] [facility] CenterannCHEM [id number] 19:30:00





             Test Item    Value        Reference Range Interpretation Comments

 

             A/G Ratio (test code = A/G Ratio) 1.6          0.7-1.6             

      



[facility] [place] [facility]annCHEM [id number] 19:30:00





             Test Item    Value        Reference Range Interpretation Comments

 

             Globulin (test code = Globulin) 2.6          2.7-4.2               

    



[place] [id number]30[facility]00





             Test Item    Value        Reference Range Interpretation Comments

 

             B/C Ratio (test code = B/C Ratio) 20           6-25                

      



[id number]00





             Test Item    Value        Reference Range Interpretation Comments

 

             AGAP (test code = AGAP) 15.4         10.0-20.0                 



[facility]2017-01-17 19:30:00





             Test Item    Value        Reference Range Interpretation Comments

 

             eGFR (test code = eGFR) 67                                     



[facility]2017-01-17 19:30:00





             Test Item    Value        Reference Range Interpretation Comments

 

             CO2 (test code = CO2) 24           24-32                     



[facility]2017-01-17 19:30:00





             Test Item    Value        Reference Range Interpretation Comments

 

             Calcium Lvl (test code = Calcium Lvl) 8.8          8.5-10.5        

          



[facility]2017-01-17 19:30:00





             Test Item    Value        Reference Range Interpretation Comments

 

             Total Protein (test code = Total 6.8          6.4-8.4              

     



             Protein)                                            



[facility]2017-01-17 19:30:00





             Test Item    Value        Reference Range Interpretation Comments

 

             ALT (test code = ALT) 25           See_Comment                [Auto

mated message] The



                                                                 system which ge

nerated this



                                                                 result transmit

phoebe



                                                                 reference range

: <=65. The



                                                                 reference range

 was not



                                                                 used to interpr

et this



                                                                 result as maame

l/abnormal.



[facility]2017-01-17 19:30:00





             Test Item    Value        Reference Range Interpretation Comments

 

             Albumin Lvl (test code = Albumin Lvl) 4.2          3.5-5.0         

          



[facility]2017-01-17 19:30:00





             Test Item    Value        Reference Range Interpretation Comments

 

             AST (test code = AST) 12           See_Comment                [Auto

mated message] The



                                                                 system which ge

nerated this



                                                                 result transmit

phoebe



                                                                 reference range

: <=37. The



                                                                 reference range

 was not



                                                                 used to interpr

et this



                                                                 result as maame

l/abnormal.



[facility]2017-01-17 19:30:00





             Test Item    Value        Reference Range Interpretation Comments

 

             Alk Phos (test code = Alk Phos) 83                           

    



[facility]2017-01-17 19:30:00





             Test Item    Value        Reference Range Interpretation Comments

 

             Chloride Lvl (test code = Chloride Lvl) 106                  

            



[facility]2017-01-17 19:30:00





             Test Item    Value        Reference Range Interpretation Comments

 

             Bili Total (test code = Bili Total) 0.5          0.2-1.3           

        



[facility]2017-01-17 19:30:00





             Test Item    Value        Reference Range Interpretation Comments

 

             Potassium Lvl (test code = Potassium 4.4          3.5-5.1          

         



             Lvl)                                                



[facility]2017-01-17 19:30:00





             Test Item    Value        Reference Range Interpretation Comments

 

             Sodium Lvl (test code = Sodium Lvl) 141          135-145           

        



[place] [id number] 19:30:00





             Test Item    Value        Reference Range Interpretation Comments

 

             Creatinine Lvl (test code = Creatinine 1.26         0.50-1.40      

           



             Lvl)                                                



[facility]2017-01-17 19:30:00





             Test Item    Value        Reference Range Interpretation Comments

 

             BUN (test code = BUN) 25           7-22                      



[facility]2017-01-17 19:30:00





             Test Item    Value        Reference Range Interpretation Comments

 

             Glucose Lvl (test code = Glucose Lvl) 91           70-99           

          



[facility]





             Test Item    Value        Reference Range Interpretation Comments

 

             RDW (test code = RDW) 14.2         11.5-14.5                 



[id number] 19:30:00





             Test Item    Value        Reference Range Interpretation Comments

 

             Hct (test code = Hct) 45.7         42.0-54.0                 



[id number] 19:30:00





             Test Item    Value        Reference Range Interpretation Comments

 

             Platelet (test code = Platelet) 149          133-450               

    



[facility]WlemrlnGRTZLBUINI0642-37-00 19:30:00





             Test Item    Value        Reference Range Interpretation Comments

 

             MPV (test code = MPV) 8.4          7.4-10.4                  



[id number] 19:30:00





             Test Item    Value        Reference Range Interpretation Comments

 

             MCV (test code = MCV) 82.9         80.0-94.0                 



[facility]fieldYgnhcujCHWXOJJHDM5408-37-13 19:30:00





             Test Item    Value        Reference Range Interpretation Comments

 

             MCHC (test code = MCHC) 34.2         32.0-36.0                 



[facility]fieldQizerdwOAVEZBPFTM5673-18-64 19:30:00





             Test Item    Value        Reference Range Interpretation Comments

 

             MCH (test code = MCH) 28.3 pg      27.0-31.0                 



[facility]NnfcdpyLBSEVVUEWP0363-20-30 19:30:00





             Test Item    Value        Reference Range Interpretation Comments

 

             WBC (test code = WBC) 6.1          3.7-10.4                  



[facility]fieldZailxahSEUQDTEZGA2941-26-10 19:30:00





             Test Item    Value        Reference Range Interpretation Comments

 

             Hgb (test code = Hgb) 15.6         14.0-18.0                 



[facility]fieldJphluufPUNPSHEWWF5474-01-11 19:30:00





             Test Item    Value        Reference Range Interpretation Comments

 

             RBC (test code = RBC) 5.52         4.70-6.10                 



[facility]OcpyugjHLNJPUSDOW8514-80-42 19:30:00





             Test Item    Value        Reference Range Interpretation Comments

 

             Basophils (test code = 0.7          See_Comment                [Aut

omated message] The



             Basophils)                                          system which ge

nerated



                                                                 this result tra

nsmitted



                                                                 reference range

: <=1.0.



                                                                 The reference r

klaudia was



                                                                 not used to int

erpret



                                                                 this result as



                                                                 normal/abnormal

.



[facility]fieldBdcughtHVJPIYLCAL1247-03-47 19:30:00





             Test Item    Value        Reference Range Interpretation Comments

 

             Segs-Bands # (test code = Segs-Bands #) 4.4          1.5-8.1       

            



[id number] 19:30:00





             Test Item    Value        Reference Range Interpretation Comments

 

             Monocytes # (test code 0.6          See_Comment                [Aut

omated message] The



             = Monocytes #)                                        system which 

generated



                                                                 this result tra

nsmitted



                                                                 reference range

: <=0.8.



                                                                 The reference r

klaudia was



                                                                 not used to int

erpret



                                                                 this result as



                                                                 normal/abnormal

.



[id number] 19:30:00





             Test Item    Value        Reference Range Interpretation Comments

 

             Lymphocytes # (test code = Lymphocytes 1.1          1.0-5.5        

           



             #)                                                  



[id number] 19:30:00





             Test Item    Value        Reference Range Interpretation Comments

 

             Eosinophils # (test code 0.1          See_Comment                [A

utomated message] The



             = Eosinophils #)                                        system whic

h generated



                                                                 this result tra

nsmitted



                                                                 reference range

: <=0.5.



                                                                 The reference r

klaudia was



                                                                 not used to int

erpret



                                                                 this result as



                                                                 normal/abnormal

.



[facility]fieldRqqswotCVNJDSPBJO2698-59-59 19:30:00





             Test Item    Value        Reference Range Interpretation Comments

 

             Eosinophils (test code = 0.9          See_Comment                [A

utomated message] The



             Eosinophils)                                        system which ge

nerated



                                                                 this result tra

nsmitted



                                                                 reference range

: <=4.0.



                                                                 The reference r

klaudia was



                                                                 not used to int

erpret



                                                                 this result as



                                                                 normal/abnormal

.



[facility]





             Test Item    Value        Reference Range Interpretation Comments

 

             Lymphocytes (test code = Lymphocytes) 17.6         20.0-40.0       

          



[facility] HospitalChcsjvkSGOXREACTW4445-21-94 19:30:00





             Test Item    Value        Reference Range Interpretation Comments

 

             Monocytes (test code = Monocytes) 9.3          2.0-12.0            

      



[facility] HospitalKcctoplGMUJWCJTIE4488-19-25 19:30:00





             Test Item    Value        Reference Range Interpretation Comments

 

             Segs (test code = Segs) 71.5         45.0-75.0                 



[facility] [id number] 19:30:00





             Test Item    Value        Reference Range Interpretation Comments

 

             Hgb A1C (test code = Hgb A1C) 5.3                                  

  



[facility]

## 2023-01-26 NOTE — EDPHYS
Physician Documentation                                                                           

 [facility]                                                                 

Name: Gomez Romero                                                                               

Age: 52 yrs                                                                                       

Sex: Male                                                                                         

: 1970                                                                                   

MRN: [id number]                                                                                   

Arrival Date: 2023                                                                          

Time: 19:28                                                                                       

Account#: [id number]                                                                            

Bed IW1                                                                                           

Private MD:                                                                                       

ED Physician Ramin Puga                                                                       

HPI:                                                                                              

                                                                                             

19:47 This 52 yrs old  Male presents to ER via car with complaints of Leg Injury.    bs3 

19:47 History of heart transplant in  on tacrolimus and mycophenolate who presents with a bs3 

      bruise to his left anterior leg. 2 weeks ago he was hitting a metal object at work and      

      it sprung hitting his left leg anteriorly he developed a bruise at the area but was         

      able to ambulate the bruising has improved but he notes tracking of the bruising down       

      his leg and he finally had time to come get it evaluated and therefore he came in today     

      he notes no difficulty ambulating he has not taken anything for it he has mild pain at      

      the site of the injury he does not know his last tetanus shot.                              

                                                                                                  

Historical:                                                                                       

- Allergies:                                                                                      

19:52 falsecarinate;                                                                          vc1 

- Home Meds:                                                                                      

19:52 micofenolato [Active]; Prograf 1 mg Oral cap every 12 hours [Active];                   vc1 

- PMHx:                                                                                           

19:52 heart attack; lost weight and no longer diabetic or HTN;                                vc1 

- PSHx:                                                                                           

19:52 Heart transplant; Hernia sx;                                                            vc1 

                                                                                                  

- Social history:: Smoking status: Patient denies any tobacco usage or history of.                

                                                                                                  

                                                                                                  

ROS:                                                                                              

19:47 Constitutional: Negative for fever, chills ENT: Negative for injury, pain, and          bs3 

      discharge, Cardiovascular: Negative for chest pain, palpitations, and edema,                

      Respiratory: Negative for shortness of breath, cough, wheezing Abdomen/GI: Negative for     

      abdominal pain, nausea, vomiting, diarrhea                                                  

                                                                                                  

Exam:                                                                                             

19:47 Constitutional:  This is a well developed, well nourished patient who is awake, alert,  bs3 

      and in no acute distress. Eyes:  Pupils equal round and reactive to light, extra-ocular     

      motions intact.  Lids and lashes normal.   ENT:  mmm, no posterior phyarngeal erythema      

      Neck:  Trachea midline, no thyromegaly, no neck stiffness Skin:  Warm, dry with normal      

      turgor.  Normal color with no rashes, no lesions, and no evidence of cellulitis. MS/        

      Extremity:  On the upper aspect of his left lower leg he has a healing bruise there is      

      a palpable hematoma on the lower aspect of his leg he has ecchymosis around the ankle       

      he has no difficulty ambulating there are no signs of infection Neuro:  Awake and           

      alert, GCS 15, oriented to person, place, time, and situation.  Cranial nerves II-XII       

      grossly intact.  Motor strength 5/5 in all extremities.  Sensory grossly intact.            

      Psych:  Awake, alert, with orientation to person, place and time.  Behavior, mood, and      

      affect are within normal limits.                                                            

                                                                                                  

Vital Signs:                                                                                      

19:50  / 102; Pulse 72; Resp 18; Temp 97.8; Pulse Ox 100% ; Weight 106.59 kg; Height 6  vc1 

      ft. 4 in. (193.04 cm); Pain 4/10;                                                           

19:50 Body Mass Index 28.60 (106.59 kg, 193.04 cm)                                            vc1 

                                                                                                  

MDM:                                                                                              

19:47 Differential diagnosis: dislocation, closed fracture, contusion, Compartment syndrome.  bs3 

      Data reviewed: vital signs, nurses notes. ED course: Patient with healing bruise            

      recommended tetanus but patient wanted to talk to his cardiothoracic surgeon advised to     

      discuss with him he has no signs of compartment syndrome his calf is soft I do not          

      think he has a deep vein thrombosis he is ambulating with a steady gait I do not think      

      he has an acute fracture we discussed normal wound healing he was advised to covered up     

      as he is immunocompromised and return with any signs of infection.                          

19:54 Patient medically screened.                                                             bs3 

                                                                                                  

Administered Medications:                                                                         

No medications were administered                                                                  

                                                                                                  

                                                                                                  

Disposition Summary:                                                                              

23 19:54                                                                                    

Discharge Ordered                                                                                 

      Location: Home                                                                          bs3 

      Problem: new                                                                            bs3 

      Symptoms: have improved                                                                 bs3 

      Condition: Stable                                                                       bs3 

      Diagnosis                                                                                   

        - Contusion of left lower leg                                                         bs3 

      Followup:                                                                               bs3 

        - With: Private Physician                                                                  

        - When: As needed                                                                          

        - Reason:                                                                                  

      Discharge Instructions:                                                                     

        - Discharge Summary Sheet                                                             bs3 

        - Contusion, Easy-to-Read                                                             bs3 

      Forms:                                                                                      

        - Medication Reconciliation Form                                                      bs3 

        - Thank You Letter                                                                    bs3 

        - Antibiotic Education                                                                bs3 

        - Prescription Opioid Use                                                             bs3 

Signatures:                                                                                       

Christiane Johnson RN                    RN   vc1                                                  

Ramin Puga MD MD   bs3                                                  

                                                                                                  

**************************************************************************************************

## 2023-11-24 NOTE — XMS REPORT
Clinical Summary

 Created on:2019



Patient:Tyra Bruno

Sex:Male

:1970

External Reference #:[id number]





Demographics







 Address  [address] [place] [address]

 

 Mobile Phone  [phone]

 

 Home Phone  [phone]

 

 Email Address  [email or web address]

 

 Preferred Language  English

 

 Marital Status  Unknown

 

 Confucianist Affiliation  Unknown

 

 Race  White

 

 Ethnic Group  Not  or 









Author







 Organization  [facility]

 

 Address  [address] [place] [address]









Support







 Name  Relationship  Address  Phone

 

 Janet Bruno  Unavailable  Unavailable  [phone]

 

 Hannah Bruno  Unavailable  Unavailable  [phone]









Care Team Providers







 Name  Role  Phone

 

 Dana Loo MD  Primary Care Provider  [phone]









Allergies







 Active Allergy  Reactions  Severity  Noted Date  Comments

 

 Foscarnet  Nausea And Vomiting,  High  2015







   Swelling      Pt has no reaction to



         current medication



         administration regimen:



         premedicate with Benadryl,



         Zofran, Tylenol, then 500cc



         NS bolus, then diluted



         foscarnet over 2 hours,



         then another 500cc NS



         bolus. Electrolyte labs



         after every dose,



         electrolyte replacement



         protocol in place.







Medications







 Medication  Sig  Dispensed  Refills  Start Date  End Date  Status

 

 mycophenolate  Take 3  180 capsule  2018  Active



 (CELLCEPT) 250 mg  capsules (750        9  



 capsule  mg total) by          



   mouth 2 (two)          



   times daily.          

 

 aspirin 81 MG EC  Take 1 tablet  30 tablet  11  2018  Active



 tablet  (81 mg total)        9  



   by mouth          



   daily.          

 

 famotidine (PEPCID)  Take 1 tablet  60 tablet  11  2018  
Active



 20 MG tablet  (20 mg total)        9  



   by mouth 2          



   (two) times          



   daily.          

 

 pravastatin  Take 1 tablet  30 tablet  11  2018  Active



 (PRAVACHOL) 40 MG  (40 mg total)        9  



 tablet  by mouth          



   daily.          

 

 tacrolimus  Take 2  120 capsule  11  2019  Active



 (PROGRAF) 1 MG  capsules (2 mg        0  



 capsule  total) by          



   mouth 2 (two)          



   times daily.          

 

 tacrolimus  Take 2  120 capsule  11  2019  Active



 (PROGRAF) 1 MG  capsules (2 mg        0  



 capsule  total) by          



   mouth 2 (two)          



   times daily.          

 

 mycophenolate  Take 3  180 capsule  11  2017  Discontinued



 (CELLCEPT) 250 mg  capsules (750        8  



 capsule  mg total) by          



   mouth 2 (two)          



   times daily.          

 

 tacrolimus  Take 1mg by  90 capsule  11  2017  Discontinued



 (PROGRAF) 1 MG  mouth in the        8  



 capsule  morning.  Take          



   2mg by mouth          



   at night. .          

 

 albuterol HFA  Inhale 1 puff  1 Inhaler  0  2017  Discontinued



 (PROVENTIL HFA) 90  by mouth via        8  



 mcg/actuation  inhaler every          



 inhaler  6 (six) hours          



   as needed for          



   Wheezing.          

 

 azelastine  1 spray by  30 mL  0  2017  Discontinued



 (ASTELIN) 137 mcg  Nasal route 2        8  



 (0.1 %) nasal spray  (two) times          



   daily Use in          



   each nostril          



   as directed.          

 

 pravastatin  Take 1 tablet    0  2017  Discontinued



 (PRAVACHOL) 40 MG  (40 mg total)        8  



 tablet  by mouth          



   daily.          

 

 aspirin 81 MG EC  Take 1 tablet    0  2017  Discontinued



 tablet  (81 mg total)        8  



   by mouth          



   daily.          

 

 famotidine (PEPCID)  Take 1 tablet  180 tablet  3  2018  
Discontinued



 20 MG tablet  (20 mg total)        8  



   by mouth 2          



   (two) times          



   daily.          

 

 tacrolimus  Take 1mg by  90 capsule  11  2018  Discontinued



 (PROGRAF) 1 MG  mouth in the        9  



 capsule  morning.  Take          



   2mg by mouth          



   at night. .          









 Hospital, Clinic, or Other  Ordered Dose  Route  Frequency  Start Date  End 
Date  Status



 Facility Administered            



 Medication            

 

 influenza vaccine (PF)  0.5 mL  IM  Once  2018  Ended



 [id number] (FLUZONE HIGH            



 DOSE) syringe (>/=65 yrs)            







Active Problems







 Patient Care Coordination Note

 

 



 



 









 Problem  Noted Date

 

 Heart transplant, orthotopic, status  2016

 

 Heart transplant rejection  2015









 Overview: 



- 2R rejection on low dose immunosuppression  10/22/15 treated with prednisone 
pulse



 - 2R rejection on low dose immunosuppression  10/28/15 treated with IV 
solumedrol, increased MMF, increased Prograf



 - 2R biopsy on 12/2/15 treated with ATG x 4









 History of Cytomegalovirus (CMV) viremia  2015

 

 ICM s/p orthotopic heart transplant 5/14/15  2015

 

 Dyslipidemia  2015

 

 Hypertension  

 

 Ischemic cardiomyopathy with MI 2015  







Encounters







 Date  Type  Specialty  Care Team  Description

 

 2019  Orders Only  Transplant  Dana Loo  Status post heart 
transplantation ([facility]);



       MD Scott  Encounter for therapeutic drug level monitoring

 

 2019  Orders Only  Transplant  Margot Post RN  Status post heart 
transplantation ([facility]) (Primary Dx);



         Encounter for therapeutic drug level monitoring

 

 2019  Telephone  Transplant  Margot Post RN  Heart Transplant



         Follow-up

 

 2019  Orders Only  Transplant  Margot Post RN  

 

 2018  Hospital Encounter  Cardiology  Dana Loo  Status post heart



       MD Scott  transplantation ([facility])

 

 2018  Follow-Up  Transplant  Dana Loo  Hyperlipidemia, unspecified 
hyperlipidemia type (Primary Dx);



       MD Scott  Status post heart transplantation ([facility]);



         Encounter for therapeutic drug level monitoring;



         Prostate cancer screening

 

 2018  Telephone  Transplant  Margot Post RN  Heart Transplant



         Follow-up

 

 2018  Abstract  Transplant  Shila Golden  

 

 2018  Telephone  Transplant  Margot Post RN  Heart Transplant



         Follow-up

 

 06/15/2018  Abstract  Transplant  LongShila  

 

 2018  Telephone  Transplant  Margot Post RN  Heart Transplant



         Follow-up

 

 2018  Abstract  Transplant  Shila Golden  

 

 2018  Orders Only  Transplant  Margot Post RN  Status post heart 
transplantation ([facility]) (Primary Dx);



         Encounter for therapeutic drug level monitoring

 

 2018  Telephone  Transplant  Margot Post RN  Heart Transplant



         Follow-up

 

 2018  Hospital Encounter  Cardiology    Status post heart



         transplantation ([facility])

 

 2018  Follow-Up  Transplant  Dana Loo  Essential hypertension (
Primary Dx);



       MD Scott  Status post heart transplantation ([facility]);



         Encounter for therapeutic drug level monitoring;



         Dyslipidemia

 

 2018  Surgery    Dana Loo  R & L CATH / CORONARY



       MD Scott  ANGIOS / PCI

 

 2018  Hospital Encounter    ObDana thompson  Status post heart



       MD Scott  transplantation ([facility])

 

 2018  Orders Only  Transplant  Margot Post RN  

 

 2018  Documentation  Transplant  Kenisha Lyon NP  

 

 2018  Documentation  Transplant  Herminia Kilgore  

 

 2018  Orders Only  Transplant  Margot Post RN  

 

 2018  Orders Only  Transplant  Margot Post RN  



after 2018



Immunizations







 Name  Dates Previously Given  Next Due

 

 Influenza High Dose Preservative Free IM  2018  

 

 Influenza High Dose Preservative Free [id number]  

 

 Influenza TIV (IM)  10/09/2017  

 

 Rho (D) Immune Globulin  05/15/2015  







Family History







 Medical History  Relation  Name  Comments

 

 Cancer  Maternal Grandfather    

 

 Heart disease  Maternal Grandfather    

 

 Diabetes  Mother    









 Relation  Name  Status  Comments

 

 Maternal Grandfather      

 

 Mother      







Social History







 Tobacco Use  Types  Packs/Day  Years Used  Date

 

 Never Smoker        









 Smokeless Tobacco: Never Used      









 Alcohol Use  Drinks/Week  oz/Week  Comments

 

 No      









 Sex Assigned at Birth  Date Recorded

 

 Not on file  









 Job Start Date  Occupation  Industry

 

 Not on file  Not on file  Not on file









 Travel History  Travel Start  Travel End









 No recent travel history available.







Last Filed Vital Signs







 Vital Sign  Reading  Time Taken

 

 Blood Pressure  131/90  2018  8:36 AM CST

 

 Pulse  84  2018  8:36 AM CST

 

 Temperature  36.6 C (97.9 F)  2018  8:36 AM CST

 

 Respiratory Rate  18  2018  8:36 AM CST

 

 Oxygen Saturation  98%  2018  8:36 AM CST

 

 Inhaled Oxygen Concentration  -  -

 

 Weight  103.6 kg (228 lb 4.8 oz)  2018  8:36 AM CST

 

 Height  188 cm (6' 2")  2018  8:36 AM CST

 

 Body Mass Index  29.31  2018  8:36 AM CST







Plan of Treatment







 Date  Type  Specialty  Care Team  Description

 

 2019  Orders Only  Transplant    

 

 2019  Appointment  Radiology    

 

 2019  Appointment  Cardiology    

 

 2019  Follow-Up  Transplant  Dana Loo MD



  



       [address] [address]



  



       [phone] (Fax)  







Implants







 Implanted  Type  Area    Device  Shelf  Model / Serial



         Identifier  Expiration  / Lot



           Date  

 

 Hemostat,Sixto Ah Absorbable Powder 3g - [id number]  Cement/Fille    C R BARD 
DAVOL    2019  0002-[id number] /



 Implanted: Qty: 1 on 2015 by Champ Jean MD  r/Adhesive           /



             [id number]

 

 Graft,Goretex Cg [id number] Lined - [id number]  Graft/Patch    W L GORE    2020  [id number] /



 Implanted: Qty: 1 on 2015 by Teofilo Rubio MD             /



             [id number]

 

 Lead,Defibrillator Cardioverter Dual Coil Df4 Connector [id number] Durata - 
[id number]  ICD    ST FIDEL    2015  7120Q-58 /



 Implanted: Qty: 1 on 2015 by Champ Jean MD      [employer]      
[id number] /

 

 Lead, Pacing Endocardial Steroid Eluting Bipolar Active Fix 52cm Tendril Sts - 
[id number]  Pacemakers    ST FIDEL    2017  2088TC/52 /



 Implanted: Qty: 1 on 2015 by Champ Jean MD      [employer]      
[id number] /

 

 Fortify Assura      ST FIDEL    10/31/2016  [id number] /



 Implanted: Qty: 1 on 2015 by Champ Jean MD      [employer]      
[id number] /

 

 Tyrx Absorbable Antibacterial Envelope          2015  [id number] /



 Implanted: Qty: 1 on 2015 by Champ Jean MD             /



             [id number]

 

 Sensation      [employer]    2018  3257--01U /



 Implanted: Qty: 1 on 03/10/2015            [id number] /

 

 Intra-Aortic Balloon      [employer]    2018  6157--01U /



 Implanted: Qty: 1 on 2015            [id number] /

 

 Intra-Aortic Balloon      [employer]    2018  6204--01U /



 Implanted: Qty: 1 on 2015            [id number] /







Procedures







 Procedure Name  Priority  Date/Time  Associated Diagnosis  Comments

 

 CBC W/PLT COUNT & AUTO  Routine  2019  9:53  Status post heart  Results 
for this



 DIFFERENTIAL    AM CDT  transplantation ([facility])



  procedure are in



       Encounter for  the results



       therapeutic drug level  section.



       monitoring  

 

 TACROLIMUS LEVEL  Routine  2019  9:53  Status post heart  Results for 
this



     AM CDT  transplantation (HCC)



  procedure are in



       Encounter for  the results



       therapeutic drug level  section.



       monitoring  

 

 CBC W/PLT COUNT & AUTO  Routine  2019  9:53  Status post heart  Results 
for this



 DIFFERENTIAL    AM CDT  transplantation (HCC)



  procedure are in



       Encounter for  the results



       therapeutic drug level  section.



       monitoring  

 

 BASIC METABOLIC PANEL  Routine  2019  9:52  Status post heart  Results 
for this



 (7)    AM CDT  transplantation (HCC)



  procedure are in



       Encounter for  the results



       therapeutic drug level  section.



       monitoring  

 

 ECHOCARDIOGRAM REPORT    2018 12:54    



 - SCAN    PM CST    

 

 2D ECHO W/ DOPPLER  Routine  2018  9:20  Status post heart  Results for 
this



 (CW/PW/COLOR)    AM CST  transplantation (HCC)  procedure are in



         the results



         section.

 

 CBC W/PLT COUNT & AUTO  Routine  2018  8:41  Status post heart  Results 
for this



 DIFFERENTIAL    AM CST  transplantation (HCC)



  procedure are in



       Encounter for  the results



       therapeutic drug level  section.



       monitoring  

 

 BASIC METABOLIC PANEL  Routine  2018  8:41  Status post heart  Results 
for this



 (7)    AM CST  transplantation (HCC)



  procedure are in



       Encounter for  the results



       therapeutic drug level  section.



       monitoring  

 

 CBC W/PLT COUNT & AUTO  Routine  2018  8:41  Status post heart  Results 
for this



 DIFFERENTIAL    AM CST  transplantation (HCC)



  procedure are in



       Encounter for  the results



       therapeutic drug level  section.



       monitoring  

 

 BASIC METABOLIC PANEL  Routine  2018 10:30    Results for this



 (7)    AM CDT    procedure are in



         the results



         section.

 

 HEMOGLOBIN A1C  Routine  2018 10:30    Results for this



     AM CDT    procedure are in



         the results



         section.

 

 CBC W/PLT COUNT & AUTO  Routine  2018 10:30    Results for this



 DIFFERENTIAL    AM CDT    procedure are in



         the results



         section.

 

 TACROLIMUS LEVEL  Routine  2018 10:30    Results for this



     AM CDT    procedure are in



         the results



         section.

 

 BASIC METABOLIC PANEL  Routine  2018  3:30    Results for this



 (7)    PM CDT    procedure are in



         the results



         section.

 

 CBC W/PLT COUNT & AUTO  Routine  2018  3:30    Results for this



 DIFFERENTIAL    PM CDT    procedure are in



         the results



         section.

 

 TACROLIMUS LEVEL  Routine  2018  3:30    Results for this



     PM CDT    procedure are in



         the results



         section.

 

 ECHOCARDIOGRAM REPORT    2018  7:20    



 - SCAN    AM CDT    

 

 CARDIAC CATH REPORT -    2018  3:01    



 SCAN    PM CDT    

 

 2D ECHO W/ DOPPLER  Routine  2018  2:02  Status post heart  Results for 
this



 (CW/PW/COLOR)    PM CDT  transplantation ([facility]CC)  procedure are in



         the results



         section.

 

 TACROLIMUS LEVEL  Routine  2018  9:24  Status post heart  Results for 
this



     AM CDT  transplantation ([facility]CC)



  procedure are in



       Encounter for  the results



       therapeutic drug level  section.



       monitoring  

 

 XR CHEST 2 VIEWS  Routine  2018  1:50  Status post heart  Results for 
this



     PM CDT  transplantation ([facility])  procedure are in



         the results



         section.

 

 R & L CATH / CORONARY    2018  1:13  H/O heart transplant  



 ANGIOS / PCI    PM CDT  (HCC)  









   Case Notes







   POP6









 ECG 12-LEAD  Routine  2018 11:50 AM CDT    









   Procedure Note - Interface, External Ris In - 2018  1:21 PM CDT



Ventricular Rate 73 BPM



   Atrial Rate 73 BPM



   P-R Interval 158 ms



   QRS Duration 94 ms



   Q-T Interval 392 ms



   QTC Calculation(Bazett) 431 ms



   P Axis 58 degrees



   R Axis 73 degrees



   T Axis 56 degrees



   



   Normal sinus rhythm



   Incomplete right bundle branch block



   Borderline ECG



   When compared with ECG of 04-MAR-2017 20:13,



   Left posterior fascicular block is no longer Present



   Incomplete right bundle branch block is now Present



   Criteria for Anterior infarct are no longer Present









 ECG 12-LEAD  Routine  2018 11:50 AM CDT    Results for this



         procedure are in the



         results section.

 

 CBC W/PLT COUNT & AUTO  STAT  2018 11:07 AM CDT    Results for this



 DIFFERENTIAL        procedure are in the



         results section.

 

 TACROLIMUS LEVEL  AP Routine  2018 11:07 AM CDT    Results for this



         procedure are in the



         results section.

 

 PROTHROMBIN TIME/INR  STAT  2018 11:07 AM CDT    Results for this



         procedure are in the



         results section.

 

 COMPREHENSIVE METABOLIC  STAT  2018 11:07 AM CDT    Results for this



 PANEL        procedure are in the



         results section.

 

 CBC W/PLT COUNT & AUTO  STAT  2018 11:07 AM CDT    Results for this



 DIFFERENTIAL        procedure are in the



         results section.

 

 LIPID PANEL  Routine  2018 11:07 AM CDT    Results for this



         procedure are in the



         results section.



after 2018



Results

CBC with platelet count + automated diff (2019  9:53 AM CDT)Only the most 
recent of3 resultswithin the time period is included.





 Component  Value  Ref Range  Performed At

 

 WBC  5.0  3.5 - 10.5 K/L  [facility] [place] [place]

 

 RBC  5.55  4.63 - 6.08 M/L  [facility] [place] [place]

 

 Hemoglobin  15.8  13.7 - 17.5 GM/DL  [facility] [place] [place]

 

 Hematocrit  47.9  40.1 - 51.0 %  [facility] [place] [place]

 

 MCV  86.3  79.0 - 92.2 fL  [facility] [place] [place]

 

 MCH  28.5  25.7 - 32.2 pg  [facility] [place] [place]

 

 MCHC  33.0  32.3 - 36.5 GM/DL  [facility] [place] [place]

 

 RDW  13.2  11.6 - 14.4 %  [facility] [place] [place]

 

 Platelets  131 (L)  150 - 450 K/CU MM  [facility] [place] [place]

 

 MPV  9.5  9.4 - 12.4 fL  [facility] [place] [place]

 

 nRBC  0  0 - 0 /100 WBC  [facility] [place] [place]

 

 % Neutros  56  %  [facility] [place] [place]

 

 % Lymphs  26  %  [facility] [place] [place]

 

 % Monos  12  %  [facility] [place] [place]

 

 % Eos  4  %  [facility] [place] [place]

 

 % Baso  1  %  [facility] [place] [place]

 

 # Neutros  2.80  1.78 - 5.38 K/L  [facility] [place] [place]

 

 # Lymphs  1.32  1.32 - 3.57 K/L  [facility] [place] [place]

 

 # Monos  0.59  0.30 - 0.82 K/L  [facility] [place] [place]

 

 # Eos  0.21  0.04 - 0.54 K/L  [facility] [place] [place]

 

 # Baso  0.04  0.01 - 0.08 K/L  [facility] [place] [place]

 

 Immature Granulocytes-Relative  1  0 - 1 %  [facility] [place] [place]









 Specimen

 

 Blood









 Performing Organization  Address  City/[facility]/Zipcode  Phone Number

 

 [address] [address] [phone]
[phone]



 [place]      



Tacrolimus (Prograf) level (2019  9:53 AM CDT)Only the most recent of5 
resultswithin the time period is included.





 Component  Value  Ref Range  Performed At

 

 Tacrolimus Lvl  6.9 (L)  10.0 - 20.0 ng/mL  [facility] [place] [place]









 Specimen

 

 Blood









 Performing Organization  Address  City[facility]  Phone Number

 

 [address] [address] [phone]
[phone]



 [place]      



Basic Metabolic Panel (2019  9:52 AM CDT)Only the most recent of4 
resultswithin the time period is included.





 Component  Value  Ref Range  Performed At

 

 Sodium  139  136 - 145 meq/L  [facility] [place] [place]

 

 Potassium  4.8  3.5 - 5.1 meq/L  [facility] [place] [place]

 

 Chloride  102  98 - 107 meq/L  [facility] [place] [place]

 

 CO2  30 (H)  22 - 29 meq/L  [facility] [place] [place]

 

 BUN  14  7 - 21 mg/dL  [facility] [place] [place]

 

 Creatinine  0.95  0.57 - 1.25 mg/dL  [facility] [place] [place]

 

 Glucose  88  70 - 105 mg/dL  [facility] [place] [place]

 

 Calcium  10.1  8.4 - 10.2 mg/dL  [facility] [place] [place]

 

 EGFR  84Comment: ESTIMATED GFR IS  mL/min/1.73 sq m  [facility]



   NOT AS ACCURATE AS CREATININE    [facility]



   CLEARANCE IN PREDICTING    



   GLOMERULAR FILTRATION RATE.    



   ESTIMATED GFR IS NOT    



   APPLICABLE FOR DIALYSIS    



   PATIENTS.    









 Specimen

 

 Blood









 Performing Organization  Address  City/[facility]  Phone Number

 

 [address] [address] [phone]
[phone]



 [place]      



ECHOCARDIOGRAM REPORT - SCAN (2018 12:54 PM CST)





 Narrative  Performed At

 

 This result has an attachment that is not available.



  



2D Echo W/Doppler(CW/PW/Color) (2018  9:20 AM CST)





 Component  Value  Ref Range  Performed At

 

 Ejection Fraction      [facility] ECHO [facility]









 Narrative  Performed At

 

 Transthoracic Echocardiography Report (TTE)



  [facility] ECHO [facility]ART[facility]



  



  



  Demographics



  



  



  



  Patient Name Lulu BRUNO of  



 Study 2018



  



 WOOD



  



  



  



  [id number]  



 GenderMale



  



  



  



  Visit Number  



 [id number]  



 Unknown



  



  



  



  [id number]  



 Room Number op



  



  Number



  



  



  



  Date of  



 Birth1970Referring  



 Physician Dana Loo MD



  



  



  



  Age49  



 year(s)Sonographer  



 Ashok Mcmillan



  



   



   



 [facility]



  



  



  



  AnalystMailyn  



 InterpretingJoThomas Torres  



 Physician MD



  



  



  



 Procedure



  



  



  



  Type of



  



  Study TTE procedure:2DECHO W  



 DOPPLER(CW/PW/COLOR) (Routine)



  



  



  



 Indications:Heart Transplant Follow up.



  



  



  



 Clinical History



  



 ICMP, HTN



  



  



  



 OHT 5.14.



  



  



  



 Height: 76 inches Weight: 90.72 kg (200 lbs) BSA:  



 2.22 m^2 BMI: 24.34 kg/m^2



  



  



  



 HR: 79 bpm BP: 127/77 mmHg



  



  



  



  Summary



  



  The left ventricle is chamber size (by PSLAX  



 dimension) is normal (male -



  



  LVIDd 4.2-5.8cm) . LV septal thickness is normal  



 (0.6-1.1cm). LV posterior



  



  wall thickness is mildly increased . LV segments  



 contract normally. LVEF



  



  by Guy's method of disk assessment is normal  



 (60%) .



  



  Normal diastolic function with normal LV filling  



 pressure (LAP) at rest.



  



  Estimated peak systolic PA pressure is 30-35 mmHg  



 .



  



  No significant pericardial effusion is  



 visualized.



  



  



  



  Previous Study



  



  Compared to the previous study there was no  



 significant change.



  



  



  



  Signature



  



  



  



  -------------------------------------------------  



 ---------------



  



  Electronically signed by Alcides Gibbs MD(Interpreting



  



  physician) on 2018 12:12 PM



  



  -------------------------------------------------  



 ---------------



  



  



  



  Findings



  



  



  



  Left Ventricle The LV endocardium  



 is well visualized.



  



 Th  



 e left ventricle is chamber size (by PSLAX



  



 di  



 mension) is normal (male - LVIDd 4.2-5.8cm) .



  



 LV  



 septal thickness is normal (0.6-1.1cm). LV



  



 po  



 sterior wall thickness is mildly increased .



  



 LV  



 segments contract normally.



  



 LV  



 EF by Guy's method of disk assessment is



  



 no  



 rmal (60%) .



  



 No  



 rmal diastolic function with normal LV filling



  



 pr  



 essure (LAP) at rest.



  



  



  



  Left AtriumLA size is  



 normal (16-34 ml/m2) .



  



  



  



  Right VentricleThe right  



 ventricular chamber size and systolic



  



 fu  



 nction are within normal limits.



  



  



  



  Right Atrium RA size is  



 normal.



  



  



  



  Aortic Valve Normal AoV  



 structure.



  



 No  



 evidence of aortic regurgitation.



  



  



  



  Mitral Valve Mild MV leaflet  



 thickening.



  



 Tr  



 ace mitral regurgitation.



  



  



  



  Tricuspid ValveMild tricuspid  



 regurgitation.



  



 Es  



 timated peak systolic PA pressure is 30-35 mmHg .



  



  



  



  Pulmonic Valve Normal PV  



 structure and function.



  



  



  



  AortaAortic  



 root size (SInus of Valsalva diameter) is



  



 no  



 rmal .



  



 Pr  



 oximal ascending aorta size is normal .



  



  



  



  PericardiumNo significant  



 pericardial effusion is visualized.



  



  



  



  IVC/SVC/PA/PV/PleuralThe estimated RA  



 pressure by IVC dynamics 5-10mmHg



  



 .



  



  



  



 Chambers/Structures



  



  



  



  Left Atrium



  



  



  



  LA Volume: 59.91  



 ml LA  



 Area: 21.63 cm^2



  



  LA Vol. Index: 27 ml/m^2



  



  



  



  Left Ventricle



  



  



  



  LVIDd: 4.83 cm



  



  LVIDs: 2.99 cm



  



  LV Septum Diastolic: 1.01 cm



  



  LV PW Diastolic: 1.22  



 cmLV FS:  



 38.1 %



  



  LVEDV Guy's:73.35 ml



  



  LVESV Guy's:25.17  



 mlLVEDVI:  



 33 ml/m^2



  



  LVEF Guy's: 65.7  



 %LVESV  



 I: 11 ml/m^2



  



  



  



  LVOT Diameter: 2.34 cm



  



  



  



  Right Atrium



  



  



  



  RA Vol. (Sngl Plane): 38.77 ml



  



  



  



  Right Ventricle



  



  



  



 TAPSE:  



 1.64 cm



  



  



  



 Aorta



  



  



  



  Ao Root S of Krupa.: 3.37  



 cmAscending Aorta:  



 2.63 cm



  



  



  



 Doppler/Quantitative Measurements



  



  



  



  Mitral Valve



  



  



  



  MV Peak E-Wave: 0.61  



 m/sMV Peak A-Wave:  



 0.47 m/s



  



   



  E/A Ratio: 1.31



  



   



  Peak Gradient: 1.5 mmHg



  



   



  Deceleration Time:  



 179.6 msec



  



  



  



  MV Bharathi. Peak:



  



  



  



  Tissue Doppler



  



  



  



  E' Septal Velocity: 0.08  



 m/sE/E': 7.29



  



  E' Lateral Velocity: 0.17 m/s



  



  



  



  Aortic Valve



  



  



  



  Peak Velocity: 1.14  



 m/sMean  



 Velocity: 0.78 m/s



  



  Peak Gradient: 5.21  



 mmHg Mean  



 Gradient: 2.77 mmHg



  



  AV Area (continuity): 3.71 cm^2



  



  AV VTI: 21 cm



  



  



  



  AV DVI: 0.86



  



  



  



  LVOT



  



  



  



  Peak Velocity: 0.86 m/s  



 Peak Gradient: 2.99 mmHg



  



  Mean Velocity: 0.62 m/s  



 Mean Gradient: 1.72 mmHg



  



  LVOT Diameter: 2.34  



 cmLVOT VTI: 18.12 cm



  



  LVOT Area: 4.3  



 cm^2 LVOT SV:77.89  



 ml



  



  LVOT CO: 6.15  



 l/min LVOT CI:  



 2.77 l/min/m^2



  



  



  



  Tricuspid Valve



  



  



  



  TR Velocity: 2.41 m/s



  



  TR Gradient: 23.19 mmHg



  



   









 Procedure Note

 

 Interface, External Ris In - 2018 12:13 PM CST



Transthoracic Echocardiography Report (TTE)



 



  Demographics



 



  Patient Name   TYRA BRUNO    Date of Study       2018



                 WOOD



 



  MRN            [id number]          Gender              Male



 



  Visit Number   [id number]        Race                Unknown



 



  Accession      [id number]         Room Number         op



  Number



 



  Date of Birth  1970        Referring Physician Dana Loo MD



 



  Age            48 year(s)        Sonographer         Ashok Mcmillan RDCS



 



  Analyst        Mayte            Interpreting        Thomas Agee      Physician           MD



 



 Procedure



 



  Type of



  Study     TTE procedure:2DECHO W DOPPLER(CW/PW/COLOR) (Routine)



 



 Indications:Heart Transplant Follow up.



 



 Clinical History



 ICMP, HTN



 



 OHT 5.



 



 Height: 76 inches Weight: 90.72 kg (200 lbs) BSA: 2.22 m^2 BMI: 24.34 kg/m^2



 



 HR: 79 bpm BP: 127/77 mmHg



 



  Summary



  The left ventricle is chamber size (by PSLAX dimension) is normal (male -



  LVIDd 4.2-5.8cm) . LV septal thickness is normal (0.6-1.1cm). LV posterior



  wall thickness is mildly increased . LV segments contract normally. LVEF



  by Guy's method of disk assessment is normal (60%) .



  Normal diastolic function with normal LV filling pressure (LAP) at rest.



  Estimated peak systolic PA pressure is 30-35 mmHg .



  No significant pericardial effusion is visualized.



 



  Previous Study



  Compared to the previous study there was no significant change.



 



  Signature



 



  ----------------------------------------------------------------



  Electronically signed by Alcides Gibbs MD(Interpreting



  physician) on 2018 12:12 PM



  ----------------------------------------------------------------



 



  Findings



 



  Left Ventricle         The LV endocardium is well visualized.



                         The left ventricle is chamber size (by PSLAX



                         dimension) is normal (male - LVIDd 4.2-5.8cm) .



                         LV septal thickness is normal (0.6-1.1cm). LV



                         posterior wall thickness is mildly increased .



                         LV segments contract normally.



                         LVEF by Guy's method of disk assessment is



                         normal (60%) .



                         Normal diastolic function with normal LV filling



                         pressure (LAP) at rest.



 



  Left Atrium            LA size is normal (16-34 ml/m2) .



 



  Right Ventricle        The right ventricular chamber size and systolic



                         function are within normal limits.



 



  Right Atrium           RA size is normal.



 



  Aortic Valve           Normal AoV structure.



                         No evidence of aortic regurgitation.



 



  Mitral Valve           Mild MV leaflet thickening.



                         Trace mitral regurgitation.



 



  Tricuspid Valve        Mild tricuspid regurgitation.



                         Estimated peak systolic PA pressure is 30-35 mmHg .



 



  Pulmonic Valve         Normal PV structure and function.



 



  Aorta                  Aortic root size (SInus of Valsalva diameter) is



                         normal .



                         Proximal ascending aorta size is normal .



 



  Pericardium            No significant pericardial effusion is visualized.



 



  IVC/SVC/PA/PV/Pleural  The estimated RA pressure by IVC dynamics 5-10mmHg



                         .



 



 Chambers/Structures



 



  Left Atrium



 



  LA Volume: 59.91 ml                     LA Area: 21.63 cm^2



  LA Vol. Index: 27 ml/m^2



 



  Left Ventricle



 



  LVIDd: 4.83 cm



  LVIDs: 2.99 cm



  LV Septum Diastolic: 1.01 cm



  LV PW Diastolic: 1.22 cm                    LV FS: 38.1 %



  LVEDV Guy's:73.35 ml



  LVESV Guy's:25.17 ml                    LVEDVI: 33 ml/m^2



  LVEF Guy's: 65.7 %                      LVESVI: 11 ml/m^2



 



  LVOT Diameter: 2.34 cm



 



  Right Atrium



 



          RA Vol. (Sngl Plane): 38.77 ml



 



  Right Ventricle



 



                     TAPSE: 1.64 cm



 



 Aorta



 



  Ao Root S of Krupa.: 3.37 cm              Ascending Aorta: 2.63 cm



 



 Doppler/Quantitative Measurements



 



  Mitral Valve



 



  MV Peak E-Wave: 0.61 m/s              MV Peak A-Wave: 0.47 m/s



                                        E/A Ratio: 1.31



                                        Peak Gradient: 1.5 mmHg



                                        Deceleration Time: 179.6 msec



 



  MV Bharathi. Peak:



 



  Tissue Doppler



 



  E' Septal Velocity: 0.08 m/s          E/E': 7.29



  E' Lateral Velocity: 0.17 m/s



 



  Aortic Valve



 



  Peak Velocity: 1.14 m/s                  Mean Velocity: 0.78 m/s



  Peak Gradient: 5.21 mmHg                 Mean Gradient: 2.77 mmHg



  AV Area (continuity): 3.71 cm^2



  AV VTI: 21 cm



 



  AV DVI: 0.86



 



  LVOT



 



  Peak Velocity: 0.86 m/s             Peak Gradient: 2.99 mmHg



  Mean Velocity: 0.62 m/s             Mean Gradient: 1.72 mmHg



  LVOT Diameter: 2.34 cm              LVOT VTI: 18.12 cm



  LVOT Area: 4.3 cm^2                 LVOT SV:77.89 ml



  LVOT CO: 6.15 l/min                 LVOT CI: 2.77 l/min/m^2



 



  Tricuspid Valve



 



  TR Velocity: 2.41 m/s



  TR Gradient: 23.19 mmHg



 









 Performing Organization  Address  City/State/Zipcode  Phone Number

 

 [facility]      



CBC with platelet count + automated diff (2018 10:30 AM CDT)Only the most 
recent of2 resultswithin the time period is included.





 Component  Value  Ref Range  Performed At

 

 WBC (MANUAL)  4.1  10^3/mL  

 

 RBC (MANUAL)  5.60  10^6/L  

 

 HEMOGLOBIN (MANUAL)  16.0  GM/DL  

 

 HEMATOCRIT (MANUAL)  48.1  %  

 

 MCV (MANUAL)  86.0  fL  

 

 MCH (MANUAL)  28.6  pg  

 

 MCHC (MANUAL)  33.3  GM/DL  

 

 RDW (MANUAL)  14.0  %  

 

 PLATELETS (MANUAL)  163  K/CU MM  

 

 MPV (MANUAL)  8.3  fL  

 

 NRBC (MANUAL)    /100 WBC  

 

 NEUTROS PCT (MANUAL)  53.0  %  

 

 LYMPHS PCT (MANUAL)  29.8  %  

 

 MONOS PCT (MANUAL)  11.3  %  

 

 EOS PCT (MANUAL)  5.0  %  

 

 BASOS PCT (MANUAL)  0.9  %  

 

 NEUTROS ABS (MANUAL)  2.2  K/L  

 

 LYMPHS ABS (MANUAL)  1.2  K/L  

 

 MONOS ABS (MANUAL)  0.5  K/L  

 

 EOS ABS (MANUAL)  0.2  K/L  

 

 BASOS ABS (MANUAL)  0.0  K/L  









 Specimen

 

 Blood



Hemoglobin A1c (2018 10:30 AM CDT)





 Component  Value  Ref Range  Performed At

 

 Hemoglobin A1C  5.3  4.0 - 6.0 %  









 Specimen

 

 Blood



ECHOCARDIOGRAM REPORT - SCAN (2018  7:20 AM CDT)





 Narrative  Performed At

 

 This result has an attachment that is not available.



  



CARDIAC CATH REPORT - SCAN (2018  3:01 PM CDT)





 Narrative  Performed At

 

 This result has an attachment that is not available.



  



2D echo w/ doppler (cw/pw/color) (2018  2:02 PM CDT)





 Component  Value  Ref Range  Performed At

 

 Ejection Fraction      [facility]









 Narrative  Performed At

 

 Transthoracic Echocardiography Report (TTE)



  [facility]ART[facility]



  



  



  Demographics



  



  



  



  Patient Name Lulu BRUNO of  



 Study 2018



  



 WOOD



  



  



  



  [id number]  



 GenderMale



  



  



  



  Visit Number  



 [id number]  



 Unknown



  



  



  



  [id number]  



 Room Number



  



  Number



  



  



  



  Date of  



 Birth1970Referring  



 Physician MD Eze Luo MD



  



  



  



  Age49  



 year(s)Sonographer  



 Pasha Gill [facility]



  



  



  



  AnalystAlex Shelbi aGrcia



  



   



 Physician MD



  



  



  



 Procedure



  



  



  



  Type of



  



  Study TTE procedure:2DECHO W  



 DOPPLER(CW/PW/COLOR) (Routine)



  



  



  



 Indications:Heart Transplant Follow up.



  



  



  



 Clinical History



  



 Congestive Heart Failure



  



 Coronary Artery Disease



  



 Hypertension



  



 Ischemic Cardiomyopathy



  



 OHT 5/14/15



  



  



  



 Height: 74 inches Weight: 103.87 kg (229 lbs) BSA:  



 2.3 m^2 BMI: 29.4 kg/m^2



  



  



  



 HR: 73 bpm BP: 124/79 mmHg



  



  



  



  Summary



  



  Regular sinus rhythm during the exam.



  



  The LV apex is incompletely visualized due to  



 foreshortening.



  



  The other segments have low normal contractility.



  



  LVEF by Guy's method of disk assessment is  



 low normal (50-55%).



  



  A trace of tricuspid regurgitation.



  



  Estimated peak systolic PA pressure is 25-30 mmHg  



 .



  



  Normal TV structure.



  



  No evidence of pericardial effusion.



  



  



  



  Previous Study



  



  In comparison with the prior exam 2017 the  



 following changes are



  



  noted: mildly depressed LVEF .



  



  



  



  Signature



  



  



  



  -------------------------------------------------  



 ---------------



  



  Electronically signed by Vic Garcia MD(Interpreting



  



  physician) on 2018 06:33 PM



  



  -------------------------------------------------  



 ---------------



  



  



  



  Findings



  



  



  



  Rhythm/BPRegular  



 sinus rhythm during the exam.



  



  



  



  Left Ventricle The LV apex is  



 incompletely visualized due to



  



 fo  



 reshortening.



  



 Th  



 e other segments have low normal contractility.



  



 LV  



 EF by Guy's method of disk assessment is low



  



 no  



 rmal (50-55%).



  



  



  



  Left AtriumLA morphology  



 consistent with orthotoptic heart



  



 tr  



 ansplant.



  



  



  



  Right VentricleRV chamber size is  



 mildly enlarged .



  



 Gl  



 obal RV systolic function is normal .



  



  



  



  Right Atrium RA cavity size  



 is consistent with orthotopic heart



  



 tr  



 ansplantation .



  



  



  



  Aortic Valve Normal AoV  



 structure and function.



  



  



  



  Mitral Valve Normal MV  



 structure and function.



  



  



  



  Tricuspid ValveA trace of  



 tricuspid regurgitation.



  



 Es  



 timated peak systolic PA pressure is 25-30 mmHg .



  



 No  



 rmal TV structure.



  



  



  



  Pulmonic Valve Normal PV  



 structure and function by limited views



  



 an  



 d Doppler.



  



  



  



  AortaAortic  



 root size (SInus of Valsalva diameter) is



  



 no  



 rmal .



  



  



  



  PericardiumNo evidence of  



 pericardial effusion.



  



  



  



  IVC/SVC/PA/PV/PleuralThe estimated RA  



 pressure by IVC dynamics 5-10mmHg



  



 .



  



  



  



 Chambers/Structures



  



  



  



  Left Atrium



  



  



  



  LA Dimension: 4.78 cm



  



  



  



  Left Ventricle



  



  



  



  LVIDd: 5.29 cm



  



  LVIDs: 3.41 cm



  



  LV Septum Diastolic: 0.98 cm



  



  LV PW Diastolic: 1.02  



 cm LV Length:  



 7.23 cm



  



  LVEDV Guy's:91.24  



 ml LV FS: 35.5  



 %



  



  LVESV Guy's:36.89 ml



  



  LVEF Guy's: 59.6  



 % LVEDVI:  



 40 ml/m^2



  



   



 LVESVI: 16  



 ml/m^2



  



  LVOT Diameter: 2.41 cm



  



  



  



 Aorta



  



  



  



  Ao Root S of Krupa.: 2.96 cm



  



  



  



 Doppler/Quantitative Measurements



  



  



  



  LVOT



  



  



  



  Peak Velocity: 0.65 m/s  



 Peak Gradient: 1.7 mmHg



  



  Mean Velocity: 0.39 m/s  



 Mean Gradient: 0.75 mmHg



  



  LVOT Diameter: 2.41  



 cmLVOT VTI: 12.26 cm



  



  LVOT Area: 4.56  



 cm^2LVOT SV:55.9  



 ml



  



  LVOT CO: 4.08  



 l/min LVOT CI:  



 1.77 l/min/m^2



  



   









 Procedure Note

 

 Interface, External Ris In - 2018  6:34 PM CDT



Transthoracic Echocardiography Report (TTE)



 



  Demographics



 



  Patient [facility]   [facility]    [facility] Study       2018



                 [id number]          Gender              Male



 



  Visit Number   [id number]        Race                Unknown



 



  Accession      [id number]         Room Number



  Number



 



  Date of Birth  1970        Referring Physician MD Eze Lou MD



 



  Age            48 year(s)        Sonographer         Pasha Gill [facility]



 



  Analyst        Fer Mario         Interpreting        Vic Garcia



                                   Physician           MD



 



 Procedure



 



  Type of



  Study     TTE procedure:2DECHO W DOPPLER(CW/PW/COLOR) (Routine)



 



 Indications:Heart Transplant Follow up.



 



 Clinical History



 Congestive Heart Failure



 Coronary Artery Disease



 Hypertension



 Ischemic Cardiomyopathy



 OHT 5/14/15



 



 Height: 74 inches Weight: 103.87 kg (229 lbs) BSA: 2.3 m^2 BMI: 29.4 kg/m^2



 



 HR: 73 bpm BP: 124/79 mmHg



 



  Summary



  Regular sinus rhythm during the exam.



  The LV apex is incompletely visualized due to foreshortening.



  The other segments have low normal contractility.



  LVEF by Guy's method of disk assessment is low normal (50-55%).



  A trace of tricuspid regurgitation.



  Estimated peak systolic PA pressure is 25-30 mmHg .



  Normal TV structure.



  No evidence of pericardial effusion.



 



  Previous Study



  In comparison with the prior exam 2017 the following changes are



  noted: mildly depressed LVEF .



 



  Signature



 



  ----------------------------------------------------------------



  Electronically signed by Vic Garcia MD(Interpreting



  physician) on 2018 06:33 PM



  ----------------------------------------------------------------



 



  Findings



 



  Rhythm/BP              Regular sinus rhythm during the exam.



 



  Left Ventricle         The LV apex is incompletely visualized due to



                         foreshortening.



                         The other segments have low normal contractility.



                         LVEF by Guy's method of disk assessment is low



                         normal (50-55%).



 



  Left Atrium            LA morphology consistent with orthotoptic heart



                         transplant.



 



  Right Ventricle        RV chamber size is mildly enlarged .



                         Global RV systolic function is normal .



 



  Right Atrium           RA cavity size is consistent with orthotopic heart



                         transplantation .



 



  Aortic Valve           Normal AoV structure and function.



 



  Mitral Valve           Normal MV structure and function.



 



  Tricuspid Valve        A trace of tricuspid regurgitation.



                         Estimated peak systolic PA pressure is 25-30 mmHg .



                         Normal TV structure.



 



  Pulmonic Valve         Normal PV structure and function by limited views



                         and Doppler.



 



  Aorta                  Aortic root size (SInus of Valsalva diameter) is



                         normal .



 



  Pericardium            No evidence of pericardial effusion.



 



  IVC/SVC/PA/PV/Pleural  The estimated RA pressure by IVC dynamics 5-10mmHg



                         .



 



 Chambers/Structures



 



  Left Atrium



 



  LA Dimension: 4.78 cm



 



  Left Ventricle



 



  LVIDd: 5.29 cm



  LVIDs: 3.41 cm



  LV Septum Diastolic: 0.98 cm



  LV PW Diastolic: 1.02 cm                   LV Length: 7.23 cm



  LVEDV Guy's:91.24 ml                   LV FS: 35.5 %



  LVESV Guy's:36.89 ml



  LVEF Guy's: 59.6 %                     LVEDVI: 40 ml/m^2



                                             LVESVI: 16 ml/m^2



  LVOT Diameter: 2.41 cm



 



 Aorta



 



  Ao Root S of Krupa.: 2.96 cm



 



 Doppler/Quantitative Measurements



 



  LVOT



 



  Peak Velocity: 0.65 m/s             Peak Gradient: 1.7 mmHg



  Mean Velocity: 0.39 m/s             Mean Gradient: 0.75 mmHg



  LVOT Diameter: 2.41 cm              LVOT VTI: 12.26 cm



  LVOT Area: 4.56 cm^2                LVOT SV:55.9 ml



  LVOT CO: 4.08 l/min                 LVOT CI: 1.77 l/min/m^2



 









 Performing Organization  Address  City/[facility]  Phone Number

 

 [facility]CHO HEARTLAB [facility]      



XR chest 2 views (2018  1:50 PM CDT)





 Narrative  Performed At

 

 FINAL REPORT



  GE [employer]



  



  



 PATIENT ID: [id number]



  



  



  



 Chest two views



  



  



  



 INDICATION: Heart transplant annual follow-up.



  



  



  



 COMPARISON: 2017



  



  



  



 IMPRESSION:



  



  



  



 There is no focal consolidation, vascular congestion, pleural 



  



 effusion, or pneumothorax. Heart size is within normal limits. Median 



  



 sternotomy changes, left axillary surgical clips, and gastric sleeve 



  



 with small hiatal hernia are again noted.



  



  



  



 No significant change since 2017.



  



  



  



 Signed: Richard Nickerson MD



  



 Report Verified Date/Time:2018 14:13:48



  



  



  



 Reading Location: [facility] B1 C013W Consult Reading Room



  



  Electronically signed by: RICHARD NICKERSON M.D. on 2018  



 02:13 PM



  



   









 Procedure Note

 

 Interface, External Ris In - 2018  2:15 PM CDT



FINAL REPORT



 



 PATIENT ID:   [id number]



 



 Chest two views



 



 INDICATION: Heart transplant annual follow-up.



 



 COMPARISON: 2017



 



 IMPRESSION:



 



 There is no focal consolidation, vascular congestion, pleural



 effusion, or pneumothorax. Heart size is within normal limits. Median



 sternotomy changes, left axillary surgical clips, and gastric sleeve



 with small hiatal hernia are again noted.



 



 No significant change since 2017.



 



 Signed: Richard Nickerson MD



 Report Verified Date/Time:  2018 14:13:48



 



 Reading Location: [facility] B1 C013W Consult Reading Room



    Electronically signed by: RICHARD NICKERSON M.D. on 2018 02:13 PM



 









 Performing Organization  Address  City/[facility]  Phone Number

 

 [facility]      



ECG 12 lead (2018 11:50 AM CDT)





 Narrative  Performed At

 

 Ventricular Rate 73 BPM



  [facility]



 Atrial Rate 73 BPM



  



 P-R Interval 158 ms



  



 QRS Duration 94 ms



  



 Q-T Interval 392 ms



  



 QTC Calculation(Bazett) 431 ms



  



 P Axis 58 degrees



  



 R Axis 73 degrees



  



 T Axis 56 degrees



  



  



  



 Normal sinus rhythm



  



 Incomplete right bundle branch block



  



 Borderline ECG



  



 When compared with ECG of 04-MAR-2017 20:13,



  



 No significant change was found



  



  



  



 Confirmed by Dana Loo ([id number]) on 2018 8:56:21 PM  









 Procedure Note

 

 Interface, External Ris In - 2018  8:56 PM CDT



Ventricular Rate 73 BPM



 Atrial Rate 73 BPM



 P-R Interval 158 ms



 QRS Duration 94 ms



 Q-T Interval 392 ms



 QTC Calculation(Bazett) 431 ms



 P Axis 58 degrees



 R Axis 73 degrees



 T Axis 56 degrees



 



 Normal sinus rhythm



 Incomplete right bundle branch block



 Borderline ECG



 When compared with ECG of 04-MAR-2017 20:13,



 No significant change was found



 



 Confirmed by aDna Loo ([id number]) on 2018 8:56:21 PM









 Performing Organization  Address  [facility]  Phone Number

 

 [facility]      



Prothrombin time/INR (2018 11:07 AM CDT)





 Component  Value  Ref Range  Performed At

 

 Protime  14.7  11.7 - 14.7 seconds  [facility] [place] [place]

 

 INR  1.2  <=5.9  [facility] [place] [place]









 Specimen

 

 Blood









 Narrative  Performed At

 

  



  [facility] [place] [place]



 RECOMMENDED COUMADIN/WARFARIN INR THERAPY  



 RANGES



  



 STANDARD DOSE: 2.0 - 3.0 Includes:  



 PROPHYLAXIS for venous thrombosis, systemic  



 embolization; TREATMENT for venous thrombosis  



 and/or pulmonary embolus.



  



 HIGH RISK: Target INR is 2.5-3.5 for patients  



 with mechanical heart valves.  









 Performing Organization  Address  City[facility]  Phone Number

 

 [id number] [place] [address] [phone]
[phone]



 CENTER      



Lipid panel (2018 11:07 AM CDT)





 Component  Value  Ref Range  Performed At

 

 Triglycerides  87  mg/dL  [facility] [place] [place]

 

 Cholesterol  127  mg/dL  [facility] [place] [place]

 

 HDL  37  mg/dL  [facility] [place] [place]

 

 LDL Calculated  73  mg/dL  [facility] [place] [place]









 Specimen

 

 Blood









 Narrative  Performed At

 

  



  [facility] [place]



 Triglyceride Reference Range:



  



  Low Risk <150



  



  [id number]



  



  High Risk 200-499



  



  Very High Risk>=500



  



  



  



 Cholesterol Reference Range:



  



  Low Risk <200



  



  [id number] 



  



  High Risk>240



  



  



  



 HDL Cholesterol Reference Range:



  



  Low Risk >=60



  



  High Risk <40



  



  



  



 LDL Cholesterol Reference Range:



  



  Optimal<100



  



  Near [id number]



  



  [id number]



  



  [id number]



  



  Very High >=190  









 Performing Organization  Address  City/State/Zipcode  Phone Number

 

 [facility]  [address] [place] [address] [phone]
[phone]



 CENTER      



Comprehensive metabolic panel (2018 11:07 AM CDT)





 Component  Value  Ref Range  Performed At

 

 Protein, Total  6.5  6.0 - 8.3 gm/dL  [facility] [place] [place]

 

 Albumin  4.2  3.5 - 5.0 g/dL  [facility] [place] [place]

 

 Alkaline Phosphatase  98  40 - 150 U/L  [facility] [place] [place]

 

 Total Bilirubin  0.4  0.2 - 1.2 mg/dL  [facility] [place] [place]

 

 Sodium  141  136 - 145 meq/L  [facility] [place] [place]

 

 Potassium  4.3  3.5 - 5.1 meq/L  [facility] [place] [place]

 

 Chloride  103  98 - 107 meq/L  [facility] [place] [place]

 

 CO2  29  22 - 29 meq/L  [facility] [place] [place]

 

 BUN  17  7 - 21 mg/dL  [facility] [place] [place]

 

 Creatinine  1.00  0.57 - 1.25 mg/dL  [facility] [place] [place]

 

 Glucose  101  70 - 105 mg/dL  [facility] [place] [place]

 

 Calcium  9.5  8.4 - 10.2 mg/dL  [facility] [place] [place]

 

 AST  15  5 - 34 U/L  [facility] [place] [place]

 

 ALT  12  6 - 55 U/L  [facility] [place] [place]

 

 EGFR  79Comment: ESTIMATED GFR  mL/min/1.73 sq m  [facility]



   IS NOT AS ACCURATE AS    [facility]



   CREATININE CLEARANCE IN    



   PREDICTING GLOMERULAR    



   FILTRATION RATE.    



   ESTIMATED GFR IS NOT    



   APPLICABLE [facility]    



   PATIENTS.    









 Specimen

 

 Blood









 Performing Organization  Address  City/[facility]/Zipcode  Phone Number

 

 [facility]  [address] [place] [address] [phone]
[phone]



 CENTER      



after 2018



Insurance







 Payer  Benefit Plan /  Subscriber ID  Type  Phone  Address



   Group        

 

 BLUE [employer]OSS/BLUE  [employer]  xxxxxxxxxxxx  PPO  [phone]  [id number]







 [employer]  [place]



           [address]









 Guarantor Name  Account Type  Relation to  Date of  Phone  Billing



     Patient  Birth    Address

 

 Tyra Bruno  Personal/Family  Self  1970  [phone]  [address]        ([place])  [place] [address]







Advance Directives

For more information, please contact:LEXI [facility] [place], [place] [phone]





 Code Status  Date Activated  Date Inactivated  Comments

 

 Full Code  2018 10:57 AM  2018  9:33 PM  









 This code status was determined by:  Patient  









       

 

 Full Code  2015 11:49 AM  2015  5:04 PM  









 This code status was determined by:  Patient  









       

 

 Full Code  10/29/2015  2:29 PM  10/31/2015  3:02 PM  









 This code status was determined by:  Patient  









       

 

 Full Code  2015 12:39 PM  2015  7:25 PM  









 This code status was determined by:  Patient  









       

 

 Full Code  2015 11:11 AM  2015  7:28 PM  









 This code status was determined by:  Patient stated

## 2025-04-24 ENCOUNTER — HOSPITAL ENCOUNTER (EMERGENCY)
Dept: HOSPITAL 97 - ER | Age: 55
Discharge: HOME | End: 2025-04-24
Payer: COMMERCIAL

## 2025-04-24 VITALS — OXYGEN SATURATION: 98 %

## 2025-04-24 VITALS — DIASTOLIC BLOOD PRESSURE: 94 MMHG | SYSTOLIC BLOOD PRESSURE: 144 MMHG

## 2025-04-24 VITALS — TEMPERATURE: 98.1 F

## 2025-04-24 DIAGNOSIS — L03.115: Primary | ICD-10-CM

## 2025-04-24 PROCEDURE — 99283 EMERGENCY DEPT VISIT LOW MDM: CPT

## 2025-05-23 ENCOUNTER — HOSPITAL ENCOUNTER (EMERGENCY)
Dept: HOSPITAL 97 - ER | Age: 55
Discharge: HOME | End: 2025-05-23
Payer: COMMERCIAL

## 2025-05-23 VITALS — DIASTOLIC BLOOD PRESSURE: 97 MMHG | OXYGEN SATURATION: 99 % | SYSTOLIC BLOOD PRESSURE: 131 MMHG

## 2025-05-23 VITALS — TEMPERATURE: 99.1 F

## 2025-05-23 DIAGNOSIS — Z11.52: ICD-10-CM

## 2025-05-23 DIAGNOSIS — J06.9: Primary | ICD-10-CM

## 2025-05-23 DIAGNOSIS — Z94.1: ICD-10-CM

## 2025-05-23 LAB
ALBUMIN SERPL BCP-MCNC: 3.8 G/DL (ref 3.4–5)
ALBUMIN/GLOB SERPL: 1.1 {RATIO} (ref 1.1–1.8)
ANION GAP SERPL CALC-SCNC: 7.7 MEQ/L (ref 5–15)
BILIRUB INDIRECT SERPL-MCNC: 0.3 MG/DL (ref 0.2–0.8)
GLOBULIN SER CALC-MCNC: 3.5 G/DL (ref 2.3–3.5)
HCT VFR BLD CALC: 46.4 % (ref 39.6–49)
LYMPHOCYTES # SPEC AUTO: 1.3 K/UL (ref 0.7–4.9)
MCH RBC QN AUTO: 28.1 PG (ref 27–35)
MCHC RBC AUTO-ENTMCNC: 34.5 G/DL (ref 32–36)
MCV RBC: 81.5 FL (ref 80–100)
NRBC BLD AUTO-RTO: 0.2 % (ref 0–0)
PMV BLD: 8.1 FL (ref 7.6–11.3)
POTASSIUM SERPL-SCNC: 3.7 MEQ/L (ref 3.5–5.1)
RBC # BLD: 5.69 M/UL (ref 4.33–5.43)
TROPONIN I SERPL HS-MCNC: 7.1 PG/ML (ref ?–58.9)

## 2025-05-23 PROCEDURE — 93005 ELECTROCARDIOGRAM TRACING: CPT

## 2025-05-23 PROCEDURE — 96360 HYDRATION IV INFUSION INIT: CPT

## 2025-05-23 PROCEDURE — 80076 HEPATIC FUNCTION PANEL: CPT

## 2025-05-23 PROCEDURE — 84484 ASSAY OF TROPONIN QUANT: CPT

## 2025-05-23 PROCEDURE — 85025 COMPLETE CBC W/AUTO DIFF WBC: CPT

## 2025-05-23 PROCEDURE — 99285 EMERGENCY DEPT VISIT HI MDM: CPT

## 2025-05-23 PROCEDURE — 36415 COLL VENOUS BLD VENIPUNCTURE: CPT

## 2025-05-23 PROCEDURE — 87428 SARSCOV & INF VIR A&B AG IA: CPT

## 2025-05-23 PROCEDURE — 80048 BASIC METABOLIC PNL TOTAL CA: CPT

## 2025-05-23 PROCEDURE — 71045 X-RAY EXAM CHEST 1 VIEW: CPT
